# Patient Record
Sex: FEMALE | Race: WHITE | Employment: OTHER | URBAN - METROPOLITAN AREA
[De-identification: names, ages, dates, MRNs, and addresses within clinical notes are randomized per-mention and may not be internally consistent; named-entity substitution may affect disease eponyms.]

---

## 2017-04-21 ENCOUNTER — APPOINTMENT (OUTPATIENT)
Dept: CT IMAGING | Age: 72
End: 2017-04-21
Payer: MEDICARE

## 2017-04-21 ENCOUNTER — HOSPITAL ENCOUNTER (EMERGENCY)
Age: 72
Discharge: HOME | End: 2017-04-21
Payer: MEDICARE

## 2017-04-21 VITALS
HEIGHT: 59 IN | SYSTOLIC BLOOD PRESSURE: 121 MMHG | DIASTOLIC BLOOD PRESSURE: 58 MMHG | TEMPERATURE: 98 F | HEART RATE: 70 BPM | RESPIRATION RATE: 18 BRPM | WEIGHT: 151 LBS | BODY MASS INDEX: 30.44 KG/M2 | OXYGEN SATURATION: 91 %

## 2017-04-21 DIAGNOSIS — S01.01XA SCALP LACERATION, INITIAL ENCOUNTER: ICD-10-CM
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
## 2017-04-21 PROCEDURE — 77030008462 HC STPLR SKN PROX J&J -A

## 2017-04-21 PROCEDURE — 74011250637 HC RX REV CODE- 250/637

## 2017-04-21 PROCEDURE — 70450 CT HEAD/BRAIN W/O DYE: CPT

## 2017-04-21 PROCEDURE — 99284 EMERGENCY DEPT VISIT MOD MDM: CPT

## 2017-04-21 PROCEDURE — 75810000293 HC SIMP/SUPERF WND  RPR

## 2017-04-21 PROCEDURE — 77030018836 HC SOL IRR NACL ICUM -A

## 2017-04-21 RX ORDER — SUCRALFATE 1 G/10ML
1 SUSPENSION ORAL
Status: COMPLETED | OUTPATIENT
Start: 2017-04-21 | End: 2017-04-21

## 2017-04-21 RX ADMIN — SUCRALFATE 1 G: 1 SUSPENSION ORAL at 03:46

## 2017-04-21 NOTE — ED PROVIDER NOTES
HPI Comments: 64-year-old female lost her balance and fell backwards striking her head. No loss of consciousness. She does have a laceration to her scalp bleeding is controlled. Patient is a 66 y.o. female presenting with fall. The history is provided by the patient. Fall   The accident occurred 1 to 2 hours ago. The fall occurred while standing. She fell from a height of ground level. She landed on hard floor. The point of impact was the head. The pain is present in the head. Associated symptoms include laceration. Pertinent negatives include no fever, no numbness, no nausea and no loss of consciousness. The risk factors include being elderly and recurrent falls. The symptoms are aggravated by pressure on injury. She has tried nothing for the symptoms. Past Medical History:   Diagnosis Date    Anxiety     Contact dermatitis and other eczema, due to unspecified cause     Depressive disorder, not elsewhere classified     Encounter for chronic pain management     Esophageal reflux     Essential hypertension, benign     Pure hypercholesterolemia        Past Surgical History:   Procedure Laterality Date    HX CATARACT REMOVAL Bilateral     HX COLONOSCOPY      HX HYSTERECTOMY      HX KNEE REPLACEMENT Right     HX LITHOTRIPSY      HX RETINAL DETACHMENT REPAIR      HX SHOULDER REPLACEMENT Left          History reviewed. No pertinent family history. Social History     Social History    Marital status:      Spouse name: N/A    Number of children: N/A    Years of education: N/A     Occupational History    Not on file. Social History Main Topics    Smoking status: Never Smoker    Smokeless tobacco: Never Used    Alcohol use No    Drug use: No    Sexual activity: Not on file     Other Topics Concern    Not on file     Social History Narrative    Patient and  reside together. She denies any in-house stairs. 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 Activity is reported as cooking and household chores as tolerable. Exercise is reported as intolerable. ALLERGIES: Flexeril [cyclobenzaprine]; Ketoprofen; and Plaquenil [hydroxychloroquine]    Review of Systems   Constitutional: Negative. Negative for activity change and fever. HENT: Negative. Eyes: Negative. Respiratory: Negative. Cardiovascular: Negative. Gastrointestinal: Negative. Negative for nausea. Genitourinary: Negative. Musculoskeletal: Negative. Skin: Negative. Neurological: Negative. Negative for loss of consciousness and numbness. Psychiatric/Behavioral: Negative. All other systems reviewed and are negative. Vitals:    04/21/17 0114 04/21/17 0240 04/21/17 0320 04/21/17 0340   BP: 163/75 114/58 140/75 129/78   Pulse: 76 70 72 73   Resp: 16      Temp: 97.8 °F (36.6 °C)      SpO2: 96% 92% 95% 94%   Weight: 68.5 kg (151 lb)      Height: 4' 11\" (1.499 m)               Physical Exam   Constitutional: She is oriented to person, place, and time. She appears well-developed and well-nourished. No distress. HENT:   Head: Normocephalic. Head is with laceration. Right Ear: External ear normal.   Left Ear: External ear normal.   Nose: Nose normal.   Mouth/Throat: Oropharynx is clear and moist. No oropharyngeal exudate. Eyes: Conjunctivae and EOM are normal. Pupils are equal, round, and reactive to light. Right eye exhibits no discharge. Left eye exhibits no discharge. No scleral icterus. Neck: Normal range of motion. Neck supple. No JVD present. No tracheal deviation present. Cardiovascular: Normal rate, regular rhythm and intact distal pulses. Pulmonary/Chest: Effort normal and breath sounds normal. No stridor. No respiratory distress. She has no wheezes. She exhibits no tenderness. Abdominal: Soft. Bowel sounds are normal. She exhibits no distension and no mass. There is no tenderness.    Musculoskeletal: Normal range of motion. She exhibits no edema or tenderness. Neurological: She is alert and oriented to person, place, and time. No cranial nerve deficit. Skin: Skin is warm and dry. Laceration noted. No rash noted. She is not diaphoretic. No erythema. No pallor. Psychiatric: She has a normal mood and affect. Her behavior is normal. Thought content normal.   Nursing note and vitals reviewed. MDM  Number of Diagnoses or Management Options  Fall, initial encounter: new and does not require workup  Scalp laceration, initial encounter: new and does not require workup  Diagnosis management comments: CT of the head negative for fractures or intracranial hemorrhage. Staples used to close the wound. Amount and/or Complexity of Data Reviewed  Tests in the radiology section of CPT®: ordered and reviewed    Risk of Complications, Morbidity, and/or Mortality  Presenting problems: moderate  Diagnostic procedures: moderate  Management options: moderate      ED Course       Wound Repair  Date/Time: 4/21/2017 4:33 AM  Performed by: attendingPreparation: skin prepped with Shur-Clens  Location details: scalp  Wound length:2.6 - 7.5 cm  Foreign bodies: no foreign bodies  Irrigation solution: saline  Irrigation method: syringe  Skin closure: staples  Number of sutures: 10  Dressing: 4x4  My total time at bedside, performing this procedure was 16-30 minutes. Comments: Good approximation was stapling also added some adhesive glue to wound  To reinforce. Bleeding controlled patient observed for several hours and no further bleeding was noted.

## 2017-04-21 NOTE — ED NOTES
I have reviewed discharge instructions with the patient. The patient verbalized understanding. [facility] to transport pt back to home.

## 2017-04-21 NOTE — ED TRIAGE NOTES
Brought in via EMS. States pt fell 45 minutes ago after losing balance which pt states is a normal problem for her. On fall, pt hit head on coffee table. Denies any LOC. Pt was able to ambulate after fall. Dressing applied prior to arrival to ED. Pt states she does not take any blood thinners. Pt alert and oriented x 4. Respirations are even and unlabored. Pt appears in no acute distress at this time.

## 2017-04-21 NOTE — DISCHARGE INSTRUCTIONS
Cuts: Care Instructions  Your Care Instructions  A cut can happen anywhere on your body. Stitches, staples, skin adhesives, or pieces of tape called Steri-Strips are sometimes used to keep the edges of a cut together and help it heal. Steri-Strips can be used by themselves or with stitches or staples. Sometimes cuts are left open. If the cut went deep and through the skin, the doctor may have closed the cut in two layers. A deeper layer of stitches brings the deep part of the cut together. These stitches will dissolve and don't need to be removed. The upper layer closure, which could be stitches, staples, Steri-Strips, or adhesive, is what you see on the cut. A cut is often covered by a bandage. The doctor has checked you carefully, but problems can develop later. If you notice any problems or new symptoms, get medical treatment right away. Follow-up care is a key part of your treatment and safety. Be sure to make and go to all appointments, and call your doctor if you are having problems. It's also a good idea to know your test results and keep a list of the medicines you take. How can you care for yourself at home? If a cut is open or closed  · Prop up the sore area on a pillow anytime you sit or lie down during the next 3 days. Try to keep it above the level of your heart. This will help reduce swelling. · Keep the cut dry for the first 24 to 48 hours. After this, you can shower if your doctor okays it. Pat the cut dry. · Don't soak the cut, such as in a bathtub. Your doctor will tell you when it's safe to get the cut wet. · After the first 24 to 48 hours, clean the cut with soap and water 2 times a day unless your doctor gives you different instructions. ¨ Don't use hydrogen peroxide or alcohol, which can slow healing. ¨ You may cover the cut with a thin layer of petroleum jelly and a nonstick bandage.   ¨ If the doctor put a bandage over the cut, put on a new bandage after cleaning the cut or if the bandage gets wet or dirty. · Avoid any activity that could cause your cut to reopen. · Be safe with medicines. Read and follow all instructions on the label. ¨ If the doctor gave you a prescription medicine for pain, take it as prescribed. ¨ If you are not taking a prescription pain medicine, ask your doctor if you can take an over-the-counter medicine. If the cut is closed with stitches, staples, or Steri-Strips  · Follow the above instructions for open or closed cuts. · Do not remove the stitches or staples on your own. Your doctor will tell you when to come back to have the stitches or staples removed. · Leave Steri-Strips on until they fall off. If the cut is closed with a skin adhesive  · Follow the above instructions for open or closed cuts. · Leave the skin adhesive on your skin until it falls off on its own. This may take 5 to 10 days. · Do not scratch, rub, or pick at the adhesive. · Do not put the sticky part of a bandage directly on the adhesive. · Do not put any kind of ointment, cream, or lotion over the area. This can make the adhesive fall off too soon. Do not use hydrogen peroxide or alcohol, which can slow healing. When should you call for help? Call your doctor now or seek immediate medical care if:  · You have new pain, or your pain gets worse. · The skin near the cut is cold or pale or changes color. · You have tingling, weakness, or numbness near the cut. · The cut starts to bleed, and blood soaks through the bandage. Oozing small amounts of blood is normal.  · You have trouble moving the area near the cut. · You have symptoms of infection, such as:  ¨ Increased pain, swelling, warmth, or redness around the cut. ¨ Red streaks leading from the cut. ¨ Pus draining from the cut. ¨ A fever. Watch closely for changes in your health, and be sure to contact your doctor if:  · The cut reopens. · You do not get better as expected. Where can you learn more?   Go to http://maisha-faheem.info/. Enter M735 in the search box to learn more about \"Cuts: Care Instructions. \"  Current as of: May 27, 2016  Content Version: 11.2  © 0178-3217 [employer]. Care instructions adapted under license by [employer] (which disclaims liability or warranty for this information). If you have questions about a medical condition or this instruction, always ask your healthcare professional. [address] any warranty or liability for your use of this information. Preventing Falls: Care Instructions  Your Care Instructions  Getting around your home safely can be a challenge if you have injuries or health problems that make it easy for you to fall. Loose rugs and furniture in walkways are among the dangers for many older people who have problems walking or who have poor eyesight. People who have conditions such as arthritis, osteoporosis, or dementia also have to be careful not to fall. You can make your home safer with a few simple measures. Follow-up care is a key part of your treatment and safety. Be sure to make and go to all appointments, and call your doctor if you are having problems. It's also a good idea to know your test results and keep a list of the medicines you take. How can you care for yourself at home? Taking care of yourself  · You may get dizzy if you do not drink enough water. To prevent dehydration, drink plenty of fluids, enough so that your urine is light yellow or clear like water. Choose water and other caffeine-free clear liquids. If you have kidney, heart, or liver disease and have to limit fluids, talk with your doctor before you increase the amount of fluids you drink. · Exercise regularly to improve your strength, muscle tone, and balance. Walk if you can. Swimming may be a good choice if you cannot walk easily. · Have your vision and hearing checked each year or any time you notice a change.  If you have trouble seeing and hearing, you might not be able to avoid objects and could lose your balance. · Know the side effects of the medicines you take. Ask your doctor or pharmacist whether the medicines you take can affect your balance. Sleeping pills or sedatives can affect your balance. · Limit the amount of alcohol you drink. Alcohol can impair your balance and other senses. · Ask your doctor whether calluses or corns on your feet need to be removed. If you wear loose-fitting shoes because of calluses or corns, you can lose your balance and fall. · Talk to your doctor if you have numbness in your feet. Preventing falls at home  · Remove raised doorway thresholds, throw rugs, and clutter. Repair loose carpet or raised areas in the floor. · Move furniture and electrical cords to keep them out of walking paths. · Use nonskid floor wax, and wipe up spills right away, especially on ceramic tile floors. · If you use a walker or cane, put rubber tips on it. If you use crutches, clean the bottoms of them regularly with an abrasive pad, such as steel wool. · Keep your house well lit, especially [place], and outside walkways. Use night-lights in areas such as hallways and bathrooms. Add extra light switches or use remote switches (such as switches that go on or off when you clap your hands) to make it easier to turn lights on if you have to get up during the night. · Install sturdy handrails on stairways. · Move items in your cabinets so that the things you use a lot are on the lower shelves (about waist level). · Keep a cordless phone and a flashlight with new batteries by your bed. If possible, put a phone in each of the main rooms of your house, or carry a cell phone in case you fall and cannot reach a phone. Or, you can wear a device around your neck or wrist. You push a button that sends a signal for help. · Wear low-heeled shoes that fit well and give your feet good support.  Use footwear with nonskid soles. Check the heels and soles of your shoes for wear. Repair or replace worn heels or soles. · Do not wear socks without shoes on wood floors. · Walk on the grass when the sidewalks are slippery. If you live in an area that gets snow and ice in the winter, sprinkle salt on slippery steps and sidewalks. Preventing falls in the bath  · Install grab bars and nonskid mats inside and outside your shower or tub and near the toilet and sinks. · Use shower chairs and bath benches. · Use a hand-held shower head that will allow you to sit while showering. · Get into a tub or shower by putting the weaker leg in first. Get out of a tub or shower with your strong side first.  · Repair loose toilet seats and consider installing a raised toilet seat to make getting on and off the toilet easier. · Keep your bathroom door unlocked while you are in the shower. Where can you learn more? Go to http://maishaParachutefaheem.info/. Enter 0476 [id number] in the search box to learn more about \"Preventing Falls: Care Instructions. \"  Current as of: August 4, 2016  Content Version: 11.2  © 1717-9769 [employer]. Care instructions adapted under license by [employer] (which disclaims liability or warranty for this information). If you have questions about a medical condition or this instruction, always ask your healthcare professional. [place] [address] any warranty or liability for your use of this information. Cuts Closed With Staples: Care Instructions  Your Care Instructions  A cut can happen anywhere on your body. The doctor used staples to close the cut. Staples easily and quickly close a cut, which helps the cut heal.  Sometimes a cut can injure tendons, blood vessels, or nerves. If the cut went deep and through the skin, the doctor may have put in a layer of stitches below the staples. The deeper layer of stitches brings the deep part of the cut together.  These stitches will dissolve and don't need to be removed. The staples in the upper layer are what you see on the cut. You may have a bandage. You will need to have the staples removed, usually in 7 to 14 days. The doctor has checked you carefully, but problems can develop later. If you notice any problems or new symptoms, get medical treatment right away. Follow-up care is a key part of your treatment and safety. Be sure to make and go to all appointments, and call your doctor if you are having problems. It's also a good idea to know your test results and keep a list of the medicines you take. How can you care for yourself at home? · Keep the cut dry for the first 24 to 48 hours. After this, you can shower if your doctor okays it. Pat the cut dry. · Don't soak the cut, such as in a bathtub. Your doctor will tell you when it's safe to get the cut wet. · If your doctor told you how to care for your cut, follow your doctor's instructions. If you did not get instructions, follow this general advice:  ¨ After the first 24 to 48 hours, wash around the cut with clean water 2 times a day. Don't use hydrogen peroxide or alcohol, which can slow healing. ¨ You may cover the cut with a thin layer of petroleum jelly, such as Vaseline, and a nonstick bandage. ¨ Apply more petroleum jelly and replace the bandage as needed. · Avoid any activity that could cause your cut to reopen. · Do not remove the staples on your own. Your doctor will tell you when to come back to have the staples removed. · Take pain medicines exactly as directed. ¨ If the doctor gave you a prescription medicine for pain, take it as prescribed. ¨ If you are not taking a prescription pain medicine, ask your doctor if you can take an over-the-counter medicine. When should you call for help? Call your doctor now or seek immediate medical care if:  · You have new pain, or your pain gets worse.   · The skin near the cut is cold or pale or changes color.  · You have tingling, weakness, or numbness near the cut. · The cut starts to bleed, and blood soaks through the bandage. Oozing small amounts of blood is normal.  · You have trouble moving the area near the cut. · You have symptoms of infection, such as:  ¨ Increased pain, swelling, warmth, or redness around the cut. ¨ Red streaks leading from the cut. ¨ Pus draining from the cut. ¨ A fever. Watch closely for changes in your health, and be sure to contact your doctor if:  · You do not get better as expected. Where can you learn more? Go to http://maisha-faheem.info/. Enter A618 in the search box to learn more about \"Cuts Closed With Staples: Care Instructions. \"  Current as of: May 27, 2016  Content Version: 11.2  © 4737-5997 [employer]. Care instructions adapted under license by [employer] (which disclaims liability or warranty for this information). If you have questions about a medical condition or this instruction, always ask your healthcare professional. [address] any warranty or liability for your use of this information.

## 2017-04-22 ENCOUNTER — PATIENT OUTREACH (OUTPATIENT)
Dept: CASE MANAGEMENT | Age: 72
End: 2017-04-22

## 2017-04-22 NOTE — PROGRESS NOTES
Initial outreach attempt to patient was unsuccessful. Second outreach attempt will be made within 24 hours. This note will not be viewable in [address]

## 2017-08-05 ENCOUNTER — APPOINTMENT (OUTPATIENT)
Dept: GENERAL RADIOLOGY | Age: 72
End: 2017-08-05
Attending: EMERGENCY MEDICINE
Payer: MEDICARE

## 2017-08-05 ENCOUNTER — HOSPITAL ENCOUNTER (EMERGENCY)
Age: 72
Discharge: HOME | End: 2017-08-06
Attending: EMERGENCY MEDICINE
Payer: MEDICARE

## 2017-08-05 DIAGNOSIS — K21.9 GASTROESOPHAGEAL REFLUX DISEASE, ESOPHAGITIS PRESENCE NOT SPECIFIED: ICD-10-CM

## 2017-08-05 DIAGNOSIS — R55 VASOVAGAL EPISODE: ICD-10-CM

## 2017-08-05 DIAGNOSIS — R55 NEAR SYNCOPE: Primary | ICD-10-CM

## 2017-08-05 LAB
ALBUMIN SERPL BCP-MCNC: 3.2 G/DL (ref 3.2–4.6)
ALBUMIN/GLOB SERPL: 0.9 {RATIO} (ref 1.2–3.5)
ALP SERPL-CCNC: 90 U/L (ref 50–136)
ALT SERPL-CCNC: 18 U/L (ref 12–65)
ANION GAP BLD CALC-SCNC: 12 MMOL/L (ref 7–16)
AST SERPL W P-5'-P-CCNC: 21 U/L (ref 15–37)
BASOPHILS # BLD AUTO: 0 K/UL (ref 0–0.2)
BASOPHILS # BLD: 0 % (ref 0–2)
BILIRUB SERPL-MCNC: 0.2 MG/DL (ref 0.2–1.1)
BUN SERPL-MCNC: 9 MG/DL (ref 8–23)
CALCIUM SERPL-MCNC: 8.2 MG/DL (ref 8.3–10.4)
CHLORIDE SERPL-SCNC: 107 MMOL/L (ref 98–107)
CO2 SERPL-SCNC: 25 MMOL/L (ref 21–32)
CREAT SERPL-MCNC: 0.69 MG/DL (ref 0.6–1)
DIFFERENTIAL METHOD BLD: ABNORMAL
EOSINOPHIL # BLD: 0.3 K/UL (ref 0–0.8)
EOSINOPHIL NFR BLD: 3 % (ref 0.5–7.8)
ERYTHROCYTE [DISTWIDTH] IN BLOOD BY AUTOMATED COUNT: 15.3 % (ref 11.9–14.6)
GLOBULIN SER CALC-MCNC: 3.6 G/DL (ref 2.3–3.5)
GLUCOSE SERPL-MCNC: 151 MG/DL (ref 65–100)
HCT VFR BLD AUTO: 34.3 % (ref 35.8–46.3)
HGB BLD-MCNC: 10.5 G/DL (ref 11.7–15.4)
IMM GRANULOCYTES # BLD: 0 K/UL (ref 0–0.5)
IMM GRANULOCYTES NFR BLD AUTO: 0.2 % (ref 0–5)
LYMPHOCYTES # BLD AUTO: 11 % (ref 13–44)
LYMPHOCYTES # BLD: 1.2 K/UL (ref 0.5–4.6)
MCH RBC QN AUTO: 25.7 PG (ref 26.1–32.9)
MCHC RBC AUTO-ENTMCNC: 30.6 G/DL (ref 31.4–35)
MCV RBC AUTO: 84.1 FL (ref 79.6–97.8)
MONOCYTES # BLD: 1.3 K/UL (ref 0.1–1.3)
MONOCYTES NFR BLD AUTO: 12 % (ref 4–12)
NEUTS SEG # BLD: 7.8 K/UL (ref 1.7–8.2)
NEUTS SEG NFR BLD AUTO: 74 % (ref 43–78)
PLATELET # BLD AUTO: 288 K/UL (ref 150–450)
PMV BLD AUTO: 9.7 FL (ref 10.8–14.1)
POTASSIUM SERPL-SCNC: 3.3 MMOL/L (ref 3.5–5.1)
PROT SERPL-MCNC: 6.8 G/DL (ref 6.3–8.2)
RBC # BLD AUTO: 4.08 M/UL (ref 4.05–5.25)
SODIUM SERPL-SCNC: 144 MMOL/L (ref 136–145)
TROPONIN I BLD-MCNC: 0.01 NG/ML (ref 0–0.08)
WBC # BLD AUTO: 10.6 K/UL (ref 4.3–11.1)

## 2017-08-05 PROCEDURE — 85025 COMPLETE CBC W/AUTO DIFF WBC: CPT | Performed by: EMERGENCY MEDICINE

## 2017-08-05 PROCEDURE — 84484 ASSAY OF TROPONIN QUANT: CPT

## 2017-08-05 PROCEDURE — 93005 ELECTROCARDIOGRAM TRACING: CPT | Performed by: EMERGENCY MEDICINE

## 2017-08-05 PROCEDURE — 80053 COMPREHEN METABOLIC PANEL: CPT | Performed by: EMERGENCY MEDICINE

## 2017-08-05 PROCEDURE — 71010 XR CHEST PORT: CPT

## 2017-08-05 PROCEDURE — 99284 EMERGENCY DEPT VISIT MOD MDM: CPT | Performed by: EMERGENCY MEDICINE

## 2017-08-06 ENCOUNTER — APPOINTMENT (OUTPATIENT)
Dept: GENERAL RADIOLOGY | Age: 72
End: 2017-08-06
Attending: EMERGENCY MEDICINE
Payer: MEDICARE

## 2017-08-06 VITALS
HEART RATE: 85 BPM | DIASTOLIC BLOOD PRESSURE: 57 MMHG | OXYGEN SATURATION: 96 % | RESPIRATION RATE: 16 BRPM | SYSTOLIC BLOOD PRESSURE: 115 MMHG | TEMPERATURE: 98.2 F

## 2017-08-06 LAB
ATRIAL RATE: 88 BPM
CALCULATED P AXIS, ECG09: 77 DEGREES
CALCULATED R AXIS, ECG10: 57 DEGREES
CALCULATED T AXIS, ECG11: 16 DEGREES
DIAGNOSIS, 93000: NORMAL
P-R INTERVAL, ECG05: 162 MS
Q-T INTERVAL, ECG07: 350 MS
QRS DURATION, ECG06: 84 MS
QTC CALCULATION (BEZET), ECG08: 423 MS
VENTRICULAR RATE, ECG03: 88 BPM

## 2017-08-06 PROCEDURE — 71020 XR CHEST PA LAT: CPT

## 2017-08-06 PROCEDURE — 74011250637 HC RX REV CODE- 250/637: Performed by: EMERGENCY MEDICINE

## 2017-08-06 RX ADMIN — Medication 30 ML: at 00:10

## 2017-08-06 NOTE — ED NOTES
After trying to contact patient's son to come and pick her up, patient's  (who is unable to drive and is currently at home), said to have an ambulance bring patient home. Patient being transported home via EMS.

## 2017-08-06 NOTE — DISCHARGE INSTRUCTIONS
Gastroesophageal Reflux Disease (GERD): Care Instructions  Your Care Instructions    Gastroesophageal reflux disease (GERD) is the backward flow of stomach acid into the esophagus. The esophagus is the tube that leads from your throat to your stomach. A one-way valve prevents the stomach acid from moving up into this tube. When you have GERD, this valve does not close tightly enough. If you have mild GERD symptoms including heartburn, you may be able to control the problem with antacids or over-the-counter medicine. Changing your diet, losing weight, and making other lifestyle changes can also help reduce symptoms. Follow-up care is a key part of your treatment and safety. Be sure to make and go to all appointments, and call your doctor if you are having problems. Its also a good idea to know your test results and keep a list of the medicines you take. How can you care for yourself at home? · Take your medicines exactly as prescribed. Call your doctor if you think you are having a problem with your medicine. · Your doctor may recommend over-the-counter medicine. For mild or occasional indigestion, antacids, such as Tums, Gaviscon, Mylanta, or Maalox, may help. Your doctor also may recommend over-the-counter acid reducers, such as Pepcid AC, Tagamet HB, Zantac 75, or Prilosec. Read and follow all instructions on the label. If you use these medicines often, talk with your doctor. · Change your eating habits. ¨ Its best to eat several small meals instead of two or three large meals. ¨ After you eat, wait 2 to 3 hours before you lie down. ¨ Chocolate, mint, and alcohol can make GERD worse. ¨ Spicy foods, foods that have a lot of acid (like tomatoes and oranges), and coffee can make GERD symptoms worse in some people. If your symptoms are worse after you eat a certain food, you may want to stop eating that food to see if your symptoms get better.   · Do not smoke or chew tobacco. Smoking can make GERD worse. If you need help quitting, talk to your doctor about stop-smoking programs and medicines. These can increase your chances of quitting for good. · If you have GERD symptoms at night, raise the head of your bed 6 to 8 inches by putting the frame on blocks or placing a foam wedge under the head of your mattress. (Adding extra pillows does not work.)  · Do not wear tight clothing around your middle. · Lose weight if you need to. Losing just 5 to 10 pounds can help. When should you call for help? Call your doctor now or seek immediate medical care if:  · You have new or different belly pain. · Your stools are black and tarlike or have streaks of blood. Watch closely for changes in your health, and be sure to contact your doctor if:  · Your symptoms have not improved after 2 days. · Food seems to catch in your throat or chest.  Where can you learn more? Go to http://maishaHeliospectrafaheem.info/. Enter V179 in the search box to learn more about \"Gastroesophageal Reflux Disease (GERD): Care Instructions. \"  Current as of: August 9, 2016  Content Version: 11.3  © 0516-9427 [employer]. Care instructions adapted under license by [employer] (which disclaims liability or warranty for this information). If you have questions about a medical condition or this instruction, always ask your healthcare professional. [address] any warranty or liability for your use of this information. Vasovagal Syncope: Care Instructions  Your Care Instructions    Vasovagal syncope (say \"[id number] [id number]\") is sudden dizziness or fainting that can be set off by things such as pain, stress, fear, or trauma. You may sweat or feel lightheaded, sick to your stomach, or tingly. The problem causes the heart rate to slow and the blood vessels to widen, or dilate, for a short time.  When this happens, blood pools in the lower body, and less blood goes to the brain. You can usually get relief by lying down with your legs raised (elevated). This helps more blood to flow to your brain and may help relieve symptoms like feeling dizzy. Some doctors may recommend a technique that involves tensing your fists and arms. This type of fainting is often easy to predict. For example, it happens to some people when they see blood or have to get a shot. They may feel symptoms before they faint. An episode of vasovagal syncope usually responds well to self-care. Other treatment often isn't needed. But if the fainting keeps happening, your doctor may suggest further treatments. Follow-up care is a key part of your treatment and safety. Be sure to make and go to all appointments, and call your doctor if you are having problems. It's also a good idea to know your test results and keep a list of the medicines you take. How can you care for yourself at home? · Drink plenty of fluids to prevent dehydration. If you have kidney, heart, or liver disease and have to limit fluids, talk with your doctor before you increase your fluid intake. · Try to avoid things that you think may set off vasovagal syncope. · Talk to your doctor about any medicines you take. Some medicines may increase the chance of this condition occurring. · If you feel symptoms, lie down with your legs raised. Talk to your doctor about what to do if your symptoms come back. When should you call for help? Call 911 anytime you think you may need emergency care. For example, call if:  · You have symptoms of a heart problem. These may include:  ¨ Chest pain or pressure. ¨ Severe trouble breathing. ¨ A fast or irregular heartbeat. Watch closely for changes in your health, and be sure to contact your doctor if:  · You have more episodes of fainting at home. · You do not get better as expected. Where can you learn more? Go to http://maisha-faheem.info/.   Enter L754 in the search box to learn more about \"Vasovagal Syncope: Care Instructions. \"  Current as of: March 20, 2017  Content Version: 11.3  © 6591-6524 [employer]. Care instructions adapted under license by [employer] (which disclaims liability or warranty for this information). If you have questions about a medical condition or this instruction, always ask your healthcare professional. [address] any warranty or liability for your use of this information.

## 2017-08-06 NOTE — ED TRIAGE NOTES
Pt arrives via [facility] complaining of a syncopal episode after a bowel movement. Per EMS patient stated she never passed out, but she was dizzy. Pt has had 150 of fluid, /50, HR 80s, pt alert and oriented. States these episodes have happened 6 times over the past 2 years.   Son's phone number [phone]

## 2017-08-06 NOTE — ED PROVIDER NOTES
HPI Comments: 26-year-old female presents with a near syncopal episode while straining to have a bowel movement. Patient has a history of vagal episodes in the past and stated it is was similar to what she's had before. Patient denies loss of consciousness and states that she did not fall and did not injure herself  Currently she is complaining mainly of a cough related to her reflux disease. She missed a dose of her Carafate earlier this evening. Patient is a 74 y.o. female presenting with syncope. The history is provided by the patient. Syncope    This is a recurrent problem. The current episode started 1 to 2 hours ago. The problem occurs rarely. The problem has been resolved. There was no loss of consciousness. The problem is associated with bowel movement. Associated symptoms include nausea. Pertinent negatives include no visual change, no chest pain, no palpitations, no confusion, no fever, no abdominal pain, no bowel incontinence, no vomiting, no bladder incontinence, no congestion, no headaches, no back pain, no focal weakness, no seizures and no slurred speech. She has tried nothing for the symptoms. Her past medical history does not include no syncope. Past Medical History:   Diagnosis Date    Anxiety     Contact dermatitis and other eczema, due to unspecified cause     Depressive disorder, not elsewhere classified     Encounter for chronic pain management     Esophageal reflux     Essential hypertension, benign     Pure hypercholesterolemia        Past Surgical History:   Procedure Laterality Date    HX CATARACT REMOVAL Bilateral     HX COLONOSCOPY      HX HYSTERECTOMY      HX KNEE REPLACEMENT Right     HX LITHOTRIPSY      HX RETINAL DETACHMENT REPAIR      HX SHOULDER REPLACEMENT Left          History reviewed. No pertinent family history.     Social History     Social History    Marital status:      Spouse name: N/A    Number of children: N/A    Years of education: N/A     Occupational History    Not on file. Social History Main Topics    Smoking status: Never Smoker    Smokeless tobacco: Never Used    Alcohol use No    Drug use: No    Sexual activity: Not on file     Other Topics Concern    Not on file     Social History Narrative    Patient and  reside together. She denies any in-house stairs. 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 Activity is reported as cooking and household chores as tolerable. Exercise is reported as intolerable. ALLERGIES: Flexeril [cyclobenzaprine]; Ketoprofen; and Plaquenil [hydroxychloroquine]    Review of Systems   Constitutional: Negative for chills and fever. HENT: Negative for congestion, ear pain and rhinorrhea. Eyes: Negative for photophobia and discharge. Respiratory: Negative for cough and shortness of breath. Cardiovascular: Positive for syncope. Negative for chest pain and palpitations. Gastrointestinal: Positive for nausea. Negative for abdominal pain, bowel incontinence, constipation, diarrhea and vomiting. Endocrine: Negative for cold intolerance and heat intolerance. Genitourinary: Negative for bladder incontinence, dysuria and flank pain. Musculoskeletal: Negative for arthralgias, back pain, myalgias and neck pain. Skin: Negative for rash and wound. Allergic/Immunologic: Negative for environmental allergies and food allergies. Neurological: Negative for focal weakness, seizures, syncope and headaches. Hematological: Negative for adenopathy. Does not bruise/bleed easily. Psychiatric/Behavioral: Negative for confusion and dysphoric mood. The patient is not nervous/anxious. All other systems reviewed and are negative. Vitals:    08/05/17 2244   BP: 101/50   Pulse: 88   Resp: 17   Temp: 98.2 °F (36.8 °C)   SpO2: 90%            Physical Exam   Constitutional: She is oriented to person, place, and time.  She appears well-developed and well-nourished. She appears distressed. HENT:   Head: Normocephalic and atraumatic. Mouth/Throat: Oropharynx is clear and moist.   Eyes: Conjunctivae and EOM are normal. Pupils are equal, round, and reactive to light. Right eye exhibits no discharge. Left eye exhibits no discharge. No scleral icterus. Neck: Normal range of motion. Neck supple. No thyromegaly present. Cardiovascular: Normal rate, regular rhythm, normal heart sounds and intact distal pulses. Exam reveals no gallop and no friction rub. No murmur heard. Pulmonary/Chest: Effort normal and breath sounds normal. No respiratory distress. She has no wheezes. She exhibits no tenderness. Abdominal: Soft. Bowel sounds are normal. She exhibits no distension. There is no hepatosplenomegaly. There is no tenderness. There is no rebound and no guarding. No hernia. Musculoskeletal: Normal range of motion. She exhibits no edema or tenderness. Neurological: She is alert and oriented to person, place, and time. She has normal strength. No cranial nerve deficit or sensory deficit. She exhibits normal muscle tone. GCS eye subscore is 4. GCS verbal subscore is 5. GCS motor subscore is 6. Skin: Skin is warm, dry and intact. No rash noted. She is not diaphoretic. No erythema. There is pallor. Psychiatric: She has a normal mood and affect. Her speech is normal and behavior is normal. Judgment and thought content normal. Cognition and memory are normal.   Nursing note and vitals reviewed.        MDM  Number of Diagnoses or Management Options  Gastroesophageal reflux disease, esophagitis presence not specified: established and worsening  Near syncope: established and worsening  Vasovagal episode: established and worsening  Diagnosis management comments: EKG reviewed  Sinus rhythm   normal intervals and axis   no ectopy   no acute ischemic changes    Patient remains asymptomatic Workup tonight does not reveal any acute changes  Patient states that these symptoms are similar to past episodes that have been worked up by her primary care provider currently she feels well  Reflux symptoms improved with GI cocktail. Patient will be discharged I have advised close follow-up with her primary care provider       Amount and/or Complexity of Data Reviewed  Clinical lab tests: ordered and reviewed  Tests in the radiology section of CPT®: ordered and reviewed  Review and summarize past medical records: yes    Risk of Complications, Morbidity, and/or Mortality  Presenting problems: high  Diagnostic procedures: moderate  Management options: moderate  General comments: Elements of this note have been dictated via voice recognition software. Text and phrases may be limited by the accuracy of the software. The chart has been reviewed, but errors may still be present.       Patient Progress  Patient progress: improved    ED Course       Procedures

## 2018-02-07 ENCOUNTER — HOSPITAL ENCOUNTER (OUTPATIENT)
Dept: PHYSICAL THERAPY | Age: 73
End: 2018-02-07
Attending: FAMILY MEDICINE

## 2018-07-18 PROBLEM — R29.6 FREQUENT FALLS: Status: ACTIVE | Noted: 2018-07-18

## 2018-07-18 PROBLEM — R29.3 POSTURAL IMBALANCE: Status: ACTIVE | Noted: 2018-07-18

## 2018-07-18 PROBLEM — M54.2 NECK PAIN: Status: ACTIVE | Noted: 2018-07-18

## 2018-07-18 PROBLEM — R26.9 ABNORMALITY OF GAIT: Status: ACTIVE | Noted: 2018-07-18

## 2018-07-31 ENCOUNTER — HOSPITAL ENCOUNTER (OUTPATIENT)
Dept: MRI IMAGING | Age: 73
Discharge: HOME | End: 2018-07-31
Attending: NURSE PRACTITIONER
Payer: MEDICARE

## 2018-07-31 DIAGNOSIS — R26.9 ABNORMALITY OF GAIT: ICD-10-CM

## 2018-07-31 DIAGNOSIS — R29.6 FREQUENT FALLS: ICD-10-CM

## 2018-07-31 DIAGNOSIS — M54.2 NECK PAIN: ICD-10-CM

## 2018-07-31 PROCEDURE — 70551 MRI BRAIN STEM W/O DYE: CPT

## 2018-07-31 PROCEDURE — 72141 MRI NECK SPINE W/O DYE: CPT

## 2018-10-29 PROBLEM — M48.02 SPINAL STENOSIS IN CERVICAL REGION: Status: ACTIVE | Noted: 2018-10-29

## 2018-10-29 PROBLEM — G31.9 CEREBELLAR ATROPHY (HCC): Status: ACTIVE | Noted: 2018-10-29

## 2018-10-30 ENCOUNTER — HOSPITAL ENCOUNTER (OUTPATIENT)
Dept: MAMMOGRAPHY | Age: 73
Discharge: HOME | End: 2018-10-30
Attending: FAMILY MEDICINE
Payer: MEDICARE

## 2018-10-30 DIAGNOSIS — Z12.39 SCREENING BREAST EXAMINATION: ICD-10-CM

## 2018-10-30 PROCEDURE — 77067 SCR MAMMO BI INCL CAD: CPT

## 2018-11-21 ENCOUNTER — HOSPITAL ENCOUNTER (OUTPATIENT)
Dept: PHYSICAL THERAPY | Age: 73
End: 2018-11-21
Attending: NURSE PRACTITIONER

## 2018-12-05 ENCOUNTER — HOSPITAL ENCOUNTER (OUTPATIENT)
Dept: PHYSICAL THERAPY | Age: 73
Discharge: HOME | End: 2018-12-05
Payer: MEDICARE

## 2018-12-05 PROCEDURE — G8978 MOBILITY CURRENT STATUS: HCPCS

## 2018-12-05 PROCEDURE — 97110 THERAPEUTIC EXERCISES: CPT

## 2018-12-05 PROCEDURE — 97162 PT EVAL MOD COMPLEX 30 MIN: CPT

## 2018-12-05 PROCEDURE — G8979 MOBILITY GOAL STATUS: HCPCS

## 2018-12-05 NOTE — THERAPY EVALUATION
Tyson Richard  : 1945  [place]  Secondary: [facility]  [facility], [address] [place] [address],[address], [facility] [address].  [phone]   [phone]           OUTPATIENT PHYSICAL THERAPY:Initial Assessment 2018   ICD-10: Treatment Diagnosis: Other abnormalities of gait and mobility R26.89;   Precautions/Allergies:   Flexeril [cyclobenzaprine]; Ketoprofen; and Plaquenil [hydroxychloroquine]   MD Orders: eval and treat MEDICAL/REFERRING DIAGNOSIS:  Degenerative disease of nervous system, unspecified [G31.9]  Spinal stenosis, cervical region [M48.02]   DATE OF ONSET: years  REFERRING PHYSICIAN: Teo Sánchez NP  RETURN PHYSICIAN APPOINTMENT:      INITIAL ASSESSMENT:  Ms. Ashlyn Higuera presents with imbalance, weakness, unsteady gait. Patient has a history of falls. . Patient would benefit from PT to address these problems to improve patient's independence and safety with mobility and daily activities. Thank you. PROBLEM LIST (Impacting functional limitations):  1. Decreased Strength  2. Decreased ADL/Functional Activities  3. Decreased Transfer Abilities  4. Decreased Ambulation Ability/Technique  5. Decreased Balance  6. Decreased Activity Tolerance  7. Decreased [place] with Home Exercise Program INTERVENTIONS PLANNED:  1. Balance Exercise  2. Bed Mobility  3. Family Education  4. Gait Training  5. Home Exercise Program (HEP)  6. Neuromuscular Re-education/Strengthening  7. Therapeutic Activites  8. Therapeutic Exercise/Strengthening  9. Transfer Training   TREATMENT PLAN:  Effective Dates: 2018 TO 3/3/2019 (90 days). Frequency/Duration: 1-2 times a week for 60- 90 Day(s)  GOALS: (Goals have been discussed and agreed upon with patient.)  Short-Term Functional Goals: Time Frame: 2-4 weeks  1. Patient will demonstrate independence and compliance with home exercise program to improve balance and strength for daily activities. 2.   3.   Discharge Goals: Time Frame: 8-12 weeks  1. Patient will increase bilateral lower extremity strength to greater than or equal to 4/5 to improve strength for functional mobility activities. 2. Patient will increase her score on the Lockwood Balance Scale to greater than or equal to 14/56 indicating improved safety and decreased fall risk for daily activities. Patient will demonstrate improved score to less than or equal to 24 seconds on the Timed Up and Go Test indicating improved mobility for daily activities. Patient will demonstrate improved time on Five Time Sit to Stand Test to less than or equal to 18 seconds indicating improved LE strength for daily mobility. Rehabilitation Potential For Stated Goals: Good  Regarding Mona Jasso therapy, I certify that the treatment plan above will be carried out by a therapist or under their direction. Thank you for this referral,  Lavelle Pedersen PT     Referring Physician Signature: Bishop Keila NP              Date                    The information in this section was collected on 12/5/18 (except where otherwise noted). HISTORY:   History of Present Injury/Illness (Reason for Referral):  Balance has been worsening over the last couple of years, especially the last 6 months. R neck hurts at times, and into head. Has had a lot of falls. Getting harder to get up from the floor, usually use a chair. Walks with rollator walker x 15-20 years. No numbness, no dizziness. Per MD notes: \"Patient presents for follow up for spinal cerebellar atrophy and frequent falls. She has a sister with SCA, similar symptoms to patient, presume SCA is genetic, though patient has not undergone genetic testing. She has a 30 year history of SCA and chronic difficulty with her gait worsening over the last 15 years. More recently she is experiencing more falls at home, despite using her rollator. She denies any difficulty with her vision.   Recent MRI brain showed cerebellar atrophy. 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 \"      Past Medical History/Comorbidities:   Ms. Marcia Ortiz  has a past medical history of Abnormality of gait, Anemia, Anxiety, Arthritis, Asthma, Calculus of kidney, Chronic pain, Contact dermatitis and other eczema, due to unspecified cause, Depressive disorder, not elsewhere classified, Encounter for chronic pain management, Esophageal reflux, Essential hypertension, benign, Frequent falls, GERD (gastroesophageal reflux disease), and Pure hypercholesterolemia. Ms. Marcia Ortiz  has a past surgical history that includes hx knee replacement (Right); hx shoulder replacement (Left); hx hysterectomy; hx lithotripsy; hx cataract removal (Bilateral); hx retinal detachment repair; hx colonoscopy; hx gi; and pr neurological procedure unlisted. Social History/Living Environment:     ; one level home; doesn't do cooking anymore; has someone who comes to clean. Prior Level of Function/Work/Activity:  Independent with rollator. Does not drive. Dominant Side:         LEFT  Previous Treatment Approaches:          Had HHPT earlier in the year. Had OPPT at [facility] last year. Personal Factors:          Sex:  female        Age:  74 y.o. Ambulatory/Rehab Services H2 Model Falls Risk Assessment    Risk Factors:       (5)  History of Recent Falls [w/in 3 months] Ability to Rise from Chair:       (1)  Pushes up, successful in one attempt    Falls Prevention Plan: Mobility Assistance Device (specify):  rollator walker   Total: (5 or greater = High Risk): 6    ©2010 [facility] [place] [address] [place] [address] Patent #[id number].  Federal Law prohibits the replication, distribution or use without written permission from [facility] [employer]     Current Medications:       Current Outpatient Medications:     sertraline (ZOLOFT) 100 mg tablet, Take 1 Tab by mouth daily. , Disp: 135 Tab, Rfl: 1    fluticasone (FLONASE) 50 mcg/actuation nasal spray, 2 Sprays by Both Nostrils route daily. , Disp: 3 Bottle, Rfl: 12    clorazepate (TRANXENE) 3.75 mg tablet, Take 1 Tab by mouth three (3) times daily. Max Daily Amount: 11.25 mg., Disp: 270 Tab, Rfl: 1    calcium carbonate (OS-JANA) 500 mg calcium (1,250 mg) tablet, Take  by mouth daily. , Disp: , Rfl:     tiotropium (SPIRIVA WITH HANDIHALER) 18 mcg inhalation capsule, Take 1 Cap by inhalation daily. , Disp: , Rfl:     ascorbic acid, vitamin C, (VITAMIN C) 500 mg tablet, Take  by mouth., Disp: , Rfl:     melatonin 10 mg cap, Take  by mouth., Disp: , Rfl:     Iron Fum & PS Cmp-Vit C-Niacin (INTEGRA) 125-40-3 mg cap, Take 125 mg by mouth daily. , Disp: 90 Cap, Rfl: 1    montelukast (SINGULAIR) 10 mg tablet, Take 10 mg by mouth daily. , Disp: , Rfl:     lisinopril (PRINIVIL, ZESTRIL) 5 mg tablet, Take 1 Tab by mouth daily. , Disp: 90 Tab, Rfl: 1    pantoprazole (PROTONIX) 40 mg tablet, Take 1 Tab by mouth two (2) times a day., Disp: 180 Tab, Rfl: 1    sucralfate (CARAFATE) 1 gram tablet, Take 1 Tab by mouth three (3) times daily. , Disp: 270 Tab, Rfl: 3    nystatin-triamcinolone (MYCOLOG II) topical cream, Apply  to affected area two (2) times a day., Disp: 60 g, Rfl: 3    SYMBICORT 80-4.5 mcg/actuation HFAA, INHALE 1 PUFF PO BID FOR COUGH, Disp: , Rfl: 0    PROAIR HFA 90 mcg/actuation inhaler, USE 2 PUFFS PO BID PRF COUGH, Disp: , Rfl: 0    dietary supplement cap, Take  by mouth., Disp: , Rfl:     docusate sodium (STOOL SOFTENER) 100 mg tab, Take  by mouth as needed. , Disp: , Rfl:     glucosamine & chondroit sul. Na 750-400 mg cmpk, Take  by mouth., Disp: , Rfl:     omega-3 fatty acids-vitamin e (FISH OIL) 1,000 mg cap, Take 1 Cap by mouth. 1 by oral route twice daily, Disp: , Rfl:     VIT C/ABDIAZIZ AC/LUT/COPPER/ZNOX (PRESERVISION LUTEIN PO), Take  by mouth.  1 by oral route daily, Disp: , Rfl:    cholecalciferol (VITAMIN D3) 1,000 unit tablet, Take  by mouth daily. , Disp: , Rfl:     aspirin delayed-release 81 mg tablet, Take  by mouth daily. , Disp: , Rfl:    Date Last Reviewed:  12/5/18   Number of Personal Factors/Comorbidities that affect the Plan of Care: 1-2: MODERATE COMPLEXITY   EXAMINATION:   Observation/Orthostatic Postural Assessment: Forward head posture, forward bent on rollator. Strength:          R LE 4/5; L LE 4-/5  Functional Mobility:         Gait/Ambulation:  Patient ambulates with rollator walker at slower pace. Forward bent posture. Decreased step length and clearance L LE. Transfers:  Independent with UE assist        Bed Mobility:  independent        Stairs:  NT  Sensation:         intact  Postural Control & Balance:  · Lockwood Balance Scale:  11/56.   (A score less than 45/56 indicates high risk of falls)     · Dynamic Gait Index:  /24. (A score less than or equal to19/24 is abnormal and predictive of falls)          Body Structures Involved:  1. Nerves  2. Eyes and Ears  3. Muscles Body Functions Affected:  1. Sensory/Pain  2. Neuromusculoskeletal  3. Movement Related Activities and Participation Affected:  1. General Tasks and Demands  2. Mobility  3. Domestic Life  4. Community, Social and [place]   Number of elements (examined above) that affect the Plan of Care: 3: MODERATE COMPLEXITY   CLINICAL PRESENTATION:   Presentation: Evolving clinical presentation with changing clinical characteristics: MODERATE COMPLEXITY   CLINICAL DECISION MAKING:   Outcome Measure: Tool Used: Lockwood Balance Scale  Score:  Initial: 11/56 Most Recent: X/56 (Date: -- )   Interpretation of Score: Each section is scored on a 0-4 scale, 0 representing the patients inability to perform the task and 4 representing independence. The scores of each section are added together for a total score of 56. The higher the patients score, the more independent the patient is.   Any score below 45 indicates increased risk for falls. Score 56 55-45 44-34 33-23 22-12 11-1 0   Modifier  CI CJ CK CL CM CN     Tool Used: Timed Up and Go (TUG)  Score:  Initial: 38.64 seconds with rollator Most Recent: X seconds (Date: -- )   Interpretation of Score: The test measures, in seconds, the time taken by an individual to stand up from a standard arm chair (seat height 46 cm [18 in], arm height 65 cm [25.6 in]), walk a distance of 3 meters (118 in, approx 10 ft), turn, walk back to the chair and sit down. If the individual takes longer than 14 seconds to complete TUG, this indicates risk for falls. Score 7 7.5-10.5 11-14 14.5-17.5 18-21 21.5-24.5 25+   Modifier  CI CJ CK CL CM CN     ? Mobility - Walking and Moving Around:     - CURRENT STATUS: CN - 100% impaired, limited or restricted    - GOAL STATUS: CM - 80%-99% impaired, limited or restricted    - D/C STATUS:  ---------------To be determined---------------     Tool Used: Five Times Sit to Stand (FTSST or 5xSST)   Score:  Initial: 20.86 seconds with UE assist Most Recent:  seconds (Date: -- )   Interpretation of Score: This is a measure of functional lower limb muscle strength and quantifying functional change of transitional movements. A score of 12 seconds or less indicates a normal level of function for community dwelling older individuals, a score of 15 seconds or more is at risk for fall. Score 12 or less 13-14 15-16 17-18 19-20 20-21 22 or greater   Modifier  CI CJ CK CL CM CN       Medical Necessity:   · Patient is expected to demonstrate progress in strength, balance and functional technique to improve safety during daily activities. Reason for Services/Other Comments:  · Patient continues to demonstrate capacity to improve strength, balance, gait which will increase independence and increase safety.    Use of outcome tool(s) and clinical judgement create a POC that gives a: Questionable prediction of patient's progress: MODERATE COMPLEXITY            TREATMENT:   (In addition to Assessment/Re-Assessment sessions the following treatments were rendered)  Pre-treatment Symptoms/Complaints:  Off balance, falls, fatigue  Pain: Initial:     4/10 back Post Session:  4/10 back       THERAPEUTIC EXERCISE: (10 minutes):  Exercises per grid below to improve mobility, strength and balance. Required minimal verbal cues to promote proper body alignment, promote proper body posture and promote proper body mechanics. Progressed resistance, repetitions and complexity of movement as indicated. Date:  12/5/18 Date:   Date:     Activity/Exercise Parameters Parameters Parameters   Marching at rail X 20 reps at rail; HEP     Standing L/R wt shift X 20 reps at rail; HEP     Standing A/P wt shift X 20 reps at rail; HEP                                   NEUROMUSCULAR RE-EDUCATION: ( minutes):  Exercise/activities per grid below to improve balance, coordination, posture and proprioception. Required minimal verbal cues to promote static and dynamic balance in standing. [employer] Portal  Treatment/Session Assessment:    · Response to Treatment:  Patient tolerated session well. Patient will call to set up appointments. .  · Compliance with Program/Exercises: Will assess as treatment progresses. · Recommendations/Intent for next treatment session: \"Next visit will focus on advancements to more challenging activities\".   Total Treatment Duration:  PT Patient Time In/Time Out  Time In: 1230  Time Out: [address], PT    Future Appointments   Date Time Provider Ken Zamora   1/9/2019  1:15 PM Lizbeth Avila, PT [facility]   1/16/2019  1:15 PM Lizbeth Avila, PT [facility]   1/23/2019  1:15 PM Lizbeth Avila, PT [facility]   1/30/2019  1:15 PM Lizbeth Austin, PT [facility]   2/14/2019  2:00 PM Jenni Luna MD [facility]   4/24/2019  2:00 PM KENTRELL Sin [facility]   6/17/2019 10:45 AM Jorge L Leila Hoffman  [address] [facility]

## 2018-12-11 PROBLEM — M50.30 DDD (DEGENERATIVE DISC DISEASE), CERVICAL: Status: ACTIVE | Noted: 2018-12-11

## 2019-01-02 ENCOUNTER — HOSPITAL ENCOUNTER (OUTPATIENT)
Dept: PHYSICAL THERAPY | Age: 74
Discharge: HOME | End: 2019-01-02
Payer: MEDICARE

## 2019-01-02 PROCEDURE — 97110 THERAPEUTIC EXERCISES: CPT

## 2019-01-02 PROCEDURE — 97112 NEUROMUSCULAR REEDUCATION: CPT

## 2019-01-02 NOTE — PROGRESS NOTES
Lona Chau  : 1945  [place]  Secondary: [facility] [address] [facility], [address] [facility] [address], [address].  [phone]   [phone]           OUTPATIENT PHYSICAL THERAPY:Daily Note 2019   ICD-10: Treatment Diagnosis: Other abnormalities of gait and mobility R26.89;   Precautions/Allergies:   Flexeril [cyclobenzaprine]; Ketoprofen; and Plaquenil [hydroxychloroquine]   MD Orders: eval and treat MEDICAL/REFERRING DIAGNOSIS:  Degenerative disease of nervous system, unspecified [G31.9]  Spinal stenosis, cervical region [M48.02]   DATE OF ONSET: years  REFERRING PHYSICIAN: Robel Feng NP  RETURN PHYSICIAN APPOINTMENT:      INITIAL ASSESSMENT:  Ms. Grayson Weller presents with imbalance, weakness, unsteady gait. Patient has a history of falls. . Patient would benefit from PT to address these problems to improve patient's independence and safety with mobility and daily activities. Thank you. PROBLEM LIST (Impacting functional limitations):  1. Decreased Strength  2. Decreased ADL/Functional Activities  3. Decreased Transfer Abilities  4. Decreased Ambulation Ability/Technique  5. Decreased Balance  6. Decreased Activity Tolerance  7. Decreased [place] with Home Exercise Program INTERVENTIONS PLANNED:  1. Balance Exercise  2. Bed Mobility  3. Family Education  4. Gait Training  5. Home Exercise Program (HEP)  6. Neuromuscular Re-education/Strengthening  7. Therapeutic Activites  8. Therapeutic Exercise/Strengthening  9. Transfer Training   TREATMENT PLAN:  Effective Dates: 2018 TO 3/3/2019 (90 days). Frequency/Duration: 1-2 times a week for 60- 90 Day(s)  GOALS: (Goals have been discussed and agreed upon with patient.)  Short-Term Functional Goals: Time Frame: 2-4 weeks  1. Patient will demonstrate independence and compliance with home exercise program to improve balance and strength for daily activities. 2.   3.   Discharge Goals: Time Frame: 8-12 weeks  1. Patient will increase bilateral lower extremity strength to greater than or equal to 4/5 to improve strength for functional mobility activities. 2. Patient will increase her score on the Lockwood Balance Scale to greater than or equal to 14/56 indicating improved safety and decreased fall risk for daily activities. Patient will demonstrate improved score to less than or equal to 24 seconds on the Timed Up and Go Test indicating improved mobility for daily activities. Patient will demonstrate improved time on Five Time Sit to Stand Test to less than or equal to 18 seconds indicating improved LE strength for daily mobility. Rehabilitation Potential For Stated Goals: Good               The information in this section was collected on 12/5/18 (except where otherwise noted). HISTORY:   History of Present Injury/Illness (Reason for Referral):  Balance has been worsening over the last couple of years, especially the last 6 months. R neck hurts at times, and into head. Has had a lot of falls. Getting harder to get up from the floor, usually use a chair. Walks with rollator walker x 15-20 years. No numbness, no dizziness. Per MD notes: \"Patient presents for follow up for spinal cerebellar atrophy and frequent falls. She has a sister with SCA, similar symptoms to patient, presume SCA is genetic, though patient has not undergone genetic testing. She has a 30 year history of SCA and chronic difficulty with her gait worsening over the last 15 years. More recently she is experiencing more falls at home, despite using her rollator. She denies any difficulty with her vision. Recent MRI brain showed cerebellar atrophy. 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 \"      Past Medical History/Comorbidities:   Ms. Eduar Salguero  has a past medical history of Abnormality of gait, Anemia, Anxiety, Arthritis, Asthma, Calculus of kidney, Chronic pain, Contact dermatitis and other eczema, due to unspecified cause, Depressive disorder, not elsewhere classified, Encounter for chronic pain management, Esophageal reflux, Essential hypertension, benign, Frequent falls, GERD (gastroesophageal reflux disease), and Pure hypercholesterolemia. Ms. Twila Barr  has a past surgical history that includes hx knee replacement (Right); hx shoulder replacement (Left); hx hysterectomy; hx lithotripsy; hx cataract removal (Bilateral); hx retinal detachment repair; hx colonoscopy; hx gi; and pr neurological procedure unlisted. Social History/Living Environment:     ; one level home; doesn't do cooking anymore; has someone who comes to clean. Prior Level of Function/Work/Activity:  Independent with rollator. Does not drive. Dominant Side:         LEFT  Previous Treatment Approaches:          Had HHPT earlier in the year. Had OPPT at [facility] last year. Personal Factors:          Sex:  female        Age:  74 y.o. Ambulatory/Rehab Services H2 Model Falls Risk Assessment    Risk Factors:       (5)  History of Recent Falls [w/in 3 months] Ability to Rise from Chair:       (1)  Pushes up, successful in one attempt    Falls Prevention Plan: Mobility Assistance Device (specify):  rollator walker   Total: (5 or greater = High Risk): 6    ©2010 [facility] [place] [address] [place] [address] Patent #[id number]. Federal Law prohibits the replication, distribution or use without written permission from [facility]     Current Medications:       Current Outpatient Medications:     sertraline (ZOLOFT) 100 mg tablet, Take 1 Tab by mouth daily. , Disp: 135 Tab, Rfl: 1    fluticasone (FLONASE) 50 mcg/actuation nasal spray, 2 Sprays by Both Nostrils route daily. , Disp: 3 Bottle, Rfl: 12    clorazepate (TRANXENE) 3.75 mg tablet, Take 1 Tab by mouth three (3) times daily.  Max Daily Amount: 11.25 mg., Disp: 270 Tab, Rfl: 1    calcium carbonate (OS-JANA) 500 mg calcium (1,250 mg) tablet, Take  by mouth daily. , Disp: , Rfl:     tiotropium (SPIRIVA WITH HANDIHALER) 18 mcg inhalation capsule, Take 1 Cap by inhalation daily. , Disp: , Rfl:     ascorbic acid, vitamin C, (VITAMIN C) 500 mg tablet, Take  by mouth., Disp: , Rfl:     melatonin 10 mg cap, Take  by mouth., Disp: , Rfl:     Iron Fum & PS Cmp-Vit C-Niacin (INTEGRA) 125-40-3 mg cap, Take 125 mg by mouth daily. , Disp: 90 Cap, Rfl: 1    montelukast (SINGULAIR) 10 mg tablet, Take 10 mg by mouth daily. , Disp: , Rfl:     lisinopril (PRINIVIL, ZESTRIL) 5 mg tablet, Take 1 Tab by mouth daily. , Disp: 90 Tab, Rfl: 1    pantoprazole (PROTONIX) 40 mg tablet, Take 1 Tab by mouth two (2) times a day., Disp: 180 Tab, Rfl: 1    sucralfate (CARAFATE) 1 gram tablet, Take 1 Tab by mouth three (3) times daily. , Disp: 270 Tab, Rfl: 3    nystatin-triamcinolone (MYCOLOG II) topical cream, Apply  to affected area two (2) times a day., Disp: 60 g, Rfl: 3    SYMBICORT 80-4.5 mcg/actuation HFAA, INHALE 1 PUFF PO BID FOR COUGH, Disp: , Rfl: 0    PROAIR HFA 90 mcg/actuation inhaler, USE 2 PUFFS PO BID PRF COUGH, Disp: , Rfl: 0    dietary supplement cap, Take  by mouth., Disp: , Rfl:     docusate sodium (STOOL SOFTENER) 100 mg tab, Take  by mouth as needed. , Disp: , Rfl:     glucosamine & chondroit sul. Na 750-400 mg cmpk, Take  by mouth., Disp: , Rfl:     omega-3 fatty acids-vitamin e (FISH OIL) 1,000 mg cap, Take 1 Cap by mouth. 1 by oral route twice daily, Disp: , Rfl:     VIT C/ABDIAZIZ AC/LUT/COPPER/ZNOX (PRESERVISION LUTEIN PO), Take  by mouth. 1 by oral route daily, Disp: , Rfl:     cholecalciferol (VITAMIN D3) 1,000 unit tablet, Take  by mouth daily. , Disp: , Rfl:     aspirin delayed-release 81 mg tablet, Take  by mouth daily. , Disp: , Rfl:    Date Last Reviewed:  1/2/19   Number of Personal Factors/Comorbidities that affect the Plan of Care: 1-2: MODERATE COMPLEXITY   EXAMINATION:   Observation/Orthostatic Postural Assessment: Forward head posture, forward bent on rollator. Strength:          R LE 4/5; L LE 4-/5  Functional Mobility:         Gait/Ambulation:  Patient ambulates with rollator walker at slower pace. Forward bent posture. Decreased step length and clearance L LE. Transfers:  Independent with UE assist        Bed Mobility:  independent        Stairs:  NT  Sensation:         intact  Postural Control & Balance:  · Lockwood Balance Scale:  11/56.   (A score less than 45/56 indicates high risk of falls)     · Dynamic Gait Index:  /24. (A score less than or equal to19/24 is abnormal and predictive of falls)          Body Structures Involved:  1. Nerves  2. Eyes and Ears  3. Muscles Body Functions Affected:  1. Sensory/Pain  2. Neuromusculoskeletal  3. Movement Related Activities and Participation Affected:  1. General Tasks and Demands  2. Mobility  3. Domestic Life  4. Community, Social and [place]   Number of elements (examined above) that affect the Plan of Care: 3: MODERATE COMPLEXITY   CLINICAL PRESENTATION:   Presentation: Evolving clinical presentation with changing clinical characteristics: MODERATE COMPLEXITY   CLINICAL DECISION MAKING:   Outcome Measure: Tool Used: Lockwood Balance Scale  Score:  Initial: 11/56 Most Recent: X/56 (Date: -- )   Interpretation of Score: Each section is scored on a 0-4 scale, 0 representing the patients inability to perform the task and 4 representing independence. The scores of each section are added together for a total score of 56. The higher the patients score, the more independent the patient is. Any score below 45 indicates increased risk for falls. Score 56 55-45 44-34 33-23 22-12 11-1 0   Modifier CH CI CJ CK CL CM CN     Tool Used: Timed Up and Go (TUG)  Score:  Initial: 38.64 seconds with rollator Most Recent: X seconds (Date: -- )   Interpretation of Score:  The test measures, in seconds, the time taken by an individual to stand up from a standard arm chair (seat height 46 cm [18 in], arm height 65 cm [25.6 in]), walk a distance of 3 meters (118 in, approx 10 ft), turn, walk back to the chair and sit down. If the individual takes longer than 14 seconds to complete TUG, this indicates risk for falls. Score 7 7.5-10.5 11-14 14.5-17.5 18-21 21.5-24.5 25+   Modifier CH CI CJ CK CL CM CN     ? Mobility - Walking and Moving Around:     - CURRENT STATUS: CN - 100% impaired, limited or restricted    - GOAL STATUS: CM - 80%-99% impaired, limited or restricted    - D/C STATUS:  ---------------To be determined---------------     Tool Used: Five Times Sit to Stand (FTSST or 5xSST)   Score:  Initial: 20.86 seconds with UE assist Most Recent:  seconds (Date: -- )   Interpretation of Score: This is a measure of functional lower limb muscle strength and quantifying functional change of transitional movements. A score of 12 seconds or less indicates a normal level of function for community dwelling older individuals, a score of 15 seconds or more is at risk for fall. Score 12 or less 13-14 15-16 17-18 19-20 20-21 22 or greater   Modifier CH CI CJ CK CL CM CN       Medical Necessity:   · Patient is expected to demonstrate progress in strength, balance and functional technique to improve safety during daily activities. Reason for Services/Other Comments:  · Patient continues to demonstrate capacity to improve strength, balance, gait which will increase independence and increase safety. Use of outcome tool(s) and clinical judgement create a POC that gives a: Questionable prediction of patient's progress: MODERATE COMPLEXITY            TREATMENT:   (In addition to Assessment/Re-Assessment sessions the following treatments were rendered)  Pre-treatment Symptoms/Complaints:  Alison Mann the other day. Don't know what happened. No injuries. Had pink eye so had to miss last appt. Tried HEP.    Pain: Initial:     4/10 back Post Session:  4/10 back       THERAPEUTIC EXERCISE: (10 minutes):  Exercises per grid below to improve mobility, strength and balance. Required minimal verbal cues to promote proper body alignment, promote proper body posture and promote proper body mechanics. Progressed resistance, repetitions and complexity of movement as indicated. Date:  1/2/19 Date:   Date:     Activity/Exercise Parameters Parameters Parameters         Marching  X 20 reps at rail     Standing hip abduction X 10 reps each leg at rail     Standing hip extension X 10 reps each leg at rail     Sit to stand 2 x 5 reps  With UE assist                             NEUROMUSCULAR RE-EDUCATION: ( 33 minutes):  Exercise/activities per grid below to improve balance, coordination, posture and proprioception. Required minimal verbal cues to promote static and dynamic balance in standing. Balance/Vestibular Treatment:   Activity   Date  1/2/19 Date Date Date Date Date   Activity/Exercise   Sets/reps/equipment Sets/reps/  equipment Sets/reps/  equipment Sets/reps/  equipment Sets/reps/  equipment Sets/reps/  equipment   Walking with head turns             Walking with head up & down             Step overs     Over 1/2 foam roll x 10 reps each leg fwd and laterally        Step taps     4 inch step x 30 reps fwd and cross        Marching   In place x 30 reps        Sidestepping           Crossovers           Rawlings           Walking  backwards             Tandem walking           Weaving in/out of cones             Picking up cones             Sports cord             [employer] ball           Figure 8s            Circles right/left           Walking with 360 degree turns           Spirals           Weight shifting:    Left & Right     On blue foams eyes open        Weight shifting:   Forward & Backward      On blue foams eyes open        Static Standing Balance             Standing with feet apart     On blue foams eyes open         Standing with feet together             Standing with feet semitandem   On blue foams eyes open         Standing with feet tandem           Single leg stance           X1/X2 Viewing exercises             Hallpike-Ruston testing for BPPV (Benign Paroxysmal Positional Vertigo)             Wise-Daroff exercises           Canalith Repositioning treatment/Epley Maneuver  for BPPV (Benign Paroxysmal Positional Vertigo)           [employer] Training: See scanned report. [employer]  Treatment/Session Assessment:    · Response to Treatment:  Patient tolerated session well. She needs CG to min A during exercises for balance and safety and verbal cueing to help shift wt appropriately to maintain balance. · Compliance with Program/Exercises: Will assess as treatment progresses. · Recommendations/Intent for next treatment session: \"Next visit will focus on advancements to more challenging activities\".   Total Treatment Duration:  PT Patient Time In/Time Out  Time In: 1315  Time Out: Yuniel 139, PT    Future Appointments   Date Time Provider Ken Zamora   1/9/2019  1:15 PM Tanner Yarbrough, PT [facility]   1/16/2019  1:15 PM Tanner Yarbrough PT [facility]   1/23/2019  1:15 PM Tanner Yarbrough, PT [facility]   1/30/2019  1:15 PM Tanner Yarbrouhg, PT [facility]   2/14/2019  2:00 PM Antoine Moncada MD [facility]   4/24/2019  2:00 PM Angelica Mckeon NP [facility]   6/17/2019 10:45 AM Darwin Coats MD [address] [place] [address]

## 2019-01-09 ENCOUNTER — HOSPITAL ENCOUNTER (OUTPATIENT)
Dept: PHYSICAL THERAPY | Age: 74
Discharge: HOME | End: 2019-01-09
Payer: MEDICARE

## 2019-01-09 PROCEDURE — 97112 NEUROMUSCULAR REEDUCATION: CPT

## 2019-01-09 PROCEDURE — 97110 THERAPEUTIC EXERCISES: CPT

## 2019-01-09 NOTE — PROGRESS NOTES
Daniel Domínguez  : 1945  [place]  Secondary: [facility]  [address] [facility], [address] [place] [address],[address], [address]  [phone]   [phone]           OUTPATIENT PHYSICAL THERAPY:Daily Note 2019   ICD-10: Treatment Diagnosis: Other abnormalities of gait and mobility R26.89;   Precautions/Allergies:   Flexeril [cyclobenzaprine]; Ketoprofen; and Plaquenil [hydroxychloroquine]   MD Orders: eval and treat MEDICAL/REFERRING DIAGNOSIS:  Degenerative disease of nervous system, unspecified [G31.9]  Spinal stenosis, cervical region [M48.02]   DATE OF ONSET: years  REFERRING PHYSICIAN: Nelly Neri NP  RETURN PHYSICIAN APPOINTMENT:      INITIAL ASSESSMENT:  Ms. Fransisco Quesada presents with imbalance, weakness, unsteady gait. Patient has a history of falls. . Patient would benefit from PT to address these problems to improve patient's independence and safety with mobility and daily activities. Thank you. PROBLEM LIST (Impacting functional limitations):  1. Decreased Strength  2. Decreased ADL/Functional Activities  3. Decreased Transfer Abilities  4. Decreased Ambulation Ability/Technique  5. Decreased Balance  6. Decreased Activity Tolerance  7. Decreased [place] with Home Exercise Program INTERVENTIONS PLANNED:  1. Balance Exercise  2. Bed Mobility  3. Family Education  4. Gait Training  5. Home Exercise Program (HEP)  6. Neuromuscular Re-education/Strengthening  7. Therapeutic Activites  8. Therapeutic Exercise/Strengthening  9. Transfer Training   TREATMENT PLAN:  Effective Dates: 2018 TO 3/3/2019 (90 days). Frequency/Duration: 1-2 times a week for 60- 90 Day(s)  GOALS: (Goals have been discussed and agreed upon with patient.)  Short-Term Functional Goals: Time Frame: 2-4 weeks  1. Patient will demonstrate independence and compliance with home exercise program to improve balance and strength for daily activities. met  2.   3.   Discharge Goals: Time Frame: 8-12 weeks  1. Patient will increase bilateral lower extremity strength to greater than or equal to 4/5 to improve strength for functional mobility activities. 2. Patient will increase her score on the Lockwood Balance Scale to greater than or equal to 14/56 indicating improved safety and decreased fall risk for daily activities. Patient will demonstrate improved score to less than or equal to 24 seconds on the Timed Up and Go Test indicating improved mobility for daily activities. Patient will demonstrate improved time on Five Time Sit to Stand Test to less than or equal to 18 seconds indicating improved LE strength for daily mobility. Rehabilitation Potential For Stated Goals: Good               The information in this section was collected on 12/5/18 (except where otherwise noted). HISTORY:   History of Present Injury/Illness (Reason for Referral):  Balance has been worsening over the last couple of years, especially the last 6 months. R neck hurts at times, and into head. Has had a lot of falls. Getting harder to get up from the floor, usually use a chair. Walks with rollator walker x 15-20 years. No numbness, no dizziness. Per MD notes: \"Patient presents for follow up for spinal cerebellar atrophy and frequent falls. She has a sister with SCA, similar symptoms to patient, presume SCA is genetic, though patient has not undergone genetic testing. She has a 30 year history of SCA and chronic difficulty with her gait worsening over the last 15 years. More recently she is experiencing more falls at home, despite using her rollator. She denies any difficulty with her vision. Recent MRI brain showed cerebellar atrophy. 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 \"      Past Medical History/Comorbidities:   Ms. Tadeo Escobar  has a past medical history of Abnormality of gait, Anemia, Anxiety, Arthritis, Asthma, Calculus of kidney, Chronic pain, Contact dermatitis and other eczema, due to unspecified cause, Depressive disorder, not elsewhere classified, Encounter for chronic pain management, Esophageal reflux, Essential hypertension, benign, Frequent falls, GERD (gastroesophageal reflux disease), and Pure hypercholesterolemia. Ms. Sandra Medrano  has a past surgical history that includes hx knee replacement (Right); hx shoulder replacement (Left); hx hysterectomy; hx lithotripsy; hx cataract removal (Bilateral); hx retinal detachment repair; hx colonoscopy; hx gi; and pr neurological procedure unlisted. Social History/Living Environment:     ; one level home; doesn't do cooking anymore; has someone who comes to clean. Prior Level of Function/Work/Activity:  Independent with rollator. Does not drive. Dominant Side:         LEFT  Previous Treatment Approaches:          Had HHPT earlier in the year. Had OPPT at [facility] last year. Personal Factors:          Sex:  female        Age:  74 y.o. Ambulatory/Rehab Services H2 Model Falls Risk Assessment    Risk Factors:       (5)  History of Recent Falls [w/in 3 months] Ability to Rise from Chair:       (1)  Pushes up, successful in one attempt    Falls Prevention Plan: Mobility Assistance Device (specify):  rollator walker   Total: (5 or greater = High Risk): 6    ©2010 [facility] [place] [address] [place] [address] Patent #[id number]. Federal Law prohibits the replication, distribution or use without written permission from [facility] of [place]     Current Medications:       Current Outpatient Medications:     sertraline (ZOLOFT) 100 mg tablet, Take 1 Tab by mouth daily. , Disp: 90 Tab, Rfl: 1    fluticasone (FLONASE) 50 mcg/actuation nasal spray, 2 Sprays by Both Nostrils route daily. , Disp: 3 Bottle, Rfl: 12    clorazepate (TRANXENE) 3.75 mg tablet, Take 1 Tab by mouth three (3) times daily.  Max Daily Amount: 11.25 mg., Disp: 270 Tab, Rfl: 1    calcium carbonate (OS-JANA) 500 mg calcium (1,250 mg) tablet, Take  by mouth daily. , Disp: , Rfl:     tiotropium (SPIRIVA WITH HANDIHALER) 18 mcg inhalation capsule, Take 1 Cap by inhalation daily. , Disp: , Rfl:     ascorbic acid, vitamin C, (VITAMIN C) 500 mg tablet, Take  by mouth., Disp: , Rfl:     melatonin 10 mg cap, Take  by mouth., Disp: , Rfl:     Iron Fum & PS Cmp-Vit C-Niacin (INTEGRA) 125-40-3 mg cap, Take 125 mg by mouth daily. , Disp: 90 Cap, Rfl: 1    montelukast (SINGULAIR) 10 mg tablet, Take 10 mg by mouth daily. , Disp: , Rfl:     lisinopril (PRINIVIL, ZESTRIL) 5 mg tablet, Take 1 Tab by mouth daily. , Disp: 90 Tab, Rfl: 1    pantoprazole (PROTONIX) 40 mg tablet, Take 1 Tab by mouth two (2) times a day., Disp: 180 Tab, Rfl: 1    sucralfate (CARAFATE) 1 gram tablet, Take 1 Tab by mouth three (3) times daily. , Disp: 270 Tab, Rfl: 3    nystatin-triamcinolone (MYCOLOG II) topical cream, Apply  to affected area two (2) times a day., Disp: 60 g, Rfl: 3    SYMBICORT 80-4.5 mcg/actuation HFAA, INHALE 1 PUFF PO BID FOR COUGH, Disp: , Rfl: 0    PROAIR HFA 90 mcg/actuation inhaler, USE 2 PUFFS PO BID PRF COUGH, Disp: , Rfl: 0    dietary supplement cap, Take  by mouth., Disp: , Rfl:     docusate sodium (STOOL SOFTENER) 100 mg tab, Take  by mouth as needed. , Disp: , Rfl:     glucosamine & chondroit sul. Na 750-400 mg cmpk, Take  by mouth., Disp: , Rfl:     omega-3 fatty acids-vitamin e (FISH OIL) 1,000 mg cap, Take 1 Cap by mouth. 1 by oral route twice daily, Disp: , Rfl:     VIT C/ABDIAZIZ AC/LUT/COPPER/ZNOX (PRESERVISION LUTEIN PO), Take  by mouth. 1 by oral route daily, Disp: , Rfl:     cholecalciferol (VITAMIN D3) 1,000 unit tablet, Take  by mouth daily. , Disp: , Rfl:     aspirin delayed-release 81 mg tablet, Take  by mouth daily. , Disp: , Rfl:    Date Last Reviewed:  1/9/19   Number of Personal Factors/Comorbidities that affect the Plan of Care: 1-2: MODERATE COMPLEXITY   EXAMINATION:   Observation/Orthostatic Postural Assessment: Forward head posture, forward bent on rollator. Strength:          R LE 4/5; L LE 4-/5  Functional Mobility:         Gait/Ambulation:  Patient ambulates with rollator walker at slower pace. Forward bent posture. Decreased step length and clearance L LE. Transfers:  Independent with UE assist        Bed Mobility:  independent        Stairs:  NT  Sensation:         intact  Postural Control & Balance:  · Lockwood Balance Scale:  11/56.   (A score less than 45/56 indicates high risk of falls)     · Dynamic Gait Index:  /24. (A score less than or equal to19/24 is abnormal and predictive of falls)          Body Structures Involved:  1. Nerves  2. Eyes and Ears  3. Muscles Body Functions Affected:  1. Sensory/Pain  2. Neuromusculoskeletal  3. Movement Related Activities and Participation Affected:  1. General Tasks and Demands  2. Mobility  3. Domestic Life  4. Community, Social and [place]   Number of elements (examined above) that affect the Plan of Care: 3: MODERATE COMPLEXITY   CLINICAL PRESENTATION:   Presentation: Evolving clinical presentation with changing clinical characteristics: MODERATE COMPLEXITY   CLINICAL DECISION MAKING:   Outcome Measure: Tool Used: Lockwood Balance Scale  Score:  Initial: 11/56 Most Recent: X/56 (Date: -- )   Interpretation of Score: Each section is scored on a 0-4 scale, 0 representing the patients inability to perform the task and 4 representing independence. The scores of each section are added together for a total score of 56. The higher the patients score, the more independent the patient is. Any score below 45 indicates increased risk for falls. Score 56 55-45 44-34 33-23 22-12 11-1 0   Modifier CH CI CJ CK CL CM CN     Tool Used: Timed Up and Go (TUG)  Score:  Initial: 38.64 seconds with rollator Most Recent: X seconds (Date: -- )   Interpretation of Score:  The test measures, in seconds, the time taken by an individual to stand up from a standard arm chair (seat height 46 cm [18 in], arm height 65 cm [25.6 in]), walk a distance of 3 meters (118 in, approx 10 ft), turn, walk back to the chair and sit down. If the individual takes longer than 14 seconds to complete TUG, this indicates risk for falls. Score 7 7.5-10.5 11-14 14.5-17.5 18-21 21.5-24.5 25+   Modifier CH CI CJ CK CL CM CN     ? Mobility - Walking and Moving Around:     - CURRENT STATUS: CN - 100% impaired, limited or restricted    - GOAL STATUS: CM - 80%-99% impaired, limited or restricted    - D/C STATUS:  ---------------To be determined---------------     Tool Used: Five Times Sit to Stand (FTSST or 5xSST)   Score:  Initial: 20.86 seconds with UE assist Most Recent:  seconds (Date: -- )   Interpretation of Score: This is a measure of functional lower limb muscle strength and quantifying functional change of transitional movements. A score of 12 seconds or less indicates a normal level of function for community dwelling older individuals, a score of 15 seconds or more is at risk for fall. Score 12 or less 13-14 15-16 17-18 19-20 20-21 22 or greater   Modifier CH CI CJ CK CL CM CN       Medical Necessity:   · Patient is expected to demonstrate progress in strength, balance and functional technique to improve safety during daily activities. Reason for Services/Other Comments:  · Patient continues to demonstrate capacity to improve strength, balance, gait which will increase independence and increase safety. Use of outcome tool(s) and clinical judgement create a POC that gives a: Questionable prediction of patient's progress: MODERATE COMPLEXITY            TREATMENT:   (In addition to Assessment/Re-Assessment sessions the following treatments were rendered)  Pre-treatment Symptoms/Complaints:  Doing fine. I was just a little tired after last time.   Pain: Initial:     3/10 back Post Session:  3/10 back THERAPEUTIC EXERCISE: (10 minutes):  Exercises per grid below to improve mobility, strength and balance. Required minimal verbal cues to promote proper body alignment, promote proper body posture and promote proper body mechanics. Progressed resistance, repetitions and complexity of movement as indicated. Date:  1/9/19 Date:   Date:     Activity/Exercise Parameters Parameters Parameters         Marching       Standing hip abduction      Standing hip extension      Sit to stand 2 x 5 reps  With UE assist     Step ups 4 inch step x 10 reps each leg with rail                       NEUROMUSCULAR RE-EDUCATION: ( 30 minutes):  Exercise/activities per grid below to improve balance, coordination, posture and proprioception. Required minimal verbal cues to promote static and dynamic balance in standing. Balance/Vestibular Treatment:   Activity   Date  1/9/19 Date Date Date Date Date   Activity/Exercise   Sets/reps/equipment Sets/reps/  equipment Sets/reps/  equipment Sets/reps/  equipment Sets/reps/  equipment Sets/reps/  equipment   Walking with head turns             Walking with head up & down             Step overs     Over 1/2 foam roll x 10 reps each leg fwd and laterally        Step taps     4 inch step 2 x 20 reps fwd and cross with rail        Marching   In place x 30 reps        Sidestepping           Crossovers           [place]           Walking  backwards             Tandem walking           Weaving in/out of cones             Picking up cones             Sports cord             [employer] ball           Figure 8s            Circles right/left           Walking with 360 degree turns           Spirals           Weight shifting:    Left & Right     On floor and On on floor and blue foams eyes open        Weight shifting:   Forward & Backward      On blue foams eyes open        Static Standing Balance             Standing with feet apart     On floor and On blue foams eyes open Standing with feet together             Standing with feet semitandem   On blue foams eyes open         Standing with feet tandem           Single leg stance           X1/X2 Viewing exercises             [place] testing for BPPV (Benign Paroxysmal Positional Vertigo)             Wise-Daroff exercises           Canalith Repositioning treatment/Epley Maneuver  for BPPV (Benign Paroxysmal Positional Vertigo)           Smart [employer] Training: See scanned report. [employer] Portal  Treatment/Session Assessment:    · Response to Treatment:  Patient tolerated session well. Patient seemed to tolerate more activity today but still needed frequent seated rest breaks. Patient has a very limited range of wt shifting over base of support before losing balance. · Compliance with Program/Exercises: Compliant most of the time. · Recommendations/Intent for next treatment session: \"Next visit will focus on advancements to more challenging activities\".   Total Treatment Duration:  PT Patient Time In/Time Out  Time In: 1300  Time Out: 2900 [facility] M[place],     Future Appointments   Date Time Provider Ken Zamora   1/16/2019  1:15 PM Janie Osorio PT [facility]   1/23/2019  1:15 PM Janie Osorio PT [facility]   1/30/2019  1:15 PM Janie Osorio PT [facility]   2/14/2019  2:00 PM Zully Shirley MD [facility] Line   4/24/2019  2:00 PM KENTRELL Funez [facility]   6/17/2019 10:45 AM Manny Coats MD [address] [place] [address]

## 2019-01-16 ENCOUNTER — HOSPITAL ENCOUNTER (OUTPATIENT)
Dept: PHYSICAL THERAPY | Age: 74
Discharge: HOME | End: 2019-01-16
Payer: MEDICARE

## 2019-01-16 PROCEDURE — 97112 NEUROMUSCULAR REEDUCATION: CPT

## 2019-01-16 PROCEDURE — 97110 THERAPEUTIC EXERCISES: CPT

## 2019-01-16 NOTE — PROGRESS NOTES
Lona Chau  : 1945  [place]  Secondary: [facility]  [facility] [address] [facility],[address], [address].  [phone]   [phone]           OUTPATIENT PHYSICAL THERAPY:Daily Note 2019   ICD-10: Treatment Diagnosis: Other abnormalities of gait and mobility R26.89;   Precautions/Allergies:   Flexeril [cyclobenzaprine]; Ketoprofen; and Plaquenil [hydroxychloroquine]   MD Orders: eval and treat MEDICAL/REFERRING DIAGNOSIS:  Degenerative disease of nervous system, unspecified [G31.9]  Spinal stenosis, cervical region [M48.02]   DATE OF ONSET: years  REFERRING PHYSICIAN: Robel Feng NP  RETURN PHYSICIAN APPOINTMENT:      INITIAL ASSESSMENT:  Ms. Grayson Weller presents with imbalance, weakness, unsteady gait. Patient has a history of falls. . Patient would benefit from PT to address these problems to improve patient's independence and safety with mobility and daily activities. Thank you. PROBLEM LIST (Impacting functional limitations):  1. Decreased Strength  2. Decreased ADL/Functional Activities  3. Decreased Transfer Abilities  4. Decreased Ambulation Ability/Technique  5. Decreased Balance  6. Decreased Activity Tolerance  7. Decreased [place] with Home Exercise Program INTERVENTIONS PLANNED:  1. Balance Exercise  2. Bed Mobility  3. Family Education  4. Gait Training  5. Home Exercise Program (HEP)  6. Neuromuscular Re-education/Strengthening  7. Therapeutic Activites  8. Therapeutic Exercise/Strengthening  9. Transfer Training   TREATMENT PLAN:  Effective Dates: 2018 TO 3/3/2019 (90 days). Frequency/Duration: 1-2 times a week for 60- 90 Day(s)  GOALS: (Goals have been discussed and agreed upon with patient.)  Short-Term Functional Goals: Time Frame: 2-4 weeks  1. Patient will demonstrate independence and compliance with home exercise program to improve balance and strength for daily activities. met  2.   3.   Discharge Goals: Time Frame: 8-12 weeks  1. Patient will increase bilateral lower extremity strength to greater than or equal to 4/5 to improve strength for functional mobility activities. 2. Patient will increase her score on the Lockwood Balance Scale to greater than or equal to 14/56 indicating improved safety and decreased fall risk for daily activities. Patient will demonstrate improved score to less than or equal to 24 seconds on the Timed Up and Go Test indicating improved mobility for daily activities. Patient will demonstrate improved time on Five Time Sit to Stand Test to less than or equal to 18 seconds indicating improved LE strength for daily mobility. Rehabilitation Potential For Stated Goals: Good               The information in this section was collected on 12/5/18 (except where otherwise noted). HISTORY:   History of Present Injury/Illness (Reason for Referral):  Balance has been worsening over the last couple of years, especially the last 6 months. R neck hurts at times, and into head. Has had a lot of falls. Getting harder to get up from the floor, usually use a chair. Walks with rollator walker x 15-20 years. No numbness, no dizziness. Per MD notes: \"Patient presents for follow up for spinal cerebellar atrophy and frequent falls. She has a sister with SCA, similar symptoms to patient, presume SCA is genetic, though patient has not undergone genetic testing. She has a 30 year history of SCA and chronic difficulty with her gait worsening over the last 15 years. More recently she is experiencing more falls at home, despite using her rollator. She denies any difficulty with her vision. Recent MRI brain showed cerebellar atrophy. 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 \"      Past Medical History/Comorbidities:   Ms. Nelia Kaiser  has a past medical history of Abnormality of gait (7/18/2018), Anemia, Anxiety, Arthritis, Asthma, Calculus of kidney, Chronic pain, Contact dermatitis and other eczema, due to unspecified cause, Depressive disorder, not elsewhere classified, Encounter for chronic pain management, Esophageal reflux, Essential hypertension, benign, Frequent falls (7/18/2018), GERD (gastroesophageal reflux disease), and Pure hypercholesterolemia. Ms. Cristiane Yeager  has a past surgical history that includes hx knee replacement (Right); hx shoulder replacement (Left); hx hysterectomy; hx lithotripsy; hx cataract removal (Bilateral); hx retinal detachment repair; hx colonoscopy; hx gi; and pr neurological procedure unlisted. Social History/Living Environment:     ; one level home; doesn't do cooking anymore; has someone who comes to clean. Prior Level of Function/Work/Activity:  Independent with rollator. Does not drive. Dominant Side:         LEFT  Previous Treatment Approaches:          Had HHPT earlier in the year. Had OPPT at [facility] last year. Personal Factors:          Sex:  female        Age:  74 y.o. Ambulatory/Rehab Services H2 Model Falls Risk Assessment    Risk Factors:       (5)  History of Recent Falls [w/in 3 months] Ability to Rise from Chair:       (1)  Pushes up, successful in one attempt    Falls Prevention Plan: Mobility Assistance Device (specify):  rollator walker   Total: (5 or greater = High Risk): 6    ©2010 [facility] [address] [place] [address] Patent #[id number]. Federal Law prohibits the replication, distribution or use without written permission from [facility]     Current Medications:       Current Outpatient Medications:     sertraline (ZOLOFT) 100 mg tablet, Take 1 Tab by mouth daily. , Disp: 90 Tab, Rfl: 1    fluticasone (FLONASE) 50 mcg/actuation nasal spray, 2 Sprays by Both Nostrils route daily. , Disp: 3 Bottle, Rfl: 12    clorazepate (TRANXENE) 3.75 mg tablet, Take 1 Tab by mouth three (3) times daily. Max Daily Amount: 11.25 mg., Disp: 270 Tab, Rfl: 1    calcium carbonate (OS-JANA) 500 mg calcium (1,250 mg) tablet, Take  by mouth daily. , Disp: , Rfl:     tiotropium (SPIRIVA WITH HANDIHALER) 18 mcg inhalation capsule, Take 1 Cap by inhalation daily. , Disp: , Rfl:     ascorbic acid, vitamin C, (VITAMIN C) 500 mg tablet, Take  by mouth., Disp: , Rfl:     melatonin 10 mg cap, Take  by mouth., Disp: , Rfl:     Iron Fum & PS Cmp-Vit C-Niacin (INTEGRA) 125-40-3 mg cap, Take 125 mg by mouth daily. , Disp: 90 Cap, Rfl: 1    montelukast (SINGULAIR) 10 mg tablet, Take 10 mg by mouth daily. , Disp: , Rfl:     lisinopril (PRINIVIL, ZESTRIL) 5 mg tablet, Take 1 Tab by mouth daily. , Disp: 90 Tab, Rfl: 1    pantoprazole (PROTONIX) 40 mg tablet, Take 1 Tab by mouth two (2) times a day., Disp: 180 Tab, Rfl: 1    sucralfate (CARAFATE) 1 gram tablet, Take 1 Tab by mouth three (3) times daily. , Disp: 270 Tab, Rfl: 3    nystatin-triamcinolone (MYCOLOG II) topical cream, Apply  to affected area two (2) times a day., Disp: 60 g, Rfl: 3    SYMBICORT 80-4.5 mcg/actuation HFAA, INHALE 1 PUFF PO BID FOR COUGH, Disp: , Rfl: 0    PROAIR HFA 90 mcg/actuation inhaler, USE 2 PUFFS PO BID PRF COUGH, Disp: , Rfl: 0    dietary supplement cap, Take  by mouth., Disp: , Rfl:     docusate sodium (STOOL SOFTENER) 100 mg tab, Take  by mouth as needed. , Disp: , Rfl:     glucosamine & chondroit sul. Na 750-400 mg cmpk, Take  by mouth., Disp: , Rfl:     omega-3 fatty acids-vitamin e (FISH OIL) 1,000 mg cap, Take 1 Cap by mouth. 1 by oral route twice daily, Disp: , Rfl:     VIT C/ABDIAZIZ AC/LUT/COPPER/ZNOX (PRESERVISION LUTEIN PO), Take  by mouth. 1 by oral route daily, Disp: , Rfl:     cholecalciferol (VITAMIN D3) 1,000 unit tablet, Take  by mouth daily. , Disp: , Rfl:     aspirin delayed-release 81 mg tablet, Take  by mouth daily. , Disp: , Rfl:    Date Last Reviewed:  1/16/19   Number of Personal Factors/Comorbidities that affect the Plan of Care: 1-2: MODERATE COMPLEXITY   EXAMINATION:   Observation/Orthostatic Postural Assessment: Forward head posture, forward bent on rollator. Strength:          R LE 4/5; L LE 4-/5  Functional Mobility:         Gait/Ambulation:  Patient ambulates with rollator walker at slower pace. Forward bent posture. Decreased step length and clearance L LE. Transfers:  Independent with UE assist        Bed Mobility:  independent        Stairs:  NT  Sensation:         intact  Postural Control & Balance:  · Lockwood Balance Scale:  11/56.   (A score less than 45/56 indicates high risk of falls)     · Dynamic Gait Index:  /24. (A score less than or equal to19/24 is abnormal and predictive of falls)          Body Structures Involved:  1. Nerves  2. Eyes and Ears  3. Muscles Body Functions Affected:  1. Sensory/Pain  2. Neuromusculoskeletal  3. Movement Related Activities and Participation Affected:  1. General Tasks and Demands  2. Mobility  3. Domestic Life  4. Community, Social and [place]   Number of elements (examined above) that affect the Plan of Care: 3: MODERATE COMPLEXITY   CLINICAL PRESENTATION:   Presentation: Evolving clinical presentation with changing clinical characteristics: MODERATE COMPLEXITY   CLINICAL DECISION MAKING:   Outcome Measure: Tool Used: Lockwood Balance Scale  Score:  Initial: 11/56 Most Recent: X/56 (Date: -- )   Interpretation of Score: Each section is scored on a 0-4 scale, 0 representing the patients inability to perform the task and 4 representing independence. The scores of each section are added together for a total score of 56. The higher the patients score, the more independent the patient is. Any score below 45 indicates increased risk for falls.     Score 56 55-45 44-34 33-23 22-12 11-1 0   Modifier CH CI CJ CK CL CM CN     Tool Used: Timed Up and Go (TUG)  Score:  Initial: 38.64 seconds with rollator Most Recent: X seconds (Date: -- )   Interpretation of Score: The test measures, in seconds, the time taken by an individual to stand up from a standard arm chair (seat height 46 cm [18 in], arm height 65 cm [25.6 in]), walk a distance of 3 meters (118 in, approx 10 ft), turn, walk back to the chair and sit down. If the individual takes longer than 14 seconds to complete TUG, this indicates risk for falls. Score 7 7.5-10.5 11-14 14.5-17.5 18-21 21.5-24.5 25+   Modifier CH CI CJ CK CL CM CN     ? Mobility - Walking and Moving Around:     - CURRENT STATUS: CN - 100% impaired, limited or restricted    - GOAL STATUS: CM - 80%-99% impaired, limited or restricted    - D/C STATUS:  ---------------To be determined---------------     Tool Used: Five Times Sit to Stand (FTSST or 5xSST)   Score:  Initial: 20.86 seconds with UE assist Most Recent:  seconds (Date: -- )   Interpretation of Score: This is a measure of functional lower limb muscle strength and quantifying functional change of transitional movements. A score of 12 seconds or less indicates a normal level of function for community dwelling older individuals, a score of 15 seconds or more is at risk for fall. Score 12 or less 13-14 15-16 17-18 19-20 20-21 22 or greater   Modifier CH CI CJ CK CL CM CN       Medical Necessity:   · Patient is expected to demonstrate progress in strength, balance and functional technique to improve safety during daily activities. Reason for Services/Other Comments:  · Patient continues to demonstrate capacity to improve strength, balance, gait which will increase independence and increase safety. Use of outcome tool(s) and clinical judgement create a POC that gives a: Questionable prediction of patient's progress: MODERATE COMPLEXITY            TREATMENT:   (In addition to Assessment/Re-Assessment sessions the following treatments were rendered)  Pre-treatment Symptoms/Complaints:  Doing well. I fell in the kitchen last night making a sandwich.  I don't know what happened. I am okay. Pain: Initial:     3/10 back Post Session:  3/10 back       THERAPEUTIC EXERCISE: (10 minutes):  Exercises per grid below to improve mobility, strength and balance. Required minimal verbal cues to promote proper body alignment, promote proper body posture and promote proper body mechanics. Progressed resistance, repetitions and complexity of movement as indicated. Date:  1/9/19 Date:  1/16/19 Date:     Activity/Exercise Parameters Parameters Parameters         Marching       Standing hip abduction      Standing hip extension      Sit to stand 2 x 5 reps  With UE assist 2 x 5 reps  With UE assist    Step ups 4 inch step x 10 reps each leg with rail 4 inch step x 10 reps each leg with rail                      NEUROMUSCULAR RE-EDUCATION: ( 30 minutes):  Exercise/activities per grid below to improve balance, coordination, posture and proprioception. Required minimal verbal cues to promote static and dynamic balance in standing.   Balance/Vestibular Treatment:   Activity   Date  1/9/19 Date  1/16/19 Date Date Date Date   Activity/Exercise   Sets/reps/equipment Sets/reps/  equipment Sets/reps/  equipment Sets/reps/  equipment Sets/reps/  equipment Sets/reps/  equipment   Walking with head turns             Walking with head up & down             Step overs     Over 1/2 foam roll x 10 reps each leg fwd and laterally Over 1/2 foam roll x 10 reps each leg fwd and laterally       Step taps     4 inch step 2 x 20 reps fwd and cross with rail 4 inch step 2 x 20 reps fwd and cross with rail       Marching   In place x 30 reps In place x 30 reps       Sidestepping           Crossovers           [place]           Walking  backwards             Tandem walking           Weaving in/out of cones             Picking up cones             Sports cord             [employer] ball           Figure 8s            Circles right/left           Walking with 360 degree turns           Spirals Weight shifting:    Left & Right     On floor and On on floor and blue foams eyes open On floor and On on floor and blue foams eyes open       Weight shifting: Forward & Backward      On blue foams eyes open On blue foams eyes open       Static Standing Balance             Standing with feet apart     On floor and On blue foams eyes open  On floor and On blue foams eyes open        Standing with feet together             Standing with feet semitandem   On blue foams eyes open  On blue foams eyes open        Standing with feet tandem           Single leg stance           X1/X2 Viewing exercises             Hallpike-Angela testing for BPPV (Benign Paroxysmal Positional Vertigo)             Wise-Daroff exercises           Canalith Repositioning treatment/Epley Maneuver  for BPPV (Benign Paroxysmal Positional Vertigo)           Smart Equit[employer] Training: See scanned report. QR Artist Portal  Treatment/Session Assessment:    · Response to Treatment:  Patient tolerated session well. She needs reinforcement with weight shifting forward and back, as she gets very anxious. She is slowly progressing in confidence to do more without UE assist.   · Compliance with Program/Exercises: Compliant most of the time. · Recommendations/Intent for next treatment session: \"Next visit will focus on advancements to more challenging activities\".   Total Treatment Duration:  PT Patient Time In/Time Out  Time In: 1315  Time Out: Kin Atkinson PT    Future Appointments   Date Time Provider Ken Zamora   1/23/2019  1:15 PM Caryle Rosier, PT [facility]   1/30/2019  1:15 PM Caryle Rosier, PT [facility]   2/14/2019  2:00 PM Amarilis Griffin MD [facility]   4/24/2019  2:00 PM Josh Dykes NP BSN [facility]   6/17/2019 10:45 AM Ned Coats MD [address] [place] [address]

## 2019-01-23 ENCOUNTER — HOSPITAL ENCOUNTER (OUTPATIENT)
Dept: PHYSICAL THERAPY | Age: 74
Discharge: HOME | End: 2019-01-23
Payer: MEDICARE

## 2019-01-23 PROCEDURE — 97112 NEUROMUSCULAR REEDUCATION: CPT

## 2019-01-23 PROCEDURE — 97110 THERAPEUTIC EXERCISES: CPT

## 2019-01-23 NOTE — PROGRESS NOTES
Cortney Israel  : 1945  [place]  Secondary: [facility]  [facility], [address] [place] [address],[address], [address]  [phone]   [phone]           OUTPATIENT PHYSICAL THERAPY:Daily Note 2019   ICD-10: Treatment Diagnosis: Other abnormalities of gait and mobility R26.89;   Precautions/Allergies:   Flexeril [cyclobenzaprine]; Ketoprofen; and Plaquenil [hydroxychloroquine]   MD Orders: eval and treat MEDICAL/REFERRING DIAGNOSIS:  Degenerative disease of nervous system, unspecified [G31.9]  Spinal stenosis, cervical region [M48.02]   DATE OF ONSET: years  REFERRING PHYSICIAN: Cesar Camp NP  RETURN PHYSICIAN APPOINTMENT:      INITIAL ASSESSMENT:  Ms. Vivian Oliveira presents with imbalance, weakness, unsteady gait. Patient has a history of falls. . Patient would benefit from PT to address these problems to improve patient's independence and safety with mobility and daily activities. Thank you. PROBLEM LIST (Impacting functional limitations):  1. Decreased Strength  2. Decreased ADL/Functional Activities  3. Decreased Transfer Abilities  4. Decreased Ambulation Ability/Technique  5. Decreased Balance  6. Decreased Activity Tolerance  7. Decreased [place] with Home Exercise Program INTERVENTIONS PLANNED:  1. Balance Exercise  2. Bed Mobility  3. Family Education  4. Gait Training  5. Home Exercise Program (HEP)  6. Neuromuscular Re-education/Strengthening  7. Therapeutic Activites  8. Therapeutic Exercise/Strengthening  9. Transfer Training   TREATMENT PLAN:  Effective Dates: 2018 TO 3/3/2019 (90 days). Frequency/Duration: 1-2 times a week for 60- 90 Day(s)  GOALS: (Goals have been discussed and agreed upon with patient.)  Short-Term Functional Goals: Time Frame: 2-4 weeks  1. Patient will demonstrate independence and compliance with home exercise program to improve balance and strength for daily activities. met  2.   3.   Discharge Goals: Time Frame: 8-12 weeks  1. Patient will increase bilateral lower extremity strength to greater than or equal to 4/5 to improve strength for functional mobility activities. 2. Patient will increase her score on the Lockwood Balance Scale to greater than or equal to 14/56 indicating improved safety and decreased fall risk for daily activities. Patient will demonstrate improved score to less than or equal to 24 seconds on the Timed Up and Go Test indicating improved mobility for daily activities. Patient will demonstrate improved time on Five Time Sit to Stand Test to less than or equal to 18 seconds indicating improved LE strength for daily mobility. Rehabilitation Potential For Stated Goals: Good               The information in this section was collected on 12/5/18 (except where otherwise noted). HISTORY:   History of Present Injury/Illness (Reason for Referral):  Balance has been worsening over the last couple of years, especially the last 6 months. R neck hurts at times, and into head. Has had a lot of falls. Getting harder to get up from the floor, usually use a chair. Walks with rollator walker x 15-20 years. No numbness, no dizziness. Per MD notes: \"Patient presents for follow up for spinal cerebellar atrophy and frequent falls. She has a sister with SCA, similar symptoms to patient, presume SCA is genetic, though patient has not undergone genetic testing. She has a 30 year history of SCA and chronic difficulty with her gait worsening over the last 15 years. More recently she is experiencing more falls at home, despite using her rollator. She denies any difficulty with her vision. Recent MRI brain showed cerebellar atrophy. 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 \"      Past Medical History/Comorbidities:   Ms. Neftali Irahetachester  has a past medical history of Abnormality of gait (7/18/2018), Anemia, Anxiety, Arthritis, Asthma, Calculus of kidney, Chronic pain, Contact dermatitis and other eczema, due to unspecified cause, Depressive disorder, not elsewhere classified, Encounter for chronic pain management, Esophageal reflux, Essential hypertension, benign, Frequent falls (7/18/2018), GERD (gastroesophageal reflux disease), and Pure hypercholesterolemia. Ms. Peter Gupta  has a past surgical history that includes hx knee replacement (Right); hx shoulder replacement (Left); hx hysterectomy; hx lithotripsy; hx cataract removal (Bilateral); hx retinal detachment repair; hx colonoscopy; hx gi; and pr neurological procedure unlisted. Social History/Living Environment:     ; one level home; doesn't do cooking anymore; has someone who comes to clean. Prior Level of Function/Work/Activity:  Independent with rollator. Does not drive. Dominant Side:         LEFT  Previous Treatment Approaches:          Had HHPT earlier in the year. Had OPPT at [facility] last year. Personal Factors:          Sex:  female        Age:  74 y.o. Ambulatory/Rehab Services H2 Model Falls Risk Assessment    Risk Factors:       (5)  History of Recent Falls [w/in 3 months] Ability to Rise from Chair:       (1)  Pushes up, successful in one attempt    Falls Prevention Plan: Mobility Assistance Device (specify):  rollator walker   Total: (5 or greater = High Risk): 6    ©2010 [facility] [place] [address] [place] [address] Patent #[id number]. Federal Law prohibits the replication, distribution or use without written permission from [facility] [employer]     Current Medications:       Current Outpatient Medications:     sertraline (ZOLOFT) 100 mg tablet, Take 1 Tab by mouth daily. , Disp: 90 Tab, Rfl: 1    fluticasone (FLONASE) 50 mcg/actuation nasal spray, 2 Sprays by Both Nostrils route daily. , Disp: 3 Bottle, Rfl: 12    clorazepate (TRANXENE) 3.75 mg tablet, Take 1 Tab by mouth three (3) times daily. Max Daily Amount: 11.25 mg., Disp: 270 Tab, Rfl: 1    calcium carbonate (OS-JANA) 500 mg calcium (1,250 mg) tablet, Take  by mouth daily. , Disp: , Rfl:     tiotropium (SPIRIVA WITH HANDIHALER) 18 mcg inhalation capsule, Take 1 Cap by inhalation daily. , Disp: , Rfl:     ascorbic acid, vitamin C, (VITAMIN C) 500 mg tablet, Take  by mouth., Disp: , Rfl:     melatonin 10 mg cap, Take  by mouth., Disp: , Rfl:     Iron Fum & PS Cmp-Vit C-Niacin (INTEGRA) 125-40-3 mg cap, Take 125 mg by mouth daily. , Disp: 90 Cap, Rfl: 1    montelukast (SINGULAIR) 10 mg tablet, Take 10 mg by mouth daily. , Disp: , Rfl:     lisinopril (PRINIVIL, ZESTRIL) 5 mg tablet, Take 1 Tab by mouth daily. , Disp: 90 Tab, Rfl: 1    pantoprazole (PROTONIX) 40 mg tablet, Take 1 Tab by mouth two (2) times a day., Disp: 180 Tab, Rfl: 1    sucralfate (CARAFATE) 1 gram tablet, Take 1 Tab by mouth three (3) times daily. , Disp: 270 Tab, Rfl: 3    nystatin-triamcinolone (MYCOLOG II) topical cream, Apply  to affected area two (2) times a day., Disp: 60 g, Rfl: 3    SYMBICORT 80-4.5 mcg/actuation HFAA, INHALE 1 PUFF PO BID FOR COUGH, Disp: , Rfl: 0    PROAIR HFA 90 mcg/actuation inhaler, USE 2 PUFFS PO BID PRF COUGH, Disp: , Rfl: 0    dietary supplement cap, Take  by mouth., Disp: , Rfl:     docusate sodium (STOOL SOFTENER) 100 mg tab, Take  by mouth as needed. , Disp: , Rfl:     glucosamine & chondroit sul. Na 750-400 mg cmpk, Take  by mouth., Disp: , Rfl:     omega-3 fatty acids-vitamin e (FISH OIL) 1,000 mg cap, Take 1 Cap by mouth. 1 by oral route twice daily, Disp: , Rfl:     VIT C/ABDIAZIZ AC/LUT/COPPER/ZNOX (PRESERVISION LUTEIN PO), Take  by mouth. 1 by oral route daily, Disp: , Rfl:     cholecalciferol (VITAMIN D3) 1,000 unit tablet, Take  by mouth daily. , Disp: , Rfl:     aspirin delayed-release 81 mg tablet, Take  by mouth daily. , Disp: , Rfl:    Date Last Reviewed:  1/23/19   Number of Personal Factors/Comorbidities that affect the Plan of Care: 1-2: MODERATE COMPLEXITY   EXAMINATION:   Observation/Orthostatic Postural Assessment: Forward head posture, forward bent on rollator. Strength:          R LE 4/5; L LE 4-/5  Functional Mobility:         Gait/Ambulation:  Patient ambulates with rollator walker at slower pace. Forward bent posture. Decreased step length and clearance L LE. Transfers:  Independent with UE assist        Bed Mobility:  independent        Stairs:  NT  Sensation:         intact  Postural Control & Balance:  · Lockwood Balance Scale:  11/56.   (A score less than 45/56 indicates high risk of falls)     · Dynamic Gait Index:  /24. (A score less than or equal to19/24 is abnormal and predictive of falls)          Body Structures Involved:  1. Nerves  2. Eyes and Ears  3. Muscles Body Functions Affected:  1. Sensory/Pain  2. Neuromusculoskeletal  3. Movement Related Activities and Participation Affected:  1. General Tasks and Demands  2. Mobility  3. Domestic Life  4. Community, Social and [place]   Number of elements (examined above) that affect the Plan of Care: 3: MODERATE COMPLEXITY   CLINICAL PRESENTATION:   Presentation: Evolving clinical presentation with changing clinical characteristics: MODERATE COMPLEXITY   CLINICAL DECISION MAKING:   Outcome Measure: Tool Used: Lockwood Balance Scale  Score:  Initial: 11/56 Most Recent: X/56 (Date: -- )   Interpretation of Score: Each section is scored on a 0-4 scale, 0 representing the patients inability to perform the task and 4 representing independence. The scores of each section are added together for a total score of 56. The higher the patients score, the more independent the patient is. Any score below 45 indicates increased risk for falls.     Score 56 55-45 44-34 33-23 22-12 11-1 0   Modifier CH CI CJ CK CL CM CN     Tool Used: Timed Up and Go (TUG)  Score:  Initial: 38.64 seconds with rollator Most Recent: X seconds (Date: -- )   Interpretation of Score: The test measures, in seconds, the time taken by an individual to stand up from a standard arm chair (seat height 46 cm [18 in], arm height 65 cm [25.6 in]), walk a distance of 3 meters (118 in, approx 10 ft), turn, walk back to the chair and sit down. If the individual takes longer than 14 seconds to complete TUG, this indicates risk for falls. Score 7 7.5-10.5 11-14 14.5-17.5 18-21 21.5-24.5 25+   Modifier CH CI CJ CK CL CM CN       Tool Used: Five Times Sit to Stand (FTSST or 5xSST)   Score:  Initial: 20.86 seconds with UE assist Most Recent:  seconds (Date: -- )   Interpretation of Score: This is a measure of functional lower limb muscle strength and quantifying functional change of transitional movements. A score of 12 seconds or less indicates a normal level of function for community dwelling older individuals, a score of 15 seconds or more is at risk for fall. Score 12 or less 13-14 15-16 17-18 19-20 20-21 22 or greater   Modifier CH CI CJ CK CL CM CN       Medical Necessity:   · Patient is expected to demonstrate progress in strength, balance and functional technique to improve safety during daily activities. Reason for Services/Other Comments:  · Patient continues to demonstrate capacity to improve strength, balance, gait which will increase independence and increase safety. Use of outcome tool(s) and clinical judgement create a POC that gives a: Questionable prediction of patient's progress: MODERATE COMPLEXITY            TREATMENT:   (In addition to Assessment/Re-Assessment sessions the following treatments were rendered)  Pre-treatment Symptoms/Complaints:  Doing okay. Have fallen but no injuries, tend to fall back. Pain: Initial:     3/10 back Post Session:  3/10 back       THERAPEUTIC EXERCISE: (10minutes):  Exercises per grid below to improve mobility, strength and balance.   Required minimal verbal cues to promote proper body alignment, promote proper body posture and promote proper body mechanics. Progressed resistance, repetitions and complexity of movement as indicated. Date:  1/9/19 Date:  1/16/19 Date:  1/23/19   Activity/Exercise Parameters Parameters Parameters         Marching       Standing hip abduction      Standing hip extension      Sit to stand 2 x 5 reps  With UE assist 2 x 5 reps  With UE assist X 10 reps from mat table With no UE assist   Step ups 4 inch step x 10 reps each leg with rail 4 inch step x 10 reps each leg with rail 4 inch step x 10 reps each leg with rail                     NEUROMUSCULAR RE-EDUCATION: ( 35 minutes):  Exercise/activities per grid below to improve balance, coordination, posture and proprioception. Required minimal verbal cues to promote static and dynamic balance in standing.   Balance/Vestibular Treatment:   Activity   Date  1/9/19 Date  1/16/19 Date  1/23/19 Date Date Date   Activity/Exercise   Sets/reps/equipment Sets/reps/  equipment Sets/reps/  equipment Sets/reps/  equipment Sets/reps/  equipment Sets/reps/  equipment   Walking with head turns             Walking with head up & down             Step overs     Over 1/2 foam roll x 10 reps each leg fwd and laterally Over 1/2 foam roll x 10 reps each leg fwd and laterally Over 1/2 foam roll x 10 reps each leg fwd and laterally      Step taps     4 inch step 2 x 20 reps fwd and cross with rail 4 inch step 2 x 20 reps fwd and cross with rail 4 inch step 2 x 20 reps fwd and cross with rail      Marching   In place x 30 reps In place x 30 reps In place x 30 reps      Sidestepping           Crossovers           [place]           Walking  backwards             Tandem walking           Weaving in/out of cones             Picking up cones             Sports cord             [employer] ball           Figure 8s            Circles right/left           Walking with 360 degree turns           Spirals           Weight shifting:    Left & Right     On floor and On on floor and blue foams eyes open On floor and On on floor and blue foams eyes open On floor and On on floor and blue foams eyes open      Weight shifting: Forward & Backward      On blue foams eyes open On blue foams eyes open On blue foams eyes open       Standing wt shifting back with stepping backwards       Worked on this several times stepping back with R CARON, to teach her to catch herself when she loses her balance backward. Standing with feet apart     On floor and On blue foams eyes open  On floor and On blue foams eyes open  On floor and On blue foams eyes open       Standing with feet together             Standing with feet semitandem   On blue foams eyes open  On blue foams eyes open  On blue foams eyes open       Standing with feet tandem           Single leg stance           X1/X2 Viewing exercises             Hallpike-Angela testing for BPPV (Benign Paroxysmal Positional Vertigo)             Wise-Daroff exercises           Canalith Repositioning treatment/Epley Maneuver  for BPPV (Benign Paroxysmal Positional Vertigo)           Smart [employer] Training: See scanned report. [facility]  Treatment/Session Assessment:    · Response to Treatment:  Patient tolerated session well. Patient did better maintaining balance and being willing to shift her weight although she is very anxious and fearful as she has a history of falls. Continue to work on balance training and strengthening to decrease fall risk. · Compliance with Program/Exercises: Compliant most of the time. · Recommendations/Intent for next treatment session: \"Next visit will focus on advancements to more challenging activities\".   Total Treatment Duration:  PT Patient Time In/Time Out  Time In: 1315  Time Out: [place] Landon Libman, PT    Future Appointments   Date Time Provider Ken Zamora   1/30/2019  1:15 PM Tanner Yarbrough PT [facility]   2/6/2019  2:00 PM Tanner Yarbrough PT [facility]   2/13/2019 12:30 PM Landon Libman, Gulshan Perez, PT [facility]   2/14/2019  2:00 PM Lawrence Tate MD [facility]   2/15/2019 12:30 PM Dulcy Harps, PT [facility]   2/20/2019  2:00 PM Dulcy Harps, PT [facility]   2/22/2019  1:45 PM Dulcy Harps, PT [facility]   2/27/2019  2:00 PM Dulcy Harps, PT [facility]   3/1/2019  1:45 PM Dulcy Harps, PT [facility]   4/24/2019  2:00 PM Cesar Camp NP BSN [facility]   6/17/2019 10:45 AM Geoffrey Coats MD [address] [place] [address]

## 2019-01-30 ENCOUNTER — HOSPITAL ENCOUNTER (OUTPATIENT)
Dept: PHYSICAL THERAPY | Age: 74
Discharge: HOME | End: 2019-01-30
Payer: MEDICARE

## 2019-01-30 PROCEDURE — 97110 THERAPEUTIC EXERCISES: CPT

## 2019-01-30 PROCEDURE — 97112 NEUROMUSCULAR REEDUCATION: CPT

## 2019-01-30 NOTE — PROGRESS NOTES
Zeno Lesch  : 1945  [place]  Secondary: [address] [id number] [facility], [address] [place] [address],[address], [facility] [address].  [phone]   [phone]           OUTPATIENT PHYSICAL THERAPY:Daily Note 2019   ICD-10: Treatment Diagnosis: Other abnormalities of gait and mobility R26.89;   Precautions/Allergies:   Flexeril [cyclobenzaprine]; Ketoprofen; and Plaquenil [hydroxychloroquine]   MD Orders: eval and treat MEDICAL/REFERRING DIAGNOSIS:  Degenerative disease of nervous system, unspecified [G31.9]  Spinal stenosis, cervical region [M48.02]   DATE OF ONSET: years  REFERRING PHYSICIAN: Lizeth aYtes NP  RETURN PHYSICIAN APPOINTMENT:      INITIAL ASSESSMENT:  Ms. Nelia Kaiser presents with imbalance, weakness, unsteady gait. Patient has a history of falls. . Patient would benefit from PT to address these problems to improve patient's independence and safety with mobility and daily activities. Thank you. PROBLEM LIST (Impacting functional limitations):  1. Decreased Strength  2. Decreased ADL/Functional Activities  3. Decreased Transfer Abilities  4. Decreased Ambulation Ability/Technique  5. Decreased Balance  6. Decreased Activity Tolerance  7. Decreased Nelson with Home Exercise Program INTERVENTIONS PLANNED:  1. Balance Exercise  2. Bed Mobility  3. Family Education  4. Gait Training  5. Home Exercise Program (HEP)  6. Neuromuscular Re-education/Strengthening  7. Therapeutic Activites  8. Therapeutic Exercise/Strengthening  9. Transfer Training   TREATMENT PLAN:  Effective Dates: 2018 TO 3/3/2019 (90 days). Frequency/Duration: 1-2 times a week for 60- 90 Day(s)  GOALS: (Goals have been discussed and agreed upon with patient.)  Short-Term Functional Goals: Time Frame: 2-4 weeks  1. Patient will demonstrate independence and compliance with home exercise program to improve balance and strength for daily activities. met  2.   3.   Discharge Goals: Time Frame: 8-12 weeks  1. Patient will increase bilateral lower extremity strength to greater than or equal to 4/5 to improve strength for functional mobility activities. 2. Patient will increase her score on the Lockwood Balance Scale to greater than or equal to 14/56 indicating improved safety and decreased fall risk for daily activities. Patient will demonstrate improved score to less than or equal to 24 seconds on the Timed Up and Go Test indicating improved mobility for daily activities. Patient will demonstrate improved time on Five Time Sit to Stand Test to less than or equal to 18 seconds indicating improved LE strength for daily mobility. Rehabilitation Potential For Stated Goals: Good               The information in this section was collected on 12/5/18 (except where otherwise noted). HISTORY:   History of Present Injury/Illness (Reason for Referral):  Balance has been worsening over the last couple of years, especially the last 6 months. R neck hurts at times, and into head. Has had a lot of falls. Getting harder to get up from the floor, usually use a chair. Walks with rollator walker x 15-20 years. No numbness, no dizziness. Per MD notes: \"Patient presents for follow up for spinal cerebellar atrophy and frequent falls. She has a sister with SCA, similar symptoms to patient, presume SCA is genetic, though patient has not undergone genetic testing. She has a 30 year history of SCA and chronic difficulty with her gait worsening over the last 15 years. More recently she is experiencing more falls at home, despite using her rollator. She denies any difficulty with her vision. Recent MRI brain showed cerebellar atrophy. 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 \"      Past Medical History/Comorbidities:   Ms. Allyssa Perrin  has a past medical history of Abnormality of gait (7/18/2018), Anemia, Anxiety, Arthritis, Asthma, Calculus of kidney, Chronic pain, Contact dermatitis and other eczema, due to unspecified cause, Depressive disorder, not elsewhere classified, Encounter for chronic pain management, Esophageal reflux, Essential hypertension, benign, Frequent falls (7/18/2018), GERD (gastroesophageal reflux disease), and Pure hypercholesterolemia. Ms. Ronald Montilla  has a past surgical history that includes hx knee replacement (Right); hx shoulder replacement (Left); hx hysterectomy; hx lithotripsy; hx cataract removal (Bilateral); hx retinal detachment repair; hx colonoscopy; hx gi; and pr neurological procedure unlisted. Social History/Living Environment:     ; one level home; doesn't do cooking anymore; has someone who comes to clean. Prior Level of Function/Work/Activity:  Independent with rollator. Does not drive. Dominant Side:         LEFT  Previous Treatment Approaches:          Had HHPT earlier in the year. Had OPPT at [facility] last year. Personal Factors:          Sex:  female        Age:  74 y.o. Ambulatory/Rehab Services H2 Model Falls Risk Assessment    Risk Factors:       (5)  History of Recent Falls [w/in 3 months] Ability to Rise from Chair:       (1)  Pushes up, successful in one attempt    Falls Prevention Plan: Mobility Assistance Device (specify):  rollator walker   Total: (5 or greater = High Risk): 6    ©2010 [facility] [address] [place] [address] Patent #[id number]. Federal Law prohibits the replication, distribution or use without written permission from [facility]     Current Medications:       Current Outpatient Medications:     sertraline (ZOLOFT) 100 mg tablet, Take 1 Tab by mouth daily. , Disp: 90 Tab, Rfl: 1    fluticasone (FLONASE) 50 mcg/actuation nasal spray, 2 Sprays by Both Nostrils route daily. , Disp: 3 Bottle, Rfl: 12    clorazepate (TRANXENE) 3.75 mg tablet, Take 1 Tab by mouth three (3) times daily. Max Daily Amount: 11.25 mg., Disp: 270 Tab, Rfl: 1    calcium carbonate (OS-JANA) 500 mg calcium (1,250 mg) tablet, Take  by mouth daily. , Disp: , Rfl:     tiotropium (SPIRIVA WITH HANDIHALER) 18 mcg inhalation capsule, Take 1 Cap by inhalation daily. , Disp: , Rfl:     ascorbic acid, vitamin C, (VITAMIN C) 500 mg tablet, Take  by mouth., Disp: , Rfl:     melatonin 10 mg cap, Take  by mouth., Disp: , Rfl:     Iron Fum & PS Cmp-Vit C-Niacin (INTEGRA) 125-40-3 mg cap, Take 125 mg by mouth daily. , Disp: 90 Cap, Rfl: 1    montelukast (SINGULAIR) 10 mg tablet, Take 10 mg by mouth daily. , Disp: , Rfl:     lisinopril (PRINIVIL, ZESTRIL) 5 mg tablet, Take 1 Tab by mouth daily. , Disp: 90 Tab, Rfl: 1    pantoprazole (PROTONIX) 40 mg tablet, Take 1 Tab by mouth two (2) times a day., Disp: 180 Tab, Rfl: 1    sucralfate (CARAFATE) 1 gram tablet, Take 1 Tab by mouth three (3) times daily. , Disp: 270 Tab, Rfl: 3    nystatin-triamcinolone (MYCOLOG II) topical cream, Apply  to affected area two (2) times a day., Disp: 60 g, Rfl: 3    SYMBICORT 80-4.5 mcg/actuation HFAA, INHALE 1 PUFF PO BID FOR COUGH, Disp: , Rfl: 0    PROAIR HFA 90 mcg/actuation inhaler, USE 2 PUFFS PO BID PRF COUGH, Disp: , Rfl: 0    dietary supplement cap, Take  by mouth., Disp: , Rfl:     docusate sodium (STOOL SOFTENER) 100 mg tab, Take  by mouth as needed. , Disp: , Rfl:     glucosamine & chondroit sul. Na 750-400 mg cmpk, Take  by mouth., Disp: , Rfl:     omega-3 fatty acids-vitamin e (FISH OIL) 1,000 mg cap, Take 1 Cap by mouth. 1 by oral route twice daily, Disp: , Rfl:     VIT C/ABDIAZIZ AC/LUT/COPPER/ZNOX (PRESERVISION LUTEIN PO), Take  by mouth. 1 by oral route daily, Disp: , Rfl:     cholecalciferol (VITAMIN D3) 1,000 unit tablet, Take  by mouth daily. , Disp: , Rfl:     aspirin delayed-release 81 mg tablet, Take  by mouth daily. , Disp: , Rfl:    Date Last Reviewed:  1/30/19   Number of Personal Factors/Comorbidities that affect the Plan of Care: 1-2: MODERATE COMPLEXITY   EXAMINATION:   Observation/Orthostatic Postural Assessment: Forward head posture, forward bent on rollator. Strength:          R LE 4/5; L LE 4-/5  Functional Mobility:         Gait/Ambulation:  Patient ambulates with rollator walker at slower pace. Forward bent posture. Decreased step length and clearance L LE. Transfers:  Independent with UE assist        Bed Mobility:  independent        Stairs:  NT  Sensation:         intact  Postural Control & Balance:  · Lockwood Balance Scale:  11/56.   (A score less than 45/56 indicates high risk of falls)     · Dynamic Gait Index:  /24. (A score less than or equal to19/24 is abnormal and predictive of falls)          Body Structures Involved:  1. Nerves  2. Eyes and Ears  3. Muscles Body Functions Affected:  1. Sensory/Pain  2. Neuromusculoskeletal  3. Movement Related Activities and Participation Affected:  1. General Tasks and Demands  2. Mobility  3. Domestic Life  4. Community, Social and [place]   Number of elements (examined above) that affect the Plan of Care: 3: MODERATE COMPLEXITY   CLINICAL PRESENTATION:   Presentation: Evolving clinical presentation with changing clinical characteristics: MODERATE COMPLEXITY   CLINICAL DECISION MAKING:   Outcome Measure: Tool Used: Lockwood Balance Scale  Score:  Initial: 11/56 Most Recent: X/56 (Date: -- )   Interpretation of Score: Each section is scored on a 0-4 scale, 0 representing the patients inability to perform the task and 4 representing independence. The scores of each section are added together for a total score of 56. The higher the patients score, the more independent the patient is. Any score below 45 indicates increased risk for falls.     Score 56 55-45 44-34 33-23 22-12 11-1 0   Modifier CH CI CJ CK CL CM CN     Tool Used: Timed Up and Go (TUG)  Score:  Initial: 38.64 seconds with rollator Most Recent: X seconds (Date: -- )   Interpretation of Score: The test measures, in seconds, the time taken by an individual to stand up from a standard arm chair (seat height 46 cm [18 in], arm height 65 cm [25.6 in]), walk a distance of 3 meters (118 in, approx 10 ft), turn, walk back to the chair and sit down. If the individual takes longer than 14 seconds to complete TUG, this indicates risk for falls. Score 7 7.5-10.5 11-14 14.5-17.5 18-21 21.5-24.5 25+   Modifier CH CI CJ CK CL CM CN       Tool Used: Five Times Sit to Stand (FTSST or 5xSST)   Score:  Initial: 20.86 seconds with UE assist Most Recent:  seconds (Date: -- )   Interpretation of Score: This is a measure of functional lower limb muscle strength and quantifying functional change of transitional movements. A score of 12 seconds or less indicates a normal level of function for community dwelling older individuals, a score of 15 seconds or more is at risk for fall. Score 12 or less 13-14 15-16 17-18 19-20 20-21 22 or greater   Modifier CH CI CJ CK CL CM CN       Medical Necessity:   · Patient is expected to demonstrate progress in strength, balance and functional technique to improve safety during daily activities. Reason for Services/Other Comments:  · Patient continues to demonstrate capacity to improve strength, balance, gait which will increase independence and increase safety. Use of outcome tool(s) and clinical judgement create a POC that gives a: Questionable prediction of patient's progress: MODERATE COMPLEXITY            TREATMENT:   (In addition to Assessment/Re-Assessment sessions the following treatments were rendered)  Pre-treatment Symptoms/Complaints:  Doing okay. A lot of near falls. Pain: Initial:     3/10 back Post Session:  3/10 back       THERAPEUTIC EXERCISE: (10 minutes):  Exercises per grid below to improve mobility, strength and balance. Required minimal verbal cues to promote proper body alignment, promote proper body posture and promote proper body mechanics.   Progressed resistance, repetitions and complexity of movement as indicated. Date:  1/30/19   Activity/Exercise Parameters       Marching     Standing hip abduction    Standing hip extension    Sit to stand X 10 reps from mat table With no UE assist   Step ups 4 inch step x 10 reps each leg with rail                 NEUROMUSCULAR RE-EDUCATION: ( 30  minutes):  Exercise/activities per grid below to improve balance, coordination, posture and proprioception. Required minimal verbal cues to promote static and dynamic balance in standing. Balance/Vestibular Treatment:   Activity   Date  1/30/19 Date Date Date   Activity/Exercise   Sets/reps/  equipment Sets/reps/  equipment Sets/reps/  equipment Sets/reps/  equipment   Walking with head turns           Walking with head up & down           Step overs     Over 1/2 foam roll x 15 reps each leg fwd and laterally      Step taps     4 inch step 2 x 20 reps fwd and cross with rail      Marching   In place x 30 reps      Sidestepping         Crossovers         [place]         Walking  backwards           Tandem walking         Weaving in/out of cones           Picking up cones           Sports cord           [employer] ball         Figure 8s          Circles right/left         Walking with 360 degree turns         Spirals         Weight shifting:    Left & Right     On floor and On on floor and blue foams eyes open      Weight shifting: Forward & Backward      On blue foams eyes open       Standing wt shifting back with stepping backwards     Worked on this several times stepping back with ELIA REARDON, to teach her to catch herself when she loses her balance backward.        Standing with feet apart     On floor and On blue foams eyes open       Standing with feet together           Standing with feet semitandem   On blue foams eyes open       Standing with feet tandem         Single leg stance         X1/X2 Viewing exercises           Hallpike-[place]ford testing for BPPV (Benign Paroxysmal Positional Vertigo)           Wise-Ananda exercises         Canalith Repositioning treatment/Epley Maneuver  for BPPV (Benign Paroxysmal Positional Vertigo)         Smart Equitest Training: See scanned report. Nala Portal  Treatment/Session Assessment:    · Response to Treatment:  Patient tolerated session well. She used one UE on railing during exercises, improved from needing B UE assist in early sessions. Continue to work on balance training and strengthening to decrease fall risk. · Compliance with Program/Exercises: Compliant most of the time. · Recommendations/Intent for next treatment session: \"Next visit will focus on advancements to more challenging activities\".   Total Treatment Duration:  PT Patient Time In/Time Out  Time In: 1315  Time Out: [address], PT    Future Appointments   Date Time Provider Ken Zamora   2/6/2019  2:00 PM Brigida Benavides, PT [facility]   2/13/2019 12:30 PM Dulcy Harlexie, PT [facility]   2/14/2019  2:00 PM Lawrence Tate MD [facility]   2/15/2019 12:30 PM Dulcy Harps, PT [facility]   2/20/2019  2:00 PM Dulcy Harps, PT [facility]   2/22/2019  1:45 PM Dulcy Harps, PT [facility]   2/25/2019  1:45 PM Dulcy Harps, PT [facility]   2/27/2019  2:00 PM Dulcy Harlexie, PT [facility]   3/1/2019  7:00 PM Dulcy Harlexie, PT [facility]   4/24/2019  2:00 PM Cesar Camp NP B[facility]   6/17/2019 10:45 AM Geoffrey Coats MD [address] [place] [address]

## 2019-02-06 ENCOUNTER — HOSPITAL ENCOUNTER (OUTPATIENT)
Dept: PHYSICAL THERAPY | Age: 74
Discharge: HOME | End: 2019-02-06
Payer: MEDICARE

## 2019-02-06 PROCEDURE — 97110 THERAPEUTIC EXERCISES: CPT

## 2019-02-06 PROCEDURE — 97112 NEUROMUSCULAR REEDUCATION: CPT

## 2019-02-06 NOTE — PROGRESS NOTES
Navid De La Cruz  : 1945  [place]  Secondary: [facility]  [address] [facility] [address], [address] [place] [address],[address]  [phone]   [phone]           OUTPATIENT PHYSICAL THERAPY:Daily Note 2019   ICD-10: Treatment Diagnosis: Other abnormalities of gait and mobility R26.89;   Precautions/Allergies:   Flexeril [cyclobenzaprine]; Ketoprofen; and Plaquenil [hydroxychloroquine]   MD Orders: eval and treat MEDICAL/REFERRING DIAGNOSIS:  Degenerative disease of nervous system, unspecified [G31.9]  Spinal stenosis, cervical region [M48.02]   DATE OF ONSET: years  REFERRING PHYSICIAN: Efren Tse NP  RETURN PHYSICIAN APPOINTMENT:      INITIAL ASSESSMENT:  Ms. Ronnell Cole presents with imbalance, weakness, unsteady gait. Patient has a history of falls. . Patient would benefit from PT to address these problems to improve patient's independence and safety with mobility and daily activities. Thank you. PROBLEM LIST (Impacting functional limitations):  1. Decreased Strength  2. Decreased ADL/Functional Activities  3. Decreased Transfer Abilities  4. Decreased Ambulation Ability/Technique  5. Decreased Balance  6. Decreased Activity Tolerance  7. Decreased [place] with Home Exercise Program INTERVENTIONS PLANNED:  1. Balance Exercise  2. Bed Mobility  3. Family Education  4. Gait Training  5. Home Exercise Program (HEP)  6. Neuromuscular Re-education/Strengthening  7. Therapeutic Activites  8. Therapeutic Exercise/Strengthening  9. Transfer Training   TREATMENT PLAN:  Effective Dates: 2018 TO 3/3/2019 (90 days). Frequency/Duration: 1-2 times a week for 60- 90 Day(s)  GOALS: (Goals have been discussed and agreed upon with patient.)  Short-Term Functional Goals: Time Frame: 2-4 weeks  1. Patient will demonstrate independence and compliance with home exercise program to improve balance and strength for daily activities. met  2.   3.   Discharge Goals: Time Frame: 8-12 weeks  1. Patient will increase bilateral lower extremity strength to greater than or equal to 4/5 to improve strength for functional mobility activities. 2. Patient will increase her score on the Lockwood Balance Scale to greater than or equal to 14/56 indicating improved safety and decreased fall risk for daily activities. Patient will demonstrate improved score to less than or equal to 24 seconds on the Timed Up and Go Test indicating improved mobility for daily activities. Patient will demonstrate improved time on Five Time Sit to Stand Test to less than or equal to 18 seconds indicating improved LE strength for daily mobility. Rehabilitation Potential For Stated Goals: Good               The information in this section was collected on 12/5/18 (except where otherwise noted). HISTORY:   History of Present Injury/Illness (Reason for Referral):  Balance has been worsening over the last couple of years, especially the last 6 months. R neck hurts at times, and into head. Has had a lot of falls. Getting harder to get up from the floor, usually use a chair. Walks with rollator walker x 15-20 years. No numbness, no dizziness. Per MD notes: \"Patient presents for follow up for spinal cerebellar atrophy and frequent falls. She has a sister with SCA, similar symptoms to patient, presume SCA is genetic, though patient has not undergone genetic testing. She has a 30 year history of SCA and chronic difficulty with her gait worsening over the last 15 years. More recently she is experiencing more falls at home, despite using her rollator. She denies any difficulty with her vision. Recent MRI brain showed cerebellar atrophy. 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 \"      Past Medical History/Comorbidities:   Ms. Tadeo Escobar  has a past medical history of Abnormality of gait (7/18/2018), Anemia, Anxiety, Arthritis, Asthma, Calculus of kidney, Chronic pain, Contact dermatitis and other eczema, due to unspecified cause, Depressive disorder, not elsewhere classified, Encounter for chronic pain management, Esophageal reflux, Essential hypertension, benign, Frequent falls (7/18/2018), GERD (gastroesophageal reflux disease), and Pure hypercholesterolemia. Ms. Walker Husbands  has a past surgical history that includes hx knee replacement (Right); hx shoulder replacement (Left); hx hysterectomy; hx lithotripsy; hx cataract removal (Bilateral); hx retinal detachment repair; hx colonoscopy; hx gi; and pr neurological procedure unlisted. Social History/Living Environment:     ; one level home; doesn't do cooking anymore; has someone who comes to clean. Prior Level of Function/Work/Activity:  Independent with rollator. Does not drive. Dominant Side:         LEFT  Previous Treatment Approaches:          Had HHPT earlier in the year. Had OPPT at [facility] last year. Personal Factors:          Sex:  female        Age:  74 y.o. Ambulatory/Rehab Services H2 Model Falls Risk Assessment    Risk Factors:       (5)  History of Recent Falls [w/in 3 months] Ability to Rise from Chair:       (1)  Pushes up, successful in one attempt    Falls Prevention Plan: Mobility Assistance Device (specify):  rollator walker   Total: (5 or greater = High Risk): 6    ©2010 [employer] [address] [place] [address] Patent #[id number]. Federal Law prohibits the replication, distribution or use without written permission from [facility] [employer]     Current Medications:       Current Outpatient Medications:     sertraline (ZOLOFT) 100 mg tablet, Take 1 Tab by mouth daily. , Disp: 90 Tab, Rfl: 1    fluticasone (FLONASE) 50 mcg/actuation nasal spray, 2 Sprays by Both Nostrils route daily. , Disp: 3 Bottle, Rfl: 12    clorazepate (TRANXENE) 3.75 mg tablet, Take 1 Tab by mouth three (3) times daily. Max Daily Amount: 11.25 mg., Disp: 270 Tab, Rfl: 1    calcium carbonate (OS-JANA) 500 mg calcium (1,250 mg) tablet, Take  by mouth daily. , Disp: , Rfl:     tiotropium (SPIRIVA WITH HANDIHALER) 18 mcg inhalation capsule, Take 1 Cap by inhalation daily. , Disp: , Rfl:     ascorbic acid, vitamin C, (VITAMIN C) 500 mg tablet, Take  by mouth., Disp: , Rfl:     melatonin 10 mg cap, Take  by mouth., Disp: , Rfl:     Iron Fum & PS Cmp-Vit C-Niacin (INTEGRA) 125-40-3 mg cap, Take 125 mg by mouth daily. , Disp: 90 Cap, Rfl: 1    montelukast (SINGULAIR) 10 mg tablet, Take 10 mg by mouth daily. , Disp: , Rfl:     lisinopril (PRINIVIL, ZESTRIL) 5 mg tablet, Take 1 Tab by mouth daily. , Disp: 90 Tab, Rfl: 1    pantoprazole (PROTONIX) 40 mg tablet, Take 1 Tab by mouth two (2) times a day., Disp: 180 Tab, Rfl: 1    sucralfate (CARAFATE) 1 gram tablet, Take 1 Tab by mouth three (3) times daily. , Disp: 270 Tab, Rfl: 3    nystatin-triamcinolone (MYCOLOG II) topical cream, Apply  to affected area two (2) times a day., Disp: 60 g, Rfl: 3    SYMBICORT 80-4.5 mcg/actuation HFAA, INHALE 1 PUFF PO BID FOR COUGH, Disp: , Rfl: 0    PROAIR HFA 90 mcg/actuation inhaler, USE 2 PUFFS PO BID PRF COUGH, Disp: , Rfl: 0    dietary supplement cap, Take  by mouth., Disp: , Rfl:     docusate sodium (STOOL SOFTENER) 100 mg tab, Take  by mouth as needed. , Disp: , Rfl:     glucosamine & chondroit sul. Na 750-400 mg cmpk, Take  by mouth., Disp: , Rfl:     omega-3 fatty acids-vitamin e (FISH OIL) 1,000 mg cap, Take 1 Cap by mouth. 1 by oral route twice daily, Disp: , Rfl:     VIT C/ABDIAZIZ AC/LUT/COPPER/ZNOX (PRESERVISION LUTEIN PO), Take  by mouth. 1 by oral route daily, Disp: , Rfl:     cholecalciferol (VITAMIN D3) 1,000 unit tablet, Take  by mouth daily. , Disp: , Rfl:     aspirin delayed-release 81 mg tablet, Take  by mouth daily. , Disp: , Rfl:    Date Last Reviewed:  2/6/19   Number of Personal Factors/Comorbidities that affect the Plan of Care: 1-2: MODERATE COMPLEXITY   EXAMINATION:   Observation/Orthostatic Postural Assessment: Forward head posture, forward bent on rollator. Strength:          R LE 4/5; L LE 4-/5  Functional Mobility:         Gait/Ambulation:  Patient ambulates with rollator walker at slower pace. Forward bent posture. Decreased step length and clearance L LE. Transfers:  Independent with UE assist        Bed Mobility:  independent        Stairs:  NT  Sensation:         intact  Postural Control & Balance:  · Lockwood Balance Scale:  11/56.   (A score less than 45/56 indicates high risk of falls)     · Dynamic Gait Index:  /24. (A score less than or equal to19/24 is abnormal and predictive of falls)          Body Structures Involved:  1. Nerves  2. Eyes and Ears  3. Muscles Body Functions Affected:  1. Sensory/Pain  2. Neuromusculoskeletal  3. Movement Related Activities and Participation Affected:  1. General Tasks and Demands  2. Mobility  3. Domestic Life  4. Community, Social and [place]   Number of elements (examined above) that affect the Plan of Care: 3: MODERATE COMPLEXITY   CLINICAL PRESENTATION:   Presentation: Evolving clinical presentation with changing clinical characteristics: MODERATE COMPLEXITY   CLINICAL DECISION MAKING:   Outcome Measure: Tool Used: Lockwood Balance Scale  Score:  Initial: 11/56 Most Recent: X/56 (Date: -- )   Interpretation of Score: Each section is scored on a 0-4 scale, 0 representing the patients inability to perform the task and 4 representing independence. The scores of each section are added together for a total score of 56. The higher the patients score, the more independent the patient is. Any score below 45 indicates increased risk for falls.     Score 56 55-45 44-34 33-23 22-12 11-1 0   Modifier CH CI CJ CK CL CM CN     Tool Used: Timed Up and Go (TUG)  Score:  Initial: 38.64 seconds with rollator Most Recent: X seconds (Date: -- )   Interpretation of Score: The test measures, in seconds, the time taken by an individual to stand up from a standard arm chair (seat height 46 cm [18 in], arm height 65 cm [25.6 in]), walk a distance of 3 meters (118 in, approx 10 ft), turn, walk back to the chair and sit down. If the individual takes longer than 14 seconds to complete TUG, this indicates risk for falls. Score 7 7.5-10.5 11-14 14.5-17.5 18-21 21.5-24.5 25+   Modifier CH CI CJ CK CL CM CN       Tool Used: Five Times Sit to Stand (FTSST or 5xSST)   Score:  Initial: 20.86 seconds with UE assist Most Recent:  seconds (Date: -- )   Interpretation of Score: This is a measure of functional lower limb muscle strength and quantifying functional change of transitional movements. A score of 12 seconds or less indicates a normal level of function for community dwelling older individuals, a score of 15 seconds or more is at risk for fall. Score 12 or less 13-14 15-16 17-18 19-20 20-21 22 or greater   Modifier CH CI CJ CK CL CM CN       Medical Necessity:   · Patient is expected to demonstrate progress in strength, balance and functional technique to improve safety during daily activities. Reason for Services/Other Comments:  · Patient continues to demonstrate capacity to improve strength, balance, gait which will increase independence and increase safety. Use of outcome tool(s) and clinical judgement create a POC that gives a: Questionable prediction of patient's progress: MODERATE COMPLEXITY            TREATMENT:   (In addition to Assessment/Re-Assessment sessions the following treatments were rendered)  Pre-treatment Symptoms/Complaints:  Doing okay. No falls but some near falls. Pain: Initial:     3/10 back Post Session:  3/10 back       THERAPEUTIC EXERCISE: (15 minutes):  Exercises per grid below to improve mobility, strength and balance. Required minimal verbal cues to promote proper body alignment, promote proper body posture and promote proper body mechanics. Progressed resistance, repetitions and complexity of movement as indicated. Date:  2/6/19   Activity/Exercise Parameters       Marching     Standing hip abduction    Standing hip extension    Sit to stand X 10 reps from mat table With no UE assist   Step ups 4 inch step x 10 reps each leg with rail   Seated scapular retraction X 15 reps yellow band             NEUROMUSCULAR RE-EDUCATION: ( 30  minutes):  Exercise/activities per grid below to improve balance, coordination, posture and proprioception. Required minimal verbal cues to promote static and dynamic balance in standing. Balance/Vestibular Treatment:   Activity   Date  2/6/19   Activity/Exercise   Sets/reps/  equipment   Walking with head turns        Walking with head up & down        Step overs     Over 1/2 foam roll x 15 reps each leg fwd and laterally   Step taps     4 inch step 2 x 20 reps fwd and cross with rail   Marching   In place x 30 reps   Sidestepping   At bar with min UE assist with CGA x 6 steps B   Crossovers      [place]      Walking  backwards        Tandem walking      Weaving in/out of cones        Picking up cones        Sports cord        ONEOK ball      Figure 8s       Circles right/left      Walking with 360 degree turns      Spirals      Weight shifting:    Left & Right     On floor and On on floor and blue foams eyes open   Weight shifting: Forward & Backward      On blue foams eyes open    Standing wt shifting back with stepping backwards     Worked on this several times stepping back with ELIA REARDON, to teach her to catch herself when she loses her balance backward.     Standing with feet apart     On floor and On blue foams eyes open    Standing with feet together        Standing with feet semitandem   On blue foams eyes open    Standing with feet tandem      Single leg stance      X1/X2 Viewing exercises        [place] testing for BPPV (Benign Paroxysmal Positional Vertigo)        Tona solis Canalith Repositioning treatment/Epley Maneuver  for BPPV (Benign Paroxysmal Positional Vertigo)      [employer] Training: See scanned report. [employer]  Treatment/Session Assessment:    · Response to Treatment:  Patient tolerated session well. Patient demonstrated improved tolerance of balance and mobility exercises today. Continue to work on balance training and strengthening to decrease fall risk. · Compliance with Program/Exercises: Compliant most of the time. · Recommendations/Intent for next treatment session: \"Next visit will focus on advancements to more challenging activities\".   Total Treatment Duration:  PT Patient Time In/Time Out  Time In: 1354  Time Out: [address], PT    Future Appointments   Date Time Provider Ken Zamora   2/13/2019 12:30 PM Evelyn Fetch, PT [facility]   2/14/2019  2:00 PM Benjamin Ruth MD [facility]   2/15/2019 12:30 PM Evelyn Fetch, PT [facility]   2/20/2019  2:00 PM Evelyn Fetch, PT [facility]   2/22/2019  1:45 PM Evelyn Fetch, PT [facility]   2/25/2019  1:45 PM Evelyn Fetch, PT [facility]   2/27/2019  2:00 PM Evelyn Fetch, PT [facility]   3/1/2019  7:00 PM Evelyn Fetch, PT [facility]   4/24/2019  2:00 PM Meliza Figueredo NP BSN [facility]   6/17/2019 10:45 AM Kishore Coats MD [address] [place] [address]

## 2019-02-13 ENCOUNTER — HOSPITAL ENCOUNTER (OUTPATIENT)
Dept: PHYSICAL THERAPY | Age: 74
Discharge: HOME | End: 2019-02-13
Payer: MEDICARE

## 2019-02-13 PROCEDURE — 97110 THERAPEUTIC EXERCISES: CPT

## 2019-02-13 PROCEDURE — 97112 NEUROMUSCULAR REEDUCATION: CPT

## 2019-02-13 NOTE — PROGRESS NOTES
Jennifer Reading  : 1945  [place]  Secondary: [address] [id number] [facility], [address] [place] [address],[address], [facility] [address].  [phone]   [phone]           OUTPATIENT PHYSICAL THERAPY:Daily Note 2019   ICD-10: Treatment Diagnosis: Other abnormalities of gait and mobility R26.89;   Precautions/Allergies:   Flexeril [cyclobenzaprine]; Ketoprofen; and Plaquenil [hydroxychloroquine]   MD Orders: eval and treat MEDICAL/REFERRING DIAGNOSIS:  Degenerative disease of nervous system, unspecified [G31.9]  Spinal stenosis, cervical region [M48.02]   DATE OF ONSET: years  REFERRING PHYSICIAN: Evelin Chavez NP  RETURN PHYSICIAN APPOINTMENT:      INITIAL ASSESSMENT:  Ms. Twila Barr presents with imbalance, weakness, unsteady gait. Patient has a history of falls. . Patient would benefit from PT to address these problems to improve patient's independence and safety with mobility and daily activities. Thank you. PROBLEM LIST (Impacting functional limitations):  1. Decreased Strength  2. Decreased ADL/Functional Activities  3. Decreased Transfer Abilities  4. Decreased Ambulation Ability/Technique  5. Decreased Balance  6. Decreased Activity Tolerance  7. Decreased [place] with Home Exercise Program INTERVENTIONS PLANNED:  1. Balance Exercise  2. Bed Mobility  3. Family Education  4. Gait Training  5. Home Exercise Program (HEP)  6. Neuromuscular Re-education/Strengthening  7. Therapeutic Activites  8. Therapeutic Exercise/Strengthening  9. Transfer Training   TREATMENT PLAN:  Effective Dates: 2018 TO 3/3/2019 (90 days). Frequency/Duration: 1-2 times a week for 60- 90 Day(s)  GOALS: (Goals have been discussed and agreed upon with patient.)  Short-Term Functional Goals: Time Frame: 2-4 weeks  1. Patient will demonstrate independence and compliance with home exercise program to improve balance and strength for daily activities. met  2.   3.   Discharge Goals: Time Frame: 8-12 weeks  1. Patient will increase bilateral lower extremity strength to greater than or equal to 4/5 to improve strength for functional mobility activities. 2. Patient will increase her score on the Lockwood Balance Scale to greater than or equal to 14/56 indicating improved safety and decreased fall risk for daily activities. Patient will demonstrate improved score to less than or equal to 24 seconds on the Timed Up and Go Test indicating improved mobility for daily activities. Patient will demonstrate improved time on Five Time Sit to Stand Test to less than or equal to 18 seconds indicating improved LE strength for daily mobility. Rehabilitation Potential For Stated Goals: Good               The information in this section was collected on 12/5/18 (except where otherwise noted). HISTORY:   History of Present Injury/Illness (Reason for Referral):  Balance has been worsening over the last couple of years, especially the last 6 months. R neck hurts at times, and into head. Has had a lot of falls. Getting harder to get up from the floor, usually use a chair. Walks with rollator walker x 15-20 years. No numbness, no dizziness. Per MD notes: \"Patient presents for follow up for spinal cerebellar atrophy and frequent falls. She has a sister with SCA, similar symptoms to patient, presume SCA is genetic, though patient has not undergone genetic testing. She has a 30 year history of SCA and chronic difficulty with her gait worsening over the last 15 years. More recently she is experiencing more falls at home, despite using her rollator. She denies any difficulty with her vision. Recent MRI brain showed cerebellar atrophy. 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 \"      Past Medical History/Comorbidities:   Ms. Eliazar Hernandez  has a past medical history of Abnormality of gait (7/18/2018), Anemia, Anxiety, Arthritis, Asthma, Calculus of kidney, Chronic pain, Contact dermatitis and other eczema, due to unspecified cause, Depressive disorder, not elsewhere classified, Encounter for chronic pain management, Esophageal reflux, Essential hypertension, benign, Frequent falls (7/18/2018), GERD (gastroesophageal reflux disease), and Pure hypercholesterolemia. Ms. Neeta Azevedo  has a past surgical history that includes hx knee replacement (Right); hx shoulder replacement (Left); hx hysterectomy; hx lithotripsy; hx cataract removal (Bilateral); hx retinal detachment repair; hx colonoscopy; hx gi; and pr neurological procedure unlisted. Social History/Living Environment:     ; one level home; doesn't do cooking anymore; has someone who comes to clean. Prior Level of Function/Work/Activity:  Independent with rollator. Does not drive. Dominant Side:         LEFT  Previous Treatment Approaches:          Had HHPT earlier in the year. Had OPPT at [facility] last year. Personal Factors:          Sex:  female        Age:  74 y.o. Ambulatory/Rehab Services H2 Model Falls Risk Assessment    Risk Factors:       (5)  History of Recent Falls [w/in 3 months] Ability to Rise from Chair:       (1)  Pushes up, successful in one attempt    Falls Prevention Plan: Mobility Assistance Device (specify):  rollator walker   Total: (5 or greater = High Risk): 6    ©2010 [facility] [address] [place] [address] Patent #[id number]. Federal Law prohibits the replication, distribution or use without written permission from [facility] Employee Benefit Plans     Current Medications:       Current Outpatient Medications:     sertraline (ZOLOFT) 100 mg tablet, Take 1 Tab by mouth daily. , Disp: 90 Tab, Rfl: 1    fluticasone (FLONASE) 50 mcg/actuation nasal spray, 2 Sprays by Both Nostrils route daily. , Disp: 3 Bottle, Rfl: 12    clorazepate (TRANXENE) 3.75 mg tablet, Take 1 Tab by mouth three (3) times daily. Max Daily Amount: 11.25 mg., Disp: 270 Tab, Rfl: 1    calcium carbonate (OS-JANA) 500 mg calcium (1,250 mg) tablet, Take  by mouth daily. , Disp: , Rfl:     tiotropium (SPIRIVA WITH HANDIHALER) 18 mcg inhalation capsule, Take 1 Cap by inhalation daily. , Disp: , Rfl:     ascorbic acid, vitamin C, (VITAMIN C) 500 mg tablet, Take  by mouth., Disp: , Rfl:     melatonin 10 mg cap, Take  by mouth., Disp: , Rfl:     Iron Fum & PS Cmp-Vit C-Niacin (INTEGRA) 125-40-3 mg cap, Take 125 mg by mouth daily. , Disp: 90 Cap, Rfl: 1    montelukast (SINGULAIR) 10 mg tablet, Take 10 mg by mouth daily. , Disp: , Rfl:     lisinopril (PRINIVIL, ZESTRIL) 5 mg tablet, Take 1 Tab by mouth daily. , Disp: 90 Tab, Rfl: 1    pantoprazole (PROTONIX) 40 mg tablet, Take 1 Tab by mouth two (2) times a day., Disp: 180 Tab, Rfl: 1    sucralfate (CARAFATE) 1 gram tablet, Take 1 Tab by mouth three (3) times daily. , Disp: 270 Tab, Rfl: 3    nystatin-triamcinolone (MYCOLOG II) topical cream, Apply  to affected area two (2) times a day., Disp: 60 g, Rfl: 3    SYMBICORT 80-4.5 mcg/actuation HFAA, INHALE 1 PUFF PO BID FOR COUGH, Disp: , Rfl: 0    PROAIR HFA 90 mcg/actuation inhaler, USE 2 PUFFS PO BID PRF COUGH, Disp: , Rfl: 0    dietary supplement cap, Take  by mouth., Disp: , Rfl:     docusate sodium (STOOL SOFTENER) 100 mg tab, Take  by mouth as needed. , Disp: , Rfl:     glucosamine & chondroit sul. Na 750-400 mg cmpk, Take  by mouth., Disp: , Rfl:     omega-3 fatty acids-vitamin e (FISH OIL) 1,000 mg cap, Take 1 Cap by mouth. 1 by oral route twice daily, Disp: , Rfl:     VIT C/ABDIAZIZ AC/LUT/COPPER/ZNOX (PRESERVISION LUTEIN PO), Take  by mouth. 1 by oral route daily, Disp: , Rfl:     cholecalciferol (VITAMIN D3) 1,000 unit tablet, Take  by mouth daily. , Disp: , Rfl:     aspirin delayed-release 81 mg tablet, Take  by mouth daily. , Disp: , Rfl:    Date Last Reviewed:  2/6/19   Number of Personal Factors/Comorbidities that affect the Plan of Care: 1-2: MODERATE COMPLEXITY   EXAMINATION:   Observation/Orthostatic Postural Assessment: Forward head posture, forward bent on rollator. Strength:          R LE 4/5; L LE 4-/5  Functional Mobility:         Gait/Ambulation:  Patient ambulates with rollator walker at slower pace. Forward bent posture. Decreased step length and clearance L LE. Transfers:  Independent with UE assist        Bed Mobility:  independent        Stairs:  NT  Sensation:         intact  Postural Control & Balance:  · Lockwood Balance Scale:  11/56.   (A score less than 45/56 indicates high risk of falls)     · Dynamic Gait Index:  /24. (A score less than or equal to19/24 is abnormal and predictive of falls)          Body Structures Involved:  1. Nerves  2. Eyes and Ears  3. Muscles Body Functions Affected:  1. Sensory/Pain  2. Neuromusculoskeletal  3. Movement Related Activities and Participation Affected:  1. General Tasks and Demands  2. Mobility  3. Domestic Life  4. Community, Social and [place]   Number of elements (examined above) that affect the Plan of Care: 3: MODERATE COMPLEXITY   CLINICAL PRESENTATION:   Presentation: Evolving clinical presentation with changing clinical characteristics: MODERATE COMPLEXITY   CLINICAL DECISION MAKING:   Outcome Measure: Tool Used: Lockwood Balance Scale  Score:  Initial: 11/56 Most Recent: X/56 (Date: -- )   Interpretation of Score: Each section is scored on a 0-4 scale, 0 representing the patients inability to perform the task and 4 representing independence. The scores of each section are added together for a total score of 56. The higher the patients score, the more independent the patient is. Any score below 45 indicates increased risk for falls.     Score 56 55-45 44-34 33-23 22-12 11-1 0   Modifier CH CI CJ CK CL CM CN     Tool Used: Timed Up and Go (TUG)  Score:  Initial: 38.64 seconds with rollator Most Recent: X seconds (Date: -- )   Interpretation of Score: The test measures, in seconds, the time taken by an individual to stand up from a standard arm chair (seat height 46 cm [18 in], arm height 65 cm [25.6 in]), walk a distance of 3 meters (118 in, approx 10 ft), turn, walk back to the chair and sit down. If the individual takes longer than 14 seconds to complete TUG, this indicates risk for falls. Score 7 7.5-10.5 11-14 14.5-17.5 18-21 21.5-24.5 25+   Modifier CH CI CJ CK CL CM CN       Tool Used: Five Times Sit to Stand (FTSST or 5xSST)   Score:  Initial: 20.86 seconds with UE assist Most Recent:  seconds (Date: -- )   Interpretation of Score: This is a measure of functional lower limb muscle strength and quantifying functional change of transitional movements. A score of 12 seconds or less indicates a normal level of function for community dwelling older individuals, a score of 15 seconds or more is at risk for fall. Score 12 or less 13-14 15-16 17-18 19-20 20-21 22 or greater   Modifier CH CI CJ CK CL CM CN       Medical Necessity:   · Patient is expected to demonstrate progress in strength, balance and functional technique to improve safety during daily activities. Reason for Services/Other Comments:  · Patient continues to demonstrate capacity to improve strength, balance, gait which will increase independence and increase safety. Use of outcome tool(s) and clinical judgement create a POC that gives a: Questionable prediction of patient's progress: MODERATE COMPLEXITY            TREATMENT:   (In addition to Assessment/Re-Assessment sessions the following treatments were rendered)  Pre-treatment Symptoms/Complaints: \"I am doing ok, I can't believe I messed my appointment time up\". Pain: Initial:     3/10 back Post Session:  3/10 back       THERAPEUTIC EXERCISE: (15 minutes):  Exercises per grid below to improve mobility, strength and balance.   Required minimal verbal cues to promote proper body alignment, promote proper body posture and promote proper body mechanics. Progressed resistance, repetitions and complexity of movement as indicated. Date:  2/13/19   Activity/Exercise Parameters       Marching     Standing hip abduction    Standing hip extension    Sit to stand X 10 reps from mat table With no UE assist   Step ups 4 inch step x 10 reps each leg with rail   Seated scapular retraction X 15 reps yellow band             NEUROMUSCULAR RE-EDUCATION: ( 30  minutes):  Exercise/activities per grid below to improve balance, coordination, posture and proprioception. Required minimal verbal cues to promote static and dynamic balance in standing. Balance/Vestibular Treatment:   Activity   Date  2/13/19   Activity/Exercise   Sets/reps/  equipment   Walking with head turns        Walking with head up & down        Step overs     Over 1/2 foam roll x 15 reps each leg fwd and laterally   Step taps     4 inch step 2 x 20 reps fwd and cross with rail   Marching   In place x 30 reps   Sidestepping   At bar with min UE assist with CGA x 6 steps B   Crossovers      Peak      Walking  backwards        Tandem walking      Weaving in/out of cones        Picking up cones        Sports cord        ONEOK ball      Figure 8s       Circles right/left      Walking with 360 degree turns      Spirals      Weight shifting:    Left & Right     On floor and On on floor and blue foams eyes open   Weight shifting: Forward & Backward      On blue foams eyes open    Standing wt shifting back with stepping backwards     Worked on this several times stepping back with ELIA REARDON, to teach her to catch herself when she loses her balance backward.     Standing with feet apart     On floor and On blue foams eyes open    Standing with feet together        Standing with feet semitandem   On blue foams eyes open    Standing with feet tandem      Single leg stance      X1/X2 Viewing exercises        Hallpike-Angela testing for BPPV (Benign Paroxysmal Positional Vertigo) Tona exercises      Canalith Repositioning treatment/Epley Maneuver  for BPPV (Benign Paroxysmal Positional Vertigo)      Smart Equitest Training: See scanned report. RAP Index Portal  Treatment/Session Assessment:  Progress note next session. · Response to Treatment:  Patient demonstrated improved tolerance of balance and mobility exercises today. Continue to work on balance training and strengthening to decrease fall risk. · Compliance with Program/Exercises: Compliant most of the time. · Recommendations/Intent for next treatment session: \"Next visit will focus on advancements to more challenging activities\".   Total Treatment Duration:  PT Patient Time In/Time Out  Time In: 1400  Time Out: [address] PTA    Future Appointments   Date Time Provider Ken Zamora   2/15/2019 12:30 PM Marychuy Ojeda, PT [facility]   2/18/2019  2:10 PM Dorita Blevins MD [facility]   2/20/2019  2:00 PM Marychuy Ojeda PT [facility]   2/22/2019  1:45 PM Marychuy Ojeda PT [facility]   2/25/2019  1:45 PM Marychuy Ojeda, PT [facility]   2/27/2019  2:00 PM Marychuy Ojeda, PT [facility]   3/1/2019  7:00 PM Marychuy Ojeda PT [facility]   4/24/2019  2:00 PM Alison Olvera NP BSN [facility]   6/17/2019 10:45 AM Lila Coats MD [address] [place] [address]

## 2019-02-15 ENCOUNTER — HOSPITAL ENCOUNTER (OUTPATIENT)
Dept: PHYSICAL THERAPY | Age: 74
Discharge: HOME | End: 2019-02-15
Payer: MEDICARE

## 2019-02-15 PROCEDURE — 97112 NEUROMUSCULAR REEDUCATION: CPT

## 2019-02-15 PROCEDURE — 97110 THERAPEUTIC EXERCISES: CPT

## 2019-02-15 NOTE — PROGRESS NOTES
Juve Hoang  : 1945  [place]  Secondary: [facility]  [facility], [address] [place] [address],[address], [address]  [phone]   [phone]           OUTPATIENT PHYSICAL THERAPY:Daily Note and Progress Report 2/15/2019   ICD-10: Treatment Diagnosis: Other abnormalities of gait and mobility R26.89;   Precautions/Allergies:   Flexeril [cyclobenzaprine]; Ketoprofen; and Plaquenil [hydroxychloroquine]   MD Orders: eval and treat MEDICAL/REFERRING DIAGNOSIS:  Degenerative disease of nervous system, unspecified [G31.9]  Spinal stenosis, cervical region [M48.02]   DATE OF ONSET: years  REFERRING PHYSICIAN: Sandee Mota NP  RETURN PHYSICIAN APPOINTMENT:      INITIAL ASSESSMENT:  Ms. Rodney Yang presents with imbalance, weakness, unsteady gait. Patient has a history of falls. . Patient would benefit from PT to address these problems to improve patient's independence and safety with mobility and daily activities. Thank you. PROGRESS NOTE 2/15/19:  Ms. Rodney Yang has attended 9 PT sessions from 18 to 2/15/19 for imbalance, weakness, unsteady gait. Patient has a history of falls, but has not had any recent falls. She has reported some near falls. Patient must use rollator walker for ambulation and transfers for balance and safety. She is demonstrating improving strength, mobility, and balance as evidenced by improved scores with testing today. Patient would benefit from continuing  PT for a few more visits to address these problems to improve patient's independence and safety with mobility and daily activities. Thank you. PROBLEM LIST (Impacting functional limitations):  1. Decreased Strength  2. Decreased ADL/Functional Activities  3. Decreased Transfer Abilities  4. Decreased Ambulation Ability/Technique  5. Decreased Balance  6. Decreased Activity Tolerance  7.  Decreased [place] with Home Exercise Program INTERVENTIONS PLANNED:  1. Balance Exercise  2. Bed Mobility  3. Family Education  4. Gait Training  5. Home Exercise Program (HEP)  6. Neuromuscular Re-education/Strengthening  7. Therapeutic Activites  8. Therapeutic Exercise/Strengthening  9. Transfer Training   TREATMENT PLAN:  Effective Dates: 12/5/2018 TO 3/3/2019 (90 days). Frequency/Duration: 1-2 times a week for 60- 90 Day(s)  GOALS: (Goals have been discussed and agreed upon with patient.)  Short-Term Functional Goals: Time Frame: 2-4 weeks  1. Patient will demonstrate independence and compliance with home exercise program to improve balance and strength for daily activities. met  2.   3.   Discharge Goals: Time Frame: 8-12 weeks  1. Patient will increase bilateral lower extremity strength to greater than or equal to 4/5 to improve strength for functional mobility activities. MET  2. Patient will increase her score on the Lockwood Balance Scale to greater than or equal to 14/56 indicating improved safety and decreased fall risk for daily activities. MET  Patient will demonstrate improved score to less than or equal to 24 seconds on the Timed Up and Go Test indicating improved mobility for daily activities. Progressing and ongoing  Patient will demonstrate improved time on Five Time Sit to Stand Test to less than or equal to 18 seconds indicating improved LE strength for daily mobility. Progressing and ongoing  Rehabilitation Potential For Stated Goals: Good               The information in this section was collected on 12/5/18 (except where otherwise noted). HISTORY:   History of Present Injury/Illness (Reason for Referral):  Balance has been worsening over the last couple of years, especially the last 6 months. R neck hurts at times, and into head. Has had a lot of falls. Getting harder to get up from the floor, usually use a chair. Walks with rollator walker x 15-20 years. No numbness, no dizziness.   Per MD notes: \"Patient presents for follow up for spinal cerebellar atrophy and frequent falls. She has a sister with SCA, similar symptoms to patient, presume SCA is genetic, though patient has not undergone genetic testing. She has a 30 year history of SCA and chronic difficulty with her gait worsening over the last 15 years. More recently she is experiencing more falls at home, despite using her rollator. She denies any difficulty with her vision. Recent MRI brain showed cerebellar atrophy. 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 \"      Past Medical History/Comorbidities:   Ms. Sara Quiros  has a past medical history of Abnormality of gait (7/18/2018), Anemia, Anxiety, Arthritis, Asthma, Calculus of kidney, Chronic pain, Contact dermatitis and other eczema, due to unspecified cause, Depressive disorder, not elsewhere classified, Encounter for chronic pain management, Esophageal reflux, Essential hypertension, benign, Frequent falls (7/18/2018), GERD (gastroesophageal reflux disease), and Pure hypercholesterolemia. Ms. Sara Quiros  has a past surgical history that includes hx knee replacement (Right); hx shoulder replacement (Left); hx hysterectomy; hx lithotripsy; hx cataract removal (Bilateral); hx retinal detachment repair; hx colonoscopy; hx gi; and pr neurological procedure unlisted. Social History/Living Environment:     ; one level home; doesn't do cooking anymore; has someone who comes to clean. Prior Level of Function/Work/Activity:  Independent with rollator. Does not drive. Dominant Side:         LEFT  Previous Treatment Approaches:          Had HHPT earlier in the year. Had OPPT at [facility] last year. Personal Factors:          Sex:  female        Age:  74 y.o.      Ambulatory/Rehab Services H2 Model Falls Risk Assessment    Risk Factors:       (5)  History of Recent Falls [w/in 3 months] Ability to Rise from Chair:       (1)  Pushes up, successful in one attempt    Falls Prevention Plan: Mobility Assistance Device (specify):  rollator walker   Total: (5 or greater = High Risk): 6    ©2010 [facility] [address] [place] [address] Patent #[id number]. Federal Law prohibits the replication, distribution or use without written permission from [facility] [employer]     Current Medications:       Current Outpatient Medications:     sertraline (ZOLOFT) 100 mg tablet, Take 1 Tab by mouth daily. , Disp: 90 Tab, Rfl: 1    fluticasone (FLONASE) 50 mcg/actuation nasal spray, 2 Sprays by Both Nostrils route daily. , Disp: 3 Bottle, Rfl: 12    clorazepate (TRANXENE) 3.75 mg tablet, Take 1 Tab by mouth three (3) times daily. Max Daily Amount: 11.25 mg., Disp: 270 Tab, Rfl: 1    calcium carbonate (OS-JANA) 500 mg calcium (1,250 mg) tablet, Take  by mouth daily. , Disp: , Rfl:     tiotropium (SPIRIVA WITH HANDIHALER) 18 mcg inhalation capsule, Take 1 Cap by inhalation daily. , Disp: , Rfl:     ascorbic acid, vitamin C, (VITAMIN C) 500 mg tablet, Take  by mouth., Disp: , Rfl:     melatonin 10 mg cap, Take  by mouth., Disp: , Rfl:     Iron Fum & PS Cmp-Vit C-Niacin (INTEGRA) 125-40-3 mg cap, Take 125 mg by mouth daily. , Disp: 90 Cap, Rfl: 1    montelukast (SINGULAIR) 10 mg tablet, Take 10 mg by mouth daily. , Disp: , Rfl:     lisinopril (PRINIVIL, ZESTRIL) 5 mg tablet, Take 1 Tab by mouth daily. , Disp: 90 Tab, Rfl: 1    pantoprazole (PROTONIX) 40 mg tablet, Take 1 Tab by mouth two (2) times a day., Disp: 180 Tab, Rfl: 1    sucralfate (CARAFATE) 1 gram tablet, Take 1 Tab by mouth three (3) times daily. , Disp: 270 Tab, Rfl: 3    nystatin-triamcinolone (MYCOLOG II) topical cream, Apply  to affected area two (2) times a day., Disp: 60 g, Rfl: 3    SYMBICORT 80-4.5 mcg/actuation HFAA, INHALE 1 PUFF PO BID FOR COUGH, Disp: , Rfl: 0    PROAIR HFA 90 mcg/actuation inhaler, USE 2 PUFFS PO BID PRF COUGH, Disp: , Rfl: 0    dietary supplement cap, Take  by mouth., Disp: , Rfl:     docusate sodium (STOOL SOFTENER) 100 mg tab, Take  by mouth as needed. , Disp: , Rfl:     glucosamine & chondroit sul. Na 750-400 mg cmpk, Take  by mouth., Disp: , Rfl:     omega-3 fatty acids-vitamin e (FISH OIL) 1,000 mg cap, Take 1 Cap by mouth. 1 by oral route twice daily, Disp: , Rfl:     VIT C/ABDIAZIZ AC/LUT/COPPER/ZNOX (PRESERVISION LUTEIN PO), Take  by mouth. 1 by oral route daily, Disp: , Rfl:     cholecalciferol (VITAMIN D3) 1,000 unit tablet, Take  by mouth daily. , Disp: , Rfl:     aspirin delayed-release 81 mg tablet, Take  by mouth daily. , Disp: , Rfl:    Date Last Reviewed:  2/15/19   Number of Personal Factors/Comorbidities that affect the Plan of Care: 1-2: MODERATE COMPLEXITY   EXAMINATION:   Observation/Orthostatic Postural Assessment: Forward head posture, forward bent on rollator. Strength:          R LE 4/5; L LE 4-/5  2/15/19: 4/5 B LE  Functional Mobility:         Gait/Ambulation:  Patient ambulates with rollator walker at slower pace. Forward bent posture. Decreased step length and clearance L LE. Transfers:  Independent with UE assist        Bed Mobility:  independent        Stairs:  NT  Sensation:         intact  Postural Control & Balance:  · Lockwood Balance Scale:  19/56.  2/15/19.  (A score less than 45/56 indicates high risk of falls)   11/56 at initial evaluation  · Dynamic Gait Index:  NT/24.   (A score less than or equal to19/24 is abnormal and predictive of falls)          Body Structures Involved:  1. Nerves  2. Eyes and Ears  3. Muscles Body Functions Affected:  1. Sensory/Pain  2. Neuromusculoskeletal  3. Movement Related Activities and Participation Affected:  1. General Tasks and Demands  2. Mobility  3. Domestic Life  4.  Community, Social and [place]   Number of elements (examined above) that affect the Plan of Care: 3: MODERATE COMPLEXITY   CLINICAL PRESENTATION:   Presentation: Evolving clinical presentation with changing clinical characteristics: MODERATE COMPLEXITY   CLINICAL DECISION MAKING:   Outcome Measure: Tool Used: Lockwood Balance Scale  Score:  Initial: 11/56 Most Recent: 19/56 (Date: 2/15/19 )   Interpretation of Score: Each section is scored on a 0-4 scale, 0 representing the patients inability to perform the task and 4 representing independence. The scores of each section are added together for a total score of 56. The higher the patients score, the more independent the patient is. Any score below 45 indicates increased risk for falls. Score 56 55-45 44-34 33-23 22-12 11-1 0   Modifier CH CI CJ CK CL CM CN     Tool Used: Timed Up and Go (TUG)  Score:  Initial: 38.64 seconds with rollator Most Recent: 30.81 seconds (Date: 2/15/19 )   Interpretation of Score: The test measures, in seconds, the time taken by an individual to stand up from a standard arm chair (seat height 46 cm [18 in], arm height 65 cm [25.6 in]), walk a distance of 3 meters (118 in, approx 10 ft), turn, walk back to the chair and sit down. If the individual takes longer than 14 seconds to complete TUG, this indicates risk for falls. Score 7 7.5-10.5 11-14 14.5-17.5 18-21 21.5-24.5 25+   Modifier CH CI CJ CK CL CM CN       Tool Used: Five Times Sit to Stand (FTSST or 5xSST)   Score:  Initial: 20.86 seconds with UE assist Most Recent: 18.4 seconds (Date: 2/15/19 )   Interpretation of Score: This is a measure of functional lower limb muscle strength and quantifying functional change of transitional movements. A score of 12 seconds or less indicates a normal level of function for community dwelling older individuals, a score of 15 seconds or more is at risk for fall. Score 12 or less 13-14 15-16 17-18 19-20 20-21 22 or greater   Modifier CH CI CJ CK CL CM CN       Medical Necessity:   · Patient is expected to demonstrate progress in strength, balance and functional technique to improve safety during daily activities.   Reason for Services/Other Comments:  · Patient continues to demonstrate capacity to improve strength, balance, gait which will increase independence and increase safety. Use of outcome tool(s) and clinical judgement create a POC that gives a: Questionable prediction of patient's progress: MODERATE COMPLEXITY            TREATMENT:   (In addition to Assessment/Re-Assessment sessions the following treatments were rendered)  Pre-treatment Symptoms/Complaints: \"Doing fine. No falls. \"  Pain: Initial:     3/10 back Post Session:  3/10 back       THERAPEUTIC EXERCISE: (20 minutes):  Exercises per grid below to improve mobility, strength and balance. Required minimal verbal cues to promote proper body alignment, promote proper body posture and promote proper body mechanics. Progressed resistance, repetitions and complexity of movement as indicated. Date:  2/15/19   Activity/Exercise Parameters       Marching     Standing hip abduction    Standing hip extension    Sit to stand X 5 reps, X 10 reps from chair With no UE assist   Step ups 6 inch step x 10 reps each leg with rail   Seated scapular retraction              NEUROMUSCULAR RE-EDUCATION: ( 30  minutes):  Exercise/activities per grid below to improve balance, coordination, posture and proprioception. Required minimal verbal cues to promote static and dynamic balance in standing.   Balance/Vestibular Treatment:   Activity   Date  2/15/19   Activity/Exercise   Sets/reps/  equipment   Walking with head turns        Walking with head up & down        Step overs     Over 1/2 foam roll x 15 reps each leg fwd and laterally   Step taps     6 inch step 2 x 20 reps fwd and cross with rail   Marching   In place x 30 reps   Sidestepping   At bar with min UE assist with CGA x 6 steps B   Crossovers      [place]      Walking  backwards        Tandem walking      Weaving in/out of cones        Picking up cones        Sports cord        ONEOK ball      Figure 8s       Circles right/left Walking with 360 degree turns      Spirals      Weight shifting:    Left & Right     On floor and On on floor and blue foams eyes open   Weight shifting: Forward & Backward      On blue foams eyes open    Standing wt shifting back with stepping backwards     d. Standing with feet apart     On floor and On blue foams eyes open    Standing with feet together        Standing with feet semitandem   On blue foams eyes open    Standing with feet tandem      Single leg stance      X1/X2 Viewing exercises        Hallpike-Leitchfield testing for BPPV (Benign Paroxysmal Positional Vertigo)        Wise-Daroff exercises      Canalith Repositioning treatment/Epley Maneuver  for BPPV (Benign Paroxysmal Positional Vertigo)      Smart [employer] Training: See scanned report. [employer] Portal  Treatment/Session Assessment:  Patient tolerated session well. She demonstrated better initiation in correcting her balance/weight shift as needed to maintain balance. Discussed with patient that we plan to discharge with HEP in the next few visits. · Response to Treatment:  Patient demonstrated improved tolerance of balance and mobility exercises today. Continue to work on balance training and strengthening to decrease fall risk. · Compliance with Program/Exercises: Compliant most of the time. · Recommendations/Intent for next treatment session: \"Next visit will focus on advancements to more challenging activities\".   Total Treatment Duration:  PT Patient Time In/Time Out  Time In: 0846  Time Out: [address], PT    Future Appointments   Date Time Provider Ken Zamora   2/18/2019  2:10 PM Lauren Dillon MD [facility]   2/20/2019  2:00 PM Muna Rogers PT [facility]   2/22/2019  1:45 PM Muna Rogers PT [facility]   2/25/2019  1:45 PM Muna Rogers PT [facility]   2/27/2019  2:00 PM Muna Rogers PT [facility]   3/1/2019  7:00 PM Muna Rogers, PT [facility] 4/24/2019  2:00 PM Shauna Logan NP BSN [facility]   6/17/2019 10:45 AM Goforth, Devaughn Lanes, MD [address] [place] [address]

## 2019-02-20 ENCOUNTER — HOSPITAL ENCOUNTER (OUTPATIENT)
Dept: PHYSICAL THERAPY | Age: 74
Discharge: HOME | End: 2019-02-20
Payer: MEDICARE

## 2019-02-20 NOTE — PROGRESS NOTES
PT NOTE: Patient cancelled her appointment today and Friday due to lack of transportation.   Jeimy Bacon, PT

## 2019-02-25 ENCOUNTER — HOSPITAL ENCOUNTER (OUTPATIENT)
Dept: PHYSICAL THERAPY | Age: 74
Discharge: HOME | End: 2019-02-25
Payer: MEDICARE

## 2019-02-25 NOTE — PROGRESS NOTES
PT NOTE: Patient had to cancel today's appointment due to problem with transportation.   Vini Canchola, PT

## 2019-02-27 ENCOUNTER — HOSPITAL ENCOUNTER (OUTPATIENT)
Dept: PHYSICAL THERAPY | Age: 74
Discharge: HOME | End: 2019-02-27
Payer: MEDICARE

## 2019-02-27 PROCEDURE — 97112 NEUROMUSCULAR REEDUCATION: CPT

## 2019-02-27 PROCEDURE — 97110 THERAPEUTIC EXERCISES: CPT

## 2019-02-27 NOTE — THERAPY RECERTIFICATION
Sophia Feliz  : 1945  [place]  Secondary: [address] [id number] [facility], [address] [place] [address],[address], [address] [facility]  [phone]   [phone]           OUTPATIENT PHYSICAL THERAPY:Daily Note and Recertification    ICD-10: Treatment Diagnosis: Other abnormalities of gait and mobility R26.89;   Precautions/Allergies:   Flexeril [cyclobenzaprine]; Ketoprofen; and Plaquenil [hydroxychloroquine]   MD Orders: eval and treat MEDICAL/REFERRING DIAGNOSIS:  Degenerative disease of nervous system, unspecified [G31.9]  Spinal stenosis, cervical region [M48.02]   DATE OF ONSET: years  REFERRING PHYSICIAN: Meliza Figueredo NP  RETURN PHYSICIAN APPOINTMENT:      INITIAL ASSESSMENT:  Ms. Seamus Hassan presents with imbalance, weakness, unsteady gait. Patient has a history of falls. . Patient would benefit from PT to address these problems to improve patient's independence and safety with mobility and daily activities. Thank you. PROGRESS NOTE 2/15/19:  Ms. Seamus Hassan has attended 9 PT sessions from 18 to 2/15/19 for imbalance, weakness, unsteady gait. Patient has a history of falls, but has not had any recent falls. She has reported some near falls. Patient must use rollator walker for ambulation and transfers for balance and safety. She is demonstrating improving strength, mobility, and balance as evidenced by improved scores with testing today. Patient would benefit from continuing  PT for a few more visits to address these problems to improve patient's independence and safety with mobility and daily activities. Thank you. RECERTIFICATION : Ms. Seamus Hassan has attended 10 PT sessions from 18 to 19 for imbalance, weakness, unsteady gait. Patient has a history of falls, but has not had any recent falls. Patient must use rollator walker for ambulation and transfers for balance and safety.  She is demonstrating improving strength, mobility, and balance. Patient had to miss a few appointments recently due to transportation problems. We plan to work with patient for a few more sessions, then discharge with HEP, as patient has progressed well. Patient would benefit from continuing  PT for a few more visits to address these problems to improve patient's independence and safety with mobility and daily activities. Thank you. PROBLEM LIST (Impacting functional limitations):  1. Decreased Strength  2. Decreased ADL/Functional Activities  3. Decreased Transfer Abilities  4. Decreased Ambulation Ability/Technique  5. Decreased Balance  6. Decreased Activity Tolerance  7. Decreased Foster with Home Exercise Program INTERVENTIONS PLANNED:  1. Balance Exercise  2. Bed Mobility  3. Family Education  4. Gait Training  5. Home Exercise Program (HEP)  6. Neuromuscular Re-education/Strengthening  7. Therapeutic Activites  8. Therapeutic Exercise/Strengthening  9. Transfer Training   TREATMENT PLAN:  Effective Dates: 2/27/2019 TO 3/27/2019 (90 days). Frequency/Duration: 1-2 times a week for 2-4 more weeks  GOALS: (Goals have been discussed and agreed upon with patient.)  Short-Term Functional Goals: Time Frame: 2-4 weeks  1. Patient will demonstrate independence and compliance with home exercise program to improve balance and strength for daily activities. met  2.   3.   Discharge Goals: Time Frame: 8-12 weeks  1. Patient will increase bilateral lower extremity strength to greater than or equal to 4/5 to improve strength for functional mobility activities. MET  2. Patient will increase her score on the Lockwood Balance Scale to greater than or equal to 14/56 indicating improved safety and decreased fall risk for daily activities. MET  Patient will demonstrate improved score to less than or equal to 24 seconds on the Timed Up and Go Test indicating improved mobility for daily activities. Progressing and ongoing  Patient will demonstrate improved time on Five Time Sit to Stand Test to less than or equal to 18 seconds indicating improved LE strength for daily mobility. Progressing and ongoing  Rehabilitation Potential For Stated Goals: Good   Regarding [facility] Sickle therapy, I certify that the treatment plan above will be carried out by a therapist or under their direction. Thank you for this referral,  Tate Briones PT     Referring Physician Signature: Angelica Mckeon NP          Date                         The information in this section was collected on 12/5/18 (except where otherwise noted). HISTORY:   History of Present Injury/Illness (Reason for Referral):  Balance has been worsening over the last couple of years, especially the last 6 months. R neck hurts at times, and into head. Has had a lot of falls. Getting harder to get up from the floor, usually use a chair. Walks with rollator walker x 15-20 years. No numbness, no dizziness. Per MD notes: \"Patient presents for follow up for spinal cerebellar atrophy and frequent falls. She has a sister with SCA, similar symptoms to patient, presume SCA is genetic, though patient has not undergone genetic testing. She has a 30 year history of SCA and chronic difficulty with her gait worsening over the last 15 years. More recently she is experiencing more falls at home, despite using her rollator. She denies any difficulty with her vision. Recent MRI brain showed cerebellar atrophy. 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 \"      Past Medical History/Comorbidities:   Ms. Peter Gupta  has a past medical history of Abnormality of gait (7/18/2018), Anemia, Anxiety, Arthritis, Asthma, Calculus of kidney, Chronic pain, Contact dermatitis and other eczema, due to unspecified cause, Depressive disorder, not elsewhere classified, Encounter for chronic pain management, Esophageal reflux, Essential hypertension, benign, Frequent falls (7/18/2018), GERD (gastroesophageal reflux disease), and Pure hypercholesterolemia. Ms. Greene Never  has a past surgical history that includes hx knee replacement (Right); hx shoulder replacement (Left); hx hysterectomy; hx lithotripsy; hx cataract removal (Bilateral); hx retinal detachment repair; hx colonoscopy; hx gi; and pr neurological procedure unlisted. Social History/Living Environment:     ; one level home; doesn't do cooking anymore; has someone who comes to clean. Prior Level of Function/Work/Activity:  Independent with rollator. Does not drive. Dominant Side:         LEFT  Previous Treatment Approaches:          Had HHPT earlier in the year. Had OPPT at [facility] last year. Personal Factors:          Sex:  female        Age:  74 y.o. Ambulatory/Rehab Services H2 Model Falls Risk Assessment    Risk Factors:       (5)  History of Recent Falls [w/in 3 months] Ability to Rise from Chair:       (1)  Pushes up, successful in one attempt    Falls Prevention Plan: Mobility Assistance Device (specify):  rollator walker   Total: (5 or greater = High Risk): 6    ©2010 [facility] [address] [place] [address] States Patent #[id number]. Federal Law prohibits the replication, distribution or use without written permission from [facility]-8     Current Medications:       Current Outpatient Medications:     clorazepate (TRANXENE) 3.75 mg tablet, Take 1 Tab by mouth three (3) times daily. Max Daily Amount: 11.25 mg., Disp: 270 Tab, Rfl: 1    sertraline (ZOLOFT) 100 mg tablet, Take 1 Tab by mouth daily. , Disp: 90 Tab, Rfl: 1    fluticasone (FLONASE) 50 mcg/actuation nasal spray, 2 Sprays by Both Nostrils route daily. , Disp: 3 Bottle, Rfl: 12    calcium carbonate (OS-JANA) 500 mg calcium (1,250 mg) tablet, Take  by mouth daily. , Disp: , Rfl:     tiotropium (SPIRIVA WITH HANDIHALER) 18 mcg inhalation capsule, Take 1 Cap by inhalation daily. , Disp: , Rfl:     ascorbic acid, vitamin C, (VITAMIN C) 500 mg tablet, Take  by mouth., Disp: , Rfl:     melatonin 10 mg cap, Take  by mouth., Disp: , Rfl:     Iron Fum & PS Cmp-Vit C-Niacin (INTEGRA) 125-40-3 mg cap, Take 125 mg by mouth daily. , Disp: 90 Cap, Rfl: 1    montelukast (SINGULAIR) 10 mg tablet, Take 10 mg by mouth daily. , Disp: , Rfl:     lisinopril (PRINIVIL, ZESTRIL) 5 mg tablet, Take 1 Tab by mouth daily. , Disp: 90 Tab, Rfl: 1    pantoprazole (PROTONIX) 40 mg tablet, Take 1 Tab by mouth two (2) times a day., Disp: 180 Tab, Rfl: 1    sucralfate (CARAFATE) 1 gram tablet, Take 1 Tab by mouth three (3) times daily. , Disp: 270 Tab, Rfl: 3    nystatin-triamcinolone (MYCOLOG II) topical cream, Apply  to affected area two (2) times a day., Disp: 60 g, Rfl: 3    SYMBICORT 80-4.5 mcg/actuation HFAA, INHALE 1 PUFF PO BID FOR COUGH, Disp: , Rfl: 0    PROAIR HFA 90 mcg/actuation inhaler, USE 2 PUFFS PO BID PRF COUGH, Disp: , Rfl: 0    dietary supplement cap, Take  by mouth., Disp: , Rfl:     docusate sodium (STOOL SOFTENER) 100 mg tab, Take  by mouth as needed. , Disp: , Rfl:     glucosamine & chondroit sul. Na 750-400 mg cmpk, Take  by mouth., Disp: , Rfl:     omega-3 fatty acids-vitamin e (FISH OIL) 1,000 mg cap, Take 1 Cap by mouth. 1 by oral route twice daily, Disp: , Rfl:     VIT C/ABDIAZIZ AC/LUT/COPPER/ZNOX (PRESERVISION LUTEIN PO), Take  by mouth. 1 by oral route daily, Disp: , Rfl:     cholecalciferol (VITAMIN D3) 1,000 unit tablet, Take  by mouth daily. , Disp: , Rfl:     aspirin delayed-release 81 mg tablet, Take  by mouth daily. , Disp: , Rfl:    Date Last Reviewed:  2/27/19   Number of Personal Factors/Comorbidities that affect the Plan of Care: 1-2: MODERATE COMPLEXITY   EXAMINATION:   Observation/Orthostatic Postural Assessment: Forward head posture, forward bent on rollator.   Strength:          R LE 4/5; L LE 4-/5  2/15/19: 4/5 B LE  Functional Mobility:         Gait/Ambulation: Patient ambulates with rollator walker at slower pace. Forward bent posture. Decreased step length and clearance L LE. Transfers:  Independent with UE assist        Bed Mobility:  independent        Stairs:  NT  Sensation:         intact  Postural Control & Balance:  · Lockwood Balance Scale:  19/56.  2/15/19.  (A score less than 45/56 indicates high risk of falls)   11/56 at initial evaluation  · Dynamic Gait Index:  NT/24.   (A score less than or equal to19/24 is abnormal and predictive of falls)          Body Structures Involved:  1. Nerves  2. Eyes and Ears  3. Muscles Body Functions Affected:  1. Sensory/Pain  2. Neuromusculoskeletal  3. Movement Related Activities and Participation Affected:  1. General Tasks and Demands  2. Mobility  3. Domestic Life  4. Community, Social and [place]   Number of elements (examined above) that affect the Plan of Care: 3: MODERATE COMPLEXITY   CLINICAL PRESENTATION:   Presentation: Evolving clinical presentation with changing clinical characteristics: MODERATE COMPLEXITY   CLINICAL DECISION MAKING:   Outcome Measure: Tool Used: Lockwood Balance Scale  Score:  Initial: 11/56 Most Recent: 19/56 (Date: 2/15/19 )   Interpretation of Score: Each section is scored on a 0-4 scale, 0 representing the patients inability to perform the task and 4 representing independence. The scores of each section are added together for a total score of 56. The higher the patients score, the more independent the patient is. Any score below 45 indicates increased risk for falls. Score 56 55-45 44-34 33-23 22-12 11-1 0   Modifier CH CI CJ CK CL CM CN     Tool Used: Timed Up and Go (TUG)  Score:  Initial: 38.64 seconds with rollator Most Recent: 30.81 seconds (Date: 2/15/19 )   Interpretation of Score:  The test measures, in seconds, the time taken by an individual to stand up from a standard arm chair (seat height 46 cm [18 in], arm height 65 cm [25.6 in]), walk a distance of 3 meters (118 in, approx 10 ft), turn, walk back to the chair and sit down. If the individual takes longer than 14 seconds to complete TUG, this indicates risk for falls. Score 7 7.5-10.5 11-14 14.5-17.5 18-21 21.5-24.5 25+   Modifier CH CI CJ CK CL CM CN       Tool Used: Five Times Sit to Stand (FTSST or 5xSST)   Score:  Initial: 20.86 seconds with UE assist Most Recent: 18.4 seconds (Date: 2/15/19 )   Interpretation of Score: This is a measure of functional lower limb muscle strength and quantifying functional change of transitional movements. A score of 12 seconds or less indicates a normal level of function for community dwelling older individuals, a score of 15 seconds or more is at risk for fall. Score 12 or less 13-14 15-16 17-18 19-20 20-21 22 or greater   Modifier CH CI CJ CK CL CM CN       Medical Necessity:   · Patient is expected to demonstrate progress in strength, balance and functional technique to improve safety during daily activities. Reason for Services/Other Comments:  · Patient continues to demonstrate capacity to improve strength, balance, gait which will increase independence and increase safety. Use of outcome tool(s) and clinical judgement create a POC that gives a: Questionable prediction of patient's progress: MODERATE COMPLEXITY            TREATMENT:   (In addition to Assessment/Re-Assessment sessions the following treatments were rendered)  Pre-treatment Symptoms/Complaints: \"Sometimes feet won't  after I've been sitting a while when I try to walk. Then if I sit back down a few minutes, I'm fine. \"  Pain: Initial:     3/10 back Post Session:  3/10 back       THERAPEUTIC EXERCISE: (15 minutes):  Exercises per grid below to improve mobility, strength and balance. Required minimal verbal cues to promote proper body alignment, promote proper body posture and promote proper body mechanics. Progressed resistance, repetitions and complexity of movement as indicated.    Date:  2/27/19 Activity/Exercise Parameters       Marching  X 10 reps each leg   Standing hip abduction X 12 reps each leg   Standing hip extension    Sit to stand X 10 reps, X 10 reps from chair With no UE assist   Step ups 6 inch step x 10 reps each leg with rail   Seated scapular retraction              NEUROMUSCULAR RE-EDUCATION: ( 25  minutes):  Exercise/activities per grid below to improve balance, coordination, posture and proprioception. Required minimal verbal cues to promote static and dynamic balance in standing. Balance/Vestibular Treatment:   Activity   Date  2/27/19   Activity/Exercise   Sets/reps/  equipment   Walking with head turns        Walking with head up & down        Step overs     Over 1/2 foam roll x 15 reps each leg fwd and laterally   Step taps     6 inch step 2 x 20 reps fwd and cross with rail   Marching   In place x 30 reps   Sidestepping   At bar with min UE assist with CGA x 6 steps B   Crossovers      [place]      Walking  backwards        Tandem walking      Weaving in/out of cones        Picking up cones        Sports cord        ONEOK ball      Figure 8s       Circles right/left      Walking with 360 degree turns      Spirals      Weight shifting:    Left & Right     On floor and On on floor and blue foams eyes open   Weight shifting: Forward & Backward      On blue foams eyes open    Standing wt shifting back with stepping backwards     d. Standing with feet apart     On floor and On blue foams eyes open    Standing with feet together        Standing with feet semitandem   On blue foams eyes open    Standing with feet tandem      Single leg stance      X1/X2 Viewing exercises        Hallpike-[place] testing for BPPV (Benign Paroxysmal Positional Vertigo)        Wise-Ananda exercises      Canalith Repositioning treatment/Epley Maneuver  for BPPV (Benign Paroxysmal Positional Vertigo)      Smart Equitest Training: See scanned report.           [facility] Portal  Treatment/Session Assessment:  Patient tolerated session well. Balance is improving. Plan to continue for a few more sessions. Discussed with patient that we plan to discharge with HEP in the next few visits. · Response to Treatment:  Patient demonstrated improved tolerance of balance and mobility exercises today. Continue to work on balance training and strengthening to decrease fall risk. · Compliance with Program/Exercises: Compliant most of the time. · Recommendations/Intent for next treatment session: \"Next visit will focus on advancements to more challenging activities\".   Total Treatment Duration:  PT Patient Time In/Time Out  Time In: 1415(late for 1400)  Time Out: [address], PT    Future Appointments   Date Time Provider Ken Zamora   3/1/2019  7:00 PM Brigida Benavides, PT [facility]   3/4/2019  1:30 PM Brigida Benavides, PT [facility]   3/6/2019 12:30 PM Brigida Benavides, PT [facility]   3/12/2019  2:00 PM Brigida Benavides, PT [facility]   4/24/2019  2:00 PM Cesar Camp NP BSN [facility]   6/17/2019 10:45 AM [facility]   8/19/2019  2:00 PM Lawrence Tate MD [facility]

## 2019-03-06 ENCOUNTER — HOSPITAL ENCOUNTER (OUTPATIENT)
Dept: PHYSICAL THERAPY | Age: 74
Discharge: HOME | End: 2019-03-06

## 2019-09-06 ENCOUNTER — APPOINTMENT (OUTPATIENT)
Dept: CT IMAGING | Age: 74
End: 2019-09-06
Payer: MEDICARE

## 2019-09-06 ENCOUNTER — HOSPITAL ENCOUNTER (EMERGENCY)
Age: 74
Discharge: HOME | End: 2019-09-06
Attending: EMERGENCY MEDICINE | Admitting: EMERGENCY MEDICINE
Payer: MEDICARE

## 2019-09-06 VITALS
DIASTOLIC BLOOD PRESSURE: 67 MMHG | TEMPERATURE: 98.2 F | RESPIRATION RATE: 18 BRPM | OXYGEN SATURATION: 94 % | SYSTOLIC BLOOD PRESSURE: 153 MMHG | WEIGHT: 142 LBS | HEIGHT: 60 IN | BODY MASS INDEX: 27.88 KG/M2 | HEART RATE: 67 BPM

## 2019-09-06 DIAGNOSIS — S09.90XA INJURY OF HEAD, INITIAL ENCOUNTER: Primary | ICD-10-CM

## 2019-09-06 DIAGNOSIS — S01.01XA LACERATION OF SCALP, INITIAL ENCOUNTER: ICD-10-CM

## 2019-09-06 PROCEDURE — 70450 CT HEAD/BRAIN W/O DYE: CPT

## 2019-09-06 PROCEDURE — 75810000293 HC SIMP/SUPERF WND  RPR: Performed by: PHYSICIAN ASSISTANT

## 2019-09-06 PROCEDURE — 99283 EMERGENCY DEPT VISIT LOW MDM: CPT | Performed by: PHYSICIAN ASSISTANT

## 2019-09-06 PROCEDURE — 77030008462 HC STPLR SKN PROX J&J -A

## 2019-09-06 NOTE — DISCHARGE INSTRUCTIONS
Patient Education        Learning About a Closed Head Injury  What is a closed head injury? A closed head injury happens when your head gets hit hard. The strong force of the blow causes your brain to shake in your skull. This movement can cause the brain to bruise, swell, or tear. Sometimes nerves or blood vessels also get damaged. This can cause bleeding in or around the brain. A concussion is a type of closed head injury. What are the symptoms? If you have a mild concussion, you may have a mild headache or feel \"not quite right. \" These symptoms are common. They usually go away over a few days to 4 weeks. But sometimes after a concussion, you feel like you can't function as well as before the injury. And you have new symptoms. This is called postconcussive syndrome. You may:  · Find it harder to solve problems, think, concentrate, or remember. · Have headaches. · Have changes in your sleep patterns, such as not being able to sleep or sleeping all the time. · Have changes in your personality. · Not be interested in your usual activities. · Feel angry or anxious without a clear reason. · Lose your sense of taste or smell. · Be dizzy, lightheaded, or unsteady. It may be hard to stand or walk. How is a closed head injury treated? Any person who may have a concussion needs to see a doctor. Some people have to stay in the hospital to be watched. Others can go home safely. If you go home, follow your doctor's instructions. He or she will tell you if you need someone to watch you closely for the next 24 hours or longer. Rest is the best treatment. Get plenty of sleep at night. And try to rest during the day. · Avoid activities that are physically or mentally demanding. These include housework, exercise, and schoolwork. And don't play video games, send text messages, or use the computer. You may need to change your school or work schedule to be able to avoid these activities.   · Ask your doctor when it's okay to drive, ride a bike, or operate machinery. · Take an over-the-counter pain medicine, such as acetaminophen (Tylenol), ibuprofen (Advil, Motrin), or naproxen (Aleve). Be safe with medicines. Read and follow all instructions on the label. · Check with your doctor before you use any other medicines for pain. · Do not drink alcohol or use illegal drugs. They can slow recovery. They can also increase your risk of getting a second head injury. Follow-up care is a key part of your treatment and safety. Be sure to make and go to all appointments, and call your doctor if you are having problems. It's also a good idea to know your test results and keep a list of the medicines you take. Where can you learn more? Go to http://maisha-faheem.info/. Enter E235 in the search box to learn more about \"Learning About a Closed Head Injury. \"  Current as of: March 28, 2019  Content Version: 12.1  © 3847-5731 [employer] Care instructions adapted under license by [employer] (which disclaims liability or warranty for this information). If you have questions about a medical condition or this instruction, always ask your healthcare professional. [address] [address] any warranty or liability for your use of this information. Patient Education        Cuts Closed With Staples: Care Instructions  Your Care Instructions  A cut can happen anywhere on your body. The doctor used staples to close the cut. Staples easily and quickly close a cut, which helps the cut heal.  Sometimes a cut can injure tendons, blood vessels, or nerves. If the cut went deep and through the skin, the doctor may have put in a layer of stitches below the staples. The deeper layer of stitches brings the deep part of the cut together. These stitches will dissolve and don't need to be removed. The staples in the upper layer are what you see on the cut. You may have a bandage.  You will need to have the staples removed, usually in 7 to 14 days. The doctor has checked you carefully, but problems can develop later. If you notice any problems or new symptoms, get medical treatment right away. Follow-up care is a key part of your treatment and safety. Be sure to make and go to all appointments, and call your doctor if you are having problems. It's also a good idea to know your test results and keep a list of the medicines you take. How can you care for yourself at home? · Keep the cut dry for the first 24 to 48 hours. After this, you can shower if your doctor okays it. Pat the cut dry. · Don't soak the cut, such as in a bathtub. Your doctor will tell you when it's safe to get the cut wet. · If your doctor told you how to care for your cut, follow your doctor's instructions. If you did not get instructions, follow this general advice:  ? After the first 24 to 48 hours, wash around the cut with clean water 2 times a day. Don't use hydrogen peroxide or alcohol, which can slow healing. ? You may cover the cut with a thin layer of petroleum jelly, such as Vaseline, and a nonstick bandage. ? Apply more petroleum jelly and replace the bandage as needed. · Avoid any activity that could cause your cut to reopen. · Do not remove the staples on your own. Your doctor will tell you when to come back to have the staples removed. · Take pain medicines exactly as directed. ? If the doctor gave you a prescription medicine for pain, take it as prescribed. ? If you are not taking a prescription pain medicine, ask your doctor if you can take an over-the-counter medicine. When should you call for help? Call your doctor now or seek immediate medical care if:    · You have new pain, or your pain gets worse.     · The skin near the cut is cold or pale or changes color.     · You have tingling, weakness, or numbness near the cut.     · The cut starts to bleed, and blood soaks through the bandage.  Oozing small amounts of blood is normal.     · You have trouble moving the area near the cut.     · You have symptoms of infection, such as:  ? Increased pain, swelling, warmth, or redness around the cut.  ? Red streaks leading from the cut.  ? Pus draining from the cut.  ? A fever.    Watch closely for changes in your health, and be sure to contact your doctor if:    · You do not get better as expected. Where can you learn more? Go to http://maisha-faheem.info/. Enter H775 in the search box to learn more about \"Cuts Closed With Staples: Care Instructions. \"  Current as of: September 23, 2018  Content Version: 12.1  © 3805-3146 [employer]. Care instructions adapted under license by [employer] (which disclaims liability or warranty for this information). If you have questions about a medical condition or this instruction, always ask your healthcare professional. [place] [address] any warranty or liability for your use of this information.

## 2019-09-06 NOTE — ED PROVIDER NOTES
Patient is here after she fell backwards hitting the back of her head on her often. She did not have loss of consciousness. She states she has had issues with her cerebellum and her balance since she was young and this is nothing new for her. This is her normal \"tipping over. \"  She did not have any loss of consciousness, visual changes, nausea, vomiting, neck pain, chest pain, shortness of breath, abdominal pain, dizziness, weakness, dyspnea on exertion, orthopnea or other new symptoms. She was ambulatory to the room without difficulty and well-hydrated. The history is provided by the patient. Fall   The accident occurred 1 to 2 hours ago. The fall occurred while standing. Impact surface: Corner of the oven. The volume of blood lost was minimal. The point of impact was the head. The pain is present in the head. The pain is at a severity of 1/10. The pain is mild. She was ambulatory at the scene. There was no entrapment after the fall. There was no drug use involved in the accident. There was no alcohol use involved in the accident. Associated symptoms include laceration. Pertinent negatives include no visual change, no fever, no numbness, no abdominal pain, no bowel incontinence, no nausea, no vomiting, no hematuria, no headaches, no extremity weakness, no hearing loss, no loss of consciousness and no tingling. The risk factors include being elderly. She has tried nothing for the symptoms.         Past Medical History:   Diagnosis Date    Abnormality of gait 7/18/2018    Anemia     Anxiety     Arthritis     Asthma     Calculus of kidney     Chronic pain     Contact dermatitis and other eczema, due to unspecified cause     Depressive disorder, not elsewhere classified     Encounter for chronic pain management     Esophageal reflux     Essential hypertension, benign     Frequent falls 7/18/2018    GERD (gastroesophageal reflux disease)     Pure hypercholesterolemia        Past Surgical History: Procedure Laterality Date    HX CATARACT REMOVAL Bilateral     HX COLONOSCOPY      HX GI      HX HYSTERECTOMY      HX KNEE REPLACEMENT Right     HX LITHOTRIPSY      HX RETINAL DETACHMENT REPAIR      HX SHOULDER REPLACEMENT Left     NEUROLOGICAL PROCEDURE UNLISTED           Family History:   Problem Relation Age of Onset    Arthritis-osteo Mother     Cancer Father     Stroke Maternal Grandmother     Stroke Paternal Grandmother        Social History     Socioeconomic History    Marital status:      Spouse name: Not on file    Number of children: Not on file    Years of education: Not on file    Highest education level: Not on file   Occupational History    Not on file   Social Needs    Financial resource strain: Not on file    Food insecurity:     Worry: Not on file     Inability: Not on file    Transportation needs:     Medical: Not on file     Non-medical: Not on file   Tobacco Use    Smoking status: Never Smoker    Smokeless tobacco: Never Used   Substance and Sexual Activity    Alcohol use: No    Drug use: No    Sexual activity: Not on file   Lifestyle    Physical activity:     Days per week: Not on file     Minutes per session: Not on file    Stress: Not on file   Relationships    Social connections:     Talks on phone: Not on file     Gets together: Not on file     Attends Buddhism service: Not on file     Active member of club or organization: Not on file     Attends meetings of clubs or organizations: Not on file     Relationship status: Not on file    Intimate partner violence:     Fear of current or ex partner: Not on file     Emotionally abused: Not on file     Physically abused: Not on file     Forced sexual activity: Not on file   Other Topics Concern    Not on file   Social History Narrative    Patient and  reside together. She denies any in-house stairs. 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 Activity is reported as cooking and household chores as tolerable. Exercise is reported as intolerable. ALLERGIES: Flexeril [cyclobenzaprine]; Ketoprofen; and Plaquenil [hydroxychloroquine]    Review of Systems   Constitutional: Negative. Negative for fever. HENT: Negative. Eyes: Negative. Respiratory: Negative. Cardiovascular: Negative. Gastrointestinal: Negative. Negative for abdominal pain, bowel incontinence, nausea and vomiting. Genitourinary: Negative. Negative for hematuria. Musculoskeletal: Negative. Negative for extremity weakness. Skin: Positive for wound. Neurological: Negative. Negative for tingling, loss of consciousness, numbness and headaches. Psychiatric/Behavioral: Negative. All other systems reviewed and are negative. Vitals:    09/06/19 1237 09/06/19 1240   BP: 169/70    Pulse: 64    Resp: 18    Temp: 97.6 °F (36.4 °C)    SpO2: 93% (!) 18%   Weight: 64.4 kg (142 lb)    Height: 5' (1.524 m)             Physical Exam   Constitutional: She is oriented to person, place, and time. She appears well-developed and well-nourished. HENT:   Head: Normocephalic and atraumatic. Right Ear: External ear normal.   Left Ear: External ear normal.   Nose: Nose normal.   Mouth/Throat: Oropharynx is clear and moist.   Eyes: Pupils are equal, round, and reactive to light. Conjunctivae and EOM are normal.   Neck: Normal range of motion. Neck supple. Cardiovascular: Normal rate, regular rhythm, normal heart sounds and intact distal pulses. Pulmonary/Chest: Effort normal and breath sounds normal.   Abdominal: Soft. Bowel sounds are normal.   Musculoskeletal: Normal range of motion. Neurological: She is alert and oriented to person, place, and time. She has normal reflexes. Skin: Skin is warm and dry. Laceration noted. Psychiatric: She has a normal mood and affect.  Her behavior is normal. Judgment and thought content normal.   Nursing note and vitals reviewed. MDM  Number of Diagnoses or Management Options     Amount and/or Complexity of Data Reviewed  Tests in the radiology section of CPT®: ordered    Risk of Complications, Morbidity, and/or Mortality  Presenting problems: moderate  Diagnostic procedures: moderate  Management options: moderate           Wound Repair  Date/Time: 9/6/2019 2:22 PM  Performed by: PAPreparation: skin prepped with Shur-Clens  Pre-procedure re-eval: Immediately prior to the procedure, the patient was reevaluated and found suitable for the planned procedure and any planned medications. Location details: scalp  Wound length:2.5 cm or less  Foreign bodies: no foreign bodies  Skin closure: staples  Number of sutures: 2  Dressing: 4x4  Patient tolerance: Patient tolerated the procedure well with no immediate complications  My total time at bedside, performing this procedure was 1-15 minutes. The patient was observed in the ED. Results Reviewed:  CT HEAD WO CONT   Final Result   IMPRESSION:     MILD ATROPHY WITH SMALL VESSEL ISCHEMIC DISEASE BUT NO ACUTE   INTRACRANIAL ABNORMALITY OR CALVARIAL FRACTURE IDENTIFIED AT NONCONTRAST CT. The wound was cleaned with Shur-Clens, 2 staples were placed. The bleeding is controlled. She was given head injury precautions. She was instructed to follow-up with her primary care physician for recheck and return here in 7 days for staple removal.  She is stable for discharge at this time. I discussed the results of all labs, procedures, radiographs, and treatments with the patient and available family. Treatment plan is agreed upon and the patient is ready for discharge. All voiced understanding of the discharge plan and medication instructions or changes as appropriate. Questions about treatment in the ED were answered. All were encouraged to return should symptoms worsen or new problems develop.

## 2019-09-06 NOTE — ED NOTES
I have reviewed discharge instructions with the patient. The patient verbalized understanding. Patient left ED via Discharge Method: ambulatory to Home with daughter    Opportunity for questions and clarification provided. Patient given 0 scripts. To continue your aftercare when you leave the hospital, you may receive an automated call from our care team to check in on how you are doing. This is a free service and part of our promise to provide the best care and service to meet your aftercare needs.  If you have questions, or wish to unsubscribe from this service please call [phone]. Thank you for Choosing our [facility] Emergency Department.

## 2019-09-06 NOTE — ED TRIAGE NOTES
Patient arrives via EMS from home with complaint of a fall. She was in the kitchen, lost her balance, fell, hit her head on the class of the oven, causing a small laceration to the back of her head. Denies loss of consciousness. Patient arrives alert and oriented.

## 2019-11-01 ENCOUNTER — HOSPITAL ENCOUNTER (OUTPATIENT)
Dept: MAMMOGRAPHY | Age: 74
Discharge: HOME | End: 2019-11-01
Attending: FAMILY MEDICINE

## 2019-11-01 DIAGNOSIS — Z12.31 VISIT FOR SCREENING MAMMOGRAM: ICD-10-CM

## 2020-07-09 ENCOUNTER — HOSPITAL ENCOUNTER (EMERGENCY)
Age: 75
Discharge: HOME | End: 2020-07-09
Attending: EMERGENCY MEDICINE
Payer: MEDICARE

## 2020-07-09 ENCOUNTER — APPOINTMENT (OUTPATIENT)
Dept: CT IMAGING | Age: 75
End: 2020-07-09
Attending: EMERGENCY MEDICINE
Payer: MEDICARE

## 2020-07-09 VITALS
HEIGHT: 60 IN | RESPIRATION RATE: 16 BRPM | BODY MASS INDEX: 27.48 KG/M2 | OXYGEN SATURATION: 96 % | HEART RATE: 88 BPM | SYSTOLIC BLOOD PRESSURE: 112 MMHG | TEMPERATURE: 98 F | WEIGHT: 140 LBS | DIASTOLIC BLOOD PRESSURE: 68 MMHG

## 2020-07-09 DIAGNOSIS — R04.0 BLEEDING FROM THE NOSE: Primary | ICD-10-CM

## 2020-07-09 DIAGNOSIS — S00.83XA CONTUSION OF FACE, INITIAL ENCOUNTER: ICD-10-CM
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
## 2020-07-09 PROCEDURE — 99284 EMERGENCY DEPT VISIT MOD MDM: CPT

## 2020-07-09 PROCEDURE — 70486 CT MAXILLOFACIAL W/O DYE: CPT

## 2020-07-09 PROCEDURE — 70450 CT HEAD/BRAIN W/O DYE: CPT

## 2020-07-09 NOTE — ED PROVIDER NOTES
Mask was worn during the entire patient examination. Janee Blas is a 66 y.o. female who presents to the ED with a chief complaint of fall. Patient states she fell out of her electric scooter and hit her nose and right side of her face. She does suffer from a history of recurrent nosebleeds has been bleeding several times since the fall today. She was wearing a nose clip on arrival.  She states that she does not feel like it is actively bleeding currently. She does take an 81 mg aspirin currently. She has no other complaints.              Past Medical History:   Diagnosis Date    Abnormality of gait 7/18/2018    Anemia     Anxiety     Arthritis     Asthma     Calculus of kidney     Chronic pain     Contact dermatitis and other eczema, due to unspecified cause     Depressive disorder, not elsewhere classified     Encounter for chronic pain management     Esophageal reflux     Essential hypertension, benign     Frequent falls 7/18/2018    GERD (gastroesophageal reflux disease)     Pure hypercholesterolemia        Past Surgical History:   Procedure Laterality Date    HX CATARACT REMOVAL Bilateral     HX COLONOSCOPY      HX GI      HX HYSTERECTOMY      HX KNEE REPLACEMENT Right     HX LITHOTRIPSY      HX RETINAL DETACHMENT REPAIR      HX SHOULDER REPLACEMENT Left     NEUROLOGICAL PROCEDURE UNLISTED           Family History:   Problem Relation Age of Onset    Arthritis-osteo Mother     Cancer Father     Stroke Maternal Grandmother     Stroke Paternal Grandmother        Social History     Socioeconomic History    Marital status:      Spouse name: Not on file    Number of children: Not on file    Years of education: Not on file    Highest education level: Not on file   Occupational History    Not on file   Social Needs    Financial resource strain: Not on file    Food insecurity     Worry: Not on file     Inability: Not on file    Transportation needs     Medical: Not on file     Non-medical: Not on file   Tobacco Use    Smoking status: Never Smoker    Smokeless tobacco: Never Used   Substance and Sexual Activity    Alcohol use: No    Drug use: No    Sexual activity: Not on file   Lifestyle    Physical activity     Days per week: Not on file     Minutes per session: Not on file    Stress: Not on file   Relationships    Social connections     Talks on phone: Not on file     Gets together: Not on file     Attends Judaism service: Not on file     Active member of club or organization: Not on file     Attends meetings of clubs or organizations: Not on file     Relationship status: Not on file    Intimate partner violence     Fear of current or ex partner: Not on file     Emotionally abused: Not on file     Physically abused: Not on file     Forced sexual activity: Not on file   Other Topics Concern    Not on file   Social History Narrative    Patient and  reside together. She denies any in-house stairs. 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 Activity is reported as cooking and household chores as tolerable. Exercise is reported as intolerable. ALLERGIES: Flexeril [cyclobenzaprine]; Ketoprofen; and Plaquenil [hydroxychloroquine]    Review of Systems   Constitutional: Negative for chills and fever. HENT: Positive for nosebleeds. Negative for congestion, ear discharge, ear pain, facial swelling, mouth sores, postnasal drip, rhinorrhea, trouble swallowing and voice change. Gastrointestinal: Negative for abdominal pain, rectal pain and vomiting. Genitourinary: Negative for dysuria and flank pain. Musculoskeletal: Negative for neck pain and neck stiffness. Skin: Negative for color change, pallor and wound. Neurological: Negative for dizziness, tremors, seizures, weakness and numbness. All other systems reviewed and are negative.       Vitals:    07/09/20 1700   BP: 109/71   Pulse: 97 Resp: 18   Temp: 97.4 °F (36.3 °C)   SpO2: 94%   Weight: 63.5 kg (140 lb)   Height: 5' (1.524 m)            Physical Exam  Vitals signs and nursing note reviewed. Constitutional:       General: She is not in acute distress. Appearance: Normal appearance. She is well-developed. She is not ill-appearing, toxic-appearing or diaphoretic. HENT:      Head: Normocephalic. Nose:      Comments: Dried blood in both nostrils. There is some ecchymosis present to the right eye. Eyes:      General: No scleral icterus. Conjunctiva/sclera: Conjunctivae normal.      Comments: Clear anterior chamber. Neck:      Musculoskeletal: No neck rigidity. Pulmonary:      Effort: Pulmonary effort is normal. No respiratory distress. Breath sounds: No stridor. Skin:     Coloration: Skin is not jaundiced or pale. Neurological:      Mental Status: She is alert. Mental status is at baseline. Psychiatric:         Mood and Affect: Mood normal.         Behavior: Behavior normal.          MDM  Number of Diagnoses or Management Options  Diagnosis management comments: Patient has had no further bleeding from the nose on my reevaluation. Her CT scans do not show any acute fractures she was given return precaution and does have a nose clip if bleeding recurs but will try to avoid packing at this time since there is no active bleeding she was given return precautions if she is unable to get the bleeding stopped. Yefri Dominguez MD; 7/9/2020 @8:33 PM Voice dictation software was used during the making of this note. This software is not perfect and grammatical and other typographical errors may be present.   This note has not been proofread for errors.  ===================================================================            Procedures

## 2020-07-09 NOTE — ED TRIAGE NOTES
Arrives via EMS wearing mask. Reports a fall around 0900; states fell face-first off of her electric scooter while bending over to pick something up off of floor. Pt has hematoma to right eye, small abrasion beneath. Pt has had intermittent epistaxis since fall. EMS has been to residence twice today. Arrives w/ nose clamp in place w/ control of bleeding. Clamp removed during triage; only 1 drop of blood noted from right nare. Denies dizziness or nausea. Denies loc. Denies taking blood thinners; takes a baby asa daily.  Alert and oriented x 4 on arrival.

## 2020-07-10 NOTE — ED NOTES
I have reviewed discharge instructions with the patient. The patient verbalized understanding. Patient left ED via Discharge Method: ambulatory to Home with family. Opportunity for questions and clarification provided. Patient given 0 scripts. To continue your aftercare when you leave the hospital, you may receive an automated call from our care team to check in on how you are doing. This is a free service and part of our promise to provide the best care and service to meet your aftercare needs.  If you have questions, or wish to unsubscribe from this service please call [phone]. Thank you for Choosing our [facility] Emergency Department.

## 2020-07-24 ENCOUNTER — HOSPITAL ENCOUNTER (EMERGENCY)
Age: 75
Discharge: HOME | DRG: 163 | End: 2020-07-25
Attending: EMERGENCY MEDICINE
Payer: MEDICARE

## 2020-07-24 ENCOUNTER — APPOINTMENT (OUTPATIENT)
Dept: GENERAL RADIOLOGY | Age: 75
DRG: 163 | End: 2020-07-24
Attending: EMERGENCY MEDICINE
Payer: MEDICARE

## 2020-07-24 DIAGNOSIS — R04.0 ACUTE ANTERIOR EPISTAXIS: Primary | ICD-10-CM

## 2020-07-24 DIAGNOSIS — J45.909 MILD ASTHMA, UNSPECIFIED WHETHER COMPLICATED, UNSPECIFIED WHETHER PERSISTENT: ICD-10-CM

## 2020-07-24 PROCEDURE — 74011000250 HC RX REV CODE- 250: Performed by: EMERGENCY MEDICINE

## 2020-07-24 PROCEDURE — 99283 EMERGENCY DEPT VISIT LOW MDM: CPT

## 2020-07-24 PROCEDURE — 71045 X-RAY EXAM CHEST 1 VIEW: CPT

## 2020-07-24 PROCEDURE — 74011250637 HC RX REV CODE- 250/637: Performed by: EMERGENCY MEDICINE

## 2020-07-24 PROCEDURE — 77030013140 HC MSK NEB VYRM -A

## 2020-07-24 PROCEDURE — 94760 N-INVAS EAR/PLS OXIMETRY 1: CPT

## 2020-07-24 PROCEDURE — 94664 DEMO&/EVAL PT USE INHALER: CPT

## 2020-07-24 PROCEDURE — 94640 AIRWAY INHALATION TREATMENT: CPT

## 2020-07-24 PROCEDURE — 77030012341 HC CHMB SPCR OPTC MDI VYRM -A

## 2020-07-24 RX ORDER — ALBUTEROL SULFATE 0.83 MG/ML
10 SOLUTION RESPIRATORY (INHALATION)
Status: DISCONTINUED | OUTPATIENT
Start: 2020-07-24 | End: 2020-07-25

## 2020-07-24 RX ORDER — IPRATROPIUM BROMIDE AND ALBUTEROL SULFATE 2.5; .5 MG/3ML; MG/3ML
3 SOLUTION RESPIRATORY (INHALATION)
Status: COMPLETED | OUTPATIENT
Start: 2020-07-24 | End: 2020-07-24

## 2020-07-24 RX ORDER — IPRATROPIUM BROMIDE AND ALBUTEROL SULFATE 2.5; .5 MG/3ML; MG/3ML
3 SOLUTION RESPIRATORY (INHALATION)
Status: DISCONTINUED | OUTPATIENT
Start: 2020-07-24 | End: 2020-07-24 | Stop reason: SDUPTHER

## 2020-07-24 RX ORDER — OXYMETAZOLINE HCL 0.05 %
2 SPRAY, NON-AEROSOL (ML) NASAL
Status: COMPLETED | OUTPATIENT
Start: 2020-07-24 | End: 2020-07-24

## 2020-07-24 RX ADMIN — OXYMETAZOLINE HCL 2 SPRAY: 0.05 SPRAY NASAL at 22:18

## 2020-07-24 RX ADMIN — IPRATROPIUM BROMIDE AND ALBUTEROL SULFATE 3 ML: .5; 3 SOLUTION RESPIRATORY (INHALATION) at 23:26

## 2020-07-25 VITALS
RESPIRATION RATE: 20 BRPM | BODY MASS INDEX: 27.48 KG/M2 | DIASTOLIC BLOOD PRESSURE: 62 MMHG | HEART RATE: 85 BPM | HEIGHT: 60 IN | WEIGHT: 140 LBS | TEMPERATURE: 98.3 F | SYSTOLIC BLOOD PRESSURE: 132 MMHG | OXYGEN SATURATION: 95 %

## 2020-07-25 NOTE — ED TRIAGE NOTES
Pt arrived to ED via EMS from home. Pt stated nosebleed started around 2030. Per EMS pt does not have hx of nosebleed. Per EMS they saw apx 20 cc of blood in trash can, no big clots seen, nose clamp applied by EMS.  No other complaints    Per EMS:   last /60, , HR 90

## 2020-07-25 NOTE — ED PROVIDER NOTES
77-year-old female presents with acute onset right-sided epistaxis. She was at home who just got off the phone with her  when she started noticing something dripping out of her nose. Blood was emanating from the right nostril and then running backwards. No prior history of nosebleeds. She takes a baby aspirin for her only blood thinner. Patient does not recall bumping, scratching her nose or blowing or sneezing hard.            Past Medical History:   Diagnosis Date    Abnormality of gait 7/18/2018    Anemia     Anxiety     Arthritis     Asthma     Calculus of kidney     Chronic pain     Contact dermatitis and other eczema, due to unspecified cause     Depressive disorder, not elsewhere classified     Encounter for chronic pain management     Esophageal reflux     Essential hypertension, benign     Frequent falls 7/18/2018    GERD (gastroesophageal reflux disease)     Pure hypercholesterolemia        Past Surgical History:   Procedure Laterality Date    HX CATARACT REMOVAL Bilateral     HX COLONOSCOPY      HX GI      HX HYSTERECTOMY      HX KNEE REPLACEMENT Right     HX LITHOTRIPSY      HX RETINAL DETACHMENT REPAIR      HX SHOULDER REPLACEMENT Left     NEUROLOGICAL PROCEDURE UNLISTED           Family History:   Problem Relation Age of Onset    Arthritis-osteo Mother     Cancer Father     Stroke Maternal Grandmother     Stroke Paternal Grandmother        Social History     Socioeconomic History    Marital status:      Spouse name: Not on file    Number of children: Not on file    Years of education: Not on file    Highest education level: Not on file   Occupational History    Not on file   Social Needs    Financial resource strain: Not on file    Food insecurity     Worry: Not on file     Inability: Not on file    Transportation needs     Medical: Not on file     Non-medical: Not on file   Tobacco Use    Smoking status: Never Smoker    Smokeless tobacco: Never Used   Substance and Sexual Activity    Alcohol use: No    Drug use: No    Sexual activity: Not on file   Lifestyle    Physical activity     Days per week: Not on file     Minutes per session: Not on file    Stress: Not on file   Relationships    Social connections     Talks on phone: Not on file     Gets together: Not on file     Attends Mosque service: Not on file     Active member of club or organization: Not on file     Attends meetings of clubs or organizations: Not on file     Relationship status: Not on file    Intimate partner violence     Fear of current or ex partner: Not on file     Emotionally abused: Not on file     Physically abused: Not on file     Forced sexual activity: Not on file   Other Topics Concern    Not on file   Social History Narrative    Patient and  reside together. She denies any in-house stairs. 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 Activity is reported as cooking and household chores as tolerable. Exercise is reported as intolerable. ALLERGIES: Flexeril [cyclobenzaprine]; Ketoprofen; and Plaquenil [hydroxychloroquine]    Review of Systems   HENT: Positive for nosebleeds. Negative for congestion, facial swelling, trouble swallowing and voice change. Respiratory: Negative for cough and stridor. Gastrointestinal: Negative for nausea and vomiting. Vitals:    07/24/20 2150   BP: 132/62   Pulse: 85   Resp: 20   Temp: 98.3 °F (36.8 °C)   SpO2: 92%   Weight: 63.5 kg (140 lb)   Height: 5' (1.524 m)            Physical Exam  Vitals signs and nursing note reviewed. Constitutional:       General: She is not in acute distress. Appearance: Normal appearance. She is well-developed. She is not ill-appearing, toxic-appearing or diaphoretic. HENT:      Head: Normocephalic and atraumatic. Nose: No congestion or rhinorrhea.       Comments: Clot emanating from right nostril, when slight traction was applied fresh bleeding ensued. Mouth/Throat:      Mouth: Mucous membranes are moist.   Eyes:      General:         Right eye: No discharge. Left eye: No discharge. Conjunctiva/sclera: Conjunctivae normal.   Neck:      Musculoskeletal: Normal range of motion and neck supple. Pulmonary:      Effort: Pulmonary effort is normal. No respiratory distress. Musculoskeletal: Normal range of motion. Skin:     General: Skin is warm and dry. Findings: No rash. Neurological:      General: No focal deficit present. Mental Status: She is alert and oriented to person, place, and time. Mental status is at baseline. Motor: No abnormal muscle tone. Comments: cni 2-12 grossly  Nl gait,  Nl speech     Psychiatric:         Mood and Affect: Mood normal.         Behavior: Behavior normal.          MDM  Number of Diagnoses or Management Options  Acute anterior epistaxis:   Mild asthma, unspecified whether complicated, unspecified whether persistent:   Diagnosis management comments: Medical decision making note:  Acute right-sided epistaxis, most likely anterior. Patient had achieved hemostasis with just a nasal clamp in route. Minimal evaluation restimulated bleeding. Will insufflate some Afrin, have her blow the nose, reinsufflate Afrin and apply nose clamp. If bleeding persists we will proceed to an inflatable Rhinostat gel / cell matrix balloon. 12:10 AM bleeding has been stopped for quite a while now with Afrin and nasal clamp for 10 minutes. Sats were initially 92 when she got here, but were trending down to the mid 80s during treatment.  's were clear, chest x-ray unrevealing. A DuoNeb was administered and initially while slouched in bed and her sats remained in the mid 80s. We were fixing to perform an IV and a 2 mg albuterol nebulizer when her sats started coming up to 90-94. Patient was gotten out of bed and ambulated and sats went up to 95.   Does not really feel short of breath, has inhaler at home. We have provided a spacer and shown how to use that with her pro-air inhaler. This concludes the \"medical decision making note\" part of this emergency department visit note.            Procedures

## 2020-07-25 NOTE — ED NOTES
I have reviewed discharge instructions with the patient. The patient verbalized understanding. Patient left ED via Discharge Method: wheelchair to Home with self. Opportunity for questions and clarification provided. Patient given 0 scripts. To continue your aftercare when you leave the hospital, you may receive an automated call from our care team to check in on how you are doing. This is a free service and part of our promise to provide the best care and service to meet your aftercare needs.  If you have questions, or wish to unsubscribe from this service please call [phone]. Thank you for Choosing our [facility] Emergency Department.

## 2020-07-25 NOTE — DISCHARGE INSTRUCTIONS
Use the spacer tube with your pro-air inhaler as demonstrated    FREQUENT NASAL SALINE  You may also apply a thin rim of vaseline inside the nostril to \"waterproof\" the skin against the dry air. .  If nosebleed recurs, apply nasal clamp to SOFT PART of nose for TEN MINUTES. If still bleeding . Delphine Blackwell Kain out the nostril, and spray in some AFRIN, or HEIDY-SYNEPHRINE. And REAPPLY nose clamp for TEN MORE minutes. .  If still bleeding, return to the e.r. Patient Education        Nosebleeds: Care Instructions  Your Care Instructions     Nosebleeds are common, especially if you have colds or allergies. Many things can cause a nosebleed. Some nosebleeds stop on their own with pressure. Others need packing. Some get cauterized (sealed). If you have gauze or other packing materials in your nose, you will need to follow up with your doctor to have the packing removed. You may need more treatment if you get nosebleeds a lot. The doctor has checked you carefully, but problems can develop later. If you notice any problems or new symptoms, get medical treatment right away. Follow-up care is a key part of your treatment and safety. Be sure to make and go to all appointments, and call your doctor if you are having problems. It's also a good idea to know your test results and keep a list of the medicines you take. How can you care for yourself at home? · If you get another nosebleed:  ? Sit up and tilt your head slightly forward. This keeps blood from going down your throat. ? Use your thumb and index finger to pinch your nose shut for 10 minutes. Use a clock. Do not check to see if the bleeding has stopped before the 10 minutes are up. If the bleeding has not stopped, pinch your nose shut for another 10 minutes. ? When the bleeding has stopped, try not to pick, rub, or blow your nose for 12 hours. Avoiding these things helps keep your nose from bleeding again.   · If your doctor prescribed antibiotics, take them as directed. Do not stop taking them just because you feel better. You need to take the full course of antibiotics. To prevent nosebleeds  · Do not blow your nose too hard. · Try not to lift or strain after a nosebleed. · Raise your head on a pillow while you sleep. · Put a thin layer of a saline- or water-based nasal gel, such as NasoGel, inside your nose. Put it on the septum, which divides your nostrils. This will prevent dryness that can cause nosebleeds. · Use a vaporizer or humidifier to add moisture to your bedroom. Follow the directions for cleaning the machine. · Do not use aspirin, ibuprofen (Advil, Motrin), or naproxen (Aleve) for 36 to 48 hours after a nosebleed unless your doctor tells you to. You can use acetaminophen (Tylenol) for pain relief. · Talk to your doctor about stopping any other medicines you are taking. Some medicines may make you more likely to get a nosebleed. · Do not use cold medicines or nasal sprays without first talking to your doctor. They can make your nose dry. When should you call for help? [id number] anytime you think you may need emergency care. For example, call if:  · You passed out (lost consciousness). Call your doctor now or seek immediate medical care if:  · You get another nosebleed and your nose is still bleeding after you have applied pressure 3 times for 10 minutes each time (30 minutes total). · There is a lot of blood running down the back of your throat even after you pinch your nose and tilt your head forward. · You have a fever. · You have sinus pain. Watch closely for changes in your health, and be sure to contact your doctor if:  · You get nosebleeds often, even if they stop. · You do not get better as expected. Where can you learn more? Go to http://www.[employer].com/  Enter S156 in the search box to learn more about \"Nosebleeds: Care Instructions. \"  Current as of: June 26, 2019               Content Version: 12.5  © 8222-9540 [employer]. Care instructions adapted under license by [employer] (which disclaims liability or warranty for this information). If you have questions about a medical condition or this instruction, always ask your healthcare professional. [address] any warranty or liability for your use of this information.

## 2020-07-27 ENCOUNTER — APPOINTMENT (OUTPATIENT)
Dept: CT IMAGING | Age: 75
DRG: 163 | End: 2020-07-27
Attending: EMERGENCY MEDICINE
Payer: MEDICARE

## 2020-07-27 ENCOUNTER — HOSPITAL ENCOUNTER (EMERGENCY)
Age: 75
Discharge: ANOTHER HOSPITAL | DRG: 163 | End: 2020-07-27
Attending: EMERGENCY MEDICINE
Payer: MEDICARE

## 2020-07-27 ENCOUNTER — HOSPITAL ENCOUNTER (INPATIENT)
Age: 75
LOS: 7 days | Discharge: NURSING, REHAB OR LONG-TERM CARE | DRG: 163 | End: 2020-08-03
Attending: FAMILY MEDICINE | Admitting: HOSPITALIST
Payer: MEDICARE

## 2020-07-27 ENCOUNTER — APPOINTMENT (OUTPATIENT)
Dept: GENERAL RADIOLOGY | Age: 75
DRG: 163 | End: 2020-07-27
Attending: EMERGENCY MEDICINE
Payer: MEDICARE

## 2020-07-27 ENCOUNTER — APPOINTMENT (OUTPATIENT)
Dept: ULTRASOUND IMAGING | Age: 75
DRG: 163 | End: 2020-07-27
Attending: HOSPITALIST
Payer: MEDICARE

## 2020-07-27 VITALS
HEART RATE: 78 BPM | OXYGEN SATURATION: 95 % | HEIGHT: 60 IN | RESPIRATION RATE: 26 BRPM | WEIGHT: 140 LBS | SYSTOLIC BLOOD PRESSURE: 156 MMHG | BODY MASS INDEX: 27.48 KG/M2 | DIASTOLIC BLOOD PRESSURE: 67 MMHG | TEMPERATURE: 97.2 F

## 2020-07-27 DIAGNOSIS — R06.02 SOB (SHORTNESS OF BREATH): Primary | ICD-10-CM

## 2020-07-27 DIAGNOSIS — I26.99 BILATERAL PULMONARY EMBOLISM (HCC): ICD-10-CM

## 2020-07-27 PROBLEM — J45.909 ASTHMA: Status: ACTIVE | Noted: 2020-07-27

## 2020-07-27 PROBLEM — Z20.822 PERSON UNDER INVESTIGATION FOR COVID-19: Status: ACTIVE | Noted: 2020-07-27

## 2020-07-27 PROBLEM — J96.01 ACUTE HYPOXEMIC RESPIRATORY FAILURE (HCC): Status: ACTIVE | Noted: 2020-07-27

## 2020-07-27 LAB
ABO + RH BLD: NORMAL
ALBUMIN SERPL-MCNC: 3.2 G/DL (ref 3.2–4.6)
ALBUMIN/GLOB SERPL: 0.8 {RATIO} (ref 1.2–3.5)
ALP SERPL-CCNC: 99 U/L (ref 50–136)
ALT SERPL-CCNC: 24 U/L (ref 12–65)
ANION GAP SERPL CALC-SCNC: 7 MMOL/L (ref 7–16)
APTT PPP: 25.6 SEC (ref 24.3–35.4)
ARTERIAL PATENCY WRIST A: YES
AST SERPL-CCNC: 19 U/L (ref 15–37)
BASE DEFICIT BLDV-SCNC: 2 MMOL/L
BASE EXCESS BLD CALC-SCNC: 0 MMOL/L
BASE EXCESS BLD CALC-SCNC: 2 MMOL/L
BASOPHILS # BLD: 0 K/UL (ref 0–0.2)
BASOPHILS NFR BLD: 0 % (ref 0–2)
BDY SITE: ABNORMAL
BILIRUB SERPL-MCNC: 0.5 MG/DL (ref 0.2–1.1)
BLOOD GROUP ANTIBODIES SERPL: NORMAL
BNP SERPL-MCNC: 5231 PG/ML
BUN SERPL-MCNC: 18 MG/DL (ref 8–23)
CALCIUM SERPL-MCNC: 8.8 MG/DL (ref 8.3–10.4)
CHLORIDE SERPL-SCNC: 108 MMOL/L (ref 98–107)
CO2 BLD-SCNC: 22 MMOL/L
CO2 BLD-SCNC: 24 MMOL/L
CO2 BLD-SCNC: 24 MMOL/L
CO2 SERPL-SCNC: 24 MMOL/L (ref 21–32)
COLLECT TIME,HTIME: 1655
COLLECT TIME,HTIME: 1731
COLLECT TIME,HTIME: 1802
CREAT SERPL-MCNC: 0.78 MG/DL (ref 0.6–1)
DIFFERENTIAL METHOD BLD: ABNORMAL
EOSINOPHIL # BLD: 0.1 K/UL (ref 0–0.8)
EOSINOPHIL NFR BLD: 1 % (ref 0.5–7.8)
ERYTHROCYTE [DISTWIDTH] IN BLOOD BY AUTOMATED COUNT: 15.9 % (ref 11.9–14.6)
FLOW RATE ISTAT,IFRATE: 4 L/MIN
GAS FLOW.O2 O2 DELIVERY SYS: ABNORMAL L/MIN
GLOBULIN SER CALC-MCNC: 4.1 G/DL (ref 2.3–3.5)
GLUCOSE SERPL-MCNC: 112 MG/DL (ref 65–100)
HCO3 BLD-SCNC: 21.2 MMOL/L (ref 22–26)
HCO3 BLD-SCNC: 23.1 MMOL/L (ref 22–26)
HCO3 BLDV-SCNC: 22.9 MMOL/L (ref 23–28)
HCT VFR BLD AUTO: 34.5 % (ref 35.8–46.3)
HGB BLD-MCNC: 10.7 G/DL (ref 11.7–15.4)
IMM GRANULOCYTES # BLD AUTO: 0 K/UL (ref 0–0.5)
IMM GRANULOCYTES NFR BLD AUTO: 0 % (ref 0–5)
INR PPP: 1.1
LACTATE SERPL-SCNC: 1.7 MMOL/L (ref 0.4–2)
LYMPHOCYTES # BLD: 2.1 K/UL (ref 0.5–4.6)
LYMPHOCYTES NFR BLD: 23 % (ref 13–44)
MCH RBC QN AUTO: 26.9 PG (ref 26.1–32.9)
MCHC RBC AUTO-ENTMCNC: 31 G/DL (ref 31.4–35)
MCV RBC AUTO: 86.7 FL (ref 79.6–97.8)
MONOCYTES # BLD: 1.1 K/UL (ref 0.1–1.3)
MONOCYTES NFR BLD: 13 % (ref 4–12)
NEUTS SEG # BLD: 5.5 K/UL (ref 1.7–8.2)
NEUTS SEG NFR BLD: 62 % (ref 43–78)
NRBC # BLD: 0.02 K/UL (ref 0–0.2)
PCO2 BLD: 19.6 MMHG (ref 35–45)
PCO2 BLD: 32.5 MMHG (ref 35–45)
PCO2 BLDV: 38.4 MMHG (ref 41–51)
PH BLD: 7.46 [PH] (ref 7.35–7.45)
PH BLD: 7.64 [PH] (ref 7.35–7.45)
PH BLDV: 7.38 [PH] (ref 7.32–7.42)
PLATELET # BLD AUTO: 307 K/UL (ref 150–450)
PMV BLD AUTO: 10.9 FL (ref 9.4–12.3)
PO2 BLD: 154 MMHG (ref 75–100)
PO2 BLD: 32 MMHG (ref 75–100)
PO2 BLDV: 24 MMHG
POTASSIUM SERPL-SCNC: 3.9 MMOL/L (ref 3.5–5.1)
PROCALCITONIN SERPL-MCNC: <0.05 NG/ML
PROT SERPL-MCNC: 7.3 G/DL (ref 6.3–8.2)
PROTHROMBIN TIME: 14 SEC (ref 12–14.7)
RBC # BLD AUTO: 3.98 M/UL (ref 4.05–5.2)
SAO2 % BLD: 78 % (ref 95–98)
SAO2 % BLD: 99 % (ref 95–98)
SAO2 % BLDV: 42 % (ref 65–88)
SERVICE CMNT-IMP: ABNORMAL
SODIUM SERPL-SCNC: 139 MMOL/L (ref 136–145)
SPECIMEN EXP DATE BLD: NORMAL
SPECIMEN TYPE: ABNORMAL
TROPONIN-HIGH SENSITIVITY: 21 PG/ML (ref 0–14)
WBC # BLD AUTO: 8.9 K/UL (ref 4.3–11.1)

## 2020-07-27 PROCEDURE — 65270000029 HC RM PRIVATE

## 2020-07-27 PROCEDURE — 80053 COMPREHEN METABOLIC PANEL: CPT

## 2020-07-27 PROCEDURE — 36600 WITHDRAWAL OF ARTERIAL BLOOD: CPT

## 2020-07-27 PROCEDURE — 82803 BLOOD GASES ANY COMBINATION: CPT

## 2020-07-27 PROCEDURE — 85730 THROMBOPLASTIN TIME PARTIAL: CPT

## 2020-07-27 PROCEDURE — 77030013140 HC MSK NEB VYRM -A

## 2020-07-27 PROCEDURE — 94640 AIRWAY INHALATION TREATMENT: CPT

## 2020-07-27 PROCEDURE — 84484 ASSAY OF TROPONIN QUANT: CPT

## 2020-07-27 PROCEDURE — 74011000250 HC RX REV CODE- 250: Performed by: EMERGENCY MEDICINE

## 2020-07-27 PROCEDURE — 96367 TX/PROPH/DG ADDL SEQ IV INF: CPT

## 2020-07-27 PROCEDURE — 86900 BLOOD TYPING SEROLOGIC ABO: CPT

## 2020-07-27 PROCEDURE — 87635 SARS-COV-2 COVID-19 AMP PRB: CPT

## 2020-07-27 PROCEDURE — 83605 ASSAY OF LACTIC ACID: CPT

## 2020-07-27 PROCEDURE — 84145 PROCALCITONIN (PCT): CPT

## 2020-07-27 PROCEDURE — 85610 PROTHROMBIN TIME: CPT

## 2020-07-27 PROCEDURE — 96368 THER/DIAG CONCURRENT INF: CPT

## 2020-07-27 PROCEDURE — 71045 X-RAY EXAM CHEST 1 VIEW: CPT

## 2020-07-27 PROCEDURE — 74011250636 HC RX REV CODE- 250/636: Performed by: EMERGENCY MEDICINE

## 2020-07-27 PROCEDURE — 96365 THER/PROPH/DIAG IV INF INIT: CPT

## 2020-07-27 PROCEDURE — 74011000258 HC RX REV CODE- 258: Performed by: EMERGENCY MEDICINE

## 2020-07-27 PROCEDURE — 93970 EXTREMITY STUDY: CPT

## 2020-07-27 PROCEDURE — 93005 ELECTROCARDIOGRAM TRACING: CPT | Performed by: EMERGENCY MEDICINE

## 2020-07-27 PROCEDURE — 96376 TX/PRO/DX INJ SAME DRUG ADON: CPT

## 2020-07-27 PROCEDURE — 87040 BLOOD CULTURE FOR BACTERIA: CPT

## 2020-07-27 PROCEDURE — 83880 ASSAY OF NATRIURETIC PEPTIDE: CPT

## 2020-07-27 PROCEDURE — 99285 EMERGENCY DEPT VISIT HI MDM: CPT

## 2020-07-27 PROCEDURE — 74011636320 HC RX REV CODE- 636/320: Performed by: EMERGENCY MEDICINE

## 2020-07-27 PROCEDURE — 74011250637 HC RX REV CODE- 250/637: Performed by: HOSPITALIST

## 2020-07-27 PROCEDURE — 85025 COMPLETE CBC W/AUTO DIFF WBC: CPT

## 2020-07-27 PROCEDURE — 96375 TX/PRO/DX INJ NEW DRUG ADDON: CPT

## 2020-07-27 PROCEDURE — 71260 CT THORAX DX C+: CPT

## 2020-07-27 RX ORDER — CALCIUM CARBONATE 500(1250)
500 TABLET ORAL DAILY
Status: DISCONTINUED | OUTPATIENT
Start: 2020-07-28 | End: 2020-08-03 | Stop reason: HOSPADM

## 2020-07-27 RX ORDER — PANTOPRAZOLE SODIUM 40 MG/1
40 TABLET, DELAYED RELEASE ORAL
Status: DISCONTINUED | OUTPATIENT
Start: 2020-07-28 | End: 2020-07-30

## 2020-07-27 RX ORDER — MELATONIN
1000 DAILY
Status: DISCONTINUED | OUTPATIENT
Start: 2020-07-28 | End: 2020-08-03 | Stop reason: HOSPADM

## 2020-07-27 RX ORDER — HEPARIN SODIUM 5000 [USP'U]/100ML
18-36 INJECTION, SOLUTION INTRAVENOUS
Status: DISCONTINUED | OUTPATIENT
Start: 2020-07-27 | End: 2020-07-27 | Stop reason: HOSPADM

## 2020-07-27 RX ORDER — ACETAMINOPHEN 325 MG/1
650 TABLET ORAL
Status: DISCONTINUED | OUTPATIENT
Start: 2020-07-27 | End: 2020-08-03 | Stop reason: HOSPADM

## 2020-07-27 RX ORDER — ACETAMINOPHEN 650 MG/1
650 SUPPOSITORY RECTAL
Status: DISCONTINUED | OUTPATIENT
Start: 2020-07-27 | End: 2020-07-27 | Stop reason: SDUPTHER

## 2020-07-27 RX ORDER — PROMETHAZINE HYDROCHLORIDE 25 MG/1
12.5 TABLET ORAL
Status: DISCONTINUED | OUTPATIENT
Start: 2020-07-27 | End: 2020-08-03 | Stop reason: HOSPADM

## 2020-07-27 RX ORDER — SERTRALINE HYDROCHLORIDE 100 MG/1
100 TABLET, FILM COATED ORAL DAILY
Status: DISCONTINUED | OUTPATIENT
Start: 2020-07-28 | End: 2020-08-03 | Stop reason: HOSPADM

## 2020-07-27 RX ORDER — ACETAMINOPHEN 325 MG/1
650 TABLET ORAL
Status: DISCONTINUED | OUTPATIENT
Start: 2020-07-27 | End: 2020-07-27 | Stop reason: SDUPTHER

## 2020-07-27 RX ORDER — FAMOTIDINE 20 MG/1
20 TABLET, FILM COATED ORAL 2 TIMES DAILY
Status: CANCELLED | OUTPATIENT
Start: 2020-07-27

## 2020-07-27 RX ORDER — HYDRALAZINE HYDROCHLORIDE 20 MG/ML
10 INJECTION INTRAMUSCULAR; INTRAVENOUS
Status: DISCONTINUED | OUTPATIENT
Start: 2020-07-27 | End: 2020-08-03 | Stop reason: HOSPADM

## 2020-07-27 RX ORDER — ASPIRIN 81 MG/1
81 TABLET ORAL DAILY
Status: DISCONTINUED | OUTPATIENT
Start: 2020-07-28 | End: 2020-08-03 | Stop reason: HOSPADM

## 2020-07-27 RX ORDER — IPRATROPIUM BROMIDE AND ALBUTEROL SULFATE 2.5; .5 MG/3ML; MG/3ML
3 SOLUTION RESPIRATORY (INHALATION) 2 TIMES DAILY
Status: CANCELLED | OUTPATIENT
Start: 2020-07-27

## 2020-07-27 RX ORDER — MELATONIN 10 MG
10 CAPSULE ORAL
Status: DISCONTINUED | OUTPATIENT
Start: 2020-07-27 | End: 2020-08-03 | Stop reason: HOSPADM

## 2020-07-27 RX ORDER — UREA 10 %
220 LOTION (ML) TOPICAL DAILY
Status: DISCONTINUED | OUTPATIENT
Start: 2020-07-28 | End: 2020-08-03 | Stop reason: HOSPADM

## 2020-07-27 RX ORDER — HEPARIN SODIUM 5000 [USP'U]/ML
5000 INJECTION, SOLUTION INTRAVENOUS; SUBCUTANEOUS
Status: COMPLETED | OUTPATIENT
Start: 2020-07-27 | End: 2020-07-27

## 2020-07-27 RX ORDER — DEXAMETHASONE SODIUM PHOSPHATE 100 MG/10ML
6 INJECTION INTRAMUSCULAR; INTRAVENOUS DAILY
Status: DISCONTINUED | OUTPATIENT
Start: 2020-07-28 | End: 2020-07-30

## 2020-07-27 RX ORDER — POLYETHYLENE GLYCOL 3350 17 G/17G
17 POWDER, FOR SOLUTION ORAL DAILY PRN
Status: DISCONTINUED | OUTPATIENT
Start: 2020-07-27 | End: 2020-08-03 | Stop reason: HOSPADM

## 2020-07-27 RX ORDER — ONDANSETRON 2 MG/ML
4 INJECTION INTRAMUSCULAR; INTRAVENOUS
Status: DISCONTINUED | OUTPATIENT
Start: 2020-07-27 | End: 2020-08-03 | Stop reason: HOSPADM

## 2020-07-27 RX ORDER — SUCRALFATE 1 G/1
1 TABLET ORAL 3 TIMES DAILY
Status: DISCONTINUED | OUTPATIENT
Start: 2020-07-27 | End: 2020-08-03 | Stop reason: HOSPADM

## 2020-07-27 RX ORDER — ACETAMINOPHEN 650 MG/1
650 SUPPOSITORY RECTAL
Status: DISCONTINUED | OUTPATIENT
Start: 2020-07-27 | End: 2020-08-03 | Stop reason: HOSPADM

## 2020-07-27 RX ORDER — BUDESONIDE AND FORMOTEROL FUMARATE DIHYDRATE 80; 4.5 UG/1; UG/1
2 AEROSOL RESPIRATORY (INHALATION)
Status: DISCONTINUED | OUTPATIENT
Start: 2020-07-27 | End: 2020-08-03 | Stop reason: HOSPADM

## 2020-07-27 RX ORDER — HEPARIN SODIUM 5000 [USP'U]/100ML
18-36 INJECTION, SOLUTION INTRAVENOUS
Status: DISCONTINUED | OUTPATIENT
Start: 2020-07-27 | End: 2020-07-29

## 2020-07-27 RX ORDER — SODIUM CHLORIDE 0.9 % (FLUSH) 0.9 %
5-40 SYRINGE (ML) INJECTION EVERY 8 HOURS
Status: DISCONTINUED | OUTPATIENT
Start: 2020-07-27 | End: 2020-08-02

## 2020-07-27 RX ORDER — IPRATROPIUM BROMIDE AND ALBUTEROL SULFATE 2.5; .5 MG/3ML; MG/3ML
3 SOLUTION RESPIRATORY (INHALATION)
Status: DISCONTINUED | OUTPATIENT
Start: 2020-07-27 | End: 2020-08-03 | Stop reason: HOSPADM

## 2020-07-27 RX ORDER — SODIUM CHLORIDE 0.9 % (FLUSH) 0.9 %
10 SYRINGE (ML) INJECTION
Status: COMPLETED | OUTPATIENT
Start: 2020-07-27 | End: 2020-07-27

## 2020-07-27 RX ORDER — FLUTICASONE PROPIONATE 50 MCG
2 SPRAY, SUSPENSION (ML) NASAL DAILY
Status: DISCONTINUED | OUTPATIENT
Start: 2020-07-28 | End: 2020-08-03 | Stop reason: HOSPADM

## 2020-07-27 RX ORDER — MONTELUKAST SODIUM 10 MG/1
10 TABLET ORAL DAILY
Status: DISCONTINUED | OUTPATIENT
Start: 2020-07-28 | End: 2020-08-03 | Stop reason: HOSPADM

## 2020-07-27 RX ORDER — IPRATROPIUM BROMIDE AND ALBUTEROL SULFATE 2.5; .5 MG/3ML; MG/3ML
3 SOLUTION RESPIRATORY (INHALATION)
Status: COMPLETED | OUTPATIENT
Start: 2020-07-27 | End: 2020-07-27

## 2020-07-27 RX ORDER — DEXAMETHASONE SODIUM PHOSPHATE 100 MG/10ML
6 INJECTION INTRAMUSCULAR; INTRAVENOUS
Status: COMPLETED | OUTPATIENT
Start: 2020-07-27 | End: 2020-07-27

## 2020-07-27 RX ORDER — CLORAZEPATE DIPOTASSIUM 7.5 MG/1
3.75 TABLET ORAL 3 TIMES DAILY
Status: DISCONTINUED | OUTPATIENT
Start: 2020-07-27 | End: 2020-08-03 | Stop reason: HOSPADM

## 2020-07-27 RX ORDER — ASCORBIC ACID 500 MG
1500 TABLET ORAL 3 TIMES DAILY
Status: DISCONTINUED | OUTPATIENT
Start: 2020-07-27 | End: 2020-08-03 | Stop reason: HOSPADM

## 2020-07-27 RX ORDER — SODIUM CHLORIDE 0.9 % (FLUSH) 0.9 %
5-40 SYRINGE (ML) INJECTION AS NEEDED
Status: DISCONTINUED | OUTPATIENT
Start: 2020-07-27 | End: 2020-08-03 | Stop reason: HOSPADM

## 2020-07-27 RX ADMIN — IOPAMIDOL 100 ML: 755 INJECTION, SOLUTION INTRAVENOUS at 18:47

## 2020-07-27 RX ADMIN — IPRATROPIUM BROMIDE AND ALBUTEROL SULFATE 3 ML: .5; 3 SOLUTION RESPIRATORY (INHALATION) at 16:54

## 2020-07-27 RX ADMIN — IPRATROPIUM BROMIDE AND ALBUTEROL SULFATE 3 ML: .5; 3 SOLUTION RESPIRATORY (INHALATION) at 19:35

## 2020-07-27 RX ADMIN — Medication 10 ML: at 18:48

## 2020-07-27 RX ADMIN — CLORAZEPATE DIPOTASSIUM 3.75 MG: 7.5 TABLET ORAL at 22:27

## 2020-07-27 RX ADMIN — SODIUM CHLORIDE 100 ML: 900 INJECTION, SOLUTION INTRAVENOUS at 18:47

## 2020-07-27 RX ADMIN — Medication 10 ML: at 22:28

## 2020-07-27 RX ADMIN — AZITHROMYCIN 500 MG: 500 INJECTION, POWDER, LYOPHILIZED, FOR SOLUTION INTRAVENOUS at 18:58

## 2020-07-27 RX ADMIN — DEXAMETHASONE SODIUM PHOSPHATE 6 MG: 10 INJECTION INTRAMUSCULAR; INTRAVENOUS at 18:30

## 2020-07-27 RX ADMIN — Medication 1500 MG: at 22:27

## 2020-07-27 RX ADMIN — HEPARIN SODIUM 18 UNITS/KG/HR: 5000 INJECTION, SOLUTION INTRAVENOUS at 19:37

## 2020-07-27 RX ADMIN — SUCRALFATE 1 G: 1 TABLET ORAL at 22:28

## 2020-07-27 RX ADMIN — SODIUM CHLORIDE 500 ML: 900 INJECTION, SOLUTION INTRAVENOUS at 17:45

## 2020-07-27 RX ADMIN — HEPARIN SODIUM 5000 UNITS: 5000 INJECTION INTRAVENOUS; SUBCUTANEOUS at 19:36

## 2020-07-27 RX ADMIN — CEFTRIAXONE SODIUM 2 G: 2 INJECTION, POWDER, FOR SOLUTION INTRAMUSCULAR; INTRAVENOUS at 18:30

## 2020-07-27 NOTE — ED PROVIDER NOTES
28-year-old female w/ PMHx of asthma presents with complaint of worsening shortness of breath and nonproductive cough over the past week. Patient was seen around 3 days ago for epistaxis. Patient denies any recurrent bleeding or issues in regards to recent ED visit. Denies chest pain, nausea, vomiting, fever, chills, dizziness, headache. Patient states that she is not on any supplemental O2 at baseline. Patient was being seen at ENT office by Dr. Grace Andre when he evaluated her and sent her to the ER for further evaluation. The history is provided by the patient. No  was used. Shortness of Breath   This is a new problem. The problem occurs continuously. The current episode started more than 2 days ago. The problem has not changed since onset. Associated symptoms include cough and wheezing. Pertinent negatives include no fever, no headaches, no coryza, no rhinorrhea, no sore throat, no swollen glands, no sputum production, no hemoptysis, no PND, no orthopnea, no chest pain, no syncope, no vomiting, no abdominal pain, no rash and no leg pain. The treatment provided no relief.         Past Medical History:   Diagnosis Date    Abnormality of gait 7/18/2018    Anemia     Anxiety     Arthritis     Asthma     Calculus of kidney     Chronic pain     Contact dermatitis and other eczema, due to unspecified cause     Depressive disorder, not elsewhere classified     Encounter for chronic pain management     Esophageal reflux     Essential hypertension, benign     Frequent falls 7/18/2018    GERD (gastroesophageal reflux disease)     Pure hypercholesterolemia        Past Surgical History:   Procedure Laterality Date    HX CATARACT REMOVAL Bilateral     HX COLONOSCOPY      HX GI      HX HYSTERECTOMY      HX KNEE REPLACEMENT Right     HX LITHOTRIPSY      HX RETINAL DETACHMENT REPAIR      HX SHOULDER REPLACEMENT Left     NEUROLOGICAL PROCEDURE UNLISTED           Family History: Problem Relation Age of Onset    Arthritis-osteo Mother     Cancer Father     Stroke Maternal Grandmother     Stroke Paternal Grandmother        Social History     Socioeconomic History    Marital status:      Spouse name: Not on file    Number of children: Not on file    Years of education: Not on file    Highest education level: Not on file   Occupational History    Not on file   Social Needs    Financial resource strain: Not on file    Food insecurity     Worry: Not on file     Inability: Not on file   [employer] needs     Medical: Not on file     Non-medical: Not on file   Tobacco Use    Smoking status: Never Smoker    Smokeless tobacco: Never Used   Substance and Sexual Activity    Alcohol use: No    Drug use: No    Sexual activity: Not on file   Lifestyle    Physical activity     Days per week: Not on file     Minutes per session: Not on file    Stress: Not on file   Relationships    Social connections     Talks on phone: Not on file     Gets together: Not on file     Attends Yarsanism service: Not on file     Active member of club or organization: Not on file     Attends meetings of clubs or organizations: Not on file     Relationship status: Not on file    Intimate partner violence     Fear of current or ex partner: Not on file     Emotionally abused: Not on file     Physically abused: Not on file     Forced sexual activity: Not on file   Other Topics Concern    Not on file   Social History Narrative    Patient and  reside together. She denies any in-house stairs. 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 Activity is reported as cooking and household chores as tolerable. Exercise is reported as intolerable. ALLERGIES: Flexeril [cyclobenzaprine]; Ketoprofen; and Plaquenil [hydroxychloroquine]    Review of Systems   Constitutional: Negative for chills, diaphoresis, fatigue and fever.    HENT: Negative for congestion, rhinorrhea and sore throat. Respiratory: Positive for cough, shortness of breath and wheezing. Negative for hemoptysis and sputum production. Cardiovascular: Negative for chest pain, orthopnea, syncope and PND. Gastrointestinal: Negative for abdominal pain, diarrhea, nausea and vomiting. Genitourinary: Negative for dysuria and flank pain. Musculoskeletal: Negative for myalgias. Skin: Negative for rash. Neurological: Negative for dizziness, weakness, light-headedness and headaches. Vitals:    07/27/20 1755 07/27/20 1800 07/27/20 1803 07/27/20 1815   BP:  135/62  146/65   Pulse: 82 81 84 82   Resp: 22 27 (!) 32 26   Temp:       SpO2: 98%  100% 100%   Weight:       Height:                Physical Exam  Vitals signs and nursing note reviewed. Constitutional:       Comments: Mild distress. HENT:      Head: Normocephalic. Comments: No epistaxis noted. No dried blood bilateral nares. Mouth/Throat:      Mouth: Mucous membranes are moist.   Eyes:      Extraocular Movements: Extraocular movements intact. Pupils: Pupils are equal, round, and reactive to light. Neck:      Musculoskeletal: Normal range of motion. Cardiovascular:      Rate and Rhythm: Normal rate and regular rhythm. Pulses: Normal pulses. Heart sounds: Normal heart sounds. Comments: Pulses 2+ throughout. Pulmonary:      Effort: Tachypnea present. Comments: Diminished breath sounds present bilaterally. Abdominal:      Palpations: Abdomen is soft. Tenderness: There is no abdominal tenderness. Comments: Soft, NTND. Musculoskeletal: Normal range of motion. Comments: 2-3+ pitting bilateral LE edema. No calf TTP. Neurological:      General: No focal deficit present. Mental Status: She is alert and oriented to person, place, and time. Comments: No focal deficits. Strength 5 out of 5 throughout.           MDM  Number of Diagnoses or Management Options  Bilateral pulmonary embolism ([facility] [address]):   SOB (shortness of breath): new and requires workup  Diagnosis management comments: Patient presents with hypoxia. Initial O2 sat 87% on room air. Patient not on insulin O2 at home. Patient placed on 4 L O2 via nasal cannula. Order placed for DuoNeb. Chest x-ray with increasing haziness to left lung. Rocephin + Zithromax ordered. ABG was obtained however inaccurate. COVID-19 ordered. Given concern for COVID will give Decadron 6 mg IV.  =====================================  CT Chest obtained. CT with evidence of bilateral PE. No saddle present. Order placed for Heparin bolus + infusion. No epistaxis at this time. Hospitalist consulted for admission. Dr. Laurie Burciaga requests Interventional Radiologist be contacted so they are aware of patient and can place lysis catheter tomorrow if needed. Pt currently HD. Pt is tachypneic. O2 sats 100% on 4 L O2. Dr. Laurie Burciaga states will transfer to DT facility. Amount and/or Complexity of Data Reviewed  Clinical lab tests: ordered and reviewed  Tests in the radiology section of CPT®: ordered and reviewed  Tests in the medicine section of CPT®: ordered and reviewed  Review and summarize past medical records: yes  Discuss the patient with other providers: yes  Independent visualization of images, tracings, or specimens: yes    Risk of Complications, Morbidity, and/or Mortality  Presenting problems: high  Diagnostic procedures: high  Management options: high    Critical Care  Total time providing critical care: 30-74 minutes    Patient Progress  Patient progress: other (comment) (Guarded. )    ED Course as of Jul 27 1937   Mon Jul 27, 2020   1657 Patient states that she is not on supplemental oxygen at home at baseline. Patient currently on 4 L    [DF]   1727 CXR IMPRESSION: Increasing peripheral hazy opacity in the left midlung could represent a developing airspace consolidation or pneumonia.     [DF]   0136 BP 150s/80s at this time. [DF]   9811 Both ABGs attempted inaccurate. [DF]   1919 CT chest IMPRESSION:  1. Bilateral pulmonary emboli that originates in the peripheral right and left  main bronchus and extend into the lobar pulmonary arteries. Clot burden is  considered extensive. 2. Cardiomegaly. 3. Dependent atelectasis with nonspecific, irregular faint groundglass opacity  in the bilateral lungs more likely related to imaging the patient in expiration. No tom pneumonia appreciated. 4. Moderate-sized hiatal hernia. 5. 2.6 cm left adrenal nodule. 6. 4 mm right upper lobe pulmonary nodule.     DC5  The critical results contained in this report were communicated to Dr. Ja Rae  by Dr. Vicki Das on 7/27/2020 at 7:10 PM. Critical results were communicated as  outlined in Section II.C.2.a.i of the ACR Practice Guideline for Communication  of Diagnostic Imaging Findings.       [DF]   1935 IR MD contacted and is aware of patient. States will see in am but to contact him if patient becomes unstable in any way overnight.    [DF]      ED Course User Index  [DF] Deng Pandey MD       EKG    Date/Time: 7/27/2020 4:49 PM  Performed by: Deng Pandey MD  Authorized by:  Deng Pandey MD     ECG reviewed by ED Physician in the absence of a cardiologist: yes    Rate:     ECG rate:  88    ECG rate assessment: normal    Rhythm:     Rhythm: sinus rhythm    Ectopy:     Ectopy: none    QRS:     QRS axis:  Normal    QRS intervals:  Normal  Conduction:     Conduction: normal    ST segments:     ST segments:  Normal  T waves:     T waves: normal              Results Include:    Recent Results (from the past 24 hour(s))   CBC WITH AUTOMATED DIFF    Collection Time: 07/27/20  4:31 PM   Result Value Ref Range    WBC 8.9 4.3 - 11.1 K/uL    RBC 3.98 (L) 4.05 - 5.2 M/uL    HGB 10.7 (L) 11.7 - 15.4 g/dL    HCT 34.5 (L) 35.8 - 46.3 %    MCV 86.7 79.6 - 97.8 FL    MCH 26.9 26.1 - 32.9 PG    MCHC 31.0 (L) 31.4 - 35.0 g/dL    RDW 15.9 (H) 11.9 - 14.6 %    PLATELET 647 031 - 470 K/uL    MPV 10.9 9.4 - 12.3 FL    ABSOLUTE NRBC 0.02 0.0 - 0.2 K/uL    DF AUTOMATED      NEUTROPHILS 62 43 - 78 %    LYMPHOCYTES 23 13 - 44 %    MONOCYTES 13 (H) 4.0 - 12.0 %    EOSINOPHILS 1 0.5 - 7.8 %    BASOPHILS 0 0.0 - 2.0 %    IMMATURE GRANULOCYTES 0 0.0 - 5.0 %    ABS. NEUTROPHILS 5.5 1.7 - 8.2 K/UL    ABS. LYMPHOCYTES 2.1 0.5 - 4.6 K/UL    ABS. MONOCYTES 1.1 0.1 - 1.3 K/UL    ABS. EOSINOPHILS 0.1 0.0 - 0.8 K/UL    ABS. BASOPHILS 0.0 0.0 - 0.2 K/UL    ABS. IMM. GRANS. 0.0 0.0 - 0.5 K/UL   METABOLIC PANEL, COMPREHENSIVE    Collection Time: 07/27/20  4:31 PM   Result Value Ref Range    Sodium 139 136 - 145 mmol/L    Potassium 3.9 3.5 - 5.1 mmol/L    Chloride 108 (H) 98 - 107 mmol/L    CO2 24 21 - 32 mmol/L    Anion gap 7 7 - 16 mmol/L    Glucose 112 (H) 65 - 100 mg/dL    BUN 18 8 - 23 MG/DL    Creatinine 0.78 0.6 - 1.0 MG/DL    GFR est AA >60 >60 ml/min/1.73m2    GFR est non-AA >60 >60 ml/min/1.73m2    Calcium 8.8 8.3 - 10.4 MG/DL    Bilirubin, total 0.5 0.2 - 1.1 MG/DL    ALT (SGPT) 24 12 - 65 U/L    AST (SGOT) 19 15 - 37 U/L    Alk.  phosphatase 99 50 - 136 U/L    Protein, total 7.3 6.3 - 8.2 g/dL    Albumin 3.2 3.2 - 4.6 g/dL    Globulin 4.1 (H) 2.3 - 3.5 g/dL    A-G Ratio 0.8 (L) 1.2 - 3.5     TROPONIN-HIGH SENSITIVITY    Collection Time: 07/27/20  4:31 PM   Result Value Ref Range    Troponin-High Sensitivity 21.0 (H) 0 - 14 pg/mL   LACTIC ACID    Collection Time: 07/27/20  4:31 PM   Result Value Ref Range    Lactic acid 1.7 0.4 - 2.0 MMOL/L   NT-PRO BNP    Collection Time: 07/27/20  4:31 PM   Result Value Ref Range    NT pro-BNP 5,231 (H) <450 PG/ML   POC VENOUS BLOOD GAS    Collection Time: 07/27/20  4:54 PM   Result Value Ref Range    Device: NASAL CANNULA      pH, venous (POC) 7.38 7.32 - 7.42      pCO2, venous (POC) 38.4 (L) 41 - 51 MMHG    pO2, venous (POC) 24 (LL) mmHg    HCO3, venous (POC) 22.9 (L) 23 - 28 MMOL/L    sO2, venous (POC) 42 (L) 65 - 88 %    Base deficit, venous (POC) 2 mmol/L    Allens test (POC) YES      Site RIGHT BRACHIAL      Specimen type (POC) MIXED VENOUS      Performed by TeagueJaneRT     CO2, POC 24 MMOL/L    Flow rate (POC) 4.000 L/min    Critical value read back 00:01     Respiratory comment: PhysicianNotified     COLLECT TIME 1,655     POC G3    Collection Time: 07/27/20  5:32 PM   Result Value Ref Range    Device: NASAL CANNULA      pH (POC) 7.64 (HH) 7.35 - 7.45      pCO2 (POC) 19.6 (LL) 35 - 45 MMHG    pO2 (POC) 32 (LL) 75 - 100 MMHG    HCO3 (POC) 21.2 (L) 22 - 26 MMOL/L    sO2 (POC) 78 (L) 95 - 98 %    Base excess (POC) 2 mmol/L    Allens test (POC) YES      Site LEFT RADIAL      Specimen type (POC) ARTERIAL      Performed by TeagueJaneRT     CO2, POC 22 MMOL/L    Flow rate (POC) 4.000 L/min    Critical value read back 00:01     Respiratory comment: PhysicianNotified     COLLECT TIME 1,731     POC G3    Collection Time: 07/27/20  6:03 PM   Result Value Ref Range    Device: NASAL CANNULA      pH (POC) 7.46 (H) 7.35 - 7.45      pCO2 (POC) 32.5 (L) 35 - 45 MMHG    pO2 (POC) 154 (H) 75 - 100 MMHG    HCO3 (POC) 23.1 22 - 26 MMOL/L    sO2 (POC) 99 (H) 95 - 98 %    Base excess (POC) 0 mmol/L    Allens test (POC) YES      Site RIGHT RADIAL      Specimen type (POC) ARTERIAL      Performed by TeagueJaneRT     CO2, POC 24 MMOL/L    Flow rate (POC) 4.000 L/min    Critical value read back 00:01     Respiratory comment: PhysicianNotified     COLLECT TIME 1,802           CRITICAL CARE Documentation: This patient is critically ill and there is a high probability of of imminent or life threatening deterioration in the patient's condition without immediate management. The nature of the patient's clinical problem is: Bilateral Pulmonary embolism, SOB, Hypoxia    I have spent 45 minutes in direct patient care, documentation, review of labs/xrays/old records, discussion with Family, Staff, Colleague, Nursing .      The time involved in the performance of separately reportable procedures was not counted toward critical care time. Jola Holter., MD; 7/27/2020 @7:18 PM         Jola Holter., MD; 7/27/2020 @4:49 PM Voice dictation software was used during the making of this note. This software is not perfect and grammatical and other typographical errors may be present.   This note has not been proofread for errors.  ===================================================================

## 2020-07-28 ENCOUNTER — APPOINTMENT (OUTPATIENT)
Dept: INTERVENTIONAL RADIOLOGY/VASCULAR | Age: 75
DRG: 163 | End: 2020-07-28
Attending: HOSPITALIST
Payer: MEDICARE

## 2020-07-28 LAB
ALBUMIN SERPL-MCNC: 2.9 G/DL (ref 3.2–4.6)
ALBUMIN/GLOB SERPL: 0.7 {RATIO} (ref 1.2–3.5)
ALP SERPL-CCNC: 91 U/L (ref 50–136)
ALT SERPL-CCNC: 27 U/L (ref 12–65)
ANION GAP SERPL CALC-SCNC: 6 MMOL/L (ref 7–16)
APTT PPP: 117.5 SEC (ref 24.3–35.4)
APTT PPP: 128.9 SEC (ref 24.3–35.4)
APTT PPP: 92.6 SEC (ref 24.3–35.4)
AST SERPL-CCNC: 16 U/L (ref 15–37)
ATRIAL RATE: 88 BPM
BASOPHILS # BLD: 0 K/UL (ref 0–0.2)
BASOPHILS # BLD: 0 K/UL (ref 0–0.2)
BASOPHILS NFR BLD: 0 % (ref 0–2)
BASOPHILS NFR BLD: 0 % (ref 0–2)
BILIRUB SERPL-MCNC: 0.4 MG/DL (ref 0.2–1.1)
BUN SERPL-MCNC: 14 MG/DL (ref 8–23)
CALCIUM SERPL-MCNC: 9 MG/DL (ref 8.3–10.4)
CALCULATED P AXIS, ECG09: 97 DEGREES
CALCULATED R AXIS, ECG10: 37 DEGREES
CALCULATED T AXIS, ECG11: 16 DEGREES
CHLORIDE SERPL-SCNC: 111 MMOL/L (ref 98–107)
CO2 SERPL-SCNC: 25 MMOL/L (ref 21–32)
COVID-19 RAPID TEST, COVR: NOT DETECTED
CREAT SERPL-MCNC: 0.67 MG/DL (ref 0.6–1)
CRP SERPL-MCNC: 0.9 MG/DL (ref 0–0.9)
D DIMER PPP FEU-MCNC: 2.68 UG/ML(FEU)
D DIMER PPP FEU-MCNC: 2.85 UG/ML(FEU)
DIAGNOSIS, 93000: NORMAL
DIFFERENTIAL METHOD BLD: ABNORMAL
DIFFERENTIAL METHOD BLD: ABNORMAL
EOSINOPHIL # BLD: 0 K/UL (ref 0–0.8)
EOSINOPHIL # BLD: 0 K/UL (ref 0–0.8)
EOSINOPHIL NFR BLD: 0 % (ref 0.5–7.8)
EOSINOPHIL NFR BLD: 0 % (ref 0.5–7.8)
ERYTHROCYTE [DISTWIDTH] IN BLOOD BY AUTOMATED COUNT: 15.7 % (ref 11.9–14.6)
ERYTHROCYTE [DISTWIDTH] IN BLOOD BY AUTOMATED COUNT: 15.8 % (ref 11.9–14.6)
FERRITIN SERPL-MCNC: 21 NG/ML (ref 8–388)
FIBRINOGEN PPP-MCNC: 338 MG/DL (ref 190–501)
GLOBULIN SER CALC-MCNC: 4.2 G/DL (ref 2.3–3.5)
GLUCOSE SERPL-MCNC: 119 MG/DL (ref 65–100)
HCT VFR BLD AUTO: 31.6 % (ref 35.8–46.3)
HCT VFR BLD AUTO: 32.4 % (ref 35.8–46.3)
HGB BLD-MCNC: 9.5 G/DL (ref 11.7–15.4)
HGB BLD-MCNC: 9.8 G/DL (ref 11.7–15.4)
IMM GRANULOCYTES # BLD AUTO: 0 K/UL (ref 0–0.5)
IMM GRANULOCYTES # BLD AUTO: 0 K/UL (ref 0–0.5)
IMM GRANULOCYTES NFR BLD AUTO: 0 % (ref 0–5)
IMM GRANULOCYTES NFR BLD AUTO: 1 % (ref 0–5)
INR PPP: 1.1
LDH SERPL L TO P-CCNC: 251 U/L (ref 110–210)
LYMPHOCYTES # BLD: 1 K/UL (ref 0.5–4.6)
LYMPHOCYTES # BLD: 1.4 K/UL (ref 0.5–4.6)
LYMPHOCYTES NFR BLD: 13 % (ref 13–44)
LYMPHOCYTES NFR BLD: 19 % (ref 13–44)
MCH RBC QN AUTO: 26.1 PG (ref 26.1–32.9)
MCH RBC QN AUTO: 26.3 PG (ref 26.1–32.9)
MCHC RBC AUTO-ENTMCNC: 30.1 G/DL (ref 31.4–35)
MCHC RBC AUTO-ENTMCNC: 30.2 G/DL (ref 31.4–35)
MCV RBC AUTO: 86.2 FL (ref 79.6–97.8)
MCV RBC AUTO: 87.5 FL (ref 79.6–97.8)
MONOCYTES # BLD: 0.5 K/UL (ref 0.1–1.3)
MONOCYTES # BLD: 0.6 K/UL (ref 0.1–1.3)
MONOCYTES NFR BLD: 7 % (ref 4–12)
MONOCYTES NFR BLD: 8 % (ref 4–12)
NEUTS SEG # BLD: 5.2 K/UL (ref 1.7–8.2)
NEUTS SEG # BLD: 6.2 K/UL (ref 1.7–8.2)
NEUTS SEG NFR BLD: 72 % (ref 43–78)
NEUTS SEG NFR BLD: 80 % (ref 43–78)
NRBC # BLD: 0 K/UL (ref 0–0.2)
NRBC # BLD: 0.02 K/UL (ref 0–0.2)
P-R INTERVAL, ECG05: 138 MS
PLATELET # BLD AUTO: 282 K/UL (ref 150–450)
PLATELET # BLD AUTO: 287 K/UL (ref 150–450)
PMV BLD AUTO: 10.6 FL (ref 9.4–12.3)
PMV BLD AUTO: 10.7 FL (ref 9.4–12.3)
POTASSIUM SERPL-SCNC: 4.1 MMOL/L (ref 3.5–5.1)
PROT SERPL-MCNC: 7.1 G/DL (ref 6.3–8.2)
PROTHROMBIN TIME: 14.5 SEC (ref 12–14.7)
Q-T INTERVAL, ECG07: 352 MS
QRS DURATION, ECG06: 86 MS
QTC CALCULATION (BEZET), ECG08: 425 MS
RBC # BLD AUTO: 3.61 M/UL (ref 4.05–5.2)
RBC # BLD AUTO: 3.76 M/UL (ref 4.05–5.2)
SARS COV-2, XPGCVT: NEGATIVE
SODIUM SERPL-SCNC: 142 MMOL/L (ref 136–145)
SOURCE, COVRS: NORMAL
VENTRICULAR RATE, ECG03: 88 BPM
WBC # BLD AUTO: 7.2 K/UL (ref 4.3–11.1)
WBC # BLD AUTO: 7.8 K/UL (ref 4.3–11.1)

## 2020-07-28 PROCEDURE — B31S1ZZ FLUOROSCOPY OF RIGHT PULMONARY ARTERY USING LOW OSMOLAR CONTRAST: ICD-10-PCS | Performed by: RADIOLOGY

## 2020-07-28 PROCEDURE — 77030013558 HC DIL VESL COOK -A

## 2020-07-28 PROCEDURE — 86580 TB INTRADERMAL TEST: CPT | Performed by: FAMILY MEDICINE

## 2020-07-28 PROCEDURE — 94640 AIRWAY INHALATION TREATMENT: CPT

## 2020-07-28 PROCEDURE — B31T1ZZ FLUOROSCOPY OF LEFT PULMONARY ARTERY USING LOW OSMOLAR CONTRAST: ICD-10-PCS | Performed by: RADIOLOGY

## 2020-07-28 PROCEDURE — 74011250636 HC RX REV CODE- 250/636: Performed by: HOSPITALIST

## 2020-07-28 PROCEDURE — C1894 INTRO/SHEATH, NON-LASER: HCPCS

## 2020-07-28 PROCEDURE — 77030002996 HC SUT SLK J&J -A

## 2020-07-28 PROCEDURE — 74011250636 HC RX REV CODE- 250/636: Performed by: RADIOLOGY

## 2020-07-28 PROCEDURE — 85610 PROTHROMBIN TIME: CPT

## 2020-07-28 PROCEDURE — C1757 CATH, THROMBECTOMY/EMBOLECT: HCPCS

## 2020-07-28 PROCEDURE — 85379 FIBRIN DEGRADATION QUANT: CPT

## 2020-07-28 PROCEDURE — 74011000302 HC RX REV CODE- 302: Performed by: FAMILY MEDICINE

## 2020-07-28 PROCEDURE — 97535 SELF CARE MNGMENT TRAINING: CPT

## 2020-07-28 PROCEDURE — 82728 ASSAY OF FERRITIN: CPT

## 2020-07-28 PROCEDURE — 83615 LACTATE (LD) (LDH) ENZYME: CPT

## 2020-07-28 PROCEDURE — 85730 THROMBOPLASTIN TIME PARTIAL: CPT

## 2020-07-28 PROCEDURE — 77030004558 HC CATH ANGI DX SUPR TORQ CARD -A

## 2020-07-28 PROCEDURE — 76937 US GUIDE VASCULAR ACCESS: CPT

## 2020-07-28 PROCEDURE — 74011000250 HC RX REV CODE- 250: Performed by: RADIOLOGY

## 2020-07-28 PROCEDURE — 36415 COLL VENOUS BLD VENIPUNCTURE: CPT

## 2020-07-28 PROCEDURE — 94760 N-INVAS EAR/PLS OXIMETRY 1: CPT

## 2020-07-28 PROCEDURE — C1880 VENA CAVA FILTER: HCPCS

## 2020-07-28 PROCEDURE — 74011250637 HC RX REV CODE- 250/637: Performed by: HOSPITALIST

## 2020-07-28 PROCEDURE — 97165 OT EVAL LOW COMPLEX 30 MIN: CPT

## 2020-07-28 PROCEDURE — 86140 C-REACTIVE PROTEIN: CPT

## 2020-07-28 PROCEDURE — 97112 NEUROMUSCULAR REEDUCATION: CPT

## 2020-07-28 PROCEDURE — 97530 THERAPEUTIC ACTIVITIES: CPT

## 2020-07-28 PROCEDURE — 85025 COMPLETE CBC W/AUTO DIFF WBC: CPT

## 2020-07-28 PROCEDURE — 77030027138 HC INCENT SPIROMETER -A

## 2020-07-28 PROCEDURE — C1769 GUIDE WIRE: HCPCS

## 2020-07-28 PROCEDURE — 97162 PT EVAL MOD COMPLEX 30 MIN: CPT

## 2020-07-28 PROCEDURE — 77030011229 HC DIL VESL COON COOK -A

## 2020-07-28 PROCEDURE — 80053 COMPREHEN METABOLIC PANEL: CPT

## 2020-07-28 PROCEDURE — 74011636320 HC RX REV CODE- 636/320: Performed by: RADIOLOGY

## 2020-07-28 PROCEDURE — 02CR3ZZ EXTIRPATION OF MATTER FROM LEFT PULMONARY ARTERY, PERCUTANEOUS APPROACH: ICD-10-PCS | Performed by: RADIOLOGY

## 2020-07-28 PROCEDURE — 85384 FIBRINOGEN ACTIVITY: CPT

## 2020-07-28 PROCEDURE — 77010033678 HC OXYGEN DAILY

## 2020-07-28 PROCEDURE — 06H03DZ INSERTION OF INTRALUMINAL DEVICE INTO INFERIOR VENA CAVA, PERCUTANEOUS APPROACH: ICD-10-PCS | Performed by: RADIOLOGY

## 2020-07-28 PROCEDURE — 77030021532 HC CATH ANGI DX IMPRS MRTM -B

## 2020-07-28 PROCEDURE — 65270000029 HC RM PRIVATE

## 2020-07-28 RX ORDER — SODIUM CHLORIDE 9 MG/ML
25 INJECTION, SOLUTION INTRAVENOUS CONTINUOUS
Status: DISCONTINUED | OUTPATIENT
Start: 2020-07-28 | End: 2020-07-28

## 2020-07-28 RX ORDER — LIDOCAINE HYDROCHLORIDE 20 MG/ML
2-20 INJECTION, SOLUTION INFILTRATION; PERINEURAL ONCE
Status: COMPLETED | OUTPATIENT
Start: 2020-07-28 | End: 2020-07-28

## 2020-07-28 RX ORDER — MIDAZOLAM HYDROCHLORIDE 1 MG/ML
.5-2 INJECTION, SOLUTION INTRAMUSCULAR; INTRAVENOUS
Status: DISCONTINUED | OUTPATIENT
Start: 2020-07-28 | End: 2020-07-28

## 2020-07-28 RX ORDER — FENTANYL CITRATE 50 UG/ML
25-50 INJECTION, SOLUTION INTRAMUSCULAR; INTRAVENOUS
Status: DISCONTINUED | OUTPATIENT
Start: 2020-07-28 | End: 2020-07-28

## 2020-07-28 RX ORDER — HEPARIN SODIUM 200 [USP'U]/100ML
1000 INJECTION, SOLUTION INTRAVENOUS AS NEEDED
Status: DISCONTINUED | OUTPATIENT
Start: 2020-07-28 | End: 2020-07-28

## 2020-07-28 RX ADMIN — TUBERCULIN PURIFIED PROTEIN DERIVATIVE 5 UNITS: 5 INJECTION, SOLUTION INTRADERMAL at 12:41

## 2020-07-28 RX ADMIN — Medication 500 MG: at 08:49

## 2020-07-28 RX ADMIN — PANTOPRAZOLE SODIUM 40 MG: 40 TABLET, DELAYED RELEASE ORAL at 17:12

## 2020-07-28 RX ADMIN — SUCRALFATE 1 G: 1 TABLET ORAL at 08:49

## 2020-07-28 RX ADMIN — Medication 10 ML: at 05:42

## 2020-07-28 RX ADMIN — BUDESONIDE AND FORMOTEROL FUMARATE DIHYDRATE 2 PUFF: 80; 4.5 AEROSOL RESPIRATORY (INHALATION) at 21:30

## 2020-07-28 RX ADMIN — Medication 1500 MG: at 08:48

## 2020-07-28 RX ADMIN — CLORAZEPATE DIPOTASSIUM 3.75 MG: 7.5 TABLET ORAL at 08:49

## 2020-07-28 RX ADMIN — HEPARIN SODIUM 16 UNITS/KG/HR: 5000 INJECTION, SOLUTION INTRAVENOUS at 20:22

## 2020-07-28 RX ADMIN — ASPIRIN 81 MG: 81 TABLET, COATED ORAL at 08:50

## 2020-07-28 RX ADMIN — Medication 220 MG: at 08:49

## 2020-07-28 RX ADMIN — CHOLECALCIFEROL TAB 25 MCG (1000 UNIT) 1 TABLET: 25 TAB at 08:49

## 2020-07-28 RX ADMIN — CLORAZEPATE DIPOTASSIUM 3.75 MG: 7.5 TABLET ORAL at 17:12

## 2020-07-28 RX ADMIN — SUCRALFATE 1 G: 1 TABLET ORAL at 17:12

## 2020-07-28 RX ADMIN — DEXAMETHASONE SODIUM PHOSPHATE 6 MG: 10 INJECTION INTRAMUSCULAR; INTRAVENOUS at 08:48

## 2020-07-28 RX ADMIN — SODIUM CHLORIDE 25 ML/HR: 900 INJECTION, SOLUTION INTRAVENOUS at 14:00

## 2020-07-28 RX ADMIN — PANTOPRAZOLE SODIUM 40 MG: 40 TABLET, DELAYED RELEASE ORAL at 08:50

## 2020-07-28 RX ADMIN — Medication 10 ML: at 21:35

## 2020-07-28 RX ADMIN — FENTANYL CITRATE 50 MCG: 50 INJECTION, SOLUTION INTRAMUSCULAR; INTRAVENOUS at 14:11

## 2020-07-28 RX ADMIN — CLORAZEPATE DIPOTASSIUM 3.75 MG: 7.5 TABLET ORAL at 21:35

## 2020-07-28 RX ADMIN — IOPAMIDOL 75 ML: 612 INJECTION, SOLUTION INTRAVENOUS at 15:40

## 2020-07-28 RX ADMIN — Medication 1500 MG: at 17:11

## 2020-07-28 RX ADMIN — MIDAZOLAM 1 MG: 1 INJECTION INTRAMUSCULAR; INTRAVENOUS at 14:11

## 2020-07-28 RX ADMIN — SERTRALINE HYDROCHLORIDE 100 MG: 100 TABLET ORAL at 08:49

## 2020-07-28 RX ADMIN — BUDESONIDE AND FORMOTEROL FUMARATE DIHYDRATE 2 PUFF: 80; 4.5 AEROSOL RESPIRATORY (INHALATION) at 08:54

## 2020-07-28 RX ADMIN — MONTELUKAST 10 MG: 10 TABLET, FILM COATED ORAL at 08:49

## 2020-07-28 RX ADMIN — MIDAZOLAM 1 MG: 1 INJECTION INTRAMUSCULAR; INTRAVENOUS at 14:20

## 2020-07-28 RX ADMIN — Medication 1500 MG: at 21:35

## 2020-07-28 RX ADMIN — HEPARIN SODIUM 2000 UNITS: 200 INJECTION, SOLUTION INTRAVENOUS at 14:22

## 2020-07-28 RX ADMIN — LIDOCAINE HYDROCHLORIDE 150 MG: 20 INJECTION, SOLUTION INFILTRATION; PERINEURAL at 14:21

## 2020-07-28 RX ADMIN — SUCRALFATE 1 G: 1 TABLET ORAL at 21:35

## 2020-07-28 RX ADMIN — FLUTICASONE PROPIONATE 2 SPRAY: 50 SPRAY, METERED NASAL at 08:48

## 2020-07-28 RX ADMIN — TIOTROPIUM BROMIDE INHALATION SPRAY 2 PUFF: 3.12 SPRAY, METERED RESPIRATORY (INHALATION) at 08:54

## 2020-07-28 NOTE — PROGRESS NOTES
Problem: Mobility Impaired (Adult and Pediatric)  Goal: *Acute Goals and Plan of Care  Outcome: Progressing Towards Goal  Note: Acute PT Goals:  (1.)Alice Pierce will move from supine to sit and sit to supine , scoot up and down, and roll side to side with MODIFIED INDEPENDENCE within 7 treatment day(s). (2.)Alice Pierce will transfer from bed to chair and chair to bed with MODIFIED INDEPENDENCE using the least restrictive device within 7 treatment day(s). (3.)Alice Pierce will ambulate with STAND BY ASSIST for 250 feet with the least restrictive device within 7 treatment day(s). (4.)Alice Pierce will perform standing static and dynamic balance activities x 15 minutes with STAND BY ASSIST to improve safety and activity tolerance within 7 treatment day(s). (Bryan Mcdonough will perform bilateral lower extremity exercises x 15 min for HEP with INDEPENDENCE to improve strength, endurance, and functional mobility within 7 treatment day(s). PHYSICAL THERAPY: Initial Assessment, Daily Note, and AM 7/28/2020  INPATIENT: PT Visit Days : 1  [place] / [place] / Product Type: Medicare /       NAME/AGE/GENDER: Tia Murphy is a 66 y.o. female   PRIMARY DIAGNOSIS: Pulmonary embolism, bilateral ([facility] [I26.99]  Acute hypoxemic respiratory failure ([facility] [J96.01]  Person under investigation for COVID-19 [Z20.828] Acute hypoxemic respiratory failure ([facility] [address] [facility] Acute hypoxemic respiratory failure ([facility] [address]        ICD-10: Treatment Diagnosis:   Generalized Muscle Weakness (M62.81)  Other lack of cordination (R27.8)  Difficulty in walking, Not elsewhere classified (R26.2)  Other abnormalities of gait and mobility (R26.89)  Repeated Falls (R29.6)  History of falling (Z91.81)   Precaution/Allergies:  Flexeril [cyclobenzaprine]; Ketoprofen; and Plaquenil [hydroxychloroquine]      ASSESSMENT:     Ms. Que Pierce is a 66year old F who presents to hospital with cough, SOB.  Prior to hospital admission pt lives with a roommate in a one story home with no step(s) to enter (has a ramp). Her  lives in another home as she reports Aby Foley is a pack rat\" - he is currently at rehab. Another friend assists her with getting to appointments. Pt endorses at least ten falls in past 6 months. Prior to admission Ms. Josi Latif uses a rollator walker or a power scooter for mobility. She does not wear oxygen at baseline. Upon entering, pt resting in bed, agreeable to PT evaluation. she reports no pain at rest. BLE assessment indicates sensation to light touch intact, AROM WFL, and strength diminished, but functional. Pt performed supine > sit with CGA, sitting EOB with good sitting balance control. Sit > stand with Min A using rollator walker. Pt ambulated  to bedside chair. Sit <> stand from bedside chair with min A, cues for proper BUE placement and safe sequencing. Pt experiencing SOB with exertion. Sit <> stand from bedside chair with Min A. Gait x 10 ft with CGA/rollator, cues for posture, breathing technique. Static standing balance activities at sink with constant support. At end of session pt sitting up in chair with all needs within reach, alarm activated for safety, RN notified. Pt presents as functioning below her baseline, with deficits in mobility including transfers, gait, balance, and activity tolerance. Pt will benefit from skilled therapy services to address stated deficits to promote return to highest level of function, independence, and safety. Will continue to follow.     This section established at most recent assessment   PROBLEM LIST (Impairments causing functional limitations):  Decreased Strength  Decreased ADL/Functional Activities  Decreased Transfer Abilities  Decreased Ambulation Ability/Technique  Decreased Balance  Decreased Activity Tolerance  Increased Fatigue  Increased Shortness of Breath   INTERVENTIONS PLANNED: (Benefits and precautions of physical therapy have been discussed with the patient.)  Balance Exercise  Bed Mobility  Family Education  Gait Training  Home Exercise Program (HEP)  Neuromuscular Re-education/Strengthening  Range of Motion (ROM)  Therapeutic Activites  Therapeutic Exercise/Strengthening  Transfer Training     TREATMENT PLAN: Frequency/Duration: 3 times a week for duration of hospital stay  Rehabilitation Potential For Stated Goals: Good     REHAB RECOMMENDATIONS (at time of discharge pending progress):    Placement: It is my opinion, based on this patient's performance to date, that Ms. Henna Gould may benefit from intensive therapy at a [address] [place] [address] after discharge due to the functional deficits listed above that are likely to improve with skilled rehabilitation and concerns that he/she may be unsafe to be unsupervised at home due to medical complications and mobility deficits which put her at increase risk of functional decline and/or falling . Equipment:   None at this time              HISTORY:   History of Present Injury/Illness (Reason for Referral):  Per H&P: \"This is a 78-year-old female with past medical history significant for asthma, hypertension, presented to the ER because of cough and shortness of breath for the last week. Patient states that she was seen about 3 days ago for a nosebleed. She went to see ENT physician Dr. Kanika John today. Because of shortness of breath she was sent to the ER for further evaluation. Patient states that she has been having nonproductive cough for the last week. No hemoptysis, no sputum production. Shortness of breath at rest worse with activity. Progressively worsening shortness of breath for the last week. No orthopnea no paroxysmal nocturnal dyspnea. She denies any chest pain or palpitations. No fever no chills. She denies any headache or dizziness. At baseline patient is not on any oxygen. No nausea no vomiting no diarrhea, no constipation, no abdominal pain.   No weakness tingling or numbness of extremities, she has bilateral lower extremity edema. No pain in the legs. No dysuria urgency or frequency of urination,  10 systems reviewed and negative except as noted in HPI. ER course:  Afebrile, tachycardic with heart rate of 95/min, tachypneic with rate of 30/min, initial blood pressure 93/60, recheck blood pressure was 150/80, oxygen saturation of 87% on room air. Placed on 4 L oxygen by nasal cannula. CBC with white count of 8.9 hemoglobin 10.7 platelet count 777. CMP is fairly unremarkable. High-sensitivity troponin of 21, NT proBNP of 5231. Arterial blood gas with pH of 7.46, PCO2 of 32.5, PO2 of 154, oxygen saturation 99% on 4 L oxygen by nasal cannula. Chest x-ray with increasing peripheral hazy opacity in the left midlung could represent a developing airspace consolidation or pneumonia. CT angiogram of the chest showed bilateral pulmonary emboli that originates in the peripheral right and left main bronchus and extends into the lobar pulmonary arteries. Clot burden is considered extensive. Cardiomegaly, dependent atelectasis with nonspecific irregular faint groundglass opacity in the bilateral lungs. Moderate-sized hiatal hernia, 2.6 cm left adrenal nodule, 4 mm right upper lobe pulmonary nodule. Patient admitted for further evaluation management of acute hypoxemic respiratory failure, bilateral extensive PE. COVID test pending. \"  Past Medical History/Comorbidities:   Ms. Dionicio Castellanos  has a past medical history of Abnormality of gait (7/18/2018), Anemia, Anxiety, Arthritis, Asthma, Calculus of kidney, Chronic pain, Contact dermatitis and other eczema, due to unspecified cause, Depressive disorder, not elsewhere classified, Encounter for chronic pain management, Esophageal reflux, Essential hypertension, benign, Frequent falls (7/18/2018), GERD (gastroesophageal reflux disease), and Pure hypercholesterolemia.   Ms. Dionicio Castellanos  has a past surgical history that includes hx knee replacement (Right); hx shoulder replacement (Left); hx hysterectomy; hx lithotripsy; hx cataract removal (Bilateral); hx retinal detachment repair; hx colonoscopy; hx gi; and neurological procedure unlisted. Social History/Living Environment:   Home Environment: Private residence  # Steps to Enter: 0  One/Two Story Residence: One story  Living Alone: No  Support Systems: Friends \ neighbors, Family member(s)  Patient Expects to be Discharged to[de-identified] Unknown  Current DME Used/Available at Home: Cletis Kava, rollator, Wheelchair, power, Shower chair  Tub or Shower Type: Tub/Shower combination  Prior Level of Function/Work/Activity:  Prior to hospital admission pt lives with a roommate in a one story home with no step(s) to enter (has a ramp). Her  lives in another home as she reports Emeka Hernandes is a pack rat\" - he is currently at rehab. Another friend assists her with getting to appointments. Pt endorses at least ten falls in past 6 months. Prior to admission Ms. Que Pierce uses a rollator walker or a power scooter for mobility. She does not wear oxygen at baseline. Number of Personal Factors/Comorbidities that affect the Plan of Care: 1-2: MODERATE COMPLEXITY   EXAMINATION:   Most Recent Physical Functioning:   Gross Assessment:  AROM: Within functional limits  Strength: Generally decreased, functional  Sensation: Intact               Posture:     Balance:  Sitting: Intact  Standing: Impaired  Standing - Static: Fair;Constant support  Standing - Dynamic : Fair;Constant support Bed Mobility:  Rolling: Contact guard assistance  Supine to Sit: Contact guard assistance  Sit to Supine: Contact guard assistance  Scooting: Contact guard assistance  Wheelchair Mobility:     Transfers:  Sit to Stand: Minimum assistance  Stand to Sit: Minimum assistance  Bed to Chair: Minimum assistance  Interventions: Safety awareness training;Verbal cues; Tactile cues  Gait:     Base of Support: Center of gravity altered  Speed/Jenifer: Shuffled; Slow  Step Length: Left shortened;Right shortened  Gait Abnormalities: Decreased step clearance;Trunk sway increased; Path deviations; Shuffling gait  Distance (ft): 10 Feet (ft)  Assistive Device: Walker, rollator  Ambulation - Level of Assistance: Contact guard assistance;Minimal assistance      Body Structures Involved:  Heart  Lungs  Bones  Joints  Muscles Body Functions Affected:  Cardio  Respiratory  Neuromusculoskeletal  Movement Related Activities and Participation Affected:  General Tasks and Demands  Mobility  Self Care   Number of elements that affect the Plan of Care: 3: MODERATE COMPLEXITY   CLINICAL PRESENTATION:   Presentation: Evolving clinical presentation with changing clinical characteristics: MODERATE COMPLEXITY   CLINICAL DECISION MAKIN [facility] Inpatient Short Form  How much difficulty does the patient currently have. .. Unable A Lot A Little None   1. Turning over in bed (including adjusting bedclothes, sheets and blankets)? [] 1   [] 2   [x] 3   [] 4   2. Sitting down on and standing up from a chair with arms ( e.g., wheelchair, bedside commode, etc.)   [] 1   [] 2   [x] 3   [] 4   3. Moving from lying on back to sitting on the side of the bed? [] 1   [] 2   [x] 3   [] 4   How much help from another person does the patient currently need. .. Total A Lot A Little None   4. Moving to and from a bed to a chair (including a wheelchair)? [] 1   [] 2   [x] 3   [] 4   5. Need to walk in hospital room? [] 1   [] 2   [x] 3   [] 4   6. Climbing 3-5 steps with a railing? [] 1   [x] 2   [] 3   [] 4   © , Trustees of [address] [place], [place] [id number], under license to [employer]. All rights reserved      Score:  Initial: 17 Most Recent: X (Date: -- )    Interpretation of Tool:  Represents activities that are increasingly more difficult (i.e. Bed mobility, Transfers, Gait).     Medical Necessity:     Patient is expected to demonstrate progress in   strength, range of motion, balance, coordination, functional technique, and activity tolerance   to   increase independence with all mobility and decrease fall risk. .  Reason for Services/Other Comments:  Patient continues to require skilled intervention due to   medical complications and mobility deficits which impact her level of function, safety, and independence  . Use of outcome tool(s) and clinical judgement create a POC that gives a: Questionable prediction of patient's progress: MODERATE COMPLEXITY        TREATMENT:   (In addition to Assessment/Re-Assessment sessions the following treatments were rendered)   Pre-treatment Symptoms/Complaints:  Fatigue  Pain: Initial:   Pain Intensity 1: 0  Post Session:  0/10     Today's treatment session addressed Decreased Strength, Decreased ADL/Functional Activities, Decreased Transfer Abilities, Decreased Ambulation Ability/Technique, Decreased Balance, Decreased Activity Tolerance, Increased Fatigue, and Increased Shortness of Breath to progress towards achieving stated therapy goals. During this session, Occupational Therapy addressed ADLs to progress towards their discipline specific goal(s). Co-treatment was necessary to improve patient's ability to follow higher level commands, ability to increase activity demands, and ability to return to normal functional activity. Therapeutic Activity: (   24 Minutes): Therapeutic activities including Bed transfers, Chair transfers, Ambulation on level ground, and standing balance activities to improve mobility, strength, balance, coordination, and activity tolerance . Required moderate cues  to promote static and dynamic balance in standing and promote coordination of bilateral, lower extremity(s).      Braces/Orthotics/Lines/Etc:   IV  O2 Device: Nasal cannula  Treatment/Session Assessment:    Response to Treatment:  See above  Interdisciplinary Collaboration:   Physical Therapist  Occupational Therapist  Registered Nurse  After treatment position/precautions:   Up in chair  Bed/Chair-wheels locked  Bed in low position  Call light within reach  RN notified   Compliance with Program/Exercises: Will assess as treatment progresses  Recommendations/Intent for next treatment session: \"Next visit will focus on advancements to more challenging activities and reduction in assistance provided\".     Total Treatment Duration:  PT Patient Time In/Time Out  Time In: 0926  Time Out: [address], [place]

## 2020-07-28 NOTE — ACP (ADVANCE CARE PLANNING)
Advance Care Planning     Advance Care Planning Activator (Inpatient)  Conversation Note      Date of ACP Conversation: 07/28/20     Conversation Conducted with:   Patient with Decision Making Capacity    ACP Activator: Lashae Gaston RN    *When Decision Maker makes decisions on behalf of the incapacitated patient: Decision Maker is asked to consider and make decisions based on patient values, known preferences, or best interests. Health Care Decision Maker:    Current Designated Health Care Decision Maker:   (If there is a valid [address] named in the [address] [place] [address] Makers\" box in the ACP activity, but it is not visible above, be sure to open that field and then select the health care decision maker relationship (ie \"primary\") in the blank space to the right of the name.) Validate  this information as still accurate & up-to-date; edit [address] field as needed.)    Note: Assess and validate information in current ACP documents, as indicated. If no Decision Maker listed above or available through scanned documents, then:    If no Authorized Decision Maker has previously been identified, then patient chooses [address]:  \"Who would you like to name as your primary health care decision-maker? \"    Name: Taiwo Dong   Relationship: Daughter Phone number: [phone]  Eric Townsend this person be reached easily? \" YES  \"Who would you like to name as your back-up decision maker? \"   Name: Jesús Riggs. Relationship: Son Phone number: [phone]  Eric Townsend this person be reached easily? \" YES    Note: If the relationship of these Decision-Makers to the patient does NOT follow your state's Next of Kin hierarchy, recommend that patient complete ACP document that meets state-specific requirements to allow them to act on the patient's behalf when appropriate. Care Preferences    Ventilation:   \"If you were in your present state of health and suddenly became very ill and were unable to breathe on your own, what would your preference be about the use of a ventilator (breathing machine) if it were available to you? \"      If patient would desire the use of a ventilator (breathing machine), answer \"yes\", if not \"no\":yes    \"If your health worsens and it becomes clear that your chance of recovery is unlikely, what would your preference be about the use of a ventilator (breathing machine) if it were available to you? \"     Would the patient desire the use of a ventilator (breathing machine)? YES      Resuscitation  \"CPR works best to restart the heart when there is a sudden event, like a heart attack, in someone who is otherwise healthy. Unfortunately, CPR does not typically restart the heart for people who have serious health conditions or who are very sick. \"    \"In the event your heart stopped as a result of an underlying serious health condition, would you want attempts to be made to restart your heart (answer \"yes\" for attempt to resuscitate) or would you prefer a natural death (answer \"no\" for do not attempt to resuscitate)? \" yes      NOTE: If the patient has a valid advance directive AND now provides care preference(s) that are inconsistent with that prior directive, advise the patient to consider either: creating a new advance directive that complies with state-specific requirements; or, if that is not possible, orally revoking that prior directive in accordance with state-specific requirements, which must be documented in the EHR. [x] Yes  [] No   Educated Patient / Jackson Bores regarding differences between Advance Directives and portable DNR orders.     Length of ACP Conversation in minutes:      Conversation Outcomes:  [x] ACP discussion completed  [] Existing advance directive reviewed with patient; no changes to patient's previously recorded wishes     [] New Advance Directive completed   [] Portable Do Not Resuscitate prepared for Provider review and signature  [] POLST/POST/MOLST/MOST prepared for Provider review and signature      Follow-up plan:    [] Schedule follow-up conversation to continue planning  [] Referred individual to Provider for additional questions/concerns   [] Advised patient/agent/surrogate to review completed ACP document and update if needed with changes in condition, patient preferences or care setting     [x] This note routed to one or more involved healthcare providers

## 2020-07-28 NOTE — PROGRESS NOTES
TRANSFER - IN REPORT:    Verbal report received from CATHY Plummer(name) on Jessica Liu  being received from ED(unit) for routine progression of care      Report consisted of patients Situation, Background, Assessment and   Recommendations(SBAR). Information from the following report(s) ED Summary, MAR and Recent Results was reviewed with the receiving nurse. Opportunity for questions and clarification was provided. Assessment completed upon patients arrival to unit and care assumed.

## 2020-07-28 NOTE — PROGRESS NOTES
TRANSFER - OUT REPORT:    Verbal report given to Breana Robledo RN(name) on Kuldeep Tripathi  being transferred to IR(unit) for ordered procedure       Report consisted of patients Situation, Background, Assessment and   Recommendations(SBAR). Information from the following report(s) SBAR and Kardex was reviewed with the receiving nurse. Lines:   Peripheral IV 07/27/20 Left Antecubital (Active)   Site Assessment Clean, dry, & intact 07/28/20 0706   Phlebitis Assessment 0 07/28/20 0706   Infiltration Assessment 0 07/28/20 0706   Dressing Status Clean, dry, & intact 07/28/20 0706   Dressing Type Tape;Transparent 07/28/20 0706   Hub Color/Line Status Infusing 07/28/20 0706   Action Taken Blood drawn 07/27/20 1638       Peripheral IV 07/27/20 Right Forearm (Active)   Site Assessment Clean, dry, & intact 07/28/20 0706   Phlebitis Assessment 0 07/28/20 0706   Infiltration Assessment 0 07/28/20 0706   Dressing Status Clean, dry, & intact 07/28/20 0706   Dressing Type Tape;Transparent 07/28/20 0706   Hub Color/Line Status Capped;Flushed 07/28/20 6782        Opportunity for questions and clarification was provided.       Patient transported with:   O2 @ 3 liters

## 2020-07-28 NOTE — ED NOTES
Daughter called pt gave verbal consent to inform her daughter what is going. Pt's  is in rehab and would not be able to be called.  Pt added daughter and son as emergency contact

## 2020-07-28 NOTE — PROGRESS NOTES
MSN, CM:  Spoke with patient this AM about discharge planning. Patient lives with room mate \"Margy\" in own house with ramps for entrance. Patient has a son \"Jr. Kane\", a daughter Lilliam Mayo" who live close and helps when needed. Patient also has a  \"Sr. Kane\" that lives in [place]. Patient is independent with all ADL's but does require the use of a rollator or motorized w/c for ambulation. Patient denies any home oxygen. Patient has received HH but does not recall from what service. Patient also has received rehab from [facility] in the past.  Patient confirms PCP is Dr. Lan Clinton and [facility] transport her to all appointments. PT and OT consulted for evaluation and recommendations. Case Management will continue to follow for discharge needs. Care Management Interventions  PCP Verified by CM: Yes(Dr. Lan Clinton)  Mode of Transport at Discharge:  Other (see comment)(family to transport)  Transition of Care Consult (CM Consult): Discharge Planning  Physical Therapy Consult: Yes  Occupational Therapy Consult: Yes  Current Support Network: Own Home, Other(Room mate lives in home)  Confirm Follow Up Transport: Other (see comment)([facility] (private transport))  Freedom of Choice List was Provided with Basic Dialogue that Supports the Patient's Individualized Plan of Care/Goals, Treatment Preferences and Shares the Quality Data Associated with the Providers?: Yes  Discharge Location  Discharge Placement: Unable to determine at this time

## 2020-07-28 NOTE — PROCEDURES
Department of Interventional Radiology  [phone]        Interventional Radiology Brief Procedure Note    Patient: Kate Feliz MRN: [id number]  SSN: [id number]    YOB: 1945  Age: 66 y.o. Sex: female      Date of Procedure: 7/28/2020    Pre-Procedure Diagnosis: Acute bilateral PE with right heart strain    Post-Procedure Diagnosis: SAME    Procedure(s): Bilateral pulmonary angiography with mechanical thrombectomy. IVC filter placement. Brief Description of Procedure: Pulmonary angiography reveals bilateral acute pulmonary emboli primarily involving the lobar branches. Only a small volume of thrombus was able to be aspirated. IVC is patent without thrombus. An IVC filter was placed. Right CFV access.     Performed By: Sharon Mcallister MD     Assistants: None    Anesthesia:Moderate Sedation    Estimated Blood Loss: 100 ml    Specimens:  None    Implants: Cook Celect IVC filter    Findings: As above    Complications: None      Signed By: Sharon Mcallister MD     July 28, 2020

## 2020-07-28 NOTE — PROGRESS NOTES
TRANSFER - IN REPORT:    Verbal report received from [facility]) on Cathren Clarity  being received from IT(unit) for routine progression of care      Report consisted of patients Situation, Background, Assessment and   Recommendations(SBAR). Information from the following report(s) SBAR, Kardex and MAR was reviewed with the receiving nurse. Opportunity for questions and clarification was provided. Assessment completed upon patients arrival to unit and care assumed.

## 2020-07-28 NOTE — PROGRESS NOTES
Hospitalist Progress Note     Admit Date:  2020  9:34 PM   Name:  Peter Schuler   Age:  66 y.o.  :  1945   MRN:  [id number]   PCP:  John Tong MD  Treatment Team: Attending Provider: Boyd Mercado MD; Utilization Review: Randall Amos RN; Physical Therapist: Xavier Hutchins PT; Care Manager: Jewels Yao RN    Subjective:   Chief complaint cough shortness of breath     This is a 12-year-old female with past medical history significant for asthma, hypertension, presented to the ER because of cough and shortness of breath for the last week. Patient states that she was seen about 3 days ago for a nosebleed. She went to see ENT physician Dr. Aram Moy today. Because of shortness of breath she was sent to the ER for further evaluation. Patient states that she has been having nonproductive cough for the last week. No hemoptysis, no sputum production. Shortness of breath at rest worse with activity. Progressively worsening shortness of breath for the last week. No orthopnea no paroxysmal nocturnal dyspnea. She denies any chest pain or palpitations. No fever no chills. She denies any headache or dizziness. At baseline patient is not on any oxygen. No nausea no vomiting no diarrhea, no constipation, no abdominal pain. No weakness tingling or numbness of extremities, she has bilateral lower extremity edema. No pain in the legs. No dysuria urgency or frequency of urination,     10 systems reviewed and negative except as noted in HPI.     ER course:     Afebrile, tachycardic with heart rate of 95/min, tachypneic with rate of 30/min, initial blood pressure 93/60, recheck blood pressure was 150/80, oxygen saturation of 87% on room air. Placed on 4 L oxygen by nasal cannula. Arterial blood gas with pH of 7.46, PCO2 of 32.5, PO2 of 154, oxygen saturation 99% on 4 L oxygen by nasal cannula.   Chest x-ray with increasing peripheral hazy opacity in the left midlung could represent a developing airspace consolidation or pneumonia. CT angiogram of the chest showed bilateral pulmonary emboli that originates in the peripheral right and left main bronchus and extends into the lobar pulmonary arteries. Clot burden is considered extensive. Cardiomegaly, dependent atelectasis with nonspecific irregular faint groundglass opacity in the bilateral lungs. Moderate-sized hiatal hernia, 2.6 cm left adrenal nodule, 4 mm right upper lobe pulmonary nodule.     Patient admitted for further evaluation management of acute hypoxemic respiratory failure, bilateral extensive PE. COVID test pending. 7/28/2020  C/o mild shortness of breath better then yesterday  Probably for IVC filter today  Npo  On heparin drip    Objective:     Patient Vitals for the past 24 hrs:   Temp Pulse Resp BP SpO2   07/28/20 0856     92 %   07/28/20 0713 97.5 °F (36.4 °C) 71 16 105/53 91 %   07/28/20 0319 97.7 °F (36.5 °C) 76 18 112/54 93 %   07/28/20 0032 97.7 °F (36.5 °C) 78 20 119/68 91 %   07/27/20 2201 98.5 °F (36.9 °C) 83 22 122/73 94 %   07/27/20 2145     98 %     Oxygen Therapy  O2 Sat (%): 92 % (07/28/20 0856)  Pulse via Oximetry: 78 beats per minute (07/28/20 0856)  O2 Device: Nasal cannula (07/28/20 0926)  O2 Flow Rate (L/min): 5 l/min (07/28/20 0926)    Intake/Output Summary (Last 24 hours) at 7/28/2020 1127  Last data filed at 7/28/2020 0002  Gross per 24 hour   Intake    Output 200 ml   Net -200 ml         General:    Well nourished. Alert. Mild resp distress on oxygen    heent- normal  CV:   RRR. No murmur, rub, or gallop. Lungs:   Coarse breath sounds  Abdomen:   Soft, nontender, nondistended. Cns- no focal neurological deficits  Extremities: Warm and dry. No cyanosis or edema. Skin:     No rashes or jaundice. Data Review:  I have reviewed all labs, meds, telemetry events, and studies from the last 24 hours.     Recent Results (from the past 24 hour(s))   EKG, 12 LEAD, INITIAL Collection Time: 07/27/20  4:22 PM   Result Value Ref Range    Ventricular Rate 88 BPM    Atrial Rate 88 BPM    P-R Interval 138 ms    QRS Duration 86 ms    Q-T Interval 352 ms    QTC Calculation (Bezet) 425 ms    Calculated P Axis 97 degrees    Calculated R Axis 37 degrees    Calculated T Axis 16 degrees    Diagnosis       !! AGE AND GENDER SPECIFIC ECG ANALYSIS !! Normal sinus rhythm  Normal ECG  When compared with ECG of 05-AUG-2017 22:36,  Nonspecific T wave abnormality now evident in Anterior leads  Confirmed by HAMMAD AZUL (), Anyi Grace ([id number]) on 7/28/2020 5:48:12 AM     PROTHROMBIN TIME + INR    Collection Time: 07/27/20  4:30 PM   Result Value Ref Range    Prothrombin time 14.0 12.0 - 14.7 sec    INR 1.1     PTT    Collection Time: 07/27/20  4:30 PM   Result Value Ref Range    aPTT 25.6 24.3 - 35.4 SEC   CBC WITH AUTOMATED DIFF    Collection Time: 07/27/20  4:31 PM   Result Value Ref Range    WBC 8.9 4.3 - 11.1 K/uL    RBC 3.98 (L) 4.05 - 5.2 M/uL    HGB 10.7 (L) 11.7 - 15.4 g/dL    HCT 34.5 (L) 35.8 - 46.3 %    MCV 86.7 79.6 - 97.8 FL    MCH 26.9 26.1 - 32.9 PG    MCHC 31.0 (L) 31.4 - 35.0 g/dL    RDW 15.9 (H) 11.9 - 14.6 %    PLATELET 731 899 - 070 K/uL    MPV 10.9 9.4 - 12.3 FL    ABSOLUTE NRBC 0.02 0.0 - 0.2 K/uL    DF AUTOMATED      NEUTROPHILS 62 43 - 78 %    LYMPHOCYTES 23 13 - 44 %    MONOCYTES 13 (H) 4.0 - 12.0 %    EOSINOPHILS 1 0.5 - 7.8 %    BASOPHILS 0 0.0 - 2.0 %    IMMATURE GRANULOCYTES 0 0.0 - 5.0 %    ABS. NEUTROPHILS 5.5 1.7 - 8.2 K/UL    ABS. LYMPHOCYTES 2.1 0.5 - 4.6 K/UL    ABS. MONOCYTES 1.1 0.1 - 1.3 K/UL    ABS. EOSINOPHILS 0.1 0.0 - 0.8 K/UL    ABS. BASOPHILS 0.0 0.0 - 0.2 K/UL    ABS. IMM.  GRANS. 0.0 0.0 - 0.5 K/UL   METABOLIC PANEL, COMPREHENSIVE    Collection Time: 07/27/20  4:31 PM   Result Value Ref Range    Sodium 139 136 - 145 mmol/L    Potassium 3.9 3.5 - 5.1 mmol/L    Chloride 108 (H) 98 - 107 mmol/L    CO2 24 21 - 32 mmol/L    Anion gap 7 7 - 16 mmol/L    Glucose 112 (H) 65 - 100 mg/dL    BUN 18 8 - 23 MG/DL    Creatinine 0.78 0.6 - 1.0 MG/DL    GFR est AA >60 >60 ml/min/1.73m2    GFR est non-AA >60 >60 ml/min/1.73m2    Calcium 8.8 8.3 - 10.4 MG/DL    Bilirubin, total 0.5 0.2 - 1.1 MG/DL    ALT (SGPT) 24 12 - 65 U/L    AST (SGOT) 19 15 - 37 U/L    Alk.  phosphatase 99 50 - 136 U/L    Protein, total 7.3 6.3 - 8.2 g/dL    Albumin 3.2 3.2 - 4.6 g/dL    Globulin 4.1 (H) 2.3 - 3.5 g/dL    A-G Ratio 0.8 (L) 1.2 - 3.5     TROPONIN-HIGH SENSITIVITY    Collection Time: 07/27/20  4:31 PM   Result Value Ref Range    Troponin-High Sensitivity 21.0 (H) 0 - 14 pg/mL   LACTIC ACID    Collection Time: 07/27/20  4:31 PM   Result Value Ref Range    Lactic acid 1.7 0.4 - 2.0 MMOL/L   NT-PRO BNP    Collection Time: 07/27/20  4:31 PM   Result Value Ref Range    NT pro-BNP 5,231 (H) <450 PG/ML   PROCALCITONIN    Collection Time: 07/27/20  4:31 PM   Result Value Ref Range    Procalcitonin <0.05 ng/mL   POC VENOUS BLOOD GAS    Collection Time: 07/27/20  4:54 PM   Result Value Ref Range    Device: NASAL CANNULA      pH, venous (POC) 7.38 7.32 - 7.42      pCO2, venous (POC) 38.4 (L) 41 - 51 MMHG    pO2, venous (POC) 24 (LL) mmHg    HCO3, venous (POC) 22.9 (L) 23 - 28 MMOL/L    sO2, venous (POC) 42 (L) 65 - 88 %    Base deficit, venous (POC) 2 mmol/L    Allens test (POC) YES      Site RIGHT BRACHIAL      Specimen type (POC) MIXED VENOUS      Performed by Madhu     CO2, POC 24 MMOL/L    Flow rate (POC) 4.000 L/min    Critical value read back 00:01     Respiratory comment: PhysicianNotified     COLLECT TIME 1,655     POC G3    Collection Time: 07/27/20  5:32 PM   Result Value Ref Range    Device: NASAL CANNULA      pH (POC) 7.64 (HH) 7.35 - 7.45      pCO2 (POC) 19.6 (LL) 35 - 45 MMHG    pO2 (POC) 32 (LL) 75 - 100 MMHG    HCO3 (POC) 21.2 (L) 22 - 26 MMOL/L    sO2 (POC) 78 (L) 95 - 98 %    Base excess (POC) 2 mmol/L    Allens test (POC) YES      Site LEFT RADIAL      Specimen type (POC) ARTERIAL      Performed by [employer]     CO2, POC 22 MMOL/L    Flow rate (POC) 4.000 L/min    Critical value read back 00:01     Respiratory comment: PhysicianNotified     COLLECT TIME 1,731     POC G3    Collection Time: 07/27/20  6:03 PM   Result Value Ref Range    Device: NASAL CANNULA      pH (POC) 7.46 (H) 7.35 - 7.45      pCO2 (POC) 32.5 (L) 35 - 45 MMHG    pO2 (POC) 154 (H) 75 - 100 MMHG    HCO3 (POC) 23.1 22 - 26 MMOL/L    sO2 (POC) 99 (H) 95 - 98 %    Base excess (POC) 0 mmol/L    Allens test (POC) YES      Site RIGHT RADIAL      Specimen type (POC) ARTERIAL      Performed by [employer]     CO2, POC 24 MMOL/L    Flow rate (POC) 4.000 L/min    Critical value read back 00:01     Respiratory comment: PhysicianNotified     COLLECT TIME 1,802     SARS-COV-2    Collection Time: 07/27/20  6:39 PM   Result Value Ref Range    Specimen source Nasopharyngeal      COVID-19 rapid test Not detected NOTD      SARS CoV-2 PENDING    TYPE & SCREEN    Collection Time: 07/27/20  6:39 PM   Result Value Ref Range    Crossmatch Expiration 07/30/2020     ABO/Rh(D) A POSITIVE     Antibody screen NEG    PTT    Collection Time: 07/28/20  1:40 AM   Result Value Ref Range    aPTT 92.6 (H) 24.3 - 35.4 SEC   PROTHROMBIN TIME + INR    Collection Time: 07/28/20  1:40 AM   Result Value Ref Range    Prothrombin time 14.5 12.0 - 14.7 sec    INR 1.1     D DIMER    Collection Time: 07/28/20  1:40 AM   Result Value Ref Range    D DIMER 2.85 (HH) <0.56 ug/ml(FEU)   FIBRINOGEN    Collection Time: 07/28/20  1:40 AM   Result Value Ref Range    Fibrinogen 338 190 - 501 mg/dL   LD    Collection Time: 07/28/20 10:04 AM   Result Value Ref Range     (H) 110 - 210 U/L   FERRITIN    Collection Time: 07/28/20 10:04 AM   Result Value Ref Range    Ferritin 21 8 - 388 NG/ML   D DIMER    Collection Time: 07/28/20 10:04 AM   Result Value Ref Range    D DIMER 2.68 (HH) <0.56 ug/ml(FEU)   CBC WITH AUTOMATED DIFF    Collection Time: 07/28/20 10:04 AM   Result Value Ref Range WBC 7.2 4.3 - 11.1 K/uL    RBC 3.76 (L) 4.05 - 5.2 M/uL    HGB 9.8 (L) 11.7 - 15.4 g/dL    HCT 32.4 (L) 35.8 - 46.3 %    MCV 86.2 79.6 - 97.8 FL    MCH 26.1 26.1 - 32.9 PG    MCHC 30.2 (L) 31.4 - 35.0 g/dL    RDW 15.8 (H) 11.9 - 14.6 %    PLATELET 912 155 - 162 K/uL    MPV 10.7 9.4 - 12.3 FL    ABSOLUTE NRBC 0.02 0.0 - 0.2 K/uL    DF AUTOMATED      NEUTROPHILS 72 43 - 78 %    LYMPHOCYTES 19 13 - 44 %    MONOCYTES 8 4.0 - 12.0 %    EOSINOPHILS 0 (L) 0.5 - 7.8 %    BASOPHILS 0 0.0 - 2.0 %    IMMATURE GRANULOCYTES 1 0.0 - 5.0 %    ABS. NEUTROPHILS 5.2 1.7 - 8.2 K/UL    ABS. LYMPHOCYTES 1.4 0.5 - 4.6 K/UL    ABS. MONOCYTES 0.6 0.1 - 1.3 K/UL    ABS. EOSINOPHILS 0.0 0.0 - 0.8 K/UL    ABS. BASOPHILS 0.0 0.0 - 0.2 K/UL    ABS. IMM. GRANS. 0.0 0.0 - 0.5 K/UL   PTT    Collection Time: 07/28/20 10:04 AM   Result Value Ref Range    aPTT 117.5 (H) 24.3 - 35.4 SEC   C REACTIVE PROTEIN, QT    Collection Time: 07/28/20 10:04 AM   Result Value Ref Range    C-Reactive protein 0.9 0.0 - 0.9 mg/dL   METABOLIC PANEL, COMPREHENSIVE    Collection Time: 07/28/20 10:04 AM   Result Value Ref Range    Sodium 142 136 - 145 mmol/L    Potassium 4.1 3.5 - 5.1 mmol/L    Chloride 111 (H) 98 - 107 mmol/L    CO2 25 21 - 32 mmol/L    Anion gap 6 (L) 7 - 16 mmol/L    Glucose 119 (H) 65 - 100 mg/dL    BUN 14 8 - 23 MG/DL    Creatinine 0.67 0.6 - 1.0 MG/DL    GFR est AA >60 >60 ml/min/1.73m2    GFR est non-AA >60 >60 ml/min/1.73m2    Calcium 9.0 8.3 - 10.4 MG/DL    Bilirubin, total 0.4 0.2 - 1.1 MG/DL    ALT (SGPT) 27 12 - 65 U/L    AST (SGOT) 16 15 - 37 U/L    Alk.  phosphatase 91 50 - 136 U/L    Protein, total 7.1 6.3 - 8.2 g/dL    Albumin 2.9 (L) 3.2 - 4.6 g/dL    Globulin 4.2 (H) 2.3 - 3.5 g/dL    A-G Ratio 0.7 (L) 1.2 - 3.5          All Micro Results     None          Current Meds:  Current Facility-Administered Medications   Medication Dose Route Frequency    tuberculin injection 5 Units  5 Units IntraDERMal ONCE    sodium chloride (NS) flush 5-40 mL  5-40 mL IntraVENous Q8H    sodium chloride (NS) flush 5-40 mL  5-40 mL IntraVENous PRN    polyethylene glycol (MIRALAX) packet 17 g  17 g Oral DAILY PRN    promethazine (PHENERGAN) tablet 12.5 mg  12.5 mg Oral Q6H PRN    Or    ondansetron (ZOFRAN) injection 4 mg  4 mg IntraVENous Q6H PRN    acetaminophen (TYLENOL) tablet 650 mg  650 mg Oral Q6H PRN    Or    acetaminophen (TYLENOL) suppository 650 mg  650 mg Rectal Q6H PRN    dexamethasone (DECADRON) 10 mg/mL injection 6 mg  6 mg IntraVENous DAILY    heparin 25,000 units in dextrose 500 mL infusion  18-36 Units/kg/hr IntraVENous TITRATE    albuterol-ipratropium (DUO-NEB) 2.5 MG-0.5 MG/3 ML  3 mL Nebulization Q4H PRN    aspirin delayed-release tablet 81 mg  81 mg Oral DAILY    calcium carbonate (OS-JANA) tablet 500 mg [elemental]  500 mg Oral DAILY    cholecalciferol (VITAMIN D3) (1000 Units /25 mcg) tablet 1 Tab  1,000 Units Oral DAILY    clorazepate (TRANXENE) tablet 3.75 mg  3.75 mg Oral TID    fluticasone propionate (FLONASE) 50 mcg/actuation nasal spray 2 Spray  2 Spray Both Nostrils DAILY    melatonin cap 10 mg (Patient Supplied)  10 mg Oral QHS    montelukast (SINGULAIR) tablet 10 mg  10 mg Oral DAILY    pantoprazole (PROTONIX) tablet 40 mg  40 mg Oral ACB&D    sertraline (ZOLOFT) tablet 100 mg  100 mg Oral DAILY    sucralfate (CARAFATE) tablet 1 g  1 g Oral TID    budesonide-formoterol (SYMBICORT) 80-4.5 mcg inhaler  2 Puff Inhalation BID RT    tiotropium bromide (SPIRIVA RESPIMAT) 2.5 mcg /actuation  2 Puff Inhalation DAILY    ascorbic acid (vitamin C) (VITAMIN C) tablet 1,500 mg  1,500 mg Oral TID    zinc sulfate tablet 220 mg  220 mg Oral DAILY    hydrALAZINE (APRESOLINE) 20 mg/mL injection 10 mg  10 mg IntraVENous Q6H PRN       Other Studies (last 24 hours):  Ct Chest W Cont    Result Date: 7/27/2020  CT OF THE CHEST WITH CONTRAST, PULMONARY EMBOLUS PROTOCOL.  CLINICAL INDICATION: Shortness of breath, Covid rule out, evaluate for pulmonary emboli PROCEDURE: Serial thin section axial images are obtained from the thoracic inlet through the upper abdomen following the administration of intravenous contrast per a dedicated, institutional pulmonary embolus protocol. Coronal MIP reformatted images are generated. Radiation dose reduction techniques were used for this study. Our CT scanners use one or all of the following: Automated exposure control, adjusted of the mA and/or kV according to patient size, iterative reconstruction COMPARISON: No prior FINDINGS: The aorta is unremarkable. Large filling defects are noted in the right and left main pulmonary arteries extending into all lobar pulmonary arteries in keeping with extensive acute pulmonary emboli. No mediastinal or axillary adenopathy. The heart is enlarged. No pleural or pericardial effusion evident. There is no pneumothorax. Dependent atelectasis is present. There is vague irregular groundglass opacity more likely secondary to imaging the patient in expiration. A 4 mm pulmonary nodule is noted in the right upper lobe on image 30 there is no pleural effusion. A moderate-sized hiatal hernia is present. A 2.6 cm left adrenal nodule is noted. No aggressive osseous is identified. IMPRESSION: 1. Bilateral pulmonary emboli that originates in the peripheral right and left main bronchus and extend into the lobar pulmonary arteries. Clot burden is considered extensive. 2. Cardiomegaly. 3. Dependent atelectasis with nonspecific, irregular faint groundglass opacity in the bilateral lungs more likely related to imaging the patient in expiration. No tom pneumonia appreciated. 4. Moderate-sized hiatal hernia. 5. 2.6 cm left adrenal nodule. 6. 4 mm right upper lobe pulmonary nodule.  [facility] The critical results contained in this report were communicated to Dr. Ja Rae by Dr. Vicki Das on 7/27/2020 at 7:10 PM. Critical results were communicated as outlined in Section II.C.2.a.i of the ACR Practice Guideline for Communication of Diagnostic Imaging Findings. Xr Chest Port    Result Date: 7/27/2020  Portable chest x-ray CLINICAL INDICATION: Shortness of breath for a couple of days FINDINGS: Single AP view the chest compared to a similar exam dated 7/24/2020 shows increase in the hazy opacity within the left mid lung. Note the lungs are underexpanded. The cardiac silhouette and mediastinum are stable and unchanged. The patient is status post left shoulder arthroplasty. IMPRESSION: Increasing peripheral hazy opacity in the left midlung could represent a developing airspace consolidation or pneumonia. Duplex Lower Ext Venous Bilat    Result Date: 7/28/2020  EXAM: Bilateral leg venous ultrasound. INDICATION: Swelling. COMPARISON: None. TECHNIQUE: Evaluation of the bilateral leg veins was performed utilizing grey-scale, color Doppler and duplex ultrasound. FINDINGS: The bilateral common femoral, deep femoral and greater saphenous veins are patent and compressible, with normal response to augmentation. There is nonocclusive thrombus in the bilateral superficial femoral and popliteal veins, as well as in the right peroneal and posterior tibial veins. Occlusive thrombus is noted in the left peroneal and posterior tibial veins. Also noted is a 2.2 x 2.2 x 0.5 cm Baker's cyst in the right popliteal fossa. Ultrasound technologist has indicated she will notify the floor nurse. IMPRESSION: 1. Bilateral leg DVT. 2. 2.2 cm Baker's cyst in the right popliteal fossa.       Assessment and Plan:     Hospital Problems as of 7/28/2020 Date Reviewed: 5/13/2020          Codes Class Noted - Resolved POA    * (Principal) [facility] ICD-10-CM: J96.01  ICD-9-CM: 518.81  7/27/2020 - Present Unknown        Pulmonary embolism, bilateral (HCC) ICD-10-CM: I26.99  ICD-9-CM: 415.19  7/27/2020 - Present Unknown        Person under investigation for COVID-19 ICD-10-CM: Z20.828  ICD-9-CM: V01.79 7/27/2020 - Present Unknown        Asthma ICD-10-CM: J45.909  ICD-9-CM: 493.90  7/27/2020 - Present Unknown        Esophageal reflux ICD-10-CM: K21.9  ICD-9-CM: 530.81  Unknown - Present Yes        Anxiety ICD-10-CM: F41.9  ICD-9-CM: 300.00  Unknown - Present Yes        Essential hypertension, benign ICD-10-CM: I10  ICD-9-CM: 401.1  Unknown - Present Yes              PLAN:    - acute hypoxic resp failure- se to bilateral PE.on heparin drip, prob for IVC filter today. -Bilateral DVT  -R/o covid  Cont steroid, vitamin c and zn  - gerd  - htn  - asthma  - anxiety/depression - stable  Ct findings-   Moderate-sized hiatal hernia. 2.6 cm left adrenal nodule. 4 mm right upper lobe pulmonary nodule.     DC planning/Dispo:    DVT ppx: heparin    Signed:  Dewey Howell MD

## 2020-07-28 NOTE — PROGRESS NOTES
Problem: Falls - Risk of  Goal: *Absence of Falls  Description: Document Tawanda Adenike Fall Risk and appropriate interventions in the flowsheet.   Outcome: Progressing Towards Goal  Note: Fall Risk Interventions:  Mobility Interventions: Patient to call before getting OOB         Medication Interventions: Patient to call before getting OOB    Elimination Interventions: Call light in reach    History of Falls Interventions: Room close to nurse's station         Problem: Pulmonary Embolism Care Plan (Adult)  Goal: *Improvement of existing pulmonary embolism  Outcome: Progressing Towards Goal  Goal: *Absence of bleeding  Outcome: Progressing Towards Goal  Goal: *Labs within defined limits  Outcome: Progressing Towards Goal

## 2020-07-28 NOTE — PROGRESS NOTES
TRANSFER - OUT REPORT:    Verbal report given to Elisabet Hadley RN on Kuldeep Tripathi  being transferred to 8th floor for routine post - op       Report consisted of patients Situation, Background, Assessment and   Recommendations(SBAR). Information from the following report(s) SBAR, Kardex, ED Summary, Procedure Summary and MAR was reviewed with the receiving nurse. Lines:   Peripheral IV 07/27/20 Left Antecubital (Active)   Site Assessment Clean, dry, & intact 07/28/20 0706   Phlebitis Assessment 0 07/28/20 0706   Infiltration Assessment 0 07/28/20 0706   Dressing Status Clean, dry, & intact 07/28/20 0706   Dressing Type Tape;Transparent 07/28/20 0706   Hub Color/Line Status Infusing 07/28/20 0706   Action Taken Blood drawn 07/27/20 1638       Peripheral IV 07/27/20 Right Forearm (Active)   Site Assessment Clean, dry, & intact 07/28/20 0706   Phlebitis Assessment 0 07/28/20 0706   Infiltration Assessment 0 07/28/20 0706   Dressing Status Clean, dry, & intact 07/28/20 0706   Dressing Type Tape;Transparent 07/28/20 0706   Hub Color/Line Status Capped;Flushed 07/28/20 8883        Opportunity for questions and clarification was provided. Patient transported with:   O2 @ 3 liters  Watch for lab results for latest blood draw: CBC and PTT. Watch for bleeding at right groin site.

## 2020-07-28 NOTE — H&P
Hospitalist Note     Admit Date:  No admission date for patient encounter. Name:  Janee Blas   Age:  66 y.o.  :  1945   MRN:  [id number]   PCP:  Gabrielle Rodriguez MD  Treatment Team: Attending Provider: Mitali Bello MD    HPI/Subjective:   Chief complaint cough shortness of breath    This is a 64-year-old female with past medical history significant for asthma, hypertension, presented to the ER because of cough and shortness of breath for the last week. Patient states that she was seen about 3 days ago for a nosebleed. She went to see ENT physician Dr. Harsha Winter today. Because of shortness of breath she was sent to the ER for further evaluation. Patient states that she has been having nonproductive cough for the last week. No hemoptysis, no sputum production. Shortness of breath at rest worse with activity. Progressively worsening shortness of breath for the last week. No orthopnea no paroxysmal nocturnal dyspnea. She denies any chest pain or palpitations. No fever no chills. She denies any headache or dizziness. At baseline patient is not on any oxygen. No nausea no vomiting no diarrhea, no constipation, no abdominal pain. No weakness tingling or numbness of extremities, she has bilateral lower extremity edema. No pain in the legs. No dysuria urgency or frequency of urination,    10 systems reviewed and negative except as noted in HPI. ER course:    Afebrile, tachycardic with heart rate of 95/min, tachypneic with rate of 30/min, initial blood pressure 93/60, recheck blood pressure was 150/80, oxygen saturation of 87% on room air. Placed on 4 L oxygen by nasal cannula. CBC with white count of 8.9 hemoglobin 10.7 platelet count 465. CMP is fairly unremarkable. High-sensitivity troponin of 21, NT proBNP of 5231. Arterial blood gas with pH of 7.46, PCO2 of 32.5, PO2 of 154, oxygen saturation 99% on 4 L oxygen by nasal cannula.     Chest x-ray with increasing peripheral hazy opacity in the left midlung could represent a developing airspace consolidation or pneumonia. CT angiogram of the chest showed bilateral pulmonary emboli that originates in the peripheral right and left main bronchus and extends into the lobar pulmonary arteries. Clot burden is considered extensive. Cardiomegaly, dependent atelectasis with nonspecific irregular faint groundglass opacity in the bilateral lungs. Moderate-sized hiatal hernia, 2.6 cm left adrenal nodule, 4 mm right upper lobe pulmonary nodule. Patient admitted for further evaluation management of acute hypoxemic respiratory failure, bilateral extensive PE. COVID test pending.     Past Medical History:   Diagnosis Date    Abnormality of gait 7/18/2018    Anemia     Anxiety     Arthritis     Asthma     Calculus of kidney     Chronic pain     Contact dermatitis and other eczema, due to unspecified cause     Depressive disorder, not elsewhere classified     Encounter for chronic pain management     Esophageal reflux     Essential hypertension, benign     Frequent falls 7/18/2018    GERD (gastroesophageal reflux disease)     Pure hypercholesterolemia       Past Surgical History:   Procedure Laterality Date    HX CATARACT REMOVAL Bilateral     HX COLONOSCOPY      HX GI      HX HYSTERECTOMY      HX KNEE REPLACEMENT Right     HX LITHOTRIPSY      HX RETINAL DETACHMENT REPAIR      HX SHOULDER REPLACEMENT Left     NEUROLOGICAL PROCEDURE UNLISTED        Allergies   Allergen Reactions    Flexeril [Cyclobenzaprine] Unknown (comments)    Ketoprofen Unknown (comments)    Plaquenil [Hydroxychloroquine] Unknown (comments)      Social History     Tobacco Use    Smoking status: Never Smoker    Smokeless tobacco: Never Used   Substance Use Topics    Alcohol use: No      Family History   Problem Relation Age of Onset    Arthritis-osteo Mother     Cancer Father     Stroke Maternal Grandmother     Stroke Paternal Grandmother Immunization History   Administered Date(s) Administered    Pneumococcal Conjugate (PCV-13) 11/06/2017    Pneumococcal Polysaccharide (PPSV-23) 02/18/2019     PTA Medications:  Cannot display prior to admission medications because the patient has not been admitted in this contact. Objective:   No data found. No intake or output data in the 24 hours ending 07/27/20 2034    *Note that automatically entered I/Os may not be accurate; dependent on patient compliance with collection and accurate  by assistants. Physical Exam:  General:    Elderly patient, alert, awake, increased work of breathing with accessory muscle use, on 4 L nasal cannula, unable to speak in full sentences,  Eyes:   Normal sclerae. Extraocular movements intact. HENT:  Normocephalic, atraumatic. Moist mucous membranes  CV:  Regular rate rhythm, no murmurs rubs or gallops. Lungs:  Diminished breath sounds, bilateral lung bases,  Abdomen: Soft, nontender, nondistended. Active bowel sounds, no organomegaly  Extremities: Warm and dry. No cyanosis, 2+  Bilateral lower extremity edema,   Neurologic: Alert awake oriented x3, CN II-XII grossly intact. Sensation intact. Skin:     No rashes or jaundice. Normal coloration  Psych:  Normal mood and affect. I reviewed the labs, imaging, EKGs, telemetry, and other studies done this admission.     Data Review:   Recent Results (from the past 24 hour(s))   PROTHROMBIN TIME + INR    Collection Time: 07/27/20  4:30 PM   Result Value Ref Range    Prothrombin time 14.0 12.0 - 14.7 sec    INR 1.1     PTT    Collection Time: 07/27/20  4:30 PM   Result Value Ref Range    aPTT 25.6 24.3 - 35.4 SEC   CBC WITH AUTOMATED DIFF    Collection Time: 07/27/20  4:31 PM   Result Value Ref Range    WBC 8.9 4.3 - 11.1 K/uL    RBC 3.98 (L) 4.05 - 5.2 M/uL    HGB 10.7 (L) 11.7 - 15.4 g/dL    HCT 34.5 (L) 35.8 - 46.3 %    MCV 86.7 79.6 - 97.8 FL    MCH 26.9 26.1 - 32.9 PG    MCHC 31.0 (L) 31.4 - 35.0 g/dL    RDW 15.9 (H) 11.9 - 14.6 %    PLATELET 388 116 - 194 K/uL    MPV 10.9 9.4 - 12.3 FL    ABSOLUTE NRBC 0.02 0.0 - 0.2 K/uL    DF AUTOMATED      NEUTROPHILS 62 43 - 78 %    LYMPHOCYTES 23 13 - 44 %    MONOCYTES 13 (H) 4.0 - 12.0 %    EOSINOPHILS 1 0.5 - 7.8 %    BASOPHILS 0 0.0 - 2.0 %    IMMATURE GRANULOCYTES 0 0.0 - 5.0 %    ABS. NEUTROPHILS 5.5 1.7 - 8.2 K/UL    ABS. LYMPHOCYTES 2.1 0.5 - 4.6 K/UL    ABS. MONOCYTES 1.1 0.1 - 1.3 K/UL    ABS. EOSINOPHILS 0.1 0.0 - 0.8 K/UL    ABS. BASOPHILS 0.0 0.0 - 0.2 K/UL    ABS. IMM. GRANS. 0.0 0.0 - 0.5 K/UL   METABOLIC PANEL, COMPREHENSIVE    Collection Time: 07/27/20  4:31 PM   Result Value Ref Range    Sodium 139 136 - 145 mmol/L    Potassium 3.9 3.5 - 5.1 mmol/L    Chloride 108 (H) 98 - 107 mmol/L    CO2 24 21 - 32 mmol/L    Anion gap 7 7 - 16 mmol/L    Glucose 112 (H) 65 - 100 mg/dL    BUN 18 8 - 23 MG/DL    Creatinine 0.78 0.6 - 1.0 MG/DL    GFR est AA >60 >60 ml/min/1.73m2    GFR est non-AA >60 >60 ml/min/1.73m2    Calcium 8.8 8.3 - 10.4 MG/DL    Bilirubin, total 0.5 0.2 - 1.1 MG/DL    ALT (SGPT) 24 12 - 65 U/L    AST (SGOT) 19 15 - 37 U/L    Alk.  phosphatase 99 50 - 136 U/L    Protein, total 7.3 6.3 - 8.2 g/dL    Albumin 3.2 3.2 - 4.6 g/dL    Globulin 4.1 (H) 2.3 - 3.5 g/dL    A-G Ratio 0.8 (L) 1.2 - 3.5     TROPONIN-HIGH SENSITIVITY    Collection Time: 07/27/20  4:31 PM   Result Value Ref Range    Troponin-High Sensitivity 21.0 (H) 0 - 14 pg/mL   LACTIC ACID    Collection Time: 07/27/20  4:31 PM   Result Value Ref Range    Lactic acid 1.7 0.4 - 2.0 MMOL/L   NT-PRO BNP    Collection Time: 07/27/20  4:31 PM   Result Value Ref Range    NT pro-BNP 5,231 (H) <450 PG/ML   PROCALCITONIN    Collection Time: 07/27/20  4:31 PM   Result Value Ref Range    Procalcitonin <0.05 ng/mL   POC VENOUS BLOOD GAS    Collection Time: 07/27/20  4:54 PM   Result Value Ref Range    Device: NASAL CANNULA      pH, venous (POC) 7.38 7.32 - 7.42      pCO2, venous (POC) 38.4 (L) 41 - 51 MMHG    pO2, venous (POC) 24 (LL) mmHg    HCO3, venous (POC) 22.9 (L) 23 - 28 MMOL/L    sO2, venous (POC) 42 (L) 65 - 88 %    Base deficit, venous (POC) 2 mmol/L    Allens test (POC) YES      Site RIGHT BRACHIAL      Specimen type (POC) MIXED VENOUS      Performed by TeagueJaneRT     CO2, POC 24 MMOL/L    Flow rate (POC) 4.000 L/min    Critical value read back 00:01     Respiratory comment: PhysicianNotified     COLLECT TIME 1,655     POC G3    Collection Time: 07/27/20  5:32 PM   Result Value Ref Range    Device: NASAL CANNULA      pH (POC) 7.64 (HH) 7.35 - 7.45      pCO2 (POC) 19.6 (LL) 35 - 45 MMHG    pO2 (POC) 32 (LL) 75 - 100 MMHG    HCO3 (POC) 21.2 (L) 22 - 26 MMOL/L    sO2 (POC) 78 (L) 95 - 98 %    Base excess (POC) 2 mmol/L    Allens test (POC) YES      Site LEFT RADIAL      Specimen type (POC) ARTERIAL      Performed by TeagueJaneRT     CO2, POC 22 MMOL/L    Flow rate (POC) 4.000 L/min    Critical value read back 00:01     Respiratory comment: PhysicianNotified     COLLECT TIME 1,731     POC G3    Collection Time: 07/27/20  6:03 PM   Result Value Ref Range    Device: NASAL CANNULA      pH (POC) 7.46 (H) 7.35 - 7.45      pCO2 (POC) 32.5 (L) 35 - 45 MMHG    pO2 (POC) 154 (H) 75 - 100 MMHG    HCO3 (POC) 23.1 22 - 26 MMOL/L    sO2 (POC) 99 (H) 95 - 98 %    Base excess (POC) 0 mmol/L    Allens test (POC) YES      Site RIGHT RADIAL      Specimen type (POC) ARTERIAL      Performed by TeagueJaneRT     CO2, POC 24 MMOL/L    Flow rate (POC) 4.000 L/min    Critical value read back 00:01     Respiratory comment: PhysicianNotified     COLLECT TIME 1,802     SARS-COV-2    Collection Time: 07/27/20  6:39 PM   Result Value Ref Range    Specimen source Nasopharyngeal      COVID-19 rapid test Not detected NOTD      SARS CoV-2 PENDING        All Micro Results     None          No current facility-administered medications for this encounter.       Current Outpatient Medications   Medication Sig    lisinopriL (PRINIVIL, ZESTRIL) 5 mg tablet Take 1 Tab by mouth daily.  pantoprazole (Protonix) 40 mg tablet Take 1 Tab by mouth two (2) times a day.  sertraline (ZOLOFT) 100 mg tablet Take 1 1/2 tab daily.  ProAir HFA 90 mcg/actuation inhaler USE 2 PUFFS PO BID PRF COUGH    montelukast (Singulair) 10 mg tablet Take 1 Tab by mouth daily.  Symbicort 80-4.5 mcg/actuation HFAA INHALE 1 PUFF PO BID FOR COUGH    fluticasone propionate (FLONASE) 50 mcg/actuation nasal spray 2 Sprays by Both Nostrils route daily.  clorazepate (TRANXENE) 3.75 mg tablet Take 1 Tab by mouth three (3) times daily. Max Daily Amount: 11.25 mg. Indications: anxious    cycloSPORINE (RESTASIS) 0.05 % dpet Restasis    nystatin-triamcinolone (MYCOLOG II) topical cream Apply  to affected area two (2) times a day.  calcium carbonate (OS-JANA) 500 mg calcium (1,250 mg) tablet Take  by mouth daily.  tiotropium (SPIRIVA WITH HANDIHALER) 18 mcg inhalation capsule Take 1 Cap by inhalation daily.  ascorbic acid, vitamin C, (VITAMIN C) 500 mg tablet Take  by mouth.  melatonin 10 mg cap Take  by mouth.  Iron Fum & PS Cmp-Vit C-Niacin (INTEGRA) 125-40-3 mg cap Take 125 mg by mouth daily.  sucralfate (CARAFATE) 1 gram tablet Take 1 Tab by mouth three (3) times daily.  dietary supplement cap Take  by mouth.  docusate sodium (STOOL SOFTENER) 100 mg tab Take  by mouth as needed.  glucosamine & chondroit sul. Na 750-400 mg cmpk Take  by mouth.  omega-3 fatty acids-vitamin e (FISH OIL) 1,000 mg cap Take 1 Cap by mouth. 1 by oral route twice daily    VIT C/ABDIAZIZ AC/LUT/COPPER/ZNOX (PRESERVISION LUTEIN PO) Take  by mouth. 1 by oral route daily    cholecalciferol (VITAMIN D3) 1,000 unit tablet Take  by mouth daily.  aspirin delayed-release 81 mg tablet Take  by mouth daily.      Facility-Administered Medications Ordered in Other Encounters   Medication Dose Route Frequency    heparin 25,000 units in dextrose 500 mL infusion  18-36 Units/kg/hr IntraVENous TITRATE       Other Studies:  Ct Chest W Cont    Result Date: 7/27/2020  CT OF THE CHEST WITH CONTRAST, PULMONARY EMBOLUS PROTOCOL. CLINICAL INDICATION: Shortness of breath, Covid rule out, evaluate for pulmonary emboli PROCEDURE: Serial thin section axial images are obtained from the thoracic inlet through the upper abdomen following the administration of intravenous contrast per a dedicated, institutional pulmonary embolus protocol. Coronal MIP reformatted images are generated. Radiation dose reduction techniques were used for this study. Our CT scanners use one or all of the following: Automated exposure control, adjusted of the mA and/or kV according to patient size, iterative reconstruction COMPARISON: No prior FINDINGS: The aorta is unremarkable. Large filling defects are noted in the right and left main pulmonary arteries extending into all lobar pulmonary arteries in keeping with extensive acute pulmonary emboli. No mediastinal or axillary adenopathy. The heart is enlarged. No pleural or pericardial effusion evident. There is no pneumothorax. Dependent atelectasis is present. There is vague irregular groundglass opacity more likely secondary to imaging the patient in expiration. A 4 mm pulmonary nodule is noted in the right upper lobe on image 30 there is no pleural effusion. A moderate-sized hiatal hernia is present. A 2.6 cm left adrenal nodule is noted. No aggressive osseous is identified. IMPRESSION: 1. Bilateral pulmonary emboli that originates in the peripheral right and left main bronchus and extend into the lobar pulmonary arteries. Clot burden is considered extensive. 2. Cardiomegaly. 3. Dependent atelectasis with nonspecific, irregular faint groundglass opacity in the bilateral lungs more likely related to imaging the patient in expiration. No tom pneumonia appreciated. 4. Moderate-sized hiatal hernia. 5. 2.6 cm left adrenal nodule. 6. 4 mm right upper lobe pulmonary nodule.  [facility] The critical results contained in this report were communicated to Dr. Monique Narvaez by Dr. Nabor Munguia on 7/27/2020 at 7:10 PM. Critical results were communicated as outlined in Section II.C.2.a.i of the ACR Practice Guideline for Communication of Diagnostic Imaging Findings. Xr Chest Port    Result Date: 7/27/2020  Portable chest x-ray CLINICAL INDICATION: Shortness of breath for a couple of days FINDINGS: Single AP view the chest compared to a similar exam dated 7/24/2020 shows increase in the hazy opacity within the left mid lung. Note the lungs are underexpanded. The cardiac silhouette and mediastinum are stable and unchanged. The patient is status post left shoulder arthroplasty. IMPRESSION: Increasing peripheral hazy opacity in the left midlung could represent a developing airspace consolidation or pneumonia. Assessment and Plan:     Hospital Problems as of 7/27/2020 Date Reviewed: 5/13/2020          Codes Class Noted - Resolved POA    * (Principal) Acute hypoxemic respiratory failure ([facility] [address] ICD-10-CM: J96.01  ICD-9-CM: 518.81  7/27/2020 - Present Unknown        Pulmonary embolism, bilateral ([facility] [address] ICD-10-CM: I26.99  ICD-9-CM: 415.19  7/27/2020 - Present Unknown        Person under investigation for COVID-19 ICD-10-CM: Z20.828  ICD-9-CM: V01.79  7/27/2020 - Present Unknown        Asthma ICD-10-CM: J45.909  ICD-9-CM: 493.90  7/27/2020 - Present Unknown        Esophageal reflux ICD-10-CM: K21.9  ICD-9-CM: 530.81  Unknown - Present Yes        Anxiety ICD-10-CM: F41.9  ICD-9-CM: 300.00  Unknown - Present Yes        Essential hypertension, benign ICD-10-CM: I10  ICD-9-CM: 401.1  Unknown - Present Yes              Plan: This is a 68-year-old female with    Acute hypoxemic respiratory failure secondary to bilateral extensive PE  Currently on 4 L oxygen by nasal cannula. Wean oxygen as tolerated. CT of the chest with findings as above. IR consulted. Appreciate recommendations. Notified by ER physician. N.p.o. after midnight for possible thrombectomy in a.m. Heparin drip per PE protocol. Monitor for signs of bleeding. Will order b/l Duplex ultrasound. No signs of pneumonia. Hold off on antibiotics. Bilateral extensive PE  As above. Person under investigation for COVID-19  COVID test pending. Dexamethasone, vitamin C, zinc.  No signs of pneumonia. Hold off on antibiotics. GERD  PPI, Carafate    Hypertension  Hold lisinopril as blood pressure is 1 79-5 40 systolic. Hydralazine PRN. Mild persistent asthma  Not in acute exacerbation. Continue home medications. Anxiety/depression  Continue home medications. Discharge planning: Medical inpatient    DVT ppx: Heparin drip    Code status:  Full    D POA patient's son Mr. Jessee Zamudio. [phone].     Estimated LOS:  Greater than 2 midnights    Risk:  high    Signed:  Hermelinda Mcguire MD

## 2020-07-28 NOTE — ED NOTES
TRANSFER - OUT REPORT:    Verbal report given to Ada Murphy RN  on Jessica Liu  being transferred to 8th floor [phone] for routine progression of care       Report consisted of patients Situation, Background, Assessment and   Recommendations(SBAR). Information from the following report(s) SBAR was reviewed with the receiving nurse. Lines:   Peripheral IV 07/27/20 Left Antecubital (Active)   Site Assessment Clean, dry, & intact 07/27/20 1638   Phlebitis Assessment 0 07/27/20 1638   Infiltration Assessment 0 07/27/20 1638   Dressing Status Clean, dry, & intact 07/27/20 1638   Dressing Type Tape;Transparent 07/27/20 1638   Hub Color/Line Status Pink;Flushed;Patent 07/27/20 1638   Action Taken Blood drawn 07/27/20 1638       Peripheral IV 07/27/20 Right Forearm (Active)   Site Assessment Clean, dry, & intact 07/27/20 1936   Phlebitis Assessment 0 07/27/20 1936   Infiltration Assessment 0 07/27/20 1936   Dressing Status Clean, dry, & intact 07/27/20 1936        Opportunity for questions and clarification was provided.       Patient transported with:   O2 @ 5 liters   Heparin infusing   ACLS Medic with pt

## 2020-07-28 NOTE — PROGRESS NOTES
Admission assessment complete. A/O x4. Respirations present, non-labored. Diminished bilaterally pan sounds w/ audible wheezing. 5 LPM oxygen via NC. 1+ edema in BLE. Safety measures in place. Bed low, locked, and call light in reach. Will continue to monitor. Oriented to room and surroundings.

## 2020-07-28 NOTE — CONSULTS
Department of Interventional Radiology  [phone]        Consult Note     Patient: Rolan Jung MRN: [id number]  SSN: [id number]    YOB: 1945  Age: 66 y.o. Sex: female      Referring Physician: Hospitalist    Consult Date: 7/28/2020     Subjective:     Chief Complaint: PE    History of Present Illness: Rolan Jung is a 66 y.o. female who is seen in consultation for possible treatment of her acute PE. She presented to the ED with c/o SOB, nonproductive cough x 1 week. Pt began experiencing SOB approximately 1 week ago. CT scan reveals bilateral PE,  troponins elevated. C/o chest discomfort last evening and overnight. She reports chronic LE edema, worse at the end of the day. US decclusive thrombus in the left per vein and PTV. She does not use home oxygen. History significant for asthma, HTN, balance disturbance-uses motorized scooter at home, epistaxis. PUI COVID. Rapid test negative. She fell out of her scooter 3 weeks ago and was seen in the ED-suffered a nose bleed, nasal trauma and black eye. Nose bleeds occur ~1/month x ~ 4 years, sometimes require ED visit, most recently being 3 days ago, treated with Afrin and nasal clamp. She is due to see her allergist next week and is planning to discuss. It always begins from the right nares, transfers to the left, then she coughs up blood. She is quite sedentary, lives in her home with a roommate, her  is in a rehab facility in [place].       Past Medical History:   Diagnosis Date    Abnormality of gait 7/18/2018    Anemia     Anxiety     Arthritis     Asthma     Calculus of kidney     Chronic pain     Contact dermatitis and other eczema, due to unspecified cause     Depressive disorder, not elsewhere classified     Encounter for chronic pain management     Esophageal reflux     Essential hypertension, benign     Frequent falls 7/18/2018    GERD (gastroesophageal reflux disease)     Pure hypercholesterolemia      Past Surgical History:   Procedure Laterality Date    HX CATARACT REMOVAL Bilateral     HX COLONOSCOPY      HX GI      HX HYSTERECTOMY      HX KNEE REPLACEMENT Right     HX LITHOTRIPSY      HX RETINAL DETACHMENT REPAIR      HX SHOULDER REPLACEMENT Left     NEUROLOGICAL PROCEDURE UNLISTED        Family History   Problem Relation Age of Onset    Arthritis-osteo Mother     Cancer Father     Stroke Maternal Grandmother     Stroke Paternal Grandmother      Social History     Tobacco Use    Smoking status: Never Smoker    Smokeless tobacco: Never Used   Substance Use Topics    Alcohol use: No      Allergies   Allergen Reactions    Flexeril [Cyclobenzaprine] Unknown (comments)    Ketoprofen Unknown (comments)    Plaquenil [Hydroxychloroquine] Unknown (comments)     Current Facility-Administered Medications   Medication Dose Route Frequency Provider Last Rate Last Dose    tuberculin injection 5 Units  5 Units IntraDERMal Aldona Brooklyn Chun MD        sodium chloride (NS) flush 5-40 mL  5-40 mL IntraVENous Q8H Fe Rosenthal MD   10 mL at 07/28/20 0542    sodium chloride (NS) flush 5-40 mL  5-40 mL IntraVENous PRN Fe Rosenthal MD        polyethylene glycol (MIRALAX) packet 17 g  17 g Oral DAILY PRN Fe Rosenthal MD        promethazine (PHENERGAN) tablet 12.5 mg  12.5 mg Oral Q6H PRN Fe Rosenthal MD        Or    ondansetron (ZOFRAN) injection 4 mg  4 mg IntraVENous Q6H PRN Fe Rosenthal MD        acetaminophen (TYLENOL) tablet 650 mg  650 mg Oral Q6H PRN Fe Rosenthal MD        Or    acetaminophen (TYLENOL) suppository 650 mg  650 mg Rectal Q6H PRN Fe Rosenthal MD        dexamethasone (DECADRON) 10 mg/mL injection 6 mg  6 mg IntraVENous DAILY Fe Rosenthal MD   6 mg at 07/28/20 0848    heparin 25,000 units in dextrose 500 mL infusion  18-36 Units/kg/hr IntraVENous TITRATE Ayde Rosenthal MD 22.9 mL/hr at 07/28/20 1127 18 Units/kg/hr at 07/28/20 1127    albuterol-ipratropium (DUO-NEB) 2.5 MG-0.5 MG/3 ML  3 mL Nebulization Q4H PRN Taty Rosenthal MD        aspirin delayed-release tablet 81 mg  81 mg Oral DAILY Ayde Rosenthal MD   81 mg at 07/28/20 0850    calcium carbonate (OS-JANA) tablet 500 mg [elemental]  500 mg Oral DAILY Ayde Rosenthal MD   500 mg at 07/28/20 0849    cholecalciferol (VITAMIN D3) (1000 Units /25 mcg) tablet 1 Tab  1,000 Units Oral DAILY Ayde Rosenthal MD   1 Tab at 07/28/20 0849    clorazepate (TRANXENE) tablet 3.75 mg  3.75 mg Oral TID Ayde Rosenthal MD   3.75 mg at 07/28/20 0849    fluticasone propionate (FLONASE) 50 mcg/actuation nasal spray 2 Spray  2 Spray Both Nostrils DAILY Ayde Rosenthal MD   2 [place] at 07/28/20 0848    melatonin cap 10 mg (Patient Supplied)  10 mg Oral QHS Ayde Rosenthal MD        montelukast (SINGULAIR) tablet 10 mg  10 mg Oral DAILY Ayde Rosenthal MD   10 mg at 07/28/20 0849    pantoprazole (PROTONIX) tablet 40 mg  40 mg Oral ACB&D Ayde Rosenthal MD   40 mg at 07/28/20 0850    sertraline (ZOLOFT) tablet 100 mg  100 mg Oral DAILY Ayde Rosenthal MD   100 mg at 07/28/20 0849    sucralfate (CARAFATE) tablet 1 g  1 g Oral TID Ayde Rosenthal MD   1 g at 07/28/20 0849    budesonide-formoterol (SYMBICORT) 80-4.5 mcg inhaler  2 Puff Inhalation BID RT Ayde Rosenthal MD   2 Puff at 07/28/20 0854    tiotropium bromide (SPIRIVA RESPIMAT) 2.5 mcg /actuation  2 Puff Inhalation DAILY Taty Rosenthal MD   2 Puff at 07/28/20 0854    ascorbic acid (vitamin C) (VITAMIN C) tablet 1,500 mg  1,500 mg Oral TID Taty Rosenthal MD   1,500 mg at 07/28/20 0848    zinc sulfate tablet 220 mg  220 mg Oral DAILY Taty Rosenthal MD   220 mg at 07/28/20 0849    hydrALAZINE (APRESOLINE) 20 mg/mL injection 10 mg  10 mg IntraVENous Q6H PRN Ayde Rosenthal MD           Medications Prior to Admission   Medication Sig    lisinopriL (PRINIVIL, ZESTRIL) 5 mg tablet Take 1 Tab by mouth daily.  pantoprazole (Protonix) 40 mg tablet Take 1 Tab by mouth two (2) times a day.     sertraline (ZOLOFT) 100 mg tablet Take 1 1/2 tab daily.  ProAir HFA 90 mcg/actuation inhaler USE 2 PUFFS PO BID PRF COUGH    montelukast (Singulair) 10 mg tablet Take 1 Tab by mouth daily.  Symbicort 80-4.5 mcg/actuation HFAA INHALE 1 PUFF PO BID FOR COUGH    fluticasone propionate (FLONASE) 50 mcg/actuation nasal spray 2 Sprays by Both Nostrils route daily.  clorazepate (TRANXENE) 3.75 mg tablet Take 1 Tab by mouth three (3) times daily. Max Daily Amount: 11.25 mg. Indications: anxious    cycloSPORINE (RESTASIS) 0.05 % dpet Restasis    nystatin-triamcinolone (MYCOLOG II) topical cream Apply  to affected area two (2) times a day.  calcium carbonate (OS-JANA) 500 mg calcium (1,250 mg) tablet Take  by mouth daily.  tiotropium (SPIRIVA WITH HANDIHALER) 18 mcg inhalation capsule Take 1 Cap by inhalation daily.  ascorbic acid, vitamin C, (VITAMIN C) 500 mg tablet Take  by mouth.  melatonin 10 mg cap Take  by mouth.  Iron Fum & PS Cmp-Vit C-Niacin (INTEGRA) 125-40-3 mg cap Take 125 mg by mouth daily.  sucralfate (CARAFATE) 1 gram tablet Take 1 Tab by mouth three (3) times daily.  dietary supplement cap Take  by mouth.  docusate sodium (STOOL SOFTENER) 100 mg tab Take  by mouth as needed.  glucosamine & chondroit sul. Na 750-400 mg cmpk Take  by mouth.  omega-3 fatty acids-vitamin e (FISH OIL) 1,000 mg cap Take 1 Cap by mouth. 1 by oral route twice daily    VIT C/ABDIAZIZ AC/LUT/COPPER/ZNOX (PRESERVISION LUTEIN PO) Take  by mouth. 1 by oral route daily    cholecalciferol (VITAMIN D3) 1,000 unit tablet Take  by mouth daily.  aspirin delayed-release 81 mg tablet Take  by mouth daily.         Allergies   Allergen Reactions    Flexeril [Cyclobenzaprine] Unknown (comments)    Ketoprofen Unknown (comments)    Plaquenil [Hydroxychloroquine] Unknown (comments)       Review of Systems:  A detailed 10 organ review of systems is obtained with pertinent positives as listed in the History of Present Illness and Past Medical History. All others are negative. Objective:     Physical Exam:  Vitals:    07/28/20 0319 07/28/20 0713 07/28/20 0856 07/28/20 1050   BP: 112/54 105/53  126/75   Pulse: 76 71  78   Resp: 18 16  18   Temp: 97.7 °F (36.5 °C) 97.5 °F (36.4 °C)  97.5 °F (36.4 °C)   SpO2: 93% 91% 92% 93%        Pain Assessment  Pain Intensity 1: 0 (07/28/20 0926)        Patient Stated Pain Goal: 0    Gen: NAD, broken conversation due to dyspnea. HEART: regular rate and rhythm  LUNG: clear to auscultation bilaterally, 93 % 4 liters  ABDOMEN: normal findings: soft, non-tender, obese  EXTREMITIES: warm, obese, mild LE nonpitting edema, perfused. Mild left pretibial bruise. Lab/Data Review:  CMP:   Lab Results   Component Value Date/Time     07/28/2020 10:04 AM    K 4.1 07/28/2020 10:04 AM     (H) 07/28/2020 10:04 AM    CO2 25 07/28/2020 10:04 AM    AGAP 6 (L) 07/28/2020 10:04 AM     (H) 07/28/2020 10:04 AM    BUN 14 07/28/2020 10:04 AM    CREA 0.67 07/28/2020 10:04 AM    GFRAA >60 07/28/2020 10:04 AM    GFRNA >60 07/28/2020 10:04 AM    CA 9.0 07/28/2020 10:04 AM    ALB 2.9 (L) 07/28/2020 10:04 AM    TP 7.1 07/28/2020 10:04 AM    GLOB 4.2 (H) 07/28/2020 10:04 AM    AGRAT 0.7 (L) 07/28/2020 10:04 AM    ALT 27 07/28/2020 10:04 AM     CBC:   Lab Results   Component Value Date/Time    WBC 7.2 07/28/2020 10:04 AM    HGB 9.8 (L) 07/28/2020 10:04 AM    HCT 32.4 (L) 07/28/2020 10:04 AM     07/28/2020 10:04 AM     COAGS:   Lab Results   Component Value Date/Time    APTT 117.5 (H) 07/28/2020 10:04 AM    PTP 14.5 07/28/2020 01:40 AM    INR 1.1 07/28/2020 01:40 AM         Assessment/Plan:   Bilateral PE, right heart strain on CT based on elevated RV:LV ratio as well as elevated cardiac biomarkers. Dyspnea even with brief conversation. Bilateral distal FV and pop vein nonocclusive thrombus. Recurrent epistaxis.     She would likely benefit from  PE thrombolysis, but she would be at significant risk of hemorrhage given her nosebleeds. ICU space is also limited. I believe she is a good candidate for suction mechanical thrombectomy. Will also place a filter for the short term given bilateral DVT and likely limited cardiac reserve. Office follow up to discuss retrieval if appropriate.       MARY LOU Padron [facility] Problems  Date Reviewed: 5/13/2020          Codes Class Noted POA    * (Principal) Acute hypoxemic respiratory failure [facility]) ICD-10-CM: J96.01  ICD-9-CM: 518.81  7/27/2020 Unknown        Pulmonary embolism, bilateral ([facility] [address] ICD-10-CM: I26.99  ICD-9-CM: 415.19  7/27/2020 Unknown        Person under investigation for COVID-19 ICD-10-CM: Z20.828  ICD-9-CM: V01.79  7/27/2020 Unknown        Asthma ICD-10-CM: T35.332  ICD-9-CM: 493.90  7/27/2020 Unknown        Esophageal reflux ICD-10-CM: K21.9  ICD-9-CM: 530.81  Unknown Yes        Anxiety ICD-10-CM: F41.9  ICD-9-CM: 300.00  Unknown Yes        Essential hypertension, benign ICD-10-CM: I10  ICD-9-CM: 401.1  Unknown Yes

## 2020-07-28 NOTE — PROGRESS NOTES
Problem: Patient Education: Go to Patient Education Activity  Goal: Patient/Family Education  Description: 1. Patient will complete lower body bathing and dressing with SBA and adaptive equipment as needed. 2. Patient will complete toileting with supervision. 3. Patient will tolerate 25 minutes of OT treatment with 1-2 rest breaks to increase activity tolerance for ADLs. 4. Patient will complete functional transfers with supervision and adaptive equipment as needed. 5. Patient will complete self-grooming while standing edge of sink with supervision and adaptive equipment as needed. 6. Patient will complete functional mobility for household distances with supervision and adaptive equipment as needed. Timeframe: 7 visits     Outcome: Progressing Towards Goal      OCCUPATIONAL THERAPY: Initial Assessment, Daily Note, and AM 7/28/2020  INPATIENT: OT Visit Days: 1  [place] / [place] / Product Type: Medicare /      NAME/AGE/GENDER: George Husbands is a 66 y.o. female   PRIMARY DIAGNOSIS:  Pulmonary embolism, bilateral ([address] [I26.99]  Acute hypoxemic respiratory failure ([address] [J96.01]  Person under investigation for COVID-19 [Z20.828] Acute hypoxemic respiratory failure ([address] Acute hypoxemic respiratory failure ([address]        ICD-10: Treatment Diagnosis:    Generalized Muscle Weakness (M62.81)   Precautions/Allergies:     Flexeril [cyclobenzaprine]; Ketoprofen; and Plaquenil [hydroxychloroquine]      ASSESSMENT:     Ms. Bruno Barr presents for the above diagnoses. Upon arrival, pt supine in bed and resting on 2L02 via n.c. Pt is alert, oriented x 4, and agreeable to OT evaluation. Pt reports living with a roommate in a 1-story home with ramp entry. At baseline, pt notes independence with ADLs and MOD I for functional mobility with use of rollator. Endorses multiple recent falls over the last several months.    Today, pt presents with deficits in overall strength, activity tolerance, ADL performance, and functional mobility. Co-treatment completed with PT d/t pt's decreased activity tolerance limiting her ability to complete ADLs and functional mobility. Pt transitioned to sitting edge of bed with min A. Static and dynamic sitting balance is intact with no additional support. Pt's breathing becoming more labored after mobility; pt provided pursed lip breathing strategies with pt demonstrating understanding. Pt stood and ambulated to bedside chair with CGA x RW. Short restbreak provided before pt ambulating to sink to perform self-grooming tasks. Pt completed washing hands and denture care with supervision after set-up. Pt returned back to sitting upright in bedside chair. Pt left with needs met, call light within reach and RN notified. At this time, Shelton Rosas is functioning below baseline for ADLs and functional mobility. Pt would benefit from skilled OT services to address OT goals and plan of care. This section established at most recent assessment   PROBLEM LIST (Impairments causing functional limitations):  Decreased Strength  Decreased ADL/Functional Activities  Decreased Transfer Abilities  Decreased Ambulation Ability/Technique  Decreased Balance  Decreased Activity Tolerance  Decreased Pacing Skills  Decreased Work Simplification/Energy Conservation Techniques  Increased Fatigue  Increased Shortness of Breath   INTERVENTIONS PLANNED: (Benefits and precautions of occupational therapy have been discussed with the patient.)  Activities of daily living training  Adaptive equipment training  Balance training  Clothing management  Community reintergration  Donning&doffing training  Neuromuscular re-eduation  Re-evaluation  Therapeutic activity  Therapeutic exercise     TREATMENT PLAN: Frequency/Duration: Follow patient 3x/week to address above goals. Rehabilitation Potential For Stated Goals: Good     REHAB RECOMMENDATIONS (at time of discharge pending progress):    Placement:   It is my opinion, based on this patient's performance to date, that Ms. Frank Howard may benefit from intensive therapy at a [address] [place] [address] after discharge due to the functional deficits listed above that are likely to improve with skilled rehabilitation and concerns that he/she may be unsafe to be unsupervised at home due to decline in ability to safely perform ADLs and functional mobility. .  Equipment:   TBD              OCCUPATIONAL PROFILE AND HISTORY:   History of Present Injury/Illness (Reason for Referral):  See H&P  Past Medical History/Comorbidities:   Ms. Frank Howard  has a past medical history of Abnormality of gait (7/18/2018), Anemia, Anxiety, Arthritis, Asthma, Calculus of kidney, Chronic pain, Contact dermatitis and other eczema, due to unspecified cause, Depressive disorder, not elsewhere classified, Encounter for chronic pain management, Esophageal reflux, Essential hypertension, benign, Frequent falls (7/18/2018), GERD (gastroesophageal reflux disease), and Pure hypercholesterolemia. Ms. Frank Howard  has a past surgical history that includes hx knee replacement (Right); hx shoulder replacement (Left); hx hysterectomy; hx lithotripsy; hx cataract removal (Bilateral); hx retinal detachment repair; hx colonoscopy; hx gi; and neurological procedure unlisted. Social History/Living Environment:   Home Environment: Private residence  # Steps to Enter: 0  One/Two Story Residence: One story  Living Alone: No  Support Systems: Friends \ neighbors  Patient Expects to be Discharged to[de-identified] Unknown  Current DME Used/Available at Home: [address], rollator, Wheelchair, [employer] chair  Tub or Shower Type: Tub/Shower combination  Prior Level of Function/Work/Activity:  Independent with ADLs and MOD I for functional mobility with rollator  Personal Factors:          Sex:  female        Age:  66 y.o.    Number of Personal Factors/Comorbidities that affect the Plan of Care: Brief history (0):  LOW COMPLEXITY   ASSESSMENT OF OCCUPATIONAL PERFORMANCE[de-identified]   Activities of Daily Living:   Basic ADLs (From Assessment) Complex ADLs (From Assessment)   Feeding: Setup  Oral Facial Hygiene/Grooming: Setup  Bathing: Moderate assistance  Upper Body Dressing: Setup  Lower Body Dressing: Moderate assistance  Toileting: Moderate assistance     Grooming/Bathing/Dressing Activities of Daily Living     Cognitive Retraining  Safety/Judgement: Fall prevention; Awareness of environment                       Bed/Mat Mobility  Rolling: Contact guard assistance  Supine to Sit: Contact guard assistance  Sit to Supine: Contact guard assistance  Sit to Stand: Minimum assistance  Stand to Sit: Contact guard assistance  Bed to Chair: Minimum assistance  Scooting: Contact guard assistance     Most Recent Physical Functioning:   Gross Assessment:  AROM: Within functional limits  Strength: Generally decreased, functional  Sensation: Intact               Posture:     Balance:  Sitting: Intact  Standing: Impaired  Standing - Static: Fair;Constant support  Standing - Dynamic : Fair;Constant support Bed Mobility:  Rolling: Contact guard assistance  Supine to Sit: Contact guard assistance  Sit to Supine: Contact guard assistance  Scooting: Contact guard assistance  Wheelchair Mobility:     Transfers:  Sit to Stand: Minimum assistance  Stand to Sit: Contact guard assistance  Bed to Chair: Minimum assistance  Interventions: Safety awareness training;Verbal cues; Tactile cues            Patient Vitals for the past 6 hrs:   BP BP Patient Position SpO2 O2 Flow Rate (L/min) Pulse   07/28/20 0706 -- -- -- 2 l/min --   07/28/20 0713 105/53 At rest 91 % -- 71   07/28/20 0856 -- -- 92 % 2 l/min --   07/28/20 0926 -- -- -- 5 l/min --       Mental Status  Neurologic State: Alert  Orientation Level: Oriented X4  Cognition: Follows commands  Perception: Appears intact  Perseveration: No perseveration noted  Safety/Judgement: Fall prevention, Awareness of environment Physical Skills Involved:  Balance  Strength  Activity Tolerance  Gross Motor Control Cognitive Skills Affected (resulting in the inability to perform in a timely and safe manner):  none Psychosocial Skills Affected:  Habits/Routines  Environmental Adaptation   Number of elements that affect the Plan of Care: 5+:  HIGH COMPLEXITY   CLINICAL DECISION MAKIN[address] [place] [address] AM-PAC 6 Clicks   Daily Activity Inpatient Short Form  How much help from another person does the patient currently need. .. Total A Lot A Little None   1. Putting on and taking off regular lower body clothing? [] 1   [x] 2   [] 3   [] 4   2. Bathing (including washing, rinsing, drying)? [] 1   [x] 2   [] 3   [] 4   3. Toileting, which includes using toilet, bedpan or urinal?   [] 1   [x] 2   [] 3   [] 4   4. Putting on and taking off regular upper body clothing? [] 1   [] 2   [x] 3   [] 4   5. Taking care of personal grooming such as brushing teeth? [] 1   [] 2   [x] 3   [] 4   6. Eating meals? [] 1   [] 2   [x] 3   [] 4   © , Trustees of [address] [place] [address], under license to [employer]. All rights reserved      Score:  Initial: 15 Most Recent: X (Date: -- )    Interpretation of Tool:  Represents activities that are increasingly more difficult (i.e. Bed mobility, Transfers, Gait). Medical Necessity:     Patient demonstrates   good   rehab potential due to higher previous functional level. Reason for Services/Other Comments:  Patient continues to require skilled intervention due to   medical complications and patient unable to attend/participate in therapy as expected  .    Use of outcome tool(s) and clinical judgement create a POC that gives a: LOW COMPLEXITY         TREATMENT:   (In addition to Assessment/Re-Assessment sessions the following treatments were rendered)     Pre-treatment Symptoms/Complaints:    Pain: Initial:   Pain Intensity 1: 0  Post Session:  same     Self Care: (10 minutes): Procedure(s) (per grid) utilized to improve and/or restore self-care/home management as related to grooming. Required minimal verbal and   cueing to facilitate activities of daily living skills. Neuromuscular Re-education: ( 15 minutes):  Exercise/activities per grid below to improve balance, coordination, posture, and energy conservation strategies . Required minimal verbal cues to promote static and dynamic balance in sitting. Braces/Orthotics/Lines/Etc:   IV  O2 Device: Nasal cannula  Treatment/Session Assessment:    Response to Treatment:  tolerated well with no issues noted. Interdisciplinary Collaboration:   Physical Therapist  Occupational Therapist  Registered Nurse  After treatment position/precautions:   Up in chair  Bed in low position  Call light within reach  RN notified   Compliance with Program/Exercises: Will assess as treatment progresses. Recommendations/Intent for next treatment session: \"Next visit will focus on advancements to more challenging activities and reduction in assistance provided\".   Total Treatment Duration:  OT Patient Time In/Time Out  Time In: 0926  Time Out: [address] [facility],

## 2020-07-29 PROBLEM — E87.6 HYPOKALEMIA: Status: ACTIVE | Noted: 2020-07-29

## 2020-07-29 LAB
ANION GAP SERPL CALC-SCNC: 10 MMOL/L (ref 7–16)
APTT PPP: 142.6 SEC (ref 24.3–35.4)
APTT PPP: 46.7 SEC (ref 24.3–35.4)
BASOPHILS # BLD: 0 K/UL (ref 0–0.2)
BASOPHILS NFR BLD: 0 % (ref 0–2)
BUN SERPL-MCNC: 16 MG/DL (ref 8–23)
CALCIUM SERPL-MCNC: 8.2 MG/DL (ref 8.3–10.4)
CHLORIDE SERPL-SCNC: 110 MMOL/L (ref 98–107)
CO2 SERPL-SCNC: 23 MMOL/L (ref 21–32)
CREAT SERPL-MCNC: 0.7 MG/DL (ref 0.6–1)
D DIMER PPP FEU-MCNC: 1.69 UG/ML(FEU)
DIFFERENTIAL METHOD BLD: ABNORMAL
EOSINOPHIL # BLD: 0.1 K/UL (ref 0–0.8)
EOSINOPHIL NFR BLD: 0 % (ref 0.5–7.8)
ERYTHROCYTE [DISTWIDTH] IN BLOOD BY AUTOMATED COUNT: 16.1 % (ref 11.9–14.6)
GLUCOSE SERPL-MCNC: 114 MG/DL (ref 65–100)
HCT VFR BLD AUTO: 31.1 % (ref 35.8–46.3)
HGB BLD-MCNC: 9.2 G/DL (ref 11.7–15.4)
IMM GRANULOCYTES # BLD AUTO: 0 K/UL (ref 0–0.5)
IMM GRANULOCYTES NFR BLD AUTO: 0 % (ref 0–5)
LYMPHOCYTES # BLD: 3.1 K/UL (ref 0.5–4.6)
LYMPHOCYTES NFR BLD: 28 % (ref 13–44)
MAGNESIUM SERPL-MCNC: 2.1 MG/DL (ref 1.8–2.4)
MCH RBC QN AUTO: 26.1 PG (ref 26.1–32.9)
MCHC RBC AUTO-ENTMCNC: 29.6 G/DL (ref 31.4–35)
MCV RBC AUTO: 88.1 FL (ref 79.6–97.8)
MM INDURATION POC: 0 MM (ref 0–5)
MONOCYTES # BLD: 1.2 K/UL (ref 0.1–1.3)
MONOCYTES NFR BLD: 11 % (ref 4–12)
NEUTS SEG # BLD: 6.8 K/UL (ref 1.7–8.2)
NEUTS SEG NFR BLD: 61 % (ref 43–78)
NRBC # BLD: 0.04 K/UL (ref 0–0.2)
PLATELET # BLD AUTO: 275 K/UL (ref 150–450)
PMV BLD AUTO: 10.8 FL (ref 9.4–12.3)
POTASSIUM SERPL-SCNC: 3.3 MMOL/L (ref 3.5–5.1)
PPD POC: NORMAL
RBC # BLD AUTO: 3.53 M/UL (ref 4.05–5.2)
SODIUM SERPL-SCNC: 143 MMOL/L (ref 136–145)
WBC # BLD AUTO: 11.2 K/UL (ref 4.3–11.1)

## 2020-07-29 PROCEDURE — 94760 N-INVAS EAR/PLS OXIMETRY 1: CPT

## 2020-07-29 PROCEDURE — 85025 COMPLETE CBC W/AUTO DIFF WBC: CPT

## 2020-07-29 PROCEDURE — 93306 TTE W/DOPPLER COMPLETE: CPT

## 2020-07-29 PROCEDURE — 97530 THERAPEUTIC ACTIVITIES: CPT

## 2020-07-29 PROCEDURE — 85730 THROMBOPLASTIN TIME PARTIAL: CPT

## 2020-07-29 PROCEDURE — 74011250637 HC RX REV CODE- 250/637: Performed by: FAMILY MEDICINE

## 2020-07-29 PROCEDURE — 80048 BASIC METABOLIC PNL TOTAL CA: CPT

## 2020-07-29 PROCEDURE — 94640 AIRWAY INHALATION TREATMENT: CPT

## 2020-07-29 PROCEDURE — 74011250637 HC RX REV CODE- 250/637: Performed by: HOSPITALIST

## 2020-07-29 PROCEDURE — 77010033678 HC OXYGEN DAILY

## 2020-07-29 PROCEDURE — 83735 ASSAY OF MAGNESIUM: CPT

## 2020-07-29 PROCEDURE — 74011250636 HC RX REV CODE- 250/636: Performed by: FAMILY MEDICINE

## 2020-07-29 PROCEDURE — 36415 COLL VENOUS BLD VENIPUNCTURE: CPT

## 2020-07-29 PROCEDURE — 65270000029 HC RM PRIVATE

## 2020-07-29 PROCEDURE — 74011250636 HC RX REV CODE- 250/636: Performed by: HOSPITALIST

## 2020-07-29 PROCEDURE — 85379 FIBRIN DEGRADATION QUANT: CPT

## 2020-07-29 RX ORDER — POTASSIUM CHLORIDE 20 MEQ/1
40 TABLET, EXTENDED RELEASE ORAL EVERY 4 HOURS
Status: COMPLETED | OUTPATIENT
Start: 2020-07-29 | End: 2020-07-29

## 2020-07-29 RX ORDER — HEPARIN SODIUM 5000 [USP'U]/ML
35 INJECTION, SOLUTION INTRAVENOUS; SUBCUTANEOUS ONCE
Status: COMPLETED | OUTPATIENT
Start: 2020-07-29 | End: 2020-07-29

## 2020-07-29 RX ORDER — GUAIFENESIN 100 MG/5ML
100 SOLUTION ORAL
Status: DISCONTINUED | OUTPATIENT
Start: 2020-07-29 | End: 2020-08-03 | Stop reason: HOSPADM

## 2020-07-29 RX ADMIN — POTASSIUM CHLORIDE 40 MEQ: 20 TABLET, EXTENDED RELEASE ORAL at 21:53

## 2020-07-29 RX ADMIN — CLORAZEPATE DIPOTASSIUM 3.75 MG: 7.5 TABLET ORAL at 16:47

## 2020-07-29 RX ADMIN — SERTRALINE HYDROCHLORIDE 100 MG: 100 TABLET ORAL at 08:52

## 2020-07-29 RX ADMIN — BUDESONIDE AND FORMOTEROL FUMARATE DIHYDRATE 2 PUFF: 80; 4.5 AEROSOL RESPIRATORY (INHALATION) at 20:40

## 2020-07-29 RX ADMIN — TIOTROPIUM BROMIDE INHALATION SPRAY 2 PUFF: 3.12 SPRAY, METERED RESPIRATORY (INHALATION) at 08:09

## 2020-07-29 RX ADMIN — BUDESONIDE AND FORMOTEROL FUMARATE DIHYDRATE 2 PUFF: 80; 4.5 AEROSOL RESPIRATORY (INHALATION) at 08:09

## 2020-07-29 RX ADMIN — Medication 1500 MG: at 08:50

## 2020-07-29 RX ADMIN — APIXABAN 10 MG: 5 TABLET, FILM COATED ORAL at 12:20

## 2020-07-29 RX ADMIN — APIXABAN 10 MG: 5 TABLET, FILM COATED ORAL at 21:49

## 2020-07-29 RX ADMIN — MONTELUKAST 10 MG: 10 TABLET, FILM COATED ORAL at 08:52

## 2020-07-29 RX ADMIN — DEXAMETHASONE SODIUM PHOSPHATE 6 MG: 10 INJECTION INTRAMUSCULAR; INTRAVENOUS at 08:53

## 2020-07-29 RX ADMIN — Medication 10 ML: at 21:43

## 2020-07-29 RX ADMIN — CHOLECALCIFEROL TAB 25 MCG (1000 UNIT) 1 TABLET: 25 TAB at 09:00

## 2020-07-29 RX ADMIN — Medication 10 ML: at 15:22

## 2020-07-29 RX ADMIN — FLUTICASONE PROPIONATE 2 SPRAY: 50 SPRAY, METERED NASAL at 09:00

## 2020-07-29 RX ADMIN — CLORAZEPATE DIPOTASSIUM 3.75 MG: 7.5 TABLET ORAL at 21:53

## 2020-07-29 RX ADMIN — POTASSIUM CHLORIDE 40 MEQ: 20 TABLET, EXTENDED RELEASE ORAL at 16:47

## 2020-07-29 RX ADMIN — Medication 500 MG: at 08:50

## 2020-07-29 RX ADMIN — PANTOPRAZOLE SODIUM 40 MG: 40 TABLET, DELAYED RELEASE ORAL at 16:47

## 2020-07-29 RX ADMIN — Medication 220 MG: at 08:52

## 2020-07-29 RX ADMIN — HEPARIN SODIUM 2200 UNITS: 5000 INJECTION INTRAVENOUS; SUBCUTANEOUS at 02:57

## 2020-07-29 RX ADMIN — Medication 10 ML: at 05:38

## 2020-07-29 RX ADMIN — SUCRALFATE 1 G: 1 TABLET ORAL at 08:52

## 2020-07-29 RX ADMIN — PANTOPRAZOLE SODIUM 40 MG: 40 TABLET, DELAYED RELEASE ORAL at 08:52

## 2020-07-29 RX ADMIN — ASPIRIN 81 MG: 81 TABLET, COATED ORAL at 08:52

## 2020-07-29 RX ADMIN — Medication 10 MG: at 22:00

## 2020-07-29 RX ADMIN — CLORAZEPATE DIPOTASSIUM 3.75 MG: 7.5 TABLET ORAL at 08:53

## 2020-07-29 RX ADMIN — Medication 1500 MG: at 16:47

## 2020-07-29 RX ADMIN — SUCRALFATE 1 G: 1 TABLET ORAL at 16:47

## 2020-07-29 RX ADMIN — SUCRALFATE 1 G: 1 TABLET ORAL at 21:53

## 2020-07-29 NOTE — INTERDISCIPLINARY ROUNDS
Interdisciplinary team rounds were held 7/29/2020 with the following team members:Care Management, Nursing and Physician. Plan of care discussed. See clinical pathway and/or care plan for interventions and desired outcomes.

## 2020-07-29 NOTE — PROGRESS NOTES
responded to ACP consult, met with patient, went over healthcare power of  form, answered questions. Patient wants to review/complete form and will call spiritual care for assistance.     Catrina Sanz PRN  [phone]

## 2020-07-29 NOTE — PROGRESS NOTES
Hospitalist Progress Note     Admit Date:  2020  9:34 PM   Name:  Kuldeep Tripathi   Age:  66 y.o.  :  1945   MRN:  [id number]   PCP:  Jaye Howell MD  Treatment Team: Attending Provider: Iliana Martinez MD; Utilization Review: Raeann Parish RN; Care Manager: Pascale Lopez, RN; Physical Therapist: Tawnya Mobley PT    Subjective:   Chief complaint cough shortness of breath     This is a 78-year-old female with past medical history significant for asthma, hypertension, presented to the ER because of cough and shortness of breath for the last week. Patient states that she was seen about 3 days ago for a nosebleed. She went to see ENT physician Dr. Dary Simms today. Because of shortness of breath she was sent to the ER for further evaluation. Patient states that she has been having nonproductive cough for the last week. No hemoptysis, no sputum production. Shortness of breath at rest worse with activity. Progressively worsening shortness of breath for the last week. No orthopnea no paroxysmal nocturnal dyspnea. She denies any chest pain or palpitations. No fever no chills. She denies any headache or dizziness. At baseline patient is not on any oxygen. No nausea no vomiting no diarrhea, no constipation, no abdominal pain. No weakness tingling or numbness of extremities, she has bilateral lower extremity edema. No pain in the legs. No dysuria urgency or frequency of urination,     10 systems reviewed and negative except as noted in HPI.     ER course:     Afebrile, tachycardic with heart rate of 95/min, tachypneic with rate of 30/min, initial blood pressure 93/60, recheck blood pressure was 150/80, oxygen saturation of 87% on room air. Placed on 4 L oxygen by nasal cannula. Arterial blood gas with pH of 7.46, PCO2 of 32.5, PO2 of 154, oxygen saturation 99% on 4 L oxygen by nasal cannula.   Chest x-ray with increasing peripheral hazy opacity in the left midlung could represent a developing airspace consolidation or pneumonia. CT angiogram of the chest showed bilateral pulmonary emboli that originates in the peripheral right and left main bronchus and extends into the lobar pulmonary arteries. Clot burden is considered extensive. Cardiomegaly, dependent atelectasis with nonspecific irregular faint groundglass opacity in the bilateral lungs. Moderate-sized hiatal hernia, 2.6 cm left adrenal nodule, 4 mm right upper lobe pulmonary nodule.     Patient admitted for further evaluation management of acute hypoxemic respiratory failure, bilateral extensive PE. COVID test pending.     7/28/2020  C/o mild shortness of breath better then yesterday  Probably for IVC filter today  Npo  On heparin drip    7/29/2020  Pt says breathing mild better  Had IVC yesterday      Objective:     Patient Vitals for the past 24 hrs:   Temp Pulse Resp BP SpO2   07/29/20 1128 97.4 °F (36.3 °C) 84 28 124/68 94 %   07/29/20 0809     90 %   07/29/20 0717 97.9 °F (36.6 °C) 84 16 123/59 96 %   07/29/20 0411 97.6 °F (36.4 °C) 83 20 109/59 96 %   07/28/20 2331 98.5 °F (36.9 °C) 87 20 113/69 94 %   07/28/20 2139     91 %   07/28/20 1940 97.3 °F (36.3 °C) 78 18 122/57 99 %   07/28/20 1800 97.6 °F (36.4 °C) 86 18 129/61 99 %   07/28/20 1620  72  123/64 99 %   07/28/20 1618  74  123/64 97 %   07/28/20 1556  68  126/70 100 %   07/28/20 1551  69  140/71 100 %   07/28/20 1546  80  142/72 99 %   07/28/20 1541  86  135/76 94 %   07/28/20 1536  72  134/78 (!) 75 %   07/28/20 1531  71  119/66 100 %   07/28/20 1526  66  114/63 (!) 77 %   07/28/20 1520  83 16 105/65 (!) 79 %   07/28/20 1516  78 20 110/61 98 %   07/28/20 1510  74 25 105/60 100 %   07/28/20 1505  85 24 109/60 98 %   07/28/20 1500  77 24 118/60 97 %   07/28/20 1455  85 23 120/62 100 %   07/28/20 1451  76 18 125/70 98 %   07/28/20 1446  82 21 127/59 94 %   07/28/20 1441  75 20 116/73 98 %   07/28/20 1436  65 19 124/71 95 % 07/28/20 1431  94 18 125/71 99 %   07/28/20 1425  73 18 122/59 100 %   07/28/20 1420  71 18 111/70 100 %     Oxygen Therapy  O2 Sat (%): 94 % (07/29/20 1128)  Pulse via Oximetry: 73 beats per minute (07/29/20 0809)  O2 Device: Nasal cannula (07/29/20 0927)  O2 Flow Rate (L/min): 3 l/min (07/29/20 0927)  ETCO2 (mmHg): 20 mmHg (07/28/20 1618)  No intake or output data in the 24 hours ending 07/29/20 1418      General:    Well nourished. Alert. Mild resp distress on oxygen    heent- normal  CV:   RRR. No murmur, rub, or gallop. Lungs:   Coarse breath sounds  Abdomen:   Soft, nontender, nondistended. Cns- no focal neurological deficits  Extremities: Warm and dry. No cyanosis or edema. Skin:     No rashes or jaundice. Data Review:  I have reviewed all labs, meds, telemetry events, and studies from the last 24 hours. Recent Results (from the past 24 hour(s))   CBC WITH AUTOMATED DIFF    Collection Time: 07/28/20  4:08 PM   Result Value Ref Range    WBC 7.8 4.3 - 11.1 K/uL    RBC 3.61 (L) 4.05 - 5.2 M/uL    HGB 9.5 (L) 11.7 - 15.4 g/dL    HCT 31.6 (L) 35.8 - 46.3 %    MCV 87.5 79.6 - 97.8 FL    MCH 26.3 26.1 - 32.9 PG    MCHC 30.1 (L) 31.4 - 35.0 g/dL    RDW 15.7 (H) 11.9 - 14.6 %    PLATELET 212 362 - 511 K/uL    MPV 10.6 9.4 - 12.3 FL    ABSOLUTE NRBC 0.00 0.0 - 0.2 K/uL    DF AUTOMATED      NEUTROPHILS 80 (H) 43 - 78 %    LYMPHOCYTES 13 13 - 44 %    MONOCYTES 7 4.0 - 12.0 %    EOSINOPHILS 0 (L) 0.5 - 7.8 %    BASOPHILS 0 0.0 - 2.0 %    IMMATURE GRANULOCYTES 0 0.0 - 5.0 %    ABS. NEUTROPHILS 6.2 1.7 - 8.2 K/UL    ABS. LYMPHOCYTES 1.0 0.5 - 4.6 K/UL    ABS. MONOCYTES 0.5 0.1 - 1.3 K/UL    ABS. EOSINOPHILS 0.0 0.0 - 0.8 K/UL    ABS. BASOPHILS 0.0 0.0 - 0.2 K/UL    ABS. IMM.  GRANS. 0.0 0.0 - 0.5 K/UL   PTT    Collection Time: 07/28/20  4:08 PM   Result Value Ref Range    aPTT 128.9 (H) 24.3 - 35.4 SEC   PTT    Collection Time: 07/29/20 12:14 AM   Result Value Ref Range    aPTT 46.7 (H) 24.3 - 35.4 SEC D DIMER    Collection Time: 07/29/20  8:53 AM   Result Value Ref Range    D DIMER 1.69 (HH) <0.56 ug/ml(FEU)   CBC WITH AUTOMATED DIFF    Collection Time: 07/29/20  8:53 AM   Result Value Ref Range    WBC 11.2 (H) 4.3 - 11.1 K/uL    RBC 3.53 (L) 4.05 - 5.2 M/uL    HGB 9.2 (L) 11.7 - 15.4 g/dL    HCT 31.1 (L) 35.8 - 46.3 %    MCV 88.1 79.6 - 97.8 FL    MCH 26.1 26.1 - 32.9 PG    MCHC 29.6 (L) 31.4 - 35.0 g/dL    RDW 16.1 (H) 11.9 - 14.6 %    PLATELET 653 581 - 376 K/uL    MPV 10.8 9.4 - 12.3 FL    ABSOLUTE NRBC 0.04 0.0 - 0.2 K/uL    DF AUTOMATED      NEUTROPHILS 61 43 - 78 %    LYMPHOCYTES 28 13 - 44 %    MONOCYTES 11 4.0 - 12.0 %    EOSINOPHILS 0 (L) 0.5 - 7.8 %    BASOPHILS 0 0.0 - 2.0 %    IMMATURE GRANULOCYTES 0 0.0 - 5.0 %    ABS. NEUTROPHILS 6.8 1.7 - 8.2 K/UL    ABS. LYMPHOCYTES 3.1 0.5 - 4.6 K/UL    ABS. MONOCYTES 1.2 0.1 - 1.3 K/UL    ABS. EOSINOPHILS 0.1 0.0 - 0.8 K/UL    ABS. BASOPHILS 0.0 0.0 - 0.2 K/UL    ABS. IMM.  GRANS. 0.0 0.0 - 0.5 K/UL   METABOLIC PANEL, BASIC    Collection Time: 07/29/20  8:53 AM   Result Value Ref Range    Sodium 143 136 - 145 mmol/L    Potassium 3.3 (L) 3.5 - 5.1 mmol/L    Chloride 110 (H) 98 - 107 mmol/L    CO2 23 21 - 32 mmol/L    Anion gap 10 7 - 16 mmol/L    Glucose 114 (H) 65 - 100 mg/dL    BUN 16 8 - 23 MG/DL    Creatinine 0.70 0.6 - 1.0 MG/DL    GFR est AA >60 >60 ml/min/1.73m2    GFR est non-AA >60 >60 ml/min/1.73m2    Calcium 8.2 (L) 8.3 - 10.4 MG/DL   PTT    Collection Time: 07/29/20  8:53 AM   Result Value Ref Range    aPTT 142.6 (H) 24.3 - 35.4 SEC   PLEASE READ & DOCUMENT PPD TEST IN 24 HRS    Collection Time: 07/29/20  1:08 PM   Result Value Ref Range    PPD      mm Induration 0 0 - 5 mm        All Micro Results     None          Current Meds:  Current Facility-Administered Medications   Medication Dose Route Frequency    apixaban (ELIQUIS) tablet 10 mg  10 mg Oral Q12H    [START ON 8/5/2020] apixaban (ELIQUIS) tablet 5 mg  5 mg Oral Q12H    potassium chloride (K-DUR, KLOR-CON) SR tablet 40 mEq  40 mEq Oral Q4H    sodium chloride (NS) flush 5-40 mL  5-40 mL IntraVENous Q8H    sodium chloride (NS) flush 5-40 mL  5-40 mL IntraVENous PRN    polyethylene glycol (MIRALAX) packet 17 g  17 g Oral DAILY PRN    promethazine (PHENERGAN) tablet 12.5 mg  12.5 mg Oral Q6H PRN    Or    ondansetron (ZOFRAN) injection 4 mg  4 mg IntraVENous Q6H PRN    acetaminophen (TYLENOL) tablet 650 mg  650 mg Oral Q6H PRN    Or    acetaminophen (TYLENOL) suppository 650 mg  650 mg Rectal Q6H PRN    dexamethasone (DECADRON) 10 mg/mL injection 6 mg  6 mg IntraVENous DAILY    albuterol-ipratropium (DUO-NEB) 2.5 MG-0.5 MG/3 ML  3 mL Nebulization Q4H PRN    aspirin delayed-release tablet 81 mg  81 mg Oral DAILY    calcium carbonate (OS-JANA) tablet 500 mg [elemental]  500 mg Oral DAILY    cholecalciferol (VITAMIN D3) (1000 Units /25 mcg) tablet 1 Tab  1,000 Units Oral DAILY    clorazepate (TRANXENE) tablet 3.75 mg  3.75 mg Oral TID    fluticasone propionate (FLONASE) 50 mcg/actuation nasal spray 2 Spray  2 Spray Both Nostrils DAILY    melatonin cap 10 mg (Patient Supplied)  10 mg Oral QHS    montelukast (SINGULAIR) tablet 10 mg  10 mg Oral DAILY    pantoprazole (PROTONIX) tablet 40 mg  40 mg Oral ACB&D    sertraline (ZOLOFT) tablet 100 mg  100 mg Oral DAILY    sucralfate (CARAFATE) tablet 1 g  1 g Oral TID    budesonide-formoterol (SYMBICORT) 80-4.5 mcg inhaler  2 Puff Inhalation BID RT    tiotropium bromide (SPIRIVA RESPIMAT) 2.5 mcg /actuation  2 Puff Inhalation DAILY    ascorbic acid (vitamin C) (VITAMIN C) tablet 1,500 mg  1,500 mg Oral TID    zinc sulfate tablet 220 mg  220 mg Oral DAILY    hydrALAZINE (APRESOLINE) 20 mg/mL injection 10 mg  10 mg IntraVENous Q6H PRN       Other Studies (last 24 hours):  No results found.     Assessment and Plan:     Hospital Problems as of 7/29/2020 Date Reviewed: 5/13/2020          Codes Class Noted - Resolved POA    Hypokalemia ICD-10-CM: E87.6  ICD-9-CM: 276.8  7/29/2020 - Present Unknown        * (Principal) Acute hypoxemic respiratory failure (HCC) ICD-10-CM: J96.01  ICD-9-CM: 518.81  7/27/2020 - Present Unknown        Pulmonary embolism, bilateral (HCC) ICD-10-CM: I26.99  ICD-9-CM: 415.19  7/27/2020 - Present Unknown        Person under investigation for COVID-19 ICD-10-CM: Z20.828  ICD-9-CM: V01.79  7/27/2020 - Present Unknown        Asthma ICD-10-CM: J45.909  ICD-9-CM: 493.90  7/27/2020 - Present Unknown        Esophageal reflux ICD-10-CM: K21.9  ICD-9-CM: 530.81  Unknown - Present Yes        Anxiety ICD-10-CM: F41.9  ICD-9-CM: 300.00  Unknown - Present Yes        Essential hypertension, benign ICD-10-CM: I10  ICD-9-CM: 401.1  Unknown - Present Yes              PLAN:    - acute hypoxic resp failure- secondary to bilateral PE.will dc heparin and start on eliquis. IVC filter 7/28/2020.  -Bilateral DVT  -R/o covid  Cont steroid, vitamin c and zn  - gerd  - htn  - asthma  - anxiety/depression - stable  Ct findings-   Moderate-sized hiatal hernia. 2.6 cm left adrenal nodule. 4 mm right upper lobe pulmonary nodule.     DC planning/Dispo:    DVT ppx: eliquis    Signed:  Rita Wheatley MD

## 2020-07-29 NOTE — PROGRESS NOTES
Patient resting in bed. Respirations present, non-labored. 3 LPM oxygen via NC. PIV infusing w/ Heparin w/o difficulty. Incision dressing to Right groin - intact w/ breakthrough bleeding noted. 1+ pitting edema BLE noted, pedal pulse present. Safety measures in place. Will continue to monitor.

## 2020-07-29 NOTE — PROGRESS NOTES
Problem: Mobility Impaired (Adult and Pediatric)  Goal: *Acute Goals and Plan of Care  Outcome: Progressing Towards Goal  Note: Acute PT Goals:  (1.)Alice Bradford will move from supine to sit and sit to supine , scoot up and down, and roll side to side with MODIFIED INDEPENDENCE within 7 treatment day(s). (2.)Alice Bradford will transfer from bed to chair and chair to bed with MODIFIED INDEPENDENCE using the least restrictive device within 7 treatment day(s). (3.)Alice Bradford will ambulate with STAND BY ASSIST for 250 feet with the least restrictive device within 7 treatment day(s). (4.)Alice Bradford will perform standing static and dynamic balance activities x 15 minutes with STAND BY ASSIST to improve safety and activity tolerance within 7 treatment day(s). (Dereje Ramachandran will perform bilateral lower extremity exercises x 15 min for HEP with INDEPENDENCE to improve strength, endurance, and functional mobility within 7 treatment day(s). PHYSICAL THERAPY: Daily Note and AM 7/29/2020  INPATIENT: PT Visit Days : 2  [place] / [place] / Product Type: Medicare /       NAME/AGE/GENDER: Lindy Ramirez is a 66 y.o. female   PRIMARY DIAGNOSIS: Pulmonary embolism, bilateral ([facility] [I26.99]  Acute hypoxemic respiratory failure ([facility] [J96.01]  Person under investigation for COVID-19 [Z20.828] Acute hypoxemic respiratory failure ([facility] Acute hypoxemic respiratory failure ([facility]       ICD-10: Treatment Diagnosis:   · Generalized Muscle Weakness (M62.81)  · Other lack of cordination (R27.8)  · Difficulty in walking, Not elsewhere classified (R26.2)  · Other abnormalities of gait and mobility (R26.89)  · Repeated Falls (R29.6)  · History of falling (Z91.81)   Precaution/Allergies:  Flexeril [cyclobenzaprine]; Ketoprofen; and Plaquenil [hydroxychloroquine]      ASSESSMENT:     Ms. Ryley Bradford is a 66year old F who presents to hospital with cough, SOB.  Prior to hospital admission pt lives with a roommate in a one story home with no step(s) to enter (has a ramp). Her  lives in another home as she reports Jacek Greene is a pack rat\" - he is currently at rehab. Another friend assists her with getting to appointments. Pt endorses at least ten falls in past 6 months. Prior to admission Ms. Delfino Damon uses a rollator walker or a power scooter for mobility. She does not wear oxygen at baseline. 7/29/20: Upon entering, pt resting in bed, agreeable to PT.  she reports no pain at rest. On 3L O2. Pt performed supine > sit with CGA, sitting EOB with good sitting balance control. Sit > stand with Min A using rollator walker. Pt ambulated  to bedside chair, fair standing balance control . Fatigued following exertion. PT instructed pt in BLE exercises to promote strength, activity tolerance, and mobility. At end of session pt sitting up in chair with all needs within reach, alarm activated for safety, RN notified. Pt presents as functioning below her baseline, with deficits in mobility including transfers, gait, balance, and activity tolerance. Pt will benefit from skilled therapy services to address stated deficits to promote return to highest level of function, independence, and safety. Will continue therapy efforts. Pt making slow progress towards goals which continue to be appropriate. Noted progress in bed mobility, activity tolerance, and transfers this session. This section established at most recent assessment   PROBLEM LIST (Impairments causing functional limitations):  1. Decreased Strength  2. Decreased ADL/Functional Activities  3. Decreased Transfer Abilities  4. Decreased Ambulation Ability/Technique  5. Decreased Balance  6. Decreased Activity Tolerance  7. Increased Fatigue  8. Increased Shortness of Breath   INTERVENTIONS PLANNED: (Benefits and precautions of physical therapy have been discussed with the patient.)  1. Balance Exercise  2. Bed Mobility  3. Family Education  4. Gait Training  5.  Home Exercise Program (HEP)  6. Neuromuscular Re-education/Strengthening  7. Range of Motion (ROM)  8. Therapeutic Activites  9. Therapeutic Exercise/Strengthening  10. Transfer Training     TREATMENT PLAN: Frequency/Duration: 3 times a week for duration of hospital stay  Rehabilitation Potential For Stated Goals: Good     REHAB RECOMMENDATIONS (at time of discharge pending progress):    Placement: It is my opinion, based on this patient's performance to date, that Ms. Evgeny Sousa may benefit from intensive therapy at a [address] [place] [address] after discharge due to the functional deficits listed above that are likely to improve with skilled rehabilitation and concerns that he/she may be unsafe to be unsupervised at home due to medical complications and mobility deficits which put her at increase risk of functional decline and/or falling . Equipment:    None at this time            HISTORY:   History of Present Injury/Illness (Reason for Referral):  Per H&P: \"This is a 57-year-old female with past medical history significant for asthma, hypertension, presented to the ER because of cough and shortness of breath for the last week. Patient states that she was seen about 3 days ago for a nosebleed. She went to see ENT physician Dr. Cheng Davis today. Because of shortness of breath she was sent to the ER for further evaluation. Patient states that she has been having nonproductive cough for the last week. No hemoptysis, no sputum production. Shortness of breath at rest worse with activity. Progressively worsening shortness of breath for the last week. No orthopnea no paroxysmal nocturnal dyspnea. She denies any chest pain or palpitations. No fever no chills. She denies any headache or dizziness. At baseline patient is not on any oxygen. No nausea no vomiting no diarrhea, no constipation, no abdominal pain. No weakness tingling or numbness of extremities, she has bilateral lower extremity edema. No pain in the legs. No dysuria urgency or frequency of urination,  10 systems reviewed and negative except as noted in HPI. ER course:  Afebrile, tachycardic with heart rate of 95/min, tachypneic with rate of 30/min, initial blood pressure 93/60, recheck blood pressure was 150/80, oxygen saturation of 87% on room air. Placed on 4 L oxygen by nasal cannula. CBC with white count of 8.9 hemoglobin 10.7 platelet count 166. CMP is fairly unremarkable. High-sensitivity troponin of 21, NT proBNP of 5231. Arterial blood gas with pH of 7.46, PCO2 of 32.5, PO2 of 154, oxygen saturation 99% on 4 L oxygen by nasal cannula. Chest x-ray with increasing peripheral hazy opacity in the left midlung could represent a developing airspace consolidation or pneumonia. CT angiogram of the chest showed bilateral pulmonary emboli that originates in the peripheral right and left main bronchus and extends into the lobar pulmonary arteries. Clot burden is considered extensive. Cardiomegaly, dependent atelectasis with nonspecific irregular faint groundglass opacity in the bilateral lungs. Moderate-sized hiatal hernia, 2.6 cm left adrenal nodule, 4 mm right upper lobe pulmonary nodule. Patient admitted for further evaluation management of acute hypoxemic respiratory failure, bilateral extensive PE. COVID test pending. \"  Past Medical History/Comorbidities:   Ms. Frank Howard  has a past medical history of Abnormality of gait (7/18/2018), Anemia, Anxiety, Arthritis, Asthma, Calculus of kidney, Chronic pain, Contact dermatitis and other eczema, due to unspecified cause, Depressive disorder, not elsewhere classified, Encounter for chronic pain management, Esophageal reflux, Essential hypertension, benign, Frequent falls (7/18/2018), GERD (gastroesophageal reflux disease), and Pure hypercholesterolemia.   Ms. Frank Howard  has a past surgical history that includes hx knee replacement (Right); hx shoulder replacement (Left); hx hysterectomy; hx lithotripsy; hx cataract removal (Bilateral); hx retinal detachment repair; hx colonoscopy; hx gi; and neurological procedure unlisted. Social History/Living Environment:   Home Environment: Private residence  # Steps to Enter: 0  One/Two Story Residence: One story  Living Alone: No  Support Systems: Friends \ neighbors  Patient Expects to be Discharged to[de-identified] Unknown  Current DME Used/Available at Home: Samuella Land, rollator, Wheelchair, [id number] chair  Tub or Shower Type: Tub/Shower combination  Prior Level of Function/Work/Activity:  Prior to hospital admission pt lives with a roommate in a one story home with no step(s) to enter (has a ramp). Her  lives in another home as she reports Herbert Arriaza is a pack rat\" - he is currently at rehab. Another friend assists her with getting to appointments. Pt endorses at least ten falls in past 6 months. Prior to admission Ms. Josesito Fritz uses a rollator walker or a power scooter for mobility. She does not wear oxygen at baseline. Number of Personal Factors/Comorbidities that affect the Plan of Care: 1-2: MODERATE COMPLEXITY   EXAMINATION:   Most Recent Physical Functioning:   Gross Assessment:  AROM: Within functional limits  Strength: Generally decreased, functional  Sensation: Intact               Posture:     Balance:  Sitting: Intact  Standing: Impaired  Standing - Static: Fair;Constant support  Standing - Dynamic : Fair;Constant support Bed Mobility:  Rolling: Contact guard assistance  Supine to Sit: Contact guard assistance  Sit to Supine: Contact guard assistance  Scooting: Contact guard assistance  Wheelchair Mobility:     Transfers:  Sit to Stand: Minimum assistance  Stand to Sit: Contact guard assistance  Bed to Chair: Contact guard assistance;Minimum assistance  Interventions: Safety awareness training; Tactile cues; Verbal cues  Gait:     Base of Support: Center of gravity altered  Speed/Jenifer: Shuffled; Slow  Step Length: Left shortened;Right shortened  Gait Abnormalities: Decreased step clearance;Trunk sway increased; Path deviations; Shuffling gait  Distance (ft): 10 Feet (ft)  Assistive Device: Walker, rollator  Ambulation - Level of Assistance: Contact guard assistance;Minimal assistance      Body Structures Involved:  1. Heart  2. Lungs  3. Bones  4. Joints  5. Muscles Body Functions Affected:  1. Cardio  2. Respiratory  3. Neuromusculoskeletal  4. Movement Related Activities and Participation Affected:  1. General Tasks and Demands  2. Mobility  3. Self Care   Number of elements that affect the Plan of Care: 3: MODERATE COMPLEXITY   CLINICAL PRESENTATION:   Presentation: Evolving clinical presentation with changing clinical characteristics: MODERATE COMPLEXITY   CLINICAL DECISION MAKIN [facility] Mobility Inpatient Short Form  How much difficulty does the patient currently have. .. Unable A Lot A Little None   1. Turning over in bed (including adjusting bedclothes, sheets and blankets)? [] 1   [] 2   [x] 3   [] 4   2. Sitting down on and standing up from a chair with arms ( e.g., wheelchair, bedside commode, etc.)   [] 1   [] 2   [x] 3   [] 4   3. Moving from lying on back to sitting on the side of the bed? [] 1   [] 2   [x] 3   [] 4   How much help from another person does the patient currently need. .. Total A Lot A Little None   4. Moving to and from a bed to a chair (including a wheelchair)? [] 1   [] 2   [x] 3   [] 4   5. Need to walk in hospital room? [] 1   [] 2   [x] 3   [] 4   6. Climbing 3-5 steps with a railing? [] 1   [x] 2   [] 3   [] 4   © , Trustees of [address] [place] [address] [address] [address], under license to [employer]. All rights reserved      Score:  Initial: 17 Most Recent: X (Date: -- )    Interpretation of Tool:  Represents activities that are increasingly more difficult (i.e. Bed mobility, Transfers, Gait).     Medical Necessity:     · Patient is expected to demonstrate progress in   · strength, range of motion, balance, coordination, functional technique, and activity tolerance  ·  to   · increase independence with all mobility and decrease fall risk. · .  Reason for Services/Other Comments:  · Patient continues to require skilled intervention due to   · medical complications and mobility deficits which impact her level of function, safety, and independence  · . Use of outcome tool(s) and clinical judgement create a POC that gives a: Questionable prediction of patient's progress: MODERATE COMPLEXITY        TREATMENT:   (In addition to Assessment/Re-Assessment sessions the following treatments were rendered)   Pre-treatment Symptoms/Complaints:  Fatigue  Pain: Initial:   Pain Intensity 1: 0  Post Session:  0/10     Therapeutic Activity: (   26 Minutes): Therapeutic activities including Bed transfers, Chair transfers, Ambulation on level ground, and BLE exercises to improve mobility, strength, balance, coordination, and activity tolerance . Required minimal cues  to promote static and dynamic balance in standing and promote coordination of bilateral, lower extremity(s). Date:  7/29/20 Date:   Date:     Activity/Exercise Parameters Parameters Parameters   Seated AP 1 x 15 BLE     Seated LAQ 1 x 8 BLE     Seated Marches 1 x 8 BLE                               Braces/Orthotics/Lines/Etc:   · IV  · O2 Device: Nasal cannula  Treatment/Session Assessment:    · Response to Treatment:  See above  · Interdisciplinary Collaboration:   o Physical Therapist  o Registered Nurse  · After treatment position/precautions:   o Up in chair  o Bed/Chair-wheels locked  o Bed in low position  o Call light within reach  o RN notified   · Compliance with Program/Exercises: Will assess as treatment progresses  · Recommendations/Intent for next treatment session: \"Next visit will focus on advancements to more challenging activities and reduction in assistance provided\".     Total Treatment Duration:  PT Patient Time In/Time Out  Time In: 0927  Time Out: [address] [place] [address]

## 2020-07-29 NOTE — ACP (ADVANCE CARE PLANNING)
responded to ACP consult, met with patient, went over healthcare power of  form, answered questions. Patient wants to review/complete form and will call spiritual care for assistance.     Patti Anton   PRN  [phone]

## 2020-07-30 LAB
ANION GAP SERPL CALC-SCNC: 8 MMOL/L (ref 7–16)
BASOPHILS # BLD: 0 K/UL (ref 0–0.2)
BASOPHILS NFR BLD: 0 % (ref 0–2)
BUN SERPL-MCNC: 13 MG/DL (ref 8–23)
CALCIUM SERPL-MCNC: 8.3 MG/DL (ref 8.3–10.4)
CHLORIDE SERPL-SCNC: 108 MMOL/L (ref 98–107)
CO2 SERPL-SCNC: 25 MMOL/L (ref 21–32)
CREAT SERPL-MCNC: 0.72 MG/DL (ref 0.6–1)
DIFFERENTIAL METHOD BLD: ABNORMAL
EOSINOPHIL # BLD: 0 K/UL (ref 0–0.8)
EOSINOPHIL NFR BLD: 0 % (ref 0.5–7.8)
ERYTHROCYTE [DISTWIDTH] IN BLOOD BY AUTOMATED COUNT: 15.9 % (ref 11.9–14.6)
GLUCOSE SERPL-MCNC: 121 MG/DL (ref 65–100)
HCT VFR BLD AUTO: 28 % (ref 35.8–46.3)
HGB BLD-MCNC: 8.4 G/DL (ref 11.7–15.4)
IMM GRANULOCYTES # BLD AUTO: 0.1 K/UL (ref 0–0.5)
IMM GRANULOCYTES NFR BLD AUTO: 1 % (ref 0–5)
LYMPHOCYTES # BLD: 2.9 K/UL (ref 0.5–4.6)
LYMPHOCYTES NFR BLD: 27 % (ref 13–44)
MCH RBC QN AUTO: 26.1 PG (ref 26.1–32.9)
MCHC RBC AUTO-ENTMCNC: 30 G/DL (ref 31.4–35)
MCV RBC AUTO: 87 FL (ref 79.6–97.8)
MM INDURATION POC: 0 MM (ref 0–5)
MONOCYTES # BLD: 1.4 K/UL (ref 0.1–1.3)
MONOCYTES NFR BLD: 13 % (ref 4–12)
NEUTS SEG # BLD: 6.4 K/UL (ref 1.7–8.2)
NEUTS SEG NFR BLD: 59 % (ref 43–78)
NRBC # BLD: 0.05 K/UL (ref 0–0.2)
PLATELET # BLD AUTO: 268 K/UL (ref 150–450)
PMV BLD AUTO: 10.8 FL (ref 9.4–12.3)
POTASSIUM SERPL-SCNC: 4.2 MMOL/L (ref 3.5–5.1)
PPD POC: NORMAL
RBC # BLD AUTO: 3.22 M/UL (ref 4.05–5.2)
SODIUM SERPL-SCNC: 141 MMOL/L (ref 136–145)
WBC # BLD AUTO: 10.7 K/UL (ref 4.3–11.1)

## 2020-07-30 PROCEDURE — 65270000029 HC RM PRIVATE

## 2020-07-30 PROCEDURE — 77030021668 HC NEB PREFIL KT VYRM -A

## 2020-07-30 PROCEDURE — 74011250637 HC RX REV CODE- 250/637: Performed by: FAMILY MEDICINE

## 2020-07-30 PROCEDURE — 94760 N-INVAS EAR/PLS OXIMETRY 1: CPT

## 2020-07-30 PROCEDURE — 74011250637 HC RX REV CODE- 250/637: Performed by: HOSPITALIST

## 2020-07-30 PROCEDURE — 74011000250 HC RX REV CODE- 250: Performed by: FAMILY MEDICINE

## 2020-07-30 PROCEDURE — 85025 COMPLETE CBC W/AUTO DIFF WBC: CPT

## 2020-07-30 PROCEDURE — 80048 BASIC METABOLIC PNL TOTAL CA: CPT

## 2020-07-30 PROCEDURE — 94640 AIRWAY INHALATION TREATMENT: CPT

## 2020-07-30 RX ORDER — PANTOPRAZOLE SODIUM 40 MG/1
40 TABLET, DELAYED RELEASE ORAL
Status: DISCONTINUED | OUTPATIENT
Start: 2020-07-31 | End: 2020-08-03 | Stop reason: HOSPADM

## 2020-07-30 RX ORDER — LATANOPROST 50 UG/ML
1 SOLUTION/ DROPS OPHTHALMIC EVERY EVENING
Status: DISCONTINUED | OUTPATIENT
Start: 2020-07-30 | End: 2020-08-03 | Stop reason: HOSPADM

## 2020-07-30 RX ORDER — CARBOXYMETHYLCELLULOSE SODIUM 10 MG/ML
1 GEL OPHTHALMIC
Status: DISCONTINUED | OUTPATIENT
Start: 2020-07-30 | End: 2020-08-03 | Stop reason: HOSPADM

## 2020-07-30 RX ADMIN — SUCRALFATE 1 G: 1 TABLET ORAL at 17:16

## 2020-07-30 RX ADMIN — Medication 220 MG: at 08:36

## 2020-07-30 RX ADMIN — ASPIRIN 81 MG: 81 TABLET, COATED ORAL at 08:36

## 2020-07-30 RX ADMIN — Medication 1500 MG: at 01:35

## 2020-07-30 RX ADMIN — Medication 1500 MG: at 08:35

## 2020-07-30 RX ADMIN — CHOLECALCIFEROL TAB 25 MCG (1000 UNIT) 1 TABLET: 25 TAB at 08:36

## 2020-07-30 RX ADMIN — BUDESONIDE AND FORMOTEROL FUMARATE DIHYDRATE 2 PUFF: 80; 4.5 AEROSOL RESPIRATORY (INHALATION) at 21:39

## 2020-07-30 RX ADMIN — GUAIFENESIN 100 MG: 100 SOLUTION ORAL at 00:03

## 2020-07-30 RX ADMIN — CLORAZEPATE DIPOTASSIUM 3.75 MG: 7.5 TABLET ORAL at 22:17

## 2020-07-30 RX ADMIN — APIXABAN 10 MG: 5 TABLET, FILM COATED ORAL at 08:37

## 2020-07-30 RX ADMIN — MONTELUKAST 10 MG: 10 TABLET, FILM COATED ORAL at 08:37

## 2020-07-30 RX ADMIN — PANTOPRAZOLE SODIUM 40 MG: 40 TABLET, DELAYED RELEASE ORAL at 06:26

## 2020-07-30 RX ADMIN — TIOTROPIUM BROMIDE INHALATION SPRAY 2 PUFF: 3.12 SPRAY, METERED RESPIRATORY (INHALATION) at 09:14

## 2020-07-30 RX ADMIN — SUCRALFATE 1 G: 1 TABLET ORAL at 08:36

## 2020-07-30 RX ADMIN — CLORAZEPATE DIPOTASSIUM 3.75 MG: 7.5 TABLET ORAL at 08:36

## 2020-07-30 RX ADMIN — Medication 1500 MG: at 17:16

## 2020-07-30 RX ADMIN — FLUTICASONE PROPIONATE 2 SPRAY: 50 SPRAY, METERED NASAL at 09:00

## 2020-07-30 RX ADMIN — LATANOPROST 1 DROP: 50 SOLUTION OPHTHALMIC at 18:16

## 2020-07-30 RX ADMIN — Medication 1500 MG: at 22:17

## 2020-07-30 RX ADMIN — Medication 500 MG: at 08:35

## 2020-07-30 RX ADMIN — BUDESONIDE AND FORMOTEROL FUMARATE DIHYDRATE 2 PUFF: 80; 4.5 AEROSOL RESPIRATORY (INHALATION) at 09:14

## 2020-07-30 RX ADMIN — Medication 5 ML: at 13:17

## 2020-07-30 RX ADMIN — SERTRALINE HYDROCHLORIDE 100 MG: 100 TABLET ORAL at 08:35

## 2020-07-30 RX ADMIN — APIXABAN 10 MG: 5 TABLET, FILM COATED ORAL at 22:17

## 2020-07-30 RX ADMIN — SUCRALFATE 1 G: 1 TABLET ORAL at 22:00

## 2020-07-30 NOTE — PROGRESS NOTES
Primary Nurse Jennifer Reid RN and Brent Lord se RN performed a dual skin assessment on this patient No impairment noted except bruising on bilateral all extremities, reddened area on the back of both heels, and sacrum. Transparent dressing over right groin where IVC filter was played.   Edson score is 18

## 2020-07-30 NOTE — PROGRESS NOTES
Met with pt at bedside maintaining social distance and PPE in place, STR list provided to pt to make choices, pt states she wants to speak with daughter about rehab.     CM will follow up with pt in am.

## 2020-07-30 NOTE — PROGRESS NOTES
Patient has rested at long intervals overnight, has been observed during hourly rounding and denies needs to this nurse at this time. Patient has been cleaned of incontinent urine and brief changed. Patient will continue to be monitored and assisted until day shift assumes care of this patient.

## 2020-07-30 NOTE — PROGRESS NOTES
Hospitalist Progress Note     Admit Date:  2020  9:34 PM   Name:  Irena Kelley   Age:  66 y.o.  :  1945   MRN:  [id number]   PCP:  Gina Rizzo MD  Treatment Team: Attending Provider: Geno Novak MD; Utilization Review: Giacomo Koehler RN; Care Manager: Gisell Dover RN    Subjective:   Chief complaint cough shortness of breath     This is a 13-year-old female with past medical history significant for asthma, hypertension, presented to the ER because of cough and shortness of breath for the last week. Patient states that she was seen about 3 days ago for a nosebleed. She went to see ENT physician Dr. Jyotsna Elizalde today. Because of shortness of breath she was sent to the ER for further evaluation. Patient states that she has been having nonproductive cough for the last week. No hemoptysis, no sputum production. Shortness of breath at rest worse with activity. Progressively worsening shortness of breath for the last week. No orthopnea no paroxysmal nocturnal dyspnea. She denies any chest pain or palpitations. No fever no chills. She denies any headache or dizziness. At baseline patient is not on any oxygen. No nausea no vomiting no diarrhea, no constipation, no abdominal pain. No weakness tingling or numbness of extremities, she has bilateral lower extremity edema. No pain in the legs. No dysuria urgency or frequency of urination,     10 systems reviewed and negative except as noted in HPI.     ER course:     Afebrile, tachycardic with heart rate of 95/min, tachypneic with rate of 30/min, initial blood pressure 93/60, recheck blood pressure was 150/80, oxygen saturation of 87% on room air. Placed on 4 L oxygen by nasal cannula. Arterial blood gas with pH of 7.46, PCO2 of 32.5, PO2 of 154, oxygen saturation 99% on 4 L oxygen by nasal cannula.   Chest x-ray with increasing peripheral hazy opacity in the left midlung could represent a developing airspace consolidation or pneumonia. CT angiogram of the chest showed bilateral pulmonary emboli that originates in the peripheral right and left main bronchus and extends into the lobar pulmonary arteries. Clot burden is considered extensive. Cardiomegaly, dependent atelectasis with nonspecific irregular faint groundglass opacity in the bilateral lungs. Moderate-sized hiatal hernia, 2.6 cm left adrenal nodule, 4 mm right upper lobe pulmonary nodule.     Patient admitted for further evaluation management of acute hypoxemic respiratory failure, bilateral extensive PE. COVID test pending. 7/28/2020  C/o mild shortness of breath better then yesterday  Probably for IVC filter today  Npo  On heparin drip    7/29/2020  Pt says breathing mild better  Had IVC yesterday    7/30/2020  Says doing ok  Shortness of breath better  On 3 lit/min      Objective:     Patient Vitals for the past 24 hrs:   Temp Pulse Resp BP SpO2   07/30/20 0914     99 %   07/30/20 0740 97.8 °F (36.6 °C) 75 20 117/58 98 %   07/30/20 0422 98.2 °F (36.8 °C) 78 20 124/77 96 %   07/30/20 0105 98.1 °F (36.7 °C) 82 20 119/51 94 %   07/29/20 2040     96 %   07/29/20 1949 98.2 °F (36.8 °C) 97 20 98/56 97 %   07/29/20 1623 98.1 °F (36.7 °C) 85 22 123/53 94 %   07/29/20 1128 97.4 °F (36.3 °C) 84 28 124/68 94 %     Oxygen Therapy  O2 Sat (%): 99 % (07/30/20 0914)  Pulse via Oximetry: 87 beats per minute (07/30/20 0914)  O2 Device: Nasal cannula (07/30/20 0914)  O2 Flow Rate (L/min): 3 l/min (07/30/20 0914)  ETCO2 (mmHg): 20 mmHg (07/28/20 1618)    Intake/Output Summary (Last 24 hours) at 7/30/2020 1122  Last data filed at 7/30/2020 0615  Gross per 24 hour   Intake    Output 1100 ml   Net -1100 ml         General:    Well nourished. Alert. Mild resp distress on oxygen  3 lit/min  heent- normal  CV:   RRR. No murmur, rub, or gallop. Lungs:   Coarse breath sounds  Abdomen:   Soft, nontender, nondistended. Cns- no focal neurological deficits  Extremities: Warm and dry. No cyanosis or edema. Skin:     No rashes or jaundice. Data Review:  I have reviewed all labs, meds, telemetry events, and studies from the last 24 hours. Recent Results (from the past 24 hour(s))   PLEASE READ & DOCUMENT PPD TEST IN 24 HRS    Collection Time: 07/29/20  1:08 PM   Result Value Ref Range    PPD      mm Induration 0 0 - 5 mm   CBC WITH AUTOMATED DIFF    Collection Time: 07/30/20  3:27 AM   Result Value Ref Range    WBC 10.7 4.3 - 11.1 K/uL    RBC 3.22 (L) 4.05 - 5.2 M/uL    HGB 8.4 (L) 11.7 - 15.4 g/dL    HCT 28.0 (L) 35.8 - 46.3 %    MCV 87.0 79.6 - 97.8 FL    MCH 26.1 26.1 - 32.9 PG    MCHC 30.0 (L) 31.4 - 35.0 g/dL    RDW 15.9 (H) 11.9 - 14.6 %    PLATELET 814 404 - 940 K/uL    MPV 10.8 9.4 - 12.3 FL    ABSOLUTE NRBC 0.05 0.0 - 0.2 K/uL    DF AUTOMATED      NEUTROPHILS 59 43 - 78 %    LYMPHOCYTES 27 13 - 44 %    MONOCYTES 13 (H) 4.0 - 12.0 %    EOSINOPHILS 0 (L) 0.5 - 7.8 %    BASOPHILS 0 0.0 - 2.0 %    IMMATURE GRANULOCYTES 1 0.0 - 5.0 %    ABS. NEUTROPHILS 6.4 1.7 - 8.2 K/UL    ABS. LYMPHOCYTES 2.9 0.5 - 4.6 K/UL    ABS. MONOCYTES 1.4 (H) 0.1 - 1.3 K/UL    ABS. EOSINOPHILS 0.0 0.0 - 0.8 K/UL    ABS. BASOPHILS 0.0 0.0 - 0.2 K/UL    ABS. IMM.  GRANS. 0.1 0.0 - 0.5 K/UL   METABOLIC PANEL, BASIC    Collection Time: 07/30/20  3:27 AM   Result Value Ref Range    Sodium 141 136 - 145 mmol/L    Potassium 4.2 3.5 - 5.1 mmol/L    Chloride 108 (H) 98 - 107 mmol/L    CO2 25 21 - 32 mmol/L    Anion gap 8 7 - 16 mmol/L    Glucose 121 (H) 65 - 100 mg/dL    BUN 13 8 - 23 MG/DL    Creatinine 0.72 0.6 - 1.0 MG/DL    GFR est AA >60 >60 ml/min/1.73m2    GFR est non-AA >60 >60 ml/min/1.73m2    Calcium 8.3 8.3 - 10.4 MG/DL        All Micro Results     None          Current Meds:  Current Facility-Administered Medications   Medication Dose Route Frequency    [START ON 7/31/2020] pantoprazole (PROTONIX) tablet 40 mg  40 mg Oral ACB    latanoprost (XALATAN) 0.005 % ophthalmic solution 1 Drop  1 Drop Both Eyes QPM    carboxymethylcellulose sodium (REFRESH LIQUIGEL) 1 % ophthalmic solution 1 Drop  1 Drop Both Eyes BID PRN    apixaban (ELIQUIS) tablet 10 mg  10 mg Oral Q12H    [START ON 8/5/2020] apixaban (ELIQUIS) tablet 5 mg  5 mg Oral Q12H    guaiFENesin (ROBITUSSIN) 100 mg/5 mL oral liquid 100 mg  100 mg Oral Q4H PRN    sodium chloride (NS) flush 5-40 mL  5-40 mL IntraVENous Q8H    sodium chloride (NS) flush 5-40 mL  5-40 mL IntraVENous PRN    polyethylene glycol (MIRALAX) packet 17 g  17 g Oral DAILY PRN    promethazine (PHENERGAN) tablet 12.5 mg  12.5 mg Oral Q6H PRN    Or    ondansetron (ZOFRAN) injection 4 mg  4 mg IntraVENous Q6H PRN    acetaminophen (TYLENOL) tablet 650 mg  650 mg Oral Q6H PRN    Or    acetaminophen (TYLENOL) suppository 650 mg  650 mg Rectal Q6H PRN    albuterol-ipratropium (DUO-NEB) 2.5 MG-0.5 MG/3 ML  3 mL Nebulization Q4H PRN    aspirin delayed-release tablet 81 mg  81 mg Oral DAILY    calcium carbonate (OS-JANA) tablet 500 mg [elemental]  500 mg Oral DAILY    cholecalciferol (VITAMIN D3) (1000 Units /25 mcg) tablet 1 Tab  1,000 Units Oral DAILY    clorazepate (TRANXENE) tablet 3.75 mg  3.75 mg Oral TID    fluticasone propionate (FLONASE) 50 mcg/actuation nasal spray 2 Spray  2 Spray Both Nostrils DAILY    melatonin cap 10 mg (Patient Supplied)  10 mg Oral QHS    montelukast (SINGULAIR) tablet 10 mg  10 mg Oral DAILY    sertraline (ZOLOFT) tablet 100 mg  100 mg Oral DAILY    sucralfate (CARAFATE) tablet 1 g  1 g Oral TID    budesonide-formoterol (SYMBICORT) 80-4.5 mcg inhaler  2 Puff Inhalation BID RT    tiotropium bromide (SPIRIVA RESPIMAT) 2.5 mcg /actuation  2 Puff Inhalation DAILY    ascorbic acid (vitamin C) (VITAMIN C) tablet 1,500 mg  1,500 mg Oral TID    zinc sulfate tablet 220 mg  220 mg Oral DAILY    hydrALAZINE (APRESOLINE) 20 mg/mL injection 10 mg  10 mg IntraVENous Q6H PRN       Other Studies (last 24 hours):  No results found.     Assessment and Plan: Hospital Problems as of 7/30/2020 Date Reviewed: 5/13/2020          Codes Class Noted - Resolved POA    Hypokalemia ICD-10-CM: E87.6  ICD-9-CM: 276.8  7/29/2020 - Present Unknown        * (Principal) Acute hypoxemic respiratory failure (HCC) ICD-10-CM: J96.01  ICD-9-CM: 518.81  7/27/2020 - Present Unknown        Pulmonary embolism, bilateral ([address] ICD-10-CM: I26.99  ICD-9-CM: 415.19  7/27/2020 - Present Unknown        Person under investigation for COVID-19 ICD-10-CM: Z20.828  ICD-9-CM: V01.79  7/27/2020 - Present Unknown        Asthma ICD-10-CM: J45.909  ICD-9-CM: 493.90  7/27/2020 - Present Unknown        Esophageal reflux ICD-10-CM: K21.9  ICD-9-CM: 530.81  Unknown - Present Yes        Anxiety ICD-10-CM: F41.9  ICD-9-CM: 300.00  Unknown - Present Yes        Essential hypertension, benign ICD-10-CM: I10  ICD-9-CM: 401.1  Unknown - Present Yes              PLAN:    - acute hypoxic resp failure- secondary to bilateral PE. on eliquis. IVC filter 7/28/2020.  -Bilateral DVT  -R/o covid- negative   vitamin c and zn  - gerd  - htn  - asthma  - anxiety/depression - stable  Ct findings-   Moderate-sized hiatal hernia. 2.6 cm left adrenal nodule. 4 mm right upper lobe pulmonary nodule.     DC planning/Dispo:    DVT ppx: eliquis    Signed:  Donya Chow MD

## 2020-07-30 NOTE — PROGRESS NOTES
Son Lavonda Holter notified of pt moving to room 903 and visiting hours. Also given update on pt condition.

## 2020-07-30 NOTE — PROGRESS NOTES
TRANSFER - IN REPORT:    Verbal report received from [facility] on Rut Miguel  being received from 8th floor for routine progression of care      Report consisted of patients Situation, Background, Assessment and   Recommendations(SBAR). Information from the following report(s) SBAR was reviewed with the receiving nurse. Opportunity for questions and clarification was provided. Assessment completed upon patients arrival to unit and care assumed.

## 2020-07-30 NOTE — PROGRESS NOTES
TRANSFER - OUT REPORT:    Verbal report given to [facility] RN(name) on Tiana Michelle  being transferred to [facility](unit) for routine progression of care       Report consisted of patients Situation, Background, Assessment and   Recommendations(SBAR). Information from the following report(s) SBAR was reviewed with the receiving nurse. Lines:   Peripheral IV 07/27/20 Left Antecubital (Active)   Site Assessment Clean, dry, & intact 07/30/20 0710   Phlebitis Assessment 0 07/30/20 0710   Infiltration Assessment 0 07/30/20 0710   Dressing Status Clean, dry, & intact 07/30/20 0710   Dressing Type Transparent;Tape 07/30/20 0710   Hub Color/Line Status Capped 07/29/20 0044   Action Taken Blood drawn 07/27/20 1638       Peripheral IV 07/27/20 Right Forearm (Active)   Site Assessment Clean, dry, & intact 07/30/20 0710   Phlebitis Assessment 0 07/30/20 0710   Infiltration Assessment 0 07/30/20 0710   Dressing Status Clean, dry, & intact 07/30/20 0710   Dressing Type Transparent;Tape 07/30/20 0710   Hub Color/Line Status Infusing 07/29/20 0044        Opportunity for questions and clarification was provided.       Patient transported with:   O2 @ 3 liters

## 2020-07-31 LAB
ANION GAP SERPL CALC-SCNC: 8 MMOL/L (ref 7–16)
BASOPHILS # BLD: 0.1 K/UL (ref 0–0.2)
BASOPHILS NFR BLD: 1 % (ref 0–2)
BUN SERPL-MCNC: 10 MG/DL (ref 8–23)
CALCIUM SERPL-MCNC: 8.7 MG/DL (ref 8.3–10.4)
CHLORIDE SERPL-SCNC: 106 MMOL/L (ref 98–107)
CO2 SERPL-SCNC: 29 MMOL/L (ref 21–32)
CREAT SERPL-MCNC: 0.6 MG/DL (ref 0.6–1)
DIFFERENTIAL METHOD BLD: ABNORMAL
EOSINOPHIL # BLD: 0.3 K/UL (ref 0–0.8)
EOSINOPHIL NFR BLD: 3 % (ref 0.5–7.8)
ERYTHROCYTE [DISTWIDTH] IN BLOOD BY AUTOMATED COUNT: 15.8 % (ref 11.9–14.6)
GLUCOSE SERPL-MCNC: 93 MG/DL (ref 65–100)
HCT VFR BLD AUTO: 29.7 % (ref 35.8–46.3)
HGB BLD-MCNC: 9.2 G/DL (ref 11.7–15.4)
IMM GRANULOCYTES # BLD AUTO: 0.1 K/UL (ref 0–0.5)
IMM GRANULOCYTES NFR BLD AUTO: 1 % (ref 0–5)
LYMPHOCYTES # BLD: 2.5 K/UL (ref 0.5–4.6)
LYMPHOCYTES NFR BLD: 28 % (ref 13–44)
MCH RBC QN AUTO: 26.7 PG (ref 26.1–32.9)
MCHC RBC AUTO-ENTMCNC: 31 G/DL (ref 31.4–35)
MCV RBC AUTO: 86.1 FL (ref 79.6–97.8)
MONOCYTES # BLD: 1.1 K/UL (ref 0.1–1.3)
MONOCYTES NFR BLD: 12 % (ref 4–12)
NEUTS SEG # BLD: 5 K/UL (ref 1.7–8.2)
NEUTS SEG NFR BLD: 55 % (ref 43–78)
NRBC # BLD: 0.03 K/UL (ref 0–0.2)
PLATELET # BLD AUTO: 278 K/UL (ref 150–450)
PMV BLD AUTO: 10.8 FL (ref 9.4–12.3)
POTASSIUM SERPL-SCNC: 3.8 MMOL/L (ref 3.5–5.1)
RBC # BLD AUTO: 3.45 M/UL (ref 4.05–5.2)
SODIUM SERPL-SCNC: 143 MMOL/L (ref 136–145)
WBC # BLD AUTO: 8.9 K/UL (ref 4.3–11.1)

## 2020-07-31 PROCEDURE — 94640 AIRWAY INHALATION TREATMENT: CPT

## 2020-07-31 PROCEDURE — 74011250637 HC RX REV CODE- 250/637: Performed by: FAMILY MEDICINE

## 2020-07-31 PROCEDURE — 94761 N-INVAS EAR/PLS OXIMETRY MLT: CPT

## 2020-07-31 PROCEDURE — 97116 GAIT TRAINING THERAPY: CPT

## 2020-07-31 PROCEDURE — 36415 COLL VENOUS BLD VENIPUNCTURE: CPT

## 2020-07-31 PROCEDURE — 74011250637 HC RX REV CODE- 250/637: Performed by: HOSPITALIST

## 2020-07-31 PROCEDURE — 97530 THERAPEUTIC ACTIVITIES: CPT

## 2020-07-31 PROCEDURE — 65270000029 HC RM PRIVATE

## 2020-07-31 PROCEDURE — 85025 COMPLETE CBC W/AUTO DIFF WBC: CPT

## 2020-07-31 PROCEDURE — 80048 BASIC METABOLIC PNL TOTAL CA: CPT

## 2020-07-31 RX ADMIN — APIXABAN 10 MG: 5 TABLET, FILM COATED ORAL at 08:26

## 2020-07-31 RX ADMIN — Medication 1500 MG: at 16:03

## 2020-07-31 RX ADMIN — SUCRALFATE 1 G: 1 TABLET ORAL at 21:57

## 2020-07-31 RX ADMIN — SUCRALFATE 1 G: 1 TABLET ORAL at 16:03

## 2020-07-31 RX ADMIN — BUDESONIDE AND FORMOTEROL FUMARATE DIHYDRATE 2 PUFF: 80; 4.5 AEROSOL RESPIRATORY (INHALATION) at 20:59

## 2020-07-31 RX ADMIN — Medication 220 MG: at 08:26

## 2020-07-31 RX ADMIN — CLORAZEPATE DIPOTASSIUM 3.75 MG: 7.5 TABLET ORAL at 21:56

## 2020-07-31 RX ADMIN — Medication 1500 MG: at 08:25

## 2020-07-31 RX ADMIN — PANTOPRAZOLE SODIUM 40 MG: 40 TABLET, DELAYED RELEASE ORAL at 06:50

## 2020-07-31 RX ADMIN — TIOTROPIUM BROMIDE INHALATION SPRAY 2 PUFF: 3.12 SPRAY, METERED RESPIRATORY (INHALATION) at 07:58

## 2020-07-31 RX ADMIN — Medication 10 ML: at 14:05

## 2020-07-31 RX ADMIN — LATANOPROST 1 DROP: 50 SOLUTION OPHTHALMIC at 17:57

## 2020-07-31 RX ADMIN — MONTELUKAST 10 MG: 10 TABLET, FILM COATED ORAL at 08:33

## 2020-07-31 RX ADMIN — CLORAZEPATE DIPOTASSIUM 3.75 MG: 7.5 TABLET ORAL at 16:03

## 2020-07-31 RX ADMIN — ASPIRIN 81 MG: 81 TABLET, COATED ORAL at 08:26

## 2020-07-31 RX ADMIN — APIXABAN 10 MG: 5 TABLET, FILM COATED ORAL at 21:56

## 2020-07-31 RX ADMIN — CHOLECALCIFEROL TAB 25 MCG (1000 UNIT) 1 TABLET: 25 TAB at 08:29

## 2020-07-31 RX ADMIN — FLUTICASONE PROPIONATE 2 SPRAY: 50 SPRAY, METERED NASAL at 08:28

## 2020-07-31 RX ADMIN — CLORAZEPATE DIPOTASSIUM 3.75 MG: 7.5 TABLET ORAL at 08:26

## 2020-07-31 RX ADMIN — SERTRALINE HYDROCHLORIDE 100 MG: 100 TABLET ORAL at 08:26

## 2020-07-31 RX ADMIN — Medication 10 ML: at 22:05

## 2020-07-31 RX ADMIN — Medication 1500 MG: at 22:01

## 2020-07-31 RX ADMIN — SUCRALFATE 1 G: 1 TABLET ORAL at 08:26

## 2020-07-31 RX ADMIN — Medication 500 MG: at 08:26

## 2020-07-31 RX ADMIN — BUDESONIDE AND FORMOTEROL FUMARATE DIHYDRATE 2 PUFF: 80; 4.5 AEROSOL RESPIRATORY (INHALATION) at 07:57

## 2020-07-31 NOTE — PROGRESS NOTES
Problem: Mobility Impaired (Adult and Pediatric)  Goal: *Acute Goals and Plan of Care  Outcome: Progressing Towards Goal  Note: Acute PT Goals:  (1.)Alice Ambrose will move from supine to sit and sit to supine , scoot up and down, and roll side to side with MODIFIED INDEPENDENCE within 7 treatment day(s). (2.)Alice Ambrose will transfer from bed to chair and chair to bed with MODIFIED INDEPENDENCE using the least restrictive device within 7 treatment day(s). (3.)Alice Ambrose will ambulate with STAND BY ASSIST for 250 feet with the least restrictive device within 7 treatment day(s). (4.)Alice Ambrose will perform standing static and dynamic balance activities x 15 minutes with STAND BY ASSIST to improve safety and activity tolerance within 7 treatment day(s). (Sha Zepeda will perform bilateral lower extremity exercises x 15 min for HEP with INDEPENDENCE to improve strength, endurance, and functional mobility within 7 treatment day(s). PHYSICAL THERAPY: Daily Note and AM 7/31/2020  INPATIENT: PT Visit Days : 3  [place] / [place] / Product Type: Medicare /       NAME/AGE/GENDER: Shelby Gonzalez is a 66 y.o. female   PRIMARY DIAGNOSIS: Pulmonary embolism, bilateral ([facility] [I26.99]  Acute hypoxemic respiratory failure ([facility] [J96.01]  Person under investigation for COVID-19 [Z20.828] Acute hypoxemic respiratory failure ([facility] Acute hypoxemic respiratory failure ([facility]       ICD-10: Treatment Diagnosis:   · Generalized Muscle Weakness (M62.81)  · Other lack of cordination (R27.8)  · Difficulty in walking, Not elsewhere classified (R26.2)  · Other abnormalities of gait and mobility (R26.89)  · Repeated Falls (R29.6)  · History of falling (Z91.81)   Precaution/Allergies:  Flexeril [cyclobenzaprine]; Ketoprofen; and Plaquenil [hydroxychloroquine]      ASSESSMENT:     Pt. Seen this AM for bed mobility, transfer, & gait training.   Pt. Able to increase gait distance this AM. However, cont. To require assistance for transfers due to decreased strength & endurance. Pt. Cont. To mobilize below her baseline & benefits from cont. PT services to address. This section established at most recent assessment   PROBLEM LIST (Impairments causing functional limitations):  1. Decreased Strength  2. Decreased ADL/Functional Activities  3. Decreased Transfer Abilities  4. Decreased Ambulation Ability/Technique  5. Decreased Balance  6. Decreased Activity Tolerance  7. Increased Fatigue  8. Increased Shortness of Breath   INTERVENTIONS PLANNED: (Benefits and precautions of physical therapy have been discussed with the patient.)  1. Balance Exercise  2. Bed Mobility  3. Family Education  4. Gait Training  5. Home Exercise Program (HEP)  6. Neuromuscular Re-education/Strengthening  7. Range of Motion (ROM)  8. Therapeutic Activites  9. Therapeutic Exercise/Strengthening  10. Transfer Training     TREATMENT PLAN: Frequency/Duration: 3 times a week for duration of hospital stay  Rehabilitation Potential For Stated Goals: Good     REHAB RECOMMENDATIONS (at time of discharge pending progress):    Placement: It is my opinion, based on this patient's performance to date, that Ms. Josi Latif may benefit from intensive therapy at a [address] [place] [address] after discharge due to the functional deficits listed above that are likely to improve with skilled rehabilitation and concerns that he/she may be unsafe to be unsupervised at home due to medical complications and mobility deficits which put her at increase risk of functional decline and/or falling . Equipment:    None at this time            HISTORY:   History of Present Injury/Illness (Reason for Referral):  Per H&P: \"This is a 68-year-old female with past medical history significant for asthma, hypertension, presented to the ER because of cough and shortness of breath for the last week. Patient states that she was seen about 3 days ago for a nosebleed. She went to see ENT physician Dr. Nicol Arriaga today. Because of shortness of breath she was sent to the ER for further evaluation. Patient states that she has been having nonproductive cough for the last week. No hemoptysis, no sputum production. Shortness of breath at rest worse with activity. Progressively worsening shortness of breath for the last week. No orthopnea no paroxysmal nocturnal dyspnea. She denies any chest pain or palpitations. No fever no chills. She denies any headache or dizziness. At baseline patient is not on any oxygen. No nausea no vomiting no diarrhea, no constipation, no abdominal pain. No weakness tingling or numbness of extremities, she has bilateral lower extremity edema. No pain in the legs. No dysuria urgency or frequency of urination,  10 systems reviewed and negative except as noted in HPI. ER course:  Afebrile, tachycardic with heart rate of 95/min, tachypneic with rate of 30/min, initial blood pressure 93/60, recheck blood pressure was 150/80, oxygen saturation of 87% on room air. Placed on 4 L oxygen by nasal cannula. CBC with white count of 8.9 hemoglobin 10.7 platelet count 965. CMP is fairly unremarkable. High-sensitivity troponin of 21, NT proBNP of 5231. Arterial blood gas with pH of 7.46, PCO2 of 32.5, PO2 of 154, oxygen saturation 99% on 4 L oxygen by nasal cannula. Chest x-ray with increasing peripheral hazy opacity in the left midlung could represent a developing airspace consolidation or pneumonia. CT angiogram of the chest showed bilateral pulmonary emboli that originates in the peripheral right and left main bronchus and extends into the lobar pulmonary arteries. Clot burden is considered extensive. Cardiomegaly, dependent atelectasis with nonspecific irregular faint groundglass opacity in the bilateral lungs. Moderate-sized hiatal hernia, 2.6 cm left adrenal nodule, 4 mm right upper lobe pulmonary nodule.   Patient admitted for further evaluation management of acute hypoxemic respiratory failure, bilateral extensive PE. COVID test pending. \"  Past Medical History/Comorbidities:   Ms. Marbella Oconnor  has a past medical history of Abnormality of gait (7/18/2018), Anemia, Anxiety, Arthritis, Asthma, Calculus of kidney, Chronic pain, Contact dermatitis and other eczema, due to unspecified cause, Depressive disorder, not elsewhere classified, Encounter for chronic pain management, Esophageal reflux, Essential hypertension, benign, Frequent falls (7/18/2018), GERD (gastroesophageal reflux disease), and Pure hypercholesterolemia. Ms. Marbella Oconnor  has a past surgical history that includes hx knee replacement (Right); hx shoulder replacement (Left); hx hysterectomy; hx lithotripsy; hx cataract removal (Bilateral); hx retinal detachment repair; hx colonoscopy; hx gi; neurological procedure unlisted; and ir thrombectomy [facility] art primary non murray or intracranial (7/28/2020). Social History/Living Environment:   Home Environment: Private residence  # Steps to Enter: 0  One/Two Story Residence: One story  Living Alone: No  Support Systems: Friends \ neighbors  Patient Expects to be Discharged to[de-identified] Unknown  Current DME Used/Available at Home: Alethea Medico, rollator, Wheelchair, [address] chair  Tub or Shower Type: Tub/Shower combination  Prior Level of Function/Work/Activity:  Prior to hospital admission pt lives with a roommate in a one story home with no step(s) to enter (has a ramp). Her  lives in another home as she reports Elissa Carpenter is a pack rat\" - he is currently at rehab. Another friend assists her with getting to appointments. Pt endorses at least ten falls in past 6 months. Prior to admission Ms. Marblela Oconnor uses a rollator walker or a power scooter for mobility. She does not wear oxygen at baseline.    Number of Personal Factors/Comorbidities that affect the Plan of Care: 1-2: MODERATE COMPLEXITY   EXAMINATION:   Most Recent Physical Functioning:   Gross Assessment: Posture:  Posture (WDL): Exceptions to WDL  Posture Assessment: Trunk flexion, Rounded shoulders, Forward head  Balance:  Sitting: Intact  Standing: Impaired  Standing - Static: Fair  Standing - Dynamic : Fair Bed Mobility:  Supine to Sit: Minimum assistance  Wheelchair Mobility:     Transfers:  Sit to Stand: Minimum assistance  Stand to Sit: Minimum assistance  Gait:     Base of Support: Narrowed  Speed/Jenifer: Slow;Shuffled  Step Length: Right shortened;Left shortened  Stance: Right increased; Left increased  Gait Abnormalities: Decreased step clearance;Trunk sway increased; Path deviations; Step to gait  Distance (ft): 50 Feet (ft)(x1, 10'x1)  Assistive Device: Walker, rollator;Gait belt  Ambulation - Level of Assistance: Contact guard assistance      Body Structures Involved:  1. Heart  2. Lungs  3. Bones  4. Joints  5. Muscles Body Functions Affected:  1. Cardio  2. Respiratory  3. Neuromusculoskeletal  4. Movement Related Activities and Participation Affected:  1. General Tasks and Demands  2. Mobility  3. Self Care   Number of elements that affect the Plan of Care: 3: MODERATE COMPLEXITY   CLINICAL PRESENTATION:   Presentation: Evolving clinical presentation with changing clinical characteristics: MODERATE COMPLEXITY   CLINICAL DECISION MAKIN [facility] In[facility] Short Form  How much difficulty does the patient currently have. .. Unable A Lot A Little None   1. Turning over in bed (including adjusting bedclothes, sheets and blankets)? [] 1   [] 2   [x] 3   [] 4   2. Sitting down on and standing up from a chair with arms ( e.g., wheelchair, bedside commode, etc.)   [] 1   [] 2   [x] 3   [] 4   3. Moving from lying on back to sitting on the side of the bed? [] 1   [] 2   [x] 3   [] 4   How much help from another person does the patient currently need. .. Total A Lot A Little None   4. Moving to and from a bed to a chair (including a wheelchair)?    [] 1   [] 2 [x] 3   [] 4   5. Need to walk in hospital room? [] 1   [] 2   [x] 3   [] 4   6. Climbing 3-5 steps with a railing? [] 1   [x] 2   [] 3   [] 4   © 2007, Trustees of [address] [place] [address] [address] [id number], under license to [employer]. All rights reserved      Score:  Initial: 17 Most Recent: X (Date: -- )    Interpretation of Tool:  Represents activities that are increasingly more difficult (i.e. Bed mobility, Transfers, Gait). Medical Necessity:     · Patient is expected to demonstrate progress in   · strength, range of motion, balance, coordination, functional technique, and activity tolerance  ·  to   · increase independence with all mobility and decrease fall risk. · .  Reason for Services/Other Comments:  · Patient continues to require skilled intervention due to   · medical complications and mobility deficits which impact her level of function, safety, and independence  · . Use of outcome tool(s) and clinical judgement create a POC that gives a: Questionable prediction of patient's progress: MODERATE COMPLEXITY        TREATMENT:   (In addition to Assessment/Re-Assessment sessions the following treatments were rendered)   Pre-treatment Symptoms/Complaints:  \"I'm worried because they said if I have another nosebleed they will take me off my blood thinners. \"  Pain: Initial:   Pain Intensity 1: 0  Post Session:  0/10     Therapeutic Activity: (16 Minutes): Therapeutic activities including Bed transfers, Chair transfers, Ambulation on level ground, and BLE exercises to improve mobility, strength, balance, coordination, and activity tolerance . Required minimal cues  to promote static and dynamic balance in standing and promote coordination of bilateral, lower extremity(s). Gait Training ( 17 minutes):  Gait training to improve and/or restore physical functioning as related to mobility.   Ambulated 50 Feet (ft)(x1, 10'x1) with minimal assistance/contact guard assist & SBA to follow closely w/ w/c due to fatigue. Assistive Device: Walker, rollator, Gait belt  Ambulation - Level of Assistance: Contact guard assistance  Distance (ft): 50 Feet (ft)(x1, 10'x1)    Braces/Orthotics/Lines/Etc:   · IV  · O2 Device: Nasal cannula  Treatment/Session Assessment:    · Response to Treatment:  HR 88-93;  O2 97-94% 3 LPM NC O2;  Trial on RA per RT request 93-83%  · Interdisciplinary Collaboration:   o Physical Therapist  o Registered Nurse  o Rehabilitation Attendant  o Respiratory Therapist  · After treatment position/precautions:   o Up in chair  o Bed/Chair-wheels locked  o Bed in low position  o Call light within reach   · Compliance with Program/Exercises: Will assess as treatment progresses  · Recommendations/Intent for next treatment session: \"Next visit will focus on advancements to more challenging activities and reduction in assistance provided\".     Total Treatment Duration:  PT Patient Time In/Time Out  Time In: 1030  Time Out: Lynne 52, PT

## 2020-07-31 NOTE — PROGRESS NOTES
Oxygen Qualifier       Room air: SpO2 with O2 and liter flow   Resting SpO2  92%  96% on 3L   Ambulating SpO2  84% 92% on 3L       Completed by:    Merlin Speller, RT

## 2020-07-31 NOTE — PROGRESS NOTES
Hospitalist Progress Note     Admit Date:  2020  9:34 PM   Name:  Tia Murphy   Age:  66 y.o.  :  1945   MRN:  [id number]   PCP:  Clifford Winchester MD  Treatment Team: Attending Provider: Christopher Cheema MD; Utilization Review: Alberto Loomis RN; Care Manager: Camilo Block, RN; Staff Nurse: Dillon Chi RN; Physical Therapist: Ronaldo Saini PT    Subjective:   Chief complaint cough shortness of breath     This is a 75-year-old female with past medical history significant for asthma, hypertension, presented to the ER because of cough and shortness of breath for the last week. Patient states that she was seen about 3 days ago for a nosebleed. She went to see ENT physician Dr. Chidi Gómez today. Because of shortness of breath she was sent to the ER for further evaluation. Patient states that she has been having nonproductive cough for the last week. No hemoptysis, no sputum production. Shortness of breath at rest worse with activity. Progressively worsening shortness of breath for the last week. No orthopnea no paroxysmal nocturnal dyspnea. She denies any chest pain or palpitations. No fever no chills. She denies any headache or dizziness. At baseline patient is not on any oxygen. No nausea no vomiting no diarrhea, no constipation, no abdominal pain. No weakness tingling or numbness of extremities, she has bilateral lower extremity edema. No pain in the legs. No dysuria urgency or frequency of urination,     10 systems reviewed and negative except as noted in HPI.     ER course:     Afebrile, tachycardic with heart rate of 95/min, tachypneic with rate of 30/min, initial blood pressure 93/60, recheck blood pressure was 150/80, oxygen saturation of 87% on room air. Placed on 4 L oxygen by nasal cannula. Arterial blood gas with pH of 7.46, PCO2 of 32.5, PO2 of 154, oxygen saturation 99% on 4 L oxygen by nasal cannula.   Chest x-ray with increasing peripheral hazy opacity in the left midlung could represent a developing airspace consolidation or pneumonia. CT angiogram of the chest showed bilateral pulmonary emboli that originates in the peripheral right and left main bronchus and extends into the lobar pulmonary arteries. Clot burden is considered extensive. Cardiomegaly, dependent atelectasis with nonspecific irregular faint groundglass opacity in the bilateral lungs. Moderate-sized hiatal hernia, 2.6 cm left adrenal nodule, 4 mm right upper lobe pulmonary nodule.     Patient admitted for further evaluation management of acute hypoxemic respiratory failure, bilateral extensive PE. COVID test pending. 7/28/2020  C/o mild shortness of breath better then yesterday  Probably for IVC filter today  Npo  On heparin drip    7/29/2020  Pt says breathing mild better  Had IVC yesterday    7/30/2020  Says doing ok  Shortness of breath better  On 3 lit/min    7/31/2020  Pt says had nose bleed this am resolved  Says breathing ok        Objective:     Patient Vitals for the past 24 hrs:   Temp Pulse Resp BP SpO2   07/31/20 1130 98.2 °F (36.8 °C) 76 18 134/78 98 %   07/31/20 0804 98.6 °F (37 °C) 83 18 127/76 96 %   07/31/20 0355 97.8 °F (36.6 °C) 84 18 133/72 94 %   07/31/20 0032 97.7 °F (36.5 °C) 82 18 102/65 93 %   07/30/20 2139     92 %   07/30/20 2051 98.7 °F (37.1 °C) (!) 109 18 101/54 91 %   07/30/20 1526 (!) 96.2 °F (35.7 °C) 87 17 90/62 96 %     Oxygen Therapy  O2 Sat (%): 98 % (07/31/20 1130)  Pulse via Oximetry: 53 beats per minute (07/30/20 2139)  O2 Device: Nasal cannula (07/31/20 0700)  O2 Flow Rate (L/min): 3 l/min (07/31/20 0700)  ETCO2 (mmHg): 20 mmHg (07/28/20 1618)  No intake or output data in the 24 hours ending 07/31/20 1325      General:    Well nourished. Alert. Mild resp distress on oxygen  3 lit/min  heent- normal  CV:   RRR. No murmur, rub, or gallop. Lungs:   Coarse breath sounds  Abdomen:   Soft, nontender, nondistended.    Cns- no focal neurological deficits  Extremities: Warm and dry. No cyanosis or edema. Skin:     No rashes or jaundice. Data Review:  I have reviewed all labs, meds, telemetry events, and studies from the last 24 hours. Recent Results (from the past 24 hour(s))   CBC WITH AUTOMATED DIFF    Collection Time: 07/31/20  7:40 AM   Result Value Ref Range    WBC 8.9 4.3 - 11.1 K/uL    RBC 3.45 (L) 4.05 - 5.2 M/uL    HGB 9.2 (L) 11.7 - 15.4 g/dL    HCT 29.7 (L) 35.8 - 46.3 %    MCV 86.1 79.6 - 97.8 FL    MCH 26.7 26.1 - 32.9 PG    MCHC 31.0 (L) 31.4 - 35.0 g/dL    RDW 15.8 (H) 11.9 - 14.6 %    PLATELET 920 046 - 725 K/uL    MPV 10.8 9.4 - 12.3 FL    ABSOLUTE NRBC 0.03 0.0 - 0.2 K/uL    DF AUTOMATED      NEUTROPHILS 55 43 - 78 %    LYMPHOCYTES 28 13 - 44 %    MONOCYTES 12 4.0 - 12.0 %    EOSINOPHILS 3 0.5 - 7.8 %    BASOPHILS 1 0.0 - 2.0 %    IMMATURE GRANULOCYTES 1 0.0 - 5.0 %    ABS. NEUTROPHILS 5.0 1.7 - 8.2 K/UL    ABS. LYMPHOCYTES 2.5 0.5 - 4.6 K/UL    ABS. MONOCYTES 1.1 0.1 - 1.3 K/UL    ABS. EOSINOPHILS 0.3 0.0 - 0.8 K/UL    ABS. BASOPHILS 0.1 0.0 - 0.2 K/UL    ABS. IMM.  GRANS. 0.1 0.0 - 0.5 K/UL   METABOLIC PANEL, BASIC    Collection Time: 07/31/20  7:40 AM   Result Value Ref Range    Sodium 143 136 - 145 mmol/L    Potassium 3.8 3.5 - 5.1 mmol/L    Chloride 106 98 - 107 mmol/L    CO2 29 21 - 32 mmol/L    Anion gap 8 7 - 16 mmol/L    Glucose 93 65 - 100 mg/dL    BUN 10 8 - 23 MG/DL    Creatinine 0.60 0.6 - 1.0 MG/DL    GFR est AA >60 >60 ml/min/1.73m2    GFR est non-AA >60 >60 ml/min/1.73m2    Calcium 8.7 8.3 - 10.4 MG/DL        All Micro Results     None          Current Meds:  Current Facility-Administered Medications   Medication Dose Route Frequency    pantoprazole (PROTONIX) tablet 40 mg  40 mg Oral ACB    latanoprost (XALATAN) 0.005 % ophthalmic solution 1 Drop  1 Drop Both Eyes QPM    carboxymethylcellulose sodium (REFRESH LIQUIGEL) 1 % ophthalmic solution 1 Drop  1 Drop Both Eyes BID PRN    apixaban (ELIQUIS) tablet 10 mg 10 mg Oral Q12H    [START ON 8/5/2020] apixaban (ELIQUIS) tablet 5 mg  5 mg Oral Q12H    guaiFENesin (ROBITUSSIN) 100 mg/5 mL oral liquid 100 mg  100 mg Oral Q4H PRN    sodium chloride (NS) flush 5-40 mL  5-40 mL IntraVENous Q8H    sodium chloride (NS) flush 5-40 mL  5-40 mL IntraVENous PRN    polyethylene glycol (MIRALAX) packet 17 g  17 g Oral DAILY PRN    promethazine (PHENERGAN) tablet 12.5 mg  12.5 mg Oral Q6H PRN    Or    ondansetron (ZOFRAN) injection 4 mg  4 mg IntraVENous Q6H PRN    acetaminophen (TYLENOL) tablet 650 mg  650 mg Oral Q6H PRN    Or    acetaminophen (TYLENOL) suppository 650 mg  650 mg Rectal Q6H PRN    albuterol-ipratropium (DUO-NEB) 2.5 MG-0.5 MG/3 ML  3 mL Nebulization Q4H PRN    aspirin delayed-release tablet 81 mg  81 mg Oral DAILY    calcium carbonate (OS-JANA) tablet 500 mg [elemental]  500 mg Oral DAILY    cholecalciferol (VITAMIN D3) (1000 Units /25 mcg) tablet 1 Tab  1,000 Units Oral DAILY    clorazepate (TRANXENE) tablet 3.75 mg  3.75 mg Oral TID    fluticasone propionate (FLONASE) 50 mcg/actuation nasal spray 2 Spray  2 Spray Both Nostrils DAILY    melatonin cap 10 mg (Patient Supplied)  10 mg Oral QHS    montelukast (SINGULAIR) tablet 10 mg  10 mg Oral DAILY    sertraline (ZOLOFT) tablet 100 mg  100 mg Oral DAILY    sucralfate (CARAFATE) tablet 1 g  1 g Oral TID    budesonide-formoterol (SYMBICORT) 80-4.5 mcg inhaler  2 Puff Inhalation BID RT    tiotropium bromide (SPIRIVA RESPIMAT) 2.5 mcg /actuation  2 Puff Inhalation DAILY    ascorbic acid (vitamin C) (VITAMIN C) tablet 1,500 mg  1,500 mg Oral TID    zinc sulfate tablet 220 mg  220 mg Oral DAILY    hydrALAZINE (APRESOLINE) 20 mg/mL injection 10 mg  10 mg IntraVENous Q6H PRN       Other Studies (last 24 hours):  No results found.     Assessment and Plan:     Hospital Problems as of 7/31/2020 Date Reviewed: 5/13/2020          Codes Class Noted - Resolved POA    Hypokalemia ICD-10-CM: E87.6  ICD-9-CM: 276.8 7/29/2020 - Present Unknown        * (Principal) Acute hypoxemic respiratory failure (HCC) ICD-10-CM: J96.01  ICD-9-CM: 518.81  7/27/2020 - Present Unknown        Pulmonary embolism, bilateral ([address] ICD-10-CM: I26.99  ICD-9-CM: 415.19  7/27/2020 - Present Unknown        Person under investigation for COVID-19 ICD-10-CM: Z20.828  ICD-9-CM: V01.79  7/27/2020 - Present Unknown        Asthma ICD-10-CM: J45.909  ICD-9-CM: 493.90  7/27/2020 - Present Unknown        Esophageal reflux ICD-10-CM: K21.9  ICD-9-CM: 530.81  Unknown - Present Yes        Anxiety ICD-10-CM: F41.9  ICD-9-CM: 300.00  Unknown - Present Yes        Essential hypertension, benign ICD-10-CM: I10  ICD-9-CM: 401.1  Unknown - Present Yes              PLAN:    - acute hypoxic resp failure- secondary to bilateral PE. on eliquis. IVC filter 7/28/2020.  -Bilateral DVT  -R/o covid- negative   vitamin c and zn  - gerd  - htn  - asthma  - anxiety/depression - stable  Ct findings-   Moderate-sized hiatal hernia. 2.6 cm left adrenal nodule. 4 mm right upper lobe pulmonary nodule. - nose bleed/epitaxis- resolved. Spoke to daughter in length regarding pts medical condition and management.     DC planning/Dispo:    DVT ppx: eliquis    Signed:  Judd Del Cid MD

## 2020-07-31 NOTE — PROGRESS NOTES
Problem: Falls - Risk of  Goal: *Absence of Falls  Description: Document Devere [place]hen Fall Risk and appropriate interventions in the flowsheet.   Outcome: Progressing Towards Goal  Note: Fall Risk Interventions:  Mobility Interventions: Patient to call before getting OOB, Utilize walker, cane, or other assistive device         Medication Interventions: Patient to call before getting OOB    Elimination Interventions: Call light in reach, Patient to call for help with toileting needs, Toileting schedule/hourly rounds    History of Falls Interventions: Consult care management for discharge planning, Door open when patient unattended         Problem: Patient Education: Go to Patient Education Activity  Goal: Patient/Family Education  Outcome: Progressing Towards Goal

## 2020-08-01 LAB
ANION GAP SERPL CALC-SCNC: 7 MMOL/L (ref 7–16)
BACTERIA SPEC CULT: NORMAL
BACTERIA SPEC CULT: NORMAL
BASOPHILS # BLD: 0 K/UL (ref 0–0.2)
BASOPHILS NFR BLD: 1 % (ref 0–2)
BUN SERPL-MCNC: 12 MG/DL (ref 8–23)
CALCIUM SERPL-MCNC: 8.8 MG/DL (ref 8.3–10.4)
CHLORIDE SERPL-SCNC: 107 MMOL/L (ref 98–107)
CO2 SERPL-SCNC: 29 MMOL/L (ref 21–32)
CREAT SERPL-MCNC: 0.58 MG/DL (ref 0.6–1)
DIFFERENTIAL METHOD BLD: ABNORMAL
EOSINOPHIL # BLD: 0.4 K/UL (ref 0–0.8)
EOSINOPHIL NFR BLD: 5 % (ref 0.5–7.8)
ERYTHROCYTE [DISTWIDTH] IN BLOOD BY AUTOMATED COUNT: 15.7 % (ref 11.9–14.6)
GLUCOSE SERPL-MCNC: 108 MG/DL (ref 65–100)
HCT VFR BLD AUTO: 28.8 % (ref 35.8–46.3)
HGB BLD-MCNC: 9 G/DL (ref 11.7–15.4)
IMM GRANULOCYTES # BLD AUTO: 0.1 K/UL (ref 0–0.5)
IMM GRANULOCYTES NFR BLD AUTO: 1 % (ref 0–5)
LYMPHOCYTES # BLD: 1.9 K/UL (ref 0.5–4.6)
LYMPHOCYTES NFR BLD: 24 % (ref 13–44)
MCH RBC QN AUTO: 26.4 PG (ref 26.1–32.9)
MCHC RBC AUTO-ENTMCNC: 31.3 G/DL (ref 31.4–35)
MCV RBC AUTO: 84.5 FL (ref 79.6–97.8)
MM INDURATION POC: 0 MM (ref 0–5)
MONOCYTES # BLD: 1 K/UL (ref 0.1–1.3)
MONOCYTES NFR BLD: 13 % (ref 4–12)
NEUTS SEG # BLD: 4.6 K/UL (ref 1.7–8.2)
NEUTS SEG NFR BLD: 58 % (ref 43–78)
NRBC # BLD: 0.02 K/UL (ref 0–0.2)
PLATELET # BLD AUTO: 278 K/UL (ref 150–450)
PMV BLD AUTO: 10.2 FL (ref 9.4–12.3)
POTASSIUM SERPL-SCNC: 3.9 MMOL/L (ref 3.5–5.1)
PPD POC: NORMAL
RBC # BLD AUTO: 3.41 M/UL (ref 4.05–5.2)
SERVICE CMNT-IMP: NORMAL
SERVICE CMNT-IMP: NORMAL
SODIUM SERPL-SCNC: 143 MMOL/L (ref 136–145)
WBC # BLD AUTO: 7.9 K/UL (ref 4.3–11.1)

## 2020-08-01 PROCEDURE — 85025 COMPLETE CBC W/AUTO DIFF WBC: CPT

## 2020-08-01 PROCEDURE — 77010033678 HC OXYGEN DAILY

## 2020-08-01 PROCEDURE — 94760 N-INVAS EAR/PLS OXIMETRY 1: CPT

## 2020-08-01 PROCEDURE — 65270000029 HC RM PRIVATE

## 2020-08-01 PROCEDURE — 36415 COLL VENOUS BLD VENIPUNCTURE: CPT

## 2020-08-01 PROCEDURE — 94640 AIRWAY INHALATION TREATMENT: CPT

## 2020-08-01 PROCEDURE — 74011250637 HC RX REV CODE- 250/637: Performed by: FAMILY MEDICINE

## 2020-08-01 PROCEDURE — 80048 BASIC METABOLIC PNL TOTAL CA: CPT

## 2020-08-01 PROCEDURE — 74011250637 HC RX REV CODE- 250/637: Performed by: HOSPITALIST

## 2020-08-01 RX ADMIN — FLUTICASONE PROPIONATE 2 SPRAY: 50 SPRAY, METERED NASAL at 10:27

## 2020-08-01 RX ADMIN — Medication 1500 MG: at 17:32

## 2020-08-01 RX ADMIN — APIXABAN 10 MG: 5 TABLET, FILM COATED ORAL at 10:23

## 2020-08-01 RX ADMIN — SUCRALFATE 1 G: 1 TABLET ORAL at 17:32

## 2020-08-01 RX ADMIN — CHOLECALCIFEROL TAB 25 MCG (1000 UNIT) 1 TABLET: 25 TAB at 10:26

## 2020-08-01 RX ADMIN — CLORAZEPATE DIPOTASSIUM 3.75 MG: 7.5 TABLET ORAL at 17:33

## 2020-08-01 RX ADMIN — MONTELUKAST 10 MG: 10 TABLET, FILM COATED ORAL at 10:26

## 2020-08-01 RX ADMIN — Medication 20 ML: at 15:37

## 2020-08-01 RX ADMIN — Medication 500 MG: at 10:23

## 2020-08-01 RX ADMIN — APIXABAN 10 MG: 5 TABLET, FILM COATED ORAL at 21:22

## 2020-08-01 RX ADMIN — ASPIRIN 81 MG: 81 TABLET, COATED ORAL at 10:26

## 2020-08-01 RX ADMIN — BUDESONIDE AND FORMOTEROL FUMARATE DIHYDRATE 2 PUFF: 80; 4.5 AEROSOL RESPIRATORY (INHALATION) at 19:50

## 2020-08-01 RX ADMIN — PANTOPRAZOLE SODIUM 40 MG: 40 TABLET, DELAYED RELEASE ORAL at 05:29

## 2020-08-01 RX ADMIN — Medication 1500 MG: at 10:23

## 2020-08-01 RX ADMIN — Medication 1500 MG: at 21:26

## 2020-08-01 RX ADMIN — Medication 220 MG: at 10:23

## 2020-08-01 RX ADMIN — TIOTROPIUM BROMIDE INHALATION SPRAY 2 PUFF: 3.12 SPRAY, METERED RESPIRATORY (INHALATION) at 09:02

## 2020-08-01 RX ADMIN — Medication 10 ML: at 05:32

## 2020-08-01 RX ADMIN — CLORAZEPATE DIPOTASSIUM 3.75 MG: 7.5 TABLET ORAL at 10:26

## 2020-08-01 RX ADMIN — SUCRALFATE 1 G: 1 TABLET ORAL at 10:26

## 2020-08-01 RX ADMIN — SERTRALINE HYDROCHLORIDE 100 MG: 100 TABLET ORAL at 10:23

## 2020-08-01 RX ADMIN — CLORAZEPATE DIPOTASSIUM 3.75 MG: 7.5 TABLET ORAL at 21:21

## 2020-08-01 RX ADMIN — Medication 10 ML: at 21:23

## 2020-08-01 RX ADMIN — BUDESONIDE AND FORMOTEROL FUMARATE DIHYDRATE 2 PUFF: 80; 4.5 AEROSOL RESPIRATORY (INHALATION) at 09:02

## 2020-08-01 RX ADMIN — ACETAMINOPHEN 650 MG: 325 TABLET, FILM COATED ORAL at 10:23

## 2020-08-01 RX ADMIN — SUCRALFATE 1 G: 1 TABLET ORAL at 21:21

## 2020-08-01 RX ADMIN — LATANOPROST 1 DROP: 50 SOLUTION OPHTHALMIC at 21:35

## 2020-08-01 RX ADMIN — ACETAMINOPHEN 650 MG: 325 TABLET, FILM COATED ORAL at 05:29

## 2020-08-02 PROBLEM — K59.00 CONSTIPATION: Status: ACTIVE | Noted: 2020-08-02

## 2020-08-02 LAB
BASOPHILS # BLD: 0 K/UL (ref 0–0.2)
BASOPHILS NFR BLD: 1 % (ref 0–2)
DIFFERENTIAL METHOD BLD: ABNORMAL
EOSINOPHIL # BLD: 0.4 K/UL (ref 0–0.8)
EOSINOPHIL NFR BLD: 5 % (ref 0.5–7.8)
ERYTHROCYTE [DISTWIDTH] IN BLOOD BY AUTOMATED COUNT: 15.4 % (ref 11.9–14.6)
HCT VFR BLD AUTO: 32.4 % (ref 35.8–46.3)
HGB BLD-MCNC: 9.7 G/DL (ref 11.7–15.4)
IMM GRANULOCYTES # BLD AUTO: 0.1 K/UL (ref 0–0.5)
IMM GRANULOCYTES NFR BLD AUTO: 1 % (ref 0–5)
LYMPHOCYTES # BLD: 2.4 K/UL (ref 0.5–4.6)
LYMPHOCYTES NFR BLD: 29 % (ref 13–44)
MCH RBC QN AUTO: 25.7 PG (ref 26.1–32.9)
MCHC RBC AUTO-ENTMCNC: 29.9 G/DL (ref 31.4–35)
MCV RBC AUTO: 85.9 FL (ref 79.6–97.8)
MONOCYTES # BLD: 1 K/UL (ref 0.1–1.3)
MONOCYTES NFR BLD: 12 % (ref 4–12)
NEUTS SEG # BLD: 4.4 K/UL (ref 1.7–8.2)
NEUTS SEG NFR BLD: 53 % (ref 43–78)
NRBC # BLD: 0.02 K/UL (ref 0–0.2)
PLATELET # BLD AUTO: 318 K/UL (ref 150–450)
PMV BLD AUTO: 10.7 FL (ref 9.4–12.3)
RBC # BLD AUTO: 3.77 M/UL (ref 4.05–5.2)
WBC # BLD AUTO: 8.4 K/UL (ref 4.3–11.1)

## 2020-08-02 PROCEDURE — 85025 COMPLETE CBC W/AUTO DIFF WBC: CPT

## 2020-08-02 PROCEDURE — 74011250637 HC RX REV CODE- 250/637: Performed by: HOSPITALIST

## 2020-08-02 PROCEDURE — 65270000029 HC RM PRIVATE

## 2020-08-02 PROCEDURE — 74011250637 HC RX REV CODE- 250/637: Performed by: FAMILY MEDICINE

## 2020-08-02 PROCEDURE — 36415 COLL VENOUS BLD VENIPUNCTURE: CPT

## 2020-08-02 PROCEDURE — 94640 AIRWAY INHALATION TREATMENT: CPT

## 2020-08-02 RX ORDER — AMOXICILLIN 250 MG
1 CAPSULE ORAL DAILY
Status: DISCONTINUED | OUTPATIENT
Start: 2020-08-02 | End: 2020-08-03 | Stop reason: HOSPADM

## 2020-08-02 RX ORDER — ADHESIVE BANDAGE
30 BANDAGE TOPICAL DAILY PRN
Status: DISCONTINUED | OUTPATIENT
Start: 2020-08-02 | End: 2020-08-03 | Stop reason: HOSPADM

## 2020-08-02 RX ADMIN — SUCRALFATE 1 G: 1 TABLET ORAL at 21:16

## 2020-08-02 RX ADMIN — CLORAZEPATE DIPOTASSIUM 3.75 MG: 7.5 TABLET ORAL at 22:00

## 2020-08-02 RX ADMIN — Medication 1500 MG: at 09:44

## 2020-08-02 RX ADMIN — SERTRALINE HYDROCHLORIDE 100 MG: 100 TABLET ORAL at 09:45

## 2020-08-02 RX ADMIN — TIOTROPIUM BROMIDE INHALATION SPRAY 2 PUFF: 3.12 SPRAY, METERED RESPIRATORY (INHALATION) at 07:45

## 2020-08-02 RX ADMIN — ASPIRIN 81 MG: 81 TABLET, COATED ORAL at 09:42

## 2020-08-02 RX ADMIN — APIXABAN 10 MG: 5 TABLET, FILM COATED ORAL at 09:43

## 2020-08-02 RX ADMIN — LATANOPROST 1 DROP: 50 SOLUTION OPHTHALMIC at 21:13

## 2020-08-02 RX ADMIN — Medication 1500 MG: at 21:10

## 2020-08-02 RX ADMIN — BUDESONIDE AND FORMOTEROL FUMARATE DIHYDRATE 2 PUFF: 80; 4.5 AEROSOL RESPIRATORY (INHALATION) at 07:45

## 2020-08-02 RX ADMIN — CLORAZEPATE DIPOTASSIUM 3.75 MG: 7.5 TABLET ORAL at 09:42

## 2020-08-02 RX ADMIN — Medication 1500 MG: at 17:16

## 2020-08-02 RX ADMIN — MAGNESIUM HYDROXIDE 30 ML: 2400 SUSPENSION ORAL at 12:28

## 2020-08-02 RX ADMIN — MONTELUKAST 10 MG: 10 TABLET, FILM COATED ORAL at 09:45

## 2020-08-02 RX ADMIN — SUCRALFATE 1 G: 1 TABLET ORAL at 09:44

## 2020-08-02 RX ADMIN — APIXABAN 10 MG: 5 TABLET, FILM COATED ORAL at 21:10

## 2020-08-02 RX ADMIN — PANTOPRAZOLE SODIUM 40 MG: 40 TABLET, DELAYED RELEASE ORAL at 05:53

## 2020-08-02 RX ADMIN — SENNOSIDES AND DOCUSATE SODIUM 1 TABLET: 8.6; 5 TABLET ORAL at 12:28

## 2020-08-02 RX ADMIN — Medication 220 MG: at 09:44

## 2020-08-02 RX ADMIN — CLORAZEPATE DIPOTASSIUM 3.75 MG: 7.5 TABLET ORAL at 17:16

## 2020-08-02 RX ADMIN — CHOLECALCIFEROL TAB 25 MCG (1000 UNIT) 1 TABLET: 25 TAB at 09:43

## 2020-08-02 RX ADMIN — FLUTICASONE PROPIONATE 2 SPRAY: 50 SPRAY, METERED NASAL at 09:44

## 2020-08-02 RX ADMIN — Medication 500 MG: at 09:45

## 2020-08-02 NOTE — PROGRESS NOTES
Hospitalist Progress Note     Admit Date:  2020  9:34 PM   Name:  Rosalina Galeana   Age:  66 y.o.  :  1945   MRN:  [id number]   PCP:  Bharat Martinez MD  Treatment Team: Attending Provider: Elaine Rocha MD; Utilization Review: Lorrene Bosworth, RN; Care Manager: Ronak Chow RN    Subjective:   Chief complaint cough shortness of breath     This is a 57-year-old female with past medical history significant for asthma, hypertension, presented to the ER because of cough and shortness of breath for the last week. Patient states that she was seen about 3 days ago for a nosebleed. She went to see ENT physician Dr. Vera Hope today. Because of shortness of breath she was sent to the ER for further evaluation. Patient states that she has been having nonproductive cough for the last week. No hemoptysis, no sputum production. Shortness of breath at rest worse with activity. Progressively worsening shortness of breath for the last week. No orthopnea no paroxysmal nocturnal dyspnea. She denies any chest pain or palpitations. No fever no chills. She denies any headache or dizziness. At baseline patient is not on any oxygen. No nausea no vomiting no diarrhea, no constipation, no abdominal pain. No weakness tingling or numbness of extremities, she has bilateral lower extremity edema. No pain in the legs. No dysuria urgency or frequency of urination,     10 systems reviewed and negative except as noted in HPI.     ER course:     Afebrile, tachycardic with heart rate of 95/min, tachypneic with rate of 30/min, initial blood pressure 93/60, recheck blood pressure was 150/80, oxygen saturation of 87% on room air. Placed on 4 L oxygen by nasal cannula. Arterial blood gas with pH of 7.46, PCO2 of 32.5, PO2 of 154, oxygen saturation 99% on 4 L oxygen by nasal cannula.   Chest x-ray with increasing peripheral hazy opacity in the left midlung could represent a developing airspace consolidation or pneumonia. CT angiogram of the chest showed bilateral pulmonary emboli that originates in the peripheral right and left main bronchus and extends into the lobar pulmonary arteries. Clot burden is considered extensive. Cardiomegaly, dependent atelectasis with nonspecific irregular faint groundglass opacity in the bilateral lungs. Moderate-sized hiatal hernia, 2.6 cm left adrenal nodule, 4 mm right upper lobe pulmonary nodule.     Patient admitted for further evaluation management of acute hypoxemic respiratory failure, bilateral extensive PE. COVID test pending. 7/28/2020  C/o mild shortness of breath better then yesterday  Probably for IVC filter today  Npo  On heparin drip    7/29/2020  Pt says breathing mild better  Had IVC yesterday    7/30/2020  Says doing ok  Shortness of breath better  On 3 lit/min    7/31/2020  Pt says had nose bleed this am resolved  Says breathing ok    8/1/2020  Says no more nose bleeding    8/2/2020  C/o constipation   breathing ok        Objective:     Patient Vitals for the past 24 hrs:   Temp Pulse Resp BP SpO2   08/02/20 0745     94 %   08/02/20 0731 98.5 °F (36.9 °C) 87 18 126/77 96 %   08/02/20 0359 98.7 °F (37.1 °C) 93 18 108/56 94 %   08/01/20 2300 98.7 °F (37.1 °C) 83 18 107/50 95 %   08/01/20 1956 98.8 °F (37.1 °C) 86 18 103/43 98 %   08/01/20 1950     96 %   08/01/20 1732 98.6 °F (37 °C) 85 18 108/69 96 %   08/01/20 1307 98 °F (36.7 °C) 91 18 113/74 98 %     Oxygen Therapy  O2 Sat (%): 94 % (08/02/20 0745)  Pulse via Oximetry: 94 beats per minute (08/02/20 0745)  O2 Device: Nasal cannula (08/02/20 0745)  O2 Flow Rate (L/min): 2 l/min (08/02/20 0745)  FIO2 (%): 28 % (08/02/20 0745)  ETCO2 (mmHg): 20 mmHg (07/28/20 1618)  No intake or output data in the 24 hours ending 08/02/20 1224      General:    Well nourished. Alert. on oxygen  3 lit/min  heent- normal  CV:   RRR. No murmur, rub, or gallop.   Lungs:   Coarse breath sounds  Abdomen:   Soft, nontender, nondistended. Cns- no focal neurological deficits  Extremities: Warm and dry. No cyanosis or edema. Skin:     No rashes or jaundice. Data Review:  I have reviewed all labs, meds, telemetry events, and studies from the last 24 hours. Recent Results (from the past 24 hour(s))   CBC WITH AUTOMATED DIFF    Collection Time: 08/02/20  6:16 AM   Result Value Ref Range    WBC 8.4 4.3 - 11.1 K/uL    RBC 3.77 (L) 4.05 - 5.2 M/uL    HGB 9.7 (L) 11.7 - 15.4 g/dL    HCT 32.4 (L) 35.8 - 46.3 %    MCV 85.9 79.6 - 97.8 FL    MCH 25.7 (L) 26.1 - 32.9 PG    MCHC 29.9 (L) 31.4 - 35.0 g/dL    RDW 15.4 (H) 11.9 - 14.6 %    PLATELET 535 747 - 119 K/uL    MPV 10.7 9.4 - 12.3 FL    ABSOLUTE NRBC 0.02 0.0 - 0.2 K/uL    DF AUTOMATED      NEUTROPHILS 53 43 - 78 %    LYMPHOCYTES 29 13 - 44 %    MONOCYTES 12 4.0 - 12.0 %    EOSINOPHILS 5 0.5 - 7.8 %    BASOPHILS 1 0.0 - 2.0 %    IMMATURE GRANULOCYTES 1 0.0 - 5.0 %    ABS. NEUTROPHILS 4.4 1.7 - 8.2 K/UL    ABS. LYMPHOCYTES 2.4 0.5 - 4.6 K/UL    ABS. MONOCYTES 1.0 0.1 - 1.3 K/UL    ABS. EOSINOPHILS 0.4 0.0 - 0.8 K/UL    ABS. BASOPHILS 0.0 0.0 - 0.2 K/UL    ABS. IMM.  GRANS. 0.1 0.0 - 0.5 K/UL        All Micro Results     None          Current Meds:  Current Facility-Administered Medications   Medication Dose Route Frequency    magnesium hydroxide (MILK OF MAGNESIA) 400 mg/5 mL oral suspension 30 mL  30 mL Oral DAILY PRN    senna-docusate (PERICOLACE) 8.6-50 mg per tablet 1 Tab  1 Tab Oral DAILY    pantoprazole (PROTONIX) tablet 40 mg  40 mg Oral ACB    latanoprost (XALATAN) 0.005 % ophthalmic solution 1 Drop  1 Drop Both Eyes QPM    carboxymethylcellulose sodium (REFRESH LIQUIGEL) 1 % ophthalmic solution 1 Drop  1 Drop Both Eyes BID PRN    apixaban (ELIQUIS) tablet 10 mg  10 mg Oral Q12H    [START ON 8/5/2020] apixaban (ELIQUIS) tablet 5 mg  5 mg Oral Q12H    guaiFENesin (ROBITUSSIN) 100 mg/5 mL oral liquid 100 mg  100 mg Oral Q4H PRN    sodium chloride (NS) flush 5-40 mL 5-40 mL IntraVENous Q8H    sodium chloride (NS) flush 5-40 mL  5-40 mL IntraVENous PRN    polyethylene glycol (MIRALAX) packet 17 g  17 g Oral DAILY PRN    promethazine (PHENERGAN) tablet 12.5 mg  12.5 mg Oral Q6H PRN    Or    ondansetron (ZOFRAN) injection 4 mg  4 mg IntraVENous Q6H PRN    acetaminophen (TYLENOL) tablet 650 mg  650 mg Oral Q6H PRN    Or    acetaminophen (TYLENOL) suppository 650 mg  650 mg Rectal Q6H PRN    albuterol-ipratropium (DUO-NEB) 2.5 MG-0.5 MG/3 ML  3 mL Nebulization Q4H PRN    aspirin delayed-release tablet 81 mg  81 mg Oral DAILY    calcium carbonate (OS-JANA) tablet 500 mg [elemental]  500 mg Oral DAILY    cholecalciferol (VITAMIN D3) (1000 Units /25 mcg) tablet 1 Tab  1,000 Units Oral DAILY    clorazepate (TRANXENE) tablet 3.75 mg  3.75 mg Oral TID    fluticasone propionate (FLONASE) 50 mcg/actuation nasal spray 2 Spray  2 Spray Both Nostrils DAILY    melatonin cap 10 mg (Patient Supplied)  10 mg Oral QHS    montelukast (SINGULAIR) tablet 10 mg  10 mg Oral DAILY    sertraline (ZOLOFT) tablet 100 mg  100 mg Oral DAILY    sucralfate (CARAFATE) tablet 1 g  1 g Oral TID    budesonide-formoterol (SYMBICORT) 80-4.5 mcg inhaler  2 Puff Inhalation BID RT    tiotropium bromide (SPIRIVA RESPIMAT) 2.5 mcg /actuation  2 Puff Inhalation DAILY    ascorbic acid (vitamin C) (VITAMIN C) tablet 1,500 mg  1,500 mg Oral TID    zinc sulfate tablet 220 mg  220 mg Oral DAILY    hydrALAZINE (APRESOLINE) 20 mg/mL injection 10 mg  10 mg IntraVENous Q6H PRN       Other Studies (last 24 hours):  No results found.     Assessment and Plan:     Hospital Problems as of 8/2/2020 Date Reviewed: 5/13/2020          Codes Class Noted - Resolved POA    Constipation ICD-10-CM: K59.00  ICD-9-CM: 564.00  8/2/2020 - Present Unknown        Hypokalemia ICD-10-CM: E87.6  ICD-9-CM: 276.8  7/29/2020 - Present Unknown        * (Principal) Acute hypoxemic respiratory failure (HCC) ICD-10-CM: J96.01  ICD-9-CM: 518.81  7/27/2020 - Present Unknown        Pulmonary embolism, bilateral ([address] ICD-10-CM: I26.99  ICD-9-CM: 415.19  7/27/2020 - Present Unknown        Person under investigation for COVID-19 ICD-10-CM: Z20.828  ICD-9-CM: V01.79  7/27/2020 - Present Unknown        Asthma ICD-10-CM: J45.909  ICD-9-CM: 493.90  7/27/2020 - Present Unknown        Esophageal reflux ICD-10-CM: K21.9  ICD-9-CM: 530.81  Unknown - Present Yes        Anxiety ICD-10-CM: F41.9  ICD-9-CM: 300.00  Unknown - Present Yes        Essential hypertension, benign ICD-10-CM: I10  ICD-9-CM: 401.1  Unknown - Present Yes              PLAN:    - acute hypoxic resp failure- secondary to bilateral PE. on eliquis. IVC filter 7/28/2020.  -Bilateral DVT  - constipation- miralax  -R/o covid- negative   vitamin c and zn  - gerd  - htn  - asthma  - anxiety/depression - stable  Ct findings-   Moderate-sized hiatal hernia. 2.6 cm left adrenal nodule. 4 mm right upper lobe pulmonary nodule. - nose bleed/epitaxis- resolved.         DC planning/Dispo:    DVT ppx: eliquis    Signed:  Beverly Cotton MD

## 2020-08-02 NOTE — PROGRESS NOTES
Hospitalist Progress Note     Admit Date:  2020  9:34 PM   Name:  Omaira Beckett   Age:  66 y.o.  :  1945   MRN:  [id number]   PCP:  Hunter Fritz MD  Treatment Team: Attending Provider: Ana María Burnham MD; Utilization Review: Jesse Orellana RN; Care Manager: Stephany Melvin RN    Subjective:   Chief complaint cough shortness of breath     This is a 58-year-old female with past medical history significant for asthma, hypertension, presented to the ER because of cough and shortness of breath for the last week. Patient states that she was seen about 3 days ago for a nosebleed. She went to see ENT physician Dr. Deann Quiroz today. Because of shortness of breath she was sent to the ER for further evaluation. Patient states that she has been having nonproductive cough for the last week. No hemoptysis, no sputum production. Shortness of breath at rest worse with activity. Progressively worsening shortness of breath for the last week. No orthopnea no paroxysmal nocturnal dyspnea. She denies any chest pain or palpitations. No fever no chills. She denies any headache or dizziness. At baseline patient is not on any oxygen. No nausea no vomiting no diarrhea, no constipation, no abdominal pain. No weakness tingling or numbness of extremities, she has bilateral lower extremity edema. No pain in the legs. No dysuria urgency or frequency of urination,     10 systems reviewed and negative except as noted in HPI.     ER course:     Afebrile, tachycardic with heart rate of 95/min, tachypneic with rate of 30/min, initial blood pressure 93/60, recheck blood pressure was 150/80, oxygen saturation of 87% on room air. Placed on 4 L oxygen by nasal cannula. Arterial blood gas with pH of 7.46, PCO2 of 32.5, PO2 of 154, oxygen saturation 99% on 4 L oxygen by nasal cannula.   Chest x-ray with increasing peripheral hazy opacity in the left midlung could represent a developing airspace consolidation or pneumonia. CT angiogram of the chest showed bilateral pulmonary emboli that originates in the peripheral right and left main bronchus and extends into the lobar pulmonary arteries. Clot burden is considered extensive. Cardiomegaly, dependent atelectasis with nonspecific irregular faint groundglass opacity in the bilateral lungs. Moderate-sized hiatal hernia, 2.6 cm left adrenal nodule, 4 mm right upper lobe pulmonary nodule.     Patient admitted for further evaluation management of acute hypoxemic respiratory failure, bilateral extensive PE. COVID test pending. 7/28/2020  C/o mild shortness of breath better then yesterday  Probably for IVC filter today  Npo  On heparin drip    7/29/2020  Pt says breathing mild better  Had IVC yesterday    7/30/2020  Says doing ok  Shortness of breath better  On 3 lit/min    7/31/2020  Pt says had nose bleed this am resolved  Says breathing ok    8/1/2020  Says no more nose bleeding        Objective:     Patient Vitals for the past 24 hrs:   Temp Pulse Resp BP SpO2   08/02/20 0745     94 %   08/02/20 0731 98.5 °F (36.9 °C) 87 18 126/77 96 %   08/02/20 0359 98.7 °F (37.1 °C) 93 18 108/56 94 %   08/01/20 2300 98.7 °F (37.1 °C) 83 18 107/50 95 %   08/01/20 1956 98.8 °F (37.1 °C) 86 18 103/43 98 %   08/01/20 1950     96 %   08/01/20 1732 98.6 °F (37 °C) 85 18 108/69 96 %   08/01/20 1307 98 °F (36.7 °C) 91 18 113/74 98 %     Oxygen Therapy  O2 Sat (%): 94 % (08/02/20 0745)  Pulse via Oximetry: 94 beats per minute (08/02/20 0745)  O2 Device: Nasal cannula (08/02/20 0745)  O2 Flow Rate (L/min): 2 l/min (08/02/20 0745)  FIO2 (%): 28 % (08/02/20 0745)  ETCO2 (mmHg): 20 mmHg (07/28/20 1618)  No intake or output data in the 24 hours ending 08/02/20 1222      General:    Well nourished. Alert. Mild resp distress on oxygen  3 lit/min  heent- normal  CV:   RRR. No murmur, rub, or gallop. Lungs:   Coarse breath sounds  Abdomen:   Soft, nontender, nondistended.    Cns- no focal neurological deficits  Extremities: Warm and dry. No cyanosis or edema. Skin:     No rashes or jaundice. Data Review:  I have reviewed all labs, meds, telemetry events, and studies from the last 24 hours. Recent Results (from the past 24 hour(s))   CBC WITH AUTOMATED DIFF    Collection Time: 08/02/20  6:16 AM   Result Value Ref Range    WBC 8.4 4.3 - 11.1 K/uL    RBC 3.77 (L) 4.05 - 5.2 M/uL    HGB 9.7 (L) 11.7 - 15.4 g/dL    HCT 32.4 (L) 35.8 - 46.3 %    MCV 85.9 79.6 - 97.8 FL    MCH 25.7 (L) 26.1 - 32.9 PG    MCHC 29.9 (L) 31.4 - 35.0 g/dL    RDW 15.4 (H) 11.9 - 14.6 %    PLATELET 630 248 - 413 K/uL    MPV 10.7 9.4 - 12.3 FL    ABSOLUTE NRBC 0.02 0.0 - 0.2 K/uL    DF AUTOMATED      NEUTROPHILS 53 43 - 78 %    LYMPHOCYTES 29 13 - 44 %    MONOCYTES 12 4.0 - 12.0 %    EOSINOPHILS 5 0.5 - 7.8 %    BASOPHILS 1 0.0 - 2.0 %    IMMATURE GRANULOCYTES 1 0.0 - 5.0 %    ABS. NEUTROPHILS 4.4 1.7 - 8.2 K/UL    ABS. LYMPHOCYTES 2.4 0.5 - 4.6 K/UL    ABS. MONOCYTES 1.0 0.1 - 1.3 K/UL    ABS. EOSINOPHILS 0.4 0.0 - 0.8 K/UL    ABS. BASOPHILS 0.0 0.0 - 0.2 K/UL    ABS. IMM.  GRANS. 0.1 0.0 - 0.5 K/UL        All Micro Results     None          Current Meds:  Current Facility-Administered Medications   Medication Dose Route Frequency    magnesium hydroxide (MILK OF MAGNESIA) 400 mg/5 mL oral suspension 30 mL  30 mL Oral DAILY PRN    senna-docusate (PERICOLACE) 8.6-50 mg per tablet 1 Tab  1 Tab Oral DAILY    pantoprazole (PROTONIX) tablet 40 mg  40 mg Oral ACB    latanoprost (XALATAN) 0.005 % ophthalmic solution 1 Drop  1 Drop Both Eyes QPM    carboxymethylcellulose sodium (REFRESH LIQUIGEL) 1 % ophthalmic solution 1 Drop  1 Drop Both Eyes BID PRN    apixaban (ELIQUIS) tablet 10 mg  10 mg Oral Q12H    [START ON 8/5/2020] apixaban (ELIQUIS) tablet 5 mg  5 mg Oral Q12H    guaiFENesin (ROBITUSSIN) 100 mg/5 mL oral liquid 100 mg  100 mg Oral Q4H PRN    sodium chloride (NS) flush 5-40 mL  5-40 mL IntraVENous Q8H    sodium chloride (NS) flush 5-40 mL  5-40 mL IntraVENous PRN    polyethylene glycol (MIRALAX) packet 17 g  17 g Oral DAILY PRN    promethazine (PHENERGAN) tablet 12.5 mg  12.5 mg Oral Q6H PRN    Or    ondansetron (ZOFRAN) injection 4 mg  4 mg IntraVENous Q6H PRN    acetaminophen (TYLENOL) tablet 650 mg  650 mg Oral Q6H PRN    Or    acetaminophen (TYLENOL) suppository 650 mg  650 mg Rectal Q6H PRN    albuterol-ipratropium (DUO-NEB) 2.5 MG-0.5 MG/3 ML  3 mL Nebulization Q4H PRN    aspirin delayed-release tablet 81 mg  81 mg Oral DAILY    calcium carbonate (OS-JANA) tablet 500 mg [elemental]  500 mg Oral DAILY    cholecalciferol (VITAMIN D3) (1000 Units /25 mcg) tablet 1 Tab  1,000 Units Oral DAILY    clorazepate (TRANXENE) tablet 3.75 mg  3.75 mg Oral TID    fluticasone propionate (FLONASE) 50 mcg/actuation nasal spray 2 Spray  2 Spray Both Nostrils DAILY    melatonin cap 10 mg (Patient Supplied)  10 mg Oral QHS    montelukast (SINGULAIR) tablet 10 mg  10 mg Oral DAILY    sertraline (ZOLOFT) tablet 100 mg  100 mg Oral DAILY    sucralfate (CARAFATE) tablet 1 g  1 g Oral TID    budesonide-formoterol (SYMBICORT) 80-4.5 mcg inhaler  2 Puff Inhalation BID RT    tiotropium bromide (SPIRIVA RESPIMAT) 2.5 mcg /actuation  2 Puff Inhalation DAILY    ascorbic acid (vitamin C) (VITAMIN C) tablet 1,500 mg  1,500 mg Oral TID    zinc sulfate tablet 220 mg  220 mg Oral DAILY    hydrALAZINE (APRESOLINE) 20 mg/mL injection 10 mg  10 mg IntraVENous Q6H PRN       Other Studies (last 24 hours):  No results found.     Assessment and Plan:     Hospital Problems as of 8/2/2020 Date Reviewed: 5/13/2020          Codes Class Noted - Resolved POA    Hypokalemia ICD-10-CM: E87.6  ICD-9-CM: 276.8  7/29/2020 - Present Unknown        * (Principal) Acute hypoxemic respiratory failure ([facility] [address] ICD-10-CM: J96.01  ICD-9-CM: 518.81  7/27/2020 - Present Unknown        Pulmonary embolism, bilateral (HCC) ICD-10-CM: I26.99  ICD-9-CM: 415.19 7/27/2020 - Present Unknown        Person under investigation for COVID-19 ICD-10-CM: Z20.828  ICD-9-CM: V01.79  7/27/2020 - Present Unknown        Asthma ICD-10-CM: B88.976  ICD-9-CM: 493.90  7/27/2020 - Present Unknown        Esophageal reflux ICD-10-CM: K21.9  ICD-9-CM: 530.81  Unknown - Present Yes        Anxiety ICD-10-CM: F41.9  ICD-9-CM: 300.00  Unknown - Present Yes        Essential hypertension, benign ICD-10-CM: I10  ICD-9-CM: 401.1  Unknown - Present Yes              PLAN:    - acute hypoxic resp failure- secondary to bilateral PE. on eliquis. IVC filter 7/28/2020.  -Bilateral DVT  -R/o covid- negative   vitamin c and zn  - gerd  - htn  - asthma  - anxiety/depression - stable  Ct findings-   Moderate-sized hiatal hernia. 2.6 cm left adrenal nodule. 4 mm right upper lobe pulmonary nodule. - nose bleed/epitaxis- resolved.         DC planning/Dispo:    DVT ppx: eliquis    Signed:  Clau Tapia MD

## 2020-08-02 NOTE — PROGRESS NOTES
Problem: Falls - Risk of  Goal: *Absence of Falls  Description: Document Kenneth Chaparro Fall Risk and appropriate interventions in the flowsheet.   Outcome: Progressing Towards Goal  Note: Fall Risk Interventions:  Mobility Interventions: Communicate number of staff needed for ambulation/transfer, OT consult for ADLs, Patient to call before getting OOB, PT Consult for mobility concerns, PT Consult for assist device competence, Utilize walker, cane, or other assistive device         Medication Interventions: Evaluate medications/consider consulting pharmacy, Patient to call before getting OOB, Teach patient to arise slowly    Elimination Interventions: Call light in reach, Patient to call for help with toileting needs, Toilet paper/wipes in reach    History of Falls Interventions: Bed/chair exit alarm

## 2020-08-02 NOTE — PROGRESS NOTES
Sitting up in recliner beside bed. No complaints voiced, call bell within reach. Hourly rounds completed this shift.

## 2020-08-02 NOTE — PROGRESS NOTES
Problem: Falls - Risk of  Goal: *Absence of Falls  Description: Document Luana Rogekinga Fall Risk and appropriate interventions in the flowsheet. Outcome: Progressing Towards Goal  Note: Fall Risk Interventions:  Mobility Interventions: Communicate number of staff needed for ambulation/transfer         Medication Interventions: Evaluate medications/consider consulting pharmacy    Elimination Interventions: Call light in reach    History of Falls Interventions: Bed/chair exit alarm         Problem: Patient Education: Go to Patient Education Activity  Goal: Patient/Family Education  Outcome: Progressing Towards Goal     Problem: Pressure Injury - Risk of  Goal: *Prevention of pressure injury  Description: Document Edson Scale and appropriate interventions in the flowsheet.   Outcome: Progressing Towards Goal  Note: Pressure Injury Interventions:  Sensory Interventions: Assess changes in LOC    Moisture Interventions: Absorbent underpads    Activity Interventions: Pressure redistribution bed/mattress(bed type)    Mobility Interventions: Pressure redistribution bed/mattress (bed type)    Nutrition Interventions: Document food/fluid/supplement intake    Friction and Shear Interventions: Apply protective barrier, creams and emollients                Problem: Patient Education: Go to Patient Education Activity  Goal: Patient/Family Education  Outcome: Progressing Towards Goal

## 2020-08-02 NOTE — PROGRESS NOTES
CM met with patient on Friday to discuss a d/c plan. Patient states she was waiting on her roommate to give her answer if she will be able to assist her if she return back home. CM informed patient that she will be seen over the weekend and needed an answer soon. Patient assure CM that she would have an answer. CM met with patient today and patient informed CM that she will need rehab before returning back home because roommate is unable to assist her. Patient provided CM with [facility] and didn't wish to provided any other facilities. CM assure the patient that the referral will be completed and she would be ready for d/c as soon as facility accept her. Referral completed. CM will continue to monitor. Care Management Interventions  PCP Verified by CM: Yes(Dr. Mariano Harmon)  Mode of Transport at Discharge:  Other (see comment)(family to transport)  Transition of Care Consult (CM Consult): Discharge Planning  Physical Therapy Consult: Yes  Occupational Therapy Consult: Yes  Current Support Network: Own Home, Other(Room mate lives in home)  Confirm Follow Up Transport: Other (see comment)([facility] (private transport))  Freedom of Choice List was Provided with Basic Dialogue that Supports the Patient's Individualized Plan of Care/Goals, Treatment Preferences and Shares the Quality Data Associated with the Providers?: Yes  Discharge Location  Discharge Placement: Unable to determine at this time

## 2020-08-03 VITALS
HEART RATE: 80 BPM | WEIGHT: 140 LBS | TEMPERATURE: 97.9 F | OXYGEN SATURATION: 97 % | DIASTOLIC BLOOD PRESSURE: 72 MMHG | SYSTOLIC BLOOD PRESSURE: 112 MMHG | HEIGHT: 60 IN | BODY MASS INDEX: 27.48 KG/M2 | RESPIRATION RATE: 18 BRPM

## 2020-08-03 PROBLEM — I27.20 PULMONARY HTN (HCC): Status: ACTIVE | Noted: 2020-08-03

## 2020-08-03 PROBLEM — I07.1 MODERATE TRICUSPID REGURGITATION: Status: ACTIVE | Noted: 2020-08-03

## 2020-08-03 PROBLEM — I34.0 MODERATE MITRAL VALVE REGURGITATION: Status: ACTIVE | Noted: 2020-08-03

## 2020-08-03 PROBLEM — Z95.828 S/P IVC FILTER: Status: ACTIVE | Noted: 2020-08-03

## 2020-08-03 PROBLEM — M71.21 BAKER'S CYST OF KNEE, RIGHT: Status: ACTIVE | Noted: 2020-08-03

## 2020-08-03 PROBLEM — M67.911 ROTATOR CUFF DISORDER, RIGHT: Status: ACTIVE | Noted: 2020-08-03

## 2020-08-03 PROBLEM — I82.403 ACUTE DEEP VEIN THROMBOSIS (DVT) OF BOTH LOWER EXTREMITIES (HCC): Status: ACTIVE | Noted: 2020-08-03

## 2020-08-03 LAB
BASOPHILS # BLD: 0.1 K/UL (ref 0–0.2)
BASOPHILS NFR BLD: 1 % (ref 0–2)
DIFFERENTIAL METHOD BLD: ABNORMAL
EOSINOPHIL # BLD: 0.4 K/UL (ref 0–0.8)
EOSINOPHIL NFR BLD: 6 % (ref 0.5–7.8)
ERYTHROCYTE [DISTWIDTH] IN BLOOD BY AUTOMATED COUNT: 15.3 % (ref 11.9–14.6)
HCT VFR BLD AUTO: 30 % (ref 35.8–46.3)
HGB BLD-MCNC: 8.9 G/DL (ref 11.7–15.4)
IMM GRANULOCYTES # BLD AUTO: 0 K/UL (ref 0–0.5)
IMM GRANULOCYTES NFR BLD AUTO: 0 % (ref 0–5)
LYMPHOCYTES # BLD: 2.2 K/UL (ref 0.5–4.6)
LYMPHOCYTES NFR BLD: 28 % (ref 13–44)
MCH RBC QN AUTO: 25.1 PG (ref 26.1–32.9)
MCHC RBC AUTO-ENTMCNC: 29.7 G/DL (ref 31.4–35)
MCV RBC AUTO: 84.7 FL (ref 79.6–97.8)
MONOCYTES # BLD: 1 K/UL (ref 0.1–1.3)
MONOCYTES NFR BLD: 13 % (ref 4–12)
NEUTS SEG # BLD: 4.1 K/UL (ref 1.7–8.2)
NEUTS SEG NFR BLD: 52 % (ref 43–78)
NRBC # BLD: 0 K/UL (ref 0–0.2)
PLATELET # BLD AUTO: 328 K/UL (ref 150–450)
PMV BLD AUTO: 10.8 FL (ref 9.4–12.3)
RBC # BLD AUTO: 3.54 M/UL (ref 4.05–5.2)
WBC # BLD AUTO: 7.8 K/UL (ref 4.3–11.1)

## 2020-08-03 PROCEDURE — 36415 COLL VENOUS BLD VENIPUNCTURE: CPT

## 2020-08-03 PROCEDURE — 74011250637 HC RX REV CODE- 250/637: Performed by: FAMILY MEDICINE

## 2020-08-03 PROCEDURE — 77010033678 HC OXYGEN DAILY

## 2020-08-03 PROCEDURE — 74011250637 HC RX REV CODE- 250/637: Performed by: HOSPITALIST

## 2020-08-03 PROCEDURE — 85025 COMPLETE CBC W/AUTO DIFF WBC: CPT

## 2020-08-03 PROCEDURE — 97530 THERAPEUTIC ACTIVITIES: CPT

## 2020-08-03 RX ORDER — POLYETHYLENE GLYCOL 3350 17 G/17G
17 POWDER, FOR SOLUTION ORAL DAILY
Qty: 14 EACH | Refills: 0 | Status: SHIPPED | OUTPATIENT
Start: 2020-08-03

## 2020-08-03 RX ORDER — LATANOPROST 50 UG/ML
1 SOLUTION/ DROPS OPHTHALMIC EVERY EVENING
Qty: 1 BOTTLE | Refills: 0 | Status: SHIPPED
Start: 2020-08-03

## 2020-08-03 RX ORDER — IPRATROPIUM BROMIDE AND ALBUTEROL SULFATE 2.5; .5 MG/3ML; MG/3ML
3 SOLUTION RESPIRATORY (INHALATION)
Qty: 30 NEBULE | Refills: 0 | Status: SHIPPED
Start: 2020-08-03 | End: 2021-10-13

## 2020-08-03 RX ORDER — ADHESIVE BANDAGE
30 BANDAGE TOPICAL DAILY
Qty: 118 ML | Refills: 0 | Status: SHIPPED
Start: 2020-08-03 | End: 2021-10-13

## 2020-08-03 RX ADMIN — Medication 220 MG: at 08:06

## 2020-08-03 RX ADMIN — ASPIRIN 81 MG: 81 TABLET, COATED ORAL at 08:06

## 2020-08-03 RX ADMIN — MONTELUKAST 10 MG: 10 TABLET, FILM COATED ORAL at 08:06

## 2020-08-03 RX ADMIN — TIOTROPIUM BROMIDE INHALATION SPRAY 2 PUFF: 3.12 SPRAY, METERED RESPIRATORY (INHALATION) at 08:46

## 2020-08-03 RX ADMIN — CHOLECALCIFEROL TAB 25 MCG (1000 UNIT) 1 TABLET: 25 TAB at 08:06

## 2020-08-03 RX ADMIN — CLORAZEPATE DIPOTASSIUM 3.75 MG: 7.5 TABLET ORAL at 08:06

## 2020-08-03 RX ADMIN — BUDESONIDE AND FORMOTEROL FUMARATE DIHYDRATE 2 PUFF: 80; 4.5 AEROSOL RESPIRATORY (INHALATION) at 08:46

## 2020-08-03 RX ADMIN — Medication 500 MG: at 08:06

## 2020-08-03 RX ADMIN — SUCRALFATE 1 G: 1 TABLET ORAL at 08:06

## 2020-08-03 RX ADMIN — Medication 1500 MG: at 15:53

## 2020-08-03 RX ADMIN — PANTOPRAZOLE SODIUM 40 MG: 40 TABLET, DELAYED RELEASE ORAL at 06:06

## 2020-08-03 RX ADMIN — SERTRALINE HYDROCHLORIDE 100 MG: 100 TABLET ORAL at 08:06

## 2020-08-03 RX ADMIN — Medication 1500 MG: at 08:06

## 2020-08-03 RX ADMIN — CLORAZEPATE DIPOTASSIUM 3.75 MG: 7.5 TABLET ORAL at 15:55

## 2020-08-03 RX ADMIN — POLYETHYLENE GLYCOL 3350 17 G: 17 POWDER, FOR SOLUTION ORAL at 08:06

## 2020-08-03 RX ADMIN — SUCRALFATE 1 G: 1 TABLET ORAL at 15:53

## 2020-08-03 RX ADMIN — SENNOSIDES AND DOCUSATE SODIUM 1 TABLET: 8.6; 5 TABLET ORAL at 08:06

## 2020-08-03 RX ADMIN — APIXABAN 10 MG: 5 TABLET, FILM COATED ORAL at 08:06

## 2020-08-03 NOTE — PROGRESS NOTES
Problem: Mobility Impaired (Adult and Pediatric)  Goal: *Acute Goals and Plan of Care  Outcome: Progressing Towards Goal  Note: Acute PT Goals:  (1.)Alice Daley will move from supine to sit and sit to supine , scoot up and down, and roll side to side with MODIFIED INDEPENDENCE within 7 treatment day(s). (2.)Alice Daley will transfer from bed to chair and chair to bed with MODIFIED INDEPENDENCE using the least restrictive device within 7 treatment day(s). (3.)Alice Daley will ambulate with STAND BY ASSIST for 250 feet with the least restrictive device within 7 treatment day(s). (4.)Alice Daley will perform standing static and dynamic balance activities x 15 minutes with STAND BY ASSIST to improve safety and activity tolerance within 7 treatment day(s). (Zarco  will perform bilateral lower extremity exercises x 15 min for HEP with INDEPENDENCE to improve strength, endurance, and functional mobility within 7 treatment day(s). PHYSICAL THERAPY: Daily Note and PM 8/3/2020  INPATIENT: PT Visit Days : 4  [place] / [place] / Product Type: Medicare /       NAME/AGE/GENDER: Giorgio Dean is a 66 y.o. female   PRIMARY DIAGNOSIS: Pulmonary embolism, bilateral ([facility] [I26.99]  Acute hypoxemic respiratory failure ([facility] [J96.01]  Person under investigation for COVID-19 [Z20.828] Acute hypoxemic respiratory failure ([address] Acute hypoxemic respiratory failure ([facility] [address]       ICD-10: Treatment Diagnosis:   · Generalized Muscle Weakness (M62.81)  · Other lack of cordination (R27.8)  · Difficulty in walking, Not elsewhere classified (R26.2)  · Other abnormalities of gait and mobility (R26.89)  · Repeated Falls (R29.6)  · History of falling (Z91.81)   Precaution/Allergies:  Flexeril [cyclobenzaprine]; Ketoprofen; and Plaquenil [hydroxychloroquine]      ASSESSMENT:     Upon entering, pt resting in bed, agreeable to PT. On 2L O2 via nasal cannula.   she reports no pain at rest. Pt performed supine > sit with SBA, sitting EOB with good sitting balance control. Sit > stand with Jasiel using rollator walker. Fair standing balance control, requiring CGA/rollator walker. Gait x 50 ft with Min A/RW, cues for BLE coordination, balance control, walker progression. Assist and cues required to prevent loss of balance. At end of session pt sitting up in chair with all needs within reach, alarm activated for safety, RN notified. Pt presents as functioning below her baseline, with deficits in mobility including transfers, gait, balance, and activity tolerance. Pt will benefit from skilled therapy services to address stated deficits to promote return to highest level of function, independence, and safety. Will continue therapy efforts towards stated goals which continue to be appropriate. Making good progress with activity tolerance and overall mobility. This section established at most recent assessment   PROBLEM LIST (Impairments causing functional limitations):  1. Decreased Strength  2. Decreased ADL/Functional Activities  3. Decreased Transfer Abilities  4. Decreased Ambulation Ability/Technique  5. Decreased Balance  6. Decreased Activity Tolerance  7. Increased Fatigue  8. Increased Shortness of Breath   INTERVENTIONS PLANNED: (Benefits and precautions of physical therapy have been discussed with the patient.)  1. Balance Exercise  2. Bed Mobility  3. Family Education  4. Gait Training  5. Home Exercise Program (HEP)  6. Neuromuscular Re-education/Strengthening  7. Range of Motion (ROM)  8. Therapeutic Activites  9. Therapeutic Exercise/Strengthening  10. Transfer Training     TREATMENT PLAN: Frequency/Duration: 3 times a week for duration of hospital stay  Rehabilitation Potential For Stated Goals: Good     REHAB RECOMMENDATIONS (at time of discharge pending progress):    Placement: It is my opinion, based on this patient's performance to date, that Ms. Marbella Oconnor may benefit from intensive therapy at a [facility] after discharge due to the functional deficits listed above that are likely to improve with skilled rehabilitation and concerns that he/she may be unsafe to be unsupervised at home due to medical complications and mobility deficits which put her at increase risk of functional decline and/or falling . Equipment:    None at this time            HISTORY:   History of Present Injury/Illness (Reason for Referral):  Per H&P: \"This is a 68-year-old female with past medical history significant for asthma, hypertension, presented to the ER because of cough and shortness of breath for the last week. Patient states that she was seen about 3 days ago for a nosebleed. She went to see ENT physician Dr. Iftikhar Terrazas today. Because of shortness of breath she was sent to the ER for further evaluation. Patient states that she has been having nonproductive cough for the last week. No hemoptysis, no sputum production. Shortness of breath at rest worse with activity. Progressively worsening shortness of breath for the last week. No orthopnea no paroxysmal nocturnal dyspnea. She denies any chest pain or palpitations. No fever no chills. She denies any headache or dizziness. At baseline patient is not on any oxygen. No nausea no vomiting no diarrhea, no constipation, no abdominal pain. No weakness tingling or numbness of extremities, she has bilateral lower extremity edema. No pain in the legs. No dysuria urgency or frequency of urination,  10 systems reviewed and negative except as noted in HPI. ER course:  Afebrile, tachycardic with heart rate of 95/min, tachypneic with rate of 30/min, initial blood pressure 93/60, recheck blood pressure was 150/80, oxygen saturation of 87% on room air. Placed on 4 L oxygen by nasal cannula. CBC with white count of 8.9 hemoglobin 10.7 platelet count 040. CMP is fairly unremarkable. High-sensitivity troponin of 21, NT proBNP of 5231.   Arterial blood gas with pH of 7.46, PCO2 of 32.5, PO2 of 154, oxygen saturation 99% on 4 L oxygen by nasal cannula. Chest x-ray with increasing peripheral hazy opacity in the left midlung could represent a developing airspace consolidation or pneumonia. CT angiogram of the chest showed bilateral pulmonary emboli that originates in the peripheral right and left main bronchus and extends into the lobar pulmonary arteries. Clot burden is considered extensive. Cardiomegaly, dependent atelectasis with nonspecific irregular faint groundglass opacity in the bilateral lungs. Moderate-sized hiatal hernia, 2.6 cm left adrenal nodule, 4 mm right upper lobe pulmonary nodule. Patient admitted for further evaluation management of acute hypoxemic respiratory failure, bilateral extensive PE. COVID test pending. \"  Past Medical History/Comorbidities:   Ms. Luz Maria Nobles  has a past medical history of Abnormality of gait (7/18/2018), Anemia, Anxiety, Arthritis, Asthma, Calculus of kidney, Chronic pain, Contact dermatitis and other eczema, due to unspecified cause, Depressive disorder, not elsewhere classified, Encounter for chronic pain management, Esophageal reflux, Essential hypertension, benign, Frequent falls (7/18/2018), GERD (gastroesophageal reflux disease), and Pure hypercholesterolemia. Ms. Luz Maria Nobles  has a past surgical history that includes hx knee replacement (Right); hx shoulder replacement (Left); hx hysterectomy; hx lithotripsy; hx cataract removal (Bilateral); hx retinal detachment repair; hx colonoscopy; hx gi; neurological procedure unlisted; and ir thrombectomy [facility] art primary non murray or intracranial (7/28/2020).   Social History/Living Environment:   Home Environment: Private residence  # Steps to Enter: 0  One/Two Story Residence: One story  Living Alone: No  Support Systems: Friends \ neighbors  Patient Expects to be Discharged to[de-identified] Unknown  Current DME Used/Available at Home: Amanda Bread, rollator, Wheelchair, Shower chair  Tub or Shower Type: Tub/Shower combination  Prior Level of Function/Work/Activity:  Prior to hospital admission pt lives with a roommate in a one story home with no step(s) to enter (has a ramp). Her  lives in another home as she reports Yoel Keith is a pack rat\" - he is currently at rehab. Another friend assists her with getting to appointments. Pt endorses at least ten falls in past 6 months. Prior to admission Ms. Monse Bahena uses a rollator walker or a power scooter for mobility. She does not wear oxygen at baseline. Number of Personal Factors/Comorbidities that affect the Plan of Care: 1-2: MODERATE COMPLEXITY   EXAMINATION:   Most Recent Physical Functioning:   Gross Assessment:  AROM: Within functional limits  Strength: Generally decreased, functional  Sensation: Intact               Posture:     Balance:  Sitting: Intact  Standing: Impaired  Standing - Static: Fair  Standing - Dynamic : Fair;Constant support Bed Mobility:  Rolling: Stand-by assistance  Supine to Sit: Stand-by assistance  Sit to Supine: Stand-by assistance  Scooting: Stand-by assistance  Wheelchair Mobility:     Transfers:  Sit to Stand: Minimum assistance  Stand to Sit: Minimum assistance  Bed to Chair: Contact guard assistance;Minimum assistance  Interventions: Safety awareness training; Tactile cues; Verbal cues  Gait:     Base of Support: Center of gravity altered;Narrowed  Speed/Jenifer: Shuffled;Slow;Fluctuations  Gait Abnormalities: Decreased step clearance;Trunk sway increased; Path deviations; Shuffling gait  Distance (ft): 50 Feet (ft)  Assistive Device: Walker, rollator;Gait belt  Ambulation - Level of Assistance: Contact guard assistance;Minimal assistance      Body Structures Involved:  1. Heart  2. Lungs  3. Bones  4. Joints  5. Muscles Body Functions Affected:  1. Cardio  2. Respiratory  3. Neuromusculoskeletal  4. Movement Related Activities and Participation Affected:  1. General Tasks and Demands  2. Mobility  3.  Self Care   Number of elements that affect the Plan of Care: 3: MODERATE COMPLEXITY   CLINICAL PRESENTATION:   Presentation: Evolving clinical presentation with changing clinical characteristics: MODERATE COMPLEXITY   CLINICAL DECISION MAKIN [facility] Inpatient Short Form  How much difficulty does the patient currently have. .. Unable A Lot A Little None   1. Turning over in bed (including adjusting bedclothes, sheets and blankets)? [] 1   [] 2   [x] 3   [] 4   2. Sitting down on and standing up from a chair with arms ( e.g., wheelchair, bedside commode, etc.)   [] 1   [] 2   [x] 3   [] 4   3. Moving from lying on back to sitting on the side of the bed? [] 1   [] 2   [x] 3   [] 4   How much help from another person does the patient currently need. .. Total A Lot A Little None   4. Moving to and from a bed to a chair (including a wheelchair)? [] 1   [] 2   [x] 3   [] 4   5. Need to walk in hospital room? [] 1   [] 2   [x] 3   [] 4   6. Climbing 3-5 steps with a railing? [] 1   [x] 2   [] 3   [] 4   © , Trustees of [address] [place] [address], under license to [employer]. All rights reserved      Score:  Initial: 17 Most Recent: X (Date: -- )    Interpretation of Tool:  Represents activities that are increasingly more difficult (i.e. Bed mobility, Transfers, Gait). Medical Necessity:     · Patient is expected to demonstrate progress in   · strength, range of motion, balance, coordination, functional technique, and activity tolerance  ·  to   · increase independence with all mobility and decrease fall risk. · .  Reason for Services/Other Comments:  · Patient continues to require skilled intervention due to   · medical complications and mobility deficits which impact her level of function, safety, and independence  · .    Use of outcome tool(s) and clinical judgement create a POC that gives a: Questionable prediction of patient's progress: MODERATE COMPLEXITY        TREATMENT:   (In addition to Assessment/Re-Assessment sessions the following treatments were rendered)   Pre-treatment Symptoms/Complaints: In good spirits today  Pain: Initial:   Pain Intensity 1: 0  Post Session:  0/10     Therapeutic Activity: (23 Minutes): Therapeutic activities including Bed transfers, Chair transfers, Standing balance activities, and Ambulation on level ground to improve mobility, strength, balance, coordination, and activity tolerance . Required minimal cues  to promote static and dynamic balance in standing and promote coordination of bilateral, lower extremity(s). Braces/Orthotics/Lines/Etc:   · O2 Device: Nasal cannula  Treatment/Session Assessment:    · Response to Treatment: See above. · Interdisciplinary Collaboration:   o Physical Therapist  o Registered Nurse  · After treatment position/precautions:   o Up in chair  o Bed/Chair-wheels locked  o Bed in low position  o Call light within reach  o RN notified   · Compliance with Program/Exercises: Will assess as treatment progresses  · Recommendations/Intent for next treatment session: \"Next visit will focus on advancements to more challenging activities and reduction in assistance provided\".     Total Treatment Duration:  PT Patient Time In/Time Out  Time In: 1417  Time Out: [address]

## 2020-08-03 NOTE — PROGRESS NOTES
MSN, CM:  Patient to be discharged to [facility] today for rehab. Patient agrees with this discharge plan. Patient has met all milestones for this admission. [facility] to transport patient to facility.       Care Management Interventions  PCP Verified by CM: Yes(Dr. Radha Petersen)  Mode of Transport at Discharge: BLS([facility] to transport)  Transition of Care Consult (CM Consult): SNF  Partner SNF: No  Reason Why Partner SNF Not Chosen: Location  Physical Therapy Consult: Yes  Occupational Therapy Consult: Yes  Current Support Network: Own Home, Other(Room mate lives in home)  Confirm Follow Up Transport: Other (see comment)([facility] (private transport))  The Plan for Transition of Care is Related to the Following Treatment Goals : Short term rehab to regain strength back to baseline for self care  The Patient and/or Patient Representative was Provided with a Choice of Provider and Agrees with the Discharge Plan?: Yes  Name of the Patient Representative Who was Provided with a Choice of Provider and Agrees with the Discharge Plan: Ms. Mirian Dukes (patient)  Freedom of Choice List was Provided with Basic Dialogue that Supports the Patient's Individualized Plan of Care/Goals, Treatment Preferences and Shares the Quality Data Associated with the Providers?: Yes  Discharge Location  Discharge Placement: Skilled nursing facility

## 2020-08-03 NOTE — PROGRESS NOTES
Problem: Falls - Risk of  Goal: *Absence of Falls  Description: Document Edgardo Sun Fall Risk and appropriate interventions in the flowsheet.   Outcome: Progressing Towards Goal  Note: Fall Risk Interventions:  Mobility Interventions: Patient to call before getting OOB, Utilize walker, cane, or other assistive device         Medication Interventions: Patient to call before getting OOB    Elimination Interventions: Call light in reach, Patient to call for help with toileting needs, Toileting schedule/hourly rounds    History of Falls Interventions: Door open when patient unattended         Problem: Patient Education: Go to Patient Education Activity  Goal: Patient/Family Education  Outcome: Progressing Towards Goal

## 2020-08-03 NOTE — DISCHARGE SUMMARY
Hospitalist Discharge Summary     Admit Date:  2020  9:34 PM   Name:  Omaira Beckett   Age:  66 y.o.  :  1945   MRN:  [id number]   PCP:  Hunter Fritz MD  Treatment Team: Attending Provider: Ana María Burnham MD; Utilization Review: Jesse Orellana RN; Care Manager: Stephany Melvin RN; Staff Nurse: Domingo King RN; Occupational Therapist: Florina Reaves OT; Physical Therapist: George Wagner PT    Problem List for this Hospitalization:  Hospital Problems as of 8/3/2020 Date Reviewed: 2020          Codes Class Noted - Resolved POA    Acute deep vein thrombosis (DVT) of both lower extremities ([facility] ICD-10-CM: I82.403  ICD-9-CM: 453.40  8/3/2020 - Present Unknown        S/P IVC filter ICD-10-CM: T02.050  ICD-9-CM: V45.89  8/3/2020 - Present Unknown        Rotator cuff disorder, right ICD-10-CM: M67.911  ICD-9-CM: 726.10  8/3/2020 - Present Unknown        Baker's cyst of knee, right ICD-10-CM: M71.21  ICD-9-CM: 727.51  8/3/2020 - Present Unknown        Moderate tricuspid regurgitation ICD-10-CM: I07.1  ICD-9-CM: 397.0  8/3/2020 - Present Unknown        Moderate mitral valve regurgitation ICD-10-CM: I34.0  ICD-9-CM: 424.0  8/3/2020 - Present Unknown        Pulmonary HTN ([facility] ICD-10-CM: I27.20  ICD-9-CM: 416.8  8/3/2020 - Present Unknown        Constipation ICD-10-CM: K59.00  ICD-9-CM: 564.00  2020 - Present Unknown        Hypokalemia ICD-10-CM: E87.6  ICD-9-CM: 276.8  2020 - Present Unknown        * (Principal) Acute hypoxemic respiratory failure ([facility] ICD-10-CM: J96.01  ICD-9-CM: 518.81  2020 - Present Unknown        Pulmonary embolism, bilateral ([facility] ICD-10-CM: I26.99  ICD-9-CM: 415.19  2020 - Present Unknown        Person under investigation for COVID-19 ICD-10-CM: Z20.828  ICD-9-CM: V01.79  2020 - Present Unknown        Asthma ICD-10-CM: J45.909  ICD-9-CM: 493.90  2020 - Present Unknown        Postural imbalance ICD-10-CM: R29.3  ICD-9-CM: 729.90 7/18/2018 - Present Yes        Esophageal reflux ICD-10-CM: K21.9  ICD-9-CM: 530.81  Unknown - Present Yes        Anxiety ICD-10-CM: F41.9  ICD-9-CM: 300.00  Unknown - Present Yes        Essential hypertension, benign ICD-10-CM: I10  ICD-9-CM: 401.1  Unknown - Present Yes                Admission HPI from 7/27/2020: This is a 25-year-old female with past medical history significant for asthma, hypertension, presented to the ER because of cough and shortness of breath for the last week.  Patient states that she was seen about 3 days ago for a nosebleed.  She went to see ENT physician Dr. Sulma Judge today. [place] Ro[place] of shortness of breath she was sent to the ER for further evaluation. Km Tyler states that she has been having nonproductive cough for the last week.  No hemoptysis, no sputum production.  Shortness of breath at rest worse with activity.  Progressively worsening shortness of breath for the last week.   No orthopnea no paroxysmal nocturnal dyspnea.  She denies any chest pain or palpitations.  No fever no chills.  She denies any headache or dizziness.  At baseline patient is not on any oxygen.  No nausea no vomiting no diarrhea, no constipation, no abdominal pain.  No weakness tingling or numbness of extremities, she has bilateral lower extremity edema.  No pain in the legs. No dysuria urgency or frequency of urination,  ER course:     Afebrile, tachycardic with heart rate of 95/min, tachypneic with rate of 30/min, initial blood pressure 93/60, recheck blood pressure was 150/80, oxygen saturation of 87% on room air.  Placed on 4 L oxygen by nasal cannula. Arterial blood gas with pH of 7.46, PCO2 of 32.5, PO2 of 154, oxygen saturation 99% on 4 L oxygen by nasal cannula. Chest x-ray with increasing peripheral hazy opacity in the left midlung could represent a developing airspace consolidation or pneumonia.   CT angiogram of the chest showed bilateral pulmonary emboli that originates in the peripheral right and left main bronchus and extends into the lobar pulmonary arteries.  Clot burden is considered extensive.  Cardiomegaly, dependent atelectasis with nonspecific irregular faint groundglass opacity in the bilateral lungs.  Moderate-sized hiatal hernia, 2.6 cm left adrenal nodule, 4 mm right upper lobe pulmonary nodule.     Patient admitted for further evaluation management of acute hypoxemic respiratory failure, bilateral extensive PE. COVID test pending. Hospital Course:  Pt also found to have bilateral PE and DVT. Pt was continued on heparin drip. IR was consulted, pt had IVC filter placed on 7/28/2020. Pt covid test was negative. Pt was continued on oxygen. pts iv heparin was dced after IVC filter placement and changed to eliquis. Pro and cons of eliquis explained. pts aspirin dced. Echo showed severe pulmonary htn-RIGHT VENTRICLE: The size was normal. Systolic function was normal. Estimated peak pressure was in the range of 75-80 mmHg.   mild to moderate mitral regurgitation. moderate  Tricuspid regurgitation. Pt had episode of nose bleed during the stay, resolved. Pt was moved out of covid floor. PT following. Recommended rehab. Pt presently requiring 2 lit/min at rest.  Pt does have bed at the rehab. Chronic rt rotator cuff problem, has difficulty right upper extremity abduction. chronic gait problem ,pt says secondary to her cerebellar problems. Clinically stable for discharge. Follow up instructions below. Plan was discussed with patient. All questions answered. Patient was stable at time of discharge and was instructed to call or return if there are any concerns or recurrence of symptoms. Diagnostic Imaging/Tests:   Ct Chest W Cont    Result Date: 7/27/2020  CT OF THE CHEST WITH CONTRAST, PULMONARY EMBOLUS PROTOCOL.  CLINICAL INDICATION: Shortness of breath, Covid rule out, evaluate for pulmonary emboli PROCEDURE: Serial thin section axial images are obtained from the thoracic inlet through the upper abdomen following the administration of intravenous contrast per a dedicated, institutional pulmonary embolus protocol. Coronal MIP reformatted images are generated. Radiation dose reduction techniques were used for this study. Our CT scanners use one or all of the following: Automated exposure control, adjusted of the mA and/or kV according to patient size, iterative reconstruction COMPARISON: No prior FINDINGS: The aorta is unremarkable. Large filling defects are noted in the right and left main pulmonary arteries extending into all lobar pulmonary arteries in keeping with extensive acute pulmonary emboli. No mediastinal or axillary adenopathy. The heart is enlarged. No pleural or pericardial effusion evident. There is no pneumothorax. Dependent atelectasis is present. There is vague irregular groundglass opacity more likely secondary to imaging the patient in expiration. A 4 mm pulmonary nodule is noted in the right upper lobe on image 30 there is no pleural effusion. A moderate-sized hiatal hernia is present. A 2.6 cm left adrenal nodule is noted. No aggressive osseous is identified. IMPRESSION: 1. Bilateral pulmonary emboli that originates in the peripheral right and left main bronchus and extend into the lobar pulmonary arteries. Clot burden is considered extensive. 2. Cardiomegaly. 3. Dependent atelectasis with nonspecific, irregular faint groundglass opacity in the bilateral lungs more likely related to imaging the patient in expiration. No tom pneumonia appreciated. 4. Moderate-sized hiatal hernia. 5. 2.6 cm left adrenal nodule. 6. 4 mm right upper lobe pulmonary nodule. [facility] The critical results contained in this report were communicated to Dr. Almodovar Comes by Dr. Chasity Patel on 7/27/2020 at 7:10 PM. Critical results were communicated as outlined in Section II.C.2.a.i of the ACR Practice Guideline for Communication of Diagnostic Imaging Findings.       Ir Thrombectomy Mech Art Primary Non Danielle Or Intracranial    Result Date: 7/29/2020  PROCEDURE: Bilateral pulmonary angiogram. Mechanical thrombectomy of pulmonary emboli. IVC venography. IVC Filter Placement. Procedural Personnel Attending physician(s): Zofia Mendoza M.D. Pre-procedure diagnosis: Acute bilateral DVT with acute pulmonary embolus with right heart strain Post-procedure diagnosis: Same Indication: Acute bilateral DVT with acute pulmonary embolus with right heart strain Complications: No immediate complications. IMPRESSION: Bilateral pulmonary radiography reveals acute PE which correlate with the CT findings. Mechanical thrombectomy was performed, but only a small amount of clot was able to be removed. An IVC filter was placed given the bilateral DVT and concurrent right heart dysfunction. _______________________________________________________________ PROCEDURE SUMMARY: - Venous access with ultrasound guidance - Bilateral pulmonary angiogram - Mechanical thrombectomy of pulmonary emboli. - IVC venography. - IVC Filter Placement. PROCEDURE DETAILS: Pre-procedure Consent: Informed consent for the procedure including risks, benefits and alternatives was obtained and time-out was performed prior to the procedure. Preparation: The site was prepared and draped using maximal sterile barrier technique including cutaneous antisepsis. Anesthesia/sedation Level of anesthesia/sedation: Moderate sedation (conscious sedation) Anesthesia/sedation administered by: Independent trained observer under attending supervision with continuous monitoring of the patient?s level of consciousness and physiologic status Total intra-service sedation time (minutes): 105 Access Local anesthesia was administered. The vessel was sonographically evaluated and determined to be patent. Real time ultrasound was used to visualize needle entry into the vessel and a permanent image was stored. A 22 [place] Dryseal sheath was placed.  Vein accessed: Right common femoral vein Access technique: Micropuncture set with 21 gauge needle Pulmonary angiography and mechanical thrombectomy The left lower lobe pulmonary artery was catheterized using a 6 Armenian angled pigtail catheter. DSA of the left lower lobe pulmonary artery was performed which demonstrates acute pulmonary embolus correlating with the recent CT findings. Mechanical/suction aspiration was performed multiple times using the Inari Flowtriever system in the left upper and left lower lobe pulmonary arteries with removal of a small volume of thrombus. The pigtail catheter in conjunction with an 035 guidewire were then used to select the right pulmonary artery. DSA of the right pulmonary artery was performed which demonstrates acute pulmonary embolus primarily involving the right lower lobe lobar and segmental branches. An 035 Amplatz wire was placed in the right lower lobe pulmonary artery. The Inari Flowtriever system was unable to track over the wire without displacement. The patient was not tolerating the procedure well and was complaining of discomfort. The Inari system was removed. IVC Venography and IVC Filter Placement Utilizing a pigtail catheter, DSA of the IVC was performed. This revealed patency of the IVC without thrombus. Using anatomic landmarks, a Cook select IVC filter was deployed in the infrarenal IVC. Post IVC Filter Placement venography of the IVC was performed which demonstrates appropriate positioning of the IVC filter in the infrarenal IVC. Closure The sheath was removed and pressure was held for hemostasis. . A sterile dressing was applied.  Contrast Contrast agent: Isovue-300 Contrast volume (mL): 75 Radiation Dose Cumulative dose: 467 mGy Specimens removed: None Estimated blood loss (mL): Approximately 100    Xr Chest Port    Result Date: 7/27/2020  Portable chest x-ray CLINICAL INDICATION: Shortness of breath for a couple of days FINDINGS: Single AP view the chest compared to a similar exam dated 7/24/2020 shows increase in the hazy opacity within the left mid lung. Note the lungs are underexpanded. The cardiac silhouette and mediastinum are stable and unchanged. The patient is status post left shoulder arthroplasty. IMPRESSION: Increasing peripheral hazy opacity in the left midlung could represent a developing airspace consolidation or pneumonia. Duplex Lower Ext Venous Bilat    Result Date: 2020  EXAM: Bilateral leg venous ultrasound. INDICATION: Swelling. COMPARISON: None. TECHNIQUE: Evaluation of the bilateral leg veins was performed utilizing grey-scale, color Doppler and duplex ultrasound. FINDINGS: The bilateral common femoral, deep femoral and greater saphenous veins are patent and compressible, with normal response to augmentation. There is nonocclusive thrombus in the bilateral superficial femoral and popliteal veins, as well as in the right peroneal and posterior tibial veins. Occlusive thrombus is noted in the left peroneal and posterior tibial veins. Also noted is a 2.2 x 2.2 x 0.5 cm Baker's cyst in the right popliteal fossa. Ultrasound technologist has indicated she will notify the floor nurse. IMPRESSION: 1. Bilateral leg DVT. 2. 2.2 cm Baker's cyst in the right popliteal fossa.       Echocardiogram results:  Results for orders placed or performed during the hospital encounter of 20   2D ECHO COMPLETE ADULT (TTE) W OR [address] [facility]  [address] [place] [address]  [phone]    Transthoracic Echocardiogram  2D, M-mode, Doppler, and Color Doppler    Patient: Alok Leone  MR #: [id number]  : [id number]  Age: 66 years  Gender: Female  Study date: 2020  Account #: [de-identified]  Height: 60 in  Weight: 139.7 lb  BSA: 1.6 mï¾²  Status:Routine  Location: 819  BP: 112/ 54    Allergies: CYCLOBENZAPRINE, KETOPROFEN, HYDROXYCHLOROQUINE    Sonographer:  MARIANO Valentino  Group:  [facility]  Referring Physician:  oMna Marte MD  Reading Physician:  Solo Arciniega. [address] Aguillon MD [facility]    INDICATIONS: PE, Check for right heart strain    PROCEDURE: This was a routine study. A transthoracic echocardiogram was  performed. The study included complete 2D imaging, M-mode, complete spectral  Doppler, and color Doppler. Image quality was adequate. LEFT VENTRICLE: Size was normal. Systolic function was normal. Ejection  fraction was estimated in the range of 55 % to 60 %. There were no regional  wall motion abnormalities. Wall thickness was normal. Left ventricular  diastolic function is indeterminate based on assessment criteria. Avg E/e':  8.05.    RIGHT VENTRICLE: The size was normal. Systolic function was normal. Estimated  peak pressure was in the range of 75-80 mmHg. LEFT ATRIUM: Size was normal.    RIGHT ATRIUM: The atrium was mildly dilated. SYSTEMIC VEINS: IVC: The inferior vena cava was dilated. The respirophasic  change in diameter was less than 50%. AORTIC VALVE: The valve was trileaflet. Leaflets exhibited mild sclerosis. There was no evidence for stenosis. There was no insufficiency. MITRAL VALVE: Valve structure was normal. There was no evidence for stenosis. There was mild to moderate regurgitation. TRICUSPID VALVE: The valve structure was normal. There was no evidence for  stenosis. There was moderate regurgitation. PULMONIC VALVE: Not well visualized. There was no evidence for stenosis. There  was mild regurgitation. PERICARDIUM: There was no pericardial effusion. AORTA: The root exhibited normal size. SUMMARY:    -  Left ventricle: Systolic function was normal. Ejection fraction was  estimated in the range of 55 % to 60 %. There were no regional wall motion  abnormalities. -  Right atrium: The atrium was mildly dilated. -  Inferior vena cava, hepatic veins: The inferior vena cava was dilated. The  respirophasic change in diameter was less than 50%. -  Mitral valve:  There was mild to moderate regurgitation.    -  Tricuspid valve: There was moderate regurgitation. SYSTEM MEASUREMENT TABLES    2D mode  AoR Diam (2D): 2.8 cm  LA Dimension (2D): 3 cm  Left Atrium Systolic Volume Index; Method of Disks, Biplane; 2D mode;: 22.5  ml/m2  IVS/LVPW (2D): 1.1  IVSd (2D): 1 cm  LVIDd (2D): 4 cm  LVIDs (2D): 2.4 cm  LVOT Area (2D): 2.3 cm2  LVPWd (2D): 0.9 cm  RVIDd (2D): 2.5 cm    Tissue Doppler Imaging  LV Peak Early Jonhson Tissue Hector; Lateral MA (TDI): 8 cm/s  LV Peak Early Johnson Tissue Hector; Medial MA (TDI): 5.6 cm/s    Unspecified Scan Mode  Peak Grad; Mean; Antegrade Flow: 9 mm[Hg]  Vmax; Antegrade Flow: 146 cm/s  LVOT Diam: 1.7 cm  MV Peak Hector/LV Peak Tissue Hectro E-Wave; Lateral MA: 6.6  MV Peak Hector/LV Peak Tissue Hector E-Wave; Medial MA: 9.5    Prepared and signed by    Kenyatta Duncan. Estephania Caicedo MD [facility]  Signed 29-Jul-2020 12:49:09           All Micro Results     None          Labs: Results:       BMP, Mg, Phos Recent Labs     08/01/20  0700      K 3.9      CO2 29   AGAP 7   BUN 12   CREA 0.58*   CA 8.8   *      CBC Recent Labs     08/03/20  0628 08/02/20  0616 08/01/20  0700   WBC 7.8 8.4 7.9   RBC 3.54* 3.77* 3.41*   HGB 8.9* 9.7* 9.0*   HCT 30.0* 32.4* 28.8*    318 278   GRANS 52 53 58   LYMPH 28 29 24   EOS 6 5 5   MONOS 13* 12 13*   BASOS 1 1 1   IG 0 1 1   ANEU 4.1 4.4 4.6   ABL 2.2 2.4 1.9   YOLA 0.4 0.4 0.4   ABM 1.0 1.0 1.0   ABB 0.1 0.0 0.0   AIG 0.0 0.1 0.1      LFT No results for input(s): ALT, TBIL, AP, TP, ALB, GLOB, AGRAT in the last 72 hours.     No lab exists for component: SGOT, GPT   Cardiac Testing No results found for: BNPP, BNP, CPK, RCK1, RCK2, RCK3, RCK4, CKMB, CKNDX, CKND1, TROPT, TROIQ   Coagulation Tests Lab Results   Component Value Date/Time    Prothrombin time 14.5 07/28/2020 01:40 AM    Prothrombin time 14.0 07/27/2020 04:30 PM    INR 1.1 07/28/2020 01:40 AM    INR 1.1 07/27/2020 04:30 PM    aPTT 142.6 (H) 07/29/2020 08:53 AM    aPTT 46.7 (H) 07/29/2020 12:14 AM    aPTT 128.9 (H) 07/28/2020 04:08 PM      A1c Lab Results   Component Value Date/Time    Hemoglobin A1c 6.1 (H) 01/13/2020 03:21 PM    Hemoglobin A1c 6.0 (H) 08/19/2019 02:44 PM    Hemoglobin A1c 5.9 (H) 08/21/2018 02:43 PM      Lipid Panel Lab Results   Component Value Date/Time    Cholesterol, total 232 (H) 01/13/2020 03:21 PM    HDL Cholesterol 78 01/13/2020 03:21 PM    LDL, calculated 127 (H) 01/13/2020 03:21 PM    VLDL, calculated 27 01/13/2020 03:21 PM    Triglyceride 134 01/13/2020 03:21 PM      Thyroid Panel Lab Results   Component Value Date/Time    TSH 2.050 01/13/2020 03:21 PM    TSH 2.150 08/19/2019 02:44 PM        Most Recent UA No results found for: COLOR, APPRN, REFSG, JOSE E, PROTU, GLUCU, KETU, BILU, BLDU, UROU, JOHNNA, LEUKU     Allergies   Allergen Reactions    Flexeril [Cyclobenzaprine] Unknown (comments)    Ketoprofen Unknown (comments)    Plaquenil [Hydroxychloroquine] Unknown (comments)     Immunization History   Administered Date(s) Administered    Pneumococcal Conjugate (PCV-13) 11/06/2017    Pneumococcal Polysaccharide (PPSV-23) 02/18/2019    TB Skin Test (PPD) Intradermal 07/28/2020       All Labs from Last 24 Hrs:  Recent Results (from the past 24 hour(s))   CBC WITH AUTOMATED DIFF    Collection Time: 08/03/20  6:28 AM   Result Value Ref Range    WBC 7.8 4.3 - 11.1 K/uL    RBC 3.54 (L) 4.05 - 5.2 M/uL    HGB 8.9 (L) 11.7 - 15.4 g/dL    HCT 30.0 (L) 35.8 - 46.3 %    MCV 84.7 79.6 - 97.8 FL    MCH 25.1 (L) 26.1 - 32.9 PG    MCHC 29.7 (L) 31.4 - 35.0 g/dL    RDW 15.3 (H) 11.9 - 14.6 %    PLATELET 696 485 - 090 K/uL    MPV 10.8 9.4 - 12.3 FL    ABSOLUTE NRBC 0.00 0.0 - 0.2 K/uL    DF AUTOMATED      NEUTROPHILS 52 43 - 78 %    LYMPHOCYTES 28 13 - 44 %    MONOCYTES 13 (H) 4.0 - 12.0 %    EOSINOPHILS 6 0.5 - 7.8 %    BASOPHILS 1 0.0 - 2.0 %    IMMATURE GRANULOCYTES 0 0.0 - 5.0 %    ABS. NEUTROPHILS 4.1 1.7 - 8.2 K/UL    ABS. LYMPHOCYTES 2.2 0.5 - 4.6 K/UL    ABS.  MONOCYTES 1.0 0.1 - 1.3 K/UL    ABS. EOSINOPHILS 0.4 0.0 - 0.8 K/UL    ABS. BASOPHILS 0.1 0.0 - 0.2 K/UL    ABS. IMM. GRANS. 0.0 0.0 - 0.5 K/UL       Discharge Exam:  Patient Vitals for the past 24 hrs:   Temp Pulse Resp BP SpO2   08/03/20 0846     97 %   08/03/20 0753 97.9 °F (36.6 °C) 80 18 112/72 98 %   08/03/20 0410 98.1 °F (36.7 °C) 79 18 99/50 93 %   08/02/20 2300 98.1 °F (36.7 °C) 90 18 101/49 91 %   08/02/20 1901 98.1 °F (36.7 °C) 92 18 110/50 92 %   08/02/20 1656 98.2 °F (36.8 °C) 84 18 134/76 96 %     Oxygen Therapy  O2 Sat (%): 97 % (08/03/20 0846)  Pulse via Oximetry: 81 beats per minute (08/03/20 0846)  O2 Device: Nasal cannula (08/03/20 0846)  O2 Flow Rate (L/min): 2 l/min (08/03/20 0846)  FIO2 (%): 28 % (08/02/20 0745)  ETCO2 (mmHg): 20 mmHg (07/28/20 1618)  No intake or output data in the 24 hours ending 08/03/20 1152    General:    Well nourished. Alert. No distress. On 2 lit/min at rest  Eyes:   Normal sclera. Extraocular movements intact. ENT:  Normocephalic, atraumatic. Moist mucous membranes  CV:   Regular rate and rhythm. No murmur, rub, or gallop. Lungs:  Mild coarse breath sounds  Abdomen: Soft, nontender, nondistended. Bowel sounds normal.   Extremities: Warm and dry. No cyanosis or edema. chronic abduction limitation rt upper extremity. Chronic gait problems  Neurologic: CN II-XII grossly intact. Sensation intact. Skin:     No rashes or jaundice. Psych:  Normal mood and affect. Discharge Info:   Current Discharge Medication List      START taking these medications    Details   albuterol-ipratropium (DUO-NEB) 2.5 mg-0.5 mg/3 ml nebu 3 mL by Nebulization route every four (4) hours as needed for Wheezing or Shortness of Breath. Qty: 30 Nebule, Refills: 0      !! apixaban (ELIQUIS) 5 mg tablet Take 2 Tabs by mouth every twelve (12) hours for 1 day. Qty: 4 Tab, Refills: 0      !! apixaban (ELIQUIS) 5 mg tablet Take 1 Tab by mouth every twelve (12) hours.   Qty: 60 Tab, Refills: 0 latanoprost (XALATAN) 0.005 % ophthalmic solution Administer 1 Drop to both eyes every evening. Qty: 1 Bottle, Refills: 0      polyethylene glycol (MIRALAX) 17 gram packet Take 1 Packet by mouth daily. Qty: 14 Each, Refills: 0      magnesium hydroxide (MILK OF MAGNESIA) 400 mg/5 mL suspension Take 30 mL by mouth daily. Qty: 118 mL, Refills: 0       !! - Potential duplicate medications found. Please discuss with provider. CONTINUE these medications which have NOT CHANGED    Details   pantoprazole (Protonix) 40 mg tablet Take 1 Tab by mouth two (2) times a day. Qty: 180 Tab, Refills: 1    Associated Diagnoses: Gastroesophageal reflux disease without esophagitis      sertraline (ZOLOFT) 100 mg tablet Take 1 1/2 tab daily. Qty: 135 Tab, Refills: 1    Comments: **Patient requests 90 days supply**  Associated Diagnoses: Anxiety      ProAir HFA 90 mcg/actuation inhaler USE 2 PUFFS PO BID PRF COUGH  Qty: 3 Inhaler, Refills: 1      montelukast (Singulair) 10 mg tablet Take 1 Tab by mouth daily. Qty: 90 Tab, Refills: 1    Associated Diagnoses: Seasonal allergic rhinitis due to pollen      Symbicort 80-4.5 mcg/actuation HFAA INHALE 1 PUFF PO BID FOR COUGH  Qty: 3 Inhaler, Refills: 1      fluticasone propionate (FLONASE) 50 mcg/actuation nasal spray 2 Sprays by Both Nostrils route daily. Qty: 3 Bottle, Refills: 12    Comments: **Patient requests 90 days supply**  Associated Diagnoses: Seasonal allergic rhinitis due to pollen      clorazepate (TRANXENE) 3.75 mg tablet Take 1 Tab by mouth three (3) times daily. Max Daily Amount: 11.25 mg. Indications: anxious  Qty: 270 Tab, Refills: 1    Associated Diagnoses: Anxiety      cycloSPORINE (RESTASIS) 0.05 % dpet Restasis      nystatin-triamcinolone (MYCOLOG II) topical cream Apply  to affected area two (2) times a day. Qty: 60 g, Refills: 3    Associated Diagnoses: Yeast dermatitis      calcium carbonate (OS-JANA) 500 mg calcium (1,250 mg) tablet Take  by mouth daily. tiotropium (SPIRIVA WITH HANDIHALER) 18 mcg inhalation capsule Take 1 Cap by inhalation daily. ascorbic acid, vitamin C, (VITAMIN C) 500 mg tablet Take  by mouth.      melatonin 10 mg cap Take  by mouth. Iron Fum & PS Cmp-Vit C-Niacin (INTEGRA) 125-40-3 mg cap Take 125 mg by mouth daily. Qty: 90 Cap, Refills: 1      sucralfate (CARAFATE) 1 gram tablet Take 1 Tab by mouth three (3) times daily. Qty: 270 Tab, Refills: 3    Associated Diagnoses: Gastroesophageal reflux disease without esophagitis      dietary supplement cap Take  by mouth. docusate sodium (STOOL SOFTENER) 100 mg tab Take  by mouth as needed. glucosamine & chondroit sul. Na 750-400 mg cmpk Take  by mouth. omega-3 fatty acids-vitamin e (FISH OIL) 1,000 mg cap Take 1 Cap by mouth. 1 by oral route twice daily      VIT C/ABDIAZIZ AC/LUT/COPPER/ZNOX (PRESERVISION LUTEIN PO) Take  by mouth. 1 by oral route daily      cholecalciferol (VITAMIN D3) 1,000 unit tablet Take  by mouth daily. STOP taking these medications       lisinopriL (PRINIVIL, ZESTRIL) 5 mg tablet Comments:   Reason for Stopping:         aspirin delayed-release 81 mg tablet Comments:   Reason for Stopping:                 Disposition: Rehab  Activity: [facility] physical therapy recommendations. Diet: DIET CARDIAC Regular    Follow-up Appointments   Procedures    FOLLOW UP VISIT Appointment in: One Week Cbc and bmp in a week. Fall precautions and decubitus ulcer precautions. Continue oxygen. Physical therapy consult. F/u with Interventional radiology in 4 weeks. F/u with  pulmonary in 2-3 weeks, pulmonary . .. Cbc and bmp in a week. Fall precautions and decubitus ulcer precautions. Continue oxygen. Physical therapy consult. F/u with Interventional radiology in 4 weeks. F/u with  pulmonary in 2-3 weeks, pulmonary htn. Standing Status:   Standing     Number of Occurrences:   1     Order Specific Question:   Appointment in     Answer:    One Week Follow-up Information     Follow up With Specialties Details Why Contact Info    [address]  [phone]    Sarah Suarez, [address] [place] [address]  [phone]            Time spent in patient discharge planning and coordination 35 minutes.     Signed:  Dewey Howell MD

## 2020-08-03 NOTE — PROGRESS NOTES
Pt resting in bed, morning meds given, call light in reach and no other needs presently. Hourly rounds completed this shift, will continue to monitor until report with day nurse.

## 2020-08-03 NOTE — PROGRESS NOTES
Problem: Falls - Risk of  Goal: *Absence of Falls  Description: Document Cristy Cerna Fall Risk and appropriate interventions in the flowsheet. 8/3/2020 1717 by Nikita Crowder RN  Outcome: Resolved/Met  8/3/2020 0716 by Nikita Crowder RN  Outcome: Progressing Towards Goal  Note: Fall Risk Interventions:  Mobility Interventions: Patient to call before getting OOB, Utilize walker, cane, or other assistive device         Medication Interventions: Patient to call before getting OOB    Elimination Interventions: Call light in reach, Patient to call for help with toileting needs, Toileting schedule/hourly rounds    History of Falls Interventions: Door open when patient unattended         Problem: Patient Education: Go to Patient Education Activity  Goal: Patient/Family Education  8/3/2020 1717 by Nikita Crowder RN  Outcome: Resolved/Met  8/3/2020 0716 by Nikita Crowder RN  Outcome: Progressing Towards Goal     Problem: Pulmonary Embolism Care Plan (Adult)  Goal: *Improvement of existing pulmonary embolism  Outcome: Resolved/Met  Goal: *Absence of bleeding  Outcome: Resolved/Met  Goal: *Labs within defined limits  Outcome: Resolved/Met     Problem: Patient Education: Go to Patient Education Activity  Goal: Patient/Family Education  Outcome: Resolved/Met     Problem: Patient Education: Go to Patient Education Activity  Goal: Patient/Family Education  Outcome: Resolved/Met     Problem: Patient Education: Go to Patient Education Activity  Goal: Patient/Family Education  Description: 1. Patient will complete lower body bathing and dressing with SBA and adaptive equipment as needed. 2. Patient will complete toileting with supervision. 3. Patient will tolerate 25 minutes of OT treatment with 1-2 rest breaks to increase activity tolerance for ADLs. 4. Patient will complete functional transfers with supervision and adaptive equipment as needed.    5. Patient will complete self-grooming while standing edge of sink with supervision and adaptive equipment as needed. 6. Patient will complete functional mobility for household distances with supervision and adaptive equipment as needed. Timeframe: 7 visits     Outcome: Resolved/Met     Problem: Pressure Injury - Risk of  Goal: *Prevention of pressure injury  Description: Document Edson Scale and appropriate interventions in the flowsheet.   Outcome: Resolved/Met     Problem: Patient Education: Go to Patient Education Activity  Goal: Patient/Family Education  Outcome: Resolved/Met

## 2020-08-03 NOTE — PROGRESS NOTES
Oxygen Qualifier       Room air: SpO2 with O2 and liter flow   Resting SpO2 91%  91% on room air   Ambulating SpO2  86% 86% on 1L  86% on 2L  88% on 2L  89% on 3L  91% on 4L   Patient at rest 91% on 2L      Completed by:     Corbin Swan

## 2020-08-03 NOTE — PROGRESS NOTES
TRANSFER - OUT REPORT:    Verbal report given to Cheryl on Rosalina Galeana  being transferred to [address] for routine progression of care       Report consisted of patients Situation, Background, Assessment and   Recommendations(SBAR). Information from the following report(s) SBAR was reviewed with the receiving nurse. Lines:       Opportunity for questions and clarification was provided.       Patient transported with:   O2 @ 2 liters   With [employer]

## 2020-08-04 ENCOUNTER — PATIENT OUTREACH (OUTPATIENT)
Dept: CASE MANAGEMENT | Age: 75
End: 2020-08-04

## 2020-08-18 ENCOUNTER — PATIENT OUTREACH (OUTPATIENT)
Dept: CASE MANAGEMENT | Age: 75
End: 2020-08-18

## 2020-08-18 NOTE — PROGRESS NOTES
Community Care Team documentation for patient in [facility]    The information below provided by:[facility]    PT Update: SBA Bed Mob; CGA transfers; CGA Amb  ft rollator; Min A ADL's        Nursing Update:Medically stable, no new orders      Discharge Date:TBD      Assign to [address]

## 2020-08-20 ENCOUNTER — PATIENT OUTREACH (OUTPATIENT)
Dept: CASE MANAGEMENT | Age: 75
End: 2020-08-20

## 2020-08-20 NOTE — PROGRESS NOTES
Community Care Team documentation for patient in [facility]    The information below provided by:[facility]    PT Update: SBA-Sup Bed Mob; SBA-CGA Transfers; CGA Amb 70 ft RW; SBA ADL's        Nursing Update:DC'd 08/19 per request. stable      Discharge Date:8/19 W/ Interim HH      Assign to [address]

## 2020-08-21 ENCOUNTER — PATIENT OUTREACH (OUTPATIENT)
Dept: CASE MANAGEMENT | Age: 75
End: 2020-08-21

## 2020-08-21 NOTE — PROGRESS NOTES
Patient was admitted to [facility] on  and discharged on 8/3 for resp failure. Patient was contacted within 1 days of SNf d/c business days of discharge , (pcp follow up)     Top Discharge Challenges to be reviewed by the provider   Additional needs identified to be addressed with provider no  none  Discussed COVID-19 related testing which was not done at this time. Test results were not done. Patient informed of results, if available? na   Method of communication with provider : none       Advance Care Planning:   Does patient have an Advance Directive:  patient declined education     Inpatient Readmission Risk score: 30%  Was this a readmission? no   Patient stated reason for the admission: na  Patients top risk factors for readmission: medical condition, medication management  Interventions to address risk factors: morteza, follow up pcp, snf, PT [place]    Care Transition Nurse (CTN) contacted the patient by telephone to perform post hospital discharge assessment. Verified name and  with patient as identifiers. Provided introduction to self, and explanation of the CTN role. CTN reviewed discharge instructions, medical action plan and red flags with patient who verbalized understanding. Patient given an opportunity to ask questions and does not have any further questions or concerns at this time. The patient agrees to contact the PCP office for questions related to their healthcare. Medication reconciliation was performed with patient, who verbalizes understanding of administration of home medications. Advised obtaining a 90-day supply of all daily and as-needed medications.    Referral to Pharm D needed: no     Home Health/Outpatient orders at discharge: [facility]: new referal sent from PCP visit, uncertain of company  Date of initial visit: has made contact, patient missed call and awaiting Jamal ordered at discharge: [address] Company: [employer]  Durable Medical Equipment received: [employer]    Covid Risk Education    Patient has following risk factors of: acute respiratory failure. Education provided regarding infection prevention, and signs and symptoms of COVID-19 and when to seek medical attention with patient who verbalized understanding. Discussed exposure protocols and quarantine From CDC: Are you at higher risk for severe illness?  and given an opportunity for questions and concerns. The patient agrees to contact the COVID-19 hotline [phone] or PCP office for questions related to COVID-19. For more information on steps you can take to protect yourself, see CDC's How to Protect Yourself     Patient/family/caregiver given information for [employer] [facility] and agrees to enroll no  Patient's preferred e-mail: declines  Patient's preferred phone number: declines    Discussed follow-up appointments. If no appointment was previously scheduled, appointment scheduling offered: yes  [facility] follow up appointment(s): No future appointments. Patient saw PCP 8/20, day after SNF d/c  Non-[facility] follow up appointment(s): na  Plan for follow-up call in 3-5 days based on severity of symptoms and risk factors. CTN provided contact information for future needs. Goals Addressed                 This Visit's Progress     Patient/Family verbalizes understanding of self-management of chronic disease. Assess barriers to safe and effective d/c AEB    Patient able to obtain medicine after d/c    Patient able to verbalize medicine changes    Patient is aware and attends follow up appointments s/p d/c. Follow up with [facility] Pulmonary- patient would like to change pulmonologist and needs contact number. [phone]. CTN to follow up next week and speak. Patient stated she could not write down information at this time.

## 2020-08-27 ENCOUNTER — PATIENT OUTREACH (OUTPATIENT)
Dept: CASE MANAGEMENT | Age: 75
End: 2020-08-27

## 2020-08-27 NOTE — PROGRESS NOTES
Transition of Care Hospital Discharge Follow-Up      Date/Time:  2020 3:03 PM    Care Transition Nurse (CTN)/ Ambulatory Care Manager [facility]) contacted the patient by telephone to follow up post hospital discharge assessment. Verified name and  with patient as identifiers. CTN/ ACM reinforced discharge instructions and red flags with patient who verbalized understanding. Patient given an opportunity to ask questions and does not have any further questions or concerns at this time. The patient agrees to contact the PCP office for questions related to their healthcare. Disease Specific:   N/A    Patients top risk factors for readmission:  d medication management, medical condition  Home Health Active: sn,pt  [address]: interim    Medication(s):   Medication changes since discharge: no    Current Outpatient Medications   Medication Sig    apixaban (ELIQUIS) 5 mg tablet Take 1 Tab by mouth every twelve (12) hours.  tiotropium (Spiriva with HandiHaler) 18 mcg inhalation capsule Take 1 Cap by inhalation daily.  albuterol-ipratropium (DUO-NEB) 2.5 mg-0.5 mg/3 ml nebu 3 mL by Nebulization route every four (4) hours as needed for Wheezing or Shortness of Breath.  latanoprost (XALATAN) 0.005 % ophthalmic solution Administer 1 Drop to both eyes every evening.  polyethylene glycol (MIRALAX) 17 gram packet Take 1 Packet by mouth daily.  magnesium hydroxide (MILK OF MAGNESIA) 400 mg/5 mL suspension Take 30 mL by mouth daily.  pantoprazole (Protonix) 40 mg tablet Take 1 Tab by mouth two (2) times a day.  sertraline (ZOLOFT) 100 mg tablet Take 1 1/2 tab daily.  ProAir HFA 90 mcg/actuation inhaler USE 2 PUFFS PO BID PRF COUGH    montelukast (Singulair) 10 mg tablet Take 1 Tab by mouth daily.  Symbicort 80-4.5 mcg/actuation HFAA INHALE 1 PUFF PO BID FOR COUGH    fluticasone propionate (FLONASE) 50 mcg/actuation nasal spray 2 Sprays by Both Nostrils route daily.     clorazepate (TRANXENE) 3.75 mg tablet Take 1 Tab by mouth three (3) times daily. Max Daily Amount: 11.25 mg. Indications: anxious    cycloSPORINE (RESTASIS) 0.05 % dpet Restasis    nystatin-triamcinolone (MYCOLOG II) topical cream Apply  to affected area two (2) times a day.  calcium carbonate (OS-JANA) 500 mg calcium (1,250 mg) tablet Take  by mouth daily.  ascorbic acid, vitamin C, (VITAMIN C) 500 mg tablet Take  by mouth.  melatonin 10 mg cap Take  by mouth.  Iron Fum & PS Cmp-Vit C-Niacin (INTEGRA) 125-40-3 mg cap Take 125 mg by mouth daily.  sucralfate (CARAFATE) 1 gram tablet Take 1 Tab by mouth three (3) times daily.  dietary supplement cap Take  by mouth.  docusate sodium (STOOL SOFTENER) 100 mg tab Take  by mouth as needed.  glucosamine & chondroit sul. Na 750-400 mg cmpk Take  by mouth.  omega-3 fatty acids-vitamin e (FISH OIL) 1,000 mg cap Take 1 Cap by mouth. 1 by oral route twice daily    VIT C/ABDIAZIZ AC/LUT/COPPER/ZNOX (PRESERVISION LUTEIN PO) Take  by mouth. 1 by oral route daily    cholecalciferol (VITAMIN D3) 1,000 unit tablet Take  by mouth daily. No current facility-administered medications for this visit. [facility] follow up appointment(s): No future appointments. Non-[facility] follow up appointment(s): na  Patient attended follow up appointments since last contact:   What was the outcome of the appointment:     Other Issues/ Miscellaneous? (Transportation, access to meals, ability to perform ADLs, adequate caregiver support, etc.)  Referrals needed? (SW, Diabetes education, HH, etc.)      Goals      Patient/Family verbalizes understanding of self-management of chronic disease. Assess barriers to safe and effective d/c AEB    Patient able to obtain medicine after d/c    Patient able to verbalize medicine changes    Patient is aware and attends follow up appointments s/p d/c.               Next Outreach Scheduled: next week, follow up for IR appt and hematology

## 2020-09-15 ENCOUNTER — HOSPITAL ENCOUNTER (INPATIENT)
Age: 75
LOS: 4 days | Discharge: HOME HEALTH | DRG: 812 | End: 2020-09-19
Attending: EMERGENCY MEDICINE | Admitting: INTERNAL MEDICINE
Payer: MEDICARE

## 2020-09-15 ENCOUNTER — APPOINTMENT (OUTPATIENT)
Dept: ULTRASOUND IMAGING | Age: 75
DRG: 812 | End: 2020-09-15
Attending: INTERNAL MEDICINE
Payer: MEDICARE

## 2020-09-15 ENCOUNTER — APPOINTMENT (OUTPATIENT)
Dept: GENERAL RADIOLOGY | Age: 75
DRG: 812 | End: 2020-09-15
Attending: INTERNAL MEDICINE
Payer: MEDICARE

## 2020-09-15 DIAGNOSIS — I82.4Z3 ACUTE DEEP VEIN THROMBOSIS (DVT) OF DISTAL VEIN OF BOTH LOWER EXTREMITIES (HCC): ICD-10-CM

## 2020-09-15 DIAGNOSIS — R55 SYNCOPE AND COLLAPSE: ICD-10-CM

## 2020-09-15 DIAGNOSIS — D62 ACUTE BLOOD LOSS ANEMIA: ICD-10-CM

## 2020-09-15 DIAGNOSIS — I26.99 PULMONARY EMBOLISM, BILATERAL (HCC): ICD-10-CM

## 2020-09-15 DIAGNOSIS — R54 FRAILTY: ICD-10-CM

## 2020-09-15 DIAGNOSIS — R04.0 EPISTAXIS: Primary | ICD-10-CM

## 2020-09-15 DIAGNOSIS — R53.83 FATIGUE, UNSPECIFIED TYPE: ICD-10-CM

## 2020-09-15 DIAGNOSIS — Z51.5 ENCOUNTER FOR PALLIATIVE CARE: ICD-10-CM

## 2020-09-15 LAB
ANION GAP SERPL CALC-SCNC: 4 MMOL/L (ref 7–16)
APPEARANCE UR: ABNORMAL
APTT PPP: 29.9 SEC (ref 24.3–35.4)
BACTERIA URNS QL MICRO: 0 /HPF
BASOPHILS # BLD: 0.1 K/UL (ref 0–0.2)
BASOPHILS NFR BLD: 0 % (ref 0–2)
BILIRUB UR QL: NEGATIVE
BUN SERPL-MCNC: 14 MG/DL (ref 8–23)
CALCIUM SERPL-MCNC: 8.3 MG/DL (ref 8.3–10.4)
CASTS URNS QL MICRO: ABNORMAL /LPF
CHLORIDE SERPL-SCNC: 106 MMOL/L (ref 98–107)
CO2 SERPL-SCNC: 29 MMOL/L (ref 21–32)
COLOR UR: YELLOW
CREAT SERPL-MCNC: 0.59 MG/DL (ref 0.6–1)
DIFFERENTIAL METHOD BLD: ABNORMAL
EOSINOPHIL # BLD: 0.3 K/UL (ref 0–0.8)
EOSINOPHIL NFR BLD: 3 % (ref 0.5–7.8)
EPI CELLS #/AREA URNS HPF: ABNORMAL /HPF
ERYTHROCYTE [DISTWIDTH] IN BLOOD BY AUTOMATED COUNT: 16.8 % (ref 11.9–14.6)
GLUCOSE SERPL-MCNC: 120 MG/DL (ref 65–100)
GLUCOSE UR STRIP.AUTO-MCNC: NEGATIVE MG/DL
HCT VFR BLD AUTO: 29.2 % (ref 35.8–46.3)
HCT VFR BLD AUTO: 29.6 % (ref 35.8–46.3)
HGB BLD-MCNC: 8.6 G/DL (ref 11.7–15.4)
HGB BLD-MCNC: 8.7 G/DL (ref 11.7–15.4)
HGB UR QL STRIP: NEGATIVE
IMM GRANULOCYTES # BLD AUTO: 0 K/UL (ref 0–0.5)
IMM GRANULOCYTES NFR BLD AUTO: 0 % (ref 0–5)
INR PPP: 1.3
KETONES UR QL STRIP.AUTO: NEGATIVE MG/DL
LEUKOCYTE ESTERASE UR QL STRIP.AUTO: ABNORMAL
LYMPHOCYTES # BLD: 1.7 K/UL (ref 0.5–4.6)
LYMPHOCYTES NFR BLD: 14 % (ref 13–44)
MCH RBC QN AUTO: 23.6 PG (ref 26.1–32.9)
MCHC RBC AUTO-ENTMCNC: 29.5 G/DL (ref 31.4–35)
MCV RBC AUTO: 80.2 FL (ref 79.6–97.8)
MONOCYTES # BLD: 1.3 K/UL (ref 0.1–1.3)
MONOCYTES NFR BLD: 11 % (ref 4–12)
NEUTS SEG # BLD: 8.5 K/UL (ref 1.7–8.2)
NEUTS SEG NFR BLD: 72 % (ref 43–78)
NITRITE UR QL STRIP.AUTO: NEGATIVE
NRBC # BLD: 0 K/UL (ref 0–0.2)
PH UR STRIP: 7.5 [PH] (ref 5–9)
PLATELET # BLD AUTO: 346 K/UL (ref 150–450)
PMV BLD AUTO: 9.9 FL (ref 9.4–12.3)
POTASSIUM SERPL-SCNC: 4.7 MMOL/L (ref 3.5–5.1)
PROT UR STRIP-MCNC: NEGATIVE MG/DL
PROTHROMBIN TIME: 16.3 SEC (ref 12–14.7)
RBC # BLD AUTO: 3.64 M/UL (ref 4.05–5.2)
RBC #/AREA URNS HPF: ABNORMAL /HPF
SODIUM SERPL-SCNC: 139 MMOL/L (ref 136–145)
SP GR UR REFRACTOMETRY: 1.01 (ref 1–1.02)
UROBILINOGEN UR QL STRIP.AUTO: 0.2 EU/DL (ref 0.2–1)
WBC # BLD AUTO: 11.9 K/UL (ref 4.3–11.1)
WBC URNS QL MICRO: ABNORMAL /HPF

## 2020-09-15 PROCEDURE — 74011250637 HC RX REV CODE- 250/637: Performed by: EMERGENCY MEDICINE

## 2020-09-15 PROCEDURE — 96375 TX/PRO/DX INJ NEW DRUG ADDON: CPT

## 2020-09-15 PROCEDURE — 85018 HEMOGLOBIN: CPT

## 2020-09-15 PROCEDURE — 80048 BASIC METABOLIC PNL TOTAL CA: CPT

## 2020-09-15 PROCEDURE — 81001 URINALYSIS AUTO W/SCOPE: CPT

## 2020-09-15 PROCEDURE — 65270000029 HC RM PRIVATE

## 2020-09-15 PROCEDURE — 2Y41X5Z PACKING OF NASAL REGION USING PACKING MATERIAL: ICD-10-PCS | Performed by: EMERGENCY MEDICINE

## 2020-09-15 PROCEDURE — 74011250636 HC RX REV CODE- 250/636: Performed by: INTERNAL MEDICINE

## 2020-09-15 PROCEDURE — C9046 COCAINE HCL NASAL SOLUTION: HCPCS | Performed by: EMERGENCY MEDICINE

## 2020-09-15 PROCEDURE — 85025 COMPLETE CBC W/AUTO DIFF WBC: CPT

## 2020-09-15 PROCEDURE — 71045 X-RAY EXAM CHEST 1 VIEW: CPT

## 2020-09-15 PROCEDURE — 86900 BLOOD TYPING SEROLOGIC ABO: CPT

## 2020-09-15 PROCEDURE — 74011250637 HC RX REV CODE- 250/637: Performed by: INTERNAL MEDICINE

## 2020-09-15 PROCEDURE — 74011250636 HC RX REV CODE- 250/636: Performed by: EMERGENCY MEDICINE

## 2020-09-15 PROCEDURE — 74011000250 HC RX REV CODE- 250: Performed by: INTERNAL MEDICINE

## 2020-09-15 PROCEDURE — 86923 COMPATIBILITY TEST ELECTRIC: CPT

## 2020-09-15 PROCEDURE — 75810000284 HC CNTRL NASAL HEMORHRAGE SIMPLE

## 2020-09-15 PROCEDURE — 99284 EMERGENCY DEPT VISIT MOD MDM: CPT

## 2020-09-15 PROCEDURE — 96374 THER/PROPH/DIAG INJ IV PUSH: CPT

## 2020-09-15 PROCEDURE — 85730 THROMBOPLASTIN TIME PARTIAL: CPT

## 2020-09-15 PROCEDURE — 77030012341 HC CHMB SPCR OPTC MDI VYRM -A

## 2020-09-15 PROCEDURE — 85610 PROTHROMBIN TIME: CPT

## 2020-09-15 PROCEDURE — 94640 AIRWAY INHALATION TREATMENT: CPT

## 2020-09-15 RX ORDER — SODIUM CHLORIDE, SODIUM LACTATE, POTASSIUM CHLORIDE, CALCIUM CHLORIDE 600; 310; 30; 20 MG/100ML; MG/100ML; MG/100ML; MG/100ML
100 INJECTION, SOLUTION INTRAVENOUS CONTINUOUS
Status: DISCONTINUED | OUTPATIENT
Start: 2020-09-15 | End: 2020-09-17

## 2020-09-15 RX ORDER — POLYETHYLENE GLYCOL 3350 17 G/17G
17 POWDER, FOR SOLUTION ORAL DAILY PRN
Status: DISCONTINUED | OUTPATIENT
Start: 2020-09-15 | End: 2020-09-19 | Stop reason: HOSPADM

## 2020-09-15 RX ORDER — CLORAZEPATE DIPOTASSIUM 7.5 MG/1
3.75 TABLET ORAL 3 TIMES DAILY
Status: DISCONTINUED | OUTPATIENT
Start: 2020-09-15 | End: 2020-09-19 | Stop reason: HOSPADM

## 2020-09-15 RX ORDER — MONTELUKAST SODIUM 10 MG/1
10 TABLET ORAL DAILY
Status: DISCONTINUED | OUTPATIENT
Start: 2020-09-16 | End: 2020-09-19 | Stop reason: HOSPADM

## 2020-09-15 RX ORDER — SODIUM CHLORIDE 0.9 % (FLUSH) 0.9 %
5-40 SYRINGE (ML) INJECTION AS NEEDED
Status: DISCONTINUED | OUTPATIENT
Start: 2020-09-15 | End: 2020-09-19 | Stop reason: HOSPADM

## 2020-09-15 RX ORDER — SUCRALFATE 1 G/1
1 TABLET ORAL 3 TIMES DAILY
Status: DISCONTINUED | OUTPATIENT
Start: 2020-09-15 | End: 2020-09-19 | Stop reason: HOSPADM

## 2020-09-15 RX ORDER — ONDANSETRON 2 MG/ML
4 INJECTION INTRAMUSCULAR; INTRAVENOUS
Status: COMPLETED | OUTPATIENT
Start: 2020-09-15 | End: 2020-09-15

## 2020-09-15 RX ORDER — ACETAMINOPHEN 650 MG/1
650 SUPPOSITORY RECTAL
Status: DISCONTINUED | OUTPATIENT
Start: 2020-09-15 | End: 2020-09-19 | Stop reason: HOSPADM

## 2020-09-15 RX ORDER — OXYMETAZOLINE HCL 0.05 %
2 SPRAY, NON-AEROSOL (ML) NASAL
Status: COMPLETED | OUTPATIENT
Start: 2020-09-15 | End: 2020-09-15

## 2020-09-15 RX ORDER — PANTOPRAZOLE SODIUM 40 MG/1
40 TABLET, DELAYED RELEASE ORAL 2 TIMES DAILY
Status: DISCONTINUED | OUTPATIENT
Start: 2020-09-15 | End: 2020-09-19 | Stop reason: HOSPADM

## 2020-09-15 RX ORDER — BUDESONIDE AND FORMOTEROL FUMARATE DIHYDRATE 80; 4.5 UG/1; UG/1
2 AEROSOL RESPIRATORY (INHALATION)
Status: DISCONTINUED | OUTPATIENT
Start: 2020-09-15 | End: 2020-09-19 | Stop reason: HOSPADM

## 2020-09-15 RX ORDER — SODIUM CHLORIDE 0.9 % (FLUSH) 0.9 %
5-40 SYRINGE (ML) INJECTION EVERY 8 HOURS
Status: DISCONTINUED | OUTPATIENT
Start: 2020-09-15 | End: 2020-09-19 | Stop reason: HOSPADM

## 2020-09-15 RX ORDER — ONDANSETRON 2 MG/ML
4 INJECTION INTRAMUSCULAR; INTRAVENOUS
Status: DISCONTINUED | OUTPATIENT
Start: 2020-09-15 | End: 2020-09-19 | Stop reason: HOSPADM

## 2020-09-15 RX ORDER — MORPHINE SULFATE 2 MG/ML
2 INJECTION, SOLUTION INTRAMUSCULAR; INTRAVENOUS
Status: COMPLETED | OUTPATIENT
Start: 2020-09-15 | End: 2020-09-15

## 2020-09-15 RX ORDER — PROMETHAZINE HYDROCHLORIDE 25 MG/1
12.5 TABLET ORAL
Status: DISCONTINUED | OUTPATIENT
Start: 2020-09-15 | End: 2020-09-19 | Stop reason: HOSPADM

## 2020-09-15 RX ORDER — ACETAMINOPHEN 325 MG/1
650 TABLET ORAL
Status: DISCONTINUED | OUTPATIENT
Start: 2020-09-15 | End: 2020-09-19 | Stop reason: HOSPADM

## 2020-09-15 RX ORDER — IPRATROPIUM BROMIDE AND ALBUTEROL SULFATE 2.5; .5 MG/3ML; MG/3ML
3 SOLUTION RESPIRATORY (INHALATION)
Status: DISCONTINUED | OUTPATIENT
Start: 2020-09-15 | End: 2020-09-19 | Stop reason: HOSPADM

## 2020-09-15 RX ORDER — LATANOPROST 50 UG/ML
1 SOLUTION/ DROPS OPHTHALMIC EVERY EVENING
Status: DISCONTINUED | OUTPATIENT
Start: 2020-09-15 | End: 2020-09-19 | Stop reason: HOSPADM

## 2020-09-15 RX ORDER — SERTRALINE HYDROCHLORIDE 50 MG/1
50 TABLET, FILM COATED ORAL DAILY
Status: DISCONTINUED | OUTPATIENT
Start: 2020-09-16 | End: 2020-09-19 | Stop reason: HOSPADM

## 2020-09-15 RX ORDER — COCAINE HYDROCHLORIDE 40 MG/ML
SOLUTION NASAL
Status: COMPLETED | OUTPATIENT
Start: 2020-09-15 | End: 2020-09-15

## 2020-09-15 RX ADMIN — ONDANSETRON 4 MG: 2 INJECTION INTRAMUSCULAR; INTRAVENOUS at 15:44

## 2020-09-15 RX ADMIN — OXYMETAZOLINE HCL 2 SPRAY: 0.05 SPRAY NASAL at 14:52

## 2020-09-15 RX ADMIN — SODIUM CHLORIDE, SODIUM LACTATE, POTASSIUM CHLORIDE, AND CALCIUM CHLORIDE 100 ML/HR: 600; 310; 30; 20 INJECTION, SOLUTION INTRAVENOUS at 20:08

## 2020-09-15 RX ADMIN — Medication 5 ML: at 20:41

## 2020-09-15 RX ADMIN — CLORAZEPATE DIPOTASSIUM 3.75 MG: 7.5 TABLET ORAL at 20:40

## 2020-09-15 RX ADMIN — SODIUM CHLORIDE 1000 ML: 900 INJECTION, SOLUTION INTRAVENOUS at 15:29

## 2020-09-15 RX ADMIN — BUDESONIDE AND FORMOTEROL FUMARATE DIHYDRATE 2 PUFF: 80; 4.5 AEROSOL RESPIRATORY (INHALATION) at 19:29

## 2020-09-15 RX ADMIN — COCAINE HYDROCHLORIDE 4 ML: 40 SOLUTION NASAL at 14:51

## 2020-09-15 RX ADMIN — MORPHINE SULFATE 2 MG: 2 INJECTION, SOLUTION INTRAMUSCULAR; INTRAVENOUS at 15:01

## 2020-09-15 RX ADMIN — SUCRALFATE 1 G: 1 TABLET ORAL at 20:40

## 2020-09-15 RX ADMIN — LATANOPROST 1 DROP: 50 SOLUTION OPHTHALMIC at 20:40

## 2020-09-15 NOTE — ED NOTES
Prior to attempting to start IV, patient made a grunting noise, seemed to lose consciousness and either drooled or had her nose run. Dr Beny Sahni was called, she was placed laid back to a 30 degree incline, oxygen placed on her and normal saline ordered.

## 2020-09-15 NOTE — ED TRIAGE NOTES
Pt arrived from home via EMS for uncontrolled nose bleed. Pt reports she was going to the bathroom when the bleeding started. No fall, no injury. Pt is on blood thinners. Clots coming out of mouth as well as nose noted on arrival. Pt reports hx of this issue but never this bad. Bleeding unable to be controlled en route by EMS despite clamp and gauze applied. Pt denies any other issues at this time. Patient masked upon arrival to ED.

## 2020-09-15 NOTE — H&P
Admit date: 9/15/2020   Name:  Scott Cox   Age:  66 y.o.   :  1945   MRN:  [id number]   PCP:  Royal Rubio MD   Provider:  Josephine Choi MD      CHIEF COMPLAINT:  Nose bleed, syncope      HISTORY OF PRESENT ILLNESS:  57-year-old female with past medical history of GERD, hypertension, anxiety, asthma, recent admission for bilateral pulmonary embolism in the setting of bilateral lower extremity DVT currently on Eliquis that presented in the setting of a nosebleed. The patient states that she was in her usual state of health until this morning when she started presented with a nosebleed that did not improve over time so she decided to come to the emergency department. Here in the emergency department patient was found to be profusely bleeding from her nose despite clamp and gauze application so rapid Rhino was applied on right nostril without further bleeding. In the emergency department the patient had a syncopal episode. She was placed on oxygen and given IV fluids with recovery of consciousness. The patient denies any shortness of breath, no chest pain, although since she started bleeding she states she has been having some cough. Denies any fever or chills, no nausea vomiting, no abdominal pain, no melena or hematochezia. In the emergency department hemoglobin noted to drop from 9.8-8.6. She will be admitted to the medical floors for further management.       PAST MEDICAL HISTORY:  Past Medical History:   Diagnosis Date    Abnormality of gait 2018    Anemia     Anxiety     Arthritis     Asthma     Calculus of kidney     Chronic pain     Contact dermatitis and other eczema, due to unspecified cause     Depressive disorder, not elsewhere classified     Encounter for chronic pain management     Esophageal reflux     Essential hypertension, benign     Frequent falls 2018    GERD (gastroesophageal reflux disease)     Pure hypercholesterolemia          HOME MEDICATION:  Prior to Admission medications    Medication Sig Start Date End Date Taking? Authorizing Provider   apixaban (ELIQUIS) 5 mg tablet Take 1 Tab by mouth every twelve (12) hours. 8/21/20   Humphrey Koch MD   tiotropium (Spiriva with HandiHaler) 18 mcg inhalation capsule Take 1 Cap by inhalation daily. 8/21/20   Humphrey Koch MD   albuterol-ipratropium (DUO-NEB) 2.5 mg-0.5 mg/3 ml nebu 3 mL by Nebulization route every four (4) hours as needed for Wheezing or Shortness of Breath. 8/3/20   Theodora Apodaca MD   latanoprost (XALATAN) 0.005 % ophthalmic solution Administer 1 Drop to both eyes every evening. 8/3/20   Theodora Apodaca MD   polyethylene glycol (MIRALAX) 17 gram packet Take 1 Packet by mouth daily. 8/3/20   Theodora Apodaca MD   magnesium hydroxide (MILK OF MAGNESIA) 400 mg/5 mL suspension Take 30 mL by mouth daily. 8/3/20   Theodora Apodaca MD   pantoprazole (Protonix) 40 mg tablet Take 1 Tab by mouth two (2) times a day. 5/13/20   Humphrey Koch MD   sertraline (ZOLOFT) 100 mg tablet Take 1 1/2 tab daily. 5/6/20   Humphrey Koch MD   ProAir HFA 90 mcg/actuation inhaler USE 2 PUFFS PO BID PRF COUGH 3/27/20   Humphrey Koch MD   montelukast (Singulair) 10 mg tablet Take 1 Tab by mouth daily. 3/17/20   Humphrey Koch MD   Symbicort 80-4.5 mcg/actuation HFAA INHALE 1 PUFF PO BID FOR COUGH 3/17/20   Humphrey Koch MD   fluticasone propionate (FLONASE) 50 mcg/actuation nasal spray 2 Sprays by Both Nostrils route daily. 3/16/20   Humphrey Koch MD   clorazepate (TRANXENE) 3.75 mg tablet Take 1 Tab by mouth three (3) times daily. Max Daily Amount: 11.25 mg. Indications: anxious 2/6/20   Humphrey Koch MD   cycloSPORINE (RESTASIS) 0.05 % dpet Restasis    Provider, Historical   nystatin-triamcinolone (MYCOLOG II) topical cream Apply  to affected area two (2) times a day.  4/18/19   Humphrey Koch MD   calcium carbonate (OS-JANA) 500 mg calcium (1,250 mg) tablet Take  by mouth daily. Provider, Historical   ascorbic acid, vitamin C, (VITAMIN C) 500 mg tablet Take  by mouth. Provider, Historical   melatonin 10 mg cap Take  by mouth. Provider, Historical   Iron Fum & PS Cmp-Vit C-Niacin (INTEGRA) 125-40-3 mg cap Take 125 mg by mouth daily. 9/6/18   Meir Antoine MD   sucralfate (CARAFATE) 1 gram tablet Take 1 Tab by mouth three (3) times daily. 8/21/18   Meir Antoine MD   dietary supplement cap Take  by mouth. Provider, Historical   docusate sodium (STOOL SOFTENER) 100 mg tab Take  by mouth as needed. Provider, Historical   glucosamine & chondroit sul. Na 750-400 mg cmpk Take  by mouth. Provider, Historical   omega-3 fatty acids-vitamin e (FISH OIL) 1,000 mg cap Take 1 Cap by mouth. 1 by oral route twice daily    Provider, Historical   VIT C/ABDIAZIZ AC/LUT/COPPER/ZNOX (PRESERVISION LUTEIN PO) Take  by mouth. 1 by oral route daily    Provider, Historical   cholecalciferol (VITAMIN D3) 1,000 unit tablet Take  by mouth daily.     Provider, Historical         REVIEW OF SYSTEMS:  14 ROS negative except from stated on HPI      SOCIAL HISTORY:  Social History     Tobacco Use    Smoking status: Never Smoker    Smokeless tobacco: Never Used   Substance Use Topics    Alcohol use: No    Drug use: No         FAMILY HISTORY:  Family History   Problem Relation Age of Onset    Arthritis-osteo Mother     Cancer Father     Stroke Maternal Grandmother     Stroke Paternal Grandmother          ALLERGIES:  Allergies   Allergen Reactions    Flexeril [Cyclobenzaprine] Unknown (comments)    Ketoprofen Unknown (comments)    Plaquenil [Hydroxychloroquine] Unknown (comments)         VITAL SIGNS:  Vitals:    09/15/20 1526 09/15/20 1532 09/15/20 1547 09/15/20 1602   BP: (!) 95/46 (!) 123/58 118/60 (!) 146/63   Pulse:       Resp:       Temp:       SpO2: 98% 100% 100% 100%   Weight:       Height:             PHYSICAL EXAM:  General: Alert, oriented, NAD  HEENT: NC/AT, EOM are intact  Neck: supple, no JVD  Cardiovascular: RRR, S1, S2, no murmurs  Respiratory: Lungs are clear, no wheezes or rales  Abdomen: Soft, NT, ND  Back: No CVA tenderness, no paraspinal tenderness  Extremities: LE without pedal edema, no erythema  Neuro: A&O, CN are intact, no focal deficits  Skin: no rash or ulcers  Psych: good mood and affect      I have personally reviewed patients laboratory data showing  Lab Results   Component Value Date    WBC 11.9 (H) 09/15/2020    HGB 8.6 (L) 09/15/2020    HCT 29.2 (L) 09/15/2020    MCV 80.2 09/15/2020     09/15/2020     No results found for: CKMB  Lab Results   Component Value Date    GLU 95 08/21/2020     08/21/2020    K 4.6 08/21/2020    CO2 25 08/21/2020     08/21/2020    BUN 11 08/21/2020       I have personally reviewed patients imaging showing  XR CHEST SNGL V   Final Result   IMPRESSION:   Unchanged linear hazy opacities in the left lower lung , indeterminate for   scarring or recurrent pneumonia and/or blood from reported nosebleed. DUPLEX LOWER EXT VENOUS BILAT    (Results Pending)       ASSESSMENT AND PLAN  64-year-old female with past medical history of GERD, hypertension, anxiety, asthma, recent admission for bilateral pulmonary embolism in the setting of bilateral lower extremity DVT currently on Eliquis that presented in the setting of a nosebleed. 1.  Acute blood loss anemia and syncope likely in the setting of profuse epistaxis. Rapid Rhino was placed in the emergency department which we will continue and assess in the morning for removal.  For hemoglobin currently 8.6. Will repeat another hemoglobin this evening and will transfuse if hemoglobin lower than 7. Will hold Eliquis for now. During syncopal episode the patient denied any chest pain or palpitations. Will obtain EKG and put the patient on telemetry monitoring for now.   Also during syncope no signs of limb movements concerning of seizure also no postictal state. Will put the patient on IV fluids. Obtain BMP and urinalysis. 2.  Recent bilateral pulmonary embolism in the setting of bilateral lower extremity DVT on Eliquis. Due to acute blood loss anemia in the setting of epistaxis will get a hold her Eliquis for now. The patient does have IVC filter. If no further bleeding will consider restarting Eliquis in the morning. Will also consider hematology consultation and will repeat bilateral lower extremity ultrasound    3. History of hypertension we will hold her lisinopril in the setting of syncopal episode. 4.  For her history of GERD we will continue on PPI. 5.  For her history of anxiety we will continue on her home dose of Tranxene and Zoloft. 6.  For her history of asthma we will continue on home inhalers and DuoNeb.     DVT prophylaxis will hold Eliquis in the setting of acute blood loss anemia    Full code    Patient aware of plan    Likely length of stay more than 2 midnights in the setting of acute blood loss anemia

## 2020-09-15 NOTE — ED NOTES
TRANSFER - OUT REPORT:    Verbal report given to CATHY Lo on Rosalina Galeana  being transferred to [phone] for routine progression of care       Report consisted of patients Situation, Background, Assessment and   Recommendations(SBAR). Information from the following report(s) SBAR, Kardex, ED Summary, [facility] and Recent Results was reviewed with the receiving nurse. Lines:   Peripheral IV 09/15/20 Left Antecubital (Active)        Opportunity for questions and clarification was provided.       Patient transported with:   O2 @ 2 liters

## 2020-09-15 NOTE — ED PROVIDER NOTES
75-year-old female presents with bloody nose for the past 2 hours. She has had recurrent epistaxis from the right nose in the past.  She states bleeding initially started on the right, but is now out of both. She is taking Eliquis. No nasal trauma. Do not pick nose. No headache or sinus problems. Has not required packing in the past.    July 2020:  IMPRESSION:     Bilateral pulmonary radiography reveals acute PE which correlate with the CT  findings. Mechanical thrombectomy was performed, but only a small amount of clot  was able to be removed. An IVC filter was placed given the bilateral DVT and  concurrent right heart dysfunction.     _______________________________________________________________     PROCEDURE SUMMARY:  - Venous access with ultrasound guidance  - Bilateral pulmonary angiogram  - Mechanical thrombectomy of pulmonary emboli. - IVC venography.  - IVC Filter Placement.       Epistaxis           Past Medical History:   Diagnosis Date    Abnormality of gait 7/18/2018    Anemia     Anxiety     Arthritis     Asthma     Calculus of kidney     Chronic pain     Contact dermatitis and other eczema, due to unspecified cause     Depressive disorder, not elsewhere classified     Encounter for chronic pain management     Esophageal reflux     Essential hypertension, benign     Frequent falls 7/18/2018    GERD (gastroesophageal reflux disease)     Pure hypercholesterolemia        Past Surgical History:   Procedure Laterality Date    HX CATARACT REMOVAL Bilateral     HX COLONOSCOPY      HX GI      HX HYSTERECTOMY      HX KNEE REPLACEMENT Right     HX LITHOTRIPSY      HX RETINAL DETACHMENT REPAIR      HX SHOULDER REPLACEMENT Left     IR THROMBECTOMY MECH ART PRIMARY NON CHIRAG OR INTRACRANIAL  7/28/2020    NEUROLOGICAL PROCEDURE UNLISTED           Family History:   Problem Relation Age of Onset    Arthritis-osteo Mother     Cancer Father     Stroke Maternal Grandmother     Stroke Paternal Grandmother        Social History     Socioeconomic History    Marital status:      Spouse name: Not on file    Number of children: Not on file    Years of education: Not on file    Highest education level: Not on file   Occupational History    Not on file   Social Needs    Financial resource strain: Not on file    Food insecurity     Worry: Not on file     Inability: Not on file    Transportation needs     Medical: Not on file     Non-medical: Not on file   Tobacco Use    Smoking status: Never Smoker    Smokeless tobacco: Never Used   Substance and Sexual Activity    Alcohol use: No    Drug use: No    Sexual activity: Not on file   Lifestyle    Physical activity     Days per week: Not on file     Minutes per session: Not on file    Stress: Not on file   Relationships    Social connections     Talks on phone: Not on file     Gets together: Not on file     Attends Confucianist service: Not on file     Active member of club or organization: Not on file     Attends meetings of clubs or organizations: Not on file     Relationship status: Not on file    Intimate partner violence     Fear of current or ex partner: Not on file     Emotionally abused: Not on file     Physically abused: Not on file     Forced sexual activity: Not on file   Other Topics Concern    Not on file   Social History Narrative    Patient and  reside together. She denies any in-house stairs. 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 Activity is reported as cooking and household chores as tolerable. Exercise is reported as intolerable. ALLERGIES: Flexeril [cyclobenzaprine]; Ketoprofen; and Plaquenil [hydroxychloroquine]    Review of Systems   Constitutional: Negative for fever. HENT: Positive for nosebleeds. Negative for congestion. Eyes: Negative for visual disturbance. Respiratory: Negative for cough and shortness of breath. Gastrointestinal: Negative for vomiting. Musculoskeletal: Negative for joint swelling. Skin: Negative for wound. Neurological: Negative for headaches. Psychiatric/Behavioral: Negative for confusion. Vitals:    09/15/20 1429   BP: (!) 175/81   Pulse: 92   Resp: 18   Temp: 98.5 °F (36.9 °C)   SpO2: 91%   Weight: 67.1 kg (148 lb)   Height: 5' (1.524 m)            Physical Exam  Vitals signs and nursing note reviewed. Constitutional:       Appearance: She is well-developed. She is ill-appearing. HENT:      Head: Normocephalic and atraumatic. Nose:      Right Nostril: Epistaxis present. Comments: Brisk epistaxis right nare with ovefrlow to left. Large clots in pharynx  Eyes:      Pupils: Pupils are equal, round, and reactive to light. Neck:      Musculoskeletal: Normal range of motion and neck supple. Cardiovascular:      Rate and Rhythm: Regular rhythm. Heart sounds: Normal heart sounds. Pulmonary:      Effort: Pulmonary effort is normal.      Breath sounds: Normal breath sounds. Abdominal:      Palpations: Abdomen is soft. Tenderness: There is no abdominal tenderness. Musculoskeletal: Normal range of motion. Skin:     General: Skin is warm and dry. Coloration: Skin is pale. Neurological:      Mental Status: She is alert. Psychiatric:         Behavior: Behavior normal.          MDM  Number of Diagnoses or Management Options  Diagnosis management comments: Parts of this document were created using dragon voice recognition software. The chart has been reviewed but errors may still be present. I wore appropriate PPE throughout this patient's ED visit. Cecilia Campbell MD, 3:02 PM    3:31 PM  Pt had syncopal episode with few seconds of seizure activity. BP dropped to 90 systolic. She has had frequent recent nosebleeds and Hb low at 8. 6. will admit for recheck Hb and monitoring. Hospitalist paged.        Amount and/or Complexity of Data Reviewed  Clinical lab tests: ordered and reviewed (Results for orders placed or performed during the hospital encounter of 09/15/20  -CBC WITH AUTOMATED DIFF       Result                      Value             Ref Range           WBC                         11.9 (H)          4.3 - 11.1 K*       RBC                         3.64 (L)          4.05 - 5.2 M*       HGB                         8.6 (L)           11.7 - 15.4 *       HCT                         29.2 (L)          35.8 - 46.3 %       MCV                         80.2              79.6 - 97.8 *       MCH                         23.6 (L)          26.1 - 32.9 *       MCHC                        29.5 (L)          31.4 - 35.0 *       RDW                         16.8 (H)          11.9 - 14.6 %       PLATELET                    346               150 - 450 K/*       MPV                         9.9               9.4 - 12.3 FL       ABSOLUTE NRBC               0.00              0.0 - 0.2 K/*       DF                          AUTOMATED                             NEUTROPHILS                 72                43 - 78 %           LYMPHOCYTES                 14                13 - 44 %           MONOCYTES                   11                4.0 - 12.0 %        EOSINOPHILS                 3                 0.5 - 7.8 %         BASOPHILS                   0                 0.0 - 2.0 %         IMMATURE GRANULOCYTES       0                 0.0 - 5.0 %         ABS. NEUTROPHILS            8.5 (H)           1.7 - 8.2 K/*       ABS. LYMPHOCYTES            1.7               0.5 - 4.6 K/*       ABS. MONOCYTES              1.3               0.1 - 1.3 K/*       ABS. EOSINOPHILS            0.3               0.0 - 0.8 K/*       ABS. BASOPHILS              0.1               0.0 - 0.2 K/*       ABS. IMM.  GRANS.            0.0               0.0 - 0.5 K/*  -PROTHROMBIN TIME + INR       Result                      Value             Ref Range           Prothrombin time            16.3 (H)          12.0 - 14.7 * INR                         1.3                              -PTT       Result                      Value             Ref Range           aPTT                        29.9              24.3 - 35.4 *  )  Tests in the medicine section of CPT®: reviewed and ordered           Epistaxis Management    Date/Time: 9/15/2020 3:02 PM  Performed by: Ese Davis MD  Authorized by: Ese Davis MD     Consent:     Consent obtained:  Emergent situation    Risks discussed:  Pain and nasal injury    Alternatives discussed:  Referral  Anesthesia (see MAR for exact dosages): Anesthesia method:  Topical application    Topical anesthetic:  Cocaine (afrin)  Procedure details:     Treatment site:  Unable to specify    Treatment method:  Nasal balloon (7.5mm rapid rhino)    Treatment complexity:  Limited    Treatment episode: recurring    Post-procedure details:     Assessment:  Bleeding decreased    Patient tolerance of procedure:   Tolerated well, no immediate complications

## 2020-09-16 ENCOUNTER — APPOINTMENT (OUTPATIENT)
Dept: ULTRASOUND IMAGING | Age: 75
DRG: 812 | End: 2020-09-16
Attending: INTERNAL MEDICINE
Payer: MEDICARE

## 2020-09-16 LAB
ANION GAP SERPL CALC-SCNC: 4 MMOL/L (ref 7–16)
ATRIAL RATE: 90 BPM
BUN SERPL-MCNC: 19 MG/DL (ref 8–23)
CALCIUM SERPL-MCNC: 8 MG/DL (ref 8.3–10.4)
CALCULATED P AXIS, ECG09: 132 DEGREES
CALCULATED R AXIS, ECG10: 24 DEGREES
CALCULATED T AXIS, ECG11: 51 DEGREES
CHLORIDE SERPL-SCNC: 108 MMOL/L (ref 98–107)
CO2 SERPL-SCNC: 30 MMOL/L (ref 21–32)
CREAT SERPL-MCNC: 0.49 MG/DL (ref 0.6–1)
DIAGNOSIS, 93000: NORMAL
ERYTHROCYTE [DISTWIDTH] IN BLOOD BY AUTOMATED COUNT: 17.2 % (ref 11.9–14.6)
FERRITIN SERPL-MCNC: 15 NG/ML (ref 8–388)
FOLATE SERPL-MCNC: 32.1 NG/ML (ref 3.1–17.5)
GLUCOSE SERPL-MCNC: 89 MG/DL (ref 65–100)
HCT VFR BLD AUTO: 24.2 % (ref 35.8–46.3)
HCT VFR BLD AUTO: 28 % (ref 35.8–46.3)
HGB BLD-MCNC: 7 G/DL (ref 11.7–15.4)
HGB BLD-MCNC: 8.7 G/DL (ref 11.7–15.4)
HISTORY CHECKED?,CKHIST: NORMAL
IRON SATN MFR SERPL: 33 %
IRON SERPL-MCNC: 110 UG/DL (ref 35–150)
MCH RBC QN AUTO: 23.4 PG (ref 26.1–32.9)
MCHC RBC AUTO-ENTMCNC: 28.9 G/DL (ref 31.4–35)
MCV RBC AUTO: 80.9 FL (ref 79.6–97.8)
NRBC # BLD: 0 K/UL (ref 0–0.2)
P-R INTERVAL, ECG05: 148 MS
PLATELET # BLD AUTO: 287 K/UL (ref 150–450)
PMV BLD AUTO: 9.8 FL (ref 9.4–12.3)
POTASSIUM SERPL-SCNC: 3.7 MMOL/L (ref 3.5–5.1)
Q-T INTERVAL, ECG07: 362 MS
QRS DURATION, ECG06: 78 MS
QTC CALCULATION (BEZET), ECG08: 442 MS
RBC # BLD AUTO: 2.99 M/UL (ref 4.05–5.2)
SODIUM SERPL-SCNC: 142 MMOL/L (ref 136–145)
TIBC SERPL-MCNC: 336 UG/DL (ref 250–450)
VENTRICULAR RATE, ECG03: 90 BPM
VIT B12 SERPL-MCNC: 775 PG/ML (ref 193–986)
WBC # BLD AUTO: 8.8 K/UL (ref 4.3–11.1)

## 2020-09-16 PROCEDURE — 82746 ASSAY OF FOLIC ACID SERUM: CPT

## 2020-09-16 PROCEDURE — 94640 AIRWAY INHALATION TREATMENT: CPT

## 2020-09-16 PROCEDURE — 74011250636 HC RX REV CODE- 250/636: Performed by: INTERNAL MEDICINE

## 2020-09-16 PROCEDURE — 36415 COLL VENOUS BLD VENIPUNCTURE: CPT

## 2020-09-16 PROCEDURE — 30233N1 TRANSFUSION OF NONAUTOLOGOUS RED BLOOD CELLS INTO PERIPHERAL VEIN, PERCUTANEOUS APPROACH: ICD-10-PCS | Performed by: INTERNAL MEDICINE

## 2020-09-16 PROCEDURE — 36430 TRANSFUSION BLD/BLD COMPNT: CPT

## 2020-09-16 PROCEDURE — 74011000250 HC RX REV CODE- 250: Performed by: INTERNAL MEDICINE

## 2020-09-16 PROCEDURE — 82607 VITAMIN B-12: CPT

## 2020-09-16 PROCEDURE — 74011250637 HC RX REV CODE- 250/637: Performed by: STUDENT IN AN ORGANIZED HEALTH CARE EDUCATION/TRAINING PROGRAM

## 2020-09-16 PROCEDURE — 80048 BASIC METABOLIC PNL TOTAL CA: CPT

## 2020-09-16 PROCEDURE — 82728 ASSAY OF FERRITIN: CPT

## 2020-09-16 PROCEDURE — 74011250637 HC RX REV CODE- 250/637: Performed by: INTERNAL MEDICINE

## 2020-09-16 PROCEDURE — 83540 ASSAY OF IRON: CPT

## 2020-09-16 PROCEDURE — 93970 EXTREMITY STUDY: CPT

## 2020-09-16 PROCEDURE — 77030013140 HC MSK NEB VYRM -A

## 2020-09-16 PROCEDURE — 74011000302 HC RX REV CODE- 302: Performed by: INTERNAL MEDICINE

## 2020-09-16 PROCEDURE — 74011250637 HC RX REV CODE- 250/637

## 2020-09-16 PROCEDURE — 99223 1ST HOSP IP/OBS HIGH 75: CPT | Performed by: INTERNAL MEDICINE

## 2020-09-16 PROCEDURE — 85027 COMPLETE CBC AUTOMATED: CPT

## 2020-09-16 PROCEDURE — 65270000029 HC RM PRIVATE

## 2020-09-16 PROCEDURE — 85018 HEMOGLOBIN: CPT

## 2020-09-16 PROCEDURE — 86580 TB INTRADERMAL TEST: CPT | Performed by: INTERNAL MEDICINE

## 2020-09-16 PROCEDURE — 2709999900 HC NON-CHARGEABLE SUPPLY

## 2020-09-16 PROCEDURE — 94760 N-INVAS EAR/PLS OXIMETRY 1: CPT

## 2020-09-16 PROCEDURE — P9016 RBC LEUKOCYTES REDUCED: HCPCS

## 2020-09-16 RX ORDER — SODIUM CHLORIDE 9 MG/ML
250 INJECTION, SOLUTION INTRAVENOUS AS NEEDED
Status: DISCONTINUED | OUTPATIENT
Start: 2020-09-16 | End: 2020-09-19 | Stop reason: HOSPADM

## 2020-09-16 RX ADMIN — MONTELUKAST 10 MG: 10 TABLET, FILM COATED ORAL at 08:26

## 2020-09-16 RX ADMIN — CLORAZEPATE DIPOTASSIUM 3.75 MG: 7.5 TABLET ORAL at 08:25

## 2020-09-16 RX ADMIN — SODIUM CHLORIDE, SODIUM LACTATE, POTASSIUM CHLORIDE, AND CALCIUM CHLORIDE 100 ML/HR: 600; 310; 30; 20 INJECTION, SOLUTION INTRAVENOUS at 05:42

## 2020-09-16 RX ADMIN — SALINE NASAL SPRAY 2 SPRAY: 1.5 SOLUTION NASAL at 22:21

## 2020-09-16 RX ADMIN — SUCRALFATE 1 G: 1 TABLET ORAL at 08:26

## 2020-09-16 RX ADMIN — SUCRALFATE 1 G: 1 TABLET ORAL at 22:16

## 2020-09-16 RX ADMIN — Medication 10 ML: at 14:33

## 2020-09-16 RX ADMIN — ACETAMINOPHEN 650 MG: 325 TABLET, FILM COATED ORAL at 22:17

## 2020-09-16 RX ADMIN — BUDESONIDE AND FORMOTEROL FUMARATE DIHYDRATE 2 PUFF: 80; 4.5 AEROSOL RESPIRATORY (INHALATION) at 19:56

## 2020-09-16 RX ADMIN — BUDESONIDE AND FORMOTEROL FUMARATE DIHYDRATE 2 PUFF: 80; 4.5 AEROSOL RESPIRATORY (INHALATION) at 07:47

## 2020-09-16 RX ADMIN — IPRATROPIUM BROMIDE AND ALBUTEROL SULFATE 3 ML: .5; 3 SOLUTION RESPIRATORY (INHALATION) at 07:53

## 2020-09-16 RX ADMIN — SERTRALINE HYDROCHLORIDE 50 MG: 50 TABLET ORAL at 08:26

## 2020-09-16 RX ADMIN — LATANOPROST 1 DROP: 50 SOLUTION OPHTHALMIC at 22:19

## 2020-09-16 RX ADMIN — CLORAZEPATE DIPOTASSIUM 3.75 MG: 7.5 TABLET ORAL at 22:16

## 2020-09-16 RX ADMIN — PANTOPRAZOLE SODIUM 40 MG: 40 TABLET, DELAYED RELEASE ORAL at 08:26

## 2020-09-16 RX ADMIN — CLORAZEPATE DIPOTASSIUM 3.75 MG: 7.5 TABLET ORAL at 16:00

## 2020-09-16 RX ADMIN — ACETAMINOPHEN 650 MG: 325 TABLET, FILM COATED ORAL at 04:26

## 2020-09-16 RX ADMIN — PANTOPRAZOLE SODIUM 40 MG: 40 TABLET, DELAYED RELEASE ORAL at 16:37

## 2020-09-16 RX ADMIN — SUCRALFATE 1 G: 1 TABLET ORAL at 16:37

## 2020-09-16 RX ADMIN — Medication 10 ML: at 05:35

## 2020-09-16 NOTE — PROGRESS NOTES
Problem: Falls - Risk of  Goal: *Absence of Falls  Description: Document Isaura Ware Fall Risk and appropriate interventions in the flowsheet.   Outcome: Progressing Towards Goal  Note: Fall Risk Interventions:  Mobility Interventions: Communicate number of staff needed for ambulation/transfer, Patient to call before getting OOB         Medication Interventions: Assess postural VS orthostatic hypotension, Patient to call before getting OOB, Teach patient to arise slowly    Elimination Interventions: Call light in reach, Patient to call for help with toileting needs    History of Falls Interventions: Door open when patient unattended, Room close to nurse's station         Problem: Patient Education: Go to Patient Education Activity  Goal: Patient/Family Education  Outcome: Progressing Towards Goal

## 2020-09-16 NOTE — CONSULTS
[address] [facility] Hematology & Oncology        Inpatient Hematology / Oncology Consult Note    Reason for Consult:  Acute blood loss anemia [D62]  Referring Physician:  David Zazueta MD    History of Present Illness:  Ms. Aleshia Daley is a 66 y.o. female admitted on 9/15/2020 with a primary diagnosis of The primary encounter diagnosis was Epistaxis. Diagnoses of Acute blood loss anemia and Syncope and collapse were also pertinent to this visit. .      Ms. Aleshia Daley was admitted on 9/15/2020 with a diagnosis of epistaxis/anemia on Eliquis. She has a past medical history of GERD, hypertension, anxiety, asthma, recent admission for bilateral pulmonary embolism in the setting of bilateral lower extremity DVT currently on Eliquis. She presented to the ER on 9/15/2020 with a nosebleed, improved with rapid Rhino. She denies any other sources of bleeding. She was found to have a drop in her hemoglobin to 8.6 and was admitted for medical management. Eliquis was placed on hold d/t bleeding and anemia. She does have an IVC filter in place. Her Hemoglobin decreased further on the morning of 9/16 and she will receive 1 unit of blood. She had a repeat BLE doppler which showed bilateral lower extremity DVT, greater on the left. Patient admits to having frequent nosebleeds every few weeks or so but she has always been able to stop it in reasonable amount of time. She states that she has seen ENT before and did not find source of bleed. She does admit to using Flonase on a daily basis and based on her description of administration, she may be causing irritation. We were consulted for our recommendation given epitaxis while on Eliquis for BL pulmonary embolism and BL lower extremity DVT. Review of Systems:  Constitutional +fatigue. Denies fever, chills, weight loss, appetite changes, night sweats.    HEENT Denies trauma, blurry vision, hearing loss, ear pain, nosebleeds, sore throat, neck pain     Skin Denies lesions or rashes. Lungs Denies dyspnea, cough, sputum production or hemoptysis. Cardiovascular Denies chest pain, palpitations, or lower extremity edema. Gastrointestinal Denies nausea, vomiting, changes in bowel habits, bloody or black stools, abdominal pain. Neuro Denies headaches, visual changes or ataxia. Denies tingling, tremors, sensory change, speech change, focal weakness or headaches. Hematology + bleeding and nosebleed. MSK Denies back pain, arthralgias, myalgias or frequent falls. Psychiatric/Behavioral The patient is not nervous/anxious.          Allergies   Allergen Reactions    Flexeril [Cyclobenzaprine] Unknown (comments)    Ketoprofen Unknown (comments)    Plaquenil [Hydroxychloroquine] Unknown (comments)     Past Medical History:   Diagnosis Date    Abnormality of gait 7/18/2018    Anemia     Anxiety     Arthritis     Asthma     Calculus of kidney     Chronic pain     Contact dermatitis and other eczema, due to unspecified cause     Depressive disorder, not elsewhere classified     Encounter for chronic pain management     Esophageal reflux     Essential hypertension, benign     Frequent falls 7/18/2018    GERD (gastroesophageal reflux disease)     Pure hypercholesterolemia      Past Surgical History:   Procedure Laterality Date    HX CATARACT REMOVAL Bilateral     HX COLONOSCOPY      HX GI      HX HYSTERECTOMY      HX KNEE REPLACEMENT Right     HX LITHOTRIPSY      HX RETINAL DETACHMENT REPAIR      HX SHOULDER REPLACEMENT Left     IR THROMBECTOMY MECH ART PRIMARY NON CHIRAG OR INTRACRANIAL  7/28/2020    NEUROLOGICAL PROCEDURE UNLISTED       Family History   Problem Relation Age of Onset    Arthritis-osteo Mother     Cancer Father     Stroke Maternal Grandmother     Stroke Paternal Grandmother      Social History     Socioeconomic History    Marital status:      Spouse name: Not on file    Number of children: Not on file    Years of education: Not on file    Highest education level: Not on file   Occupational History    Not on file   Social Needs    Financial resource strain: Not on file    Food insecurity     Worry: Not on file     Inability: Not on file    Transportation needs     Medical: Not on file     Non-medical: Not on file   Tobacco Use    Smoking status: Never Smoker    Smokeless tobacco: Never Used   Substance and Sexual Activity    Alcohol use: No    Drug use: No    Sexual activity: Not on file   Lifestyle    Physical activity     Days per week: Not on file     Minutes per session: Not on file    Stress: Not on file   Relationships    Social connections     Talks on phone: Not on file     Gets together: Not on file     Attends Yazidism service: Not on file     Active member of club or organization: Not on file     Attends meetings of clubs or organizations: Not on file     Relationship status: Not on file    Intimate partner violence     Fear of current or ex partner: Not on file     Emotionally abused: Not on file     Physically abused: Not on file     Forced sexual activity: Not on file   Other Topics Concern    Not on file   Social History Narrative    Patient and  reside together. She denies any in-house stairs. 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 Activity is reported as cooking and household chores as tolerable. Exercise is reported as intolerable.       Current Facility-Administered Medications   Medication Dose Route Frequency Provider Last Rate Last Dose    0.9% sodium chloride infusion 250 mL  250 mL IntraVENous PRN Jhoana Guillen MD        sodium chloride (NS) flush 5-40 mL  5-40 mL IntraVENous Q8H Margarette Quintanilla MD   10 mL at 09/16/20 0535    sodium chloride (NS) flush 5-40 mL  5-40 mL IntraVENous PRN Malcolm Hawk MD        acetaminophen (TYLENOL) tablet 650 mg  650 mg Oral Q6H PRN Malcolm Hawk MD   650 mg at 09/16/20 0426    Or    acetaminophen (TYLENOL) suppository 650 mg  650 mg Rectal Q6H PRN Malaika Ryan MD        polyethylene glycol (MIRALAX) packet 17 g  17 g Oral DAILY PRN Joey Kumari MD        promethazine (PHENERGAN) tablet 12.5 mg  12.5 mg Oral Q6H PRN Joey Kumari MD        Or    [facility] 4 mg  4 mg IntraVENous Q6H PRN Joey Kumari MD        lactated Ringers infusion  100 mL/hr IntraVENous CONTINUOUS Joey Kumari  mL/hr at 09/16/20 0542 100 mL/hr at 09/16/20 0542    albuterol-ipratropium (DUO-NEB) 2.5 MG-0.5 MG/3 ML  3 mL Nebulization Q4H PRN Malaika Ryan MD   3 mL at 09/16/20 0753    clorazepate (TRANXENE) tablet 3.75 mg  3.75 mg Oral TID Joey Kumari MD   3.75 mg at 09/16/20 0825    latanoprost (XALATAN) 0.005 % ophthalmic solution 1 Drop  1 Drop Both Eyes QPM Malaika Ryan MD   1 Drop at 09/15/20 2040    montelukast (SINGULAIR) tablet 10 mg  10 mg Oral DAILY Malaika Ryan MD   10 mg at 09/16/20 0826    pantoprazole (PROTONIX) tablet 40 mg  40 mg Oral BID Malaika Ryan MD   40 mg at 09/16/20 6615    sertraline (ZOLOFT) tablet 50 mg  50 mg Oral DAILY Malaika Ryan MD   50 mg at 09/16/20 0819    sucralfate (CARAFATE) tablet 1 g  1 g Oral TID Joey Kumari MD   1 g at 09/16/20 0826    budesonide-formoterol (SYMBICORT) 80-4.5 mcg inhaler  2 Puff Inhalation BID RT Malaika Ryan MD   2 Puff at 09/16/20 0747    tiotropium bromide (SPIRIVA RESPIMAT) 2.5 mcg /actuation  2 Puff Inhalation DAILY Ranger Purl, MD   2 Puff at 09/16/20 6843       OBJECTIVE:  Patient Vitals for the past 8 hrs:   BP Temp Pulse Resp SpO2   09/16/20 1208 (!) 129/58 98.5 °F (36.9 °C) 89 18 92 %   09/16/20 1031 (!) 100/43 98.2 °F (36.8 °C) 92 18 92 %   09/16/20 1015 (!) 100/44 98.1 °F (36.7 °C) 90 18 93 %   09/16/20 0751 (!) 135/59 98.1 °F (36.7 °C) 78 18 91 %   09/16/20 0749     90 % Temp (24hrs), Av.2 °F (36.8 °C), Min:98 °F (36.7 °C), Max:98.5 °F (36.9 °C)    No intake/output data recorded. Physical Exam:  Constitutional: Well developed, well nourished female in no acute distress, sitting comfortably in the hospital bed. HEENT: Normocephalic and atraumatic. Oropharynx is clear, mucous membranes are moist.  Neck supple. Left nares has dried blood   Skin Warm and dry. No bruising and no rash noted. No erythema. No pallor. Respiratory Lungs are clear to auscultation bilaterally without wheezes, rales or rhonchi, normal air exchange without accessory muscle use. CVS Normal rate, regular rhythm and normal S1 and S2. No murmurs, gallops, or rubs. Abdomen Soft, nontender and nondistended, normoactive bowel sounds. Neuro Grossly nonfocal with no obvious sensory or motor deficits. MSK Normal range of motion in general.  No edema and no tenderness. Psych Appropriate mood and affect. Labs:    Recent Results (from the past 24 hour(s))   CBC WITH AUTOMATED DIFF    Collection Time: 09/15/20  2:49 PM   Result Value Ref Range    WBC 11.9 (H) 4.3 - 11.1 K/uL    RBC 3.64 (L) 4.05 - 5.2 M/uL    HGB 8.6 (L) 11.7 - 15.4 g/dL    HCT 29.2 (L) 35.8 - 46.3 %    MCV 80.2 79.6 - 97.8 FL    MCH 23.6 (L) 26.1 - 32.9 PG    MCHC 29.5 (L) 31.4 - 35.0 g/dL    RDW 16.8 (H) 11.9 - 14.6 %    PLATELET 125 966 - 024 K/uL    MPV 9.9 9.4 - 12.3 FL    ABSOLUTE NRBC 0.00 0.0 - 0.2 K/uL    DF AUTOMATED      NEUTROPHILS 72 43 - 78 %    LYMPHOCYTES 14 13 - 44 %    MONOCYTES 11 4.0 - 12.0 %    EOSINOPHILS 3 0.5 - 7.8 %    BASOPHILS 0 0.0 - 2.0 %    IMMATURE GRANULOCYTES 0 0.0 - 5.0 %    ABS. NEUTROPHILS 8.5 (H) 1.7 - 8.2 K/UL    ABS. LYMPHOCYTES 1.7 0.5 - 4.6 K/UL    ABS. MONOCYTES 1.3 0.1 - 1.3 K/UL    ABS. EOSINOPHILS 0.3 0.0 - 0.8 K/UL    ABS. BASOPHILS 0.1 0.0 - 0.2 K/UL    ABS. IMM.  GRANS. 0.0 0.0 - 0.5 K/UL   PROTHROMBIN TIME + INR    Collection Time: 09/15/20  2:49 PM   Result Value Ref Range Prothrombin time 16.3 (H) 12.0 - 14.7 sec    INR 1.3     PTT    Collection Time: 09/15/20  2:49 PM   Result Value Ref Range    aPTT 29.9 24.3 - 28.0 SEC   METABOLIC PANEL, BASIC    Collection Time: 09/15/20  2:49 PM   Result Value Ref Range    Sodium 139 136 - 145 mmol/L    Potassium 4.7 3.5 - 5.1 mmol/L    Chloride 106 98 - 107 mmol/L    CO2 29 21 - 32 mmol/L    Anion gap 4 (L) 7 - 16 mmol/L    Glucose 120 (H) 65 - 100 mg/dL    BUN 14 8 - 23 MG/DL    Creatinine 0.59 (L) 0.6 - 1.0 MG/DL    GFR est AA >60 >60 ml/min/1.73m2    GFR est non-AA >60 >60 ml/min/1.73m2    Calcium 8.3 8.3 - 10.4 MG/DL   EKG, 12 LEAD, INITIAL    Collection Time: 09/15/20  6:15 PM   Result Value Ref Range    Ventricular Rate 90 BPM    Atrial Rate 90 BPM    P-R Interval 148 ms    QRS Duration 78 ms    Q-T Interval 362 ms    QTC Calculation (Bezet) 442 ms    Calculated P Axis 132 degrees    Calculated R Axis 24 degrees    Calculated T Axis 51 degrees    Diagnosis       !! AGE AND GENDER SPECIFIC ECG ANALYSIS !!   Normal sinus rhythm  Nonspecific ST and T wave abnormality  Abnormal ECG  When compared with ECG of 27-JUL-2020 16:22,  Ectopic atrial rhythm has replaced Sinus rhythm  Nonspecific T wave abnormality no longer evident in Anterior leads  Nonspecific T wave abnormality now evident in Lateral leads  Confirmed by HAMMAD AZUL (), MAEVE ROD ([id number]) on 9/16/2020 6:19:47 AM     HGB & HCT    Collection Time: 09/15/20  6:23 PM   Result Value Ref Range    HGB 8.7 (L) 11.7 - 15.4 g/dL    HCT 29.6 (L) 35.8 - 46.3 %   TYPE & SCREEN    Collection Time: 09/15/20  6:23 PM   Result Value Ref Range    Crossmatch Expiration 09/18/2020     ABO/Rh(D) A POSITIVE     Antibody screen NEG     Unit number [id number]     Blood component type RC LR     Unit division 00     Status of unit ISSUED     Crossmatch result Compatible    URINALYSIS W/ RFLX MICROSCOPIC    Collection Time: 09/15/20  6:23 PM   Result Value Ref Range    Color YELLOW      Appearance CLOUDY Specific gravity 1.010 1.001 - 1.023      pH (UA) 7.5 5.0 - 9.0      Protein Negative NEG mg/dL    Glucose Negative mg/dL    Ketone Negative NEG mg/dL    Bilirubin Negative NEG      Blood Negative NEG      Urobilinogen 0.2 0.2 - 1.0 EU/dL    Nitrites Negative NEG      Leukocyte Esterase SMALL (A) NEG      WBC 3-5 0 /hpf    RBC 0-3 0 /hpf    Epithelial cells 0-3 0 /hpf    Bacteria 0 0 /hpf    Casts 0-3 0 /lpf   METABOLIC PANEL, BASIC    Collection Time: 09/16/20  5:33 AM   Result Value Ref Range    Sodium 142 136 - 145 mmol/L    Potassium 3.7 3.5 - 5.1 mmol/L    Chloride 108 (H) 98 - 107 mmol/L    CO2 30 21 - 32 mmol/L    Anion gap 4 (L) 7 - 16 mmol/L    Glucose 89 65 - 100 mg/dL    BUN 19 8 - 23 MG/DL    Creatinine 0.49 (L) 0.6 - 1.0 MG/DL    GFR est AA >60 >60 ml/min/1.73m2    GFR est non-AA >60 >60 ml/min/1.73m2    Calcium 8.0 (L) 8.3 - 10.4 MG/DL   CBC W/O DIFF    Collection Time: 09/16/20  5:33 AM   Result Value Ref Range    WBC 8.8 4.3 - 11.1 K/uL    RBC 2.99 (L) 4.05 - 5.2 M/uL    HGB 7.0 (L) 11.7 - 15.4 g/dL    HCT 24.2 (L) 35.8 - 46.3 %    MCV 80.9 79.6 - 97.8 FL    MCH 23.4 (L) 26.1 - 32.9 PG    MCHC 28.9 (L) 31.4 - 35.0 g/dL    RDW 17.2 (H) 11.9 - 14.6 %    PLATELET 565 687 - 862 K/uL    MPV 9.8 9.4 - 12.3 FL    ABSOLUTE NRBC 0.00 0.0 - 0.2 K/uL   RBC, ALLOCATE    Collection Time: 09/16/20  7:00 AM   Result Value Ref Range    HISTORY CHECKED? Historical check performed        Imaging:  DUPLEX LOWER EXT VENOUS BILAT [[id number]]  Collected: 09/16/20 0945    Order Status: Completed  Updated: 09/16/20 0950    Narrative:      Clinical History: The Female patient is 66years old  presenting with symptoms   of DVT. Comparisons:  None     Findings:       The right common femoral, deep femoral, greater saphenous and visualized calf   veins are patent demonstrating normal compressibility, normal color flow, and   normal response to distal augmentation.  There is nonocclusive minimal thrombus   the distal superficial femoral vein and throughout the popliteal vein. The left common femoral, deep femoral, greater saphenous veins are patent. There   is nonocclusive thrombus in the distal superficial femoral and popliteal veins   with occlusive thrombus in the posterior tibial and peroneal veins    Impression:      Impression:       1. Bilateral lower extremity DVT, greater on the left as described above. CPT code(s) 93194          XR CHEST [facility] [[id number]]  Collected: 09/15/20 1718    Order Status: Completed  Updated: 09/15/20 1723    Narrative:      EXAMINATION: CHEST RADIOGRAPH 9/15/2020 5:08 PM     INDICATION: Shortness of breath and uncontrolled nosebleed. COMPARISON: Chest radiograph July 27, 2020 and CT chest July 27, 2020 point     TECHNIQUE: A single view of the chest was obtained. FINDINGS:     Support Devices: None     Osseous : Prior left shoulder arthroplasty and right humeral partially included   ORIF     Cardiomediastinal Silhouette: Normal in size for technique. Hiatal hernia noted. Lungs: Hazy linear opacities in the left lower lobe are not significantly   changed. Pulmonary vascularity is normal.     Pleura: Persistent elevation of the left hemidiaphragm. No evidence of pleural   fluid or pneumothorax. Upper Abdomen: No abnormality. Impression:      IMPRESSION:   Unchanged linear hazy opacities in the left lower lung , indeterminate for   scarring or recurrent pneumonia and/or blood from reported nosebleed.           ASSESSMENT:  Problem List  Date Reviewed: 8/21/2020          Codes Class Noted    * (Principal) Acute blood loss anemia ICD-10-CM: D62  ICD-9-CM: 285.1  9/15/2020        Acute deep vein thrombosis (DVT) of both lower extremities ([facility] [address] ICD-10-CM: I82.403  ICD-9-CM: 453.40  8/3/2020        S/P IVC filter ICD-10-CM: W62.300  ICD-9-CM: V45.89  8/3/2020        Rotator cuff disorder, right ICD-10-CM: M67.911  ICD-9-CM: 726.10  8/3/2020        Baker's cyst of knee, right ICD-10-CM: M71.21  ICD-9-CM: 727.51  8/3/2020        Moderate tricuspid regurgitation ICD-10-CM: I07.1  ICD-9-CM: 397.0  8/3/2020        Moderate mitral valve regurgitation ICD-10-CM: I34.0  ICD-9-CM: 424.0  8/3/2020        Pulmonary HTN (HCC) ICD-10-CM: I27.20  ICD-9-CM: 416.8  8/3/2020        Constipation ICD-10-CM: K59.00  ICD-9-CM: 564.00  8/2/2020        Hypokalemia ICD-10-CM: E87.6  ICD-9-CM: 276.8  7/29/2020        Acute hypoxemic respiratory failure (HCC) ICD-10-CM: J96.01  ICD-9-CM: 518.81  7/27/2020        Pulmonary embolism, bilateral ([facility] [address] ICD-10-CM: I26.99  ICD-9-CM: 415.19  7/27/2020        Person under investigation for COVID-19 ICD-10-CM: Z20.828  ICD-9-CM: V01.79  7/27/2020        Asthma ICD-10-CM: J45.909  ICD-9-CM: 493.90  7/27/2020        DDD (degenerative disc disease), cervical ICD-10-CM: M50.30  ICD-9-CM: 722.4  12/11/2018        Cerebellar atrophy ([facility] [address] ICD-10-CM: G31.9  ICD-9-CM: 331.9  10/29/2018        Cervical stenosis of spinal canal ICD-10-CM: M48.02  ICD-9-CM: 723.0  10/29/2018        Abnormality of gait ICD-10-CM: R26.9  ICD-9-CM: 781.2  7/18/2018        Frequent falls ICD-10-CM: R29.6  ICD-9-CM: V15.88  7/18/2018        Neck pain ICD-10-CM: M54.2  ICD-9-CM: 723.1  7/18/2018        Postural imbalance ICD-10-CM: R29.3  ICD-9-CM: 729.90  7/18/2018        Back pain ICD-10-CM: M54.9  ICD-9-CM: 724.5  7/20/2015        Contact dermatitis and other eczema, due to unspecified cause ICD-10-CM: L25.9  ICD-9-CM: 692.9  Unknown        Esophageal reflux ICD-10-CM: K21.9  ICD-9-CM: 530.81  Unknown        Pure hypercholesterolemia ICD-10-CM: E78.00  ICD-9-CM: 272.0  Unknown        Depressive disorder, not elsewhere classified ICD-10-CM: F32.9  ICD-9-CM: 311  Unknown        Anxiety ICD-10-CM: F41.9  ICD-9-CM: 300.00  Unknown        Essential hypertension, benign ICD-10-CM: I10  ICD-9-CM: 401.1  Unknown                RECOMMENDATIONS:  Acute Blood Loss with Epistaxis  - Will receive 1 unit PRBC today for Hgb 7.0. Eliquis currently on hold. Rapid Rhino removed this AM.  -Recommend ENT consult. -Recommend avoiding nose sprays if she is to continue Eliquis    Recent BL pulmonary embolism with BLE DVT  -  Hx of IVC filter.    - Repeat BLE doppler pendig    PMH PAKO  -labs last checked in August with iron saturation 6% not on oral replacement  -Repeat iron studies and treat if still low    Lab studies and imaging studies (CXR) were personally reviewed. Pertinent old records were reviewed. Thank you for allowing us to participate in the care of Ms. Dwayne Melvin. Letha Castillo NP    [facility] [place] [facility]  [address] [facility] [address]  Office : [phone]  Fax : [phone]   I personally saw, exammed and counselled the patient, and discussed with NP, agree with above history/assessment/plan. 66 y. o.female found B/L PE/DVT in 7/2020 and started Eliquis, admitted for severe epistaxis and acute anemia, held Eliquis, reported frequent nose bleeding for a year and had ENT eval without significant finding, desired to consult ENT for second opinion, also reported improper use of flonase nasal spray and discussed it can cause bleeding, will follow. Natacha Bradford M.D.   [address]  Office : [phone]  Fax : [phone]

## 2020-09-16 NOTE — PROGRESS NOTES
Progress Note    Patient: Prince Paget MRN: [id number]  SSN: [id number]    YOB: 1945  Age: 66 y.o. Sex: female      Admit Date: 9/15/2020    LOS: 1 day     Subjective:   78-year-old female with past medical history of GERD, hypertension, anxiety, asthma, recent admission for bilateral pulmonary embolism in the setting of bilateral lower extremity DVT currently on Eliquis that presented in the setting of a nosebleed. Patient seen and examined at bedside. This morning feeling weak, denies further nose bleed. Rocket rhino removed. Denies chest pain, no SOB. Objective:     Vitals:    09/15/20 2230 09/16/20 0303 09/16/20 0749 09/16/20 0751   BP: (!) 110/45 (!) 111/44  (!) 135/59   Pulse: 77 86  78   Resp: 18 18  18   Temp: 98.2 °F (36.8 °C) 98 °F (36.7 °C)  98.1 °F (36.7 °C)   SpO2: 95% 90% 90% 91%   Weight:       Height:            Intake and Output:  Current Shift: No intake/output data recorded.   Last three shifts: 09/14 1901 - 09/16 0700  In: -   Out: 850 [Urine:850]    ROS  10 ROS negative except from stated on subjective    Physical Exam:   General: Alert, oriented, NAD  HEENT: NC/AT, EOM are intact  Neck: supple, no JVD  Cardiovascular: RRR, S1, S2, no murmurs  Respiratory: Lungs are clear, no wheezes or rales  Abdomen: Soft, NT, ND  Back: No CVA tenderness, no paraspinal tenderness  Extremities: LE without pedal edema, no erythema  Neuro: A&O, CN are intact, no focal deficits  Skin: no rash or ulcers  Psych: good mood and affect    Lab/Data Review:  I have personally reviewed patients laboratory data showing  Recent Results (from the past 24 hour(s))   CBC WITH AUTOMATED DIFF    Collection Time: 09/15/20  2:49 PM   Result Value Ref Range    WBC 11.9 (H) 4.3 - 11.1 K/uL    RBC 3.64 (L) 4.05 - 5.2 M/uL    HGB 8.6 (L) 11.7 - 15.4 g/dL    HCT 29.2 (L) 35.8 - 46.3 %    MCV 80.2 79.6 - 97.8 FL    MCH 23.6 (L) 26.1 - 32.9 PG    MCHC 29.5 (L) 31.4 - 35.0 g/dL    RDW 16.8 (H) 11.9 - 14.6 % PLATELET 055 397 - 181 K/uL    MPV 9.9 9.4 - 12.3 FL    ABSOLUTE NRBC 0.00 0.0 - 0.2 K/uL    DF AUTOMATED      NEUTROPHILS 72 43 - 78 %    LYMPHOCYTES 14 13 - 44 %    MONOCYTES 11 4.0 - 12.0 %    EOSINOPHILS 3 0.5 - 7.8 %    BASOPHILS 0 0.0 - 2.0 %    IMMATURE GRANULOCYTES 0 0.0 - 5.0 %    ABS. NEUTROPHILS 8.5 (H) 1.7 - 8.2 K/UL    ABS. LYMPHOCYTES 1.7 0.5 - 4.6 K/UL    ABS. MONOCYTES 1.3 0.1 - 1.3 K/UL    ABS. EOSINOPHILS 0.3 0.0 - 0.8 K/UL    ABS. BASOPHILS 0.1 0.0 - 0.2 K/UL    ABS. IMM. GRANS. 0.0 0.0 - 0.5 K/UL   PROTHROMBIN TIME + INR    Collection Time: 09/15/20  2:49 PM   Result Value Ref Range    Prothrombin time 16.3 (H) 12.0 - 14.7 sec    INR 1.3     PTT    Collection Time: 09/15/20  2:49 PM   Result Value Ref Range    aPTT 29.9 24.3 - 51.0 SEC   METABOLIC PANEL, BASIC    Collection Time: 09/15/20  2:49 PM   Result Value Ref Range    Sodium 139 136 - 145 mmol/L    Potassium 4.7 3.5 - 5.1 mmol/L    Chloride 106 98 - 107 mmol/L    CO2 29 21 - 32 mmol/L    Anion gap 4 (L) 7 - 16 mmol/L    Glucose 120 (H) 65 - 100 mg/dL    BUN 14 8 - 23 MG/DL    Creatinine 0.59 (L) 0.6 - 1.0 MG/DL    GFR est AA >60 >60 ml/min/1.73m2    GFR est non-AA >60 >60 ml/min/1.73m2    Calcium 8.3 8.3 - 10.4 MG/DL   EKG, 12 LEAD, INITIAL    Collection Time: 09/15/20  6:15 PM   Result Value Ref Range    Ventricular Rate 90 BPM    Atrial Rate 90 BPM    P-R Interval 148 ms    QRS Duration 78 ms    Q-T Interval 362 ms    QTC Calculation (Bezet) 442 ms    Calculated P Axis 132 degrees    Calculated R Axis 24 degrees    Calculated T Axis 51 degrees    Diagnosis       !! AGE AND GENDER SPECIFIC ECG ANALYSIS !!   Normal sinus rhythm  Nonspecific ST and T wave abnormality  Abnormal ECG  When compared with ECG of 27-JUL-2020 16:22,  Ectopic atrial rhythm has replaced Sinus rhythm  Nonspecific T wave abnormality no longer evident in Anterior leads  Nonspecific T wave abnormality now evident in Lateral leads  Confirmed by CEBE  MD (), Bernardo Gama ([id number]) on 9/16/2020 6:19:47 AM     HGB & HCT    Collection Time: 09/15/20  6:23 PM   Result Value Ref Range    HGB 8.7 (L) 11.7 - 15.4 g/dL    HCT 29.6 (L) 35.8 - 46.3 %   TYPE & SCREEN    Collection Time: 09/15/20  6:23 PM   Result Value Ref Range    Crossmatch Expiration 09/18/2020     ABO/Rh(D) A POSITIVE     Antibody screen NEG     Unit number [id number]     Blood component type RC LR     Unit division 00     Status of unit ALLOCATED     Crossmatch result Compatible    URINALYSIS W/ RFLX MICROSCOPIC    Collection Time: 09/15/20  6:23 PM   Result Value Ref Range    Color YELLOW      Appearance CLOUDY      Specific gravity 1.010 1.001 - 1.023      pH (UA) 7.5 5.0 - 9.0      Protein Negative NEG mg/dL    Glucose Negative mg/dL    Ketone Negative NEG mg/dL    Bilirubin Negative NEG      Blood Negative NEG      Urobilinogen 0.2 0.2 - 1.0 EU/dL    Nitrites Negative NEG      Leukocyte Esterase SMALL (A) NEG      WBC 3-5 0 /hpf    RBC 0-3 0 /hpf    Epithelial cells 0-3 0 /hpf    Bacteria 0 0 /hpf    Casts 0-3 0 /lpf   METABOLIC PANEL, BASIC    Collection Time: 09/16/20  5:33 AM   Result Value Ref Range    Sodium 142 136 - 145 mmol/L    Potassium 3.7 3.5 - 5.1 mmol/L    Chloride 108 (H) 98 - 107 mmol/L    CO2 30 21 - 32 mmol/L    Anion gap 4 (L) 7 - 16 mmol/L    Glucose 89 65 - 100 mg/dL    BUN 19 8 - 23 MG/DL    Creatinine 0.49 (L) 0.6 - 1.0 MG/DL    GFR est AA >60 >60 ml/min/1.73m2    GFR est non-AA >60 >60 ml/min/1.73m2    Calcium 8.0 (L) 8.3 - 10.4 MG/DL   CBC W/O DIFF    Collection Time: 09/16/20  5:33 AM   Result Value Ref Range    WBC 8.8 4.3 - 11.1 K/uL    RBC 2.99 (L) 4.05 - 5.2 M/uL    HGB 7.0 (L) 11.7 - 15.4 g/dL    HCT 24.2 (L) 35.8 - 46.3 %    MCV 80.9 79.6 - 97.8 FL    MCH 23.4 (L) 26.1 - 32.9 PG    MCHC 28.9 (L) 31.4 - 35.0 g/dL    RDW 17.2 (H) 11.9 - 14.6 %    PLATELET 382 609 - 864 K/uL    MPV 9.8 9.4 - 12.3 FL    ABSOLUTE NRBC 0.00 0.0 - 0.2 K/uL   RBC, ALLOCATE    Collection Time: 09/16/20  7:00 AM Result Value Ref Range    HISTORY CHECKED? Historical check performed         Image:  I have personally reviewed patients imaging showing  DUPLEX LOWER EXT VENOUS BILAT   Final Result   Impression:         1. Bilateral lower extremity DVT, greater on the left as described above. CPT code(s) 11793                  XR CHEST SNGL V   Final Result   IMPRESSION:   Unchanged linear hazy opacities in the left lower lung , indeterminate for   scarring or recurrent pneumonia and/or blood from reported nosebleed. Hospital problems     Principal Problem:    Acute blood loss anemia (9/15/2020)    Active Problems:    Esophageal reflux ()      Anxiety ()      Essential hypertension, benign ()      Pulmonary embolism, bilateral ([address] (7/27/2020)      Asthma (7/27/2020)        Assessment and Plan:   42-year-old female with past medical history of GERD, hypertension, anxiety, asthma, recent admission for bilateral pulmonary embolism in the setting of bilateral lower extremity DVT currently on Eliquis that presented in the setting of a nosebleed.      1. Acute blood loss anemia and syncope likely in the setting of profuse epistaxis  - Rapid Rhino removed this morning   - Hb 7 this am  - Transfuse 1 unit RBC  - Continue to hold Eliquis for now  - Monitor H&H  - Transfuse if Hb<7     2. Recent bilateral pulmonary embolism in the setting of bilateral lower extremity DVT on Eliquis. - Due to acute blood loss anemia will continue to hold Eliquis for now  - Has IVC filter  - Follow [address]  - Hematology consultation      3. HTN  - Continue to hold lisinopril in the setting of syncopal episode     4. GERD  - Continue PPI     5. Anxiety   - Continue on her home dose of Tranxene and Zoloft     6.   Asthma  - Continue on home inhalers and DuoNeb prn     DVT prophylaxis will hold Eliquis in the setting of acute blood loss anemia    I have reviewed, updated, and verified this note's content and spent 38 minutes of my 42 minutes visit performing counseling and coordination of care regarding medical management.      Signed By: Nabil Pa MD     September 16, 2020

## 2020-09-16 NOTE — PROGRESS NOTES
Problem: Falls - Risk of  Goal: *Absence of Falls  Description: Document Bao Rodriguez Fall Risk and appropriate interventions in the flowsheet.   Outcome: Progressing Towards Goal  Note: Fall Risk Interventions:  Mobility Interventions: Communicate number of staff needed for ambulation/transfer, Patient to call before getting OOB         Medication Interventions: Assess postural VS orthostatic hypotension, Patient to call before getting OOB, Teach patient to arise slowly    Elimination Interventions: Call light in reach, Patient to call for help with toileting needs    History of Falls Interventions: Door open when patient unattended, Room close to nurse's station         Problem: Patient Education: Go to Patient Education Activity  Goal: Patient/Family Education  Outcome: Resolved/Met     Problem: Anemia Care Plan (Adult and Pediatric)  Goal: *Labs within defined limits  Outcome: Progressing Towards Goal  Goal: *Tolerates increased activity  Outcome: Progressing Towards Goal     Problem: Patient Education: Go to Patient Education Activity  Goal: Patient/Family Education  Outcome: Resolved/Met

## 2020-09-16 NOTE — PROGRESS NOTES
Care Management Interventions  PCP Verified by CM: Yes  Mode of Transport at Discharge: (family)  Transition of Care Consult (CM Consult): Other  Current Support Network: Other(lives with a roomate)  Confirm Follow Up Transport: Family  The Patient and/or Patient Representative was Provided with a Choice of Provider and Agrees with the Discharge Plan?: Yes  Freedom of Choice List was Provided with Basic Dialogue that Supports the Patient's Individualized Plan of Care/Goals, Treatment Preferences and Shares the Quality Data Associated with the Providers?: Yes  Discharge Location  Discharge Placement: Home  Visited with pt regarding plans for discharge, pt states that she lives at home with her roommate, she is inde with her walker or electric wheelchair. States that she try's to use her walker more, to keep her leg strength up. She lives in a single story home and her son(Kane) built her a ramp in the front. She does not drive, but her roommate does. Current with her PCP, Dr. Juan Pablo Jasso. Is hoping to d/c home once medically stable, PT/PPD orders written.

## 2020-09-16 NOTE — CONSULTS
Rut Miguel is a 66 y.o. female currently admitted for the following:   Chief Complaint   Patient presents with   [address] [place] [address] Epistaxis   ENT service has been consulted for evaluation and recommendations. HPI:  Patient states that she has had episodes of epistaxis primarily on the right side that have been off and on over the last several months. She states this time the epistaxis was worse than her usual and would not control with self maintenance at home. This is the first bleed that she has had since starting her Eliquis approximately 1 month ago. She states the bleeding is spontaneous and denies any trauma to the nose. At admission patient hemoglobin 7.0. Status post 1 unit packed red blood cells and now 8.7. Per the patient and also the patient's records it appears that she was packed with a rapid Rhino balloon packing overnight in the ER. This packing was then subsequently removed this morning and has had no further bleeding.   Allergies   Allergen Reactions    Flexeril [Cyclobenzaprine] Unknown (comments)    Ketoprofen Unknown (comments)    Plaquenil [Hydroxychloroquine] Unknown (comments)       Current Facility-Administered Medications   Medication    0.9% sodium chloride infusion 250 mL    tuberculin injection 5 Units    sodium chloride (NS) flush 5-40 mL    sodium chloride (NS) flush 5-40 mL    acetaminophen (TYLENOL) tablet 650 mg    Or    acetaminophen (TYLENOL) suppository 650 mg    polyethylene glycol (MIRALAX) packet 17 g    promethazine (PHENERGAN) tablet 12.5 mg    Or    ondansetron (ZOFRAN) injection 4 mg    lactated Ringers infusion    albuterol-ipratropium (DUO-NEB) 2.5 MG-0.5 MG/3 ML    clorazepate (TRANXENE) tablet 3.75 mg    latanoprost (XALATAN) 0.005 % ophthalmic solution 1 Drop    montelukast (SINGULAIR) tablet 10 mg    pantoprazole (PROTONIX) tablet 40 mg    sertraline (ZOLOFT) tablet 50 mg    sucralfate (CARAFATE) tablet 1 g    budesonide-formoterol (SYMBICORT) 80-4.5 mcg inhaler    tiotropium bromide (SPIRIVA RESPIMAT) 2.5 mcg /actuation       Past Medical History:   Diagnosis Date    Abnormality of gait 7/18/2018    Anemia     Anxiety     Arthritis     Asthma     Calculus of kidney     Chronic pain     Contact dermatitis and other eczema, due to unspecified cause     Depressive disorder, not elsewhere classified     Encounter for chronic pain management     Esophageal reflux     Essential hypertension, benign     Frequent falls 7/18/2018    GERD (gastroesophageal reflux disease)     Pure hypercholesterolemia        Past Surgical History:   Procedure Laterality Date    HX CATARACT REMOVAL Bilateral     HX COLONOSCOPY      HX GI      HX HYSTERECTOMY      HX KNEE REPLACEMENT Right     HX LITHOTRIPSY      HX RETINAL DETACHMENT REPAIR      HX SHOULDER REPLACEMENT Left     IR THROMBECTOMY MECH ART PRIMARY NON CHIRAG OR INTRACRANIAL  7/28/2020    NEUROLOGICAL PROCEDURE UNLISTED         Social History     Tobacco Use    Smoking status: Never Smoker    Smokeless tobacco: Never Used   Substance Use Topics    Alcohol use: No        Family History   Problem Relation Age of Onset    Arthritis-osteo Mother     Cancer Father     Stroke Maternal Grandmother     Stroke Paternal Grandmother        HPI     Review of Systems   Constitutional: Negative for chills and fever. HENT: Positive for nosebleeds. Negative for hearing loss. Eyes: Negative for blurred vision. Respiratory: Negative for cough. Cardiovascular: Negative for chest pain. Gastrointestinal: Negative for heartburn. Genitourinary: Negative for dysuria. Musculoskeletal: Negative for myalgias. Skin: Negative for rash. Neurological: Negative for dizziness. Psychiatric/Behavioral: Negative for depression. Physical Exam  Vitals signs and nursing note reviewed. Constitutional:       General: She is awake. She is not in acute distress. Appearance: Normal appearance.  She is well-developed. She is obese. She is not ill-appearing or diaphoretic. HENT:      Head: Normocephalic and atraumatic. Jaw: No trismus, tenderness, swelling or pain on movement. Salivary Glands: Right salivary gland is not diffusely enlarged or tender. Left salivary gland is not diffusely enlarged or tender. Right Ear: Tympanic membrane, ear canal and external ear normal. No drainage, swelling or tenderness. No middle ear effusion. There is no impacted cerumen. Tympanic membrane is not perforated. Left Ear: Tympanic membrane, ear canal and external ear normal. No drainage, swelling or tenderness. No middle ear effusion. There is no impacted cerumen. Tympanic membrane is not perforated. Nose: No nasal deformity, nasal tenderness, mucosal edema, congestion or rhinorrhea. Right Nostril: No epistaxis, septal hematoma or occlusion. Left Nostril: No epistaxis, septal hematoma or occlusion. Comments: No active epistaxis bilaterally. No large blood clots. Evidence of prior old blood bilaterally left greater than right. Nasal septum is intact with no perforations. No obvious areas of trauma to the nasal cavity but overall has a dry appearance. No excoriations or prominent blood vessels. Mouth/Throat:      Lips: No lesions. Mouth: Mucous membranes are moist. No injury or oral lesions. Tongue: No lesions. Tongue does not deviate from midline. Palate: No mass and lesions. Pharynx: Oropharynx is clear. Uvula midline. No pharyngeal swelling, oropharyngeal exudate, posterior oropharyngeal erythema or uvula swelling. Tonsils: No tonsillar exudate or tonsillar abscesses. Eyes:      General: No scleral icterus. Extraocular Movements: Extraocular movements intact. Conjunctiva/sclera: Conjunctivae normal.      Pupils: Pupils are equal, round, and reactive to light. Neck:      Musculoskeletal: Normal range of motion and neck supple.  No edema, erythema or neck rigidity. Thyroid: No thyroid mass or thyromegaly. Trachea: Trachea and phonation normal. No tracheal tenderness. Pulmonary:      Effort: Pulmonary effort is normal. No tachypnea. Breath sounds: No stridor. Lymphadenopathy:      Head:      Right side of head: No submental, submandibular, preauricular or posterior auricular adenopathy. Left side of head: No submental, submandibular, preauricular or posterior auricular adenopathy. Cervical: No cervical adenopathy. Right cervical: No superficial, deep or posterior cervical adenopathy. Left cervical: No superficial, deep or posterior cervical adenopathy. Skin:     General: Skin is warm and dry. Neurological:      Mental Status: She is alert and oriented to person, place, and time. Cranial Nerves: Cranial nerves are intact. No facial asymmetry. Psychiatric:         Mood and Affect: Mood and affect normal.         Behavior: Behavior normal.          1. Epistaxis  Controlled at this time. On Eliquis. Recommend holding fluticasone nasal spray for the next 10 to 15 days. And also educated patient on proper use use of this nasal spray by directing the spray tip up and out away from her nasal septum. Recommend nasal saline irrigations 4 times a day for the next 10 days. Then she can use nasal saline irrigations 1 or 2 times a day going forward to prevent further bleeding episodes. No acute ENT intervention indicated at this present time. Please do not hesitate to reconsult if any further needs.     2. Acute blood loss anemia  -2/2 to above            Patricia Alcantara DO

## 2020-09-17 LAB
ABO + RH BLD: NORMAL
ANION GAP SERPL CALC-SCNC: 5 MMOL/L (ref 7–16)
BASOPHILS # BLD: 0.1 K/UL (ref 0–0.2)
BASOPHILS NFR BLD: 1 % (ref 0–2)
BLD PROD TYP BPU: NORMAL
BLOOD GROUP ANTIBODIES SERPL: NORMAL
BPU ID: NORMAL
BUN SERPL-MCNC: 9 MG/DL (ref 8–23)
CALCIUM SERPL-MCNC: 8.2 MG/DL (ref 8.3–10.4)
CHLORIDE SERPL-SCNC: 110 MMOL/L (ref 98–107)
CO2 SERPL-SCNC: 29 MMOL/L (ref 21–32)
CREAT SERPL-MCNC: 0.51 MG/DL (ref 0.6–1)
CROSSMATCH RESULT,%XM: NORMAL
DIFFERENTIAL METHOD BLD: ABNORMAL
EOSINOPHIL # BLD: 0.5 K/UL (ref 0–0.8)
EOSINOPHIL NFR BLD: 5 % (ref 0.5–7.8)
ERYTHROCYTE [DISTWIDTH] IN BLOOD BY AUTOMATED COUNT: 16.6 % (ref 11.9–14.6)
GLUCOSE SERPL-MCNC: 95 MG/DL (ref 65–100)
HCT VFR BLD AUTO: 27.5 % (ref 35.8–46.3)
HGB BLD-MCNC: 8.4 G/DL (ref 11.7–15.4)
IMM GRANULOCYTES # BLD AUTO: 0 K/UL (ref 0–0.5)
IMM GRANULOCYTES NFR BLD AUTO: 1 % (ref 0–5)
LYMPHOCYTES # BLD: 2.4 K/UL (ref 0.5–4.6)
LYMPHOCYTES NFR BLD: 27 % (ref 13–44)
MCH RBC QN AUTO: 24.9 PG (ref 26.1–32.9)
MCHC RBC AUTO-ENTMCNC: 30.5 G/DL (ref 31.4–35)
MCV RBC AUTO: 81.6 FL (ref 79.6–97.8)
MONOCYTES # BLD: 1.2 K/UL (ref 0.1–1.3)
MONOCYTES NFR BLD: 13 % (ref 4–12)
NEUTS SEG # BLD: 4.7 K/UL (ref 1.7–8.2)
NEUTS SEG NFR BLD: 53 % (ref 43–78)
NRBC # BLD: 0 K/UL (ref 0–0.2)
PLATELET # BLD AUTO: 283 K/UL (ref 150–450)
PMV BLD AUTO: 10.1 FL (ref 9.4–12.3)
POTASSIUM SERPL-SCNC: 3.6 MMOL/L (ref 3.5–5.1)
RBC # BLD AUTO: 3.37 M/UL (ref 4.05–5.2)
SODIUM SERPL-SCNC: 144 MMOL/L (ref 136–145)
SPECIMEN EXP DATE BLD: NORMAL
STATUS OF UNIT,%ST: NORMAL
UNIT DIVISION, %UDIV: 0
WBC # BLD AUTO: 8.8 K/UL (ref 4.3–11.1)

## 2020-09-17 PROCEDURE — 97161 PT EVAL LOW COMPLEX 20 MIN: CPT

## 2020-09-17 PROCEDURE — 97165 OT EVAL LOW COMPLEX 30 MIN: CPT

## 2020-09-17 PROCEDURE — 74011250636 HC RX REV CODE- 250/636: Performed by: INTERNAL MEDICINE

## 2020-09-17 PROCEDURE — 99232 SBSQ HOSP IP/OBS MODERATE 35: CPT | Performed by: INTERNAL MEDICINE

## 2020-09-17 PROCEDURE — 80048 BASIC METABOLIC PNL TOTAL CA: CPT

## 2020-09-17 PROCEDURE — 74011250637 HC RX REV CODE- 250/637: Performed by: INTERNAL MEDICINE

## 2020-09-17 PROCEDURE — 74011250637 HC RX REV CODE- 250/637: Performed by: STUDENT IN AN ORGANIZED HEALTH CARE EDUCATION/TRAINING PROGRAM

## 2020-09-17 PROCEDURE — 65270000029 HC RM PRIVATE

## 2020-09-17 PROCEDURE — 94760 N-INVAS EAR/PLS OXIMETRY 1: CPT

## 2020-09-17 PROCEDURE — 2709999900 HC NON-CHARGEABLE SUPPLY

## 2020-09-17 PROCEDURE — 94640 AIRWAY INHALATION TREATMENT: CPT

## 2020-09-17 PROCEDURE — 36415 COLL VENOUS BLD VENIPUNCTURE: CPT

## 2020-09-17 PROCEDURE — 85025 COMPLETE CBC W/AUTO DIFF WBC: CPT

## 2020-09-17 RX ORDER — ALBUTEROL SULFATE 90 UG/1
2 AEROSOL, METERED RESPIRATORY (INHALATION)
Status: DISCONTINUED | OUTPATIENT
Start: 2020-09-17 | End: 2020-09-19 | Stop reason: HOSPADM

## 2020-09-17 RX ORDER — OXYMETAZOLINE HCL 0.05 %
2 SPRAY, NON-AEROSOL (ML) NASAL 2 TIMES DAILY
Status: COMPLETED | OUTPATIENT
Start: 2020-09-17 | End: 2020-09-18

## 2020-09-17 RX ORDER — OXYMETAZOLINE HCL 0.05 %
2 SPRAY, NON-AEROSOL (ML) NASAL ONCE
Status: DISCONTINUED | OUTPATIENT
Start: 2020-09-17 | End: 2020-09-17

## 2020-09-17 RX ADMIN — ACETAMINOPHEN 650 MG: 325 TABLET, FILM COATED ORAL at 13:26

## 2020-09-17 RX ADMIN — SALINE NASAL SPRAY 2 SPRAY: 1.5 SOLUTION NASAL at 21:20

## 2020-09-17 RX ADMIN — CLORAZEPATE DIPOTASSIUM 3.75 MG: 7.5 TABLET ORAL at 18:07

## 2020-09-17 RX ADMIN — BUDESONIDE AND FORMOTEROL FUMARATE DIHYDRATE 2 PUFF: 80; 4.5 AEROSOL RESPIRATORY (INHALATION) at 08:15

## 2020-09-17 RX ADMIN — CLORAZEPATE DIPOTASSIUM 3.75 MG: 7.5 TABLET ORAL at 21:18

## 2020-09-17 RX ADMIN — SUCRALFATE 1 G: 1 TABLET ORAL at 18:07

## 2020-09-17 RX ADMIN — CLORAZEPATE DIPOTASSIUM 3.75 MG: 7.5 TABLET ORAL at 08:02

## 2020-09-17 RX ADMIN — Medication 5 ML: at 21:18

## 2020-09-17 RX ADMIN — SUCRALFATE 1 G: 1 TABLET ORAL at 21:17

## 2020-09-17 RX ADMIN — Medication 10 ML: at 12:24

## 2020-09-17 RX ADMIN — PANTOPRAZOLE SODIUM 40 MG: 40 TABLET, DELAYED RELEASE ORAL at 18:07

## 2020-09-17 RX ADMIN — SUCRALFATE 1 G: 1 TABLET ORAL at 08:02

## 2020-09-17 RX ADMIN — LATANOPROST 1 DROP: 50 SOLUTION OPHTHALMIC at 21:18

## 2020-09-17 RX ADMIN — SALINE NASAL SPRAY 2 SPRAY: 1.5 SOLUTION NASAL at 08:02

## 2020-09-17 RX ADMIN — ALBUTEROL SULFATE 2 PUFF: 90 AEROSOL, METERED RESPIRATORY (INHALATION) at 20:20

## 2020-09-17 RX ADMIN — Medication 5 ML: at 05:10

## 2020-09-17 RX ADMIN — BUDESONIDE AND FORMOTEROL FUMARATE DIHYDRATE 2 PUFF: 80; 4.5 AEROSOL RESPIRATORY (INHALATION) at 20:18

## 2020-09-17 RX ADMIN — SALINE NASAL SPRAY 2 SPRAY: 1.5 SOLUTION NASAL at 18:07

## 2020-09-17 RX ADMIN — OXYMETAZOLINE HCL 2 SPRAY: 0.05 SPRAY NASAL at 18:07

## 2020-09-17 RX ADMIN — SERTRALINE HYDROCHLORIDE 50 MG: 50 TABLET ORAL at 08:02

## 2020-09-17 RX ADMIN — SODIUM CHLORIDE, SODIUM LACTATE, POTASSIUM CHLORIDE, AND CALCIUM CHLORIDE 100 ML/HR: 600; 310; 30; 20 INJECTION, SOLUTION INTRAVENOUS at 01:08

## 2020-09-17 RX ADMIN — PANTOPRAZOLE SODIUM 40 MG: 40 TABLET, DELAYED RELEASE ORAL at 08:02

## 2020-09-17 RX ADMIN — MONTELUKAST 10 MG: 10 TABLET, FILM COATED ORAL at 08:02

## 2020-09-17 NOTE — PROGRESS NOTES
HPI:  George Husbands is a 66 y.o. female seen for follow up on Epistaxis. Ms. Bruno Barr was seen yesterday afternoon under consultation for epistaxis in the setting of Eliquis that is being used for bilateral pulmonary embolism. At that time there is no active bleeding. Unfortunately around middle of the day today she had an episode of epistaxis on the right side that was controlled with conservative measures by clamping the nasal bridge adding ice pack and the bleeding was controlled after about 10 to 20 minutes. This episode of epistaxis was not nearly as severe as her episode at the time of admission. Had no further bleeding since that episode earlier today. Eliquis is still been on hold since her admission. Past Medical History, Past Surgical History, Family history, Social History, and Medications were all reviewed with the patient today and updated as necessary.      Allergies   Allergen Reactions    Flexeril [Cyclobenzaprine] Unknown (comments)    Ketoprofen Unknown (comments)    Plaquenil [Hydroxychloroquine] Unknown (comments)       Current Facility-Administered Medications   Medication    oxymetazoline (AFRIN) 0.05 % nasal spray 2 Spray    0.9% sodium chloride infusion 250 mL    tuberculin injection 5 Units    sodium chloride (OCEAN) 0.65 % nasal squeeze bottle 2 Spray    sodium chloride (NS) flush 5-40 mL    sodium chloride (NS) flush 5-40 mL    acetaminophen (TYLENOL) tablet 650 mg    Or    acetaminophen (TYLENOL) suppository 650 mg    polyethylene glycol (MIRALAX) packet 17 g    promethazine (PHENERGAN) tablet 12.5 mg    Or    ondansetron (ZOFRAN) injection 4 mg    albuterol-ipratropium (DUO-NEB) 2.5 MG-0.5 MG/3 ML    clorazepate (TRANXENE) tablet 3.75 mg    latanoprost (XALATAN) 0.005 % ophthalmic solution 1 Drop    montelukast (SINGULAIR) tablet 10 mg    pantoprazole (PROTONIX) tablet 40 mg    sertraline (ZOLOFT) tablet 50 mg    sucralfate (CARAFATE) tablet 1 g    budesonide-formoterol (SYMBICORT) 80-4.5 mcg inhaler    tiotropium bromide (SPIRIVA RESPIMAT) 2.5 mcg /actuation        Patient Active Problem List   Diagnosis Code    Contact dermatitis and other eczema, due to unspecified cause L25.9    Esophageal reflux K21.9    Pure hypercholesterolemia E78.00    Depressive disorder, not elsewhere classified F32.9    Anxiety F41.9    Essential hypertension, benign I10    Back pain M54.9    Abnormality of gait R26.9    Frequent falls R29.6    Neck pain M54.2    Postural imbalance R29.3    Cerebellar atrophy (HCC) G31.9    Cervical stenosis of spinal canal M48.02    DDD (degenerative disc disease), cervical M50.30    Acute hypoxemic respiratory failure (HCC) J96.01    Pulmonary embolism, bilateral (HCC) I26.99    Person under investigation for COVID-19 Z20.828    Asthma J45.909    Hypokalemia E87.6    Constipation K59.00    Acute deep vein thrombosis (DVT) of both lower extremities (HCC) I82.403    S/P IVC filter Z95.828    Rotator cuff disorder, right M67.911    Baker's cyst of knee, right M71.21    Moderate tricuspid regurgitation I07.1    Moderate mitral valve regurgitation I34.0    Pulmonary HTN (HCC) I27.20    Acute blood loss anemia D62       Current Facility-Administered Medications   Medication Dose Route Frequency    oxymetazoline (AFRIN) 0.05 % nasal spray 2 Spray  2 Spray Both Nostrils ONCE    0.9% sodium chloride infusion 250 mL  250 mL IntraVENous PRN    tuberculin injection 5 Units  5 Units IntraDERMal ONCE    sodium chloride (OCEAN) 0.65 % nasal squeeze bottle 2 Spray  2 Spray Both Nostrils QID    sodium chloride (NS) flush 5-40 mL  5-40 mL IntraVENous Q8H    sodium chloride (NS) flush 5-40 mL  5-40 mL IntraVENous PRN    acetaminophen (TYLENOL) tablet 650 mg  650 mg Oral Q6H PRN    Or    acetaminophen (TYLENOL) suppository 650 mg  650 mg Rectal Q6H PRN    polyethylene glycol (MIRALAX) packet 17 g  17 g Oral DAILY PRN    promethazine (PHENERGAN) tablet 12.5 mg  12.5 mg Oral Q6H PRN    Or    ondansetron (ZOFRAN) injection 4 mg  4 mg IntraVENous Q6H PRN    albuterol-ipratropium (DUO-NEB) 2.5 MG-0.5 MG/3 ML  3 mL Nebulization Q4H PRN    clorazepate (TRANXENE) tablet 3.75 mg  3.75 mg Oral TID    latanoprost (XALATAN) 0.005 % ophthalmic solution 1 Drop  1 Drop Both Eyes QPM    montelukast (SINGULAIR) tablet 10 mg  10 mg Oral DAILY    pantoprazole (PROTONIX) tablet 40 mg  40 mg Oral BID    sertraline (ZOLOFT) tablet 50 mg  50 mg Oral DAILY    sucralfate (CARAFATE) tablet 1 g  1 g Oral TID    budesonide-formoterol (SYMBICORT) 80-4.5 mcg inhaler  2 Puff Inhalation BID RT    tiotropium bromide (SPIRIVA RESPIMAT) 2.5 mcg /actuation  2 Puff Inhalation DAILY       Past Medical History:   Diagnosis Date    Abnormality of gait 7/18/2018    Anemia     Anxiety     Arthritis     Asthma     Calculus of kidney     Chronic pain     Contact dermatitis and other eczema, due to unspecified cause     Depressive disorder, not elsewhere classified     Encounter for chronic pain management     Esophageal reflux     Essential hypertension, benign     Frequent falls 7/18/2018    GERD (gastroesophageal reflux disease)     Pure hypercholesterolemia        Past Surgical History:   Procedure Laterality Date    HX CATARACT REMOVAL Bilateral     HX COLONOSCOPY      HX GI      HX HYSTERECTOMY      HX KNEE REPLACEMENT Right     HX LITHOTRIPSY      HX RETINAL DETACHMENT REPAIR      HX SHOULDER REPLACEMENT Left     IR THROMBECTOMY [facility] ART PRIMARY NON CHIRAG OR INTRACRANIAL  7/28/2020    NEUROLOGICAL PROCEDURE UNLISTED         Social History     Tobacco Use    Smoking status: Never Smoker    Smokeless tobacco: Never Used   Substance Use Topics    Alcohol use: No       Family History   Problem Relation Age of Onset    Arthritis-osteo Mother     Cancer Father     Stroke Maternal Grandmother     Stroke Paternal Grandmother ROS:    ROS       PHYSICAL EXAM:    Visit Vitals  /60 (BP 1 Location: Left arm, BP Patient Position: At rest)   Pulse 78   Temp 98.1 °F (36.7 °C)   Resp 18   Ht 5' (1.524 m)   Wt 139 lb 14.4 oz (63.5 kg)   SpO2 94%   BMI 27.32 kg/m²       Physical Exam  Vitals signs and nursing note reviewed. Constitutional:       General: She is awake. She is not in acute distress. Appearance: Normal appearance. She is well-developed. She is not ill-appearing or diaphoretic. HENT:      Head: Normocephalic and atraumatic. Jaw: No trismus, tenderness, swelling or pain on movement. Salivary Glands: Right salivary gland is not diffusely enlarged or tender. Left salivary gland is not diffusely enlarged or tender. Right Ear: Tympanic membrane, ear canal and external ear normal. No drainage, swelling or tenderness. No middle ear effusion. There is no impacted cerumen. Tympanic membrane is not perforated. Left Ear: Tympanic membrane, ear canal and external ear normal. No drainage, swelling or tenderness. No middle ear effusion. There is no impacted cerumen. Tympanic membrane is not perforated. Nose: Nose normal. No nasal deformity, nasal tenderness, mucosal edema, congestion or rhinorrhea. Right Nostril: No epistaxis, septal hematoma or occlusion. Left Nostril: No epistaxis, septal hematoma or occlusion. Comments: No active epistaxis noted on bilateral nasal cavities. No excoriations to the nasal septum. No prominent vessels. No old blood or clots present at this time. Patient states she recently rinsed her nose with nasal saline spray. Mouth/Throat:      Lips: No lesions. Mouth: Mucous membranes are moist. No injury or oral lesions. Tongue: No lesions. Tongue does not deviate from midline. Palate: No mass and lesions. Pharynx: Oropharynx is clear. Uvula midline.  No pharyngeal swelling, oropharyngeal exudate, posterior oropharyngeal erythema or uvula swelling. Tonsils: No tonsillar exudate or tonsillar abscesses. Comments: No active bleeding or old blood clots to the posterior oropharynx. Eyes:      General: No scleral icterus. Extraocular Movements: Extraocular movements intact. Conjunctiva/sclera: Conjunctivae normal.      Pupils: Pupils are equal, round, and reactive to light. Neck:      Musculoskeletal: Normal range of motion and neck supple. No edema, erythema or neck rigidity. Thyroid: No thyroid mass or thyromegaly. Trachea: Trachea and phonation normal. No tracheal tenderness. Pulmonary:      Effort: Pulmonary effort is normal. No tachypnea. Breath sounds: No stridor. Lymphadenopathy:      Head:      Right side of head: No submental, submandibular, preauricular or posterior auricular adenopathy. Left side of head: No submental, submandibular, preauricular or posterior auricular adenopathy. Cervical: No cervical adenopathy. Right cervical: No superficial, deep or posterior cervical adenopathy. Left cervical: No superficial, deep or posterior cervical adenopathy. Skin:     General: Skin is warm and dry. Neurological:      Mental Status: She is alert and oriented to person, place, and time. Cranial Nerves: Cranial nerves are intact. No facial asymmetry. Psychiatric:         Mood and Affect: Mood and affect normal.         Behavior: Behavior normal.            ASSESSMENT and PLAN      1. Epistaxis  No active bleeding on current exam of nasal cavity bilaterally. No evidence of blood clots or old blood to the nasal cavity. No evidence of active bleeding or blood clots to the posterior oropharynx or nasopharynx. Hemoglobin seems to have stabilized at 8.4 today. Hematology was considering restarting her Eliquis prior to today's epistaxis at a lower dose. I do agree with that plan as long as she is free of epistaxis for 24 to 48 hours.    -If bleeding persists either here or after discharge then we can consider taking her to the operating room for nasal endoscopy and control of epistaxis. -recommend to continue nasal saline irrigations 3-4 times a day with no blowing of her nose.    -educated patient on avoiding bending over or exerting herself. -recommend Afrin to be used scheduled twice a day for the next 3 days. 2. Acute blood loss anemia    3.  Pulmonary embolism, bilateral ([address]        Veda Dan,   9/17/2020

## 2020-09-17 NOTE — PROGRESS NOTES
Progress Note    Patient: Rut Miguel MRN: [id number]  SSN: [id number]    YOB: 1945  Age: 66 y.o. Sex: female      Admit Date: 9/15/2020    LOS: 2 days     Subjective:   66-year-old female with past medical history of GERD, hypertension, anxiety, asthma, recent admission for bilateral pulmonary embolism in the setting of bilateral lower extremity DVT currently on Eliquis that presented in the setting of a nosebleed. Patient seen and examined at bedside. This morning feeling weak, had another episode of nose bleed. Denies chest pain, no abdominal pain, no diarrhea, no black or bloody stools. Objective:     Vitals:    09/17/20 0240 09/17/20 0750 09/17/20 0824 09/17/20 1200   BP: (!) 134/59 (!) 159/78  (!) 140/66   Pulse: 78 77  78   Resp: 18 18  18   Temp: 98.4 °F (36.9 °C) 97.8 °F (36.6 °C)  98.3 °F (36.8 °C)   SpO2: 90% 93% 91% 93%   Weight:       Height:            Intake and Output:  Current Shift: 09/17 0701 - 09/17 1900  In: -   Out: 500 [Urine:500]  Last three shifts: 09/15 1901 - 09/17 0700  In: 1501 [P.O.:120;  I.V.:2814]  Out: 2650 [Urine:2650]    ROS  10 ROS negative except from stated on subjective    Physical Exam:   General: Alert, oriented, NAD  HEENT: NC/AT, EOM are intact  Neck: supple, no JVD  Cardiovascular: RRR, S1, S2, no murmurs  Respiratory: Decrease breath sounds, no wheezes  Abdomen: Soft, NT, ND  Back: No CVA tenderness, no paraspinal tenderness  Extremities: BL LE edema, no erythema  Neuro: A&O, CN are intact, no focal deficits  Skin: no rash or ulcers  Psych: good mood and affect    Lab/Data Review:  I have personally reviewed patients laboratory data showing  Recent Results (from the past 24 hour(s))   VITAMIN B12    Collection Time: 09/16/20  3:31 PM   Result Value Ref Range    Vitamin B12 775 193 - 986 pg/mL   FOLATE    Collection Time: 09/16/20  3:31 PM   Result Value Ref Range    Folate 32.1 (H) 3.1 - 17.5 ng/mL   TRANSFERRIN SATURATION    Collection Time: 09/16/20  3:31 PM   Result Value Ref Range    Iron 110 35 - 150 ug/dL    TIBC 336 250 - 450 ug/dL    Transferrin Saturation 33 %   FERRITIN    Collection Time: 09/16/20  3:31 PM   Result Value Ref Range    Ferritin 15 8 - 388 NG/ML   HGB & HCT    Collection Time: 09/16/20  6:25 PM   Result Value Ref Range    HGB 8.7 (L) 11.7 - 15.4 g/dL    HCT 28.0 (L) 35.8 - 46.3 %   CBC WITH AUTOMATED DIFF    Collection Time: 09/17/20  5:17 AM   Result Value Ref Range    WBC 8.8 4.3 - 11.1 K/uL    RBC 3.37 (L) 4.05 - 5.2 M/uL    HGB 8.4 (L) 11.7 - 15.4 g/dL    HCT 27.5 (L) 35.8 - 46.3 %    MCV 81.6 79.6 - 97.8 FL    MCH 24.9 (L) 26.1 - 32.9 PG    MCHC 30.5 (L) 31.4 - 35.0 g/dL    RDW 16.6 (H) 11.9 - 14.6 %    PLATELET 174 327 - 078 K/uL    MPV 10.1 9.4 - 12.3 FL    ABSOLUTE NRBC 0.00 0.0 - 0.2 K/uL    DF AUTOMATED      NEUTROPHILS 53 43 - 78 %    LYMPHOCYTES 27 13 - 44 %    MONOCYTES 13 (H) 4.0 - 12.0 %    EOSINOPHILS 5 0.5 - 7.8 %    BASOPHILS 1 0.0 - 2.0 %    IMMATURE GRANULOCYTES 1 0.0 - 5.0 %    ABS. NEUTROPHILS 4.7 1.7 - 8.2 K/UL    ABS. LYMPHOCYTES 2.4 0.5 - 4.6 K/UL    ABS. MONOCYTES 1.2 0.1 - 1.3 K/UL    ABS. EOSINOPHILS 0.5 0.0 - 0.8 K/UL    ABS. BASOPHILS 0.1 0.0 - 0.2 K/UL    ABS. IMM. GRANS. 0.0 0.0 - 0.5 K/UL   METABOLIC PANEL, BASIC    Collection Time: 09/17/20  5:17 AM   Result Value Ref Range    Sodium 144 136 - 145 mmol/L    Potassium 3.6 3.5 - 5.1 mmol/L    Chloride 110 (H) 98 - 107 mmol/L    CO2 29 21 - 32 mmol/L    Anion gap 5 (L) 7 - 16 mmol/L    Glucose 95 65 - 100 mg/dL    BUN 9 8 - 23 MG/DL    Creatinine 0.51 (L) 0.6 - 1.0 MG/DL    GFR est AA >60 >60 ml/min/1.73m2    GFR est non-AA >60 >60 ml/min/1.73m2    Calcium 8.2 (L) 8.3 - 10.4 MG/DL        Image:  I have personally reviewed patients imaging showing  DUPLEX LOWER EXT VENOUS BILAT   Final Result   Impression:         1. Bilateral lower extremity DVT, greater on the left as described above.       CPT code(s) 52870                  XR CHEST SNGL V   Final Result   IMPRESSION:   Unchanged linear hazy opacities in the left lower lung , indeterminate for   scarring or recurrent pneumonia and/or blood from reported nosebleed. Hospital problems     Principal Problem:    Acute blood loss anemia (9/15/2020)    Active Problems:    Esophageal reflux ()      Anxiety ()      Essential hypertension, benign ()      Pulmonary embolism, bilateral ([address] (7/27/2020)      Asthma (7/27/2020)        Assessment and Plan:   73-year-old female with past medical history of GERD, hypertension, anxiety, asthma, recent admission for bilateral pulmonary embolism in the setting of bilateral lower extremity DVT currently on Eliquis that presented in the setting of a nosebleed.      1. Acute blood loss anemia and syncope likely in the setting of profuse epistaxis  - Rapid Rhino removed yesterday  - Had another episode of epistaxis this am  - Transfused 1 unit RBC 9/16/20  - Continue to hold Eliquis   - Monitor H&H  - Transfuse if Hb<7     2. Recent bilateral pulmonary embolism in the setting of bilateral lower extremity DVT on Eliquis. - Due to acute blood loss anemia will continue to hold Eliquis  - Has IVC filter  - BL LE US showed BL LE DVT  - Hematology recs appreciated  - Since unable to anticoagulate her she has a poor prognosis and will consult palliative care     3. HTN  - Continue to hold lisinopril in the setting of syncopal episode     4. GERD  - Continue PPI     5. Anxiety   - Continue on her home dose of Tranxene and Zoloft     6. Asthma  - Continue on home inhalers and DuoNeb prn     DVT prophylaxis will hold Eliquis in the setting of acute blood loss anemia    I have reviewed, updated, and verified this note's content and spent 38 minutes of my 42 minutes visit performing counseling and coordination of care regarding medical management.      Signed By: Jesus Hampton MD     September 17, 2020

## 2020-09-17 NOTE — PROGRESS NOTES
Problem: Falls - Risk of  Goal: *Absence of Falls  Description: Document Jessi Nye Fall Risk and appropriate interventions in the flowsheet. Outcome: Progressing Towards Goal  Note: Fall Risk Interventions:  Mobility Interventions: Bed/chair exit alarm, Patient to call before getting OOB         Medication Interventions: Teach patient to arise slowly    Elimination Interventions: Call light in reach    History of Falls Interventions: Room close to nurse's station         Problem: Anemia Care Plan (Adult and Pediatric)  Goal: *Labs within defined limits  Outcome: Progressing Towards Goal  Goal: *Tolerates increased activity  Outcome: Progressing Towards Goal     Problem: Pressure Injury - Risk of  Goal: *Prevention of pressure injury  Description: Document Edson Scale and appropriate interventions in the flowsheet.   Outcome: Progressing Towards Goal  Note: Pressure Injury Interventions:       Moisture Interventions: Apply protective barrier, creams and emollients, Maintain skin hydration (lotion/cream)    Activity Interventions: Increase time out of bed, Pressure redistribution bed/mattress(bed type)    Mobility Interventions: Pressure redistribution bed/mattress (bed type)    Nutrition Interventions: Document food/fluid/supplement intake, Offer support with meals,snacks and hydration    Friction and Shear Interventions: Lift sheet                Problem: Patient Education: Go to Patient Education Activity  Goal: Patient/Family Education  Outcome: Resolved/Met

## 2020-09-17 NOTE — PROGRESS NOTES
[facility]ematology [facility] On[facility]        Inpatient Hematology / Oncology Progress Note      Admission Date: 9/15/2020  2:31 PM  Reason for Admission/Hospital Course: Acute blood loss anemia [D62]      24 Hour Events:  Seen by ENT who recommends holding nasal sprays. Doppler + for BLE DVT L>R. No complaints this AM     ROS:  +fatigue  +nose bleeds on admission   +bleeding. 10 point review of systems is otherwise negative with the exception of the elements mentioned above in the HPI. Allergies   Allergen Reactions    Flexeril [Cyclobenzaprine] Unknown (comments)    Ketoprofen Unknown (comments)    Plaquenil [Hydroxychloroquine] Unknown (comments)       OBJECTIVE:  Patient Vitals for the past 8 hrs:   BP Temp Pulse Resp SpO2   20 0824     91 %   20 0750 (!) 159/78 97.8 °F (36.6 °C) 77 18 93 %   20 0240 (!) 134/59 98.4 °F (36.9 °C) 78 18 90 %     Temp (24hrs), Av.2 °F (36.8 °C), Min:97.8 °F (36.6 °C), Max:98.5 °F (36.9 °C)     0701 -  1900  In: -   Out: 500 [Urine:500]    Physical Exam:  Constitutional: Well developed, well nourished female in no acute distress, sitting comfortably in the hospital bed. HEENT: Normocephalic and atraumatic. Oropharynx is clear, mucous membranes are moist.  Pupils are equal, round, and reactive to light. Extraocular muscles are intact. Sclerae anicteric. Neck supple without JVD. No thyromegaly present. Lymph node   Deferred    Skin Warm and dry. No bruising and no rash noted. No erythema. No pallor. Respiratory Lungs are clear to auscultation bilaterally without wheezes, rales or rhonchi, normal air exchange without accessory muscle use. CVS Normal rate, regular rhythm and normal S1 and S2. No murmurs, gallops, or rubs. Abdomen Soft, nontender and nondistended, normoactive bowel sounds. No palpable mass. No hepatosplenomegaly. Neuro Grossly nonfocal with no obvious sensory or motor deficits.    MSK Normal range of motion in general.  No edema and no tenderness. Psych Appropriate mood and affect. Labs:      Recent Labs     09/17/20 0517 09/16/20  1825 09/16/20  0533  09/15/20  1449   WBC 8.8  --  8.8  --  11.9*   RBC 3.37*  --  2.99*  --  3.64*   HGB 8.4* 8.7* 7.0*   < > 8.6*   HCT 27.5* 28.0* 24.2*   < > 29.2*   MCV 81.6  --  80.9  --  80.2   MCH 24.9*  --  23.4*  --  23.6*   MCHC 30.5*  --  28.9*  --  29.5*   RDW 16.6*  --  17.2*  --  16.8*     --  287  --  346   GRANS 53  --   --   --  72   LYMPH 27  --   --   --  14   MONOS 13*  --   --   --  11   EOS 5  --   --   --  3   BASOS 1  --   --   --  0   IG 1  --   --   --  0   DF AUTOMATED  --   --   --  AUTOMATED   ANEU 4.7  --   --   --  8.5*   ABL 2.4  --   --   --  1.7   ABM 1.2  --   --   --  1.3   YOLA 0.5  --   --   --  0.3   ABB 0.1  --   --   --  0.1   AIG 0.0  --   --   --  0.0    < > = values in this interval not displayed.         Recent Labs     09/17/20  0517 09/16/20  0533 09/15/20  1449    142 139   K 3.6 3.7 4.7   * 108* 106   CO2 29 30 29   AGAP 5* 4* 4*   GLU 95 89 120*   BUN 9 19 14   CREA 0.51* 0.49* 0.59*   GFRAA >60 >60 >60   GFRNA >60 >60 >60   CA 8.2* 8.0* 8.3         Imaging:    Medications:  Current Facility-Administered Medications   Medication Dose Route Frequency    0.9% sodium chloride infusion 250 mL  250 mL IntraVENous PRN    tuberculin injection 5 Units  5 Units IntraDERMal ONCE    sodium chloride (OCEAN) 0.65 % nasal squeeze bottle 2 Spray  2 Spray Both Nostrils QID    sodium chloride (NS) flush 5-40 mL  5-40 mL IntraVENous Q8H    sodium chloride (NS) flush 5-40 mL  5-40 mL IntraVENous PRN    acetaminophen (TYLENOL) tablet 650 mg  650 mg Oral Q6H PRN    Or    acetaminophen (TYLENOL) suppository 650 mg  650 mg Rectal Q6H PRN    polyethylene glycol (MIRALAX) packet 17 g  17 g Oral DAILY PRN    promethazine (PHENERGAN) tablet 12.5 mg  12.5 mg Oral Q6H PRN    Or    ondansetron (ZOFRAN) injection 4 mg  4 mg IntraVENous Q6H PRN    lactated Ringers infusion  100 mL/hr IntraVENous CONTINUOUS    albuterol-ipratropium (DUO-NEB) 2.5 MG-0.5 MG/3 ML  3 mL Nebulization Q4H PRN    clorazepate (TRANXENE) tablet 3.75 mg  3.75 mg Oral TID    latanoprost (XALATAN) 0.005 % ophthalmic solution 1 Drop  1 Drop Both Eyes QPM    montelukast (SINGULAIR) tablet 10 mg  10 mg Oral DAILY    pantoprazole (PROTONIX) tablet 40 mg  40 mg Oral BID    sertraline (ZOLOFT) tablet 50 mg  50 mg Oral DAILY    sucralfate (CARAFATE) tablet 1 g  1 g Oral TID    budesonide-formoterol (SYMBICORT) 80-4.5 mcg inhaler  2 Puff Inhalation BID RT    tiotropium bromide (SPIRIVA RESPIMAT) 2.5 mcg /actuation  2 Puff Inhalation DAILY         ASSESSMENT:    Problem List  Date Reviewed: 8/21/2020          Codes Class Noted    * (Principal) Acute blood loss anemia ICD-10-CM: D62  ICD-9-CM: 285.1  9/15/2020        Acute deep vein thrombosis (DVT) of both lower extremities (HCC) ICD-10-CM: I82.403  ICD-9-CM: 453.40  8/3/2020        S/P IVC filter ICD-10-CM: Y10.202  ICD-9-CM: V45.89  8/3/2020        Rotator cuff disorder, right ICD-10-CM: M67.911  ICD-9-CM: 726.10  8/3/2020        Baker's cyst of knee, right ICD-10-CM: M71.21  ICD-9-CM: 727.51  8/3/2020        Moderate tricuspid regurgitation ICD-10-CM: I07.1  ICD-9-CM: 397.0  8/3/2020        Moderate mitral valve regurgitation ICD-10-CM: I34.0  ICD-9-CM: 424.0  8/3/2020        Pulmonary HTN (HCC) ICD-10-CM: I27.20  ICD-9-CM: 416.8  8/3/2020        Constipation ICD-10-CM: K59.00  ICD-9-CM: 564.00  8/2/2020        Hypokalemia ICD-10-CM: E87.6  ICD-9-CM: 276.8  7/29/2020        Acute hypoxemic respiratory failure (HCC) ICD-10-CM: J96.01  ICD-9-CM: 518.81  7/27/2020        Pulmonary embolism, bilateral ([address] ICD-10-CM: I26.99  ICD-9-CM: 415.19  7/27/2020        Person under investigation for COVID-19 ICD-10-CM: Z20.828  ICD-9-CM: V01.79  7/27/2020        Asthma ICD-10-CM: J45.909  ICD-9-CM: 493.90  7/27/2020 DDD (degenerative disc disease), cervical ICD-10-CM: M50.30  ICD-9-CM: 722.4  12/11/2018        Cerebellar atrophy ([facility] [address] ICD-10-CM: G31.9  ICD-9-CM: 331.9  10/29/2018        Cervical stenosis of spinal canal ICD-10-CM: M48.02  ICD-9-CM: 723.0  10/29/2018        Abnormality of gait ICD-10-CM: R26.9  ICD-9-CM: 781.2  7/18/2018        Frequent falls ICD-10-CM: R29.6  ICD-9-CM: V15.88  7/18/2018        Neck pain ICD-10-CM: M54.2  ICD-9-CM: 723.1  7/18/2018        Postural imbalance ICD-10-CM: R29.3  ICD-9-CM: 729.90  7/18/2018        Back pain ICD-10-CM: M54.9  ICD-9-CM: 724.5  7/20/2015        Contact dermatitis and other eczema, due to unspecified cause ICD-10-CM: L25.9  ICD-9-CM: 692.9  Unknown        Esophageal reflux ICD-10-CM: K21.9  ICD-9-CM: 530.81  Unknown        Pure hypercholesterolemia ICD-10-CM: E78.00  ICD-9-CM: 272.0  Unknown        Depressive disorder, not elsewhere classified ICD-10-CM: F32.9  ICD-9-CM: 311  Unknown        Anxiety ICD-10-CM: F41.9  ICD-9-CM: 300.00  Unknown        Essential hypertension, benign ICD-10-CM: I10  ICD-9-CM: 401.1  Unknown            Ms. Franny Coleman was admitted on 9/15/2020 with a diagnosis of epistaxis/anemia on Eliquis. She has a past medical history of GERD, hypertension, anxiety, asthma, recent admission for bilateral pulmonary embolism in the setting of bilateral lower extremity DVT currently on Eliquis. She presented to the ER on 9/15/2020 with a nosebleed, improved with rapid Rhino. She denies any other sources of bleeding. She was found to have a drop in her hemoglobin to 8.6 and was admitted for medical management. Eliquis was placed on hold d/t bleeding and anemia. She does have an IVC filter in place. Her Hemoglobin decreased further on the morning of 9/16 and she will receive 1 unit of blood. She had a repeat BLE doppler which showed bilateral lower extremity DVT, greater on the left.  Patient admits to having frequent nosebleeds every few weeks or so but she has always been able to stop it in reasonable amount of time. She states that she has seen ENT before and did not find source of bleed. She does admit to using Flonase on a daily basis and based on her description of administration, she may be causing irritation. We were consulted for our recommendation given epitaxis while on Eliquis for BL pulmonary embolism and BL lower extremity DVT. PLAN:  Acute Blood Loss with Epistaxis  - Will receive 1 unit PRBC today for Hgb 7.0. Eliquis currently on hold. Rapid Rhino removed this AM.  -Recommend ENT consult. -Recommend avoiding nose sprays if she is to continue Eliquis  9/17 seen by ENT who recommends nasal irrigations and holding on nasal sprays.      Recent BL pulmonary embolism with BLE DVT  -  Hx of IVC filter.    - Repeat BLE doppler pendig  9/17 repeat  Doppler with B DVT left greater than right. Recommend lower dose of Eliquis BID (Dr. Brown Orf discussed with hospitalist).      PMH PAKO  -labs last checked in August with iron saturation 6% not on oral replacement  -Repeat iron studies and treat if still low  9/17 iron stores WNL. Addendum at [phone]: Dr. Abundio Ortiz made our team aware that patient has had another nose bleed, but it was able to be stopped. Eliquis is still hold. He plans to have palliative care consult. Goals and plan of care reviewed with the patient. All questions answered to the best of our ability. Charo Breen, [address] [facility]  [address]  Office : [phone]  Fax : [phone]     I personally saw, exammed and counselled the patient, and discussed with NP, agree with above history/assessment/plan. 66 y. o.female found B/L PE/DVT in 7/2020 and started Eliquis, admitted for severe epistaxis and acute anemia, held Eliquis, reported frequent nose bleeding for a year and had ENT eval without significant finding, also reported improper use of flonase nasal spray and discussed it can cause bleeding, consulted ENT for second opinion and agreed with the improper use of flonase was causing septum bleeding, discussed with Dr. Dora Jones to resume half dose anticoagulation.       Yanelis Collins M.D.   [address]  Office : [phone]  Fax : [phone]

## 2020-09-17 NOTE — PROGRESS NOTES
Problem: Self Care Deficits Care Plan (Adult)  Goal: *Acute Goals and Plan of Care (Insert Text)  Description: 1. Patient will perform grooming with supervision standing at sink. 2. Patient will perform Upper body dressing with supervision  3. Patient will perform lower body dressing with contact guard assistance. 4. Patient will perform upper and lower body bathing stand by assistance. 5. Patient will perform toilet transfers with stand by assistance. 6. Patient will perform shower transfer with stand by assistance. 7. Patient will participate in 30 + minutes of ADL/ therapeutic exercise/therapeutic activity with min rest breaks to increase activity tolerance for self care. 8. Patient will perform ADL functional mobility in room with stand by assistance. Goals to be achieved in 7 days. Outcome: Progressing Towards Goal     OCCUPATIONAL THERAPY: Initial Assessment and PM 9/17/2020  INPATIENT:    [place] / [place] / Product Type: Medicare /      NAME/AGE/GENDER: Kamini Terry is a 66 y.o. female   PRIMARY DIAGNOSIS:  Acute blood loss anemia [D62] Acute blood loss anemia Acute blood loss anemia        ICD-10: Treatment Diagnosis:    Generalized Muscle Weakness (M62.81)   Precautions/Allergies:     Flexeril [cyclobenzaprine]; Ketoprofen; and Plaquenil [hydroxychloroquine]      ASSESSMENT:     Ms. Amandeep Vogel presents with above diagnosis and was seen in room with PT present. Pt noted to have generalized weakness. Pt came from home where she is independent with self care and mobility with a rolling walker and scooter. Pt has a roommate that can assist with minimal activities. Pt should do well to return home but could use OT to maximize safety and independence with self care and functional mobility prior to discharge and perhaps continued OT at home with home health.       This section established at most recent assessment   PROBLEM LIST (Impairments causing functional limitations):  Decreased Strength  Decreased Balance  Decreased Activity Tolerance   INTERVENTIONS PLANNED: (Benefits and precautions of occupational therapy have been discussed with the patient.)  Activities of daily living training  Adaptive equipment training  Therapeutic activity  Therapeutic exercise     TREATMENT PLAN: Frequency/Duration: Follow patient 3 times per week to address above goals. Rehabilitation Potential For Stated Goals: Good     REHAB RECOMMENDATIONS (at time of discharge pending progress):    Placement: It is my opinion, based on this patient's performance to date, that Ms. Rosendo Wade may benefit from [address] after discharge due to the functional deficits listed above that are likely to improve with skilled rehabilitation because she is home bound but could benefit from continued therapy . Equipment:   None at this time              OCCUPATIONAL PROFILE AND HISTORY:   History of Present Injury/Illness (Reason for Referral):  See H&P  Past Medical History/Comorbidities:   Ms. Rosendo Wade  has a past medical history of Abnormality of gait (7/18/2018), Anemia, Anxiety, Arthritis, Asthma, Calculus of kidney, Chronic pain, Contact dermatitis and other eczema, due to unspecified cause, Depressive disorder, not elsewhere classified, Encounter for chronic pain management, Esophageal reflux, Essential hypertension, benign, Frequent falls (7/18/2018), GERD (gastroesophageal reflux disease), and Pure hypercholesterolemia. Ms. Rosendo Wade  has a past surgical history that includes hx knee replacement (Right); hx shoulder replacement (Left); hx hysterectomy; hx lithotripsy; hx cataract removal (Bilateral); hx retinal detachment repair; hx colonoscopy; hx gi; neurological procedure unlisted; and ir thrombectomy mech art primary non murray or intracranial (7/28/2020).   Social History/Living Environment:   Home Environment: Private residence  Wheelchair Ramp: Yes  One/Two Story Residence: One story  Living Alone: No  Support Systems: Other (comments)(roommate)  Patient Expects to be Discharged to[de-identified] Private residence  Current DME Used/Available at Home: Walker, rolling, Wheelchair, power  Prior Level of Function/Work/Activity:  Modified independent per pt. Number of Personal Factors/Comorbidities that affect the Plan of Care: Brief history (0):  LOW COMPLEXITY   ASSESSMENT OF OCCUPATIONAL PERFORMANCE[de-identified]   Activities of Daily Living:   Basic ADLs (From Assessment) Complex ADLs (From Assessment)   Feeding: Setup  Oral Facial Hygiene/Grooming: Setup  Bathing: Minimum assistance  Upper Body Dressing: Stand-by assistance  Lower Body Dressing: Minimum assistance  Toileting: Minimum assistance     Grooming/Bathing/Dressing Activities of Daily Living     Cognitive Retraining  Safety/Judgement: Awareness of environment                 Functional Transfers  Bathroom Mobility: Contact guard assistance  Toilet Transfer : Minimum assistance  Shower Transfer: Minimum assistance     Bed/Mat Mobility  Supine to Sit: Stand-by assistance  Sit to Stand: Contact guard assistance  Stand to Sit: Contact guard assistance  Bed to Chair: Contact guard assistance  Scooting: Additional time     Most Recent Physical Functioning:   Gross Assessment:                  Posture:  Posture (WDL): Exceptions to WDL  Posture Assessment: Rounded shoulders, Trunk flexion, Genu valgus right, Genu valgus left  Balance:  Sitting: Intact  Standing: Pull to stand; With support Bed Mobility:  Supine to Sit: Stand-by assistance  Scooting:  Additional time  Wheelchair Mobility:     Transfers:  Sit to Stand: Contact guard assistance  Stand to Sit: Contact guard assistance  Bed to Chair: Contact guard assistance            Patient Vitals for the past 6 hrs:   BP BP Patient Position SpO2 Pulse   09/17/20 1200 (!) 140/66 At rest 93 % 78       Mental Status  Neurologic State: Alert  Orientation Level: Oriented X4  Cognition: Appropriate decision making  Perception: Appears intact  Perseveration: No perseveration noted  Safety/Judgement: Awareness of environment                          Physical Skills Involved:  Balance  Strength  Activity Tolerance Cognitive Skills Affected (resulting in the inability to perform in a timely and safe manner):  none Psychosocial Skills Affected:  Environmental Adaptation   Number of elements that affect the Plan of Care: 3-5:  MODERATE COMPLEXITY   CLINICAL DECISION MAKIN[address] [place] [address] AM-PAC 6 Clicks   Daily Activity Inpatient Short Form  How much help from another person does the patient currently need. .. Total A Lot A Little None   1. Putting on and taking off regular lower body clothing? [] 1   [] 2   [x] 3   [] 4   2. Bathing (including washing, rinsing, drying)? [] 1   [] 2   [x] 3   [] 4   3. Toileting, which includes using toilet, bedpan or urinal?   [] 1   [] 2   [x] 3   [] 4   4. Putting on and taking off regular upper body clothing? [] 1   [] 2   [] 3   [x] 4   5. Taking care of personal grooming such as brushing teeth? [] 1   [] 2   [] 3   [x] 4   6. Eating meals? [] 1   [] 2   [] 3   [x] 4   © , Trustees of [address] [place] [address], under license to [employer]. All rights reserved      Score:  Initial: 21 Most Recent: X (Date: -- )    Interpretation of Tool:  Represents activities that are increasingly more difficult (i.e. Bed mobility, Transfers, Gait).      Use of outcome tool(s) and clinical judgement create a POC that gives a: LOW COMPLEXITY         TREATMENT:   (In addition to Assessment/Re-Assessment sessions the following treatments were rendered)     Pre-treatment Symptoms/Complaints:    Pain: Initial:     0 Post Session:  0     Assessment/Reassessment only, no treatment provided today    Braces/Orthotics/Lines/Etc:   O2 Device: Room air  Treatment/Session Assessment:    Response to Treatment:  pt up to recliner tolerated well  Interdisciplinary Collaboration:   Physical Therapist  Occupational Therapist  Registered Nurse  After treatment position/precautions:   Up in chair  Bed/Chair-wheels locked  Call light within reach  RN notified   Compliance with Program/Exercises: Compliant all of the time. Recommendations/Intent for next treatment session: \"Next visit will focus on advancements to more challenging activities and reduction in assistance provided\".   Total Treatment Duration:  OT Patient Time In/Time Out  Time In: 1400  Time Out: Kota Corey [address]

## 2020-09-18 PROCEDURE — 2709999900 HC NON-CHARGEABLE SUPPLY

## 2020-09-18 PROCEDURE — 99232 SBSQ HOSP IP/OBS MODERATE 35: CPT | Performed by: INTERNAL MEDICINE

## 2020-09-18 PROCEDURE — 65270000029 HC RM PRIVATE

## 2020-09-18 PROCEDURE — 94760 N-INVAS EAR/PLS OXIMETRY 1: CPT

## 2020-09-18 PROCEDURE — 94640 AIRWAY INHALATION TREATMENT: CPT

## 2020-09-18 PROCEDURE — 99222 1ST HOSP IP/OBS MODERATE 55: CPT | Performed by: INTERNAL MEDICINE

## 2020-09-18 PROCEDURE — 74011250637 HC RX REV CODE- 250/637: Performed by: INTERNAL MEDICINE

## 2020-09-18 RX ADMIN — Medication 10 ML: at 05:32

## 2020-09-18 RX ADMIN — SUCRALFATE 1 G: 1 TABLET ORAL at 17:09

## 2020-09-18 RX ADMIN — ALBUTEROL SULFATE 2 PUFF: 90 AEROSOL, METERED RESPIRATORY (INHALATION) at 20:40

## 2020-09-18 RX ADMIN — SALINE NASAL SPRAY 2 SPRAY: 1.5 SOLUTION NASAL at 13:30

## 2020-09-18 RX ADMIN — SALINE NASAL SPRAY 2 SPRAY: 1.5 SOLUTION NASAL at 08:22

## 2020-09-18 RX ADMIN — Medication 10 ML: at 13:29

## 2020-09-18 RX ADMIN — Medication 5 ML: at 21:16

## 2020-09-18 RX ADMIN — MONTELUKAST 10 MG: 10 TABLET, FILM COATED ORAL at 08:22

## 2020-09-18 RX ADMIN — OXYMETAZOLINE HCL 2 SPRAY: 0.05 SPRAY NASAL at 08:22

## 2020-09-18 RX ADMIN — CLORAZEPATE DIPOTASSIUM 3.75 MG: 7.5 TABLET ORAL at 17:09

## 2020-09-18 RX ADMIN — SALINE NASAL SPRAY 2 SPRAY: 1.5 SOLUTION NASAL at 17:09

## 2020-09-18 RX ADMIN — SUCRALFATE 1 G: 1 TABLET ORAL at 21:13

## 2020-09-18 RX ADMIN — CLORAZEPATE DIPOTASSIUM 3.75 MG: 7.5 TABLET ORAL at 21:15

## 2020-09-18 RX ADMIN — BUDESONIDE AND FORMOTEROL FUMARATE DIHYDRATE 2 PUFF: 80; 4.5 AEROSOL RESPIRATORY (INHALATION) at 07:40

## 2020-09-18 RX ADMIN — OXYMETAZOLINE HCL 2 SPRAY: 0.05 SPRAY NASAL at 17:09

## 2020-09-18 RX ADMIN — ALBUTEROL SULFATE 2 PUFF: 90 AEROSOL, METERED RESPIRATORY (INHALATION) at 07:39

## 2020-09-18 RX ADMIN — CLORAZEPATE DIPOTASSIUM 3.75 MG: 7.5 TABLET ORAL at 08:22

## 2020-09-18 RX ADMIN — BUDESONIDE AND FORMOTEROL FUMARATE DIHYDRATE 2 PUFF: 80; 4.5 AEROSOL RESPIRATORY (INHALATION) at 20:39

## 2020-09-18 RX ADMIN — ACETAMINOPHEN 650 MG: 325 TABLET, FILM COATED ORAL at 08:22

## 2020-09-18 RX ADMIN — SUCRALFATE 1 G: 1 TABLET ORAL at 08:22

## 2020-09-18 RX ADMIN — SERTRALINE HYDROCHLORIDE 50 MG: 50 TABLET ORAL at 08:22

## 2020-09-18 RX ADMIN — SALINE NASAL SPRAY 2 SPRAY: 1.5 SOLUTION NASAL at 21:17

## 2020-09-18 RX ADMIN — LATANOPROST 1 DROP: 50 SOLUTION OPHTHALMIC at 21:17

## 2020-09-18 RX ADMIN — PANTOPRAZOLE SODIUM 40 MG: 40 TABLET, DELAYED RELEASE ORAL at 17:09

## 2020-09-18 RX ADMIN — PANTOPRAZOLE SODIUM 40 MG: 40 TABLET, DELAYED RELEASE ORAL at 08:22

## 2020-09-18 NOTE — ACP (ADVANCE CARE PLANNING)
Advance Care Planning     Advance Care Planning Activator (Inpatient)  Conversation Note      Date of ACP Conversation: 09/18/20     Conversation Conducted with:   Patient Steffi Garcia     ACP Activator: Keith Meade RN    *When Decision Maker makes decisions on behalf of the incapacitated patient: Decision Maker is asked to consider and make decisions based on patient values, known preferences, or best interests. Health Care Decision Maker:    Current Designated Health Care Decision Maker:   (If there is a valid [place] named in the [address] [place] [address] Makers\" box in the ACP activity, but it is not visible above, be sure to open that field and then select the health care decision maker relationship (ie \"primary\") in the blank space to the right of the name.) Validate  this information as still accurate & up-to-date; edit [place] field as needed.)    Note: Assess and validate information in current ACP documents, as indicated. If no Decision Maker listed above or available through scanned documents, then:    If no Authorized Decision Maker has previously been identified, then patient chooses [place]:  \"Who would you like to name as your primary health care decision-maker? \"    Name: Laith Running  Relationship: Daughter Phone number: [phone]  [place] Rather this person be reached easily? \" YES  \"Who would you like to name as your back-up decision maker? \"   Name:     Relationship:  Phone number:   \"Can this person be reached easily? \"    Note: If the relationship of these Decision-Makers to the patient does NOT follow your state's Next of Kin hierarchy, recommend that patient complete ACP document that meets state-specific requirements to allow them to act on the patient's behalf when appropriate. Care Preferences    Ventilation:   \"If you were in your present state of health and suddenly became very ill and were unable to breathe on your own, what would your preference be about the use of a ventilator (breathing machine) if it were available to you? \"      If patient would desire the use of a ventilator (breathing machine), answer \"yes\", if not \"no\":yes    \"If your health worsens and it becomes clear that your chance of recovery is unlikely, what would your preference be about the use of a ventilator (breathing machine) if it were available to you? \"     Would the patient desire the use of a ventilator (breathing machine)? YES      Resuscitation  \"CPR works best to restart the heart when there is a sudden event, like a heart attack, in someone who is otherwise healthy. Unfortunately, CPR does not typically restart the heart for people who have serious health conditions or who are very sick. \"    \"In the event your heart stopped as a result of an underlying serious health condition, would you want attempts to be made to restart your heart (answer \"yes\" for attempt to resuscitate) or would you prefer a natural death (answer \"no\" for do not attempt to resuscitate)? \" yes      NOTE: If the patient has a valid advance directive AND now provides care preference(s) that are inconsistent with that prior directive, advise the patient to consider either: creating a new advance directive that complies with state-specific requirements; or, if that is not possible, orally revoking that prior directive in accordance with state-specific requirements, which must be documented in the EHR. [x] Yes  [] No   Educated Patient / Jose Weinberg regarding differences between Advance Directives and portable DNR orders.     Length of ACP Conversation in minutes:      Conversation Outcomes:  [x] ACP discussion completed  [] Existing advance directive reviewed with patient; no changes to patient's previously recorded wishes     [] New Advance Directive completed   [] Portable Do Not Resuscitate prepared for Provider review and signature  [] POLST/POST/MOLST/MOST prepared for Provider review and signature      Follow-up plan:    [] Schedule follow-up conversation to continue planning  [] Referred individual to Provider for additional questions/concerns   [] Advised patient/agent/surrogate to review completed ACP document and update if needed with changes in condition, patient preferences or care setting     [] This note routed to one or more involved healthcare providers      These are patient's current wishes as discussed at bedside today and are not intended to take place of [address]

## 2020-09-18 NOTE — PROGRESS NOTES
Progress Note    Patient: Rut Miguel MRN: [id number]  SSN: [id number]    YOB: 1945  Age: 66 y.o. Sex: female      Admit Date: 9/15/2020    LOS: 3 days     Subjective:   18-year-old female with past medical history of GERD, hypertension, anxiety, asthma, recent admission for bilateral pulmonary embolism in the setting of bilateral lower extremity DVT currently on Eliquis that presented in the setting of a nosebleed. Patient seen and examined at bedside. This morning feeling beetter, no nose bleed today. Denies chest pain, no abdominal pain, no diarrhea, no black or bloody stools. Objective:     Vitals:    09/18/20 0350 09/18/20 0741 09/18/20 0750 09/18/20 1141   BP: 135/60  (!) 149/67 132/66   Pulse: 81  73 67   Resp: 18 18 18   Temp: 98.2 °F (36.8 °C)  97.9 °F (36.6 °C) 97.4 °F (36.3 °C)   SpO2: 96% 96% 92% 96%   Weight:       Height:            Intake and Output:  Current Shift: 09/18 0701 - 09/18 1900  In: 240 [P.O.:240]  Out: 500 [Urine:500]  Last three shifts: 09/16 1901 - 09/18 0700  In: 3633 [P. O.:600; I.V.:2814]  Out: 2050 [Urine:2050]    ROS  10 ROS negative except from stated on subjective    Physical Exam:   General: Alert, oriented, NAD  HEENT: NC/AT, EOM are intact  Neck: supple, no JVD  Cardiovascular: RRR, S1, S2, no murmurs  Respiratory: Decrease breath sounds, no wheezes  Abdomen: Soft, NT, ND  Back: No CVA tenderness, no paraspinal tenderness  Extremities: BL LE edema, no erythema  Neuro: A&O, CN are intact, no focal deficits  Skin: no rash or ulcers  Psych: good mood and affect    Lab/Data Review:  I have personally reviewed patients laboratory data showing  No results found for this or any previous visit (from the past 24 hour(s)). Image:  I have personally reviewed patients imaging showing  DUPLEX LOWER EXT VENOUS BILAT   Final Result   Impression:         1. Bilateral lower extremity DVT, greater on the left as described above.       CPT code(s) 15913 XR CHEST SNGL V   Final Result   IMPRESSION:   Unchanged linear hazy opacities in the left lower lung , indeterminate for   scarring or recurrent pneumonia and/or blood from reported nosebleed. Hospital problems     Principal Problem:    Acute blood loss anemia (9/15/2020)    Active Problems:    Esophageal reflux ()      Anxiety ()      Essential hypertension, benign ()      Pulmonary embolism, bilateral ([address] (7/27/2020)      Asthma (7/27/2020)        Assessment and Plan:   22-year-old female with past medical history of GERD, hypertension, anxiety, asthma, recent admission for bilateral pulmonary embolism in the setting of bilateral lower extremity DVT currently on Eliquis that presented in the setting of a nosebleed.      1. Acute blood loss anemia and syncope likely in the setting of profuse epistaxis  - Rapid Rhino removed   - Had another episode of epistaxis yesterday  - Transfused 1 unit RBC 9/16/20  - Continue to hold Eliquis   - Monitor H&H  - Transfuse if Hb<7  - ENT recs appreciated     2. Recent bilateral pulmonary embolism in the setting of bilateral lower extremity DVT on Eliquis    - Due to acute blood loss anemia will continue to hold Eliquis  - Has IVC filter  - BL LE US showed BL LE DVT  - Hematology recs appreciated  - Spoke to Dr Chacha Snell (Hematology) today and recommends holding anticoagulation for a month and then reassess to resume it  - Palliative care recs appreciated     3. HTN  - Continue to hold lisinopril     4. GERD  - Continue PPI     5. Anxiety   - Continue on her home dose of Tranxene and Zoloft     6. Asthma  - Continue on home inhalers and DuoNeb prn     DVT prophylaxis will hold Eliquis in the setting of acute blood loss anemia    I have reviewed, updated, and verified this note's content and spent 38 minutes of my 42 minutes visit performing counseling and coordination of care regarding medical management.      Signed By: Leland Maradiaga MD September 18, 2020

## 2020-09-18 NOTE — PROGRESS NOTES
Care Management Interventions  PCP Verified by CM: Yes  Palliative Care Criteria Met (RRAT>21 & CHF Dx)?: No(Risk 25% Dx Acute blood loss anemia )  Mode of Transport at Discharge: (family)  Transition of Care Consult (CM Consult): Discharge Planning, [address]: Yes  Discharge Durable Medical Equipment: No(Rolling Walker and electric wheelchair)  Physical Therapy Consult: Yes  Occupational Therapy Consult: Yes  Speech Therapy Consult: No  Current Support Network: Other(lives with roommate)  Confirm Follow Up Transport: Family  The Plan for Transition of Care is Related to the Following Treatment Goals : debility current with [facility]   The Patient and/or Patient Representative was Provided with a Choice of Provider and Agrees with the Discharge Plan?: Yes  Name of the Patient Representative Who was Provided with a Choice of Provider and Agrees with the Discharge Plan: Patient Shahab Reyna  [place] of Choice List was Provided with Basic Dialogue that Supports the Patient's Individualized Plan of Care/Goals, Treatment Preferences and Shares the Quality Data Associated with the Providers?: Yes  Discharge Location  Discharge Placement: Home with home health  F/U with patient for d/c planning. Patient lives with a roommate in one story home and is independent of ADL's. She has Rolling walker and electric wheelchair. She has transportation as needed. She has Medicare and  and is able to obtain her medications. She is current with [facility]/OT/RN and this was confirmed with Tyrone Marquez at [facility] she is current. D/C plan is home with roommate and [facility]. CM following.

## 2020-09-18 NOTE — CONSULTS
Palliative Care    Patient: Jane Cross MRN: [id number]  SSN: [id number]    YOB: 1945  Age: 66 y.o. Sex: female       Date of Request: 9/18/2020  Date of Consult:  9/18/2020  Reason for Consult:  goals of care and medical decision making  Requesting Physician: Dr. Thaddeus Knox     Assessment/Plan:     Principal Diagnosis:    Debility, Unspecified  R53.81    Additional Diagnoses:   · Frailty  R54  · Counseling, Encounter for Medical Advice  Z71.9    Palliative Performance Scale (PPS):       Medical Decision Making:   Reviewed and summarized labs and imaging. Met with pt at bedside. We discussed her current medical situation and concerns. Pt expressed her understanding. She hopes she improves and returns home. We discussed her wishes overall. Pt has family who live nearby and lives with a friend currently who helps her at home. Pt daughter, Gloria Hernandez, is HCPOA. Pt wishes to remain full code but would not wish to remain intubated should she not be improving. Pt states she has made these goals clear with her daughter and son. Pt does not have a hospice diagnosis. Current medical plan is to resume eliquis at lower dose should pt not experience any further bleeding. Will be available if needed. Will discuss findings with members of the interdisciplinary team.      Thank you for this referral.         Subjective:     History obtained from:  Patient and Chart    Chief Complaint: fatigue  History of Present Illness:  75-year-old female with past medical history of GERD, hypertension, anxiety, asthma, recent admission for bilateral pulmonary embolism in the setting of bilateral lower extremity DVT currently on Eliquis that presented in the setting of a nosebleed. Patient seen and examined at bedside. This morning feeling weak, had another episode of nose bleed.  Denies chest pain, no abdominal pain, no diarrhea, no black or bloody stools  No further bleeding noted since yesterday    Advance Directive: Yes       Code Status:  Full Code            Health Care Power of : pt daughter is HCPOA    Past Medical History:   Diagnosis Date    Abnormality of gait 7/18/2018    Anemia     Anxiety     Arthritis     Asthma     Calculus of kidney     Chronic pain     Contact dermatitis and other eczema, due to unspecified cause     Depressive disorder, not elsewhere classified     Encounter for chronic pain management     Esophageal reflux     Essential hypertension, benign     Frequent falls 7/18/2018    GERD (gastroesophageal reflux disease)     Pure hypercholesterolemia       Past Surgical History:   Procedure Laterality Date    HX CATARACT REMOVAL Bilateral     HX COLONOSCOPY      HX GI      HX HYSTERECTOMY      HX KNEE REPLACEMENT Right     HX LITHOTRIPSY      HX RETINAL DETACHMENT REPAIR      HX SHOULDER REPLACEMENT Left     IR THROMBECTOMY MECH ART PRIMARY NON CHIRAG OR INTRACRANIAL  7/28/2020    NEUROLOGICAL PROCEDURE UNLISTED       Family History   Problem Relation Age of Onset    Arthritis-osteo Mother     Cancer Father     Stroke Maternal Grandmother     Stroke Paternal Grandmother       Social History     Tobacco Use    Smoking status: Never Smoker    Smokeless tobacco: Never Used   Substance Use Topics    Alcohol use: No     Prior to Admission medications    Medication Sig Start Date End Date Taking? Authorizing Provider   apixaban (ELIQUIS) 5 mg tablet Take 1 Tab by mouth every twelve (12) hours. 8/21/20   Marilee Farooq MD   tiotropium (Spiriva with HandiHaler) 18 mcg inhalation capsule Take 1 Cap by inhalation daily. 8/21/20   Marilee Farooq MD   albuterol-ipratropium (DUO-NEB) 2.5 mg-0.5 mg/3 ml nebu 3 mL by Nebulization route every four (4) hours as needed for Wheezing or Shortness of Breath. 8/3/20   Mercedez Larson MD   latanoprost (XALATAN) 0.005 % ophthalmic solution Administer 1 Drop to both eyes every evening.  8/3/20   Mercedez Larson MD polyethylene glycol (MIRALAX) 17 gram packet Take 1 Packet by mouth daily. 8/3/20   Juan Pablo Garcia MD   magnesium hydroxide (MILK OF MAGNESIA) 400 mg/5 mL suspension Take 30 mL by mouth daily. 8/3/20   Juan Pablo Garcia MD   pantoprazole (Protonix) 40 mg tablet Take 1 Tab by mouth two (2) times a day. 5/13/20   Duglas Gamble MD   sertraline (ZOLOFT) 100 mg tablet Take 1 1/2 tab daily. 5/6/20   Duglas Gamble MD   ProAir HFA 90 mcg/actuation inhaler USE 2 PUFFS PO BID PRF COUGH 3/27/20   Duglas Gamble MD   montelukast (Singulair) 10 mg tablet Take 1 Tab by mouth daily. 3/17/20   Duglas Gamble MD   Symbicort 80-4.5 mcg/actuation HFAA INHALE 1 PUFF PO BID FOR COUGH 3/17/20   Duglas Gamble MD   fluticasone propionate (FLONASE) 50 mcg/actuation nasal spray 2 Sprays by Both Nostrils route daily. 3/16/20   Duglas Gamble MD   clorazepate (TRANXENE) 3.75 mg tablet Take 1 Tab by mouth three (3) times daily. Max Daily Amount: 11.25 mg. Indications: anxious 2/6/20   Duglas Gamble MD   cycloSPORINE (RESTASIS) 0.05 % dpet Restasis    Provider, Historical   nystatin-triamcinolone (MYCOLOG II) topical cream Apply  to affected area two (2) times a day. 4/18/19   Duglas Gamble MD   calcium carbonate (OS-JANA) 500 mg calcium (1,250 mg) tablet Take  by mouth daily. Provider, Historical   ascorbic acid, vitamin C, (VITAMIN C) 500 mg tablet Take  by mouth. Provider, Historical   melatonin 10 mg cap Take  by mouth. Provider, Historical   Iron Fum & PS Cmp-Vit C-Niacin (INTEGRA) 125-40-3 mg cap Take 125 mg by mouth daily. 9/6/18   Duglas Gamble MD   sucralfate (CARAFATE) 1 gram tablet Take 1 Tab by mouth three (3) times daily. 8/21/18   Duglas Gamble MD   dietary supplement cap Take  by mouth. Provider, Historical   docusate sodium (STOOL SOFTENER) 100 mg tab Take  by mouth as needed. Provider, Historical   glucosamine & chondroit sul. Na 750-400 mg cmpk Take  by mouth.     Provider, Historical   omega-3 fatty acids-vitamin e (FISH OIL) 1,000 mg cap Take 1 Cap by mouth. 1 by oral route twice daily    Provider, Historical   VIT C/ABDIAZIZ AC/LUT/COPPER/ZNOX (PRESERVISION LUTEIN PO) Take  by mouth. 1 by oral route daily    Provider, Historical   cholecalciferol (VITAMIN D3) 1,000 unit tablet Take  by mouth daily. Provider, Historical       Allergies   Allergen Reactions    Flexeril [Cyclobenzaprine] Unknown (comments)    Ketoprofen Unknown (comments)    Plaquenil [Hydroxychloroquine] Unknown (comments)        Review of systems negative with exception of noted above     Objective:     Visit Vitals  BP (!) 149/67 (BP 1 Location: Right arm)   Pulse 73   Temp 97.9 °F (36.6 °C)   Resp 18   Ht 5' (1.524 m)   Wt 139 lb 14.4 oz (63.5 kg)   SpO2 92%   BMI 27.32 kg/m²        Physical Exam:    General:  Cooperative. No acute distress. Eyes:  Conjunctivae/corneas clear    Nose: Nares normal. Septum midline. Neck: Supple, symmetrical, trachea midline, no JVD   Lungs:   Clear to auscultation bilaterally, unlabored   Heart:  Regular rate and rhythm, no murmur    Abdomen:   Soft, non-tender, non-distended. Positive bowel sounds   Extremities: Normal, atraumatic, no cyanosis or edema   Skin: Skin color, texture, turgor normal. No rash or lesions.    Neurologic: Nonfocal   Psych: Alert and oriented      Assessment:     Hospital Problems  Date Reviewed: 8/21/2020          Codes Class Noted POA    * (Principal) Acute blood loss anemia ICD-10-CM: D62  ICD-9-CM: 285.1  9/15/2020 Unknown        Pulmonary embolism, bilateral ([facility] [address] ICD-10-CM: I26.99  ICD-9-CM: 415.19  7/27/2020 Yes        Asthma ICD-10-CM: J45.909  ICD-9-CM: 493.90  7/27/2020 Yes        Esophageal reflux ICD-10-CM: K21.9  ICD-9-CM: 530.81  Unknown Yes        Anxiety ICD-10-CM: F41.9  ICD-9-CM: 300.00  Unknown Yes        Essential hypertension, benign ICD-10-CM: I10  ICD-9-CM: 401.1  Unknown Yes              Signed By: Vaibhav Weinberg MD September 18, 2020

## 2020-09-18 NOTE — PROGRESS NOTES
Problem: Falls - Risk of  Goal: *Absence of Falls  Description: Document Carmelita Stade Fall Risk and appropriate interventions in the flowsheet. Outcome: Progressing Towards Goal  Note: Fall Risk Interventions:  Mobility Interventions: Communicate number of staff needed for ambulation/transfer         Medication Interventions: Teach patient to arise slowly    Elimination Interventions: Call light in reach    History of Falls Interventions: Evaluate medications/consider consulting pharmacy         Problem: Anemia Care Plan (Adult and Pediatric)  Goal: *Labs within defined limits  Outcome: Progressing Towards Goal  Goal: *Tolerates increased activity  Outcome: Progressing Towards Goal     Problem: Pressure Injury - Risk of  Goal: *Prevention of pressure injury  Description: Document Edson Scale and appropriate interventions in the flowsheet.   Outcome: Progressing Towards Goal  Note: Pressure Injury Interventions:       Moisture Interventions: Apply protective barrier, creams and emollients    Activity Interventions: Increase time out of bed    Mobility Interventions: PT/OT evaluation    Nutrition Interventions: Document food/fluid/supplement intake    Friction and Shear Interventions: Feet elevated on foot rest, Lift team/patient mobility team                Problem: Patient Education: Go to Patient Education Activity  Goal: Patient/Family Education  Outcome: Progressing Towards Goal     Problem: Patient Education: Go to Patient Education Activity  Goal: Patient/Family Education  Outcome: Progressing Towards Goal

## 2020-09-18 NOTE — PROGRESS NOTES
[facility]        Inpatient Hematology / Oncology Progress Note      Admission Date: 9/15/2020  2:31 PM  Reason for Admission/Hospital Course: Acute blood loss anemia [D62]      24 Hour Events:  Sitting in the chair reading a book  Wondering when she can go home  Had another nose bleed yesterday that stopped within 10 minutes     ROS:  +fatigue  +nose bleeds on admission   +bleeding. 10 point review of systems is otherwise negative with the exception of the elements mentioned above in the HPI. Allergies   Allergen Reactions    Flexeril [Cyclobenzaprine] Unknown (comments)    Ketoprofen Unknown (comments)    Plaquenil [Hydroxychloroquine] Unknown (comments)       OBJECTIVE:  Patient Vitals for the past 8 hrs:   BP Temp Pulse Resp SpO2   20 0750 (!) 149/67 97.9 °F (36.6 °C) 73 18 92 %   20 0741     96 %   20 0350 135/60 98.2 °F (36.8 °C) 81 18 96 %     Temp (24hrs), Av.1 °F (36.7 °C), Min:97.5 °F (36.4 °C), Max:98.7 °F (37.1 °C)    No intake/output data recorded. Physical Exam:  Constitutional: Well developed, well nourished female in no acute distress, sitting comfortably in the chair. HEENT: Normocephalic and atraumatic. Oropharynx is clear, mucous membranes are moist.  Pupils are equal, round, and reactive to light. Extraocular muscles are intact. Sclerae anicteric. Neck supple without JVD. No thyromegaly present. Lymph node   Deferred    Skin Warm and dry. No bruising and no rash noted. No erythema. No pallor. Respiratory Lungs are clear to auscultation bilaterally without wheezes, rales or rhonchi, normal air exchange without accessory muscle use. CVS Normal rate, regular rhythm and normal S1 and S2. No murmurs, gallops, or rubs. Abdomen Soft, nontender and nondistended, normoactive bowel sounds. No palpable mass. No hepatosplenomegaly. Neuro Grossly nonfocal with no obvious sensory or motor deficits.    MSK Normal range of motion in general.  No edema and no tenderness. Psych Appropriate mood and affect. Labs:      Recent Labs     09/17/20  0517 09/16/20  1825 09/16/20  0533  09/15/20  1449   WBC 8.8  --  8.8  --  11.9*   RBC 3.37*  --  2.99*  --  3.64*   HGB 8.4* 8.7* 7.0*   < > 8.6*   HCT 27.5* 28.0* 24.2*   < > 29.2*   MCV 81.6  --  80.9  --  80.2   MCH 24.9*  --  23.4*  --  23.6*   MCHC 30.5*  --  28.9*  --  29.5*   RDW 16.6*  --  17.2*  --  16.8*     --  287  --  346   GRANS 53  --   --   --  72   LYMPH 27  --   --   --  14   MONOS 13*  --   --   --  11   EOS 5  --   --   --  3   BASOS 1  --   --   --  0   IG 1  --   --   --  0   DF AUTOMATED  --   --   --  AUTOMATED   ANEU 4.7  --   --   --  8.5*   ABL 2.4  --   --   --  1.7   ABM 1.2  --   --   --  1.3   YOLA 0.5  --   --   --  0.3   ABB 0.1  --   --   --  0.1   AIG 0.0  --   --   --  0.0    < > = values in this interval not displayed.         Recent Labs     09/17/20  0517 09/16/20  0533 09/15/20  1449    142 139   K 3.6 3.7 4.7   * 108* 106   CO2 29 30 29   AGAP 5* 4* 4*   GLU 95 89 120*   BUN 9 19 14   CREA 0.51* 0.49* 0.59*   GFRAA >60 >60 >60   GFRNA >60 >60 >60   CA 8.2* 8.0* 8.3         Imaging:    Medications:  Current Facility-Administered Medications   Medication Dose Route Frequency    oxymetazoline (AFRIN) 0.05 % nasal spray 2 Spray  2 Spray Both Nostrils BID    albuterol (PROVENTIL HFA, VENTOLIN HFA, PROAIR HFA) inhaler 2 Puff  2 Puff Inhalation BID RT    0.9% sodium chloride infusion 250 mL  250 mL IntraVENous PRN    tuberculin injection 5 Units  5 Units IntraDERMal ONCE    sodium chloride (OCEAN) 0.65 % nasal squeeze bottle 2 Spray  2 Spray Both Nostrils QID    sodium chloride (NS) flush 5-40 mL  5-40 mL IntraVENous Q8H    sodium chloride (NS) flush 5-40 mL  5-40 mL IntraVENous PRN    acetaminophen (TYLENOL) tablet 650 mg  650 mg Oral Q6H PRN    Or    acetaminophen (TYLENOL) suppository 650 mg  650 mg Rectal Q6H PRN    polyethylene glycol (MIRALAX) packet 17 g  17 g Oral DAILY PRN    promethazine (PHENERGAN) tablet 12.5 mg  12.5 mg Oral Q6H PRN    Or    ondansetron (ZOFRAN) injection 4 mg  4 mg IntraVENous Q6H PRN    albuterol-ipratropium (DUO-NEB) 2.5 MG-0.5 MG/3 ML  3 mL Nebulization Q4H PRN    clorazepate (TRANXENE) tablet 3.75 mg  3.75 mg Oral TID    latanoprost (XALATAN) 0.005 % ophthalmic solution 1 Drop  1 Drop Both Eyes QPM    montelukast (SINGULAIR) tablet 10 mg  10 mg Oral DAILY    pantoprazole (PROTONIX) tablet 40 mg  40 mg Oral BID    sertraline (ZOLOFT) tablet 50 mg  50 mg Oral DAILY    sucralfate (CARAFATE) tablet 1 g  1 g Oral TID    budesonide-formoterol (SYMBICORT) 80-4.5 mcg inhaler  2 Puff Inhalation BID RT    tiotropium bromide (SPIRIVA RESPIMAT) 2.5 mcg /actuation  2 Puff Inhalation DAILY         ASSESSMENT:    Problem List  Date Reviewed: 8/21/2020          Codes Class Noted    * (Principal) Acute blood loss anemia ICD-10-CM: D62  ICD-9-CM: 285.1  9/15/2020        Acute deep vein thrombosis (DVT) of both lower extremities (HCC) ICD-10-CM: I82.403  ICD-9-CM: 453.40  8/3/2020        S/P IVC filter ICD-10-CM: J10.216  ICD-9-CM: V45.89  8/3/2020        Rotator cuff disorder, right ICD-10-CM: M67.911  ICD-9-CM: 726.10  8/3/2020        Baker's cyst of knee, right ICD-10-CM: M71.21  ICD-9-CM: 727.51  8/3/2020        Moderate tricuspid regurgitation ICD-10-CM: I07.1  ICD-9-CM: 397.0  8/3/2020        Moderate mitral valve regurgitation ICD-10-CM: I34.0  ICD-9-CM: 424.0  8/3/2020        Pulmonary HTN (HCC) ICD-10-CM: I27.20  ICD-9-CM: 416.8  8/3/2020        Constipation ICD-10-CM: K59.00  ICD-9-CM: 564.00  8/2/2020        Hypokalemia ICD-10-CM: E87.6  ICD-9-CM: 276.8  7/29/2020        Acute hypoxemic respiratory failure (HCC) ICD-10-CM: J96.01  ICD-9-CM: 518.81  7/27/2020        Pulmonary embolism, bilateral ([facility] [address] ICD-10-CM: I26.99  ICD-9-CM: 415.19  7/27/2020        Person under investigation for COVID-19 ICD-10-CM: Z20.828  ICD-9-CM: V01.79  7/27/2020        Asthma ICD-10-CM: J45.909  ICD-9-CM: 493.90  7/27/2020        DDD (degenerative disc disease), cervical ICD-10-CM: M50.30  ICD-9-CM: 722.4  12/11/2018        Cerebellar atrophy ([address] ICD-10-CM: G31.9  ICD-9-CM: 331.9  10/29/2018        Cervical stenosis of spinal canal ICD-10-CM: M48.02  ICD-9-CM: 723.0  10/29/2018        Abnormality of gait ICD-10-CM: R26.9  ICD-9-CM: 781.2  7/18/2018        Frequent falls ICD-10-CM: R29.6  ICD-9-CM: V15.88  7/18/2018        Neck pain ICD-10-CM: M54.2  ICD-9-CM: 723.1  7/18/2018        Postural imbalance ICD-10-CM: R29.3  ICD-9-CM: 729.90  7/18/2018        Back pain ICD-10-CM: M54.9  ICD-9-CM: 724.5  7/20/2015        Contact dermatitis and other eczema, due to unspecified cause ICD-10-CM: L25.9  ICD-9-CM: 692.9  Unknown        Esophageal reflux ICD-10-CM: K21.9  ICD-9-CM: 530.81  Unknown        Pure hypercholesterolemia ICD-10-CM: E78.00  ICD-9-CM: 272.0  Unknown        Depressive disorder, not elsewhere classified ICD-10-CM: F32.9  ICD-9-CM: 311  Unknown        Anxiety ICD-10-CM: F41.9  ICD-9-CM: 300.00  Unknown        Essential hypertension, benign ICD-10-CM: I10  ICD-9-CM: 401.1  Unknown            Ms. Ayleen Jenkins was admitted on 9/15/2020 with a diagnosis of epistaxis/anemia on Eliquis. She has a past medical history of GERD, hypertension, anxiety, asthma, recent admission for bilateral pulmonary embolism in the setting of bilateral lower extremity DVT currently on Eliquis. She presented to the ER on 9/15/2020 with a nosebleed, improved with rapid Rhino. She denies any other sources of bleeding. She was found to have a drop in her hemoglobin to 8.6 and was admitted for medical management. Eliquis was placed on hold d/t bleeding and anemia. She does have an IVC filter in place. Her Hemoglobin decreased further on the morning of 9/16 and she will receive 1 unit of blood.   She had a repeat BLE doppler which showed bilateral lower extremity DVT, greater on the left. Patient admits to having frequent nosebleeds every few weeks or so but she has always been able to stop it in reasonable amount of time. She states that she has seen ENT before and did not find source of bleed. She does admit to using Flonase on a daily basis and based on her description of administration, she may be causing irritation. We were consulted for our recommendation given epitaxis while on Eliquis for BL pulmonary embolism and BL lower extremity DVT. PLAN:  Acute Blood Loss with Epistaxis  - Will receive 1 unit PRBC today for Hgb 7.0. Eliquis currently on hold. Rapid Rhino removed this AM.  -Recommend ENT consult. -Recommend avoiding nose sprays if she is to continue Eliquis  9/17 seen by ENT who recommends nasal irrigations and holding on nasal sprays. 9/18 episode noted again yesterday, but stopped within 10 minutes. ENT still following and recommend to continue nasal irritations, Afrin, and ok with her restarting Eliquis if she is free of epistaxis. Hold Eliquis at this time.      Recent BL pulmonary embolism with BLE DVT  -  Hx of IVC filter.    - Repeat BLE doppler pendig  9/17 repeat  Doppler with B DVT left greater than right. Recommend lower dose of Eliquis BID (Dr. aNt Aleman discussed with hospitalist). 9/18 another episode of epistaxis noted yesterday. Dr. Nat Aleman recommends a 1 month break from Eliquis and then she can most likely resume lower dose Eliquis BID as long as she has no more episodes. Dr. Nat Aleman discussed with Dr. Phuc Simmons  -labs last checked in August with iron saturation 6% not on oral replacement  -Repeat iron studies and treat if still low  9/17 iron stores WNL. Goals and plan of care reviewed with the patient. All questions answered to the best of our ability.              Charo Russell, [facility]  [address] [place] [address]  Office : [phone]  Fax : [phone]      I personally saw, exammed and counselled the patient, and discussed with NP, agree with above history/assessment/plan. 75 y. o.female found B/L PE/DVT in 7/2020 and started Eliquis, admitted for severe epistaxis and acute anemia, held Eliquis, reported frequent nose bleeding for a year and had ENT eval without significant finding, also reported improper use of flonase nasal spray and discussed it can cause bleeding, consulted ENT for second opinion and agreed with the improper use of flonase was causing septum bleeding, discussed with Dr. Yung Mccoy to resume half dose anticoagulation but she had another episode of epistaxis without resuming AC, again discussed her high risk of bleeding makes AC unpractical and rec 2-4 weeks of break.       Rachell Verdin M.D.   [facility] [address]  Office : [phone]  Fax : [phone]

## 2020-09-18 NOTE — PROGRESS NOTES
Physical therapy - attempted this am. Pt. Refused saying she just got back in bed. Will try later. In pm, pt. Sound asleep. Attempted to wake and pt. Opened eyes briefly and closed them back. Will try tomorrow.

## 2020-09-19 VITALS
OXYGEN SATURATION: 99 % | DIASTOLIC BLOOD PRESSURE: 80 MMHG | BODY MASS INDEX: 27.47 KG/M2 | TEMPERATURE: 98.1 F | HEIGHT: 60 IN | HEART RATE: 71 BPM | SYSTOLIC BLOOD PRESSURE: 97 MMHG | WEIGHT: 139.9 LBS | RESPIRATION RATE: 18 BRPM

## 2020-09-19 LAB
ANION GAP SERPL CALC-SCNC: 3 MMOL/L (ref 7–16)
BASOPHILS # BLD: 0.1 K/UL (ref 0–0.2)
BASOPHILS NFR BLD: 1 % (ref 0–2)
BUN SERPL-MCNC: 14 MG/DL (ref 8–23)
CALCIUM SERPL-MCNC: 8.4 MG/DL (ref 8.3–10.4)
CHLORIDE SERPL-SCNC: 107 MMOL/L (ref 98–107)
CO2 SERPL-SCNC: 31 MMOL/L (ref 21–32)
CREAT SERPL-MCNC: 0.58 MG/DL (ref 0.6–1)
DIFFERENTIAL METHOD BLD: ABNORMAL
EOSINOPHIL # BLD: 0.5 K/UL (ref 0–0.8)
EOSINOPHIL NFR BLD: 7 % (ref 0.5–7.8)
ERYTHROCYTE [DISTWIDTH] IN BLOOD BY AUTOMATED COUNT: 17.2 % (ref 11.9–14.6)
GLUCOSE SERPL-MCNC: 105 MG/DL (ref 65–100)
HCT VFR BLD AUTO: 27.4 % (ref 35.8–46.3)
HGB BLD-MCNC: 8.4 G/DL (ref 11.7–15.4)
IMM GRANULOCYTES # BLD AUTO: 0 K/UL (ref 0–0.5)
IMM GRANULOCYTES NFR BLD AUTO: 0 % (ref 0–5)
LYMPHOCYTES # BLD: 2.1 K/UL (ref 0.5–4.6)
LYMPHOCYTES NFR BLD: 32 % (ref 13–44)
MCH RBC QN AUTO: 24.7 PG (ref 26.1–32.9)
MCHC RBC AUTO-ENTMCNC: 30.7 G/DL (ref 31.4–35)
MCV RBC AUTO: 80.6 FL (ref 79.6–97.8)
MONOCYTES # BLD: 0.9 K/UL (ref 0.1–1.3)
MONOCYTES NFR BLD: 14 % (ref 4–12)
NEUTS SEG # BLD: 3 K/UL (ref 1.7–8.2)
NEUTS SEG NFR BLD: 46 % (ref 43–78)
NRBC # BLD: 0 K/UL (ref 0–0.2)
PLATELET # BLD AUTO: 287 K/UL (ref 150–450)
PMV BLD AUTO: 9.9 FL (ref 9.4–12.3)
POTASSIUM SERPL-SCNC: 3.6 MMOL/L (ref 3.5–5.1)
RBC # BLD AUTO: 3.4 M/UL (ref 4.05–5.2)
SODIUM SERPL-SCNC: 141 MMOL/L (ref 136–145)
WBC # BLD AUTO: 6.6 K/UL (ref 4.3–11.1)

## 2020-09-19 PROCEDURE — 36415 COLL VENOUS BLD VENIPUNCTURE: CPT

## 2020-09-19 PROCEDURE — 97116 GAIT TRAINING THERAPY: CPT

## 2020-09-19 PROCEDURE — 94640 AIRWAY INHALATION TREATMENT: CPT

## 2020-09-19 PROCEDURE — 74011250637 HC RX REV CODE- 250/637: Performed by: INTERNAL MEDICINE

## 2020-09-19 PROCEDURE — 94760 N-INVAS EAR/PLS OXIMETRY 1: CPT

## 2020-09-19 PROCEDURE — 85025 COMPLETE CBC W/AUTO DIFF WBC: CPT

## 2020-09-19 PROCEDURE — 80048 BASIC METABOLIC PNL TOTAL CA: CPT

## 2020-09-19 PROCEDURE — 99232 SBSQ HOSP IP/OBS MODERATE 35: CPT | Performed by: INTERNAL MEDICINE

## 2020-09-19 PROCEDURE — 97535 SELF CARE MNGMENT TRAINING: CPT

## 2020-09-19 RX ORDER — VITAMIN A 3000 MCG
1 CAPSULE ORAL
Qty: 15 ML | Refills: 0 | Status: SHIPPED | OUTPATIENT
Start: 2020-09-19

## 2020-09-19 RX ADMIN — SUCRALFATE 1 G: 1 TABLET ORAL at 07:38

## 2020-09-19 RX ADMIN — ALBUTEROL SULFATE 2 PUFF: 90 AEROSOL, METERED RESPIRATORY (INHALATION) at 11:13

## 2020-09-19 RX ADMIN — PANTOPRAZOLE SODIUM 40 MG: 40 TABLET, DELAYED RELEASE ORAL at 07:38

## 2020-09-19 RX ADMIN — MONTELUKAST 10 MG: 10 TABLET, FILM COATED ORAL at 07:38

## 2020-09-19 RX ADMIN — SALINE NASAL SPRAY 2 SPRAY: 1.5 SOLUTION NASAL at 08:00

## 2020-09-19 RX ADMIN — BUDESONIDE AND FORMOTEROL FUMARATE DIHYDRATE 2 PUFF: 80; 4.5 AEROSOL RESPIRATORY (INHALATION) at 11:14

## 2020-09-19 RX ADMIN — CLORAZEPATE DIPOTASSIUM 3.75 MG: 7.5 TABLET ORAL at 07:38

## 2020-09-19 RX ADMIN — SALINE NASAL SPRAY 2 SPRAY: 1.5 SOLUTION NASAL at 13:00

## 2020-09-19 RX ADMIN — Medication 10 ML: at 05:33

## 2020-09-19 NOTE — PROGRESS NOTES
Problem: Falls - Risk of  Goal: *Absence of Falls  Description: Document Benny Marinelli Fall Risk and appropriate interventions in the flowsheet. Outcome: Progressing Towards Goal  Note: Fall Risk Interventions:  Mobility Interventions: Communicate number of staff needed for ambulation/transfer         Medication Interventions: Teach patient to arise slowly    Elimination Interventions: Call light in reach    History of Falls Interventions: Investigate reason for fall         Problem: Anemia Care Plan (Adult and Pediatric)  Goal: *Labs within defined limits  Outcome: Progressing Towards Goal  Goal: *Tolerates increased activity  Outcome: Progressing Towards Goal     Problem: Pressure Injury - Risk of  Goal: *Prevention of pressure injury  Description: Document Edson Scale and appropriate interventions in the flowsheet.   Outcome: Progressing Towards Goal  Note: Pressure Injury Interventions:       Moisture Interventions: Apply protective barrier, creams and emollients    Activity Interventions: Increase time out of bed    Mobility Interventions: Pressure redistribution bed/mattress (bed type)    Nutrition Interventions: Document food/fluid/supplement intake    Friction and Shear Interventions: Lift team/patient mobility team                Problem: Patient Education: Go to Patient Education Activity  Goal: Patient/Family Education  Outcome: Progressing Towards Goal     Problem: Patient Education: Go to Patient Education Activity  Goal: Patient/Family Education  Outcome: Progressing Towards Goal

## 2020-09-19 NOTE — DISCHARGE SUMMARY
Date of Admission: 9/15/2020  Date of Discharge: 9/19/2020    Discharge Diagnoses:  1.  Acute blood loss anemia and syncope likely in the setting of profuse epistaxis     Discharge Medications:  Discharge Medication List as of 9/19/2020 12:50 PM      START taking these medications    Details   sodium chloride (Saline Nasal) 0.65 % nasal squeeze bottle 0.05 mL by Both Nostrils route four (4) times daily as needed for Congestion or Nasal Dryness., Normal, Disp-15 mL,R-0         CONTINUE these medications which have NOT CHANGED    Details   tiotropium (Spiriva with HandiHaler) 18 mcg inhalation capsule Take 1 Cap by inhalation daily. , Normal, Disp-90 Cap,R-1      albuterol-ipratropium (DUO-NEB) 2.5 mg-0.5 mg/3 ml nebu 3 mL by Nebulization route every four (4) hours as needed for Wheezing or Shortness of Breath., No Print, Disp-30 Nebule,R-0      latanoprost (XALATAN) 0.005 % ophthalmic solution Administer 1 Drop to both eyes every evening., No Print, Disp-1 Bottle,R-0      polyethylene glycol (MIRALAX) 17 gram packet Take 1 Packet by mouth daily. , Print, Disp-14 Each,R-0      magnesium hydroxide (MILK OF MAGNESIA) 400 mg/5 mL suspension Take 30 mL by mouth daily. , No Print, Disp-118 mL,R-0      pantoprazole (Protonix) 40 mg tablet Take 1 Tab by mouth two (2) times a day., Normal, Disp-180 Tab, R-1      sertraline (ZOLOFT) 100 mg tablet Take 1 1/2 tab daily. , Normal, Disp-135 Tab, R-1**Patient requests 90 days supply**      ProAir HFA 90 mcg/actuation inhaler USE 2 PUFFS PO BID PRF COUGH, Normal, Disp-3 Inhaler, R-1, SWETHA      montelukast (Singulair) 10 mg tablet Take 1 Tab by mouth daily. , Normal, Disp-90 Tab, R-1      Symbicort 80-4.5 mcg/actuation HFAA INHALE 1 PUFF PO BID FOR COUGH, Normal, Disp-3 Inhaler, R-1, SWETHA      clorazepate (TRANXENE) 3.75 mg tablet Take 1 Tab by mouth three (3) times daily. Max Daily Amount: 11.25 mg.  Indications: anxious, Print, Disp-270 Tab, R-1      cycloSPORINE (RESTASIS) 0.05 % dpet Restasis, Historical Med      nystatin-triamcinolone (MYCOLOG II) topical cream Apply  to affected area two (2) times a day., Normal, Disp-60 g, R-3      calcium carbonate (OS-AJNA) 500 mg calcium (1,250 mg) tablet Take  by mouth daily. , Historical Med      ascorbic acid, vitamin C, (VITAMIN C) 500 mg tablet Take  by mouth., Historical Med      melatonin 10 mg cap Take  by mouth., Historical Med      Iron Fum & PS Cmp-Vit C-Niacin (INTEGRA) 125-40-3 mg cap Take 125 mg by mouth daily. , Normal, Disp-90 Cap, R-1      sucralfate (CARAFATE) 1 gram tablet Take 1 Tab by mouth three (3) times daily. , Normal, Disp-270 Tab, R-3      dietary supplement cap Take  by mouth., Historical Med      docusate sodium (STOOL SOFTENER) 100 mg tab Take  by mouth as needed., Historical Med      glucosamine & chondroit sul. Na 750-400 mg cmpk Take  by mouth., Historical Med      omega-3 fatty acids-vitamin e (FISH OIL) 1,000 mg cap Take 1 Cap by mouth. 1 by oral route twice daily, Historical Med      VIT C/ABDIAZIZ AC/LUT/COPPER/ZNOX (PRESERVISION LUTEIN PO) Take  by mouth. 1 by oral route daily, Historical Med      cholecalciferol (VITAMIN D3) 1,000 unit tablet Take  by mouth daily. , Historical Med         STOP taking these medications       apixaban (ELIQUIS) 5 mg tablet Comments:   Reason for Stopping:         fluticasone propionate (FLONASE) 50 mcg/actuation nasal spray Comments:   Reason for Stopping:                Pending Labs:  None    Follow-up (including scheduled tests):   Follow-up Information     Follow up With Specialties Details Why Contact Info    Dillon Arteaga MD Family Medicine   [address]      Don Abreu MD Oncology, Internal Medicine   [address] [facility]  [address] [facility] [address] [phone]        Schedule an appointment as soon as possible for a visit in 1 month             History of Present Illness:  24-year-old female with past medical history of GERD, hypertension, anxiety, asthma, recent admission for bilateral pulmonary embolism in the setting of bilateral lower extremity DVT currently on Eliquis that presented in the setting of a nosebleed. The patient states that she was in her usual state of health until this morning when she started presented with a nosebleed that did not improve over time so she decided to come to the emergency department. Here in the emergency department patient was found to be profusely bleeding from her nose despite clamp and gauze application so rapid Rhino was applied on right nostril without further bleeding. In the emergency department the patient had a syncopal episode. She was placed on oxygen and given IV fluids with recovery of consciousness. The patient denies any shortness of breath, no chest pain, although since she started bleeding she states she has been having some cough. Denies any fever or chills, no nausea vomiting, no abdominal pain, no melena or hematochezia. In the emergency department hemoglobin noted to drop from 9.8-8.6. She will be admitted to the medical floors for further management. Past Medical History:  Past Medical History:   Diagnosis Date    Abnormality of gait 7/18/2018    Anemia     Anxiety     Arthritis     Asthma     Calculus of kidney     Chronic pain     Contact dermatitis and other eczema, due to unspecified cause     Depressive disorder, not elsewhere classified     Encounter for chronic pain management     Esophageal reflux     Essential hypertension, benign     Frequent falls 7/18/2018    GERD (gastroesophageal reflux disease)     Pure hypercholesterolemia        Allergies:   Allergies   Allergen Reactions    Flexeril [Cyclobenzaprine] Unknown (comments)    Ketoprofen Unknown (comments)    Plaquenil [Hydroxychloroquine] Unknown (comments)       Hospital Course:  47-year-old female with past medical history of GERD, hypertension, anxiety, asthma, recent admission for bilateral pulmonary embolism in the setting of bilateral lower extremity DVT currently on Eliquis that presented in the setting of a nosebleed.      1.  Acute blood loss anemia and syncope likely in the setting of profuse epistaxis  - Eliquis held  - Rapid Rhino placed and removed   - Hemoglobin dropped   - Transfused 1 unit RBC 9/16/20  - Had another episode of epistaxis 9/17/20  - Hem consulted  - ENT consulted  - Per hem/onc will not resume anticoagulation for a month and will follow outpatient     2.  Recent bilateral pulmonary embolism in the setting of bilateral lower extremity DVT on Eliquis    - Eliquis held due to acute anemia in the setting of epistaxis   - Has IVC filter  - BL LE US showed BL LE DVT  - Hematology consulted  - Spoke to Dr Gabbi June and Peter Lundy (Hem/Onc)and they emphasize not resuming anticoagulation for a month and to follow with them outpatient.       Procedures:  None    Discharge Day Information:  Follow with PMD    Discharge Physical Exam:  General: Alert, oriented, NAD  HEENT: NC/AT, EOM are intact  Neck: supple, no JVD  Cardiovascular: RRR, S1, S2, no murmurs  Respiratory: Lungs are clear, no wheezes or rales  Abdomen: Soft, NT, ND  Back: No CVA tenderness, no paraspinal tenderness  Extremities: LE without pedal edema, no erythema  Neuro: A&O, CN are intact, no focal deficits  Skin: no rash or ulcers  Psych: good mood and affect    Condition: Improved    Disposition: Home with services    Consultants During This Hospitalization: Hematology, ENT       Spent 35 minutes on discharge services

## 2020-09-19 NOTE — PROGRESS NOTES
Problem: Mobility Impaired (Adult and Pediatric)  Goal: *Acute Goals and Plan of Care (Insert Text)  Outcome: Progressing Towards Goal  Note:    LTG:  (1.)Ms. Ross Woodson will move from supine to sit and sit to supine  in bed with SUPERVISION within 7 treatment day(s). (2.)Ms. Ross Woodson will transfer from bed to chair and chair to bed with STAND BY ASSIST using the least restrictive device within 7 treatment day(s). (3.)Ms. Ross Woodson will ambulate with STAND BY ASSIST for 30 feet with the least restrictive device within 7 treatment day(s). (4)Ms. Ross Woodson will perform HEP with cues and assistance to increase safety on her feet in 7 days. ________________________________________________________________________________________________       PHYSICAL THERAPY: Initial Assessment and PM 9/19/2020  INPATIENT: PT Visit Days : 2  [place] / [place] / Product Type: Medicare /       NAME/AGE/GENDER: Janee Blas is a 66 y.o. female   PRIMARY DIAGNOSIS: Acute blood loss anemia [D62] Acute blood loss anemia Acute blood loss anemia       ICD-10: Treatment Diagnosis:    · Generalized Muscle Weakness (M62.81)  · Other abnormalities of gait and mobility (R26.89)   Precaution/Allergies:  Flexeril [cyclobenzaprine]; Ketoprofen; and Plaquenil [hydroxychloroquine]      ASSESSMENT:     Ms. Ross Woodson presents with general weakness with decreased functional mobility and gait. She lives with her roommate and reports she is independent at home. She only walks short distances with RW and uses her scooter otherwise. Pt more steady this AM. She requires CGA and some verbal cues for ambulation and especially turning with RW but overall demos improved endurance and control with gait. Ambulated back to recliner and pt performed light exercises while reclined. Left with needs in reach.      This section established at most recent assessment   PROBLEM LIST (Impairments causing functional limitations):  1. Decreased Strength  2. Decreased ADL/Functional Activities  3. Decreased Transfer Abilities  4. Decreased Ambulation Ability/Technique  5. Decreased Balance  6. Decreased Activity Tolerance  7. Increased Fatigue  8. Decreased Flexibility/Joint Mobility  9. Decreased [place] with Home Exercise Program   INTERVENTIONS PLANNED: (Benefits and precautions of physical therapy have been discussed with the patient.)  1. Balance Exercise  2. Bed Mobility  3. Family Education  4. Gait Training  5. Home Exercise Program (HEP)  6. Range of Motion (ROM)  7. Therapeutic Activites  8. Therapeutic Exercise/Strengthening  9. Transfer Training     TREATMENT PLAN: Frequency/Duration: daily for duration of hospital stay  Rehabilitation Potential For Stated Goals: [facility] (at time of discharge pending progress):    Placement:  HHPT  Equipment:    None at this time              HISTORY:   History of Present Injury/Illness (Reason for Referral):  66-year-old female with past medical history of GERD, hypertension, anxiety, asthma, recent admission for bilateral pulmonary embolism in the setting of bilateral lower extremity DVT currently on Eliquis that presented in the setting of a nosebleed. The patient states that she was in her usual state of health until this morning when she started presented with a nosebleed that did not improve over time so she decided to come to the emergency department. Here in the emergency department patient was found to be profusely bleeding from her nose despite clamp and gauze application so rapid Rhino was applied on right nostril without further bleeding. In the emergency department the patient had a syncopal episode. She was placed on oxygen and given IV fluids with recovery of consciousness. The patient denies any shortness of breath, no chest pain, although since she started bleeding she states she has been having some cough.   Denies any fever or chills, no nausea vomiting, no abdominal pain, no melena or hematochezia. In the emergency department hemoglobin noted to drop from 9.8-8.6. She will be admitted to the medical floors for further management. Past Medical History/Comorbidities:   Ms. Henna Gould  has a past medical history of Abnormality of gait (7/18/2018), Anemia, Anxiety, Arthritis, Asthma, Calculus of kidney, Chronic pain, Contact dermatitis and other eczema, due to unspecified cause, Depressive disorder, not elsewhere classified, Encounter for chronic pain management, Esophageal reflux, Essential hypertension, benign, Frequent falls (7/18/2018), GERD (gastroesophageal reflux disease), and Pure hypercholesterolemia. Ms. Henna Gould  has a past surgical history that includes hx knee replacement (Right); hx shoulder replacement (Left); hx hysterectomy; hx lithotripsy; hx cataract removal (Bilateral); hx retinal detachment repair; hx colonoscopy; hx gi; neurological procedure unlisted; and ir thrombectomy [facility] art primary non murray or intracranial (7/28/2020). Social History/Living Environment:   Home Environment: Private residence  Wheelchair Ramp: Yes  One/Two Story Residence: One story  Living Alone: No  Support Systems: Other (comments)(roommate)  Patient Expects to be Discharged to[de-identified] Private residence  Current DME Used/Available at Home: Walker, rolling, Wheelchair, power  Prior Level of Function/Work/Activity:  Independent, short distance gait     Number of Personal Factors/Comorbidities that affect the Plan of Care: 3+: HIGH COMPLEXITY   EXAMINATION:   Most Recent Physical Functioning:   Gross Assessment:  AROM: Generally decreased, functional  Strength: Generally decreased, functional               Posture:     Balance:  Sitting: Intact  Standing: Intact;Pull to stand; With support Bed Mobility:  Rolling: Supervision  Supine to Sit: Contact guard assistance  Scooting: Supervision  Wheelchair Mobility:     Transfers:  Sit to Stand: Stand-by assistance  Stand to Sit: Contact guard assistance  Bed to Chair: Contact guard assistance  Gait:     Speed/Jenifer: Slow;Pace decreased (<100 feet/min)  Step Length: Right shortened;Left shortened  Gait Abnormalities: Decreased step clearance  Distance (ft): 84 Feet (ft)  Assistive Device: Walker, rolling  Ambulation - Level of Assistance: Contact guard assistance  Interventions: Safety awareness training;Verbal cues; Visual/Demos  Duration: 10 Minutes      Body Structures Involved:  1. Bones  2. Joints  3. Muscles  4. Ligaments Body Functions Affected:  1. Movement Related Activities and Participation Affected:  1. Mobility   Number of elements that affect the Plan of Care: 3: MODERATE COMPLEXITY   CLINICAL PRESENTATION:   Presentation: Stable and uncomplicated: LOW COMPLEXITY   CLINICAL DECISION MAKING:   Alina Breath AM-PAC 6 Clicks   Basic Mobility Inpatient Short Form  How much difficulty does the patient currently have. .. Unable A Lot A Little None   1. Turning over in bed (including adjusting bedclothes, sheets and blankets)? [] 1   [] 2   [x] 3   [] 4   2. Sitting down on and standing up from a chair with arms ( e.g., wheelchair, bedside commode, etc.)   [] 1   [] 2   [x] 3   [] 4   3. Moving from lying on back to sitting on the side of the bed? [] 1   [] 2   [x] 3   [] 4   How much help from another person does the patient currently need. .. Total A Lot A Little None   4. Moving to and from a bed to a chair (including a wheelchair)? [] 1   [] 2   [x] 3   [] 4   5. Need to walk in hospital room? [] 1   [] 2   [x] 3   [] 4   6. Climbing 3-5 steps with a railing? [] 1   [x] 2   [] 3   [] 4   © 2007, Trustees of [employer], under license to [employer] All rights reserved      Score:  Initial: 17 Most Recent: X (Date: -- )    Interpretation of Tool:  Represents activities that are increasingly more difficult (i.e. Bed mobility, Transfers, Gait).     Medical Necessity: · Patient is expected to demonstrate progress in   · strength, range of motion, and balance  ·  to   · decrease assistance required with exercises and functional mobility  · . Reason for Services/Other Comments:  · Patient   · continues to require present interventions due to patient's inability to perform exercises and functional mobility independently  · . Use of outcome tool(s) and clinical judgement create a POC that gives a: Questionable prediction of patient's progress: MODERATE COMPLEXITY            TREATMENT:   (In addition to Assessment/Re-Assessment sessions the following treatments were rendered)   Pre-treatment Symptoms/Complaints:  A little tired still  Pain: Initial:   Pain Intensity 1: 0  Post Session:  0     Gait Training (10 Minutes):  Gait training to improve and/or restore physical functioning as related to mobility, strength and balance. Ambulated 80 Feet (ft) with Contact guard assistance using a Walker, rolling and moderate Safety awareness training;Verbal cues; Visual/Demos related to their ankle position and motion and hip position and motion to promote proper body alignment and promote proper body posture. Instruction in performance of turning safely within walker to correct push off and heel strike. Therapeutic Exercise: (6 Minutes):  Exercises per grid below to improve mobility and strength. Required minimal verbal cues to promote proper body alignment. Progressed complexity of movement as indicated.      Date:  9/19 Date:   Date:     Activity/Exercise Parameters Parameters Parameters   Ankle pumps 20     SLRs 10 B     Hip abd 20 B                                   Braces/Orthotics/Lines/Etc:   · O2 Device: Room air  Treatment/Session Assessment:    · Response to Treatment:  Improved gait  · Interdisciplinary Collaboration:   o Physical Therapist  o Registered Nurse  · After treatment position/precautions:   o Up in chair  o Bed/Chair-wheels locked  o Bed in low position  o Call light within reach   · Compliance with Program/Exercises: Will assess as treatment progresses  · Recommendations/Intent for next treatment session: \"Next visit will focus on advancements to more challenging activities and reduction in assistance provided\".   Total Treatment Duration:  PT Patient Time In/Time Out  Time In: 1050  Time Out: George [id number], PT

## 2020-09-19 NOTE — PROGRESS NOTES
Problem: Self Care Deficits Care Plan (Adult)  Goal: *Acute Goals and Plan of Care (Insert Text)  Description: 1. Patient will perform grooming with supervision standing at sink. 2. Patient will perform Upper body dressing with supervision  3. Patient will perform lower body dressing with contact guard assistance. 4. Patient will perform upper and lower body bathing stand by assistance. 5. Patient will perform toilet transfers with stand by assistance. 6. Patient will perform shower transfer with stand by assistance. 7. Patient will participate in 30 + minutes of ADL/ therapeutic exercise/therapeutic activity with min rest breaks to increase activity tolerance for self care. 8. Patient will perform ADL functional mobility in room with stand by assistance. Goals to be achieved in 7 days. Outcome: Progressing Towards Goal     OCCUPATIONAL THERAPY: Daily Note and PM 9/19/2020  INPATIENT: OT Visit Days: 2  [place] / Product Type: Medicare /      NAME/AGE/GENDER: Prince Paget is a 66 y.o. female   PRIMARY DIAGNOSIS:  Acute blood loss anemia [D62] Acute blood loss anemia Acute blood loss anemia       ICD-10: Treatment Diagnosis:    · Generalized Muscle Weakness (M62.81)   Precautions/Allergies:     Flexeril [cyclobenzaprine]; Ketoprofen; and Plaquenil [hydroxychloroquine]      ASSESSMENT:     Ms. Ashu Sofia presents with above diagnosis and was seen in room with PT present. Pt noted to have generalized weakness. Pt came from home where she is independent with self care and mobility with a rolling walker and scooter. Pt has a roommate that can assist with minimal activities. Pt should do well to return home but could use OT to maximize safety and independence with self care and functional mobility prior to discharge and perhaps continued OT at home with home health.       9/19/2020 Pt was sitting in chair upon arrival. Pt completed functional mobility with supervision using rolling walker. Pt completed toileting with supervision. Pt completed dressing with the assistance listed below. Pt is progressing towards goals. Continue POC. This section established at most recent assessment   PROBLEM LIST (Impairments causing functional limitations):  1. Decreased Strength  2. Decreased Balance  3. Decreased Activity Tolerance   INTERVENTIONS PLANNED: (Benefits and precautions of occupational therapy have been discussed with the patient.)  1. Activities of daily living training  2. Adaptive equipment training  3. Therapeutic activity  4. Therapeutic exercise     TREATMENT PLAN: Frequency/Duration: Follow patient 3 times per week to address above goals. Rehabilitation Potential For Stated Goals: Good     REHAB RECOMMENDATIONS (at time of discharge pending progress):    Placement: It is my opinion, based on this patient's performance to date, that Ms. Josesito Fritz may benefit from [address] after discharge due to the functional deficits listed above that are likely to improve with skilled rehabilitation because she is home bound but could benefit from continued therapy . Equipment:    None at this time              OCCUPATIONAL PROFILE AND HISTORY:   History of Present Injury/Illness (Reason for Referral):  See H&P  Past Medical History/Comorbidities:   Ms. Josesito Fritz  has a past medical history of Abnormality of gait (7/18/2018), Anemia, Anxiety, Arthritis, Asthma, Calculus of kidney, Chronic pain, Contact dermatitis and other eczema, due to unspecified cause, Depressive disorder, not elsewhere classified, Encounter for chronic pain management, Esophageal reflux, Essential hypertension, benign, Frequent falls (7/18/2018), GERD (gastroesophageal reflux disease), and Pure hypercholesterolemia.   Ms. Josesito Fritz  has a past surgical history that includes hx knee replacement (Right); hx shoulder replacement (Left); hx hysterectomy; hx lithotripsy; hx cataract removal (Bilateral); hx retinal detachment repair; hx colonoscopy; hx gi; neurological procedure unlisted; and ir thrombectomy [facility] art primary non murray or intracranial (7/28/2020). Social History/Living Environment:   Home Environment: Private residence  Wheelchair Ramp: Yes  One/Two Story Residence: One story  Living Alone: No  Support Systems: Other (comments)(roommate)  Patient Expects to be Discharged to[de-identified] Private residence  Current DME Used/Available at Home: Walker, rolling, Wheelchair, power  Prior Level of Function/Work/Activity:  Modified independent per pt.       Number of Personal Factors/Comorbidities that affect the Plan of Care: Brief history (0):  LOW COMPLEXITY   ASSESSMENT OF OCCUPATIONAL PERFORMANCE[de-identified]   Activities of Daily Living:   Basic ADLs (From Assessment) Complex ADLs (From Assessment)   Feeding: Setup  Oral Facial Hygiene/Grooming: Setup  Bathing: Minimum assistance  Upper Body Dressing: Stand-by assistance  Lower Body Dressing: Minimum assistance  Toileting: Minimum assistance     Grooming/Bathing/Dressing Activities of Daily Living   Grooming  Washing Hands: Set-up Cognitive Retraining  Safety/Judgement: Fall prevention           Toileting  Bladder Hygiene: Independent   Upper Body Dressing Assistance  Dressing Assistance: Independent  Bra: Independent  Pullover Shirt: Independent Functional Transfers  Toilet Transfer : Independent   Lower Body Dressing Assistance  Dressing Assistance: Minimum assistance  Underpants: Supervision  Pants With Elastic Waist: Minimum assistance  Socks: Minimum assistance  Slip on Shoes with Back: Minimum assistance Bed/Mat Mobility  Rolling: Supervision  Supine to Sit: Contact guard assistance  Sit to Stand: Stand-by assistance  Stand to Sit: Supervision  Bed to Chair: Contact guard assistance  Scooting: Supervision     Most Recent Physical Functioning:   Gross Assessment:                  Posture:  Posture (WDL): Exceptions to WDL  Posture Assessment: Rounded shoulders, Trunk flexion, Genu valgus right, Genu valgus left  Balance:  Sitting: Intact  Standing: Intact Bed Mobility:  Rolling: Supervision  Supine to Sit: Contact guard assistance  Scooting: Supervision  Wheelchair Mobility:     Transfers:  Sit to Stand: Stand-by assistance  Stand to Sit: Supervision  Bed to Chair: Contact guard assistance            Patient Vitals for the past 6 hrs:   BP BP Patient Position SpO2 Pulse   20 0743 (!) 142/74 At rest;Supine 95 % 69   20 1119   97 %    20 1152 97/80 At rest;Sitting 99 % 71       Mental Status  Neurologic State: Alert  Orientation Level: Oriented X4  Cognition: Appropriate decision making, Appropriate for age attention/concentration  Perception: Verbal, Tactile  Perseveration: Tactile cues provided, Verbal cues provided  Safety/Judgement: Fall prevention                          Physical Skills Involved:  1. Balance  2. Strength  3. Activity Tolerance Cognitive Skills Affected (resulting in the inability to perform in a timely and safe manner): 1. none Psychosocial Skills Affected:  1. Environmental Adaptation   Number of elements that affect the Plan of Care: 3-5:  MODERATE COMPLEXITY   CLINICAL DECISION MAKIN[address] [place] [address] AM-PAC 6 Clicks   Daily Activity Inpatient Short Form  How much help from another person does the patient currently need. .. Total A Lot A Little None   1. Putting on and taking off regular lower body clothing? [] 1   [] 2   [x] 3   [] 4   2. Bathing (including washing, rinsing, drying)? [] 1   [] 2   [x] 3   [] 4   3. Toileting, which includes using toilet, bedpan or urinal?   [] 1   [] 2   [x] 3   [] 4   4. Putting on and taking off regular upper body clothing? [] 1   [] 2   [] 3   [x] 4   5. Taking care of personal grooming such as brushing teeth? [] 1   [] 2   [] 3   [x] 4   6. Eating meals? [] 1   [] 2   [] 3   [x] 4   © , Trustees of [address] [place] [address], under license to [employer].  All rights reserved      Score:  Initial: 21 Most Recent: X (Date: -- )    Interpretation of Tool:  Represents activities that are increasingly more difficult (i.e. Bed mobility, Transfers, Gait). Use of outcome tool(s) and clinical judgement create a POC that gives a: LOW COMPLEXITY         TREATMENT:   (In addition to Assessment/Re-Assessment sessions the following treatments were rendered)     Pre-treatment Symptoms/Complaints:    Pain: Initial:   Pain Intensity 1: 0 0 Post Session:  0     Self Care: (30): Procedure(s) (per grid) utilized to improve and/or restore self-care/home management as related to dressing and toileting. Required min verbal and manual cueing to facilitate activities of daily living skills. Braces/Orthotics/Lines/Etc:   · O2 Device: Room air  Treatment/Session Assessment:    · Response to Treatment:  pt up to recliner tolerated well  · Interdisciplinary Collaboration:   o Physical Therapist  o Certified Occupational Therapy Assistant  o Registered Nurse  · After treatment position/precautions:   o Up in chair  o Bed/Chair-wheels locked  o Call light within reach  o RN notified   · Compliance with Program/Exercises: Compliant all of the time. · Recommendations/Intent for next treatment session: \"Next visit will focus on advancements to more challenging activities and reduction in assistance provided\".   Total Treatment Duration:  OT Patient Time In/Time Out  Time In: 1240  Time Out: 88 Renea Huitron

## 2020-09-19 NOTE — PROGRESS NOTES
[facility]        Inpatient Hematology / Oncology Progress Note      Admission Date: 9/15/2020  2:31 PM  Reason for Admission/Hospital Course: Acute blood loss anemia [D62]      24 Hour Events:  No documented bleeding episodes  Sitting in the bedside chair wondering when she can go home     ROS:  +fatigue  +nose bleeds on admission   +bleeding. 10 point review of systems is otherwise negative with the exception of the elements mentioned above in the HPI. Allergies   Allergen Reactions    Flexeril [Cyclobenzaprine] Unknown (comments)    Ketoprofen Unknown (comments)    Plaquenil [Hydroxychloroquine] Unknown (comments)       OBJECTIVE:  Patient Vitals for the past 8 hrs:   BP Temp Pulse Resp SpO2   20 0743 (!) 142/74 97.5 °F (36.4 °C) 69 20 95 %   20 0326 121/65 98.4 °F (36.9 °C) 89 18 94 %     Temp (24hrs), Av °F (36.7 °C), Min:97.4 °F (36.3 °C), Max:98.5 °F (36.9 °C)     07 -  1900  In: -   Out: 600 [Urine:600]    Physical Exam:  Constitutional: Well developed, well nourished female in no acute distress, sitting comfortably in the chair. HEENT: Normocephalic and atraumatic. Oropharynx is clear, mucous membranes are moist.  Pupils are equal, round, and reactive to light. Extraocular muscles are intact. Sclerae anicteric. Neck supple without JVD. No thyromegaly present. Lymph node   Deferred    Skin Warm and dry. No bruising and no rash noted. No erythema. No pallor. Respiratory Lungs are clear to auscultation bilaterally without wheezes, rales or rhonchi, normal air exchange without accessory muscle use. CVS Normal rate, regular rhythm and normal S1 and S2. No murmurs, gallops, or rubs. Abdomen Soft, nontender and nondistended, normoactive bowel sounds. No palpable mass. No hepatosplenomegaly. Neuro Grossly nonfocal with no obvious sensory or motor deficits. MSK Normal range of motion in general.  No edema and no tenderness. Psych Appropriate mood and affect.         Labs:      Recent Labs     09/19/20 0425 09/17/20  0517 09/16/20  1825   WBC 6.6 8.8  --    RBC 3.40* 3.37*  --    HGB 8.4* 8.4* 8.7*   HCT 27.4* 27.5* 28.0*   MCV 80.6 81.6  --    MCH 24.7* 24.9*  --    MCHC 30.7* 30.5*  --    RDW 17.2* 16.6*  --     283  --    GRANS 46 53  --    LYMPH 32 27  --    MONOS 14* 13*  --    EOS 7 5  --    BASOS 1 1  --    IG 0 1  --    DF AUTOMATED AUTOMATED  --    ANEU 3.0 4.7  --    ABL 2.1 2.4  --    ABM 0.9 1.2  --    YOLA 0.5 0.5  --    ABB 0.1 0.1  --    AIG 0.0 0.0  --         Recent Labs     09/19/20 0425 09/17/20  0517    144   K 3.6 3.6    110*   CO2 31 29   AGAP 3* 5*   * 95   BUN 14 9   CREA 0.58* 0.51*   GFRAA >60 >60   GFRNA >60 >60   CA 8.4 8.2*         Imaging:    Medications:  Current Facility-Administered Medications   Medication Dose Route Frequency    albuterol (PROVENTIL HFA, VENTOLIN HFA, PROAIR HFA) inhaler 2 Puff  2 Puff Inhalation BID RT    0.9% sodium chloride infusion 250 mL  250 mL IntraVENous PRN    sodium chloride (OCEAN) 0.65 % nasal squeeze bottle 2 Spray  2 Spray Both Nostrils QID    sodium chloride (NS) flush 5-40 mL  5-40 mL IntraVENous Q8H    sodium chloride (NS) flush 5-40 mL  5-40 mL IntraVENous PRN    acetaminophen (TYLENOL) tablet 650 mg  650 mg Oral Q6H PRN    Or    acetaminophen (TYLENOL) suppository 650 mg  650 mg Rectal Q6H PRN    polyethylene glycol (MIRALAX) packet 17 g  17 g Oral DAILY PRN    promethazine (PHENERGAN) tablet 12.5 mg  12.5 mg Oral Q6H PRN    Or    ondansetron (ZOFRAN) injection 4 mg  4 mg IntraVENous Q6H PRN    albuterol-ipratropium (DUO-NEB) 2.5 MG-0.5 MG/3 ML  3 mL Nebulization Q4H PRN    clorazepate (TRANXENE) tablet 3.75 mg  3.75 mg Oral TID    latanoprost (XALATAN) 0.005 % ophthalmic solution 1 Drop  1 Drop Both Eyes QPM    montelukast (SINGULAIR) tablet 10 mg  10 mg Oral DAILY    pantoprazole (PROTONIX) tablet 40 mg  40 mg Oral BID    sertraline (ZOLOFT) tablet 50 mg  50 mg Oral DAILY    sucralfate (CARAFATE) tablet 1 g  1 g Oral TID    budesonide-formoterol (SYMBICORT) 80-4.5 mcg inhaler  2 Puff Inhalation BID RT    tiotropium bromide (SPIRIVA RESPIMAT) 2.5 mcg /actuation  2 Puff Inhalation DAILY         ASSESSMENT:    Problem List  Date Reviewed: 8/21/2020          Codes Class Noted    * (Principal) Acute blood loss anemia ICD-10-CM: D62  ICD-9-CM: 285.1  9/15/2020        Acute deep vein thrombosis (DVT) of both lower extremities (HCC) ICD-10-CM: I82.403  ICD-9-CM: 453.40  8/3/2020        S/P IVC filter ICD-10-CM: D32.528  ICD-9-CM: V45.89  8/3/2020        Rotator cuff disorder, right ICD-10-CM: M67.911  ICD-9-CM: 726.10  8/3/2020        Baker's cyst of knee, right ICD-10-CM: M71.21  ICD-9-CM: 727.51  8/3/2020        Moderate tricuspid regurgitation ICD-10-CM: I07.1  ICD-9-CM: 397.0  8/3/2020        Moderate mitral valve regurgitation ICD-10-CM: I34.0  ICD-9-CM: 424.0  8/3/2020        Pulmonary HTN (HCC) ICD-10-CM: I27.20  ICD-9-CM: 416.8  8/3/2020        Constipation ICD-10-CM: K59.00  ICD-9-CM: 564.00  8/2/2020        Hypokalemia ICD-10-CM: E87.6  ICD-9-CM: 276.8  7/29/2020        Acute hypoxemic respiratory failure ([facility] [address] [address] ICD-10-CM: J96.01  ICD-9-CM: 518.81  7/27/2020        Pulmonary embolism, bilateral ([facility] [address] ICD-10-CM: I26.99  ICD-9-CM: 415.19  7/27/2020        Person under investigation for COVID-19 ICD-10-CM: Z20.828  ICD-9-CM: V01.79  7/27/2020        Asthma ICD-10-CM: J45.909  ICD-9-CM: 493.90  7/27/2020        DDD (degenerative disc disease), cervical ICD-10-CM: M50.30  ICD-9-CM: 722.4  12/11/2018        Cerebellar atrophy ([facility] [address] ICD-10-CM: G31.9  ICD-9-CM: 331.9  10/29/2018        Cervical stenosis of spinal canal ICD-10-CM: M48.02  ICD-9-CM: 723.0  10/29/2018        Abnormality of gait ICD-10-CM: R26.9  ICD-9-CM: 781.2  7/18/2018        Frequent falls ICD-10-CM: R29.6  ICD-9-CM: V15.88  7/18/2018        Neck pain ICD-10-CM: M54.2  ICD-9-CM: 723.1  7/18/2018        Postural imbalance ICD-10-CM: R29.3  ICD-9-CM: 729.90  7/18/2018        Back pain ICD-10-CM: M54.9  ICD-9-CM: 724.5  7/20/2015        Contact dermatitis and other eczema, due to unspecified cause ICD-10-CM: L25.9  ICD-9-CM: 692.9  Unknown        Esophageal reflux ICD-10-CM: K21.9  ICD-9-CM: 530.81  Unknown        Pure hypercholesterolemia ICD-10-CM: E78.00  ICD-9-CM: 272.0  Unknown        Depressive disorder, not elsewhere classified ICD-10-CM: F32.9  ICD-9-CM: 311  Unknown        Anxiety ICD-10-CM: F41.9  ICD-9-CM: 300.00  Unknown        Essential hypertension, benign ICD-10-CM: I10  ICD-9-CM: 401.1  Unknown            Ms. Josi Latif was admitted on 9/15/2020 with a diagnosis of epistaxis/anemia on Eliquis. She has a past medical history of GERD, hypertension, anxiety, asthma, recent admission for bilateral pulmonary embolism in the setting of bilateral lower extremity DVT currently on Eliquis. She presented to the ER on 9/15/2020 with a nosebleed, improved with rapid Rhino. She denies any other sources of bleeding. She was found to have a drop in her hemoglobin to 8.6 and was admitted for medical management. Eliquis was placed on hold d/t bleeding and anemia. She does have an IVC filter in place. Her Hemoglobin decreased further on the morning of 9/16 and she will receive 1 unit of blood. She had a repeat BLE doppler which showed bilateral lower extremity DVT, greater on the left. Patient admits to having frequent nosebleeds every few weeks or so but she has always been able to stop it in reasonable amount of time. She states that she has seen ENT before and did not find source of bleed. She does admit to using Flonase on a daily basis and based on her description of administration, she may be causing irritation. We were consulted for our recommendation given epitaxis while on Eliquis for BL pulmonary embolism and BL lower extremity DVT.     PLAN:  Acute Blood Loss with Epistaxis  - Will receive 1 unit PRBC today for Hgb 7.0. Eliquis currently on hold. Rapid Rhino removed this AM.  -Recommend ENT consult. -Recommend avoiding nose sprays if she is to continue Eliquis  9/17 seen by ENT who recommends nasal irrigations and holding on nasal sprays. 9/18 episode noted again yesterday, but stopped within 10 minutes. ENT still following and recommend to continue nasal irritations, Afrin, and ok with her restarting Eliquis if she is free of epistaxis. Hold Eliquis at this time.      Recent BL pulmonary embolism with BLE DVT  -  Hx of IVC filter.    - Repeat BLE doppler pendig  9/17 repeat  Doppler with B DVT left greater than right. Recommend lower dose of Eliquis BID (Dr. Chacha Snell discussed with hospitalist). 9/18 another episode of epistaxis noted yesterday. Dr. Chacha Snell recommends a 1 month break from Eliquis and then she can most likely resume lower dose Eliquis BID as long as she has no more episodes. Dr. Chacha Snell discussed with Dr. Chanel Ledsema. 9/19 recommend to continue to hold eliquis for 1 month before considering to resume.      PMH PAKO  -labs last checked in August with iron saturation 6% not on oral replacement  -Repeat iron studies and treat if still low  9/17 iron stores WNL. Disposition: ok to discharge from a hem/onc perspective. She will need follow up with Dr. Chacha Snell with in 1 month of  Discharge to discuss resuming her Eliquis. Goals and plan of care reviewed with the patient. All questions answered to the best of our ability. Charo Barroso, [facility]  [address]  Office : [phone]  Fax : [phone]     I personally saw, exammed and counselled the patient, and discussed with NP, agree with above history/assessment/plan. 75 y. o.female found B/L PE/DVT in 7/2020 and started Eliquis, admitted for severe epistaxis and acute anemia, held Eliquis, reported frequent nose bleeding for a year and had ENT eval without significant finding, also reported improper use of flonase nasal spray and discussed it can cause bleeding, consulted ENT for second opinion and agreed with the improper use of flonase was causing septum bleeding, discussed with Dr. Tristan Recinos to resume half dose anticoagulation but she had another episode of epistaxis without resuming AC, again discussed her high risk of bleeding makes AC unpractical and agreed to have 2-4 weeks of break, follow in office.  Patricia Lagunas M.D.   [address]  Office : [phone]

## 2020-09-19 NOTE — DISCHARGE INSTRUCTIONS
DISCHARGE SUMMARY from Nurse    PATIENT INSTRUCTIONS:  Please call physician after your discharge if the following symptoms present:    1. Temperature greater than 100.5  2. Heart rate greater than 90 beats per minute  3. Increased respirations   4. Chills  5. Cough with any sputum (color: yellow, white, green, or bloody?)  6. Shortness of breath or change in breathing pattern  7. Bleeding:  Any prolonged bleeding presented from skin tears, cuts, bleeding from brushing teeth, nose bleed, bleeding noted in stool  8. Tenderness, swelling, or drainage from IV site or central line catheter    Diet:Regular      Thrombocytopenic Precautions      What to do at Home:  Recommended activity: Activity as tolerated      *  Please give a list of your current medications to your Primary Care Provider. *  Please update this list whenever your medications are discontinued, doses are      changed, or new medications (including over-the-counter products) are added. *  Please carry medication information at all times in case of emergency situations. These are general instructions for a healthy lifestyle:    No smoking/ No tobacco products/ Avoid exposure to second hand smoke  Surgeon General's Warning:  Quitting smoking now greatly reduces serious risk to your health. Obesity, smoking, and sedentary lifestyle greatly increases your risk for illness    A healthy diet, regular physical exercise & weight monitoring are important for maintaining a healthy lifestyle    You may be retaining fluid if you have a history of heart failure or if you experience any of the following symptoms:  Weight gain of 3 pounds or more overnight or 5 pounds in a week, increased swelling in our hands or feet or shortness of breath while lying flat in bed. Please call your doctor as soon as you notice any of these symptoms; do not wait until your next office visit. The discharge information has been reviewed with the patient.   The patient verbalized understanding. Discharge medications reviewed with the patient and appropriate educational materials and side effects teaching were provided.   ___________________________________________________________________________________________________________________________________

## 2020-09-19 NOTE — PROGRESS NOTES
0275-Bedside report received from Edwina Squires RN. Resting in bed. No needs voiced. No s/s of acute distress. 1255-Discharge medications reviewed with patient and appropriate educational materials and side effects teaching were provided. I have reviewed discharge instructions with the patient. The patient verbalized understanding. Pt's IV removed with tip intact.

## 2020-09-20 NOTE — PROGRESS NOTES
Discharge summaries and resumption orders faxed to [facility].      Pancho Christensen, [address]    [address] [facility]    * [email or web address]    ( [phone]

## 2020-09-21 ENCOUNTER — PATIENT OUTREACH (OUTPATIENT)
Dept: CASE MANAGEMENT | Age: 75
End: 2020-09-21

## 2020-09-21 NOTE — PROGRESS NOTES
Patient was admitted to [facility] on 9/15 and discharged on  for acute blood loss. Patient was contacted within 1 business days of discharge. Top Discharge Challenges to be reviewed by the provider   Additional needs identified to be addressed with provider no  none  Discussed COVID-19 related testing which was not done at this time. Test results were not done. Patient informed of results, if available? na   Method of communication with provider : none       Advance Care Planning:   Does patient have an Advance Directive:  health care decision makers updated    Inpatient Readmission Risk score: 31%  Was this a readmission? yes   Patient stated reason for the admission: nose bleed  Patients top risk factors for readmission: medical condition and medication management  Interventions to address risk factors: na    Care Transition Nurse (CTN) contacted the patient by telephone to perform post hospital discharge assessment. Verified name and  with patient as identifiers. Provided introduction to self, and explanation of the CTN role. CTN reviewed discharge instructions, medical action plan and red flags with patient who verbalized understanding. Patient given an opportunity to ask questions and does not have any further questions or concerns at this time. The patient agrees to contact the PCP office for questions related to their healthcare. Medication reconciliation was performed with patient, who verbalizes understanding of administration of home medications. Advised obtaining a 90-day supply of all daily and as-needed medications.    Referral to Pharm D needed: no     Home Health/Outpatient orders at discharge: home health care  [address]: INterim  Date of initial visit:     [facility] ordered at discharge: [address]  received: na    Covid Risk Education    Patient has following risk factors of: asthma and acute respiratory failure. Education provided regarding infection prevention, and signs and symptoms of COVID-19 and when to seek medical attention with patient who verbalized understanding. Discussed exposure protocols and quarantine From CDC: Are you at higher risk for severe illness?  and given an opportunity for questions and concerns. The patient agrees to contact the COVID-19 hotline [phone] or PCP office for questions related to COVID-19. For more information on steps you can take to protect yourself, see CDC's How to Protect Yourself     Patient/family/caregiver given information for [employer] and agrees to enroll no  Patient's preferred e-mail: declines  Patient's preferred phone number: declines    Discussed follow-up appointments. If no appointment was previously scheduled, appointment scheduling offered: yes  [facility] follow up appointment(s):   Future Appointments   Date Time Provider Ken Zamora   10/21/2020 12:40 PM Gina [address] [facility]   10/21/2020  1:15 PM William Thakkar MD [facility] follow up appointment(s): na  Plan for follow-up call in 10-14 days based on severity of symptoms and risk factors. CTN provided contact information for future needs. Goals Addressed                 This Visit's Progress     Patient/Family verbalizes understanding of self-management of chronic disease. On track     Assess barriers to safe and effective d/c AEB    Patient able to obtain medicine after d/c    Patient able to verbalize medicine changes    Patient is aware and attends follow up appointments s/p d/c. Patient known to CTN. No questions/concerns at this time. Patient aware of all changes and appts. Feeling well.  Currently no bleeding and apixaban is on hold for 1 month

## 2020-09-29 NOTE — PROGRESS NOTES
Physician Progress Note      PATIENT:               Bijan Vann  CSN #:                  [id number]  :                       1945  ADMIT DATE:       9/15/2020 2:31 PM  [address] DATE:        2020 2:41 PM  RESPONDING  PROVIDER #:        Daja Carmona MD          QUERY TEXT:    Pt admitted with  anemia due to epistaxis and has pulmonary embolism documented. If possible, please document in progress notes and discharge summary if you are evaluating and/or treating any of the following: The medical record reflects the following:  Risk Factors 66 yr old with recent PE and DVT, here with anemia d/t epistaxis  Clinical Indicators: pt on Eliquis. DVT still present. No Chest Ct repeated. No chest pain. No shortness of breath. Pt has an IVC filter. Treatment: Eliquis stopped. Pt to follow up with Heme/ Onc within a month. Thank you,  Bridger Polanco RN CDs  [phone]  Options provided:  -- Acute pulmonary embolism  -- Recurrent acute pulmonary embolism  -- Chronic pulmonary embolism  -- Other - I will add my own diagnosis  -- Disagree - Not applicable / Not valid  -- Disagree - Clinically unable to determine / Unknown  -- Refer to Clinical Documentation Reviewer    PROVIDER RESPONSE TEXT:    This patient has a chronic pulmonary embolism.     Query created by: Daisy Nelson on 2020 3:43 PM      Electronically signed by:  Daja Carmona MD 2020 7:03 AM

## 2020-10-07 ENCOUNTER — PATIENT OUTREACH (OUTPATIENT)
Dept: CASE MANAGEMENT | Age: 75
End: 2020-10-07

## 2020-10-07 NOTE — PROGRESS NOTES
Transition of Care Hospital Discharge Follow-Up      Date/Time:  10/7/2020 1:30 PM    Care Transition Nurse (CTN)/ Ambulatory Care Manager [facility]) contacted the patient by telephone to follow up post hospital discharge assessment. Verified name and  with patient as identifiers. CTN/ ACM reinforced discharge instructions and red flags with patient who verbalized understanding. Patient given an opportunity to ask questions and does not have any further questions or concerns at this time. The patient agrees to contact the PCP office for questions related to their healthcare. Disease Specific:   N/A    Patients top risk factors for readmission:  medical condition, medication management    Home Health Active: none  [address]: na    Medication(s):   Medication changes since discharge: Integra Plus added    Current Outpatient Medications   Medication Sig    montelukast (Singulair) 10 mg tablet Take 1 Tab by mouth daily.  sertraline (ZOLOFT) 100 mg tablet Take 1 1/2 tab daily.  Iron Fum & P-FA-Vit B & C No.9 (Integra Plus) 125 mg iron- 1 mg cap Take 1 Cap by mouth daily.  sodium chloride (Saline Nasal) 0.65 % nasal squeeze bottle 0.05 mL by Both Nostrils route four (4) times daily as needed for Congestion or Nasal Dryness.  tiotropium (Spiriva with HandiHaler) 18 mcg inhalation capsule Take 1 Cap by inhalation daily.  albuterol-ipratropium (DUO-NEB) 2.5 mg-0.5 mg/3 ml nebu 3 mL by Nebulization route every four (4) hours as needed for Wheezing or Shortness of Breath.  latanoprost (XALATAN) 0.005 % ophthalmic solution Administer 1 Drop to both eyes every evening.  polyethylene glycol (MIRALAX) 17 gram packet Take 1 Packet by mouth daily.  magnesium hydroxide (MILK OF MAGNESIA) 400 mg/5 mL suspension Take 30 mL by mouth daily.  pantoprazole (Protonix) 40 mg tablet Take 1 Tab by mouth two (2) times a day.     ProAir HFA 90 mcg/actuation inhaler USE 2 PUFFS PO BID PRF COUGH    Symbicort 80-4.5 mcg/actuation HFAA INHALE 1 PUFF PO BID FOR COUGH    clorazepate (TRANXENE) 3.75 mg tablet Take 1 Tab by mouth three (3) times daily. Max Daily Amount: 11.25 mg. Indications: anxious    cycloSPORINE (RESTASIS) 0.05 % dpet Restasis    nystatin-triamcinolone (MYCOLOG II) topical cream Apply  to affected area two (2) times a day.  calcium carbonate (OS-JANA) 500 mg calcium (1,250 mg) tablet Take  by mouth daily.  ascorbic acid, vitamin C, (VITAMIN C) 500 mg tablet Take  by mouth.  melatonin 10 mg cap Take  by mouth.  Iron Fum & PS Cmp-Vit C-Niacin (INTEGRA) 125-40-3 mg cap Take 125 mg by mouth daily.  sucralfate (CARAFATE) 1 gram tablet Take 1 Tab by mouth three (3) times daily.  dietary supplement cap Take  by mouth.  docusate sodium (STOOL SOFTENER) 100 mg tab Take  by mouth as needed.  glucosamine & chondroit sul. Na 750-400 mg cmpk Take  by mouth.  omega-3 fatty acids-vitamin e (FISH OIL) 1,000 mg cap Take 1 Cap by mouth. 1 by oral route twice daily    VIT C/ABDIAZIZ AC/LUT/COPPER/ZNOX (PRESERVISION LUTEIN PO) Take  by mouth. 1 by oral route daily    cholecalciferol (VITAMIN D3) 1,000 unit tablet Take  by mouth daily. No current facility-administered medications for this visit. [facility] follow up appointment(s):   Future Appointments   Date Time Provider Ken Zamora   10/14/2020  2:50 PM [address] [place] [address] OUTREACH INSURANCE [address]   10/14/2020  3:15 PM Jose Maria Chu MD [facility]   10/27/2020  2:30 PM Cholo Sanford MD [facility]      Non-[facility] follow up appointment(s): na  Patient attended follow up appointments since last contact:   What was the outcome of the appointment: Peggy Rios plus added  Other Issues/ Miscellaneous?  (Transportation, access to meals, ability to perform ADLs, adequate caregiver support, etc.)  Referrals needed? (SW, Diabetes education, HH, etc.)      Goals      Patient/Family verbalizes understanding of self-management of chronic disease. Assess barriers to safe and effective d/c AEB    Patient able to obtain medicine after d/c    Patient able to verbalize medicine changes    Patient is aware and attends follow up appointments s/p d/c.               Next Outreach Scheduled: 2 weeks, final follow up if no needs arise

## 2020-10-14 ENCOUNTER — HOSPITAL ENCOUNTER (OUTPATIENT)
Dept: LAB | Age: 75
Discharge: HOME | End: 2020-10-14
Payer: MEDICARE

## 2020-10-14 DIAGNOSIS — I26.99 PULMONARY EMBOLISM, BILATERAL (HCC): ICD-10-CM

## 2020-10-14 LAB
ALBUMIN SERPL-MCNC: 3.4 G/DL (ref 3.2–4.6)
ALBUMIN/GLOB SERPL: 0.8 {RATIO} (ref 1.2–3.5)
ALP SERPL-CCNC: 97 U/L (ref 50–136)
ALT SERPL-CCNC: 19 U/L (ref 12–65)
ANION GAP SERPL CALC-SCNC: 5 MMOL/L (ref 7–16)
AST SERPL-CCNC: 17 U/L (ref 15–37)
BASOPHILS # BLD: 0.1 K/UL (ref 0–0.2)
BASOPHILS NFR BLD: 1 % (ref 0–2)
BILIRUB SERPL-MCNC: 0.2 MG/DL (ref 0.2–1.1)
BUN SERPL-MCNC: 18 MG/DL (ref 8–23)
CALCIUM SERPL-MCNC: 9.2 MG/DL (ref 8.3–10.4)
CHLORIDE SERPL-SCNC: 106 MMOL/L (ref 98–107)
CO2 SERPL-SCNC: 29 MMOL/L (ref 21–32)
CREAT SERPL-MCNC: 0.7 MG/DL (ref 0.6–1)
DIFFERENTIAL METHOD BLD: ABNORMAL
EOSINOPHIL # BLD: 0.3 K/UL (ref 0–0.8)
EOSINOPHIL NFR BLD: 4 % (ref 0.5–7.8)
ERYTHROCYTE [DISTWIDTH] IN BLOOD BY AUTOMATED COUNT: 19.9 % (ref 11.9–14.6)
GLOBULIN SER CALC-MCNC: 4.1 G/DL (ref 2.3–3.5)
GLUCOSE SERPL-MCNC: 96 MG/DL (ref 65–100)
HCT VFR BLD AUTO: 36.6 % (ref 35.8–46.3)
HGB BLD-MCNC: 10.6 G/DL (ref 11.7–15.4)
IMM GRANULOCYTES # BLD AUTO: 0 K/UL (ref 0–0.5)
IMM GRANULOCYTES NFR BLD AUTO: 0 % (ref 0–5)
LYMPHOCYTES # BLD: 2 K/UL (ref 0.5–4.6)
LYMPHOCYTES NFR BLD: 27 % (ref 13–44)
MCH RBC QN AUTO: 23.8 PG (ref 26.1–32.9)
MCHC RBC AUTO-ENTMCNC: 29 G/DL (ref 31.4–35)
MCV RBC AUTO: 82.2 FL (ref 79.6–97.8)
MONOCYTES # BLD: 0.8 K/UL (ref 0.1–1.3)
MONOCYTES NFR BLD: 11 % (ref 4–12)
NEUTS SEG # BLD: 4.4 K/UL (ref 1.7–8.2)
NEUTS SEG NFR BLD: 58 % (ref 43–78)
NRBC # BLD: 0 K/UL (ref 0–0.2)
PLATELET # BLD AUTO: 364 K/UL (ref 150–450)
PMV BLD AUTO: 9.3 FL (ref 9.4–12.3)
POTASSIUM SERPL-SCNC: 4.7 MMOL/L (ref 3.5–5.1)
PROT SERPL-MCNC: 7.5 G/DL (ref 6.3–8.2)
RBC # BLD AUTO: 4.45 M/UL (ref 4.05–5.25)
SODIUM SERPL-SCNC: 140 MMOL/L (ref 136–145)
WBC # BLD AUTO: 7.6 K/UL (ref 4.3–11.1)

## 2020-10-14 PROCEDURE — 80053 COMPREHEN METABOLIC PANEL: CPT

## 2020-10-14 PROCEDURE — 85025 COMPLETE CBC W/AUTO DIFF WBC: CPT

## 2020-10-14 PROCEDURE — 36415 COLL VENOUS BLD VENIPUNCTURE: CPT

## 2020-10-21 ENCOUNTER — PATIENT OUTREACH (OUTPATIENT)
Dept: CASE MANAGEMENT | Age: 75
End: 2020-10-21

## 2020-10-21 NOTE — PROGRESS NOTES
Transition of Care Hospital Discharge Follow-Up      Date/Time:  10/21/2020 11:44 AM    Care Transition Nurse (CTN)/ Ambulatory Care Manager [facility]) contacted the patient by telephone to follow up post hospital discharge assessment. Verified name and  with patient as identifiers. CTN/ ACM reinforced discharge instructions and red flags with patient who verbalized understanding. Patient given an opportunity to ask questions and does not have any further questions or concerns at this time. The patient agrees to contact the PCP office for questions related to their healthcare. Disease Specific:   N/A    Patients top risk factors for readmission:  medical condition, medication management    Home Health Active: none  [address]: na    Medication(s):   Medication changes since discharge: Apixaban     Current Outpatient Medications   Medication Sig    lisinopriL (PRINIVIL, ZESTRIL) 5 mg tablet TAKE ONE TABLET BY MOUTH EVERY DAY    nystatin-triamcinolone (MYCOLOG II) topical cream Apply  to affected area two (2) times a day.  apixaban (ELIQUIS) 2.5 mg tablet Take 1 Tab by mouth two (2) times a day.  montelukast (Singulair) 10 mg tablet Take 1 Tab by mouth daily.  sertraline (ZOLOFT) 100 mg tablet Take 1 1/2 tab daily.  Iron Fum & P-FA-Vit B & C No.9 (Integra Plus) 125 mg iron- 1 mg cap Take 1 Cap by mouth daily.  sodium chloride (Saline Nasal) 0.65 % nasal squeeze bottle 0.05 mL by Both Nostrils route four (4) times daily as needed for Congestion or Nasal Dryness.  tiotropium (Spiriva with HandiHaler) 18 mcg inhalation capsule Take 1 Cap by inhalation daily.  albuterol-ipratropium (DUO-NEB) 2.5 mg-0.5 mg/3 ml nebu 3 mL by Nebulization route every four (4) hours as needed for Wheezing or Shortness of Breath.  latanoprost (XALATAN) 0.005 % ophthalmic solution Administer 1 Drop to both eyes every evening.  polyethylene glycol (MIRALAX) 17 gram packet Take 1 Packet by mouth daily.  magnesium hydroxide (MILK OF MAGNESIA) 400 mg/5 mL suspension Take 30 mL by mouth daily.  pantoprazole (Protonix) 40 mg tablet Take 1 Tab by mouth two (2) times a day.  ProAir HFA 90 mcg/actuation inhaler USE 2 PUFFS PO BID PRF COUGH    Symbicort 80-4.5 mcg/actuation HFAA INHALE 1 PUFF PO BID FOR COUGH    clorazepate (TRANXENE) 3.75 mg tablet Take 1 Tab by mouth three (3) times daily. Max Daily Amount: 11.25 mg. Indications: anxious    cycloSPORINE (RESTASIS) 0.05 % dpet Restasis    calcium carbonate (OS-JANA) 500 mg calcium (1,250 mg) tablet Take  by mouth daily.  ascorbic acid, vitamin C, (VITAMIN C) 500 mg tablet Take  by mouth.  melatonin 10 mg cap Take  by mouth.  Iron Fum & PS Cmp-Vit C-Niacin (INTEGRA) 125-40-3 mg cap Take 125 mg by mouth daily.  sucralfate (CARAFATE) 1 gram tablet Take 1 Tab by mouth three (3) times daily.  dietary supplement cap Take  by mouth.  docusate sodium (STOOL SOFTENER) 100 mg tab Take  by mouth as needed.  glucosamine & chondroit sul. Na 750-400 mg cmpk Take  by mouth.  omega-3 fatty acids-vitamin e (FISH OIL) 1,000 mg cap Take 1 Cap by mouth. 1 by oral route twice daily    VIT C/ABDIAZIZ AC/LUT/COPPER/ZNOX (PRESERVISION LUTEIN PO) Take  by mouth. 1 by oral route daily    cholecalciferol (VITAMIN D3) 1,000 unit tablet Take  by mouth daily. No current facility-administered medications for this visit. [facility] follow up appointment(s):   Future Appointments   Date Time Provider Ken Zamora   10/27/2020  2:30 PM Shelby Mccullough MD [facility]   11/11/2020  2:20 PM Gina Orlando [facility]   11/11/2020  2:45 PM Ramiro Ruggiero MD [facility]      Non-[facility] follow up appointment(s): na  Patient attended follow up appointments since last contact:   What was the outcome of the appointment: stable, dose of eliquis reduced, follow up 1 month    Other Issues/ Miscellaneous?  (Transportation, access to meals, ability to perform ADLs, adequate caregiver support, etc.)  Referrals needed? (SW, Diabetes education, HH, etc.)      Goals    None          Next Outreach Scheduled: complete CHERI episode, no current needs or s/s to report. Has contact number for CTN shall any arise.

## 2020-11-11 ENCOUNTER — HOSPITAL ENCOUNTER (OUTPATIENT)
Dept: LAB | Age: 75
Discharge: HOME | End: 2020-11-11
Payer: MEDICARE

## 2020-11-11 DIAGNOSIS — I26.99 PULMONARY EMBOLISM, BILATERAL (HCC): ICD-10-CM

## 2020-11-11 LAB
ALBUMIN SERPL-MCNC: 3.2 G/DL (ref 3.2–4.6)
ALBUMIN/GLOB SERPL: 0.8 {RATIO} (ref 1.2–3.5)
ALP SERPL-CCNC: 82 U/L (ref 50–136)
ALT SERPL-CCNC: 19 U/L (ref 12–65)
ANION GAP SERPL CALC-SCNC: 5 MMOL/L (ref 7–16)
AST SERPL-CCNC: 14 U/L (ref 15–37)
BASOPHILS # BLD: 0 K/UL (ref 0–0.2)
BASOPHILS NFR BLD: 1 % (ref 0–2)
BILIRUB SERPL-MCNC: 0.3 MG/DL (ref 0.2–1.1)
BUN SERPL-MCNC: 13 MG/DL (ref 8–23)
CALCIUM SERPL-MCNC: 9.1 MG/DL (ref 8.3–10.4)
CHLORIDE SERPL-SCNC: 105 MMOL/L (ref 98–107)
CO2 SERPL-SCNC: 28 MMOL/L (ref 21–32)
CREAT SERPL-MCNC: 0.6 MG/DL (ref 0.6–1)
DIFFERENTIAL METHOD BLD: ABNORMAL
EOSINOPHIL # BLD: 0.2 K/UL (ref 0–0.8)
EOSINOPHIL NFR BLD: 3 % (ref 0.5–7.8)
ERYTHROCYTE [DISTWIDTH] IN BLOOD BY AUTOMATED COUNT: 20.9 % (ref 11.9–14.6)
GLOBULIN SER CALC-MCNC: 4.2 G/DL (ref 2.3–3.5)
GLUCOSE SERPL-MCNC: 119 MG/DL (ref 65–100)
HCT VFR BLD AUTO: 36.4 % (ref 35.8–46.3)
HGB BLD-MCNC: 10.8 G/DL (ref 11.7–15.4)
IMM GRANULOCYTES # BLD AUTO: 0 K/UL (ref 0–0.5)
IMM GRANULOCYTES NFR BLD AUTO: 0 % (ref 0–5)
LYMPHOCYTES # BLD: 1.8 K/UL (ref 0.5–4.6)
LYMPHOCYTES NFR BLD: 25 % (ref 13–44)
MCH RBC QN AUTO: 24.3 PG (ref 26.1–32.9)
MCHC RBC AUTO-ENTMCNC: 29.7 G/DL (ref 31.4–35)
MCV RBC AUTO: 82 FL (ref 79.6–97.8)
MONOCYTES # BLD: 0.8 K/UL (ref 0.1–1.3)
MONOCYTES NFR BLD: 10 % (ref 4–12)
NEUTS SEG # BLD: 4.6 K/UL (ref 1.7–8.2)
NEUTS SEG NFR BLD: 61 % (ref 43–78)
NRBC # BLD: 0 K/UL (ref 0–0.2)
PLATELET # BLD AUTO: 310 K/UL (ref 150–450)
PMV BLD AUTO: 9.4 FL (ref 9.4–12.3)
POTASSIUM SERPL-SCNC: 3.7 MMOL/L (ref 3.5–5.1)
PROT SERPL-MCNC: 7.4 G/DL (ref 6.3–8.2)
RBC # BLD AUTO: 4.44 M/UL (ref 4.05–5.25)
SODIUM SERPL-SCNC: 138 MMOL/L (ref 136–145)
WBC # BLD AUTO: 7.4 K/UL (ref 4.3–11.1)

## 2020-11-11 PROCEDURE — 85025 COMPLETE CBC W/AUTO DIFF WBC: CPT

## 2020-11-11 PROCEDURE — 80053 COMPREHEN METABOLIC PANEL: CPT

## 2020-11-11 PROCEDURE — 36415 COLL VENOUS BLD VENIPUNCTURE: CPT

## 2020-11-16 ENCOUNTER — HOSPITAL ENCOUNTER (EMERGENCY)
Age: 75
Discharge: HOME | End: 2020-11-16
Attending: EMERGENCY MEDICINE
Payer: MEDICARE

## 2020-11-16 VITALS
RESPIRATION RATE: 18 BRPM | HEART RATE: 90 BPM | DIASTOLIC BLOOD PRESSURE: 63 MMHG | WEIGHT: 155 LBS | SYSTOLIC BLOOD PRESSURE: 142 MMHG | BODY MASS INDEX: 31.84 KG/M2 | OXYGEN SATURATION: 95 % | TEMPERATURE: 98 F

## 2020-11-16 DIAGNOSIS — R04.0 EPISTAXIS: Primary | ICD-10-CM

## 2020-11-16 PROCEDURE — 74011250637 HC RX REV CODE- 250/637: Performed by: EMERGENCY MEDICINE

## 2020-11-16 PROCEDURE — 75810000284 HC CNTRL NASAL HEMORHRAGE SIMPLE

## 2020-11-16 PROCEDURE — 99284 EMERGENCY DEPT VISIT MOD MDM: CPT

## 2020-11-16 RX ORDER — OXYMETAZOLINE HCL 0.05 %
2 SPRAY, NON-AEROSOL (ML) NASAL
Status: COMPLETED | OUTPATIENT
Start: 2020-11-16 | End: 2020-11-16

## 2020-11-16 RX ORDER — AMOXICILLIN 250 MG/1
500 CAPSULE ORAL 3 TIMES DAILY
Qty: 60 CAP | Refills: 0 | Status: SHIPPED | OUTPATIENT
Start: 2020-11-16 | End: 2020-11-26

## 2020-11-16 RX ADMIN — OXYMETAZOLINE HCL 2 SPRAY: 0.05 SPRAY NASAL at 11:19

## 2020-11-16 NOTE — ED NOTES
I have reviewed discharge instructions with the patient. The patient verbalized understanding. Patient left ED via Discharge Method: wheelchair to Home with son. Opportunity for questions and clarification provided. Patient given 1 scripts. To continue your aftercare when you leave the hospital, you may receive an automated call from our care team to check in on how you are doing. This is a free service and part of our promise to provide the best care and service to meet your aftercare needs.  If you have questions, or wish to unsubscribe from this service please call [phone]. Thank you for Choosing our [facility] Emergency Department.

## 2020-11-16 NOTE — ED PROVIDER NOTES
Patient has a history of hypertension, hyperlipidemia, GERD and Eliquis for blood clots complaining of epistaxis. She states it started this morning, mostly on the right but just a little bit on the left. She tried using Afrin without success. She got concerned about the persistence of the bleeding and came here for evaluation. She denies any trauma or obvious precipitating event.            Past Medical History:   Diagnosis Date    Abnormality of gait 7/18/2018    Anemia     Anxiety     Arthritis     Asthma     Calculus of kidney     Chronic pain     Contact dermatitis and other eczema, due to unspecified cause     Depressive disorder, not elsewhere classified     Encounter for chronic pain management     Esophageal reflux     Essential hypertension, benign     Frequent falls 7/18/2018    GERD (gastroesophageal reflux disease)     Pure hypercholesterolemia        Past Surgical History:   Procedure Laterality Date    HX CATARACT REMOVAL Bilateral     HX COLONOSCOPY      HX GI      HX HYSTERECTOMY      HX KNEE REPLACEMENT Right     HX LITHOTRIPSY      HX RETINAL DETACHMENT REPAIR      HX SHOULDER REPLACEMENT Left     IR THROMBECTOMY MECH ART PRIMARY NON CHIRAG OR INTRACRANIAL  7/28/2020    NEUROLOGICAL PROCEDURE UNLISTED           Family History:   Problem Relation Age of Onset    Arthritis-osteo Mother     Cancer Father     Stroke Maternal Grandmother     Stroke Paternal Grandmother        Social History     Socioeconomic History    Marital status:      Spouse name: Not on file    Number of children: Not on file    Years of education: Not on file    Highest education level: Not on file   Occupational History    Not on file   Social Needs    Financial resource strain: Not on file    Food insecurity     Worry: Not on file     Inability: Not on file    Transportation needs     Medical: Not on file     Non-medical: Not on file   Tobacco Use    Smoking status: Never Smoker  Smokeless tobacco: Never Used   Substance and Sexual Activity    Alcohol use: No    Drug use: No    Sexual activity: Not on file   Lifestyle    Physical activity     Days per week: Not on file     Minutes per session: Not on file    Stress: Not on file   Relationships    Social connections     Talks on phone: Not on file     Gets together: Not on file     Attends Religion service: Not on file     Active member of club or organization: Not on file     Attends meetings of clubs or organizations: Not on file     Relationship status: Not on file    Intimate partner violence     Fear of current or ex partner: Not on file     Emotionally abused: Not on file     Physically abused: Not on file     Forced sexual activity: Not on file   Other Topics Concern    Not on file   Social History Narrative    Patient and  reside together. She denies any in-house stairs. 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 Activity is reported as cooking and household chores as tolerable. Exercise is reported as intolerable. ALLERGIES: Flexeril [cyclobenzaprine]; Ketoprofen; and Plaquenil [hydroxychloroquine]    Review of Systems   Constitutional: Negative for chills and fever. Gastrointestinal: Negative for nausea and vomiting. All other systems reviewed and are negative. Vitals:    11/16/20 1016   BP: (!) 168/74   Pulse: 90   Resp: 18   Temp: 98 °F (36.7 °C)   SpO2: 94%   Weight: 70.3 kg (155 lb)            Physical Exam  Vitals signs and nursing note reviewed. Constitutional:       Appearance: Normal appearance. She is well-developed and normal weight. HENT:      Head: Normocephalic and atraumatic. Nose:      Comments: Brisk bleeding from right nare, minimal bleeding from the left nare  Eyes:      Conjunctiva/sclera: Conjunctivae normal.      Pupils: Pupils are equal, round, and reactive to light.    Neck:      Musculoskeletal: Normal range of motion and neck supple. Pulmonary:      Effort: Pulmonary effort is normal. No respiratory distress. Musculoskeletal:         General: No tenderness. Skin:     General: Skin is warm and dry. Neurological:      Mental Status: She is alert and oriented to person, place, and time. Psychiatric:         Behavior: Behavior normal.          MDM  Number of Diagnoses or Management Options  Epistaxis: new and requires workup  Diagnosis management comments: Bleeding has ceased from her right nare    Risk of Complications, Morbidity, and/or Mortality  Presenting problems: moderate  Diagnostic procedures: low  Management options: low    Patient Progress  Patient progress: improved         Epistaxis Management    Date/Time: 11/16/2020 10:45 AM  Performed by: Khris Barraza MD  Authorized by: Khris Barraza MD     Consent:     Consent obtained:  Verbal    Consent given by:  Patient    Risks discussed:  Bleeding, infection, nasal injury and pain    Alternatives discussed:  No treatment, delayed treatment, observation and referral  Anesthesia (see MAR for exact dosages): Anesthesia method:  None  Procedure details:     Treatment site:  R anterior    Treatment method:  Nasal tampon    Treatment complexity:  Limited    Treatment episode: initial    Post-procedure details:     Assessment:  Bleeding stopped    Patient tolerance of procedure:   Tolerated well, no immediate complications

## 2020-12-03 ENCOUNTER — TRANSCRIBE ORDER (OUTPATIENT)
Dept: SCHEDULING | Age: 75
End: 2020-12-03

## 2020-12-07 ENCOUNTER — TRANSCRIBE ORDER (OUTPATIENT)
Dept: SCHEDULING | Age: 75
End: 2020-12-07

## 2020-12-07 DIAGNOSIS — I26.99 PULMONARY EMBOLISM (HCC): ICD-10-CM

## 2020-12-07 DIAGNOSIS — I82.403 DVT OF LOWER EXTREMITY, BILATERAL (HCC): Primary | ICD-10-CM

## 2020-12-16 ENCOUNTER — HOSPITAL ENCOUNTER (OUTPATIENT)
Dept: ULTRASOUND IMAGING | Age: 75
Discharge: HOME | End: 2020-12-16
Attending: RADIOLOGY

## 2020-12-16 DIAGNOSIS — I82.403 DVT OF LOWER EXTREMITY, BILATERAL (HCC): ICD-10-CM

## 2020-12-16 DIAGNOSIS — I26.99 PULMONARY EMBOLISM (HCC): ICD-10-CM

## 2021-02-09 ENCOUNTER — TRANSCRIBE ORDER (OUTPATIENT)
Dept: SCHEDULING | Age: 76
End: 2021-02-09

## 2021-02-11 ENCOUNTER — TRANSCRIBE ORDER (OUTPATIENT)
Dept: SCHEDULING | Age: 76
End: 2021-02-11

## 2021-02-11 DIAGNOSIS — I82.409 DEEP VENOUS THROMBOSIS OF LOWER EXTREMITY (HCC): Primary | ICD-10-CM

## 2021-02-16 ENCOUNTER — HOSPITAL ENCOUNTER (OUTPATIENT)
Dept: LAB | Age: 76
Discharge: HOME | End: 2021-02-16
Payer: MEDICARE

## 2021-02-16 DIAGNOSIS — I82.4Z3 ACUTE DEEP VEIN THROMBOSIS (DVT) OF DISTAL VEIN OF BOTH LOWER EXTREMITIES (HCC): ICD-10-CM

## 2021-02-16 LAB
ALBUMIN SERPL-MCNC: 3.6 G/DL (ref 3.2–4.6)
ALBUMIN/GLOB SERPL: 0.8 {RATIO} (ref 1.2–3.5)
ALP SERPL-CCNC: 85 U/L (ref 50–136)
ALT SERPL-CCNC: 24 U/L (ref 12–65)
ANION GAP SERPL CALC-SCNC: 6 MMOL/L (ref 7–16)
AST SERPL-CCNC: 21 U/L (ref 15–37)
BASOPHILS # BLD: 0 K/UL (ref 0–0.2)
BASOPHILS NFR BLD: 0 % (ref 0–2)
BILIRUB SERPL-MCNC: 0.2 MG/DL (ref 0.2–1.1)
BUN SERPL-MCNC: 8 MG/DL (ref 8–23)
CALCIUM SERPL-MCNC: 9 MG/DL (ref 8.3–10.4)
CHLORIDE SERPL-SCNC: 103 MMOL/L (ref 98–107)
CO2 SERPL-SCNC: 31 MMOL/L (ref 21–32)
CREAT SERPL-MCNC: 0.8 MG/DL (ref 0.6–1)
DIFFERENTIAL METHOD BLD: ABNORMAL
EOSINOPHIL # BLD: 0.2 K/UL (ref 0–0.8)
EOSINOPHIL NFR BLD: 3 % (ref 0.5–7.8)
ERYTHROCYTE [DISTWIDTH] IN BLOOD BY AUTOMATED COUNT: 16.3 % (ref 11.9–14.6)
GLOBULIN SER CALC-MCNC: 4.3 G/DL (ref 2.3–3.5)
GLUCOSE SERPL-MCNC: 109 MG/DL (ref 65–100)
HCT VFR BLD AUTO: 46.2 % (ref 35.8–46.3)
HGB BLD-MCNC: 14.3 G/DL (ref 11.7–15.4)
IMM GRANULOCYTES # BLD AUTO: 0 K/UL (ref 0–0.5)
IMM GRANULOCYTES NFR BLD AUTO: 0 % (ref 0–5)
LYMPHOCYTES # BLD: 2.1 K/UL (ref 0.5–4.6)
LYMPHOCYTES NFR BLD: 29 % (ref 13–44)
MCH RBC QN AUTO: 28.3 PG (ref 26.1–32.9)
MCHC RBC AUTO-ENTMCNC: 31 G/DL (ref 31.4–35)
MCV RBC AUTO: 91.3 FL (ref 79.6–97.8)
MONOCYTES # BLD: 0.8 K/UL (ref 0.1–1.3)
MONOCYTES NFR BLD: 11 % (ref 4–12)
NEUTS SEG # BLD: 4.1 K/UL (ref 1.7–8.2)
NEUTS SEG NFR BLD: 57 % (ref 43–78)
NRBC # BLD: 0 K/UL (ref 0–0.2)
PLATELET # BLD AUTO: 226 K/UL (ref 150–450)
PMV BLD AUTO: 9.5 FL (ref 9.4–12.3)
POTASSIUM SERPL-SCNC: 3.6 MMOL/L (ref 3.5–5.1)
PROT SERPL-MCNC: 7.9 G/DL (ref 6.3–8.2)
RBC # BLD AUTO: 5.06 M/UL (ref 4.05–5.25)
SODIUM SERPL-SCNC: 140 MMOL/L (ref 136–145)
WBC # BLD AUTO: 7.3 K/UL (ref 4.3–11.1)

## 2021-02-16 PROCEDURE — 36415 COLL VENOUS BLD VENIPUNCTURE: CPT

## 2021-02-16 PROCEDURE — 85025 COMPLETE CBC W/AUTO DIFF WBC: CPT

## 2021-02-16 PROCEDURE — 80053 COMPREHEN METABOLIC PANEL: CPT

## 2021-02-24 ENCOUNTER — TRANSCRIBE ORDER (OUTPATIENT)
Dept: SCHEDULING | Age: 76
End: 2021-02-24

## 2021-02-24 DIAGNOSIS — Z12.31 SCREENING MAMMOGRAM, ENCOUNTER FOR: Primary | ICD-10-CM

## 2021-03-11 ENCOUNTER — HOSPITAL ENCOUNTER (OUTPATIENT)
Dept: ULTRASOUND IMAGING | Age: 76
Discharge: HOME | End: 2021-03-11
Attending: RADIOLOGY

## 2021-03-11 DIAGNOSIS — I82.409 DEEP VENOUS THROMBOSIS OF LOWER EXTREMITY (HCC): ICD-10-CM

## 2021-03-24 ENCOUNTER — HOSPITAL ENCOUNTER (OUTPATIENT)
Dept: LAB | Age: 76
Discharge: HOME | End: 2021-03-24
Payer: MEDICARE

## 2021-03-24 DIAGNOSIS — I82.4Y9 DEEP VEIN THROMBOSIS (DVT) OF PROXIMAL LOWER EXTREMITY, UNSPECIFIED CHRONICITY, UNSPECIFIED LATERALITY (HCC): ICD-10-CM

## 2021-03-24 LAB
ALBUMIN SERPL-MCNC: 3.7 G/DL (ref 3.2–4.6)
ALBUMIN/GLOB SERPL: 0.9 {RATIO} (ref 1.2–3.5)
ALP SERPL-CCNC: 81 U/L (ref 50–136)
ALT SERPL-CCNC: 31 U/L (ref 12–65)
ANION GAP SERPL CALC-SCNC: 4 MMOL/L (ref 7–16)
AST SERPL-CCNC: 26 U/L (ref 15–37)
BASOPHILS # BLD: 0 K/UL (ref 0–0.2)
BASOPHILS NFR BLD: 0 % (ref 0–2)
BILIRUB SERPL-MCNC: 0.5 MG/DL (ref 0.2–1.1)
BUN SERPL-MCNC: 11 MG/DL (ref 8–23)
CALCIUM SERPL-MCNC: 9.3 MG/DL (ref 8.3–10.4)
CHLORIDE SERPL-SCNC: 104 MMOL/L (ref 98–107)
CO2 SERPL-SCNC: 31 MMOL/L (ref 21–32)
CREAT SERPL-MCNC: 0.7 MG/DL (ref 0.6–1)
DIFFERENTIAL METHOD BLD: ABNORMAL
EOSINOPHIL # BLD: 0.2 K/UL (ref 0–0.8)
EOSINOPHIL NFR BLD: 2 % (ref 0.5–7.8)
ERYTHROCYTE [DISTWIDTH] IN BLOOD BY AUTOMATED COUNT: 15.3 % (ref 11.9–14.6)
GLOBULIN SER CALC-MCNC: 4 G/DL (ref 2.3–3.5)
GLUCOSE SERPL-MCNC: 106 MG/DL (ref 65–100)
HCT VFR BLD AUTO: 45.3 % (ref 35.8–46.3)
HGB BLD-MCNC: 14.5 G/DL (ref 11.7–15.4)
IMM GRANULOCYTES # BLD AUTO: 0 K/UL (ref 0–0.5)
IMM GRANULOCYTES NFR BLD AUTO: 0 % (ref 0–5)
LYMPHOCYTES # BLD: 2.2 K/UL (ref 0.5–4.6)
LYMPHOCYTES NFR BLD: 29 % (ref 13–44)
MCH RBC QN AUTO: 29.7 PG (ref 26.1–32.9)
MCHC RBC AUTO-ENTMCNC: 32 G/DL (ref 31.4–35)
MCV RBC AUTO: 92.8 FL (ref 79.6–97.8)
MONOCYTES # BLD: 0.8 K/UL (ref 0.1–1.3)
MONOCYTES NFR BLD: 11 % (ref 4–12)
NEUTS SEG # BLD: 4.4 K/UL (ref 1.7–8.2)
NEUTS SEG NFR BLD: 58 % (ref 43–78)
NRBC # BLD: 0 K/UL (ref 0–0.2)
PLATELET # BLD AUTO: 236 K/UL (ref 150–450)
PMV BLD AUTO: 10 FL (ref 9.4–12.3)
POTASSIUM SERPL-SCNC: 3.7 MMOL/L (ref 3.5–5.1)
PROT SERPL-MCNC: 7.7 G/DL (ref 6.3–8.2)
RBC # BLD AUTO: 4.88 M/UL (ref 4.05–5.2)
SODIUM SERPL-SCNC: 139 MMOL/L (ref 136–145)
WBC # BLD AUTO: 7.7 K/UL (ref 4.3–11.1)

## 2021-03-24 PROCEDURE — 36415 COLL VENOUS BLD VENIPUNCTURE: CPT

## 2021-03-24 PROCEDURE — 80053 COMPREHEN METABOLIC PANEL: CPT

## 2021-03-24 PROCEDURE — 85025 COMPLETE CBC W/AUTO DIFF WBC: CPT

## 2021-03-29 ENCOUNTER — HOSPITAL ENCOUNTER (OUTPATIENT)
Dept: INTERVENTIONAL RADIOLOGY/VASCULAR | Age: 76
Setting detail: OBSERVATION
Discharge: HOME | End: 2021-03-30
Attending: RADIOLOGY | Admitting: HOSPITALIST
Payer: MEDICARE

## 2021-03-29 DIAGNOSIS — I82.409 DEEP VEIN THROMBOSIS (DVT) (HCC): ICD-10-CM

## 2021-03-29 DIAGNOSIS — R29.6 FREQUENT FALLS: ICD-10-CM

## 2021-03-29 DIAGNOSIS — R26.9 ABNORMALITY OF GAIT: ICD-10-CM

## 2021-03-29 PROBLEM — I10 ESSENTIAL HYPERTENSION: Status: ACTIVE | Noted: 2021-03-29

## 2021-03-29 PROBLEM — Z86.711 HISTORY OF PULMONARY EMBOLISM: Status: ACTIVE | Noted: 2021-03-29

## 2021-03-29 PROBLEM — S35.10XA: Status: ACTIVE | Noted: 2021-03-29

## 2021-03-29 LAB
ERYTHROCYTE [DISTWIDTH] IN BLOOD BY AUTOMATED COUNT: 15.1 % (ref 11.9–14.6)
HCT VFR BLD AUTO: 40.2 % (ref 35.8–46.3)
HGB BLD-MCNC: 13 G/DL (ref 11.7–15.4)
MCH RBC QN AUTO: 30.2 PG (ref 26.1–32.9)
MCHC RBC AUTO-ENTMCNC: 32.3 G/DL (ref 31.4–35)
MCV RBC AUTO: 93.3 FL (ref 79.6–97.8)
NRBC # BLD: 0 K/UL (ref 0–0.2)
PLATELET # BLD AUTO: 206 K/UL (ref 150–450)
PMV BLD AUTO: 9.8 FL (ref 9.4–12.3)
RBC # BLD AUTO: 4.31 M/UL (ref 4.05–5.2)
WBC # BLD AUTO: 10.4 K/UL (ref 4.3–11.1)

## 2021-03-29 PROCEDURE — 85027 COMPLETE CBC AUTOMATED: CPT

## 2021-03-29 PROCEDURE — 74011250637 HC RX REV CODE- 250/637: Performed by: HOSPITALIST

## 2021-03-29 PROCEDURE — C1769 GUIDE WIRE: HCPCS

## 2021-03-29 PROCEDURE — 74011000250 HC RX REV CODE- 250: Performed by: HOSPITALIST

## 2021-03-29 PROCEDURE — 77030004524 HC CATH ANGI DX FLS COOK -B

## 2021-03-29 PROCEDURE — 77030022017 HC DRSG HEMO QCLOT ZMED -A

## 2021-03-29 PROCEDURE — 77030021532 HC CATH ANGI DX IMPRS MRTM -B

## 2021-03-29 PROCEDURE — 74011250637 HC RX REV CODE- 250/637: Performed by: RADIOLOGY

## 2021-03-29 PROCEDURE — C1751 CATH, INF, PER/CENT/MIDLINE: HCPCS

## 2021-03-29 PROCEDURE — 37193 REM ENDOVAS VENA CAVA FILTER: CPT

## 2021-03-29 PROCEDURE — 37248 TRLUML BALO ANGIOP 1ST VEIN: CPT

## 2021-03-29 PROCEDURE — 77030004629 HC CATH ANGI SZ AUR COOK -B

## 2021-03-29 PROCEDURE — 74011250636 HC RX REV CODE- 250/636: Performed by: RADIOLOGY

## 2021-03-29 PROCEDURE — 77030010507 HC ADH SKN DERMBND J&J -B

## 2021-03-29 PROCEDURE — C1773 RET DEV, INSERTABLE: HCPCS

## 2021-03-29 PROCEDURE — 77030004565 HC CATH ANGI DX TMPO CARD -B

## 2021-03-29 PROCEDURE — C1894 INTRO/SHEATH, NON-LASER: HCPCS

## 2021-03-29 PROCEDURE — 36415 COLL VENOUS BLD VENIPUNCTURE: CPT

## 2021-03-29 PROCEDURE — 74011000250 HC RX REV CODE- 250: Performed by: RADIOLOGY

## 2021-03-29 PROCEDURE — 99218 HC RM OBSERVATION: CPT

## 2021-03-29 PROCEDURE — C1725 CATH, TRANSLUMIN NON-LASER: HCPCS

## 2021-03-29 PROCEDURE — 74011000636 HC RX REV CODE- 636: Performed by: RADIOLOGY

## 2021-03-29 RX ORDER — MONTELUKAST SODIUM 10 MG/1
10 TABLET ORAL DAILY
Status: DISCONTINUED | OUTPATIENT
Start: 2021-03-30 | End: 2021-03-30 | Stop reason: HOSPADM

## 2021-03-29 RX ORDER — DIPHENHYDRAMINE HYDROCHLORIDE 50 MG/ML
25-50 INJECTION, SOLUTION INTRAMUSCULAR; INTRAVENOUS
Status: DISCONTINUED | OUTPATIENT
Start: 2021-03-29 | End: 2021-03-29

## 2021-03-29 RX ORDER — SODIUM CHLORIDE 0.9 % (FLUSH) 0.9 %
5-40 SYRINGE (ML) INJECTION EVERY 8 HOURS
Status: DISCONTINUED | OUTPATIENT
Start: 2021-03-29 | End: 2021-03-30 | Stop reason: HOSPADM

## 2021-03-29 RX ORDER — IPRATROPIUM BROMIDE AND ALBUTEROL SULFATE 2.5; .5 MG/3ML; MG/3ML
3 SOLUTION RESPIRATORY (INHALATION)
Status: DISCONTINUED | OUTPATIENT
Start: 2021-03-29 | End: 2021-03-30 | Stop reason: HOSPADM

## 2021-03-29 RX ORDER — LISINOPRIL 5 MG/1
5 TABLET ORAL DAILY
Status: DISCONTINUED | OUTPATIENT
Start: 2021-03-30 | End: 2021-03-30 | Stop reason: HOSPADM

## 2021-03-29 RX ORDER — ACETAMINOPHEN 325 MG/1
650 TABLET ORAL
Status: DISCONTINUED | OUTPATIENT
Start: 2021-03-29 | End: 2021-03-30 | Stop reason: HOSPADM

## 2021-03-29 RX ORDER — POLYETHYLENE GLYCOL 3350 17 G/17G
17 POWDER, FOR SOLUTION ORAL DAILY PRN
Status: DISCONTINUED | OUTPATIENT
Start: 2021-03-29 | End: 2021-03-30 | Stop reason: HOSPADM

## 2021-03-29 RX ORDER — PANTOPRAZOLE SODIUM 40 MG/1
40 TABLET, DELAYED RELEASE ORAL 2 TIMES DAILY
Status: DISCONTINUED | OUTPATIENT
Start: 2021-03-30 | End: 2021-03-30 | Stop reason: HOSPADM

## 2021-03-29 RX ORDER — POTASSIUM CHLORIDE 7.45 MG/ML
10 INJECTION INTRAVENOUS AS NEEDED
Status: DISCONTINUED | OUTPATIENT
Start: 2021-03-29 | End: 2021-03-30 | Stop reason: HOSPADM

## 2021-03-29 RX ORDER — OXYCODONE AND ACETAMINOPHEN 5; 325 MG/1; MG/1
1 TABLET ORAL
Status: DISCONTINUED | OUTPATIENT
Start: 2021-03-29 | End: 2021-03-30 | Stop reason: HOSPADM

## 2021-03-29 RX ORDER — FENTANYL CITRATE 50 UG/ML
25-100 INJECTION, SOLUTION INTRAMUSCULAR; INTRAVENOUS
Status: DISCONTINUED | OUTPATIENT
Start: 2021-03-29 | End: 2021-03-29

## 2021-03-29 RX ORDER — SODIUM CHLORIDE 0.9 % (FLUSH) 0.9 %
5-40 SYRINGE (ML) INJECTION AS NEEDED
Status: DISCONTINUED | OUTPATIENT
Start: 2021-03-29 | End: 2021-03-30 | Stop reason: HOSPADM

## 2021-03-29 RX ORDER — LIDOCAINE HYDROCHLORIDE 20 MG/ML
20-200 INJECTION, SOLUTION INFILTRATION; PERINEURAL
Status: DISCONTINUED | OUTPATIENT
Start: 2021-03-29 | End: 2021-03-29

## 2021-03-29 RX ORDER — ADHESIVE BANDAGE
30 BANDAGE TOPICAL DAILY
Status: DISCONTINUED | OUTPATIENT
Start: 2021-03-30 | End: 2021-03-30 | Stop reason: HOSPADM

## 2021-03-29 RX ORDER — CALCIUM CARBONATE 500(1250)
500 TABLET ORAL DAILY
Status: DISCONTINUED | OUTPATIENT
Start: 2021-03-30 | End: 2021-03-30 | Stop reason: HOSPADM

## 2021-03-29 RX ORDER — SUCRALFATE 1 G/1
1 TABLET ORAL 3 TIMES DAILY
Status: DISCONTINUED | OUTPATIENT
Start: 2021-03-29 | End: 2021-03-30 | Stop reason: HOSPADM

## 2021-03-29 RX ORDER — BUDESONIDE AND FORMOTEROL FUMARATE DIHYDRATE 80; 4.5 UG/1; UG/1
2 AEROSOL RESPIRATORY (INHALATION)
Status: DISCONTINUED | OUTPATIENT
Start: 2021-03-30 | End: 2021-03-30 | Stop reason: HOSPADM

## 2021-03-29 RX ORDER — SODIUM CHLORIDE 9 MG/ML
500 INJECTION, SOLUTION INTRAVENOUS CONTINUOUS
Status: DISCONTINUED | OUTPATIENT
Start: 2021-03-29 | End: 2021-03-29

## 2021-03-29 RX ORDER — ACETAMINOPHEN 650 MG/1
650 SUPPOSITORY RECTAL
Status: DISCONTINUED | OUTPATIENT
Start: 2021-03-29 | End: 2021-03-30 | Stop reason: HOSPADM

## 2021-03-29 RX ORDER — HEPARIN SODIUM 200 [USP'U]/100ML
1000-8000 INJECTION, SOLUTION INTRAVENOUS CONTINUOUS
Status: DISCONTINUED | OUTPATIENT
Start: 2021-03-29 | End: 2021-03-29

## 2021-03-29 RX ORDER — MAGNESIUM SULFATE HEPTAHYDRATE 40 MG/ML
2 INJECTION, SOLUTION INTRAVENOUS AS NEEDED
Status: DISCONTINUED | OUTPATIENT
Start: 2021-03-29 | End: 2021-03-30 | Stop reason: HOSPADM

## 2021-03-29 RX ORDER — PROMETHAZINE HYDROCHLORIDE 25 MG/1
12.5 TABLET ORAL
Status: DISCONTINUED | OUTPATIENT
Start: 2021-03-29 | End: 2021-03-30 | Stop reason: HOSPADM

## 2021-03-29 RX ORDER — SERTRALINE HYDROCHLORIDE 50 MG/1
150 TABLET, FILM COATED ORAL DAILY
Status: DISCONTINUED | OUTPATIENT
Start: 2021-03-30 | End: 2021-03-30 | Stop reason: HOSPADM

## 2021-03-29 RX ORDER — ONDANSETRON 2 MG/ML
4 INJECTION INTRAMUSCULAR; INTRAVENOUS
Status: DISCONTINUED | OUTPATIENT
Start: 2021-03-29 | End: 2021-03-30 | Stop reason: HOSPADM

## 2021-03-29 RX ORDER — MIDAZOLAM HYDROCHLORIDE 1 MG/ML
.5-2 INJECTION, SOLUTION INTRAMUSCULAR; INTRAVENOUS
Status: DISCONTINUED | OUTPATIENT
Start: 2021-03-29 | End: 2021-03-29

## 2021-03-29 RX ADMIN — SUCRALFATE 1 G: 1 TABLET ORAL at 21:25

## 2021-03-29 RX ADMIN — FENTANYL CITRATE 25 MCG: 50 INJECTION, SOLUTION INTRAMUSCULAR; INTRAVENOUS at 12:38

## 2021-03-29 RX ADMIN — MIDAZOLAM 1 MG: 1 INJECTION INTRAMUSCULAR; INTRAVENOUS at 11:53

## 2021-03-29 RX ADMIN — FENTANYL CITRATE 50 MCG: 50 INJECTION, SOLUTION INTRAMUSCULAR; INTRAVENOUS at 12:55

## 2021-03-29 RX ADMIN — SODIUM CHLORIDE 10 ML: 9 INJECTION, SOLUTION INTRAMUSCULAR; INTRAVENOUS; SUBCUTANEOUS at 21:25

## 2021-03-29 RX ADMIN — FENTANYL CITRATE 50 MCG: 50 INJECTION, SOLUTION INTRAMUSCULAR; INTRAVENOUS at 12:32

## 2021-03-29 RX ADMIN — FENTANYL CITRATE 50 MCG: 50 INJECTION, SOLUTION INTRAMUSCULAR; INTRAVENOUS at 13:15

## 2021-03-29 RX ADMIN — MIDAZOLAM 0.5 MG: 1 INJECTION INTRAMUSCULAR; INTRAVENOUS at 13:15

## 2021-03-29 RX ADMIN — OXYCODONE HYDROCHLORIDE AND ACETAMINOPHEN 1 TABLET: 5; 325 TABLET ORAL at 16:42

## 2021-03-29 RX ADMIN — LIDOCAINE HYDROCHLORIDE 180 MG: 20 INJECTION, SOLUTION INFILTRATION; PERINEURAL at 12:27

## 2021-03-29 RX ADMIN — MIDAZOLAM 0.5 MG: 1 INJECTION INTRAMUSCULAR; INTRAVENOUS at 12:48

## 2021-03-29 RX ADMIN — MIDAZOLAM 1 MG: 1 INJECTION INTRAMUSCULAR; INTRAVENOUS at 12:21

## 2021-03-29 RX ADMIN — FENTANYL CITRATE 50 MCG: 50 INJECTION, SOLUTION INTRAMUSCULAR; INTRAVENOUS at 12:21

## 2021-03-29 RX ADMIN — MIDAZOLAM 1 MG: 1 INJECTION INTRAMUSCULAR; INTRAVENOUS at 12:55

## 2021-03-29 RX ADMIN — FENTANYL CITRATE 25 MCG: 50 INJECTION, SOLUTION INTRAMUSCULAR; INTRAVENOUS at 12:48

## 2021-03-29 RX ADMIN — SODIUM CHLORIDE 500 ML: 900 INJECTION, SOLUTION INTRAVENOUS at 12:12

## 2021-03-29 RX ADMIN — MIDAZOLAM 0.5 MG: 1 INJECTION INTRAMUSCULAR; INTRAVENOUS at 12:38

## 2021-03-29 RX ADMIN — IOPAMIDOL 140 ML: 612 INJECTION, SOLUTION INTRAVENOUS at 13:33

## 2021-03-29 NOTE — PROGRESS NOTES
Patient resting comfortably. Safety precautions in place. Call light and personal items within reach.

## 2021-03-29 NOTE — PROGRESS NOTES
TRANSFER - OUT REPORT:    Verbal report given to Waldo Ac RN(name) on Lyla Sanchez  being transferred to IR room 4 for routine post - op       Report consisted of patients Situation, Background, Assessment and   Recommendations(SBAR). Information from the following report(s) SBAR, Procedure Summary and MAR was reviewed with the receiving nurse. Opportunity for questions and clarification was provided. Conscious Sedation:   250 Mcg of Fentanyl administered  4.5 Mg of Versed administered    Pt tolerated procedure fair.      Visit Vitals  BP (!) 153/73   Pulse 62   Temp 98.7 °F (37.1 °C)   Resp 18   Ht 4' 10\" (1.473 m)   Wt 70.8 kg (156 lb)   SpO2 99%   Breastfeeding No   BMI 32.60 kg/m²     Past Medical History:   Diagnosis Date    Abnormality of gait 7/18/2018    Anemia     Anxiety     Arthritis     Asthma     Calculus of kidney     Chronic pain     Contact dermatitis and other eczema, due to unspecified cause     Depressive disorder, not elsewhere classified     Encounter for chronic pain management     Esophageal reflux     Essential hypertension, benign     Frequent falls 7/18/2018    GERD (gastroesophageal reflux disease)     Pure hypercholesterolemia      Peripheral IV 03/29/21 Left;Posterior Hand (Active)

## 2021-03-29 NOTE — H&P
HOSPITALIST H&P/CONSULT  NAME:  Igor Hoyos   Age:  66 y.o.  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 Kala Umaña (hematology), status post IVC filter, GERD, dyslipidemia, arthritis, depression who presented to the IR for IVC filter removal.  Case discussed with Dr. Renetta Wolfe who stated that IVC filter was embedded in IVC, retraction caused caval injury which was not thought to be through and through. Temporary ballooning was done with no further bleeding from the site of the tear. Patient denies any abdominal pain, nausea vomiting, chest pain, shortness of breath or cough. Patient will be admitted for observation to monitor signs of bleeding and further post procedure complications. Complete ROS done and is as stated in HPI or otherwise negative  Past Medical History:   Diagnosis Date    Abnormality of gait 2018    Anemia     Anxiety     Arthritis     Asthma     Calculus of kidney     Chronic pain     Contact dermatitis and other eczema, due to unspecified cause     Depressive disorder, not elsewhere classified     Encounter for chronic pain management     Esophageal reflux     Essential hypertension, benign     Frequent falls 2018    GERD (gastroesophageal reflux disease)     Pure hypercholesterolemia       Past Surgical History:   Procedure Laterality Date    HX CATARACT REMOVAL Bilateral     HX COLONOSCOPY      HX GI      HX HYSTERECTOMY      HX KNEE REPLACEMENT Right     HX LITHOTRIPSY      HX RETINAL DETACHMENT REPAIR      HX SHOULDER REPLACEMENT Left     IR THROMBECTOMY MECH ART PRIMARY NON CHIRAG OR INTRACRANIAL  2020    NEUROLOGICAL PROCEDURE UNLISTED        Home medications:  Reviewed and reconciled in MAR.      Allergies   Allergen Reactions    Flexeril [Cyclobenzaprine] Unknown (comments)    Ketoprofen Unknown (comments)    Plaquenil [Hydroxychloroquine] Unknown (comments)      Social History     Tobacco Use    Smoking status: Never Smoker    Smokeless tobacco: Never Used   Substance Use Topics    Alcohol use: No      Family History   Problem Relation Age of Onset    Arthritis-osteo Mother     Cancer Father     Stroke Maternal Grandmother     Stroke Paternal Grandmother       Objective:     Visit Vitals  BP (!) 182/84   Pulse 74   Temp 98.7 °F (37.1 °C)   Resp 16   Ht 4' 10\" (1.473 m)   Wt 70.8 kg (156 lb)   SpO2 91%   Breastfeeding No   BMI 32.60 kg/m²      Temp (24hrs), Av.7 °F (37.1 °C), Min:98.7 °F (37.1 °C), Max:98.7 °F (37.1 °C)    Oxygen Therapy  O2 Sat (%): 91 % (21 1356)  Pulse via Oximetry: 74 beats per minute (21 1356)  O2 Device: Room air (21 1356)  O2 Flow Rate (L/min): 4 l/min (21 1321)  ETCO2 (mmHg): 43 mmHg (21 1344)  Physical Exam:  General:    Alert, awake, NAD, on 2 L via NC  Head:   Normocephalic, without obvious abnormality, atraumatic. Nose:  Nares normal. No drainage or sinus tenderness. Lungs:   Clear to auscultation bilaterally. No Wheezing or Rhonchi. No rales. Heart:   Regular rate and rhythm,  no murmur, rub or gallop. Abdomen:   Soft, non-tender. Not distended. Bowel sounds normal.   Extremities: No cyanosis. No edema. No clubbing  Skin:     Texture, turgor normal. No rashes or lesions. Not Jaundiced  Neurologic: GCS 15, no motor or sensory deficits, cranial through 4 grossly intact. Psych:              AO x3, mood and affect appropriate. Data Review:   No results found for this or any previous visit (from the past 24 hour(s)). Imaging /Procedures /Studies   All agnostic imaging personally reviewed by me. Assessment and Plan:      Active Hospital Problems    Diagnosis Date Noted    Injury of inferior vena cava 2021     Priority: 1 - One    Essential hypertension 03/29/2021    Pulmonary HTN ([facility] [address] 08/03/2020    Esophageal reflux     Pure hypercholesterolemia        PLAN  · Admit to observation to monitor signs of bleeding following injury to IVC during IVC filter removal.  · Patient had a ballooning procedure done at the time of IVC filter removal that stopped immediate bleeding. · Case discussed with Dr. Esequiel Weiss, who recommends patient to be off her Eliquis for at least 3 days and can follow-up with her primary hematologist Dr. Miki Moran as outpatient. · We will do a stat CBC and recheck in morning for any signs of anemia. · Wean off oxygen as able, titrate SPO2 greater than 92%. · Pulmonary hypertension: Restarting Symbicort 2 puffs twice daily along with Spiriva 2 puffs twice daily. DuoNeb as needed  · Continue Singulair 10 mg p.o. daily. · HTN: BP is optimally controlled, restarting lisinopril 5 mg p.o. daily. · Depression: Restarting Zoloft 150 mg p.o. daily. · GERD: Starting Protonix 40 mg p.o. daily along with Carafate 1 g 3 times daily. · DVT prophylaxis with SCD  · PT and OT eval    Code Status: Full  High risk greater than 2 midnights.   Anticipated discharge:     Signed By: Deo Marc MD     March 29, 2021

## 2021-03-29 NOTE — PROGRESS NOTES
IR Nurse Pre-Procedure Checklist Part 2          Consent signed: Yes    H&P complete:  Yes    Antibiotics: Not applicable    Airway/Mallampati Done: Yes    Shaved: Yes    Pregnancy Form:Not applicable    Patient Position: Yes    MD Side: Yes     Biopsy Worksheet: Not applicable    Specimen Medium: Not applicable    Pt to IR suite 1 via stretcher with RN

## 2021-03-29 NOTE — PROCEDURES
Department of Interventional Radiology  [phone]        Interventional Radiology Brief Procedure Note    Patient: Lyla Sanchez MRN: [id number]  SSN: [id number]    YOB: 1945  Age: 66 y.o. Sex: female      Date of Procedure: 3/29/2021    Pre-Procedure Diagnosis: Indwelling IVC filter    Post-Procedure Diagnosis: SAME    Procedure(s): IVC filter removal. IVC venography    Brief Description of Procedure: IVC venography reveals caval patency. The hook of the IVC filter was unable to be snared as it was embedded. Loop-snare technique was used with a 16 Fr sheath to remove the IVC filter. Given that the IVC filter was approximately 5 months old, the tines were also embedded in the caval wall. The filter was removed intact without any tines/legs missing. Repeat IVC venography demonstrated a probable small tear in the IVC. A 30 mm balloon was used to tamponade this area for 3 minutes. Repeat IVC venography was performed without evidence of ongoing hemorrhage. Right IJ vein access. Performed By: Iona Najera MD     Assistants: None    Anesthesia:Moderate Sedation    Estimated Blood Loss: 50 ml    Specimens:  None    Implants:  None    Findings: As above    Complications: Retroperitoneal hemorrhage, appears to be resolved following balloon tamponade intra-procedure. Plan for admission overnight for monitoring.      Signed By: Iona Najera MD     March 29, 2021

## 2021-03-29 NOTE — PROGRESS NOTES
Purewick placed at this time. Patient unable to walk to bathroom and has a difficult time getting on and off the bedpan.

## 2021-03-30 VITALS
WEIGHT: 156 LBS | SYSTOLIC BLOOD PRESSURE: 113 MMHG | HEIGHT: 58 IN | DIASTOLIC BLOOD PRESSURE: 48 MMHG | HEART RATE: 110 BPM | RESPIRATION RATE: 18 BRPM | BODY MASS INDEX: 32.75 KG/M2 | TEMPERATURE: 98.2 F | OXYGEN SATURATION: 94 %

## 2021-03-30 LAB
ANION GAP SERPL CALC-SCNC: 4 MMOL/L (ref 7–16)
BUN SERPL-MCNC: 8 MG/DL (ref 8–23)
CALCIUM SERPL-MCNC: 8.9 MG/DL (ref 8.3–10.4)
CHLORIDE SERPL-SCNC: 106 MMOL/L (ref 98–107)
CO2 SERPL-SCNC: 30 MMOL/L (ref 21–32)
CREAT SERPL-MCNC: 0.42 MG/DL (ref 0.6–1)
ERYTHROCYTE [DISTWIDTH] IN BLOOD BY AUTOMATED COUNT: 15.4 % (ref 11.9–14.6)
GLUCOSE SERPL-MCNC: 94 MG/DL (ref 65–100)
HCT VFR BLD AUTO: 42.6 % (ref 35.8–46.3)
HGB BLD-MCNC: 13.5 G/DL (ref 11.7–15.4)
MAGNESIUM SERPL-MCNC: 2.1 MG/DL (ref 1.8–2.4)
MCH RBC QN AUTO: 30.1 PG (ref 26.1–32.9)
MCHC RBC AUTO-ENTMCNC: 31.7 G/DL (ref 31.4–35)
MCV RBC AUTO: 94.9 FL (ref 79.6–97.8)
NRBC # BLD: 0 K/UL (ref 0–0.2)
PLATELET # BLD AUTO: 216 K/UL (ref 150–450)
PMV BLD AUTO: 10.3 FL (ref 9.4–12.3)
POTASSIUM SERPL-SCNC: 3.8 MMOL/L (ref 3.5–5.1)
RBC # BLD AUTO: 4.49 M/UL (ref 4.05–5.2)
SODIUM SERPL-SCNC: 140 MMOL/L (ref 136–145)
WBC # BLD AUTO: 11.8 K/UL (ref 4.3–11.1)

## 2021-03-30 PROCEDURE — 94760 N-INVAS EAR/PLS OXIMETRY 1: CPT

## 2021-03-30 PROCEDURE — 74011000250 HC RX REV CODE- 250: Performed by: HOSPITALIST

## 2021-03-30 PROCEDURE — 94640 AIRWAY INHALATION TREATMENT: CPT

## 2021-03-30 PROCEDURE — 80048 BASIC METABOLIC PNL TOTAL CA: CPT

## 2021-03-30 PROCEDURE — 99218 HC RM OBSERVATION: CPT

## 2021-03-30 PROCEDURE — 97161 PT EVAL LOW COMPLEX 20 MIN: CPT

## 2021-03-30 PROCEDURE — 97530 THERAPEUTIC ACTIVITIES: CPT

## 2021-03-30 PROCEDURE — 85027 COMPLETE CBC AUTOMATED: CPT

## 2021-03-30 PROCEDURE — 36415 COLL VENOUS BLD VENIPUNCTURE: CPT

## 2021-03-30 PROCEDURE — 74011250637 HC RX REV CODE- 250/637: Performed by: HOSPITALIST

## 2021-03-30 PROCEDURE — 74011250637 HC RX REV CODE- 250/637: Performed by: RADIOLOGY

## 2021-03-30 PROCEDURE — 97112 NEUROMUSCULAR REEDUCATION: CPT

## 2021-03-30 PROCEDURE — 97535 SELF CARE MNGMENT TRAINING: CPT

## 2021-03-30 PROCEDURE — 97166 OT EVAL MOD COMPLEX 45 MIN: CPT

## 2021-03-30 PROCEDURE — 83735 ASSAY OF MAGNESIUM: CPT

## 2021-03-30 RX ORDER — NYSTATIN 100000 [USP'U]/G
POWDER TOPICAL AS NEEDED
Status: DISCONTINUED | OUTPATIENT
Start: 2021-03-30 | End: 2021-03-30 | Stop reason: HOSPADM

## 2021-03-30 RX ADMIN — TIOTROPIUM BROMIDE INHALATION SPRAY 2 PUFF: 3.12 SPRAY, METERED RESPIRATORY (INHALATION) at 07:27

## 2021-03-30 RX ADMIN — BUDESONIDE AND FORMOTEROL FUMARATE DIHYDRATE 2 PUFF: 80; 4.5 AEROSOL RESPIRATORY (INHALATION) at 08:00

## 2021-03-30 RX ADMIN — SERTRALINE 150 MG: 50 TABLET, FILM COATED ORAL at 09:57

## 2021-03-30 RX ADMIN — SUCRALFATE 1 G: 1 TABLET ORAL at 09:57

## 2021-03-30 RX ADMIN — OXYCODONE HYDROCHLORIDE AND ACETAMINOPHEN 1 TABLET: 5; 325 TABLET ORAL at 09:58

## 2021-03-30 RX ADMIN — SODIUM CHLORIDE 10 ML: 9 INJECTION, SOLUTION INTRAMUSCULAR; INTRAVENOUS; SUBCUTANEOUS at 06:11

## 2021-03-30 RX ADMIN — PANTOPRAZOLE SODIUM 40 MG: 40 TABLET, DELAYED RELEASE ORAL at 09:58

## 2021-03-30 RX ADMIN — OXYCODONE HYDROCHLORIDE AND ACETAMINOPHEN 1 TABLET: 5; 325 TABLET ORAL at 02:19

## 2021-03-30 RX ADMIN — MONTELUKAST 10 MG: 10 TABLET, FILM COATED ORAL at 09:58

## 2021-03-30 RX ADMIN — CALCIUM 500 MG: 500 TABLET ORAL at 09:57

## 2021-03-30 RX ADMIN — LISINOPRIL 5 MG: 5 TABLET ORAL at 09:58

## 2021-03-30 NOTE — PROGRESS NOTES
Department of Interventional Radiology  [phone]                                 Progress Note  Patient: Gurpreet Cleaning MRN: [id number]  SSN: [id number]    YOB: 1945  Age: 66 y.o. Sex: female      Subjective:   Mrs. Brittni Jerez is seen eating breakfast without complaint this AM. She has not been able to ambulate as her walker was taken home by her friend yesterday evening. She says her back pain is near her baseline without any worsening. Objective:     Vitals:    03/29/21 2351 03/30/21 0406 03/30/21 0723 03/30/21 0728   BP: 133/73 (!) 154/67 (!) 121/45    Pulse: 70 75 76    Resp: 18 19 18    Temp: 97.5 °F (36.4 °C) 98.1 °F (36.7 °C) 98.2 °F (36.8 °C)    SpO2: 90% 93% 90% 94%   Weight:       Height:            Pain Assessment  Pain Intensity 1: 0 (03/30/21 0755)  Pain Location 1: Back     Patient Stated Pain Goal: 0    Intake and Output:  External Female Catheter 03/29/21 (Active)   Site Assessment Clean, dry, & intact 03/30/21 0755   Repositioned No 03/30/21 0755   Perineal Care No 03/30/21 0755   Wick Changed No 03/30/21 0755   Suction Canister/Tubing Changed No 03/30/21 0755     Physical Exam:   General: Pleasant elderly female in NAD. AAOx4. Lungs: Respirations are even and non-labored. Neck: Right neck access site is c/d/i with moderate TTP. No palpable hematoma. Lab/Data Review: All lab results for the last 24 hours reviewed. Assessment:   65 yo female with multiple medical co-morbidities including DVT/PE who had IVC filter removed 3/29/21. Complex removal given dwell time of 9 months with embedded filter hook/tines. Transient retroperitoneal hemorrhage during procedure. Doing well following monitoring overnight with stable hgb. Plan:     Discharge home. Re-start [facility] Thursday 4/1. Follow up in IR clinic as needed.      Signed By: Alirio Driver MD     March 30, 2021

## 2021-03-30 NOTE — DISCHARGE INSTRUCTIONS
[address]  Department of Interventional Radiology  [facility]  [phone] Office  [phone] Fax       Inferior Vena Cava Filter Removal Discharge Instructions    General Information:   Today you had an inferior vena cava filter removed. This was removed because it was determined that you no longer needed this filter. Home Care Instructions: You can resume your regular diet and medication regimen. Do not drink alcohol, drive, or make any important legal decisions in the next 24 hours. Do not lift anything heavier than a gallon of milk, or do anything strenuous for the next 24 hours. You will notice a dressing on your neck or groin. This was the insertion site for the retrieval of the device. Keep the site covered until the puncture site has totally healed. You make take a shower with the bandage, but do cover it with plastic wrap. Do not immerse yourself in water until the wound has totally healed. After your puncture wound heals, there is no special care that is needed. You may resume your normal level of activity slowly, after about one week of light activity. Call If:   You should call your Physician and/or the Radiology Nurse if you have any bleeding other than a small spot on your bandage. Please call if you have any signs of infection; fever, swelling, or increased pain at the site. Call if you have any questions of how to take care of your wound. Follow-Up Instructions: Please see your ordering doctor as he/she has requested. To Reach Us: If you have any questions about your procedure, please call the Interventional Radiology department at [phone]. After business hours (5pm) and weekends, call the answering service at [phone] and ask for the Radiologist on call to be paged. Si tiene Preguntas acerca del procedimiento, por favor llame al departamento de Radiología Intervencional al [phone]. Después de horas de oficina (5 pm) y los fines de [place], llamar al Barbara myers [phone] y pregunte por el Radiologo de [place]. Interventional Radiology General Nurse Discharge    After general anesthesia or intravenous sedation, for 24 hours or while taking prescription Narcotics:  · Limit your activities  · Do not drive and operate hazardous machinery  · Do not make important personal or business decisions  · Do  not drink alcoholic beverages  · If you have not urinated within 8 hours after discharge, please contact your surgeon on call. * Please give a list of your current medications to your Primary Care Provider. * Please update this list whenever your medications are discontinued, doses are     changed, or new medications (including over-the-counter products) are added. * Please carry medication information at all times in case of emergency situations. These are general instructions for a healthy lifestyle:    No smoking/ No tobacco products/ Avoid exposure to second hand smoke  Surgeon General's Warning:  Quitting smoking now greatly reduces serious risk to your health. Obesity, smoking, and sedentary lifestyle greatly increases your risk for illness  A healthy diet, regular physical exercise & weight monitoring are important for maintaining a healthy lifestyle    You may be retaining fluid if you have a history of heart failure or if you experience any of the following symptoms:  Weight gain of 3 pounds or more overnight or 5 pounds in a week, increased swelling in our hands or feet or shortness of breath while lying flat in bed. Please call your doctor as soon as you notice any of these symptoms; do not wait until your next office visit.     Recognize signs and symptoms of STROKE:  F-face looks uneven    A-arms unable to move or move unevenly    S-speech slurred or non-existent    T-time-call 911 as soon as signs and symptoms begin-DO NOT go       Back to bed or wait to see if you get better-TIME IS BRAIN. Patient Signature:  Date: 3/29/2021  Discharging Nurse: Mckay Deras from Nurse    PATIENT INSTRUCTIONS:    After general anesthesia or intravenous sedation, for 24 hours or while taking prescription Narcotics:  · Limit your activities  · Do not drive and operate hazardous machinery  · Do not make important personal or business decisions  · Do  not drink alcoholic beverages  · If you have not urinated within 8 hours after discharge, please contact your surgeon on call. Report the following to your surgeon:  · Excessive pain, swelling, redness or odor of or around the surgical area  · Temperature over 100.5  · Nausea and vomiting lasting longer than 4 hours or if unable to take medications  · Any signs of decreased circulation or nerve impairment to extremity: change in color, persistent  numbness, tingling, coldness or increase pain  · Any questions    What to do at Home:  Recommended activity: Activity as tolerated    If you experience any of the following symptoms increased shortness of breath, cough with yellow or green or bloody mucous, temperature > 1004, pain unrelieved by medication, nausea or vomiting unrelieved by medication then please follow up with Primary Care Physician or ER as needed. *  Please give a list of your current medications to your Primary Care Provider. *  Please update this list whenever your medications are discontinued, doses are      changed, or new medications (including over-the-counter products) are added. *  Please carry medication information at all times in case of emergency situations. These are general instructions for a healthy lifestyle:    No smoking/ No tobacco products/ Avoid exposure to second hand smoke  Surgeon General's Warning:  Quitting smoking now greatly reduces serious risk to your health.     Obesity, smoking, and sedentary lifestyle greatly increases your risk for illness    A healthy diet, regular physical exercise & weight monitoring are important for maintaining a healthy lifestyle    You may be retaining fluid if you have a history of heart failure or if you experience any of the following symptoms:  Weight gain of 3 pounds or more overnight or 5 pounds in a week, increased swelling in our hands or feet or shortness of breath while lying flat in bed. Please call your doctor as soon as you notice any of these symptoms; do not wait until your next office visit. The discharge information has been reviewed with the patient. The patient verbalized understanding. Discharge medications reviewed with the patient and appropriate educational materials and side effects teaching were provided.   ___________________________________________________________________________________________________________________________________

## 2021-03-30 NOTE — DISCHARGE SUMMARY
[address] [facility] Discharge Summary    Patient ID:  Shu Prajapati. female. 1945.  [id number]    Admit date: 3/29/2021  8:22 PM    Discharge date: 03/30/21     Admitting Physician: Shanelle Torres MD  Attending Physician: Artis Stanton MD  Primary Care Physician: Summer Wise MD     Discharge Physician: Jennyfer Rees NP    Discharged Condition: Stable    Indication for Admission: No chief complaint on file. Reason for hospitalization:   Patient Active Problem List   Diagnosis Code    Contact dermatitis and other eczema, due to unspecified cause L25.9    Esophageal reflux K21.9    Pure hypercholesterolemia E78.00    Depressive disorder, not elsewhere classified F32.9    Anxiety F41.9    Essential hypertension, benign I10    Back pain M54.9    Abnormality of gait R26.9    Frequent falls R29.6    Neck pain M54.2    Postural imbalance R29.3    Cerebellar atrophy (HCC) G31.9    Cervical stenosis of spinal canal M48.02    DDD (degenerative disc disease), cervical M50.30    Acute hypoxemic respiratory failure (HCC) J96.01    Pulmonary embolism, bilateral (HCC) I26.99    Person under investigation for COVID-19 Z20.822    Asthma J45.909    Hypokalemia E87.6    Constipation K59.00    Acute deep vein thrombosis (DVT) of both lower extremities (HCC) I82.403    S/P IVC filter Z95.828    Rotator cuff disorder, right M67.911    Baker's cyst of knee, right M71.21    Moderate tricuspid regurgitation I07.1    Moderate mitral valve regurgitation I34.0    Pulmonary HTN (HCC) I27.20    Acute blood loss anemia D62    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 Simone Young within 3 days. Follow up with your PCP within one week.   Did Patient have Sepsis (YES OR NO): [facility] [place] Course:   66 y.o. female with history of PE/DVT on Eliquis since last year following up with Dr. Aneudy Mendoza (hematology), status post IVC filter, GERD, dyslipidemia, arthritis, depression who presented to the IR for IVC filter removal.  Case discussed with Dr. Keara Henry who stated that IVC filter was embedded in IVC, retraction caused caval injury which was not thought to be through and through. Temporary ballooning was done with no further bleeding from the site of the tear; venography performed. Patient denies any abdominal pain, nausea vomiting, chest pain, shortness of breath or cough. Patient was admitted for observation to monitor signs of bleeding and further post procedure complications. Mar/30: Patient seen and examined. Gauze at R neck c/d/i.  VS stable. Afebrile. Patient denies CP, SOB, N/V, dizziness, chills, and pain at entry site. She is appropriate for discharge; case d/w Attending and IR physician. Assessment and Plan:    IVC filter removal  Hx PE/DVT  - No further bleeding; venography performed  - H&H stable  - Ok for discharge today per Dr. Keara Henry, IR Physician  - Follow up with Dr. Aneudy Mendoza within 3 days  - Plan to re-start apixaban on April 2, 2021.     HTN  - Cont lisinopril    Pulm HTN  - Cont inhalants    GERD  - Cont pantoprazole    Depression   - Cont sertraline      Discharge Exam:  Visit Vitals  BP (!) 121/45 (BP 1 Location: Left upper arm, BP Patient Position: At rest)   Pulse 76   Temp 98.2 °F (36.8 °C)   Resp 18   Ht 4' 10\" (1.473 m)   Wt 70.8 kg (156 lb)   SpO2 94%   Breastfeeding No   BMI 32.60 kg/m²      General: No acute distress, speaking in full sentences  HEENT: PERRL, oropharynx is clear, gauze at R IJ c/d/i  Neck: Supple, no lymphadenopathy, no JVD   Lungs: No tachypnea, no use of accessory muscles   Cardiovascular: Regular rate and rhythm with normal S1 and S2   Abdomen: Soft, nondistended  Extremities: No cyanosis clubbing or edema   Neuro: Nonfocal, A&O x3   Psych: Normal affect     Consults: IP CONSULT TO HOSPITALIST    Code Status: Full Code    Significant Diagnostic Studies:   CMP:   Lab Results   Component Value Date/Time     03/30/2021 06:19 AM    K 3.8 03/30/2021 06:19 AM     03/30/2021 06:19 AM    CO2 30 03/30/2021 06:19 AM    AGAP 4 (L) 03/30/2021 06:19 AM    GLU 94 03/30/2021 06:19 AM    BUN 8 03/30/2021 06:19 AM    CREA 0.42 (L) 03/30/2021 06:19 AM    GFRAA >60 03/30/2021 06:19 AM    GFRNA >60 03/30/2021 06:19 AM    CA 8.9 03/30/2021 06:19 AM    MG 2.1 03/30/2021 06:19 AM         CBC:    Lab Results   Component Value Date/Time    WBC 11.8 (H) 03/30/2021 06:19 AM    HGB 13.5 03/30/2021 06:19 AM    HCT 42.6 03/30/2021 06:19 AM     03/30/2021 06:19 AM       Lab Results   Component Value Date/Time    INR 1.3 09/15/2020 02:49 PM    INR 1.1 07/28/2020 01:40 AM    INR 1.1 07/27/2020 04:30 PM    Prothrombin time 16.3 (H) 09/15/2020 02:49 PM    Prothrombin time 14.5 07/28/2020 01:40 AM    Prothrombin time 14.0 07/27/2020 04:30 PM       ABG:  No results found for: PH, PHI, PCO2, PCO2I, PO2, PO2I, HCO3, HCO3I, FIO2, FIO2I        No results found for: CPK, RCK1, RCK2, RCK3, RCK4, CKMB, CKNDX, CKND1, TROPT, TROIQ, BNPP, BNP        Radiology Reports :   [unfilled]    EKG:  EKG Results     None          Discharge Medications:  Current Discharge Medication List      CONTINUE these medications which have NOT CHANGED    Details   apixaban (ELIQUIS) 2.5 mg tablet Take 1 Tab by mouth two (2) times a day. Qty: 180 Tab, Refills: 1    Associated Diagnoses: Pulmonary embolism, bilateral (HCC)      sucralfate (Carafate) 1 gram tablet Take 1 Tab by mouth three (3) times daily. Qty: 270 Tab, Refills: 3    Associated Diagnoses: Gastroesophageal reflux disease without esophagitis      clorazepate (TRANXENE) 3.75 mg tablet Take 1 Tab by mouth three (3) times daily. Max Daily Amount: 11.25 mg.  Indications: anxious  Qty: 270 Tab, Refills: 1    Associated Diagnoses: Anxiety montelukast (Singulair) 10 mg tablet Take 1 Tab by mouth daily. Qty: 90 Tab, Refills: 1    Associated Diagnoses: Seasonal allergic rhinitis due to pollen      sertraline (ZOLOFT) 100 mg tablet Take 1 1/2 tab daily. Qty: 135 Tab, Refills: 1    Comments: **Patient requests 90 days supply**  Associated Diagnoses: Anxiety      lisinopriL (PRINIVIL, ZESTRIL) 5 mg tablet TAKE ONE TABLET BY MOUTH EVERY DAY  Qty: 90 Tab, Refills: 0    Associated Diagnoses: Essential hypertension      olopatadine (Pataday) 0.2 % drop ophthalmic solution Administer 1 Drop to both eyes daily. Qty: 15 mL, Refills: 3    Comments: 15 ml = 3 bottles for 90 days  Associated Diagnoses: Allergic conjunctivitis of both eyes      nystatin-triamcinolone (MYCOLOG II) topical cream Apply  to affected area two (2) times a day. Qty: 60 g, Refills: 3    Associated Diagnoses: Yeast dermatitis      Iron Fum & P-FA-Vit B & C No.9 (Integra Plus) 125 mg iron- 1 mg cap Take 1 Cap by mouth daily. Qty: 30 Cap, Refills: 6    Associated Diagnoses: Iron deficiency anemia, unspecified iron deficiency anemia type      sodium chloride (Saline Nasal) 0.65 % nasal squeeze bottle 0.05 mL by Both Nostrils route four (4) times daily as needed for Congestion or Nasal Dryness. Qty: 15 mL, Refills: 0      tiotropium (Spiriva with HandiHaler) 18 mcg inhalation capsule Take 1 Cap by inhalation daily. Qty: 90 Cap, Refills: 1    Associated Diagnoses: Mild intermittent asthma, unspecified whether complicated      albuterol-ipratropium (DUO-NEB) 2.5 mg-0.5 mg/3 ml nebu 3 mL by Nebulization route every four (4) hours as needed for Wheezing or Shortness of Breath. Qty: 30 Nebule, Refills: 0      latanoprost (XALATAN) 0.005 % ophthalmic solution Administer 1 Drop to both eyes every evening. Qty: 1 Bottle, Refills: 0      polyethylene glycol (MIRALAX) 17 gram packet Take 1 Packet by mouth daily.   Qty: 14 Each, Refills: 0      magnesium hydroxide (MILK OF MAGNESIA) 400 mg/5 mL suspension Take 30 mL by mouth daily. Qty: 118 mL, Refills: 0      pantoprazole (Protonix) 40 mg tablet Take 1 Tab by mouth two (2) times a day. Qty: 180 Tab, Refills: 1    Associated Diagnoses: Gastroesophageal reflux disease without esophagitis      ProAir HFA 90 mcg/actuation inhaler USE 2 PUFFS PO BID PRF COUGH  Qty: 3 Inhaler, Refills: 1      Symbicort 80-4.5 mcg/actuation HFAA INHALE 1 PUFF PO BID FOR COUGH  Qty: 3 Inhaler, Refills: 1      cycloSPORINE (RESTASIS) 0.05 % dpet Restasis      calcium carbonate (OS-JANA) 500 mg calcium (1,250 mg) tablet Take  by mouth daily. ascorbic acid, vitamin C, (VITAMIN C) 500 mg tablet Take  by mouth.      melatonin 10 mg cap Take  by mouth. Iron Fum & PS Cmp-Vit C-Niacin (INTEGRA) 125-40-3 mg cap Take 125 mg by mouth daily. Qty: 90 Cap, Refills: 1      dietary supplement cap Take  by mouth. docusate sodium (STOOL SOFTENER) 100 mg tab Take  by mouth as needed. glucosamine & chondroit sul. Na 750-400 mg cmpk Take  by mouth. omega-3 fatty acids-vitamin e (FISH OIL) 1,000 mg cap Take 1 Cap by mouth. 1 by oral route twice daily      VIT C/ABDIAZIZ AC/LUT/COPPER/ZNOX (PRESERVISION LUTEIN PO) Take  by mouth. 1 by oral route daily      cholecalciferol (VITAMIN D3) 1,000 unit tablet Take  by mouth daily. Medications Discontinued during this hospitalization:   Medications Discontinued During This Encounter   Medication Reason    0.9% sodium chloride infusion 500 mL     heparin (PF) 2 units/ml in NS infusion 2,000-16,000 Units     lidocaine (XYLOCAINE) 20 mg/mL (2 %) injection  mg     diphenhydrAMINE (BENADRYL) injection 25-50 mg     fentaNYL citrate (PF) injection  mcg     midazolam (VERSED) injection 0.5-2 mg        Patient Instructions: Follow up with Dr. Manisha Lu after being discharged from the hospital.  Resume taking apixaban on April 2. Activity: as tolerated.     Diet:   DIET CARDIAC Regular; 2 GM NA (House Low NA)    Therapy Ordered:  No therapy plan of the specified type found. Follow-up appointments: Follow-up Appointments   Procedures    FOLLOW UP VISIT Appointment in: Other (Specify)     Standing Status:   Standing     Number of Occurrences:   1     Order Specific Question:   Appointment in     Answer: Other (Specify)     VS, labs, charting reviewed with Care team.  Ms. Terry Dejesus is appropriate for discharge today. Time Spent on Discharge greater than 30 minutes.     Marilee Valencia NP  3/30/2021 9:21 AM

## 2021-03-30 NOTE — PROGRESS NOTES
MSN, CM:  Spoke with patient this AM about discharge planning. Patient lives with a roommate \"Glenna\" in own house with ramps for entrance. Patient has a son \"Kane\" and a daughter Ether [place]ads" that live locally. Patient's  Jamal Rm away in December 2020. Patient is independent with all ADL's and uses a rollator for ambulation. Other DME includes walker, electric w/c, raised toilet seat, shower chair, and grab bars. Patient denies any home oxygen at this time. Patient confirms she has received rehab services from [facility] and [facility] services from [facility]. Patient confirms PCP is Dr. Grecia Madsen and roommate Kary Chandra drives her to all appointments. PT and OT consulted for evaluation and recommendations. PT/OT worked with patient this AM.    Patient to be discharged with no services ordered or requested. Patient agrees with this discharge plan and states \"I get around good, I don't need anything\". Patient has met all milestones for this admission. Family to transport patient home. Care Management Interventions  PCP Verified by CM: Yes(Adam)  Mode of Transport at Discharge:  Other (see comment)(family to transport)  Transition of Care Consult (CM Consult): Discharge Planning  Physical Therapy Consult: Yes  Occupational Therapy Consult: Yes  Current Support Network: Own Home, Other(roommate lives in home)  Confirm Follow Up Transport: Other (see comment)(roommate)  Freedom of Choice List was Provided with Basic Dialogue that Supports the Patient's Individualized Plan of Care/Goals, Treatment Preferences and Shares the Quality Data Associated with the Providers?: Yes  Discharge Location  Discharge Placement: Home

## 2021-03-30 NOTE — PROGRESS NOTES
ACUTE OT GOALS:  (Developed with and agreed upon by patient and/or caregiver.)  1. Patient will bathe and dress total body with supervision and adaptive device as needed. 2. Patient will toilet with modified independence and adaptive device as needed. 3. Patient will tolerate 30 minutes of OT treatment with up to 2 rest breaks to increase activity tolerance for ADLs. 4. Patient will complete functional transfers with modified independence. 5. Patient will complete functional mobility for ADLs with modified independence. 6. Patient will demonstrate improved standing balance for ADLs with stand by assistance and adaptive equipment as needed.      Timeframe: 7 visits     OCCUPATIONAL THERAPY ASSESSMENT: Initial Assessment and Daily Note OT Treatment Day # 1    Jose Eduardo Vidal is a 66 y.o. 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]  Essential hypertension [I10]    1 Day Post-Op  Reason for Referral:    ICD-10: Treatment Diagnosis: Generalized Muscle Weakness (M62.81)  Other lack of cordination (R27.8)  Repeated Falls (R29.6)  History of falling (Z91.81)  Low Back Pain (M54.5)  [place] / Product Type: Medicare /   ASSESSMENT:     REHAB RECOMMENDATIONS:   Recommendation to date pending progress:  Setting:   Short-term Rehab  Equipment:    None     PRIOR LEVEL OF FUNCTION:  (Prior to Hospitalization)  INITIAL/CURRENT LEVEL OF FUNCTION:  (Based on today's evaluation)   Bathing:   Modified Independent using shower chair  Dressing:   Independent  Feeding/Grooming:   Independent  Toileting:   Modified Independent using raised toilet seat  Functional Mobility:   Modified Independent using rollator Bathing:   Moderate Assistance  Dressing:   Minimal Assistance  Feeding/Grooming:   Contact Guard Assistance  Toileting:   Minimal Assistance  Functional Mobility:   Minimal Assistance x 2 to ModAx2     ASSESSMENT:  Ms. Brittni Jerez presents with inferior vena cava injury during IVC filter removal. At baseline pt lives with a roommate, completes ADLs and ambulates with Herbert using rollator. Endorses numerous falls, reports hx cerebellar dysfunction. Pt impulsive with deficits in safety awareness, strength, balance, and endurance impacting mobility and ADLs. Pt with poor activity tolerance and balance causing her to be at high risk for falls. Educated on benefits of STR however pt refusing, reports she is agreeable to HHOT/PT. Pt practiced functional transfers with CGA-Jasiel, ambulation with ModAx2/RW and Min-ModAx2/rollator. Frequent cueing for safety, posture, AD management, pacing. Toileting, dressing, and grooming with Jasiel/cueign for safety. Pt is functioning below baseline and would benefit from continued OT to increase safety and independence. SUBJECTIVE:   Ms. Brittni Jerez states, \"If y'all give me a hard time I won't tell you that joke. \"    SOCIAL HISTORY/LIVING ENVIRONMENT: At baseline pt lives with a roommate, completes ADLs and ambulates with Herbert using rollator. Endorses numerous falls, reports hx cerebellar dysfunction. Pt's , Sandy Reyes,  at [facility] 2020. Pt has a dog named Edin Bynum. Hx LBP.    Home Environment: Private residence  # Steps to Enter: 1  One/Two Story Residence: One story  Living Alone: No  Support Systems: Friends \ neighbors(roommate)    OBJECTIVE:     PAIN: VITAL SIGNS: LINES/DRAINS:   Pre Treatment: Pain Screen  Pain Scale 1: Numeric (0 - 10)  Pain Intensity 1: 0  Post Treatment: same Vital Signs  Pulse (Heart Rate): (!) 110  BP: (!) 113/48  MAP (Calculated): 70  BP 1 Location: Left upper arm  BP 1 Method: Automatic  BP Patient Position: Sitting  O2 Device: Room air  O2 Flow Rate (L/min): 93 l/min   O2 Device: Room air     GROSS EVALUATION:  BUEs Within Functional Limits Abnormal/ Functional Abnormal/ Non-Functional (see comments) Not Tested Comments:   AROM [] [x] [] [] R RTC tear   PROM [] [] [] [x]    Strength [] [x] [] []    Balance [] [x] [x] []    Posture [] [x] [] []    Sensation [] [] [] []    Coordination [] [x] [] []    Tone [] [] [] []    Edema [] [] [] []    Activity Tolerance [] [x] [x] []     [] [] [] []      COGNITION/  PERCEPTION: Intact Impaired   (see comments) Comments:   Orientation [x] []    Vision [x] []    Hearing [x] []    Judgment/ Insight [] [x]    Attention [x] []    Memory [x] []    Command Following [x] []    Emotional Regulation [x] []     [] []      ACTIVITIES OF DAILY LIVING: I Mod I S SBA CGA Min Mod Max Total NT Comments: Assist for balance, safety, transfers   BASIC ADLs:              Bathing/ Showering [] [] [] [] [] [] [] [] [] [x]    Toileting [] [] [] [] [] [x] [] [] [] [] Change brief   Dressing [] [] [] [] [] [x] [] [] [] [] [place] pull up   Feeding [] [] [] [] [] [] [] [] [] [x]    Grooming [] [] [] [] [x] [] [] [] [] [] Wash hands, brush teeth; standing   Personal Device Care [] [] [] [] [] [] [] [] [] [x]    Functional Mobility [] [] [] [] [] [x] [x] [] [] [] Min-ModAx2/rollator   I=Independent, Mod I=Modified Independent, S=Supervision, SBA=Standby Assistance, CGA=Contact Guard Assistance,   Min=Minimal Assistance, Mod=Moderate Assistance, Max=Maximal Assistance, Total=Total Assistance, NT=Not Tested    MOBILITY: I Mod I S SBA CGA Min Mod Max Total  NT x2 Comments:   Supine to sit [] [] [] [] [] [] [] [] [] [x] []    Sit to supine [] [] [] [] [] [] [] [] [] [x] []    Sit to stand [] [] [] [] [x] [x] [] [] [] [] []    Bed to chair [] [] [] [] [] [x] [x] [] [] [] [x] Rollator   I=Independent, Mod I=Modified Independent, S=Supervision, SBA=Standby Assistance, CGA=Contact Guard Assistance,   Min=Minimal Assistance, Mod=Moderate Assistance, Max=Maximal Assistance, Total=Total Assistance, NT=Not Tested    [address] [facility] [address] [id number] AM-PAC 6 Clicks   Daily Activity Inpatient Short Form        How much help from another person does the patient currently need. .. Total A Lot A Little None   1. Putting on and taking off regular lower body clothing? [] 1   [] 2   [x] 3   [] 4   2. Bathing (including washing, rinsing, drying)? [] 1   [x] 2   [] 3   [] 4   3. Toileting, which includes using toilet, bedpan or urinal?   [] 1   [] 2   [x] 3   [] 4   4. Putting on and taking off regular upper body clothing? [] 1   [] 2   [x] 3   [] 4   5. Taking care of personal grooming such as brushing teeth? [] 1   [] 2   [x] 3   [] 4   6. Eating meals? [] 1   [] 2   [] 3   [x] 4   © 2007, Trustees of [address] [place] [address] [address] [id number], under license to [employer]. All rights reserved     Score:  Initial: 18 3/30/21 Most Recent: X (Date: -- )   Interpretation of Tool:  Represents activities that are increasingly more difficult (i.e. Bed mobility, Transfers, Gait). PLAN:   FREQUENCY/DURATION: OT Plan of Care: 3 times/week for duration of hospital stay or until stated goals are met, whichever comes first.    PROBLEM LIST:   (Skilled intervention is medically necessary to address:)  1. Decreased ADL/Functional Activities  2. Decreased Activity Tolerance  3. Decreased AROM/PROM  4. Decreased Balance  5. Decreased Coordination  6. Decreased Gait Ability  7. Decreased Strength  8. Decreased Transfer Abilities  9. Increased Pain   INTERVENTIONS PLANNED:   (Benefits and precautions of occupational therapy have been discussed with the patient.)  1. Self Care Training  2. Therapeutic Activity  3. Therapeutic Exercise/HEP  4. Neuromuscular Re-education  5. Gait Training  6.  Education     TREATMENT:     EVALUATION: Moderate Complexity : (Untimed Charge)    TREATMENT:   (     )  Co-Treatment PT/OT necessary due to patient's decreased overall endurance/tolerance levels, as well as need for high level skilled assistance to complete functional transfers/mobility and functional tasks  Self Care (23 Minutes): Self care including Toileting, Lower Body Dressing, Grooming and ADL Adaptive Equipment Training to increase independence and decrease level of assistance required. Neuromuscular Re-education (15 Minutes): Neuromuscular Re-education included Balance Training, Coordination training, Functional mobility with facilitation, Postural training and Standing balance training to improve Balance, Coordination, Functional Mobility and Postural Control.     TREATMENT GRID:  N/A    AFTER TREATMENT POSITION/PRECAUTIONS:  Chair and Needs within reach    INTERDISCIPLINARY COLLABORATION:  RN/PCT, PT/PTA, OT/KLEIN and RN Case Manager/     TOTAL TREATMENT DURATION:  OT Patient Time In/Time Out  Time In: 0893  Time Out: Hernesto Brewster 71, OTR/L

## 2021-03-30 NOTE — PROGRESS NOTES
ACUTE PHYSICAL THERAPY GOALS:  (Developed with and agreed upon by patient and/or caregiver. )  LTG:  (1.)Ms. Nik Burns will move from supine to sit and sit to supine, scoot up and down and roll side to side in bed INDEPENDENTLY with bed flat within 7 treatment day(s). (2.)Ms. Nik Burns will transfer from bed to chair and chair to bed with MODIFIED INDEPENDENCE using the least restrictive device within 7 treatment day(s). (3.)Ms. Nik Burns will ambulate with SUPERVISION for 150+ feet with the least restrictive device within 7 treatment day(s).   ________________________________________________________________________________________________      PHYSICAL THERAPY ASSESSMENT: Initial Assessment and AM PT Treatment Day # 1      Rick Corey is a 66 y.o. 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]  Essential hypertension [I10]    1 Day Post-Op  Reason for Referral:  Weakness, unsteady gait  ICD-10: Treatment Diagnosis: Generalized Muscle Weakness (M62.81)  Difficulty in walking, Not elsewhere classified (R26.2)  Other abnormalities of gait and mobility (R26.89)  [place] / Product Type: Medicare /     ASSESSMENT:     REHAB RECOMMENDATIONS:   Recommendation to date pending progress:  Setting:   Short-term Rehab  Equipment:    To Be Determined     PRIOR LEVEL OF FUNCTION:  (Prior to Hospitalization) INITIAL/CURRENT LEVEL OF FUNCTION:  (Most Recently Demonstrated)   Bed Mobility:   Independent  Sit to Stand:   Modified Independent  Transfers:   Supervision  Gait/Mobility:   Supervision Bed Mobility:   Standby Assistance  Sit to Stand:   Contact Guard Assistance  Transfers:   Minimal Assistance x 2  Gait/Mobility:   Minimal Assistance x 2 to moderate assist of 2 for safety     ASSESSMENT:  Ms. Nik Burns is admitted for observation after a small tear in inferior vena cava following IVC filter removal. She lives with roommate and is ambulatory with rollator at baseline; chronic balance deficits due to cerebellar dysfunction per pt report with frequent falls. Presents with significantly impaired transfers, seated and standing balance, strength, activity tolerance, and impaired gait safety. Close CGA for sit-stand transfers from multiple surfaces with cueing for safety and technique. Mostly constant min assist of 1-2 for standing static and dynamic balance with brief mod assist of 2 at times. Attempted mobility with RW however pt extremely unsafe. Took seated break, then provided with rollator and attempted ambulation x several other short trials with continued very unsteady gait, poor body mechanics (forward trunk flexion), impulsivity, and poor safety awareness. Admits to feeling weaker than baseline and attributes this to \"spending a day or two in bed\". Feel she would greatly benefit from rehab at [place] however pt adamantly refusing and requesting to return home, so will need  PT at a minimum. SUBJECTIVE:   Ms. Eva Romero states, \"I'm not going to physical therapy. \"    SOCIAL HISTORY/LIVING ENVIRONMENT: Lives with roommate. Single story home with 1 step. Uses rollator at baseline. Frequent falls.    Home Environment: Private residence  # Steps to Enter: 1  One/Two Story Residence: One story  Living Alone: No  Support Systems: Friends \ neighbors(roommate)  OBJECTIVE:     PAIN: VITAL SIGNS: LINES/DRAINS:   Pre Treatment: Pain Screen  Pain Scale 1: Numeric (0 - 10)  Pain Intensity 1: 0  Post Treatment: 0/10 Vital Signs  O2 Device: Room air none  O2 Device: Room air     GROSS EVALUATION:   Within Functional Limits Abnormal/ Functional Abnormal/ Non-Functional (see comments) Not Tested Comments:   AROM [x] [] [] []    PROM [] [] [] []    Strength [] [x] [] []    Balance [] [x] [] []    Posture [] [x] [] []    Sensation [] [] [] []    Coordination [] [] [] []    Tone [] [] [] []    Edema [] [] [] []    Activity Tolerance [] [x] [] []     [] [] [] []      COGNITION/  PERCEPTION: Intact Impaired   (see comments) Comments:   Orientation [x] []    Vision [x] []    Hearing [x] []    Command Following [] []    Safety Awareness [] [x] impulsive    [] []      MOBILITY: I Mod I S SBA CGA Min Mod Max Total  NT x2 Comments:   Bed Mobility    Rolling [] [] [] [x] [] [] [] [] [] [] []    Supine to Sit [] [] [] [x] [] [] [] [] [] [] []    Scooting [] [] [] [x] [] [] [] [] [] [] []    Sit to Supine [] [] [] [] [] [] [] [] [] [x] []    Transfers    Sit to Stand [] [] [] [] [x] [] [] [] [] [] []    Bed to Chair [] [] [] [] [] [x] [] [] [] [] [x]    Stand to Sit [] [] [] [] [x] [] [] [] [] [] []    I=Independent, Mod I=Modified Independent, S=Supervision, SBA=Standby Assistance, CGA=Contact Guard Assistance,   Min=Minimal Assistance, Mod=Moderate Assistance, Max=Maximal Assistance, Total=Total Assistance, NT=Not Tested  GAIT: I Mod I S SBA CGA Min Mod Max Total  NT x2 Comments:   Level of Assistance [] [] [] [] [] [x] [x] [] [] [] [x]    Distance 10 ft x2, 30 ft, 20 ft, 10 ft (multiple short trials)    DME Rolling Walker and rollator    Gait Quality Poor balance, impaired coordination    Weightbearing Status N/A     I=Independent, Mod I=Modified Independent, S=Supervision, SBA=Standby Assistance, CGA=Contact Guard Assistance,   Min=Minimal Assistance, Mod=Moderate Assistance, Max=Maximal Assistance, Total=Total Assistance, NT=Not [facility]       How much difficulty does the patient currently have. .. Unable A Lot A Little None   1. Turning over in bed (including adjusting bedclothes, sheets and blankets)? [] 1   [] 2   [] 3   [x] 4   2. Sitting down on and standing up from a chair with arms ( e.g., wheelchair, bedside commode, etc.)   [] 1   [] 2   [x] 3   [] 4   3. Moving from lying on back to sitting on the side of the bed?    [] 1   [] 2   [x] 3   [] 4   How much help from another person does the patient currently need. .. Total A Lot A Little None   4. Moving to and from a bed to a chair (including a wheelchair)? [] 1   [] 2   [x] 3   [] 4   5. Need to walk in hospital room? [] 1   [x] 2   [] 3   [] 4   6. Climbing 3-5 steps with a railing? [x] 1   [] 2   [] 3   [] 4   © 2007, Trustees of [facility] [employer], under license to [employer]. All rights reserved     Score:  Initial: 16 Most Recent: X (Date: -- )    Interpretation of Tool:  Represents activities that are increasingly more difficult (i.e. Bed mobility, Transfers, Gait). PLAN:   FREQUENCY/DURATION: PT Plan of Care: 3 times/week for duration of hospital stay or until stated goals are met, whichever comes first.    PROBLEM LIST:   (Skilled intervention is medically necessary to address:)  1. Decreased Activity Tolerance  2. Decreased Balance  3. Decreased Coordination  4. Decreased Gait Ability  5. Decreased Strength  6. Decreased Transfer Abilities   INTERVENTIONS PLANNED:   (Benefits and precautions of physical therapy have been discussed with the patient.)  1. Therapeutic Activity  2. Therapeutic Exercise/HEP  3. Neuromuscular Re-education  4. Gait Training  5. Manual Therapy  6. Education     TREATMENT:     EVALUATION: Low Complexity : (Untimed Charge)    TREATMENT:   (     )  Co-Treatment PT/OT necessary due to patient's decreased overall endurance/tolerance levels, as well as need for high level skilled assistance to complete functional transfers/mobility and functional tasks  Therapeutic Activity (38 Minutes): Therapeutic activity included Rolling, Supine to Sit, Scooting, Transfer Training, Ambulation on level ground, Sitting balance , Standing balance and seated and standing static/dynamic balance activities to improve functional Mobility, Strength, Activity tolerance and balance, posture, gait safety.     TREATMENT GRID:  N/A    AFTER TREATMENT POSITION/PRECAUTIONS:  Chair, Needs within reach and RN notified    INTERDISCIPLINARY COLLABORATION:  RN/PCT, PT/PTA, OT/KLEIN and RN Case Manager/     TOTAL TREATMENT DURATION:  PT Patient Time In/Time Out  Time In: 0900  Time Out: [address], DPT

## 2021-03-30 NOTE — PROGRESS NOTES
TRANSFER - OUT REPORT:    Verbal report given to CATHY Holden on Elma Mcgee  being transferred to 8th floor for routine progression of care     Report consisted of patients Situation, Background, Assessment and   Recommendations(SBAR). Information from the following report(s) SBAR, Procedure Summary and MAR was reviewed with the receiving nurse. Opportunity for questions and clarification was provided. Conscious Sedation:       Pt tolerated procedure well.      Visit Vitals  BP (!) 153/70   Pulse 70   Temp 98.7 °F (37.1 °C)   Resp 16   Ht 4' 10\" (1.473 m)   Wt 70.8 kg (156 lb)   SpO2 95%   Breastfeeding No   BMI 32.60 kg/m²     Past Medical History:   Diagnosis Date    Abnormality of gait 7/18/2018    Anemia     Anxiety     Arthritis     Asthma     Calculus of kidney     Chronic pain     Contact dermatitis and other eczema, due to unspecified cause     Depressive disorder, not elsewhere classified     Encounter for chronic pain management     Esophageal reflux     Essential hypertension, benign     Frequent falls 7/18/2018    GERD (gastroesophageal reflux disease)     Pure hypercholesterolemia      Peripheral IV 03/29/21 Left;Posterior Hand (Active)

## 2021-04-02 ENCOUNTER — HOSPITAL ENCOUNTER (OUTPATIENT)
Dept: LAB | Age: 76
Discharge: HOME | End: 2021-04-02
Payer: MEDICARE

## 2021-04-02 DIAGNOSIS — I82.4Z3 ACUTE DEEP VEIN THROMBOSIS (DVT) OF DISTAL VEIN OF BOTH LOWER EXTREMITIES (HCC): ICD-10-CM

## 2021-04-02 LAB
ALBUMIN SERPL-MCNC: 3.5 G/DL (ref 3.2–4.6)
ALBUMIN/GLOB SERPL: 0.8 {RATIO} (ref 1.2–3.5)
ALP SERPL-CCNC: 78 U/L (ref 50–136)
ALT SERPL-CCNC: 27 U/L (ref 12–65)
ANION GAP SERPL CALC-SCNC: 5 MMOL/L (ref 7–16)
AST SERPL-CCNC: 19 U/L (ref 15–37)
BASOPHILS # BLD: 0 K/UL (ref 0–0.2)
BASOPHILS NFR BLD: 0 % (ref 0–2)
BILIRUB SERPL-MCNC: 0.6 MG/DL (ref 0.2–1.1)
BUN SERPL-MCNC: 9 MG/DL (ref 8–23)
CALCIUM SERPL-MCNC: 9.1 MG/DL (ref 8.3–10.4)
CHLORIDE SERPL-SCNC: 103 MMOL/L (ref 98–107)
CO2 SERPL-SCNC: 30 MMOL/L (ref 21–32)
CREAT SERPL-MCNC: 0.5 MG/DL (ref 0.6–1)
DIFFERENTIAL METHOD BLD: ABNORMAL
EOSINOPHIL # BLD: 0.2 K/UL (ref 0–0.8)
EOSINOPHIL NFR BLD: 3 % (ref 0.5–7.8)
ERYTHROCYTE [DISTWIDTH] IN BLOOD BY AUTOMATED COUNT: 15.1 % (ref 11.9–14.6)
GLOBULIN SER CALC-MCNC: 4.2 G/DL (ref 2.3–3.5)
GLUCOSE SERPL-MCNC: 99 MG/DL (ref 65–100)
HCT VFR BLD AUTO: 42.2 % (ref 35.8–46.3)
HGB BLD-MCNC: 13.3 G/DL (ref 11.7–15.4)
IMM GRANULOCYTES # BLD AUTO: 0 K/UL (ref 0–0.5)
IMM GRANULOCYTES NFR BLD AUTO: 0 % (ref 0–5)
LYMPHOCYTES # BLD: 1.9 K/UL (ref 0.5–4.6)
LYMPHOCYTES NFR BLD: 25 % (ref 13–44)
MCH RBC QN AUTO: 29.8 PG (ref 26.1–32.9)
MCHC RBC AUTO-ENTMCNC: 31.5 G/DL (ref 31.4–35)
MCV RBC AUTO: 94.6 FL (ref 79.6–97.8)
MONOCYTES # BLD: 0.8 K/UL (ref 0.1–1.3)
MONOCYTES NFR BLD: 10 % (ref 4–12)
NEUTS SEG # BLD: 4.5 K/UL (ref 1.7–8.2)
NEUTS SEG NFR BLD: 61 % (ref 43–78)
NRBC # BLD: 0 K/UL (ref 0–0.2)
PLATELET # BLD AUTO: 241 K/UL (ref 150–450)
PMV BLD AUTO: 9.6 FL (ref 9.4–12.3)
POTASSIUM SERPL-SCNC: 4.2 MMOL/L (ref 3.5–5.1)
PROT SERPL-MCNC: 7.7 G/DL (ref 6.3–8.2)
RBC # BLD AUTO: 4.46 M/UL (ref 4.05–5.2)
SODIUM SERPL-SCNC: 138 MMOL/L (ref 136–145)
WBC # BLD AUTO: 7.5 K/UL (ref 4.3–11.1)

## 2021-04-02 PROCEDURE — 80053 COMPREHEN METABOLIC PANEL: CPT

## 2021-04-02 PROCEDURE — 36415 COLL VENOUS BLD VENIPUNCTURE: CPT

## 2021-04-02 PROCEDURE — 85025 COMPLETE CBC W/AUTO DIFF WBC: CPT

## 2021-04-05 ENCOUNTER — HOSPITAL ENCOUNTER (OUTPATIENT)
Dept: MAMMOGRAPHY | Age: 76
Discharge: HOME | End: 2021-04-05
Attending: FAMILY MEDICINE

## 2021-04-05 DIAGNOSIS — Z12.31 SCREENING MAMMOGRAM, ENCOUNTER FOR: ICD-10-CM

## 2021-07-08 ENCOUNTER — HOSPITAL ENCOUNTER (OUTPATIENT)
Dept: LAB | Age: 76
Discharge: HOME | End: 2021-07-08
Payer: MEDICARE

## 2021-07-08 DIAGNOSIS — I26.99 PULMONARY EMBOLISM, BILATERAL (HCC): ICD-10-CM

## 2021-07-08 LAB
ALBUMIN SERPL-MCNC: 3.6 G/DL (ref 3.2–4.6)
ALBUMIN/GLOB SERPL: 0.9 {RATIO} (ref 1.2–3.5)
ALP SERPL-CCNC: 78 U/L (ref 50–136)
ALT SERPL-CCNC: 25 U/L (ref 12–65)
ANION GAP SERPL CALC-SCNC: 4 MMOL/L (ref 7–16)
AST SERPL-CCNC: 16 U/L (ref 15–37)
BASOPHILS # BLD: 0 K/UL (ref 0–0.2)
BASOPHILS NFR BLD: 1 % (ref 0–2)
BILIRUB SERPL-MCNC: 0.5 MG/DL (ref 0.2–1.1)
BUN SERPL-MCNC: 14 MG/DL (ref 8–23)
CALCIUM SERPL-MCNC: 9.1 MG/DL (ref 8.3–10.4)
CHLORIDE SERPL-SCNC: 104 MMOL/L (ref 98–107)
CO2 SERPL-SCNC: 29 MMOL/L (ref 21–32)
CREAT SERPL-MCNC: 0.6 MG/DL (ref 0.6–1)
DIFFERENTIAL METHOD BLD: ABNORMAL
EOSINOPHIL # BLD: 0.4 K/UL (ref 0–0.8)
EOSINOPHIL NFR BLD: 5 % (ref 0.5–7.8)
ERYTHROCYTE [DISTWIDTH] IN BLOOD BY AUTOMATED COUNT: 13.2 % (ref 11.9–14.6)
GLOBULIN SER CALC-MCNC: 4.1 G/DL (ref 2.3–3.5)
GLUCOSE SERPL-MCNC: 115 MG/DL (ref 65–100)
HCT VFR BLD AUTO: 45.4 % (ref 35.8–46.3)
HGB BLD-MCNC: 14.8 G/DL (ref 11.7–15.4)
IMM GRANULOCYTES # BLD AUTO: 0 K/UL (ref 0–0.5)
IMM GRANULOCYTES NFR BLD AUTO: 0 % (ref 0–5)
LYMPHOCYTES # BLD: 2.6 K/UL (ref 0.5–4.6)
LYMPHOCYTES NFR BLD: 30 % (ref 13–44)
MCH RBC QN AUTO: 31.6 PG (ref 26.1–32.9)
MCHC RBC AUTO-ENTMCNC: 32.6 G/DL (ref 31.4–35)
MCV RBC AUTO: 96.8 FL (ref 79.6–97.8)
MONOCYTES # BLD: 0.8 K/UL (ref 0.1–1.3)
MONOCYTES NFR BLD: 10 % (ref 4–12)
NEUTS SEG # BLD: 4.7 K/UL (ref 1.7–8.2)
NEUTS SEG NFR BLD: 55 % (ref 43–78)
NRBC # BLD: 0 K/UL (ref 0–0.2)
PLATELET # BLD AUTO: 210 K/UL (ref 150–450)
PMV BLD AUTO: 9.1 FL (ref 9.4–12.3)
POTASSIUM SERPL-SCNC: 3.9 MMOL/L (ref 3.5–5.1)
PROT SERPL-MCNC: 7.7 G/DL (ref 6.3–8.2)
RBC # BLD AUTO: 4.69 M/UL (ref 4.05–5.2)
SODIUM SERPL-SCNC: 137 MMOL/L (ref 136–145)
WBC # BLD AUTO: 8.5 K/UL (ref 4.3–11.1)

## 2021-07-08 PROCEDURE — 36415 COLL VENOUS BLD VENIPUNCTURE: CPT

## 2021-07-08 PROCEDURE — 85025 COMPLETE CBC W/AUTO DIFF WBC: CPT

## 2021-07-08 PROCEDURE — 80053 COMPREHEN METABOLIC PANEL: CPT

## 2021-08-03 PROBLEM — I10 ESSENTIAL HYPERTENSION, BENIGN: Status: RESOLVED | Noted: 2021-08-03 | Resolved: 2021-08-03

## 2021-09-01 NOTE — ED TRIAGE NOTES
Pt c/o SOB with cough x 1 week. States he was seen Friday for a nosebleed and had some low O2 levels then but they resolved. Pt appears pale and lethargic.  Masked on arrival. Crescentic Advancement Flap Text: The defect edges were debeveled with a #15 scalpel blade.  Given the location of the defect and the proximity to free margins a crescentic advancement flap was deemed most appropriate.  Using a sterile surgical marker, the appropriate advancement flap was drawn incorporating the defect and placing the expected incisions within the relaxed skin tension lines where possible.    The area thus outlined was incised deep to adipose tissue with a #15 scalpel blade.  The skin margins were undermined to an appropriate distance in all directions utilizing iris scissors.

## 2021-10-13 ENCOUNTER — HOSPITAL ENCOUNTER (OUTPATIENT)
Dept: LAB | Age: 76
Discharge: HOME | End: 2021-10-13
Payer: MEDICARE

## 2021-10-13 DIAGNOSIS — D50.9 IRON DEFICIENCY ANEMIA, UNSPECIFIED IRON DEFICIENCY ANEMIA TYPE: ICD-10-CM

## 2021-10-13 LAB
ALBUMIN SERPL-MCNC: 3.3 G/DL (ref 3.2–4.6)
ALBUMIN/GLOB SERPL: 0.8 {RATIO} (ref 1.2–3.5)
ALP SERPL-CCNC: 73 U/L (ref 50–136)
ALT SERPL-CCNC: 25 U/L (ref 12–65)
ANION GAP SERPL CALC-SCNC: 7 MMOL/L (ref 7–16)
AST SERPL-CCNC: 18 U/L (ref 15–37)
BASOPHILS # BLD: 0 K/UL (ref 0–0.2)
BASOPHILS NFR BLD: 1 % (ref 0–2)
BILIRUB SERPL-MCNC: 0.4 MG/DL (ref 0.2–1.1)
BUN SERPL-MCNC: 10 MG/DL (ref 8–23)
CALCIUM SERPL-MCNC: 8.8 MG/DL (ref 8.3–10.4)
CHLORIDE SERPL-SCNC: 102 MMOL/L (ref 98–107)
CO2 SERPL-SCNC: 29 MMOL/L (ref 21–32)
CREAT SERPL-MCNC: 0.6 MG/DL (ref 0.6–1)
DIFFERENTIAL METHOD BLD: ABNORMAL
EOSINOPHIL # BLD: 0.2 K/UL (ref 0–0.8)
EOSINOPHIL NFR BLD: 3 % (ref 0.5–7.8)
ERYTHROCYTE [DISTWIDTH] IN BLOOD BY AUTOMATED COUNT: 13.5 % (ref 11.9–14.6)
FERRITIN SERPL-MCNC: 39 NG/ML (ref 8–388)
GLOBULIN SER CALC-MCNC: 4.1 G/DL (ref 2.3–3.5)
GLUCOSE SERPL-MCNC: 96 MG/DL (ref 65–100)
HCT VFR BLD AUTO: 44.9 % (ref 35.8–46.3)
HGB BLD-MCNC: 14.5 G/DL (ref 11.7–15.4)
IMM GRANULOCYTES # BLD AUTO: 0 K/UL (ref 0–0.5)
IMM GRANULOCYTES NFR BLD AUTO: 0 % (ref 0–5)
IRON SATN MFR SERPL: 20 %
IRON SERPL-MCNC: 64 UG/DL (ref 35–150)
LYMPHOCYTES # BLD: 2.3 K/UL (ref 0.5–4.6)
LYMPHOCYTES NFR BLD: 35 % (ref 13–44)
MCH RBC QN AUTO: 31.7 PG (ref 26.1–32.9)
MCHC RBC AUTO-ENTMCNC: 32.3 G/DL (ref 31.4–35)
MCV RBC AUTO: 98.2 FL (ref 79.6–97.8)
MONOCYTES # BLD: 0.8 K/UL (ref 0.1–1.3)
MONOCYTES NFR BLD: 13 % (ref 4–12)
NEUTS SEG # BLD: 3.1 K/UL (ref 1.7–8.2)
NEUTS SEG NFR BLD: 48 % (ref 43–78)
NRBC # BLD: 0 K/UL (ref 0–0.2)
PLATELET # BLD AUTO: 190 K/UL (ref 150–450)
PMV BLD AUTO: 9 FL (ref 9.4–12.3)
POTASSIUM SERPL-SCNC: 3.9 MMOL/L (ref 3.5–5.1)
PROT SERPL-MCNC: 7.4 G/DL (ref 6.3–8.2)
RBC # BLD AUTO: 4.57 M/UL (ref 4.05–5.2)
SODIUM SERPL-SCNC: 138 MMOL/L (ref 136–145)
TIBC SERPL-MCNC: 320 UG/DL (ref 250–450)
WBC # BLD AUTO: 6.4 K/UL (ref 4.3–11.1)

## 2021-10-13 PROCEDURE — 36415 COLL VENOUS BLD VENIPUNCTURE: CPT

## 2021-10-13 PROCEDURE — 80053 COMPREHEN METABOLIC PANEL: CPT

## 2021-10-13 PROCEDURE — 85025 COMPLETE CBC W/AUTO DIFF WBC: CPT

## 2021-10-13 PROCEDURE — 83540 ASSAY OF IRON: CPT

## 2021-10-13 PROCEDURE — 82728 ASSAY OF FERRITIN: CPT

## 2021-11-18 PROBLEM — D50.9 IRON DEFICIENCY ANEMIA: Status: ACTIVE | Noted: 2021-11-18

## 2021-11-18 PROBLEM — M54.31 SCIATICA, RIGHT SIDE: Status: ACTIVE | Noted: 2021-11-18

## 2021-11-18 PROBLEM — R73.9 HIGH BLOOD SUGAR: Status: ACTIVE | Noted: 2021-11-18

## 2022-01-31 ENCOUNTER — HOSPITAL ENCOUNTER (INPATIENT)
Age: 77
LOS: 4 days | Discharge: HOME HEALTH | DRG: 193 | End: 2022-02-04
Attending: EMERGENCY MEDICINE | Admitting: FAMILY MEDICINE
Payer: MEDICARE

## 2022-01-31 ENCOUNTER — APPOINTMENT (OUTPATIENT)
Dept: GENERAL RADIOLOGY | Age: 77
DRG: 193 | End: 2022-01-31
Attending: EMERGENCY MEDICINE
Payer: MEDICARE

## 2022-01-31 ENCOUNTER — APPOINTMENT (OUTPATIENT)
Dept: CT IMAGING | Age: 77
DRG: 193 | End: 2022-01-31
Attending: EMERGENCY MEDICINE
Payer: MEDICARE

## 2022-01-31 DIAGNOSIS — Z95.828 S/P IVC FILTER: ICD-10-CM

## 2022-01-31 DIAGNOSIS — J96.01 ACUTE RESPIRATORY FAILURE WITH HYPOXIA (HCC): ICD-10-CM

## 2022-01-31 DIAGNOSIS — U07.1 COVID-19: ICD-10-CM

## 2022-01-31 DIAGNOSIS — J96.01 ACUTE HYPOXEMIC RESPIRATORY FAILURE (HCC): ICD-10-CM

## 2022-01-31 DIAGNOSIS — R91.8 PULMONARY INFILTRATE: ICD-10-CM

## 2022-01-31 DIAGNOSIS — R29.818 SUSPECTED SLEEP APNEA: ICD-10-CM

## 2022-01-31 DIAGNOSIS — R09.89 SUSPECTED CHF (CONGESTIVE HEART FAILURE): ICD-10-CM

## 2022-01-31 DIAGNOSIS — I26.99 PULMONARY EMBOLISM, BILATERAL (HCC): ICD-10-CM

## 2022-01-31 DIAGNOSIS — R53.81 DEBILITY: ICD-10-CM

## 2022-01-31 DIAGNOSIS — I27.20 PULMONARY HTN (HCC): ICD-10-CM

## 2022-01-31 DIAGNOSIS — I50.33 ACUTE ON CHRONIC DIASTOLIC CONGESTIVE HEART FAILURE (HCC): Primary | ICD-10-CM

## 2022-01-31 DIAGNOSIS — Z86.16 HISTORY OF 2019 NOVEL CORONAVIRUS DISEASE (COVID-19): ICD-10-CM

## 2022-01-31 LAB
ALBUMIN SERPL-MCNC: 2.2 G/DL (ref 3.2–4.6)
ALBUMIN/GLOB SERPL: 0.5 {RATIO} (ref 1.2–3.5)
ALP SERPL-CCNC: 90 U/L (ref 50–136)
ALT SERPL-CCNC: 16 U/L (ref 12–65)
ANION GAP SERPL CALC-SCNC: 7 MMOL/L (ref 7–16)
AST SERPL-CCNC: 15 U/L (ref 15–37)
BASOPHILS # BLD: 0 K/UL (ref 0–0.2)
BASOPHILS NFR BLD: 0 % (ref 0–2)
BILIRUB SERPL-MCNC: 0.3 MG/DL (ref 0.2–1.1)
BNP SERPL-MCNC: 2673 PG/ML
BUN SERPL-MCNC: 10 MG/DL (ref 8–23)
CALCIUM SERPL-MCNC: 8.9 MG/DL (ref 8.3–10.4)
CHLORIDE SERPL-SCNC: 106 MMOL/L (ref 98–107)
CO2 SERPL-SCNC: 27 MMOL/L (ref 21–32)
COVID-19 RAPID TEST, COVR: NOT DETECTED
CREAT SERPL-MCNC: 0.51 MG/DL (ref 0.6–1)
DIFFERENTIAL METHOD BLD: ABNORMAL
EOSINOPHIL # BLD: 0.2 K/UL (ref 0–0.8)
EOSINOPHIL NFR BLD: 2 % (ref 0.5–7.8)
ERYTHROCYTE [DISTWIDTH] IN BLOOD BY AUTOMATED COUNT: 14.7 % (ref 11.9–14.6)
FERRITIN SERPL-MCNC: 73 NG/ML (ref 8–388)
GLOBULIN SER CALC-MCNC: 4.4 G/DL (ref 2.3–3.5)
GLUCOSE SERPL-MCNC: 143 MG/DL (ref 65–100)
HCT VFR BLD AUTO: 33.5 % (ref 35.8–46.3)
HGB BLD-MCNC: 10.4 G/DL (ref 11.7–15.4)
IMM GRANULOCYTES # BLD AUTO: 0.1 K/UL (ref 0–0.5)
IMM GRANULOCYTES NFR BLD AUTO: 1 % (ref 0–5)
IRON SATN MFR SERPL: 6 %
IRON SERPL-MCNC: 13 UG/DL (ref 35–150)
LYMPHOCYTES # BLD: 1.9 K/UL (ref 0.5–4.6)
LYMPHOCYTES NFR BLD: 20 % (ref 13–44)
MAGNESIUM SERPL-MCNC: 2.1 MG/DL (ref 1.8–2.4)
MCH RBC QN AUTO: 29.9 PG (ref 26.1–32.9)
MCHC RBC AUTO-ENTMCNC: 31 G/DL (ref 31.4–35)
MCV RBC AUTO: 96.3 FL (ref 79.6–97.8)
MONOCYTES # BLD: 1.1 K/UL (ref 0.1–1.3)
MONOCYTES NFR BLD: 12 % (ref 4–12)
NEUTS SEG # BLD: 5.9 K/UL (ref 1.7–8.2)
NEUTS SEG NFR BLD: 64 % (ref 43–78)
NRBC # BLD: 0 K/UL (ref 0–0.2)
PLATELET # BLD AUTO: 357 K/UL (ref 150–450)
PMV BLD AUTO: 9.3 FL (ref 9.4–12.3)
POTASSIUM SERPL-SCNC: 3.3 MMOL/L (ref 3.5–5.1)
PROT SERPL-MCNC: 6.6 G/DL (ref 6.3–8.2)
RBC # BLD AUTO: 3.48 M/UL (ref 4.05–5.2)
SODIUM SERPL-SCNC: 140 MMOL/L (ref 136–145)
SOURCE, COVRS: NORMAL
TIBC SERPL-MCNC: 208 UG/DL (ref 250–450)
TROPONIN-HIGH SENSITIVITY: 34.6 PG/ML (ref 0–14)
TROPONIN-HIGH SENSITIVITY: 35 PG/ML (ref 0–14)
WBC # BLD AUTO: 9.3 K/UL (ref 4.3–11.1)

## 2022-01-31 PROCEDURE — 93005 ELECTROCARDIOGRAM TRACING: CPT | Performed by: EMERGENCY MEDICINE

## 2022-01-31 PROCEDURE — 80053 COMPREHEN METABOLIC PANEL: CPT

## 2022-01-31 PROCEDURE — 84484 ASSAY OF TROPONIN QUANT: CPT

## 2022-01-31 PROCEDURE — 74011250636 HC RX REV CODE- 250/636: Performed by: EMERGENCY MEDICINE

## 2022-01-31 PROCEDURE — 71260 CT THORAX DX C+: CPT

## 2022-01-31 PROCEDURE — 65660000000 HC RM CCU STEPDOWN

## 2022-01-31 PROCEDURE — 83540 ASSAY OF IRON: CPT

## 2022-01-31 PROCEDURE — 71045 X-RAY EXAM CHEST 1 VIEW: CPT

## 2022-01-31 PROCEDURE — 74011000250 HC RX REV CODE- 250: Performed by: EMERGENCY MEDICINE

## 2022-01-31 PROCEDURE — 83880 ASSAY OF NATRIURETIC PEPTIDE: CPT

## 2022-01-31 PROCEDURE — 83735 ASSAY OF MAGNESIUM: CPT

## 2022-01-31 PROCEDURE — 74011000258 HC RX REV CODE- 258: Performed by: EMERGENCY MEDICINE

## 2022-01-31 PROCEDURE — 87635 SARS-COV-2 COVID-19 AMP PRB: CPT

## 2022-01-31 PROCEDURE — 74011636637 HC RX REV CODE- 636/637: Performed by: FAMILY MEDICINE

## 2022-01-31 PROCEDURE — 74011000250 HC RX REV CODE- 250: Performed by: FAMILY MEDICINE

## 2022-01-31 PROCEDURE — 85025 COMPLETE CBC W/AUTO DIFF WBC: CPT

## 2022-01-31 PROCEDURE — 94762 N-INVAS EAR/PLS OXIMTRY CONT: CPT

## 2022-01-31 PROCEDURE — 74011250637 HC RX REV CODE- 250/637: Performed by: EMERGENCY MEDICINE

## 2022-01-31 PROCEDURE — 99285 EMERGENCY DEPT VISIT HI MDM: CPT

## 2022-01-31 PROCEDURE — 74011250637 HC RX REV CODE- 250/637: Performed by: FAMILY MEDICINE

## 2022-01-31 PROCEDURE — 82728 ASSAY OF FERRITIN: CPT

## 2022-01-31 PROCEDURE — 74011000636 HC RX REV CODE- 636: Performed by: EMERGENCY MEDICINE

## 2022-01-31 RX ORDER — SUCRALFATE 1 G/1
1 TABLET ORAL 3 TIMES DAILY
Status: DISCONTINUED | OUTPATIENT
Start: 2022-01-31 | End: 2022-02-04 | Stop reason: HOSPADM

## 2022-01-31 RX ORDER — POLYETHYLENE GLYCOL 3350 17 G/17G
17 POWDER, FOR SOLUTION ORAL DAILY PRN
Status: DISCONTINUED | OUTPATIENT
Start: 2022-01-31 | End: 2022-02-04 | Stop reason: HOSPADM

## 2022-01-31 RX ORDER — ACETAMINOPHEN 650 MG/1
650 SUPPOSITORY RECTAL
Status: DISCONTINUED | OUTPATIENT
Start: 2022-01-31 | End: 2022-02-04 | Stop reason: HOSPADM

## 2022-01-31 RX ORDER — ACETAMINOPHEN 325 MG/1
650 TABLET ORAL
Status: DISCONTINUED | OUTPATIENT
Start: 2022-01-31 | End: 2022-02-04 | Stop reason: HOSPADM

## 2022-01-31 RX ORDER — POLYETHYLENE GLYCOL 3350 17 G/17G
17 POWDER, FOR SOLUTION ORAL DAILY
Status: DISCONTINUED | OUTPATIENT
Start: 2022-02-01 | End: 2022-02-04 | Stop reason: HOSPADM

## 2022-01-31 RX ORDER — ASCORBIC ACID 500 MG
500 TABLET ORAL DAILY
Status: DISCONTINUED | OUTPATIENT
Start: 2022-02-01 | End: 2022-02-04 | Stop reason: HOSPADM

## 2022-01-31 RX ORDER — ONDANSETRON 4 MG/1
4 TABLET, ORALLY DISINTEGRATING ORAL
Status: DISCONTINUED | OUTPATIENT
Start: 2022-01-31 | End: 2022-02-04 | Stop reason: HOSPADM

## 2022-01-31 RX ORDER — ALBUTEROL SULFATE 0.83 MG/ML
2.5 SOLUTION RESPIRATORY (INHALATION)
Status: DISCONTINUED | OUTPATIENT
Start: 2022-01-31 | End: 2022-02-04 | Stop reason: HOSPADM

## 2022-01-31 RX ORDER — LATANOPROST 50 UG/ML
1 SOLUTION/ DROPS OPHTHALMIC
Status: DISCONTINUED | OUTPATIENT
Start: 2022-01-31 | End: 2022-02-04 | Stop reason: HOSPADM

## 2022-01-31 RX ORDER — SODIUM CHLORIDE 0.9 % (FLUSH) 0.9 %
5-10 SYRINGE (ML) INJECTION AS NEEDED
Status: DISCONTINUED | OUTPATIENT
Start: 2022-01-31 | End: 2022-02-03

## 2022-01-31 RX ORDER — SODIUM CHLORIDE 0.9 % (FLUSH) 0.9 %
5-40 SYRINGE (ML) INJECTION EVERY 8 HOURS
Status: DISCONTINUED | OUTPATIENT
Start: 2022-01-31 | End: 2022-02-04 | Stop reason: HOSPADM

## 2022-01-31 RX ORDER — PREDNISONE 20 MG/1
40 TABLET ORAL
Status: DISCONTINUED | OUTPATIENT
Start: 2022-01-31 | End: 2022-02-01

## 2022-01-31 RX ORDER — KETOTIFEN FUMARATE 0.35 MG/ML
1 SOLUTION/ DROPS OPHTHALMIC 2 TIMES DAILY
Status: DISCONTINUED | OUTPATIENT
Start: 2022-01-31 | End: 2022-02-04 | Stop reason: HOSPADM

## 2022-01-31 RX ORDER — BUDESONIDE AND FORMOTEROL FUMARATE DIHYDRATE 80; 4.5 UG/1; UG/1
2 AEROSOL RESPIRATORY (INHALATION)
Status: DISCONTINUED | OUTPATIENT
Start: 2022-01-31 | End: 2022-02-04 | Stop reason: HOSPADM

## 2022-01-31 RX ORDER — POTASSIUM CHLORIDE 20 MEQ/1
40 TABLET, EXTENDED RELEASE ORAL
Status: COMPLETED | OUTPATIENT
Start: 2022-01-31 | End: 2022-01-31

## 2022-01-31 RX ORDER — FUROSEMIDE 10 MG/ML
40 INJECTION INTRAMUSCULAR; INTRAVENOUS
Status: COMPLETED | OUTPATIENT
Start: 2022-01-31 | End: 2022-01-31

## 2022-01-31 RX ORDER — LISINOPRIL 5 MG/1
5 TABLET ORAL DAILY
Status: DISCONTINUED | OUTPATIENT
Start: 2022-02-01 | End: 2022-02-04 | Stop reason: HOSPADM

## 2022-01-31 RX ORDER — ONDANSETRON 2 MG/ML
4 INJECTION INTRAMUSCULAR; INTRAVENOUS
Status: DISCONTINUED | OUTPATIENT
Start: 2022-01-31 | End: 2022-02-04 | Stop reason: HOSPADM

## 2022-01-31 RX ORDER — SODIUM CHLORIDE 0.9 % (FLUSH) 0.9 %
10 SYRINGE (ML) INJECTION
Status: ACTIVE | OUTPATIENT
Start: 2022-01-31 | End: 2022-02-01

## 2022-01-31 RX ORDER — CLORAZEPATE DIPOTASSIUM 7.5 MG/1
3.75 TABLET ORAL 3 TIMES DAILY
Status: DISCONTINUED | OUTPATIENT
Start: 2022-01-31 | End: 2022-02-01

## 2022-01-31 RX ORDER — PANTOPRAZOLE SODIUM 40 MG/1
40 TABLET, DELAYED RELEASE ORAL 2 TIMES DAILY
Status: DISCONTINUED | OUTPATIENT
Start: 2022-01-31 | End: 2022-02-04 | Stop reason: HOSPADM

## 2022-01-31 RX ORDER — SODIUM CHLORIDE 0.9 % (FLUSH) 0.9 %
5-10 SYRINGE (ML) INJECTION EVERY 8 HOURS
Status: DISCONTINUED | OUTPATIENT
Start: 2022-01-31 | End: 2022-02-03

## 2022-01-31 RX ORDER — FUROSEMIDE 40 MG/1
40 TABLET ORAL DAILY
Status: DISCONTINUED | OUTPATIENT
Start: 2022-02-01 | End: 2022-02-01

## 2022-01-31 RX ORDER — MONTELUKAST SODIUM 10 MG/1
10 TABLET ORAL DAILY
Status: DISCONTINUED | OUTPATIENT
Start: 2022-02-01 | End: 2022-02-04 | Stop reason: HOSPADM

## 2022-01-31 RX ORDER — SODIUM CHLORIDE 0.9 % (FLUSH) 0.9 %
5-40 SYRINGE (ML) INJECTION AS NEEDED
Status: DISCONTINUED | OUTPATIENT
Start: 2022-01-31 | End: 2022-02-04 | Stop reason: HOSPADM

## 2022-01-31 RX ADMIN — SODIUM CHLORIDE 100 ML: 900 INJECTION, SOLUTION INTRAVENOUS at 17:30

## 2022-01-31 RX ADMIN — FUROSEMIDE 40 MG: 40 INJECTION, SOLUTION INTRAMUSCULAR; INTRAVENOUS at 20:10

## 2022-01-31 RX ADMIN — IOPAMIDOL 100 ML: 755 INJECTION, SOLUTION INTRAVENOUS at 17:30

## 2022-01-31 RX ADMIN — PANTOPRAZOLE SODIUM 40 MG: 40 TABLET, DELAYED RELEASE ORAL at 20:10

## 2022-01-31 RX ADMIN — KETOTIFEN FUMARATE 1 DROP: 0.35 SOLUTION/ DROPS OPHTHALMIC at 20:48

## 2022-01-31 RX ADMIN — SUCRALFATE 1 G: 1 TABLET ORAL at 21:32

## 2022-01-31 RX ADMIN — SODIUM CHLORIDE, PRESERVATIVE FREE 10 ML: 5 INJECTION INTRAVENOUS at 22:00

## 2022-01-31 RX ADMIN — PREDNISONE 40 MG: 10 TABLET ORAL at 20:09

## 2022-01-31 RX ADMIN — POTASSIUM CHLORIDE 40 MEQ: 20 TABLET, EXTENDED RELEASE ORAL at 21:32

## 2022-01-31 RX ADMIN — NITROGLYCERIN 1 INCH: 20 OINTMENT TOPICAL at 20:10

## 2022-01-31 NOTE — ED PROVIDER NOTES
Patient is a 75-year-old female presenting to the emergency department today secondary to increasing shortness of breath which started last night. The patient states she is not having any chest pain. 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 Patient says she has had a persistent cough ever since she was diagnosed with Covid in December 2021. The patient denies any abdominal pain nausea vomiting. She was found to be in the upper 80s on room air when she was seen by EMS and they put her on 4 L nasal cannula and able to get her oxygen saturation to 96%. The patient says she started to feel little bit better. She was given Solu-Medrol and terbutaline and a breathing treatment in route.            Past Medical History:   Diagnosis Date    Abnormality of gait 7/18/2018    Anemia     Anxiety     Arthritis     Asthma     Calculus of kidney     Chronic pain     Contact dermatitis and other eczema, due to unspecified cause     Depressive disorder, not elsewhere classified     Encounter for chronic pain management     Esophageal reflux     Essential hypertension, benign     Frequent falls 7/18/2018    GERD (gastroesophageal reflux disease)     Pure hypercholesterolemia        Past Surgical History:   Procedure Laterality Date    HX CATARACT REMOVAL Bilateral     HX COLONOSCOPY      HX GI      HX HYSTERECTOMY      HX KNEE REPLACEMENT Right     HX LITHOTRIPSY      HX RETINAL DETACHMENT REPAIR      HX SHOULDER REPLACEMENT Left     IR REMOVE IVC FILTER W SI  3/29/2021    IR THROMBECTOMY MECH ART PRIMARY NON CHIRAG OR INTRACRANIAL  7/28/2020    NEUROLOGICAL PROCEDURE UNLISTED           Family History:   Problem Relation Age of Onset    OSTEOARTHRITIS Mother     Cancer Father     Stroke Maternal Grandmother     Stroke Paternal Grandmother        Social History     Socioeconomic History    Marital status:  Spouse name: Not on file    Number of children: Not on file    Years of education: Not on file    Highest education level: Not on file   Occupational History    Not on file   Tobacco Use    Smoking status: Never Smoker    Smokeless tobacco: Never Used   Substance and Sexual Activity    Alcohol use: No    Drug use: No    Sexual activity: Not on file   Other Topics Concern    Not on file   Social History Narrative    Patient and  reside together. She denies any in-house stairs. 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 Activity is reported as cooking and household chores as tolerable. Exercise is reported as intolerable. Social Determinants of Health     Financial Resource Strain:     Difficulty of Paying Living Expenses: Not on file   Food Insecurity:     Worried About Running Out of Food in the Last Year: Not on file    Theresa of Food in the Last Year: Not on file   Transportation Needs:     Lack of Transportation (Medical): Not on file    Lack of Transportation (Non-Medical):  Not on file   Physical Activity:     Days of Exercise per Week: Not on file    Minutes of Exercise per Session: Not on file   Stress:     Feeling of Stress : Not on file   Social Connections:     Frequency of Communication with Friends and Family: Not on file    Frequency of Social Gatherings with Friends and Family: Not on file    Attends [employer] Services: Not on file    Active Member of Clubs or Organizations: Not on file    Attends Club or Organization Meetings: Not on file    Marital Status: Not on file   Intimate Partner Violence:     Fear of Current or Ex-Partner: Not on file    Emotionally Abused: Not on file    Physically Abused: Not on file    Sexually Abused: Not on file   Housing Stability:     Unable to Pay for Housing in the Last Year: Not on file    Number of [place] in the Last Year: Not on file    Unstable Housing in the Last Year: Not on file         ALLERGIES: Flexeril [cyclobenzaprine], Ketoprofen, and Plaquenil [hydroxychloroquine]    Review of Systems   Constitutional: Positive for fatigue. Respiratory: Positive for cough and shortness of breath. All other systems reviewed and are negative. Vitals:    01/31/22 1551 01/31/22 1816 01/31/22 1831 01/31/22 1846   BP: (!) 145/56 137/70 (!) 145/73 (!) 142/71   Pulse: (!) 103 95 (!) 104 (!) 104   Resp: 30 27 28 25   Temp: 98.1 °F (36.7 °C)      SpO2: 96% 93% 95% 94%   Weight: 70.3 kg (155 lb)      Height: 5' (1.524 m)               Physical Exam     GENERAL:The patient has Body mass index is 30.27 kg/m². Well-hydrated. VITAL SIGNS: Heart rate, blood pressure, respiratory rate reviewed as recorded in  nurse's notes  EYES: Pupils reactive. Extraocular motion intact. No conjunctival redness or drainage. NECK: Supple, no meningeal signs. Trachea midline. No masses or thyromegaly. LUNGS:  accessory muscle use with respirations. Tachypnea present. The patient has some scattered rhonchi and diminished breath sounds in the lower lung fields. CHEST: No deformity  CARDIOVASCULAR: Sinus tachycardia no murmurs gallops or rubs appreciated. ABDOMEN: Soft without tenderness. No palpable masses or organomegaly. No  peritoneal signs. No rigidity. EXTREMITIES: No clubbing or cyanosis. No joint swelling. Normal muscle tone. No  restricted range of motion appreciated. NEUROLOGIC: Sensation is grossly intact. Cranial nerve exam reveals face is  symmetrical, tongue is midline speech is clear. SKIN: No rash or petechiae. Good skin turgor palpated. PSYCHIATRIC: Alert and oriented. Appropriate behavior and judgment.       MDM  Number of Diagnoses or Management Options  Diagnosis management comments: acute exacerbation asthma, COPD, CHF, COVID-19    Bronchitis, aspiration pneumonia, pneumonia,    PE, rib fracture, rib dysfunction, pleurisy, pneumothorax, aspiration of foreign body         Amount and/or Complexity of Data Reviewed  Clinical lab tests: ordered and reviewed  Tests in the radiology section of CPT®: ordered and reviewed  Tests in the medicine section of CPT®: reviewed and ordered  Review and summarize past medical records: yes  Independent visualization of images, tracings, or specimens: yes      ED Course as of 01/31/22 1907 Mon Jan 31, 2022   1709 COVID-19 rapid test: Not detected []   1709 XR Chest Port  IMPRESSIONs: New edema-like pattern to the lungs. [KH]   1751 NT pro-BNP(!): 2,673 [KH]   1906 Talk to the patient by the findings in the emergency department need for admission to the hospital.  The patient is agreeable this plan. Hospitalist was consulted through the use of the perfect serve system and they will come and see the patient. []      ED Course User Index  [] Jihan Zee DO       EKG    Date/Time: 1/31/2022 5:11 PM  Performed by: Jihan Zee DO  Authorized by: Jihan Zee DO     ECG reviewed by ED Physician in the absence of a cardiologist: yes    Comments:      Sinus tachycardia rate 104 bpm with no ST elevations appreciated.

## 2022-01-31 NOTE — ED TRIAGE NOTES
Pt arrives from home via [facility]. Called to home for worsening SOB that started last night. Covid 12/29 and ended up hospitalized with Covid Pneumonia. 2.5 albuterol, 125 solumedrol, .25 terbutaline, 150 NS given by EMS. Tachy at 120's when EMS arrived.   Initial O2 91% on RA, placed on 4L NC

## 2022-02-01 ENCOUNTER — APPOINTMENT (OUTPATIENT)
Dept: GENERAL RADIOLOGY | Age: 77
DRG: 193 | End: 2022-02-01
Attending: INTERNAL MEDICINE
Payer: MEDICARE

## 2022-02-01 ENCOUNTER — HOSPITAL ENCOUNTER (INPATIENT)
Dept: NON INVASIVE DIAGNOSTICS | Age: 77
Discharge: HOME | DRG: 193 | End: 2022-02-01
Attending: FAMILY MEDICINE
Payer: MEDICARE

## 2022-02-01 PROBLEM — R29.818 SUSPECTED SLEEP APNEA: Status: ACTIVE | Noted: 2022-02-01

## 2022-02-01 PROBLEM — R53.81 DEBILITY: Status: ACTIVE | Noted: 2022-02-01

## 2022-02-01 PROBLEM — Z86.16 HISTORY OF 2019 NOVEL CORONAVIRUS DISEASE (COVID-19): Status: ACTIVE | Noted: 2022-02-01

## 2022-02-01 PROBLEM — R91.8 PULMONARY INFILTRATE: Status: ACTIVE | Noted: 2022-02-01

## 2022-02-01 LAB
ANION GAP SERPL CALC-SCNC: 5 MMOL/L (ref 7–16)
ATRIAL RATE: 104 BPM
B PERT DNA SPEC QL NAA+PROBE: NOT DETECTED
BORDETELLA PARAPERTUSSIS PCR, BORPAR: NOT DETECTED
BUN SERPL-MCNC: 11 MG/DL (ref 8–23)
C PNEUM DNA SPEC QL NAA+PROBE: NOT DETECTED
CALCIUM SERPL-MCNC: 9.1 MG/DL (ref 8.3–10.4)
CALCULATED P AXIS, ECG09: 20 DEGREES
CALCULATED R AXIS, ECG10: 0 DEGREES
CALCULATED T AXIS, ECG11: -39 DEGREES
CHLORIDE SERPL-SCNC: 106 MMOL/L (ref 98–107)
CO2 SERPL-SCNC: 31 MMOL/L (ref 21–32)
CREAT SERPL-MCNC: 0.62 MG/DL (ref 0.6–1)
DIAGNOSIS, 93000: NORMAL
ECHO AO ASC DIAM: 2.9 CM
ECHO AO ASCENDING AORTA INDEX: 1.74 CM/M2
ECHO AO ROOT DIAM: 2.8 CM
ECHO AO ROOT INDEX: 1.68 CM/M2
ECHO AV AREA PEAK VELOCITY: 2.1 CM2
ECHO AV AREA VTI: 2.4 CM2
ECHO AV AREA/BSA PEAK VELOCITY: 1.3 CM2/M2
ECHO AV AREA/BSA VTI: 1.4 CM2/M2
ECHO AV MEAN GRADIENT: 4 MMHG
ECHO AV MEAN VELOCITY: 1 M/S
ECHO AV PEAK GRADIENT: 10 MMHG
ECHO AV PEAK VELOCITY: 1.6 M/S
ECHO AV VELOCITY RATIO: 0.69
ECHO AV VTI: 30.5 CM
ECHO EST RA PRESSURE: 3 MMHG
ECHO IVC PROX: 2 CM
ECHO LA AREA 2C: 19.4 CM2
ECHO LA AREA 4C: 23.6 CM2
ECHO LA DIAMETER INDEX: 1.74 CM/M2
ECHO LA DIAMETER: 2.9 CM
ECHO LA MAJOR AXIS: 6 CM
ECHO LA MINOR AXIS: 5.3 CM
ECHO LA TO AORTIC ROOT RATIO: 1.04
ECHO LA VOL BP: 68 ML (ref 22–52)
ECHO LA VOL/BSA BIPLANE: 41 ML/M2 (ref 16–34)
ECHO LV E' LATERAL VELOCITY: 8 CM/S
ECHO LV E' SEPTAL VELOCITY: 7 CM/S
ECHO LV EDV A2C: 47 ML
ECHO LV EDV A4C: 56 ML
ECHO LV EDV BP: 52 ML (ref 56–104)
ECHO LV EDV INDEX A4C: 34 ML/M2
ECHO LV EDV INDEX BP: 31 ML/M2
ECHO LV EDV NDEX A2C: 28 ML/M2
ECHO LV EJECTION FRACTION A2C: 42 %
ECHO LV EJECTION FRACTION A4C: 52 %
ECHO LV EJECTION FRACTION BIPLANE: 45 % (ref 55–100)
ECHO LV ESV A2C: 27 ML
ECHO LV ESV A4C: 27 ML
ECHO LV ESV INDEX A2C: 16 ML/M2
ECHO LV ESV INDEX A4C: 16 ML/M2
ECHO LV FRACTIONAL SHORTENING: 32 % (ref 28–44)
ECHO LV INTERNAL DIMENSION DIASTOLE INDEX: 2.22 CM/M2
ECHO LV INTERNAL DIMENSION DIASTOLIC: 3.7 CM (ref 3.9–5.3)
ECHO LV INTERNAL DIMENSION SYSTOLIC INDEX: 1.5 CM/M2
ECHO LV INTERNAL DIMENSION SYSTOLIC: 2.5 CM
ECHO LV IVSD: 0.9 CM (ref 0.6–0.9)
ECHO LV MASS 2D: 89.5 G (ref 67–162)
ECHO LV MASS INDEX 2D: 53.6 G/M2 (ref 43–95)
ECHO LV POSTERIOR WALL DIASTOLIC: 0.8 CM (ref 0.6–0.9)
ECHO LV RELATIVE WALL THICKNESS RATIO: 0.43
ECHO LVOT AREA: 3.1 CM2
ECHO LVOT AV VTI INDEX: 0.76
ECHO LVOT DIAM: 2 CM
ECHO LVOT MEAN GRADIENT: 2 MMHG
ECHO LVOT PEAK GRADIENT: 4 MMHG
ECHO LVOT PEAK VELOCITY: 1.1 M/S
ECHO LVOT STROKE VOLUME INDEX: 43.8 ML/M2
ECHO LVOT SV: 73.2 ML
ECHO LVOT VTI: 23.3 CM
ECHO MV A VELOCITY: 1.07 M/S
ECHO MV AREA VTI: 2.4 CM2
ECHO MV E DECELERATION TIME (DT): 185 MS
ECHO MV E VELOCITY: 0.93 M/S
ECHO MV E/A RATIO: 0.87
ECHO MV E/E' LATERAL: 11.63
ECHO MV E/E' RATIO (AVERAGED): 12.46
ECHO MV E/E' SEPTAL: 13.29
ECHO MV LVOT VTI INDEX: 1.31
ECHO MV MAX VELOCITY: 1.1 M/S
ECHO MV MEAN GRADIENT: 3 MMHG
ECHO MV MEAN VELOCITY: 0.8 M/S
ECHO MV PEAK GRADIENT: 5 MMHG
ECHO MV VTI: 30.5 CM
ECHO PV ACCELERATION TIME (AT): 169 MS
ECHO PV MAX VELOCITY: 0.9 M/S
ECHO PV PEAK GRADIENT: 3 MMHG
ECHO RV BASAL DIMENSION: 3.9 CM
ECHO RV MID DIMENSION: 2.9 CM
ECHO RV TAPSE: 1.6 CM (ref 1.5–2)
ERYTHROCYTE [DISTWIDTH] IN BLOOD BY AUTOMATED COUNT: 14.5 % (ref 11.9–14.6)
FLUAV SUBTYP SPEC NAA+PROBE: NOT DETECTED
FLUBV RNA SPEC QL NAA+PROBE: NOT DETECTED
GLUCOSE BLD STRIP.AUTO-MCNC: 127 MG/DL (ref 65–100)
GLUCOSE BLD STRIP.AUTO-MCNC: 152 MG/DL (ref 65–100)
GLUCOSE SERPL-MCNC: 241 MG/DL (ref 65–100)
HADV DNA SPEC QL NAA+PROBE: NOT DETECTED
HCOV 229E RNA SPEC QL NAA+PROBE: NOT DETECTED
HCOV HKU1 RNA SPEC QL NAA+PROBE: NOT DETECTED
HCOV NL63 RNA SPEC QL NAA+PROBE: NOT DETECTED
HCOV OC43 RNA SPEC QL NAA+PROBE: NOT DETECTED
HCT VFR BLD AUTO: 32.1 % (ref 35.8–46.3)
HGB BLD-MCNC: 10.1 G/DL (ref 11.7–15.4)
HMPV RNA SPEC QL NAA+PROBE: NOT DETECTED
HPIV1 RNA SPEC QL NAA+PROBE: NOT DETECTED
HPIV2 RNA SPEC QL NAA+PROBE: NOT DETECTED
HPIV3 RNA SPEC QL NAA+PROBE: NOT DETECTED
HPIV4 RNA SPEC QL NAA+PROBE: NOT DETECTED
M PNEUMO DNA SPEC QL NAA+PROBE: NOT DETECTED
MCH RBC QN AUTO: 30 PG (ref 26.1–32.9)
MCHC RBC AUTO-ENTMCNC: 31.5 G/DL (ref 31.4–35)
MCV RBC AUTO: 95.3 FL (ref 79.6–97.8)
NRBC # BLD: 0 K/UL (ref 0–0.2)
P-R INTERVAL, ECG05: 136 MS
PLATELET # BLD AUTO: 366 K/UL (ref 150–450)
PMV BLD AUTO: 10 FL (ref 9.4–12.3)
POTASSIUM SERPL-SCNC: 4.3 MMOL/L (ref 3.5–5.1)
Q-T INTERVAL, ECG07: 360 MS
QRS DURATION, ECG06: 95 MS
QTC CALCULATION (BEZET), ECG08: 474 MS
RBC # BLD AUTO: 3.37 M/UL (ref 4.05–5.2)
RSV RNA SPEC QL NAA+PROBE: NOT DETECTED
RV+EV RNA SPEC QL NAA+PROBE: NOT DETECTED
SARS-COV-2 PCR, COVPCR: NOT DETECTED
SERVICE CMNT-IMP: ABNORMAL
SERVICE CMNT-IMP: ABNORMAL
SODIUM SERPL-SCNC: 142 MMOL/L (ref 136–145)
VENTRICULAR RATE, ECG03: 104 BPM
WBC # BLD AUTO: 5.2 K/UL (ref 4.3–11.1)

## 2022-02-01 PROCEDURE — 85613 RUSSELL VIPER VENOM DILUTED: CPT

## 2022-02-01 PROCEDURE — 99223 1ST HOSP IP/OBS HIGH 75: CPT | Performed by: INTERNAL MEDICINE

## 2022-02-01 PROCEDURE — 74011250637 HC RX REV CODE- 250/637: Performed by: FAMILY MEDICINE

## 2022-02-01 PROCEDURE — 0202U NFCT DS 22 TRGT SARS-COV-2: CPT

## 2022-02-01 PROCEDURE — 82962 GLUCOSE BLOOD TEST: CPT

## 2022-02-01 PROCEDURE — 94640 AIRWAY INHALATION TREATMENT: CPT

## 2022-02-01 PROCEDURE — 94664 DEMO&/EVAL PT USE INHALER: CPT

## 2022-02-01 PROCEDURE — 85027 COMPLETE CBC AUTOMATED: CPT

## 2022-02-01 PROCEDURE — 74011636637 HC RX REV CODE- 636/637: Performed by: FAMILY MEDICINE

## 2022-02-01 PROCEDURE — 74011250636 HC RX REV CODE- 250/636: Performed by: PHYSICIAN ASSISTANT

## 2022-02-01 PROCEDURE — 65660000000 HC RM CCU STEPDOWN

## 2022-02-01 PROCEDURE — 93306 TTE W/DOPPLER COMPLETE: CPT

## 2022-02-01 PROCEDURE — 97161 PT EVAL LOW COMPLEX 20 MIN: CPT

## 2022-02-01 PROCEDURE — 36415 COLL VENOUS BLD VENIPUNCTURE: CPT

## 2022-02-01 PROCEDURE — 74011000250 HC RX REV CODE- 250: Performed by: EMERGENCY MEDICINE

## 2022-02-01 PROCEDURE — 86431 RHEUMATOID FACTOR QUANT: CPT

## 2022-02-01 PROCEDURE — 97535 SELF CARE MNGMENT TRAINING: CPT

## 2022-02-01 PROCEDURE — 97530 THERAPEUTIC ACTIVITIES: CPT

## 2022-02-01 PROCEDURE — 74011000250 HC RX REV CODE- 250: Performed by: FAMILY MEDICINE

## 2022-02-01 PROCEDURE — 97166 OT EVAL MOD COMPLEX 45 MIN: CPT

## 2022-02-01 PROCEDURE — 74011636637 HC RX REV CODE- 636/637: Performed by: PHYSICIAN ASSISTANT

## 2022-02-01 PROCEDURE — 80048 BASIC METABOLIC PNL TOTAL CA: CPT

## 2022-02-01 PROCEDURE — 86580 TB INTRADERMAL TEST: CPT | Performed by: FAMILY MEDICINE

## 2022-02-01 PROCEDURE — 74011250637 HC RX REV CODE- 250/637: Performed by: PHYSICIAN ASSISTANT

## 2022-02-01 RX ORDER — CLORAZEPATE DIPOTASSIUM 7.5 MG/1
3.75 TABLET ORAL 3 TIMES DAILY
Status: DISCONTINUED | OUTPATIENT
Start: 2022-02-01 | End: 2022-02-01

## 2022-02-01 RX ORDER — INSULIN LISPRO 100 [IU]/ML
0-10 INJECTION, SOLUTION INTRAVENOUS; SUBCUTANEOUS
Status: DISCONTINUED | OUTPATIENT
Start: 2022-02-01 | End: 2022-02-04 | Stop reason: HOSPADM

## 2022-02-01 RX ORDER — CLORAZEPATE DIPOTASSIUM 7.5 MG/1
3.75 TABLET ORAL 2 TIMES DAILY
Status: DISCONTINUED | OUTPATIENT
Start: 2022-02-01 | End: 2022-02-04 | Stop reason: HOSPADM

## 2022-02-01 RX ORDER — FUROSEMIDE 10 MG/ML
40 INJECTION INTRAMUSCULAR; INTRAVENOUS 2 TIMES DAILY
Status: DISCONTINUED | OUTPATIENT
Start: 2022-02-01 | End: 2022-02-01

## 2022-02-01 RX ORDER — FUROSEMIDE 40 MG/1
40 TABLET ORAL DAILY
Status: DISCONTINUED | OUTPATIENT
Start: 2022-02-02 | End: 2022-02-04 | Stop reason: HOSPADM

## 2022-02-01 RX ORDER — CLORAZEPATE DIPOTASSIUM 7.5 MG/1
7.5 TABLET ORAL ONCE
Status: COMPLETED | OUTPATIENT
Start: 2022-02-01 | End: 2022-02-01

## 2022-02-01 RX ADMIN — LATANOPROST 1 DROP: 50 SOLUTION OPHTHALMIC at 21:21

## 2022-02-01 RX ADMIN — CLORAZEPATE DIPOTASSIUM 7.5 MG: 7.5 TABLET ORAL at 02:17

## 2022-02-01 RX ADMIN — CLORAZEPATE DIPOTASSIUM 3.75 MG: 7.5 TABLET ORAL at 11:54

## 2022-02-01 RX ADMIN — KETOTIFEN FUMARATE 1 DROP: 0.35 SOLUTION/ DROPS OPHTHALMIC at 21:21

## 2022-02-01 RX ADMIN — FUROSEMIDE 40 MG: 40 INJECTION, SOLUTION INTRAMUSCULAR; INTRAVENOUS at 11:54

## 2022-02-01 RX ADMIN — SODIUM CHLORIDE, PRESERVATIVE FREE 10 ML: 5 INJECTION INTRAVENOUS at 21:22

## 2022-02-01 RX ADMIN — CLORAZEPATE DIPOTASSIUM 3.75 MG: 7.5 TABLET ORAL at 21:18

## 2022-02-01 RX ADMIN — KETOTIFEN FUMARATE 1 DROP: 0.35 SOLUTION/ DROPS OPHTHALMIC at 11:57

## 2022-02-01 RX ADMIN — SODIUM CHLORIDE, PRESERVATIVE FREE 10 ML: 5 INJECTION INTRAVENOUS at 14:00

## 2022-02-01 RX ADMIN — APIXABAN 2.5 MG: 2.5 TABLET, FILM COATED ORAL at 21:18

## 2022-02-01 RX ADMIN — BUDESONIDE AND FORMOTEROL FUMARATE DIHYDRATE 2 PUFF: 80; 4.5 AEROSOL RESPIRATORY (INHALATION) at 08:49

## 2022-02-01 RX ADMIN — POLYETHYLENE GLYCOL 3350 17 G: 17 POWDER, FOR SOLUTION ORAL at 11:58

## 2022-02-01 RX ADMIN — Medication 500 MG: at 11:53

## 2022-02-01 RX ADMIN — LATANOPROST 1 DROP: 50 SOLUTION OPHTHALMIC at 02:01

## 2022-02-01 RX ADMIN — SUCRALFATE 1 G: 1 TABLET ORAL at 17:14

## 2022-02-01 RX ADMIN — TUBERCULIN PURIFIED PROTEIN DERIVATIVE 5 UNITS: 5 INJECTION INTRADERMAL at 14:52

## 2022-02-01 RX ADMIN — APIXABAN 2.5 MG: 2.5 TABLET, FILM COATED ORAL at 11:53

## 2022-02-01 RX ADMIN — LISINOPRIL 5 MG: 5 TABLET ORAL at 11:54

## 2022-02-01 RX ADMIN — PANTOPRAZOLE SODIUM 40 MG: 40 TABLET, DELAYED RELEASE ORAL at 11:53

## 2022-02-01 RX ADMIN — PANTOPRAZOLE SODIUM 40 MG: 40 TABLET, DELAYED RELEASE ORAL at 21:18

## 2022-02-01 RX ADMIN — MONTELUKAST 10 MG: 10 TABLET, FILM COATED ORAL at 11:53

## 2022-02-01 RX ADMIN — SUCRALFATE 1 G: 1 TABLET ORAL at 11:53

## 2022-02-01 RX ADMIN — SODIUM CHLORIDE, PRESERVATIVE FREE 10 ML: 5 INJECTION INTRAVENOUS at 21:19

## 2022-02-01 RX ADMIN — SERTRALINE 150 MG: 100 TABLET, FILM COATED ORAL at 17:14

## 2022-02-01 RX ADMIN — PREDNISONE 40 MG: 10 TABLET ORAL at 17:14

## 2022-02-01 RX ADMIN — INSULIN LISPRO 2 UNITS: 100 INJECTION, SOLUTION INTRAVENOUS; SUBCUTANEOUS at 21:19

## 2022-02-01 RX ADMIN — BUDESONIDE AND FORMOTEROL FUMARATE DIHYDRATE 2 PUFF: 80; 4.5 AEROSOL RESPIRATORY (INHALATION) at 21:15

## 2022-02-01 RX ADMIN — SUCRALFATE 1 G: 1 TABLET ORAL at 21:19

## 2022-02-01 NOTE — PROGRESS NOTES
ACUTE OT GOALS:  (Developed with and agreed upon by patient and/or caregiver.)  1. Pt will toilet with min A   2. Pt will complete functional transfers for ADLs with min A  3. Pt will complete lower body dressing with min A using AE as needed  4. Pt will complete grooming and hygiene with set up  5. Pt will demonstrate independence with HEP to promote increased BUE strength and functional use for ADLs  6. Pt will tolerate 15 minutes functional activity with min or fewer rest breaks to promote increased endurance for ADLs  7. Pt will complete upper body bathing and dressing with set up using AE as needed    Timeframe: 7 days      OCCUPATIONAL THERAPY ASSESSMENT: Initial Assessment and Daily Note OT Treatment Day # 1    Parker Obando is a 64 y.o. female   PRIMARY DIAGNOSIS: Acute hypoxemic respiratory failure (HCC)  Suspected CHF (congestive heart failure) [R09.89]  Hypokalemia [E87.6]       Reason for Referral:  Generalized weakness, CHF  ICD-10: Treatment Diagnosis: Generalized Muscle Weakness (M62.81)  History of falling (Z91.81)  [place]PAT[place]: [place] / [place] / Product Type: Medicare /   ASSESSMENT:     REHAB RECOMMENDATIONS:   Recommendation to date pending progress:  [address] [place] [address]  Equipment:    None     PRIOR LEVEL OF FUNCTION:  (Prior to Hospitalization)  INITIAL/CURRENT LEVEL OF FUNCTION:  (Based on today's evaluation)   Bathing:   Minimal Assistance  Dressing:   Minimal Assistance  Feeding/Grooming:   Independent  Toileting:   Minimal Assistance  Functional Mobility:   Minimal Assistance Bathing:   Moderate Assistance  Dressing:   Moderate Assistance  Feeding/Grooming:   Contact Guard Assistance  Toileting:   Moderate Assistance  Functional Mobility:   Moderate Assistance     ASSESSMENT:  Ms. Khris Hopkins presented generally weak with increased O2 needs and deficits in strength, endurance, mobility, and balance impacting ADLs.  Pt reports that she was independent at baseline but has been requiring assistance for ADLs since she recently had COVID. Pt demonstrated ADLs with min-mod A, mobility for ADLs with mod A to stand. Pt is below her functional baseline and would benefit from skilled OT services to address deficits. SUBJECTIVE:   Ms. Jessica Cotton states, \"I was independent before I had COVID. \"    SOCIAL HISTORY/LIVING ENVIRONMENT: Lives w/ friend who assists w/ bathing, dressing, toileting, and transfers. Is able to complete UB bathing/ dressing but requires some assistance for LB, is most challenged by standing and maintaining standing balance during ADLs. WC for mobility. 1L home w/ ramp. Sponge bathes. Elevated toilet, BSC. Home Environment: Private residence  # Steps to Enter: 0  One/Two Story Residence: One story  Living Alone: No  Support Systems: Friend/Neighbor    OBJECTIVE:     PAIN: VITAL SIGNS: LINES/DRAINS:   Pre Treatment: Pain Screen  Pain Scale 1: Numeric (0 - 10)  Pain Intensity 1: 0  Post Treatment: 0   Purewick  O2 Device: Nasal cannula     GROSS EVALUATION:  BUE Within Functional Limits Abnormal/ Functional Abnormal/ Non-Functional (see comments) Not Tested Comments:   AROM [] [x] [] [] Poor ROM R shoulder d/t rotator cuff problems.     PROM [] [] [] []    Strength [] [x] [] []    Balance [] [x] [] []    Posture [] [] [] []    Sensation [] [] [] []    Coordination [] [x] [] []    Tone [] [] [] []    Edema [] [] [] []    Activity Tolerance [] [x] [] []     [] [] [] []      COGNITION/  PERCEPTION: Intact Impaired   (see comments) Comments:   Orientation [x] []    Vision [x] []    Hearing [x] []    Judgment/ Insight [x] []    Attention [x] []    Memory [x] []    Command Following [x] []    Emotional Regulation [x] []     [] []      ACTIVITIES OF DAILY LIVING: I Mod I S SBA CGA Min Mod Max Total NT Comments   BASIC ADLs:              Bathing/ Showering [] [] [] [] [] [] [] [] [] []    Toileting [] [] [] [] [] [] [] [] [] []    Dressing [] [] [] [] [] [x] [] [] [] [] slippers   Feeding [] [] [] [] [] [] [] [] [] []    Grooming [] [] [] [] [x] [] [] [] [] []    Personal Device Care [] [] [] [] [] [] [] [] [] []    Functional Mobility [] [] [] [] [] [] [] [] [] []    I=Independent, Mod I=Modified Independent, S=Supervision, SBA=Standby Assistance, CGA=Contact Guard Assistance,   Min=Minimal Assistance, Mod=Moderate Assistance, Max=Maximal Assistance, Total=Total Assistance, NT=Not Tested    MOBILITY: I Mod I S SBA CGA Min Mod Max Total  NT x2 Comments:   Supine to sit [] [] [] [] [x] [] [] [] [] [] []    Sit to supine [] [] [] [] [] [x] [] [] [] [] []    Sit to stand [] [] [] [] [] [] [x] [] [] [] []    Bed to chair [] [] [] [] [] [] [] [] [] [] []    I=Independent, Mod I=Modified Independent, S=Supervision, SBA=Standby Assistance, CGA=Contact Guard Assistance,   Min=Minimal Assistance, Mod=Moderate Assistance, Max=Maximal Assistance, Total=Total Assistance, NT=Not Tested    [address] [facility] [address] [id number] AM-PAC 6 Clicks   Daily Activity Inpatient Short Form        How much help from another person does the patient currently need. .. Total A Lot A Little None   1. Putting on and taking off regular lower body clothing? [] 1   [x] 2   [] 3   [] 4   2. Bathing (including washing, rinsing, drying)? [] 1   [x] 2   [] 3   [] 4   3. Toileting, which includes using toilet, bedpan or urinal?   [] 1   [x] 2   [] 3   [] 4   4. Putting on and taking off regular upper body clothing? [] 1   [] 2   [x] 3   [] 4   5. Taking care of personal grooming such as brushing teeth? [] 1   [] 2   [] 3   [x] 4   6. Eating meals? [] 1   [] 2   [] 3   [x] 4   © 2007, Trustees of [address] [place] [address] [address] [id number], under license to [employer]. All rights reserved     Score:  Initial: 17 Most Recent: X (Date: -- )   Interpretation of Tool:  Represents activities that are increasingly more difficult (i.e. Bed mobility, Transfers, Gait).     PLAN:   FREQUENCY/DURATION: OT Plan of Care: 3 times/week for duration of hospital stay or until stated goals are met, whichever comes first.    PROBLEM LIST:   (Skilled intervention is medically necessary to address:)  1. Decreased ADL/Functional Activities  2. Decreased Activity Tolerance  3. Decreased AROM/PROM  4. Decreased Balance  5. Decreased Strength  6. Decreased Transfer Abilities   INTERVENTIONS PLANNED:   (Benefits and precautions of occupational therapy have been discussed with the patient.)  1. Self Care Training  2. Therapeutic Activity  3. Therapeutic Exercise/HEP  4. Neuromuscular Re-education  5. Education     TREATMENT:     EVALUATION: Moderate Complexity : (Untimed Charge)    TREATMENT:   ($$ Self Care/Home Management: 8-22 mins    )  Self Care (8 Minutes): Self care including Lower Body Dressing to increase independence and decrease level of assistance required.     TREATMENT GRID:  N/A    AFTER TREATMENT POSITION/PRECAUTIONS:  Bed, Needs within reach, RN notified and Visitors at bedside    INTERDISCIPLINARY COLLABORATION:  RN/PCT    TOTAL TREATMENT DURATION:  OT Patient Time In/Time Out  Time In: 1430  Time Out: [address] [facility]  Hsieh [place]

## 2022-02-01 NOTE — H&P
Hospitalist Admission History and Physical     NAME:  Hector Lees   Age:  64 y.o.  :   1945   MRN:   [id number]  PCP: Kalyn Avalos MD  Consulting MD:  Treatment Team: Attending Provider: Marcio Harris DO; Respiratory Therapist: Judd Santos RT; Primary Nurse: Jodi Li RN    Chief Complaint   Patient presents with    Shortness of Breath         HPI:   Patient is a 64 y.o. female who presented to the ED for cc SOB with leg edema since yesterday. Hx of DVT/PE on Eliquis, GERD, HLD, depression, HTN, pulmonary HTN with RV pressure 75-80mmHg, COVID in Dec 2021. EMS placed her on 4L NC after being found mid 80s on RA. Vitals - mild tachycardia    Labs- K 3.3. ProBNP 2,673. COVID negative    CT chest PE protocol - No evidence of central or proximal segmental pulmonary embolism with the remaining branches unable to be assessed due to excessive motio. Findings most compatible with viral pneumonitis.   Past Medical History:   Diagnosis Date    Abnormality of gait 2018    Anemia     Anxiety     Arthritis     Asthma     Calculus of kidney     Chronic pain     Contact dermatitis and other eczema, due to unspecified cause     Depressive disorder, not elsewhere classified     Encounter for chronic pain management     Esophageal reflux     Essential hypertension, benign     Frequent falls 2018    GERD (gastroesophageal reflux disease)     Pure hypercholesterolemia         Past Surgical History:   Procedure Laterality Date    HX CATARACT REMOVAL Bilateral     HX COLONOSCOPY      HX GI      HX HYSTERECTOMY      HX KNEE REPLACEMENT Right     HX LITHOTRIPSY      HX RETINAL DETACHMENT REPAIR      HX SHOULDER REPLACEMENT Left     IR REMOVE IVC FILTER W SI  3/29/2021    IR THROMBECTOMY MECH ART PRIMARY NON CHIRAG OR INTRACRANIAL  2020    NEUROLOGICAL PROCEDURE UNLISTED          Family History   Problem Relation Age of Onset    OSTEOARTHRITIS Mother    Steph Vasquez Father     Stroke Maternal Grandmother     Stroke Paternal Grandmother        Social History     Social History Narrative    Patient and  reside together. She denies any in-house stairs. 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 Activity is reported as cooking and household chores as tolerable. Exercise is reported as intolerable. Social History     Tobacco Use    Smoking status: Never Smoker    Smokeless tobacco: Never Used   Substance Use Topics    Alcohol use: No        Social History     Substance and Sexual Activity   Drug Use No         Allergies   Allergen Reactions    Flexeril [Cyclobenzaprine] Unknown (comments)    Ketoprofen Unknown (comments)    Plaquenil [Hydroxychloroquine] Unknown (comments)       Prior to Admission medications    Medication Sig Start Date End Date Taking? Authorizing Provider   lisinopriL (PRINIVIL, ZESTRIL) 5 mg tablet TAKE ONE TABLET BY MOUTH EVERY DAY 11/15/21   Yemi Arshad MD   sertraline (ZOLOFT) 100 mg tablet Take 1 1/2 tab daily. 11/2/21   Yemi Arshad MD   nystatin-triamcinolone [facility]) topical cream APPLY CREAM TO AFFECTED AREA TWICE A DAY 10/25/21   McCraw, Leitha Landau, MD   lifitegrast Reyes Paramjit) 5 % dpet Apply  to eye. Provider, Historical   apixaban (ELIQUIS) 2.5 mg tablet Take 1 Tablet by mouth two (2) times a day. 10/13/21   Brina Marshall MD   Iron Fum & PS Cmp-Vit C-Niacin (Integra) 125-40-3 mg cap Take 125 mg by mouth daily. 10/11/21   Yemi Arshad MD   clorazepate (TRANXENE) 3.75 mg tablet Take 1 Tablet by mouth three (3) times daily. Max Daily Amount: 11.25 mg.  Indications: anxious 10/11/21   Yemi Arshad MD   ProAir HFA 90 mcg/actuation inhaler INHALE TWO PUFFS BY MOUTH TWICE A DAY AS NEEDED FOR COUGH 10/11/21   McCraw, Leitha Landau, MD   Symbicort 80-4.5 mcg/actuation HFAA INHALE 1 PUFF MOUTH TWICE A DAY FOR COUGH RINSE MOUTH AFTER [facility] DOSE (DISCARD THREE MONTHS AFTER REMOVAL FROM FOIL POUCH) 10/11/21   Nathan Whitley MD   pantoprazole (Protonix) 40 mg tablet Take 1 Tablet by mouth two (2) times a day. 10/11/21   Nathan Whitley MD   chlorhexidine (PERIDEX) 0.12 % solution SWISH AND DIP BRUSH INTO RINSE AND CLEAN TEETH EXPOSED IMPLANT TWO TIMES DAILY. 8/25/21   Provider, Historical   montelukast (SINGULAIR) 10 mg tablet TAKE ONE TABLET BY MOUTH EVERY DAY 8/29/21   Nathan Whitley MD   sucralfate (Carafate) 1 gram tablet Take 1 Tab by mouth three (3) times daily. 3/3/21   Nathan Whitley MD   olopatadine (Pataday) 0.2 % drop ophthalmic solution Administer 1 Drop to both eyes daily. 10/27/20   Nathan Whitley MD   sodium chloride (Saline Nasal) 0.65 % nasal squeeze bottle 0.05 mL by Both Nostrils route four (4) times daily as needed for Congestion or Nasal Dryness. 9/19/20   Mark Antonio MD   latanoprost (XALATAN) 0.005 % ophthalmic solution Administer 1 Drop to both eyes every evening. 8/3/20   Gus Moon MD   polyethylene glycol (MIRALAX) 17 gram packet Take 1 Packet by mouth daily. 8/3/20   Gus Moon MD   calcium carbonate (OS-JANA) 500 mg calcium (1,250 mg) tablet Take  by mouth daily. Provider, Historical   ascorbic acid, vitamin C, (VITAMIN C) 500 mg tablet Take  by mouth. Provider, Historical   melatonin 10 mg cap Take  by mouth. Provider, Historical   dietary supplement cap Take  by mouth. Provider, Historical   docusate sodium (STOOL SOFTENER) 100 mg tab Take  by mouth as needed. Provider, Historical   glucosamine & chondroit sul. Na 750-400 mg cmpk Take  by mouth. Provider, Historical   omega-3 fatty acids-vitamin e (FISH OIL) 1,000 mg cap Take 1 Cap by mouth. 1 by oral route twice daily    Provider, Historical   VIT C/ABDIAZIZ AC/LUT/COPPER/ZNOX (PRESERVISION LUTEIN PO) Take  by mouth.  1 by oral route daily    Provider, Historical   cholecalciferol (VITAMIN D3) 1,000 unit tablet Take  by mouth daily.    Provider, Historical           Review of Systems    Constitutional: NAD  Eyes:  no change in visual acuity, no photophobia  Ears, nose, mouth, throat, and face: no  Odynphagia, dysphagia, no thrush or exudate, negative for chronic sinus congestion, recurrent headaches  Respiratory: SOB  Cardiovascular: negative for CP, palpitations, or PND  Gastrointestinal: negative for abdominal pain, no hematemesis, hematochezia or BRBPR  Genitourinary: no urgency, frequency, or dysuria, no nocturia  Integument/breast: negative for skin rash or skin lesions  Hematologic/lymphatic: negative for known bleeding disorder  Musculoskeletal:LE edema  Neurological: generalized weakness  Behavioral/Psych: negative for depression or chronic anxiety,   Endocrine: negative for polydyspia, polyuria or intolerance to heat or cold  Allergic/Immunologic: negative for chronic allergic rhinitis, or known connective tissue disorder      Objective:     Patient Vitals for the past 24 hrs:   Temp Pulse Resp BP SpO2   01/31/22 1846  (!) 104 25 (!) 142/71 94 %   01/31/22 1831  (!) 104 28 (!) 145/73 95 %   01/31/22 1816  95 27 137/70 93 %   01/31/22 1551 98.1 °F (36.7 °C) (!) 103 30 (!) 145/56 96 %        No intake/output data recorded. No intake/output data recorded.     Data Review:   Recent Results (from the past 24 hour(s))   EKG, 12 LEAD, INITIAL    Collection Time: 01/31/22  3:58 PM   Result Value Ref Range    Ventricular Rate 104 BPM    Atrial Rate 104 BPM    P-R Interval 136 ms    QRS Duration 95 ms    Q-T Interval 360 ms    QTC Calculation (Bezet) 474 ms    Calculated P Axis 20 degrees    Calculated R Axis 0 degrees    Calculated T Axis -39 degrees    Diagnosis       Sinus tachycardia  Low voltage, precordial leads  Nonspecific T abnormalities, diffuse leads     CBC WITH AUTOMATED DIFF    Collection Time: 01/31/22  4:00 PM   Result Value Ref Range    WBC 9.3 4.3 - 11.1 K/uL    RBC 3.48 (L) 4.05 - 5.2 M/uL    HGB 10.4 (L) 11.7 - 15.4 g/dL    HCT 33.5 (L) 35.8 - 46.3 %    MCV 96.3 79.6 - 97.8 FL    MCH 29.9 26.1 - 32.9 PG    MCHC 31.0 (L) 31.4 - 35.0 g/dL    RDW 14.7 (H) 11.9 - 14.6 %    PLATELET 262 052 - 793 K/uL    MPV 9.3 (L) 9.4 - 12.3 FL    ABSOLUTE NRBC 0.00 0.0 - 0.2 K/uL    DF AUTOMATED      NEUTROPHILS 64 43 - 78 %    LYMPHOCYTES 20 13 - 44 %    MONOCYTES 12 4.0 - 12.0 %    EOSINOPHILS 2 0.5 - 7.8 %    BASOPHILS 0 0.0 - 2.0 %    IMMATURE GRANULOCYTES 1 0.0 - 5.0 %    ABS. NEUTROPHILS 5.9 1.7 - 8.2 K/UL    ABS. LYMPHOCYTES 1.9 0.5 - 4.6 K/UL    ABS. MONOCYTES 1.1 0.1 - 1.3 K/UL    ABS. EOSINOPHILS 0.2 0.0 - 0.8 K/UL    ABS. BASOPHILS 0.0 0.0 - 0.2 K/UL    ABS. IMM. GRANS. 0.1 0.0 - 0.5 K/UL   METABOLIC PANEL, COMPREHENSIVE    Collection Time: 01/31/22  4:00 PM   Result Value Ref Range    Sodium 140 136 - 145 mmol/L    Potassium 3.3 (L) 3.5 - 5.1 mmol/L    Chloride 106 98 - 107 mmol/L    CO2 27 21 - 32 mmol/L    Anion gap 7 7 - 16 mmol/L    Glucose 143 (H) 65 - 100 mg/dL    BUN 10 8 - 23 MG/DL    Creatinine 0.51 (L) 0.6 - 1.0 MG/DL    GFR est AA >60 >60 ml/min/1.73m2    GFR est non-AA >60 >60 ml/min/1.73m2    Calcium 8.9 8.3 - 10.4 MG/DL    Bilirubin, total 0.3 0.2 - 1.1 MG/DL    ALT (SGPT) 16 12 - 65 U/L    AST (SGOT) 15 15 - 37 U/L    Alk.  phosphatase 90 50 - 136 U/L    Protein, total 6.6 6.3 - 8.2 g/dL    Albumin 2.2 (L) 3.2 - 4.6 g/dL    Globulin 4.4 (H) 2.3 - 3.5 g/dL    A-G Ratio 0.5 (L) 1.2 - 3.5     MAGNESIUM    Collection Time: 01/31/22  4:00 PM   Result Value Ref Range    Magnesium 2.1 1.8 - 2.4 mg/dL   TROPONIN-HIGH SENSITIVITY    Collection Time: 01/31/22  4:00 PM   Result Value Ref Range    Troponin-High Sensitivity 34.6 (H) 0 - 14 pg/mL   NT-PRO BNP    Collection Time: 01/31/22  4:12 PM   Result Value Ref Range    NT pro-BNP 2,673 (H) <450 PG/ML   COVID-19 RAPID TEST    Collection Time: 01/31/22  4:15 PM   Result Value Ref Range    Specimen source NASAL      COVID-19 rapid test Not detected NOTD     TROPONIN-HIGH SENSITIVITY Collection Time: 01/31/22  5:58 PM   Result Value Ref Range    Troponin-High Sensitivity 35.0 (H) 0 - 14 pg/mL       Physical Exam:     General:  Alert, cooperative, increased work of breathing noted. Very pale   Eyes:  Conjunctivae/corneas clear. PERRL   Ears:  Normal TMs and external ear canals both ears. Nose: Nares normal.    Mouth/Throat: Lips, mucosa, and tongue normal. Teeth and gums normal.   Neck:  mild JVD. Back:   deferred   Lungs:   Clear to auscultation bilaterally. Heart:  Regular rate and rhythm, S1, S2 normal   Abdomen:   Soft, non-tender. Bowel sounds normal. Obese   Extremities: 3+ edema to LE bilaterally    Pulses: 2+ and symmetric all extremities. Skin: Skin color, texture, turgor normal. No rashes or lesions   Lymph nodes: Cervical, supraclavicular, and axillary nodes normal.   Neurologic: CNII-XII intact. Limited ROM in bed. Assessment and Plan     Active Problems:    Anxiety ()      Pulmonary embolism, bilateral ([address] (7/27/2020)      Hypokalemia (7/29/2020)      Constipation (8/2/2020)      S/P IVC filter (8/3/2020)      Pulmonary HTN ([address] (8/3/2020)      Essential hypertension (3/29/2021)      Iron deficiency anemia (11/18/2021)    Suspected CHF exacerbation - No ECHO since 2020 and COVID can cause cardiomyopathy. Also with hx of severe pulmonary HTN. Start lasix 40mg daily. Strict Is and Os. Cardiac monitor. Repeat ECHO    Viral pneumonitis - Could be residual from recent covid. Steroid burst. Symbicort. PRN albuterol    Normocytic anemia - Hg 10.4 from 14.3 two months ago. No obvious bleeding but she is very  pale on exam. Will have cardiac monitor--see what Hg is tomorrow. Iron studies.      Hypokalemia - Supplement     HTN- ACE    Hx of DVT/PE- Eliquis    PPI/carafate    PPD/PT/OT    DVT prophylaxis - Low dose Eliquis (had significant nose bleed with higher dose)  Signed By: Tommy Samuels DO   January 31, 2022

## 2022-02-01 NOTE — PROGRESS NOTES
Hospitalist Progress Note   Admit Date:  2022  3:41 PM   Name:  Elena Jean   Age:  64 y.o. Sex:  female  :  1945   MRN:  [id number]   [place]  [facility] [place]    Presenting Complaint: Shortness of Breath    Reason(s) for Admission: Suspected CHF (congestive heart failure) [R09.89]  Hypokalemia [E87.6]     Hospital Course & Interval History:   Robin Jenkins is a 75-year-old female with history HTN, b/l pulmonary embolism, iron deficiency anemia, and recent COVID pneumonia who presented to the ED with increasing shortness of breath starting the night before. She reports continued cough ever since having COVID in 2021. She denies chest pain, abdominal pain, or N/V. She reports she is compliant with her medications. EMS found her sats to be in upper 80s on room air and was placed on 4L O2 nasal cannula and given solumedrol, terbutaline, and albuterol nebulizer treatment. Chest Xray 2022 shows bilateral pulmonary edema. COVID panel 2022 negative. BNP elevated at 2673. Echo ordered for suspected CHF but showed no signs of systolic/diastolic dysfunction or ventricular enlargement. Subjective (22): \"I feel a little better today. \" Pleasant WF laying in bed. A&Ox3. Reports continued SOB but has improved since yesterday with lasix treatment. Denies orthopnea, paroxysmal nocturnal dyspnea, or chest pain but admits to HANKINS and anxiety. Review of Systems:  10 systems reviewed and negative except as noted in HPI.       Assessment & Plan:     Principal Problem:    Acute hypoxemic respiratory failure ([address] (2020)  -CXR 2022 shows bilateral pulmonary edema  -lasix IV 40mg  -4L O2 via nasal cannula, wean as tolerated  -symbicort bid / prednisone 40mg daily  -CT chest negative for acute PE    Active Problems:    Acute pulmonary edema b/l  -strict I&O  -feels some improvement with diuresis, cont lasix 40mg daily  -negative respiratory virus panel  -echo with preserved EF and normal diastolic fxn      Acute / worsening pneumonitis  - remote COVID infection 12/2021  - comparison CT 7/27/2020 to current CT chest with worsening b/l infiltrative / GGO findings  - will consult pulm for further recs  - check RF / lupus   - cont oral corticosteroids          Essential hypertension (3/29/2021)  -adequate control on lisinopril 5mg  -last /83 mmHg      Iron deficiency anemia (11/18/2021)  -Hgb 10.1 (2/1/2022)  -iron 13 ug/dl (1/31/2022)  -iron PO and vitamin C  -recheck CBC AM  -history of nose bleed 3 weeks ago that needed packing      Hypokalemia (7/29/2020)  -4.3 mmol/l (2/1/2022) after supplementation       Hyperglycemia / Diabetes  -glucose 241 mg/dl (2/1/2022)  -sliding scale insulin  -HA1C 6.0% (11/18/2021)  -consider starting oral agent on dc     Anxiety  -Tranxene bid restarted      Pulmonary embolism, bilateral ([address] (7/27/2020)  -CT 1/31/2022 showed no evidence of current PE  -eliquis PO      S/P IVC filter (8/3/2020)  -removed 3/29/2021      Constipation (8/2/2020)  -reports no constipation today  -Miralax PRN        Dispo/Discharge Planning:      Expect at least 2 midnight stays    Diet:  ADULT DIET Regular; 4 carb choices (60 gm/meal);  Low Fat/Low Chol/High Fiber/2 gm Na; 1200 ml  DVT PPx: Eliquis  Code status: Full Code    Hospital Problems as of 2/1/2022 Date Reviewed: 10/13/2021          Codes Class Noted - Resolved POA    Suspected CHF (congestive heart failure) ICD-10-CM: R09.89  ICD-9-CM: 785.9  1/31/2022 - Present Unknown        Iron deficiency anemia ICD-10-CM: D50.9  ICD-9-CM: 280.9  11/18/2021 - Present Yes        Essential hypertension ICD-10-CM: I10  ICD-9-CM: 401.9  3/29/2021 - Present Yes        S/P IVC filter ICD-10-CM: F05.186  ICD-9-CM: V45.89  8/3/2020 - Present Yes        Pulmonary HTN ([facility] [address] ICD-10-CM: I27.20  ICD-9-CM: 416.8  8/3/2020 - Present Yes        Constipation ICD-10-CM: K59.00  ICD-9-CM: 564.00  8/2/2020 - Present Yes        Hypokalemia ICD-10-CM: E87.6  ICD-9-CM: 276.8  7/29/2020 - Present Yes        * (Principal) Acute hypoxemic respiratory failure (HCC) ICD-10-CM: J96.01  ICD-9-CM: 518.81  7/27/2020 - Present Yes        Pulmonary embolism, bilateral (HCC) ICD-10-CM: I26.99  ICD-9-CM: 415.19  7/27/2020 - Present Yes        Anxiety ICD-10-CM: F41.9  ICD-9-CM: 300.00  Unknown - Present Yes              Objective:     Patient Vitals for the past 24 hrs:   Temp Pulse Resp BP SpO2   02/01/22 0849     97 %   02/01/22 0745  77 20 (!) 155/83 96 %   02/01/22 0741  77 19 (!) 165/94 97 %   02/01/22 0530  71 17 132/72 95 %   02/01/22 0430  76 18 123/64 95 %   02/01/22 0330  80 19 132/76    02/01/22 0053  83      02/01/22 0020  88   94 %   01/31/22 2222  88 28  97 %   01/31/22 2210  88 29  96 %   01/31/22 2136  (!) 104  (!) 168/86 96 %   01/31/22 2108  92 29  93 %   01/31/22 2101  93 26 (!) 147/76 (!) 88 %   01/31/22 1846  (!) 104 25 (!) 142/71 94 %   01/31/22 1831  (!) 104 28 (!) 145/73 95 %   01/31/22 1816  95 27 137/70 93 %   01/31/22 1551 98.1 °F (36.7 °C) (!) 103 30 (!) 145/56 96 %     Oxygen Therapy  O2 Sat (%): 97 % (02/01/22 0849)  Pulse via Oximetry: 80 beats per minute (02/01/22 0849)  O2 Device: Nasal cannula (02/01/22 0849)  O2 Flow Rate (L/min): 4 l/min (02/01/22 0849)  FIO2 (%): 36 % (02/01/22 0849)    Estimated body mass index is 30.27 kg/m² as calculated from the following:    Height as of this encounter: 5' (1.524 m). Weight as of this encounter: 70.3 kg (155 lb). No intake or output data in the 24 hours ending 02/01/22 1024      Physical Exam:   Blood pressure 139/83, pulse 77, temperature 97.3 °F (36.3 °C), resp. rate 20, height 5' (1.524 m), weight 70.3 kg (155 lb), SpO2 97 %. General:    Well nourished. No overt distress  Head:  Normocephalic, atraumatic  Eyes:  Pale conjunctiva. Sclerae appear normal.  Pupils equally round. ENT:  Nares appear normal, no drainage.   Moist oral mucosa  Neck:  No restricted ROM. Trachea midline   CV:   RRR. No m/r/g. No jugular venous distension. Lungs:   Rales bilaterally. No wheezing or rhonchi. Respirations even with slightly increased effort. 4L O2 via nasal cannula  Abdomen: Bowel sounds present. Soft, nontender, nondistended. Extremities: No cyanosis or clubbing. 1+ pitting edema bilaterally. Skin:     No rashes and normal coloration. Warm and dry. Neuro:  CN II-XII grossly intact. Sensation intact. A&Ox3  Psych:  Normal mood and affect. I have reviewed ordered lab tests and independently visualized imaging below:    Recent Labs:  Recent Results (from the past 48 hour(s))   EKG, 12 LEAD, INITIAL    Collection Time: 01/31/22  3:58 PM   Result Value Ref Range    Ventricular Rate 104 BPM    Atrial Rate 104 BPM    P-R Interval 136 ms    QRS Duration 95 ms    Q-T Interval 360 ms    QTC Calculation (Bezet) 474 ms    Calculated P Axis 20 degrees    Calculated R Axis 0 degrees    Calculated T Axis -39 degrees    Diagnosis       Sinus tachycardia  Low voltage, precordial leads  Nonspecific T abnormalities, diffuse leads    Confirmed by DELROY AZUL (), ADELITA SPRING ([id number]) on 2/1/2022 6:21:05 AM     CBC WITH AUTOMATED DIFF    Collection Time: 01/31/22  4:00 PM   Result Value Ref Range    WBC 9.3 4.3 - 11.1 K/uL    RBC 3.48 (L) 4.05 - 5.2 M/uL    HGB 10.4 (L) 11.7 - 15.4 g/dL    HCT 33.5 (L) 35.8 - 46.3 %    MCV 96.3 79.6 - 97.8 FL    MCH 29.9 26.1 - 32.9 PG    MCHC 31.0 (L) 31.4 - 35.0 g/dL    RDW 14.7 (H) 11.9 - 14.6 %    PLATELET 958 028 - 187 K/uL    MPV 9.3 (L) 9.4 - 12.3 FL    ABSOLUTE NRBC 0.00 0.0 - 0.2 K/uL    DF AUTOMATED      NEUTROPHILS 64 43 - 78 %    LYMPHOCYTES 20 13 - 44 %    MONOCYTES 12 4.0 - 12.0 %    EOSINOPHILS 2 0.5 - 7.8 %    BASOPHILS 0 0.0 - 2.0 %    IMMATURE GRANULOCYTES 1 0.0 - 5.0 %    ABS. NEUTROPHILS 5.9 1.7 - 8.2 K/UL    ABS. LYMPHOCYTES 1.9 0.5 - 4.6 K/UL    ABS. MONOCYTES 1.1 0.1 - 1.3 K/UL    ABS.  EOSINOPHILS 0.2 0.0 - 0.8 K/UL ABS. BASOPHILS 0.0 0.0 - 0.2 K/UL    ABS. IMM. GRANS. 0.1 0.0 - 0.5 K/UL   METABOLIC PANEL, COMPREHENSIVE    Collection Time: 01/31/22  4:00 PM   Result Value Ref Range    Sodium 140 136 - 145 mmol/L    Potassium 3.3 (L) 3.5 - 5.1 mmol/L    Chloride 106 98 - 107 mmol/L    CO2 27 21 - 32 mmol/L    Anion gap 7 7 - 16 mmol/L    Glucose 143 (H) 65 - 100 mg/dL    BUN 10 8 - 23 MG/DL    Creatinine 0.51 (L) 0.6 - 1.0 MG/DL    GFR est AA >60 >60 ml/min/1.73m2    GFR est non-AA >60 >60 ml/min/1.73m2    Calcium 8.9 8.3 - 10.4 MG/DL    Bilirubin, total 0.3 0.2 - 1.1 MG/DL    ALT (SGPT) 16 12 - 65 U/L    AST (SGOT) 15 15 - 37 U/L    Alk.  phosphatase 90 50 - 136 U/L    Protein, total 6.6 6.3 - 8.2 g/dL    Albumin 2.2 (L) 3.2 - 4.6 g/dL    Globulin 4.4 (H) 2.3 - 3.5 g/dL    A-G Ratio 0.5 (L) 1.2 - 3.5     MAGNESIUM    Collection Time: 01/31/22  4:00 PM   Result Value Ref Range    Magnesium 2.1 1.8 - 2.4 mg/dL   TROPONIN-HIGH SENSITIVITY    Collection Time: 01/31/22  4:00 PM   Result Value Ref Range    Troponin-High Sensitivity 34.6 (H) 0 - 14 pg/mL   NT-PRO BNP    Collection Time: 01/31/22  4:12 PM   Result Value Ref Range    NT pro-BNP 2,673 (H) <450 PG/ML   COVID-19 RAPID TEST    Collection Time: 01/31/22  4:15 PM   Result Value Ref Range    Specimen source NASAL      COVID-19 rapid test Not detected NOTD     TROPONIN-HIGH SENSITIVITY    Collection Time: 01/31/22  5:58 PM   Result Value Ref Range    Troponin-High Sensitivity 35.0 (H) 0 - 14 pg/mL   FERRITIN    Collection Time: 01/31/22  5:58 PM   Result Value Ref Range    Ferritin 73 8 - 388 NG/ML   TRANSFERRIN SATURATION    Collection Time: 01/31/22  5:58 PM   Result Value Ref Range    Iron 13 (L) 35 - 150 ug/dL    TIBC 208 (L) 250 - 450 ug/dL    Transferrin Saturation 6 (L) >34 %   METABOLIC PANEL, BASIC    Collection Time: 02/01/22  4:17 AM   Result Value Ref Range    Sodium 142 136 - 145 mmol/L    Potassium 4.3 3.5 - 5.1 mmol/L    Chloride 106 98 - 107 mmol/L    CO2 31 21 - 32 mmol/L    Anion gap 5 (L) 7 - 16 mmol/L    Glucose 241 (H) 65 - 100 mg/dL    BUN 11 8 - 23 MG/DL    Creatinine 0.62 0.6 - 1.0 MG/DL    GFR est AA >60 >60 ml/min/1.73m2    GFR est non-AA >60 >60 ml/min/1.73m2    Calcium 9.1 8.3 - 10.4 MG/DL   CBC W/O DIFF    Collection Time: 02/01/22  4:17 AM   Result Value Ref Range    WBC 5.2 4.3 - 11.1 K/uL    RBC 3.37 (L) 4.05 - 5.2 M/uL    HGB 10.1 (L) 11.7 - 15.4 g/dL    HCT 32.1 (L) 35.8 - 46.3 %    MCV 95.3 79.6 - 97.8 FL    MCH 30.0 26.1 - 32.9 PG    MCHC 31.5 31.4 - 35.0 g/dL    RDW 14.5 11.9 - 14.6 %    PLATELET 750 753 - 896 K/uL    MPV 10.0 9.4 - 12.3 FL    ABSOLUTE NRBC 0.00 0.0 - 0.2 K/uL   RESPIRATORY VIRUS PANEL W/COVID-19, PCR    Collection Time: 02/01/22  7:27 AM    Specimen: Nasopharyngeal   Result Value Ref Range    Adenovirus NOT DETECTED NOTDET      Coronavirus 229E NOT DETECTED NOTDET      Coronavirus HKU1 NOT DETECTED NOTDET      Coronavirus CVNL63 NOT DETECTED NOTDET      Coronavirus OC43 NOT DETECTED NOTDET      SARS-CoV-2, PCR NOT DETECTED NOTDET      Metapneumovirus NOT DETECTED NOTDET      Rhinovirus and Enterovirus NOT DETECTED NOTDET      Influenza A NOT DETECTED NOTDET      Influenza B NOT DETECTED NOTDET      Parainfluenza 1 NOT DETECTED NOTDET      Parainfluenza 2 NOT DETECTED NOTDET      Parainfluenza 3 NOT DETECTED NOTDET      Parainfluenza virus 4 NOT DETECTED NOTDET      RSV by PCR NOT DETECTED NOTDET      B. parapertussis, PCR NOT DETECTED NOTDET      Bordetella pertussis - PCR NOT DETECTED NOTDET      Chlamydophila pneumoniae DNA, QL, PCR NOT DETECTED NOTDET      Mycoplasma pneumoniae DNA, QL, PCR NOT DETECTED NOTDET         All Micro Results     Procedure Component Value Units Date/Time    RESPIRATORY VIRUS PANEL W/COVID-19, PCR [[id number]] Collected: 02/01/22 0727    Order Status: Completed Specimen: Nasopharyngeal Updated: 02/01/22 0853     Adenovirus NOT DETECTED        Coronavirus 229E NOT DETECTED        Coronavirus HKU1 NOT DETECTED        Coronavirus CVNL63 NOT DETECTED        Coronavirus OC43 NOT DETECTED        SARS-CoV-2, PCR NOT DETECTED        Metapneumovirus NOT DETECTED        Rhinovirus and Enterovirus NOT DETECTED        Influenza A NOT DETECTED        Influenza B NOT DETECTED        Parainfluenza 1 NOT DETECTED        Parainfluenza 2 NOT DETECTED        Parainfluenza 3 NOT DETECTED        Parainfluenza virus 4 NOT DETECTED        RSV by PCR NOT DETECTED        B. parapertussis, PCR NOT DETECTED        Bordetella pertussis - PCR NOT DETECTED        Chlamydophila pneumoniae DNA, QL, PCR NOT DETECTED        Mycoplasma pneumoniae DNA, QL, PCR NOT DETECTED       COVID-19 RAPID TEST [[id number]] Collected: 01/31/22 1615    Order Status: Completed Specimen: Nasopharyngeal Updated: 01/31/22 1708     Specimen source NASAL        COVID-19 rapid test Not detected        Comment:      The specimen is NEGATIVE for SARS-CoV-2, the novel coronavirus associated with COVID-19. A negative result does not rule out COVID-19. This test has been authorized by the FDA under an Emergency Use Authorization (EUA) for use by authorized laboratories. Fact sheet for Healthcare Providers: ConventionUpdate.co.nz  Fact sheet for Patients: ConventionUpdate.co.nz       Methodology: Isothermal Nucleic Acid Amplification               Other Studies:  XR CHEST PORT    Result Date: 1/31/2022  History: Shortness of breath Exam: portable chest Comparison: 9/15/2020 Findings: There is an edema-like pattern to the lungs, new when compared with the prior exam. No change in the appearance of the mediastinal contour or osseous structures. IMPRESSIONs: New edema-like pattern to the lungs. CT CHEST PULMONARY EMBOLISM    Result Date: 1/31/2022  CT chest with contrast (pulmonary embolism protocol) History: worsening SOB that started last night.   Covid 12/29 and ended up hospitalized with Covid Pneumonia Technique: Helically acquired images were obtained from the lung apices to the domes of the diaphragms reconstructed at 2.5mm intervals after the uneventful administration of 100 cc's intravenous Isovue-370 in order to evaluate the pulmonary arteries. Coronal and sagittal reformatted images were submitted. Radiation dose reduction techniques were used for this study:  Our CT scanners use one or all of the following: Automated exposure control, adjustment of the mA and/or kVp according to patient's size, iterative reconstruction. Comparison: 01/27/2020 Findings: There is respiratory motion artifact. There is no pleural or pericardial effusion. The heart is at least mildly enlarged. There are no filling defects within the central or proximal segmental pulmonary arteries. The distal segmental branches are not well assessed due to motion. Moderate bilateral interstitial opacities are present bilaterally. There is a large hiatal hernia. No adenopathy is present within the thorax. There is a left adrenal gland nodule measuring 2.6 cm without significant change. There is a remote compression deformity involving a lower thoracic vertebra, unchanged. 1. No evidence of central or proximal segmental pulmonary embolism with the remaining branches unable to be assessed due to excessive motion 2. Findings most compatible with viral pneumonitis. 3. Large hiatal hernia. .       Current Meds:  Current Facility-Administered Medications   Medication Dose Route Frequency    furosemide (LASIX) injection 40 mg  40 mg IntraVENous BID    insulin lispro (HUMALOG) injection 0-10 Units  0-10 Units SubCUTAneous AC&HS    clorazepate (TRANXENE) tablet 3.75 mg  3.75 mg Oral BID    sodium chloride (NS) flush 5-10 mL  5-10 mL IntraVENous Q8H    sodium chloride (NS) flush 5-10 mL  5-10 mL IntraVENous PRN    tuberculin injection 5 Units  5 Units IntraDERMal ONCE    predniSONE (DELTASONE) tablet 40 mg  40 mg Oral DAILY WITH DINNER    apixaban (ELIQUIS) tablet 2.5 mg  2.5 mg Oral Q12H    ascorbic acid (vitamin C) (VITAMIN C) tablet 500 mg  500 mg Oral DAILY    Iron Fum & PS Cmp-Vit C-Niacin 125-40-3 mg cap 1 Caplet (Patient Supplied)  1 Caplet Oral DAILY    latanoprost (XALATAN) 0.005 % ophthalmic solution 1 Drop  1 Drop Both Eyes QHS    lifitegrast 5 % dpet 1 Drop (Patient Supplied)  1 Drop Both Eyes Q12H    lisinopriL (PRINIVIL, ZESTRIL) tablet 5 mg  5 mg Oral DAILY    montelukast (SINGULAIR) tablet 10 mg  10 mg Oral DAILY    ketotifen (ZADITOR) 0.025 % (0.035 %) ophthalmic solution 1 Drop  1 Drop Both Eyes BID    pantoprazole (PROTONIX) tablet 40 mg  40 mg Oral BID    polyethylene glycol (MIRALAX) packet 17 g  17 g Oral DAILY    sertraline (ZOLOFT) tablet 150 mg  150 mg Oral QPM    sucralfate (CARAFATE) tablet 1 g  1 g Oral TID    budesonide-formoterol (SYMBICORT) 80-4.5 mcg inhaler  2 Puff Inhalation BID RT    albuterol (PROVENTIL VENTOLIN) nebulizer solution 2.5 mg  2.5 mg Nebulization Q6H PRN    sodium chloride (NS) flush 5-40 mL  5-40 mL IntraVENous Q8H    sodium chloride (NS) flush 5-40 mL  5-40 mL IntraVENous PRN    acetaminophen (TYLENOL) tablet 650 mg  650 mg Oral Q6H PRN    Or    acetaminophen (TYLENOL) suppository 650 mg  650 mg Rectal Q6H PRN    polyethylene glycol (MIRALAX) packet 17 g  17 g Oral DAILY PRN    ondansetron (ZOFRAN ODT) tablet 4 mg  4 mg Oral Q8H PRN    Or    ondansetron (ZOFRAN) injection 4 mg  4 mg IntraVENous Q6H PRN       Signed:  BRYAN LópezS      Addendum:  Pt seen and examined independently. CT chest reviewed, compared to 7/2021 CT chest, concerning for worsening ILD. Cont systemic steroids, check RF / lupus, pulm consulted. Plan of care discussed and formulated with PA student. Signed By: ZEE Ibanez     February 1, 2022          Part of this note may have been written by using a voice dictation software.   The note has been proof read but may still contain some grammatical/other typographical errors.

## 2022-02-01 NOTE — ACP (ADVANCE CARE PLANNING)
[facility] Hospitalist [facility]  At the heart of better care     Advance Care Planning   Admit Date:  2022  3:41 PM   Name:  Elena Jean   Age:  64 y.o. Sex:  female  :  1945   MRN:  [id number]   Room:  [place] [address]    Elena Jean is able to make her own decisions: Yes    If pt unable to make decisions, POA/surrogate decision maker:  Daughter    Other members present in the meeting:   NA    Patient / surrogate decision-maker directed:  Code Status: FULL CODE -full aggressive medical and surgical interventions, including intubations, resuscitations, pressors, artificial tube feeding        Patient or surrogate consented to discussion of the current conditions, workup, management plans, prognosis, and understand the risk for further deterioration. Time spent: 20 minutes in direct discussion (face to face and/or over phone).     Signed:  ZEE Celestin

## 2022-02-01 NOTE — PROGRESS NOTES
ACUTE PHYSICAL THERAPY GOALS:  (Developed with and agreed upon by patient and/or caregiver. )  LTG:  (1.)Ms. Jessica Cotton will move from supine to sit and sit to supine , scoot up and down and roll side to side in bed with MODIFIED INDEPENDENCE within 7 treatment day(s). (2.)Ms. Jessica Cotton will transfer from bed to chair and chair to bed with STAND BY ASSIST using the least restrictive device within 7 treatment day(s). (3.)Ms. Jessica Cotton will ambulate with CONTACT GUARD ASSIST for 150+ feet with the least restrictive device within 7 treatment day(s).   ________________________________________________________________________________________________      PHYSICAL THERAPY ASSESSMENT: Initial Assessment and AM PT Treatment Day # 1      Charlet Bumpers is a 64 y.o. female   PRIMARY DIAGNOSIS: Acute hypoxemic respiratory failure ([facility] [address]  Suspected CHF (congestive heart failure) [R09.89]  Hypokalemia [E87.6]       Reason for Referral:  Respiratory failure, CHF  ICD-10: Treatment Diagnosis: Generalized Muscle Weakness (M62.81)  Difficulty in walking, Not elsewhere classified (R26.2)  Other abnormalities of gait and mobility (R26.89)  History of falling (Z91.81)  [place]PAT[place]: [place] / [place] / Product Type: Medicare /     ASSESSMENT:     REHAB RECOMMENDATIONS:   Recommendation to date pending progress:  [address] [place] [address]  Equipment:    To Be Determined     PRIOR LEVEL OF FUNCTION:  (Prior to Hospitalization) INITIAL/CURRENT LEVEL OF FUNCTION:  (Most Recently Demonstrated)   Bed Mobility:   Independent  Sit to Stand:   Modified Independent  Transfers:   Modified Independent  Gait/Mobility:   Modified Independent Bed Mobility:   Minimal Assistance  Sit to Stand:   Minimal Assistance-moderate assistance  Transfers:   Minimal Assistance-moderate assistance  Gait/Mobility:   Moderate Assistance     ASSESSMENT:  Ms. Jessica Cotton is admitted from home with weakness, respiratory failure, CHF. She is s/p recent hospitalization with COVID and subsequent rehab stay. Lives with friend who assists with mobility, transfers, and ADLs as needed. States she has been mostly using wheelchair since having COVID and needing assistance with mobility. Two recent falls. Seen for evaluation in ER, appears generally weak with history of R UE injury. Min assist for bed mobility/transfer to sitting. Worked on seated activities, sit-stand transfers, and standing functional activities at edge of bed/stretcher to address balance/activity tolerance. Needs heavy assist for self care activities in standing. On third stand, able to take 2-3 small very shuffled steps from bed over to wheelchair for transfer to floor, needs mod assist and heavy walker use with significantly flexed posture and impaired balance. Left in wheelchair with staff. Ms. Tanna George seems weaker than baseline and has had recent falls, more limited with mobility. Discussed dc needs with her- she seems adamantly against another rehab stay and wants to return home with friend. Will benefit from continued therapy during hospital stay to maximize safety/independence with mobility. SUBJECTIVE:   Ms. Tanna George states, \"I am rehab-ed out\"    SOCIAL HISTORY/LIVING ENVIRONMENT: two recent falls (since getting covid). Lives w/friend who helps. Using wc since covid, prior to this she was mod I with rollator.    Home Environment: Private residence  # Steps to Enter: 0  One/Two Story Residence: One story  Living Alone: No  Support Systems: Friend/Neighbor  OBJECTIVE:     PAIN: VITAL SIGNS: LINES/DRAINS:   Pre Treatment: Pain Screen  Pain Scale 1: Numeric (0 - 10)  Pain Intensity 1: 0  Post Treatment: 0/10 Vital Signs  O2 Device: Nasal cannula  O2 Flow Rate (L/min): 4 l/min Purewick  O2 Device: Nasal cannula     GROSS EVALUATION:   Within Functional Limits Abnormal/ Functional Abnormal/ Non-Functional (see comments) Not Tested Comments:   AROM [] [x] [] [] Limited R UE chronically   PROM [] [] [] []    Strength [] [x] [] []    Balance [] [x] [] []    Posture [] [x] [] []    Sensation [] [] [] []    Coordination [] [] [] []    Tone [] [] [] []    Edema [] [] [] []    Activity Tolerance [] [x] [] []     [] [] [] []      COGNITION/  PERCEPTION: Intact Impaired   (see comments) Comments:   Orientation [x] []    Vision [x] []    Hearing [x] []    Command Following [x] []    Safety Awareness [] [x]     [] []      MOBILITY: I Mod I S SBA CGA Min Mod Max Total  NT x2 Comments:   Bed Mobility    Rolling [] [] [] [] [] [x] [] [] [] [] []    Supine to Sit [] [] [] [] [] [x] [] [] [] [] []    Scooting [] [] [] [] [] [x] [] [] [] [] []    Sit to Supine [] [] [] [] [] [] [] [] [] [] []    Transfers    Sit to Stand [] [] [] [] [] [x] [x] [] [] [] []    Bed to Chair [] [] [] [] [] [] [x] [] [] [] []    Stand to Sit [] [] [] [] [] [x] [x] [] [] [] []    I=Independent, Mod I=Modified Independent, S=Supervision, SBA=Standby Assistance, CGA=Contact Guard Assistance,   Min=Minimal Assistance, Mod=Moderate Assistance, Max=Maximal Assistance, Total=Total Assistance, NT=Not Tested  GAIT: I Mod I S SBA CGA Min Mod Max Total  NT x2 Comments:   Level of Assistance [] [] [] [] [] [] [x] [] [] [] []    Distance 3 ft    DME Rolling Walker    Gait Quality Small shuffled steps from stretcher to wheelchair, impaired balance and safety    Weightbearing Status N/A     I=Independent, Mod I=Modified Independent, S=Supervision, SBA=Standby Assistance, CGA=Contact Guard Assistance,   Min=Minimal Assistance, Mod=Moderate Assistance, Max=Maximal Assistance, Total=Total Assistance, NT=Not Tested    [facility] [address] [id number] [employer] [address] [place] [address],[address] Floor Inpatient Short Form       How much difficulty does the patient currently have. .. Unable A Lot A Little None   1. Turning over in bed (including adjusting bedclothes, sheets and blankets)? [] 1   [] 2   [x] 3   [] 4   2.   Sitting down on and standing up from a chair with arms ( e.g., wheelchair, bedside commode, etc.)   [] 1   [] 2   [x] 3   [] 4   3. Moving from lying on back to sitting on the side of the bed? [] 1   [] 2   [x] 3   [] 4   How much help from another person does the patient currently need. .. Total A Lot A Little None   4. Moving to and from a bed to a chair (including a wheelchair)? [] 1   [x] 2   [] 3   [] 4   5. Need to walk in hospital room? [x] 1   [] 2   [] 3   [] 4   6. Climbing 3-5 steps with a railing? [x] 1   [] 2   [] 3   [] 4   © 2007, Trustees of [address] [place] [address] [address] [id number], under license to [employer]. All rights reserved     Score:  Initial: 13 Most Recent: X (Date: -- )    Interpretation of Tool:  Represents activities that are increasingly more difficult (i.e. Bed mobility, Transfers, Gait). PLAN:   FREQUENCY/DURATION: PT Plan of Care: 3 times/week for duration of hospital stay or until stated goals are met, whichever comes first.    PROBLEM LIST:   (Skilled intervention is medically necessary to address:)  1. Decreased Activity Tolerance  2. Decreased AROM/PROM  3. Decreased Balance  4. Decreased Coordination  5. Decreased Gait Ability  6. Decreased Strength  7. Decreased Transfer Abilities   INTERVENTIONS PLANNED:   (Benefits and precautions of physical therapy have been discussed with the patient.)  1. Therapeutic Activity  2. Therapeutic Exercise/HEP  3. Neuromuscular Re-education  4. Gait Training  5. Manual Therapy  6. Education     TREATMENT:     EVALUATION: Low Complexity : (Untimed Charge)    TREATMENT:   ($$ Therapeutic Activity: 8-22 mins    )  Therapeutic Activity (10 Minutes): Therapeutic activity included Rolling, Supine to Sit, Scooting, Transfer Training, Ambulation on level ground, Sitting balance  and Standing balance to improve functional Mobility, Strength, Activity tolerance and balance.     TREATMENT GRID:  N/A    AFTER TREATMENT POSITION/PRECAUTIONS:  pt left in wheelchair with staff taking to floor    INTERDISCIPLINARY COLLABORATION:  RN/PCT and PT/PTA    TOTAL TREATMENT DURATION:  PT Patient Time In/Time Out  Time In: 0935  Time Out: [address] , DPT

## 2022-02-02 ENCOUNTER — APPOINTMENT (OUTPATIENT)
Dept: GENERAL RADIOLOGY | Age: 77
DRG: 193 | End: 2022-02-02
Attending: INTERNAL MEDICINE
Payer: MEDICARE

## 2022-02-02 LAB
ANION GAP SERPL CALC-SCNC: 6 MMOL/L (ref 7–16)
BASOPHILS # BLD: 0 K/UL (ref 0–0.2)
BASOPHILS NFR BLD: 0 % (ref 0–2)
BNP SERPL-MCNC: 698 PG/ML
BUN SERPL-MCNC: 19 MG/DL (ref 8–23)
CALCIUM SERPL-MCNC: 9.4 MG/DL (ref 8.3–10.4)
CHLORIDE SERPL-SCNC: 104 MMOL/L (ref 98–107)
CO2 SERPL-SCNC: 30 MMOL/L (ref 21–32)
CREAT SERPL-MCNC: 0.5 MG/DL (ref 0.6–1)
CRP SERPL HS-MCNC: 42.8 MG/L
DIFFERENTIAL METHOD BLD: ABNORMAL
EOSINOPHIL # BLD: 0 K/UL (ref 0–0.8)
EOSINOPHIL NFR BLD: 0 % (ref 0.5–7.8)
ERYTHROCYTE [DISTWIDTH] IN BLOOD BY AUTOMATED COUNT: 14.7 % (ref 11.9–14.6)
ERYTHROCYTE [SEDIMENTATION RATE] IN BLOOD: 80 MM/HR (ref 0–30)
GLUCOSE BLD STRIP.AUTO-MCNC: 126 MG/DL (ref 65–100)
GLUCOSE BLD STRIP.AUTO-MCNC: 129 MG/DL (ref 65–100)
GLUCOSE BLD STRIP.AUTO-MCNC: 181 MG/DL (ref 65–100)
GLUCOSE BLD STRIP.AUTO-MCNC: 99 MG/DL (ref 65–100)
GLUCOSE SERPL-MCNC: 185 MG/DL (ref 65–100)
HCT VFR BLD AUTO: 33.6 % (ref 35.8–46.3)
HGB BLD-MCNC: 10.5 G/DL (ref 11.7–15.4)
IMM GRANULOCYTES # BLD AUTO: 0.1 K/UL (ref 0–0.5)
IMM GRANULOCYTES NFR BLD AUTO: 1 % (ref 0–5)
LYMPHOCYTES # BLD: 1.5 K/UL (ref 0.5–4.6)
LYMPHOCYTES NFR BLD: 13 % (ref 13–44)
MCH RBC QN AUTO: 29.7 PG (ref 26.1–32.9)
MCHC RBC AUTO-ENTMCNC: 31.3 G/DL (ref 31.4–35)
MCV RBC AUTO: 95.2 FL (ref 79.6–97.8)
MM INDURATION POC: 0 MM (ref 0–5)
MONOCYTES # BLD: 1 K/UL (ref 0.1–1.3)
MONOCYTES NFR BLD: 9 % (ref 4–12)
NEUTS SEG # BLD: 8.7 K/UL (ref 1.7–8.2)
NEUTS SEG NFR BLD: 77 % (ref 43–78)
NRBC # BLD: 0 K/UL (ref 0–0.2)
PHOSPHATE SERPL-MCNC: 2.6 MG/DL (ref 2.3–3.7)
PLATELET # BLD AUTO: 445 K/UL (ref 150–450)
PMV BLD AUTO: 9.4 FL (ref 9.4–12.3)
POTASSIUM SERPL-SCNC: 3.5 MMOL/L (ref 3.5–5.1)
PPD POC: NEGATIVE NEGATIVE
RBC # BLD AUTO: 3.53 M/UL (ref 4.05–5.2)
SERVICE CMNT-IMP: ABNORMAL
SERVICE CMNT-IMP: NORMAL
SODIUM SERPL-SCNC: 140 MMOL/L (ref 136–145)
WBC # BLD AUTO: 11.3 K/UL (ref 4.3–11.1)

## 2022-02-02 PROCEDURE — 97530 THERAPEUTIC ACTIVITIES: CPT

## 2022-02-02 PROCEDURE — 65660000000 HC RM CCU STEPDOWN

## 2022-02-02 PROCEDURE — 74011250637 HC RX REV CODE- 250/637: Performed by: FAMILY MEDICINE

## 2022-02-02 PROCEDURE — 80048 BASIC METABOLIC PNL TOTAL CA: CPT

## 2022-02-02 PROCEDURE — 74011000250 HC RX REV CODE- 250: Performed by: EMERGENCY MEDICINE

## 2022-02-02 PROCEDURE — 86141 C-REACTIVE PROTEIN HS: CPT

## 2022-02-02 PROCEDURE — 71046 X-RAY EXAM CHEST 2 VIEWS: CPT

## 2022-02-02 PROCEDURE — 84100 ASSAY OF PHOSPHORUS: CPT

## 2022-02-02 PROCEDURE — 94760 N-INVAS EAR/PLS OXIMETRY 1: CPT

## 2022-02-02 PROCEDURE — 36415 COLL VENOUS BLD VENIPUNCTURE: CPT

## 2022-02-02 PROCEDURE — 74011636637 HC RX REV CODE- 636/637: Performed by: PHYSICIAN ASSISTANT

## 2022-02-02 PROCEDURE — 85025 COMPLETE CBC W/AUTO DIFF WBC: CPT

## 2022-02-02 PROCEDURE — 74011250637 HC RX REV CODE- 250/637: Performed by: PHYSICIAN ASSISTANT

## 2022-02-02 PROCEDURE — 85652 RBC SED RATE AUTOMATED: CPT

## 2022-02-02 PROCEDURE — 77010033678 HC OXYGEN DAILY

## 2022-02-02 PROCEDURE — 83735 ASSAY OF MAGNESIUM: CPT

## 2022-02-02 PROCEDURE — 83880 ASSAY OF NATRIURETIC PEPTIDE: CPT

## 2022-02-02 PROCEDURE — 94640 AIRWAY INHALATION TREATMENT: CPT

## 2022-02-02 PROCEDURE — 82962 GLUCOSE BLOOD TEST: CPT

## 2022-02-02 PROCEDURE — 97535 SELF CARE MNGMENT TRAINING: CPT

## 2022-02-02 PROCEDURE — 74011000250 HC RX REV CODE- 250: Performed by: FAMILY MEDICINE

## 2022-02-02 RX ORDER — POTASSIUM CHLORIDE 750 MG/1
10 TABLET, EXTENDED RELEASE ORAL DAILY
Status: DISCONTINUED | OUTPATIENT
Start: 2022-02-02 | End: 2022-02-04 | Stop reason: HOSPADM

## 2022-02-02 RX ORDER — POTASSIUM CHLORIDE 750 MG/1
40 TABLET, EXTENDED RELEASE ORAL
Status: COMPLETED | OUTPATIENT
Start: 2022-02-02 | End: 2022-02-02

## 2022-02-02 RX ADMIN — LISINOPRIL 5 MG: 5 TABLET ORAL at 09:25

## 2022-02-02 RX ADMIN — SUCRALFATE 1 G: 1 TABLET ORAL at 09:26

## 2022-02-02 RX ADMIN — MONTELUKAST 10 MG: 10 TABLET, FILM COATED ORAL at 09:26

## 2022-02-02 RX ADMIN — POLYETHYLENE GLYCOL 3350 17 G: 17 POWDER, FOR SOLUTION ORAL at 09:27

## 2022-02-02 RX ADMIN — FUROSEMIDE 40 MG: 40 TABLET ORAL at 09:25

## 2022-02-02 RX ADMIN — LATANOPROST 1 DROP: 50 SOLUTION OPHTHALMIC at 21:24

## 2022-02-02 RX ADMIN — SERTRALINE 150 MG: 100 TABLET, FILM COATED ORAL at 17:40

## 2022-02-02 RX ADMIN — SODIUM CHLORIDE, PRESERVATIVE FREE 10 ML: 5 INJECTION INTRAVENOUS at 06:21

## 2022-02-02 RX ADMIN — SODIUM CHLORIDE, PRESERVATIVE FREE 10 ML: 5 INJECTION INTRAVENOUS at 15:31

## 2022-02-02 RX ADMIN — BUDESONIDE AND FORMOTEROL FUMARATE DIHYDRATE 2 PUFF: 80; 4.5 AEROSOL RESPIRATORY (INHALATION) at 21:05

## 2022-02-02 RX ADMIN — KETOTIFEN FUMARATE 1 DROP: 0.35 SOLUTION/ DROPS OPHTHALMIC at 21:24

## 2022-02-02 RX ADMIN — SODIUM CHLORIDE, PRESERVATIVE FREE 10 ML: 5 INJECTION INTRAVENOUS at 06:22

## 2022-02-02 RX ADMIN — APIXABAN 2.5 MG: 2.5 TABLET, FILM COATED ORAL at 09:25

## 2022-02-02 RX ADMIN — SODIUM CHLORIDE, PRESERVATIVE FREE 10 ML: 5 INJECTION INTRAVENOUS at 21:21

## 2022-02-02 RX ADMIN — POTASSIUM CHLORIDE 40 MEQ: 750 TABLET, EXTENDED RELEASE ORAL at 15:30

## 2022-02-02 RX ADMIN — APIXABAN 2.5 MG: 2.5 TABLET, FILM COATED ORAL at 21:20

## 2022-02-02 RX ADMIN — CLORAZEPATE DIPOTASSIUM 3.75 MG: 7.5 TABLET ORAL at 09:25

## 2022-02-02 RX ADMIN — BUDESONIDE AND FORMOTEROL FUMARATE DIHYDRATE 2 PUFF: 80; 4.5 AEROSOL RESPIRATORY (INHALATION) at 08:23

## 2022-02-02 RX ADMIN — SUCRALFATE 1 G: 1 TABLET ORAL at 21:21

## 2022-02-02 RX ADMIN — Medication 500 MG: at 09:25

## 2022-02-02 RX ADMIN — PANTOPRAZOLE SODIUM 40 MG: 40 TABLET, DELAYED RELEASE ORAL at 09:25

## 2022-02-02 RX ADMIN — SODIUM CHLORIDE, PRESERVATIVE FREE 10 ML: 5 INJECTION INTRAVENOUS at 21:22

## 2022-02-02 RX ADMIN — CLORAZEPATE DIPOTASSIUM 3.75 MG: 7.5 TABLET ORAL at 21:21

## 2022-02-02 RX ADMIN — PANTOPRAZOLE SODIUM 40 MG: 40 TABLET, DELAYED RELEASE ORAL at 21:21

## 2022-02-02 RX ADMIN — SODIUM CHLORIDE, PRESERVATIVE FREE 10 ML: 5 INJECTION INTRAVENOUS at 15:32

## 2022-02-02 RX ADMIN — SUCRALFATE 1 G: 1 TABLET ORAL at 15:32

## 2022-02-02 RX ADMIN — KETOTIFEN FUMARATE 1 DROP: 0.35 SOLUTION/ DROPS OPHTHALMIC at 09:26

## 2022-02-02 RX ADMIN — INSULIN LISPRO 2 UNITS: 100 INJECTION, SOLUTION INTRAVENOUS; SUBCUTANEOUS at 08:11

## 2022-02-02 RX ADMIN — POTASSIUM CHLORIDE 10 MEQ: 750 TABLET, EXTENDED RELEASE ORAL at 15:31

## 2022-02-02 NOTE — PROGRESS NOTES
Hospitalist Progress Note   Admit Date:  2022  3:41 PM   Name:  Aniket Kelly   Age:  64 y.o. Sex:  female  :  1945   MRN:  [id number]   Room:      Presenting Complaint: Shortness of Breath    Reason(s) for Admission: Suspected CHF (congestive heart failure) [R09.89]  Hypokalemia [E87.6]     Hospital Course & Interval History:   Sumaya Garcia is a 69-year-old female with history HTN, b/l pulmonary embolism, iron deficiency anemia, and recent COVID pneumonia who presented to the ED with increasing shortness of breath starting the night before. She reports continued cough ever since having COVID in 2021. She denies chest pain, abdominal pain, or N/V. She reports she is compliant with her medications. EMS found her sats to be in upper 80s on room air and was placed on 4L O2 nasal cannula and given solumedrol, terbutaline, and albuterol nebulizer treatment. Chest Xray 2022 shows bilateral pulmonary edema. COVID panel 2022 negative. BNP elevated at 2673. Echo ordered for suspected CHF but showed no signs of systolic/diastolic dysfunction or ventricular enlargement. Subjective (22): \"I am breathing better today. \"  Pleasant WF sitting up in bed admitting to improved dyspnea this AM    Review of Systems:  10 systems reviewed and negative except as noted in HPI.       Assessment & Plan:     Principal Problem:    Acute hypoxemic respiratory failure ([address] (2020)  - clinically improved  - weaning supplemental O2 to 2L via NC today  - in setting of remote COVID with some pneumonitis and pulm edema   - cont lasix as dosed  -CT chest negative for acute PE    Active Problems:    Acute pulmonary edema b/l  - clinically improved  - repeat BNP this AM improved though cxray with persistent b/l infiltrates (probable post COVID etiology)  - cont diuretics  - echo with preserved EF and normal diastolic fxn      Acute / worsening pneumonitis  - remote COVID infection 12/2021  - appreciate pulmonary assistance   - cont pred as dosed      Essential hypertension (3/29/2021)  - adequate control on lisinopril 5mg      Iron deficiency anemia (11/18/2021)  - h/h stable      Hypokalemia (7/29/2020)  - anticipate due to ongoing diuretics will need daily supplementation  - KCL 40meq now then 10meq daily      Newly dx'd diabetes  - cont sliding scale  - nutrition for diabetic diet teaching  - consider starting oral agent on dc     Anxiety  -Tranxene bid restarted  - mood stable      Pulmonary embolism, bilateral ([address]   - CT 1/31/2022 showed no evidence of current PE  - eliquis PO      S/P IVC filter   -removed 3/29/2021      Constipation (8/2/2020)  - cont bowel regimen      Debility  - appreciate PT note; pt not interested in STR on dc; will need home health  - possibly home next 1-2 days if clinical condition continues to improve      Dispo/Discharge Planning:      Expect at least 2 midnight stay    Diet:  ADULT DIET Regular; 4 carb choices (60 gm/meal);  Low Fat/Low Chol/High Fiber/2 gm Na; 1200 ml  DVT PPx: Eliquis  Code status: Full Code    Hospital Problems as of 2/2/2022 Date Reviewed: 10/13/2021          Codes Class Noted - Resolved POA    History of 2019 novel coronavirus disease (COVID-19) ICD-10-CM: Z86.16  ICD-9-CM: V12.09  2/1/2022 - Present Unknown        Pulmonary infiltrate ICD-10-CM: R91.8  ICD-9-CM: 793.19  2/1/2022 - Present Unknown        Debility ICD-10-CM: R53.81  ICD-9-CM: 799.3  2/1/2022 - Present Unknown        Suspected sleep apnea ICD-10-CM: R29.818  ICD-9-CM: 781.99  2/1/2022 - Present Unknown        Suspected CHF (congestive heart failure) ICD-10-CM: R09.89  ICD-9-CM: 785.9  1/31/2022 - Present Unknown        Iron deficiency anemia ICD-10-CM: D50.9  ICD-9-CM: 280.9  11/18/2021 - Present Yes        Essential hypertension ICD-10-CM: I10  ICD-9-CM: 401.9  3/29/2021 - Present Yes        S/P IVC filter ICD-10-CM: W00.756  ICD-9-CM: V45.89  8/3/2020 - Present Yes Pulmonary HTN ([facility] [address] ICD-10-CM: I27.20  ICD-9-CM: 416.8  8/3/2020 - Present Yes        Constipation ICD-10-CM: K59.00  ICD-9-CM: 564.00  8/2/2020 - Present Yes        Hypokalemia ICD-10-CM: E87.6  ICD-9-CM: 276.8  7/29/2020 - Present Yes        * (Principal) Acute hypoxemic respiratory failure (HCC) ICD-10-CM: J96.01  ICD-9-CM: 518.81  7/27/2020 - Present Yes        Pulmonary embolism, bilateral (HCC) ICD-10-CM: I26.99  ICD-9-CM: 415.19  7/27/2020 - Present Yes        Anxiety ICD-10-CM: F41.9  ICD-9-CM: 300.00  Unknown - Present Yes              Objective:     Patient Vitals for the past 24 hrs:   Temp Pulse Resp BP SpO2   02/02/22 1123 98.2 °F (36.8 °C) 87 18 117/63 94 %   02/02/22 1032     91 %   02/02/22 0826     95 %   02/02/22 0825     95 %   02/02/22 0715 97.8 °F (36.6 °C) 76 18 134/74 96 %   02/02/22 0400  76      02/02/22 0345 97.4 °F (36.3 °C) 76 20 (!) 143/73 96 %   02/02/22 0000  87      02/01/22 2258 98.1 °F (36.7 °C) 87 18 (!) 117/54 92 %   02/01/22 2224  87      02/01/22 2115  87   92 %   02/01/22 2000  87      02/01/22 1921 98.2 °F (36.8 °C) 87 18 119/63 92 %   02/01/22 1628 97.9 °F (36.6 °C) 85  107/63 93 %   02/01/22 1425     95 %   02/01/22 1424     96 %     Oxygen Therapy  O2 Sat (%): 94 % (02/02/22 1123)  Pulse via Oximetry: 72 beats per minute (02/02/22 0826)  O2 Device: Nasal cannula (02/02/22 1032)  O2 Flow Rate (L/min): 2 l/min (02/02/22 1032)  FIO2 (%): 36 % (02/01/22 0849)    Estimated body mass index is 33.22 kg/m² as calculated from the following:    Height as of this encounter: 5' (1.524 m). Weight as of this encounter: 77.2 kg (170 lb 1.6 oz). Intake/Output Summary (Last 24 hours) at 2/2/2022 1255  Last data filed at 2/1/2022 2351  Gross per 24 hour   Intake    Output 1350 ml   Net -1350 ml         Physical Exam:   Blood pressure 117/63, pulse 87, temperature 98.2 °F (36.8 °C), resp.  rate 18, height 5' (1.524 m), weight 77.2 kg (170 lb 1.6 oz), SpO2 94 %. General:    Well nourished. No overt distress  Head:  Normocephalic, atraumatic  Eyes:  Pale conjunctiva. Sclerae appear normal.  Pupils equally round. ENT:  Nares appear normal, no drainage. Moist oral mucosa  Neck:  No restricted ROM. Trachea midline   CV:   RRR. No m/r/g. No jugular venous distension. Lungs:   Rales bilaterally. No wheezing or rhonchi. Respirations even with slightly increased effort. 2L O2 via nasal cannula  Abdomen: Bowel sounds present. Soft, nontender, nondistended. Extremities: No cyanosis or clubbing. 1+ pitting edema bilaterally. Skin:     No rashes and normal coloration. Warm and dry. Neuro:  CN II-XII grossly intact. Sensation intact. A&Ox3  Psych:  Normal mood and affect.       I have reviewed ordered lab tests and independently visualized imaging below:    Recent Labs:  Recent Results (from the past 48 hour(s))   EKG, 12 LEAD, INITIAL    Collection Time: 01/31/22  3:58 PM   Result Value Ref Range    Ventricular Rate 104 BPM    Atrial Rate 104 BPM    P-R Interval 136 ms    QRS Duration 95 ms    Q-T Interval 360 ms    QTC Calculation (Bezet) 474 ms    Calculated P Axis 20 degrees    Calculated R Axis 0 degrees    Calculated T Axis -39 degrees    Diagnosis       Sinus tachycardia  Low voltage, precordial leads  Nonspecific T abnormalities, diffuse leads    Confirmed by DELROY AZUL (), ADELITA SPRING ([id number]) on 2/1/2022 6:21:05 AM     CBC WITH AUTOMATED DIFF    Collection Time: 01/31/22  4:00 PM   Result Value Ref Range    WBC 9.3 4.3 - 11.1 K/uL    RBC 3.48 (L) 4.05 - 5.2 M/uL    HGB 10.4 (L) 11.7 - 15.4 g/dL    HCT 33.5 (L) 35.8 - 46.3 %    MCV 96.3 79.6 - 97.8 FL    MCH 29.9 26.1 - 32.9 PG    MCHC 31.0 (L) 31.4 - 35.0 g/dL    RDW 14.7 (H) 11.9 - 14.6 %    PLATELET 404 444 - 823 K/uL    MPV 9.3 (L) 9.4 - 12.3 FL    ABSOLUTE NRBC 0.00 0.0 - 0.2 K/uL    DF AUTOMATED      NEUTROPHILS 64 43 - 78 %    LYMPHOCYTES 20 13 - 44 %    MONOCYTES 12 4.0 - 12.0 % EOSINOPHILS 2 0.5 - 7.8 %    BASOPHILS 0 0.0 - 2.0 %    IMMATURE GRANULOCYTES 1 0.0 - 5.0 %    ABS. NEUTROPHILS 5.9 1.7 - 8.2 K/UL    ABS. LYMPHOCYTES 1.9 0.5 - 4.6 K/UL    ABS. MONOCYTES 1.1 0.1 - 1.3 K/UL    ABS. EOSINOPHILS 0.2 0.0 - 0.8 K/UL    ABS. BASOPHILS 0.0 0.0 - 0.2 K/UL    ABS. IMM. GRANS. 0.1 0.0 - 0.5 K/UL   METABOLIC PANEL, COMPREHENSIVE    Collection Time: 01/31/22  4:00 PM   Result Value Ref Range    Sodium 140 136 - 145 mmol/L    Potassium 3.3 (L) 3.5 - 5.1 mmol/L    Chloride 106 98 - 107 mmol/L    CO2 27 21 - 32 mmol/L    Anion gap 7 7 - 16 mmol/L    Glucose 143 (H) 65 - 100 mg/dL    BUN 10 8 - 23 MG/DL    Creatinine 0.51 (L) 0.6 - 1.0 MG/DL    GFR est AA >60 >60 ml/min/1.73m2    GFR est non-AA >60 >60 ml/min/1.73m2    Calcium 8.9 8.3 - 10.4 MG/DL    Bilirubin, total 0.3 0.2 - 1.1 MG/DL    ALT (SGPT) 16 12 - 65 U/L    AST (SGOT) 15 15 - 37 U/L    Alk.  phosphatase 90 50 - 136 U/L    Protein, total 6.6 6.3 - 8.2 g/dL    Albumin 2.2 (L) 3.2 - 4.6 g/dL    Globulin 4.4 (H) 2.3 - 3.5 g/dL    A-G Ratio 0.5 (L) 1.2 - 3.5     MAGNESIUM    Collection Time: 01/31/22  4:00 PM   Result Value Ref Range    Magnesium 2.1 1.8 - 2.4 mg/dL   TROPONIN-HIGH SENSITIVITY    Collection Time: 01/31/22  4:00 PM   Result Value Ref Range    Troponin-High Sensitivity 34.6 (H) 0 - 14 pg/mL   NT-PRO BNP    Collection Time: 01/31/22  4:12 PM   Result Value Ref Range    NT pro-BNP 2,673 (H) <450 PG/ML   COVID-19 RAPID TEST    Collection Time: 01/31/22  4:15 PM   Result Value Ref Range    Specimen source NASAL      COVID-19 rapid test Not detected NOTD     TROPONIN-HIGH SENSITIVITY    Collection Time: 01/31/22  5:58 PM   Result Value Ref Range    Troponin-High Sensitivity 35.0 (H) 0 - 14 pg/mL   FERRITIN    Collection Time: 01/31/22  5:58 PM   Result Value Ref Range    Ferritin 73 8 - 388 NG/ML   TRANSFERRIN SATURATION    Collection Time: 01/31/22  5:58 PM   Result Value Ref Range    Iron 13 (L) 35 - 150 ug/dL    TIBC 208 (L) 250 - 450 ug/dL    Transferrin Saturation 6 (L) >29 %   METABOLIC PANEL, BASIC    Collection Time: 02/01/22  4:17 AM   Result Value Ref Range    Sodium 142 136 - 145 mmol/L    Potassium 4.3 3.5 - 5.1 mmol/L    Chloride 106 98 - 107 mmol/L    CO2 31 21 - 32 mmol/L    Anion gap 5 (L) 7 - 16 mmol/L    Glucose 241 (H) 65 - 100 mg/dL    BUN 11 8 - 23 MG/DL    Creatinine 0.62 0.6 - 1.0 MG/DL    GFR est AA >60 >60 ml/min/1.73m2    GFR est non-AA >60 >60 ml/min/1.73m2    Calcium 9.1 8.3 - 10.4 MG/DL   CBC W/O DIFF    Collection Time: 02/01/22  4:17 AM   Result Value Ref Range    WBC 5.2 4.3 - 11.1 K/uL    RBC 3.37 (L) 4.05 - 5.2 M/uL    HGB 10.1 (L) 11.7 - 15.4 g/dL    HCT 32.1 (L) 35.8 - 46.3 %    MCV 95.3 79.6 - 97.8 FL    MCH 30.0 26.1 - 32.9 PG    MCHC 31.5 31.4 - 35.0 g/dL    RDW 14.5 11.9 - 14.6 %    PLATELET 219 982 - 666 K/uL    MPV 10.0 9.4 - 12.3 FL    ABSOLUTE NRBC 0.00 0.0 - 0.2 K/uL   RESPIRATORY VIRUS PANEL W/COVID-19, PCR    Collection Time: 02/01/22  7:27 AM    Specimen: Nasopharyngeal   Result Value Ref Range    Adenovirus NOT DETECTED NOTDET      Coronavirus 229E NOT DETECTED NOTDET      Coronavirus HKU1 NOT DETECTED NOTDET      Coronavirus CVNL63 NOT DETECTED NOTDET      Coronavirus OC43 NOT DETECTED NOTDET      SARS-CoV-2, PCR NOT DETECTED NOTDET      Metapneumovirus NOT DETECTED NOTDET      Rhinovirus and Enterovirus NOT DETECTED NOTDET      Influenza A NOT DETECTED NOTDET      Influenza B NOT DETECTED NOTDET      Parainfluenza 1 NOT DETECTED NOTDET      Parainfluenza 2 NOT DETECTED NOTDET      Parainfluenza 3 NOT DETECTED NOTDET      Parainfluenza virus 4 NOT DETECTED NOTDET      RSV by PCR NOT DETECTED NOTDET      B. parapertussis, PCR NOT DETECTED NOTDET      Bordetella pertussis - PCR NOT DETECTED NOTDET      Chlamydophila pneumoniae DNA, QL, PCR NOT DETECTED NOTDET      Mycoplasma pneumoniae DNA, QL, PCR NOT DETECTED NOTDET     ECHO ADULT COMPLETE    Collection Time: 02/01/22 11:29 AM   Result Value Ref Range    LV EDV A2C 47 mL    LV EDV A4C 56 mL    LV EDV BP 52 (A) 56 - 104 mL    LV ESV A2C 27 mL    LV ESV A4C 27 mL    IVSd 0.9 0.6 - 0.9 cm    LVIDd 3.7 (A) 3.9 - 5.3 cm    LVIDs 2.5 cm    LVOT Diameter 2.0 cm    LVOT Mean Gradient 2 mmHg    LVOT VTI 23.3 cm    LVOT Peak Velocity 1.1 m/s    LVOT Peak Gradient 4 mmHg    LVPWd 0.8 0.6 - 0.9 cm    LV E' Lateral Velocity 8 cm/s    LV E' Septal Velocity 7 cm/s    LV Ejection Fraction A2C 42 %    LV Ejection Fraction A4C 52 %    EF BP 45 (A) 55 - 100 %    LVOT Area 3.1 cm2    LVOT SV 73.2 ml    LA Minor Axis 5.3 cm    LA Major [place] 6.0 cm    LA Area 2C 19.4 cm2    LA Area 4C 23.6 cm2    LA Volume BP 68 (A) 22 - 52 mL    LA Diameter 2.9 cm    AV Mean Velocity 1.0 m/s    AV Mean Gradient 4 mmHg    AV VTI 30.5 cm    AV Peak Velocity 1.6 m/s    AV Peak Gradient 10 mmHg    AV Area by VTI 2.4 cm2    AV Area by Peak Velocity 2.1 cm2    Aortic Root 2.8 cm    Ascending Aorta 2.9 cm    IVC Proxmal 2.0 cm    MV E Wave Deceleration Time 185.0 ms    MV A Velocity 1.07 m/s    MV E Velocity 0.93 m/s    MV Mean Gradient 3 mmHg    MV VTI 30.5 cm    MV Mean Velocity 0.8 m/s    MV Max Velocity 1.1 m/s    MV Peak Gradient 5 mmHg    MV Area by VTI 2.4 cm2    PV .0 ms    PV Max Velocity 0.9 m/s    PV Peak Gradient 3 mmHg    RV Basal Dimension 3.9 cm    RV Mid Dimension 2.9 cm    TAPSE 1.6 1.5 - 2.0 cm    Fractional Shortening 2D 32 28 - 44 %    LV EDV Index BP 31 mL/m2    LV ESV Index A4C 16 mL/m2    LV EDV Index A4C 34 mL/m2    LV ESV Index A2C 16 mL/m2    LV EDV Index A2C 28 mL/m2    LVIDd Index 2.22 cm/m2    LVIDs Index 1.50 cm/m2    LV RWT Ratio 0.43     LV Mass 2D 89.5 67 - 162 g    LV Mass 2D Index 53.6 43 - 95 g/m2    MV E/A 0.87     E/E' Ratio (Averaged) 12.46     E/E' Lateral 11.63     E/E' Septal 13.29     LA Volume Index BP 41 (A) 16 - 34 ml/m2    LVOT Stroke Volume Index 43.8 mL/m2    LA Size Index 1.74 cm/m2    LA/AO Root Ratio 1.04     Ao Root Index 1.68 cm/m2 Ascending Aorta Index 1.74 cm/m2    AV Velocity Ratio 0.69     LVOT:AV VTI Index 0.76     MARIYA/BSA VTI 1.4 cm2/m2    MARIYA/BSA Peak Velocity 1.3 cm2/m2    MV:LVOT VTI Index 1.31     Est. RA Pressure 3 mmHg   GLUCOSE, POC    Collection Time: 02/01/22  4:41 PM   Result Value Ref Range    Glucose (POC) 127 (H) 65 - 100 mg/dL    Performed by Luke Gao    GLUCOSE, POC    Collection Time: 02/01/22  8:37 PM   Result Value Ref Range    Glucose (POC) 152 (H) 65 - 100 mg/dL    Performed by [facility]    MISC. LAB TEST    Collection Time: 02/01/22  9:10 PM   Result Value Ref Range    Test Description: 6,502      Reference Lab: [email or web address]      Results: PENDING    GLUCOSE, POC    Collection Time: 02/02/22  7:43 AM   Result Value Ref Range    Glucose (POC) 181 (H) 65 - 100 mg/dL    Performed by Sharif    CBC WITH AUTOMATED DIFF    Collection Time: 02/02/22 10:26 AM   Result Value Ref Range    WBC 11.3 (H) 4.3 - 11.1 K/uL    RBC 3.53 (L) 4.05 - 5.2 M/uL    HGB 10.5 (L) 11.7 - 15.4 g/dL    HCT 33.6 (L) 35.8 - 46.3 %    MCV 95.2 79.6 - 97.8 FL    MCH 29.7 26.1 - 32.9 PG    MCHC 31.3 (L) 31.4 - 35.0 g/dL    RDW 14.7 (H) 11.9 - 14.6 %    PLATELET 882 860 - 988 K/uL    MPV 9.4 9.4 - 12.3 FL    ABSOLUTE NRBC 0.00 0.0 - 0.2 K/uL    DF AUTOMATED      NEUTROPHILS 77 43 - 78 %    LYMPHOCYTES 13 13 - 44 %    MONOCYTES 9 4.0 - 12.0 %    EOSINOPHILS 0 (L) 0.5 - 7.8 %    BASOPHILS 0 0.0 - 2.0 %    IMMATURE GRANULOCYTES 1 0.0 - 5.0 %    ABS. NEUTROPHILS 8.7 (H) 1.7 - 8.2 K/UL    ABS. LYMPHOCYTES 1.5 0.5 - 4.6 K/UL    ABS. MONOCYTES 1.0 0.1 - 1.3 K/UL    ABS. EOSINOPHILS 0.0 0.0 - 0.8 K/UL    ABS. BASOPHILS 0.0 0.0 - 0.2 K/UL    ABS. IMM.  GRANS. 0.1 0.0 - 0.5 K/UL   METABOLIC PANEL, BASIC    Collection Time: 02/02/22 10:26 AM   Result Value Ref Range    Sodium 140 136 - 145 mmol/L    Potassium 3.5 3.5 - 5.1 mmol/L    Chloride 104 98 - 107 mmol/L    CO2 30 21 - 32 mmol/L    Anion gap 6 (L) 7 - 16 mmol/L    Glucose 185 (H) 65 - 100 mg/dL    BUN 19 8 - 23 MG/DL    Creatinine 0.50 (L) 0.6 - 1.0 MG/DL    GFR est AA >60 >60 ml/min/1.73m2    GFR est non-AA >60 >60 ml/min/1.73m2    Calcium 9.4 8.3 - 10.4 MG/DL   PHOSPHORUS    Collection Time: 02/02/22 10:26 AM   Result Value Ref Range    Phosphorus 2.6 2.3 - 3.7 MG/DL   NT-PRO BNP    Collection Time: 02/02/22 10:26 AM   Result Value Ref Range    NT pro- (H) <450 PG/ML   GLUCOSE, POC    Collection Time: 02/02/22 11:48 AM   Result Value Ref Range    Glucose (POC) 126 (H) 65 - 100 mg/dL    Performed by Efren        All Micro Results     Procedure Component Value Units Date/Time    RESPIRATORY VIRUS PANEL W/COVID-19, PCR [[id number]] Collected: 02/01/22 0727    Order Status: Completed Specimen: Nasopharyngeal Updated: 02/01/22 0853     Adenovirus NOT DETECTED        Coronavirus 229E NOT DETECTED        Coronavirus HKU1 NOT DETECTED        Coronavirus CVNL63 NOT DETECTED        Coronavirus OC43 NOT DETECTED        SARS-CoV-2, PCR NOT DETECTED        Metapneumovirus NOT DETECTED        Rhinovirus and Enterovirus NOT DETECTED        Influenza A NOT DETECTED        Influenza B NOT DETECTED        Parainfluenza 1 NOT DETECTED        Parainfluenza 2 NOT DETECTED        Parainfluenza 3 NOT DETECTED        Parainfluenza virus 4 NOT DETECTED        RSV by PCR NOT DETECTED        B. parapertussis, PCR NOT DETECTED        Bordetella pertussis - PCR NOT DETECTED        Chlamydophila pneumoniae DNA, QL, PCR NOT DETECTED        Mycoplasma pneumoniae DNA, QL, PCR NOT DETECTED       COVID-19 RAPID TEST [[id number]] Collected: 01/31/22 1615    Order Status: Completed Specimen: Nasopharyngeal Updated: 01/31/22 1708     Specimen source NASAL        COVID-19 rapid test Not detected        Comment:      The specimen is NEGATIVE for SARS-CoV-2, the novel coronavirus associated with COVID-19. A negative result does not rule out COVID-19.        This test has been authorized by the FDA under an Emergency Use Authorization (EUA) for use by authorized laboratories. Fact sheet for Healthcare Providers: ConventionUpdate.co.nz  Fact sheet for Patients: ConventionUpdate.co.nz       Methodology: Isothermal Nucleic Acid Amplification               Other Studies:  XR CHEST PA LAT    Result Date: 2/2/2022  EXAM: Chest x-ray. INDICATION: Dyspnea. COMPARISON: January 31, 2022. TECHNIQUE: Frontal and lateral views of the chest were obtained. FINDINGS: Patchy diffuse bilateral lung edema or infiltrates are unchanged. The heart remains enlarged. No pneumothorax or pleural effusion is seen. Again noted are degenerative changes in the spine, old lower thoracic spine compression fracture, a left shoulder arthroplasty and a right humerus fixation plate. Unchanged cardiomegaly and bilateral lung edema or infiltrates.       Current Meds:  Current Facility-Administered Medications   Medication Dose Route Frequency    insulin lispro (HUMALOG) injection 0-10 Units  0-10 Units SubCUTAneous AC&HS    clorazepate (TRANXENE) tablet 3.75 mg  3.75 mg Oral BID    furosemide (LASIX) tablet 40 mg  40 mg Oral DAILY    sodium chloride (NS) flush 5-10 mL  5-10 mL IntraVENous Q8H    sodium chloride (NS) flush 5-10 mL  5-10 mL IntraVENous PRN    tuberculin injection 5 Units  5 Units IntraDERMal ONCE    apixaban (ELIQUIS) tablet 2.5 mg  2.5 mg Oral Q12H    ascorbic acid (vitamin C) (VITAMIN C) tablet 500 mg  500 mg Oral DAILY    Iron Fum & PS Cmp-Vit C-Niacin 125-40-3 mg cap 1 Caplet (Patient Supplied)  1 Caplet Oral DAILY    latanoprost (XALATAN) 0.005 % ophthalmic solution 1 Drop  1 Drop Both Eyes QHS    lifitegrast 5 % dpet 1 Drop (Patient Supplied)  1 Drop Both Eyes Q12H    lisinopriL (PRINIVIL, ZESTRIL) tablet 5 mg  5 mg Oral DAILY    montelukast (SINGULAIR) tablet 10 mg  10 mg Oral DAILY    ketotifen (ZADITOR) 0.025 % (0.035 %) ophthalmic solution 1 Drop  1 Drop Both Eyes BID    pantoprazole (PROTONIX) tablet 40 mg  40 mg Oral BID    polyethylene glycol (MIRALAX) packet 17 g  17 g Oral DAILY    sertraline (ZOLOFT) tablet 150 mg  150 mg Oral QPM    sucralfate (CARAFATE) tablet 1 g  1 g Oral TID    budesonide-formoterol (SYMBICORT) 80-4.5 mcg inhaler  2 Puff Inhalation BID RT    albuterol (PROVENTIL VENTOLIN) nebulizer solution 2.5 mg  2.5 mg Nebulization Q6H PRN    sodium chloride (NS) flush 5-40 mL  5-40 mL IntraVENous Q8H    sodium chloride (NS) flush 5-40 mL  5-40 mL IntraVENous PRN    acetaminophen (TYLENOL) tablet 650 mg  650 mg Oral Q6H PRN    Or    acetaminophen (TYLENOL) suppository 650 mg  650 mg Rectal Q6H PRN    polyethylene glycol (MIRALAX) packet 17 g  17 g Oral DAILY PRN    ondansetron (ZOFRAN ODT) tablet 4 mg  4 mg Oral Q8H PRN    Or    ondansetron (ZOFRAN) injection 4 mg  4 mg IntraVENous Q6H PRN       Signed:  ZEE Ibanez,       Part of this note may have been written by using a voice dictation software. The note has been proof read but may still contain some grammatical/other typographical errors.

## 2022-02-02 NOTE — PROGRESS NOTES
RNCM received call from patient's daughter, Sabra Tolbert. All questions answered. Requesting NP to call in regards to medical questions.

## 2022-02-02 NOTE — PROGRESS NOTES
RNCM met with patient in room ENDO 1. Patient awake in bed, alert and oriented x 4. Patient lives in own home with \"roommate\" Leonor Katz who assists her with ADL's. Patient post covid 12/29/21, did not require home O2 but currently on [facility]. Patient lives in one level home with ramp to enter. Patient has the following DME: elevated toilet seat, BSC, hand rails, wheelchair and rollator. Patient current with [facility]. Confirmed demographics, PCP, emergency contact and uses mail order scripts with [employer] but sometimes uses [facility] [address]. PT/OT eval recommend home health when stable for discharge. HH order placed. And referral started/faxed to [phone]. CM following for additional supportive needs.          Care Management Interventions  PCP Verified by CM:  (Dr Tigist Brennan [phone])  Mode of Transport at Discharge: Self  Transition of Care Consult (CM Consult): Discharge [address] (current with [facility])  [address]: No  Reason Outside [facility]: Patient already serviced by other home care/hospice agency  Discharge Durable Medical Equipment: Yes (DME: wheelchair, rollator, BSC, raised toilet, hand rails)  Physical Therapy Consult: Yes  Occupational Therapy Consult: Yes  Support Systems: Friend/Neighbor,Child(ranjith) (family friend: Dru Pham, [address] [facility] [phone]; son Ashlee Gil [phone])  Confirm Follow Up Transport: Self  The Plan for Transition of Care is Related to the Following Treatment Goals : return to safe level of independence  The Patient and/or Patient Representative was Provided with a Choice of Provider and Agrees with the Discharge Plan?: Yes  Freedom of Choice List was Provided with Basic Dialogue that Supports the Patient's Individualized Plan of Care/Goals, Treatment Preferences and Shares the Quality Data Associated with the Providers?: Yes   Resource Information Provided?: No  Discharge Location  Patient Expects to be Discharged to[de-identified] Home with home health

## 2022-02-02 NOTE — CONSULTS
History and Physical Initial Visit NOTE           2/1/2022    Parker Obando                        Date of Admission:  1/31/2022    The patient's chart is reviewed and the patient is discussed with the staff. Subjective:     Patient is a 64 y.o.  female seen and evaluated at the request of Dr. Kay Cabrera for pulmonary infiltrates    Patient has history of DVT/PE on Eliquis, ?pulmonary hypertension , Covid infection in December 2021, reflux, etc.  Patient came in with low oxygenation in the 80s on room air and required oxygen at 4 L/min. BNP was elevated 2673. Prior echocardiogram showed normal EF with pulmonary hypertension. No fevers, chills or infections reported. While patient was at [address] for her COVID-19 infection did get Decadron, remdesivir and antibiotics with azithromycin. Patient diuresed while in the hospital and a CT scan done yesterday showed the patient had increasing infiltrates compared to the prior study done in June 2020. Patient is diuresing and currently down to 2 L/min oxygen. Call to see if we can help assess her pulmonary infiltrates. Blood work for rheumatoid lupus were sent. Please note during this admission patient did have Covid PCR testing and also a rapid both of which were negative. Currently patient is in the room awake and alert. Indicates she feels okay except when she walks around she gets a little winded. He does report compliance with all her medications including her blood thinners. Patient did have a CT scan with PE protocol done today that was negative for central proximal pulmonary embolism. .    Patient does report that her  used to smoke all the time but he quit. She indicated in the past he was smoking 3 packs/day for a long time. She reported that eventually transition of cigars and pipe smoke, but he quit a couple years ago.   She indicates she also has been diagnosed with asthma and uses Symbicort and ProAir inhaler at home. Review of Systems:  -Fever  -Headaches  -Chest pain  +Dyspnea with exertion, -wheezing,+ cough (AM cough)  -Abdominal pain, -constipation  + Leg swelling ( more than normal)  All other organ systems grossly normal.      Prior to Admission Medications   Prescriptions Last Dose Informant Patient Reported? Taking? Iron Fum & PS Cmp-Vit C-Niacin (Integra) 125-40-3 mg cap   No No   Sig: Take 125 mg by mouth daily. ProAir HFA 90 mcg/actuation inhaler   No No   Sig: INHALE TWO PUFFS BY MOUTH TWICE A DAY AS NEEDED FOR COUGH   Symbicort 80-4.5 mcg/actuation HFAA   No No   Sig: INHALE 1 PUFF MOUTH TWICE A DAY FOR COUGH RINSE MOUTH AFTER EACH DOSE (DISCARD THREE MONTHS AFTER REMOVAL FROM FOIL POUCH)   VIT C/ABDIAZIZ AC/LUT/COPPER/ZNOX (PRESERVISION LUTEIN PO)   Yes No   Sig: Take  by mouth. 1 by oral route daily   apixaban (ELIQUIS) 2.5 mg tablet   No No   Sig: Take 1 Tablet by mouth two (2) times a day. ascorbic acid, vitamin C, (VITAMIN C) 500 mg tablet   Yes No   Sig: Take  by mouth.   calcium carbonate (OS-JANA) 500 mg calcium (1,250 mg) tablet   Yes No   Sig: Take  by mouth daily. chlorhexidine (PERIDEX) 0.12 % solution   Yes No   Sig: SWISH AND DIP BRUSH INTO RINSE AND CLEAN TEETH EXPOSED IMPLANT TWO TIMES DAILY. cholecalciferol (VITAMIN D3) 1,000 unit tablet   Yes No   Sig: Take  by mouth daily. clorazepate (TRANXENE) 3.75 mg tablet   No No   Sig: Take 1 Tablet by mouth three (3) times daily. Max Daily Amount: 11.25 mg. Indications: anxious   dietary supplement cap   Yes No   Sig: Take  by mouth. docusate sodium (STOOL SOFTENER) 100 mg tab   Yes No   Sig: Take  by mouth as needed. glucosamine & chondroit sul. Na 750-400 mg cmpk   Yes No   Sig: Take  by mouth.   latanoprost (XALATAN) 0.005 % ophthalmic solution   No No   Sig: Administer 1 Drop to both eyes every evening.    lifitegrast (Xiidra) 5 % dpet   Yes No   Sig: Apply  to eye.   lisinopriL (PRINIVIL, ZESTRIL) 5 mg tablet   No No   Sig: TAKE ONE TABLET BY MOUTH EVERY DAY   melatonin 10 mg cap   Yes No   Sig: Take  by mouth.   montelukast (SINGULAIR) 10 mg tablet   No No   Sig: TAKE ONE TABLET BY MOUTH EVERY DAY   nystatin-triamcinolone (MYCOLOG II) topical cream   No No   Sig: APPLY CREAM TO AFFECTED AREA TWICE A DAY   olopatadine (Pataday) 0.2 % drop ophthalmic solution   No No   Sig: Administer 1 Drop to both eyes daily. omega-3 fatty acids-vitamin e (FISH OIL) 1,000 mg cap   Yes No   Sig: Take 1 Cap by mouth. 1 by oral route twice daily   pantoprazole (Protonix) 40 mg tablet   No No   Sig: Take 1 Tablet by mouth two (2) times a day. polyethylene glycol (MIRALAX) 17 gram packet   No No   Sig: Take 1 Packet by mouth daily. sertraline (ZOLOFT) 100 mg tablet   No No   Sig: Take 1 1/2 tab daily. sodium chloride (Saline Nasal) 0.65 % nasal squeeze bottle   No No   Si.05 mL by Both Nostrils route four (4) times daily as needed for Congestion or Nasal Dryness. sucralfate (Carafate) 1 gram tablet   No No   Sig: Take 1 Tab by mouth three (3) times daily.       Facility-Administered Medications: None     Past Medical History:   Diagnosis Date    Abnormality of gait 2018    Anemia     Anxiety     Arthritis     Asthma     Calculus of kidney     Chronic pain     Contact dermatitis and other eczema, due to unspecified cause     Depressive disorder, not elsewhere classified     Encounter for chronic pain management     Esophageal reflux     Essential hypertension, benign     Frequent falls 2018    GERD (gastroesophageal reflux disease)     Pure hypercholesterolemia      Past Surgical History:   Procedure Laterality Date    HX CATARACT REMOVAL Bilateral     HX COLONOSCOPY      HX GI      HX HYSTERECTOMY      HX KNEE REPLACEMENT Right     HX LITHOTRIPSY      HX RETINAL DETACHMENT REPAIR      HX SHOULDER REPLACEMENT Left     IR REMOVE IVC FILTER W SI  3/29/2021    IR THROMBECTOMY MECH ART PRIMARY NON CHIRAG OR INTRACRANIAL  7/28/2020    NEUROLOGICAL PROCEDURE UNLISTED       Social History     Socioeconomic History    Marital status:      Spouse name: Not on file    Number of children: Not on file    Years of education: Not on file    Highest education level: Not on file   Occupational History    Not on file   Tobacco Use    Smoking status: Never Smoker    Smokeless tobacco: Never Used   Substance and Sexual Activity    Alcohol use: No    Drug use: No    Sexual activity: Not on file   Other Topics Concern    Not on file   Social History Narrative    Patient and  reside together. She denies any in-house stairs. 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 Activity is reported as cooking and household chores as tolerable. Exercise is reported as intolerable. Social Determinants of Health     Financial Resource Strain:     Difficulty of Paying Living Expenses: Not on file   Food Insecurity:     Worried About Running Out of Food in the Last Year: Not on file    Theresa of Food in the Last Year: Not on file   Transportation Needs:     Lack of Transportation (Medical): Not on file    Lack of Transportation (Non-Medical):  Not on file   Physical Activity:     Days of Exercise per Week: Not on file    Minutes of Exercise per Session: Not on file   Stress:     Feeling of Stress : Not on file   Social Connections:     Frequency of Communication with Friends and Family: Not on file    Frequency of Social Gatherings with Friends and Family: Not on file    Attends Scientologist Services: Not on file    Active Member of Clubs or Organizations: Not on file    Attends Club or Organization Meetings: Not on file    Marital Status: Not on file   Intimate Partner Violence:     Fear of Current or Ex-Partner: Not on file    Emotionally Abused: Not on file    Physically Abused: Not on file    Sexually Abused: Not on file   Housing Stability:  Unable to Pay for Housing in the Last Year: Not on file    Number of Places Lived in the Last Year: Not on file    Unstable Housing in the Last Year: Not on file     Family History   Problem Relation Age of Onset    OSTEOARTHRITIS Mother     Cancer Father     Stroke Maternal Grandmother     Stroke Paternal Grandmother      Allergies   Allergen Reactions    Flexeril [Cyclobenzaprine] Unknown (comments)    Ketoprofen Unknown (comments)    Plaquenil [Hydroxychloroquine] Unknown (comments)     Current Facility-Administered Medications   Medication Dose Route Frequency    insulin lispro (HUMALOG) injection 0-10 Units  0-10 Units SubCUTAneous AC&HS    clorazepate (TRANXENE) tablet 3.75 mg  3.75 mg Oral BID    [START ON 2/2/2022] furosemide (LASIX) tablet 40 mg  40 mg Oral DAILY    sodium chloride (NS) flush 5-10 mL  5-10 mL IntraVENous Q8H    sodium chloride (NS) flush 5-10 mL  5-10 mL IntraVENous PRN    tuberculin injection 5 Units  5 Units IntraDERMal ONCE    predniSONE (DELTASONE) tablet 40 mg  40 mg Oral DAILY WITH DINNER    apixaban (ELIQUIS) tablet 2.5 mg  2.5 mg Oral Q12H    ascorbic acid (vitamin C) (VITAMIN C) tablet 500 mg  500 mg Oral DAILY    Iron Fum & PS Cmp-Vit C-Niacin 125-40-3 mg cap 1 Caplet (Patient Supplied)  1 Caplet Oral DAILY    latanoprost (XALATAN) 0.005 % ophthalmic solution 1 Drop  1 Drop Both Eyes QHS    lifitegrast 5 % dpet 1 Drop (Patient Supplied)  1 Drop Both Eyes Q12H    lisinopriL (PRINIVIL, ZESTRIL) tablet 5 mg  5 mg Oral DAILY    montelukast (SINGULAIR) tablet 10 mg  10 mg Oral DAILY    ketotifen (ZADITOR) 0.025 % (0.035 %) ophthalmic solution 1 Drop  1 Drop Both Eyes BID    pantoprazole (PROTONIX) tablet 40 mg  40 mg Oral BID    polyethylene glycol (MIRALAX) packet 17 g  17 g Oral DAILY    sertraline (ZOLOFT) tablet 150 mg  150 mg Oral QPM    sucralfate (CARAFATE) tablet 1 g  1 g Oral TID    budesonide-formoterol (SYMBICORT) 80-4.5 mcg inhaler  2 Puff Inhalation BID RT    albuterol (PROVENTIL VENTOLIN) nebulizer solution 2.5 mg  2.5 mg Nebulization Q6H PRN    sodium chloride (NS) flush 5-40 mL  5-40 mL IntraVENous Q8H    sodium chloride (NS) flush 5-40 mL  5-40 mL IntraVENous PRN    acetaminophen (TYLENOL) tablet 650 mg  650 mg Oral Q6H PRN    Or    acetaminophen (TYLENOL) suppository 650 mg  650 mg Rectal Q6H PRN    polyethylene glycol (MIRALAX) packet 17 g  17 g Oral DAILY PRN    ondansetron (ZOFRAN ODT) tablet 4 mg  4 mg Oral Q8H PRN    Or    ondansetron (ZOFRAN) injection 4 mg  4 mg IntraVENous Q6H PRN     Objective:   Blood pressure 119/63, pulse 87, temperature 98.2 °F (36.8 °C), resp. rate 18, height 5' (1.524 m), weight 155 lb (70.3 kg), SpO2 92 %. Intake/Output Summary (Last 24 hours) at 2/1/2022 2019  Last data filed at 2/1/2022 2006  Gross per 24 hour   Intake    Output 1250 ml   Net -1250 ml     PHYSICAL EXAM   Constitutional:  the patient is elderly slightly obese  female and in no acute distress  EENMT:  Sclera clear, pupils equal, oral mucosa moist  Respiratory: Distant but clear to auscultation bilaterally no wheezing no crackles. On 2 L/min  Cardiovascular:  RRR without M,G,R  Gastrointestinal: soft and non-tender; with positive bowel sounds. Musculoskeletal: warm without cyanosis. There is +2 left and +1 right lower extremity edema. Positive for mild calf tenderness on her right leg versus her left leg  Skin:  no jaundice or rashes, no visible wounds   Neurologic: no gross neuro deficits     Psychiatric:  alert and oriented x three    CXR: None today    CT Chest: From January 31, 2022  Progression of ground glass infiltrates from 1/22 vs June 2020.  Does have a resolution of subcentimeter lesion in RUL since June 2020 as well      Recent Labs     02/01/22  0417 01/31/22  1758 01/31/22  1612 01/31/22  1600   WBC 5.2  --   --  9.3   HGB 10.1*  --   --  10.4*   HCT 32.1*  --   --  33.5*     --   --  357     -- --  140   K 4.3  --   --  3.3*     --   --  106   CO2 31  --   --  27   *  --   --  143*   BUN 11  --   --  10   CREA 0.62  --   --  0.51*   MG  --   --   --  2.1   CA 9.1  --   --  8.9   ALB  --   --   --  2.2*   AST  --   --   --  15   ALT  --   --   --  16   AP  --   --   --  90   TROPHS  --  35.0*  --  34.6*   BNPNT  --   --  2,673*  --      ECHO: Results from Hospital Encounter encounter on 01/31/22    ECHO ADULT COMPLETE    Interpretation Summary    Left Ventricle: Left ventricle size is normal. Mildly increased wall thickness. Normal wall motion. Normal left ventricular systolic function with a visually estimated EF of 60 - 65%. Normal diastolic function.   Mitral Valve: Mildly thickened leaflets. Mild transvalvular regurgitation.   IVC/SVC: IVC diameter is less than or equal to 21 mm and decreases less than 50% during inspiration; therefore the estimated right atrial pressure is intermediate (~8 mmHg).      Results     Procedure Component Value Units Date/Time    RESPIRATORY VIRUS PANEL W/COVID-19, PCR [[id number]] Collected: 02/01/22 0727    Order Status: Completed Specimen: Nasopharyngeal Updated: 02/01/22 0853     Adenovirus NOT DETECTED        Coronavirus 229E NOT DETECTED        Coronavirus HKU1 NOT DETECTED        Coronavirus CVNL63 NOT DETECTED        Coronavirus OC43 NOT DETECTED        SARS-CoV-2, PCR NOT DETECTED        Metapneumovirus NOT DETECTED        Rhinovirus and Enterovirus NOT DETECTED        Influenza A NOT DETECTED        Influenza B NOT DETECTED        Parainfluenza 1 NOT DETECTED        Parainfluenza 2 NOT DETECTED        Parainfluenza 3 NOT DETECTED        Parainfluenza virus 4 NOT DETECTED        RSV by PCR NOT DETECTED        B. parapertussis, PCR NOT DETECTED        Bordetella pertussis - PCR NOT DETECTED        Chlamydophila pneumoniae DNA, QL, PCR NOT DETECTED        Mycoplasma pneumoniae DNA, QL, PCR NOT DETECTED       COVID-19 RAPID TEST [[id number]] Collected: 01/31/22 1615    Order Status: Completed Specimen: Nasopharyngeal Updated: 01/31/22 1708     Specimen source NASAL        COVID-19 rapid test Not detected        Comment:      The specimen is NEGATIVE for SARS-CoV-2, the novel coronavirus associated with COVID-19. A negative result does not rule out COVID-19. This test has been authorized by the FDA under an Emergency Use Authorization (EUA) for use by authorized laboratories. Fact sheet for Healthcare Providers: ConventionUpdate.co.nz  Fact sheet for Patients: ConventionUpdate.co.nz       Methodology: Isothermal Nucleic Acid Amplification             Inpat Anti-Infectives (From admission, onward)    None        Assessment and Plan:  (Medical Decision Making)     Principal Problem:    Acute hypoxemic respiratory failure ([address] (7/27/2020)  --improved with diureses. Taper oxygen as tolerated    Active Problems:    Pulmonary infiltrate (2/1/2022)   --Pulmonary infiltrates etiology is unfortunate likely multifactorial.  Please note the patient had COVID-19 infection about a month ago and a lot of this may be just residual scarring from COVID-19 infection. However BNP was markedly elevated. Since patient also has improvement in oxygen needs since the patient has been diuresed given her BNP was 2600 would recommend a follow-up x-ray and should recheck BNP to see if it is coming in the normal range. --Inconsequential, oxygen needs are coming down at this point. --Can run inflammatory markers such as ESR, CRP to get baseline and see if has inflammation vs volume overload. --Please note current Covid tests are negative so stop steroids. Pulmonary embolism, bilateral ([address] (7/27/2020)  New CAT scan does not show central pulmonary embolism but does have a prior history of DVT. Eventually may need a VQ scan to rule out any peripheral pulmonary embolism.   Would continue current anticoagulation  --Does have some right calf tenderness, but previously did have a history of DVT and does have an IVC filter. So no need for an ultrasound. Suspected CHF (congestive heart failure) (1/31/2022)  BNP was elevated recheck. Echo is normal.  Please note new echo also does not show the presence of pulmonary hypertension which was reported in the past.      History of 2019 novel coronavirus disease (COVID-19) (2/1/2022)  Recent infection this past December. New Covid's tests are negative  --stop steroids, likely contributing to fluid retention. Can restart if inflammatory markers are elevated. S/P IVC filter (8/3/2020)  Prior history      Anxiety ()  Per PMD        Pulmonary HTN ([address] (8/3/2020)  New echo does not show this      Debility  We will get PT and OT to see the patient.  --get incentive spirometry. Iron deficiency anemia (11/18/2021)  Per PMD noted iron levels are low      History of Asthma  --reported using inhalers at home and will make sure she has them here. .     Obesity  Suspect KWASI  --consider a sleep study in the future, if agreeable. Full Code    More than 50% of the time documented was spent in face-to-face contact with the patient and in the care of the patient on the floor/unit where the patient is located. Thank you very much for this referral.  We appreciate the opportunity to participate in this patient's care. Will follow along with above stated plan.     Kerrie Banda MD

## 2022-02-02 NOTE — PROGRESS NOTES
ACUTE OT GOALS:  (Developed with and agreed upon by patient and/or caregiver.)  1. Pt will toilet with min A   2. Pt will complete functional transfers for ADLs with min A  3. Pt will complete lower body dressing with min A using AE as needed  4. Pt will complete grooming and hygiene with set up  5. Pt will demonstrate independence with HEP to promote increased BUE strength and functional use for ADLs  6. Pt will tolerate 15 minutes functional activity with min or fewer rest breaks to promote increased endurance for ADLs  7. Pt will complete upper body bathing and dressing with set up using AE as needed     Timeframe: 7 days    OCCUPATIONAL THERAPY: Daily Note OT Treatment Day # 2    Charlet Bumpers is a 64 y.o. female   PRIMARY DIAGNOSIS: Acute hypoxemic respiratory failure (HCC)  Suspected CHF (congestive heart failure) [R09.89]  Hypokalemia [E87.6]       [place] / [place] / Product Type: Medicare /   ASSESSMENT:     REHAB RECOMMENDATIONS: CURRENT LEVEL OF FUNCTION:  (Most Recently Demonstrated)   Recommendation to date pending progress:  [address] [place] [address] Therapy  Equipment:    None Bathing:   Not tested  Dressing:   Minimal Assistance (to don slippers)  Feeding/Grooming:   Independent  Toileting:   Moderate Assistance  Functional Mobility:   Minimal Assistance     ASSESSMENT:  Ms. Jessica Cotton presents supine in bed and agreeable to co-treatment therapy session. Pt moved from supine>sit with CGA and is able to scoot out to EOB and maintain sitting balance with no assistance. Pt donned slippers in sitting with min A and performed grooming task at EOB. She performed several episodes of sit<>stand and steps along the EOB with rest breaks throughout. Pt 85% on RA during activity and recovered to 94% on 2L NC. Pt donned new gown and linens changed. She was left supine in bed with all needs met and in reach. Continue per OT POC.       SUBJECTIVE:   Ms. Jessica Cotton states, \"I was using my facemask to cover my eyes so they brought be this one\"    SOCIAL HISTORY/LIVING ENVIRONMENT: Lives w/ friend who assists w/ bathing, dressing, toileting, and transfers. Is able to complete UB bathing/ dressing but requires some assistance for LB, is most challenged by standing and maintaining standing balance during ADLs. WC for mobility. 1L home w/ ramp. Sponge bathes. Elevated toilet, BSC.   Home Environment: Private residence  # Steps to Enter: 0  One/Two Story Residence: One story  Living Alone: No  Support Systems: Friend/Neighbor,Child(ranjith) (family friend: Robert Caicedo, [address] [place] [address] [phone]; son Nazario Barrera [phone])    OBJECTIVE:     PAIN: VITAL SIGNS: LINES/DRAINS:   Pre Treatment: Pain Screen  Pain Scale 1: Numeric (0 - 10)  Pain Intensity 1: 0  Post Treatment: 0   Purewick  O2 Device: Nasal cannula     ACTIVITIES OF DAILY LIVING: I Mod I S SBA CGA Min Mod Max Total NT Comments   BASIC ADLs:              Bathing/ Showering [] [] [] [] [] [] [] [] [] [x]    Toileting [] [] [] [] [] [] [] [] [] [x]    Dressing [] [] [] [] [] [x] [] [] [] [] New gown and shoes   Feeding [] [] [] [] [] [] [] [] [] [x]    Grooming [] [] [x] [] [] [] [] [] [] []    Personal Device Care [] [] [] [] [] [] [] [] [] [x]    Functional Mobility [] [] [] [] [x] [x] [] [] [] [] Bed mobility and steps at EOB   I=Independent, Mod I=Modified Independent, S=Supervision, SBA=Standby Assistance, CGA=Contact Guard Assistance,   Min=Minimal Assistance, Mod=Moderate Assistance, Max=Maximal Assistance, Total=Total Assistance, NT=Not Tested    MOBILITY: I Mod I S SBA CGA Min Mod Max Total  NT x2 Comments:   Supine to sit [] [] [] [] [] [x] [] [] [] [] []    Sit to supine [] [] [] [] [] [x] [] [] [] [] []    Sit to stand [] [] [] [] [x] [x] [] [] [] [] [] Steps along EOB   Bed to chair [] [] [] [] [] [] [] [] [] [x] []    I=Independent, Mod I=Modified Independent, S=Supervision, SBA=Standby Assistance, CGA=Contact Guard Assistance,   Min=Minimal Assistance, Mod=Moderate Assistance, Max=Maximal Assistance, Total=Total Assistance, NT=Not Tested    BALANCE: Good Fair+ Fair Fair- Poor NT Comments   Sitting Static [x] [] [] [] [] []    Sitting Dynamic [x] [] [] [] [] []              Standing Static [] [] [x] [] [] []    Standing Dynamic [] [] [x] [] [] []      PLAN:   FREQUENCY/DURATION: OT Plan of Care: 3 times/week for duration of hospital stay or until stated goals are met, whichever comes first.    TREATMENT:   TREATMENT:   ($$ Self Care/Home Management: 23-37 mins    )  Co-Treatment PT/OT necessary due to patient's decreased overall endurance/tolerance levels, as well as need for high level skilled assistance to complete functional transfers/mobility and functional tasks  Self Care (30 Minutes): Self care including Upper Body Dressing, Lower Body Dressing, Grooming, Energy Conservation Training and functional mobility in prep for OOB ADLs to increase independence and decrease level of assistance required.     TREATMENT GRID:  N/A    AFTER TREATMENT POSITION/PRECAUTIONS:  Bed, Needs within reach and RN notified    INTERDISCIPLINARY COLLABORATION:  RN/PCT and PT/PTA    TOTAL TREATMENT DURATION:  OT Patient Time In/Time Out  Time In: 1030  Time Out: [address], [place]

## 2022-02-02 NOTE — PROGRESS NOTES
ACUTE PHYSICAL THERAPY GOALS:  (Developed with and agreed upon by patient and/or caregiver. )  LTG:  (1.)Ms. Keyur Palmer will move from supine to sit and sit to supine , scoot up and down and roll side to side in bed with MODIFIED INDEPENDENCE within 7 treatment day(s). (2.)Ms. Keyur Palmer will transfer from bed to chair and chair to bed with STAND BY ASSIST using the least restrictive device within 7 treatment day(s). (3.)Ms. Keyur Palmer will ambulate with CONTACT GUARD ASSIST for 150+ feet with the least restrictive device within 7 treatment day(s). PHYSICAL THERAPY: Daily Note and AM Treatment Day # 2    Jacques Simons is a 64 y.o. female   PRIMARY DIAGNOSIS: Acute hypoxemic respiratory failure (HCC)  Suspected CHF (congestive heart failure) [R09.89]  Hypokalemia [E87.6]         ASSESSMENT:     REHAB RECOMMENDATIONS: CURRENT LEVEL OF FUNCTION:  (Most Recently Demonstrated)   Recommendation to date pending progress:  Settin [facility] (pt refusing STR after recent stay)  Equipment:    None Bed Mobility:   Minimal Assistance  Sit to Stand:   Contact Guard Assistance  Transfers:   Contact Guard Assistance-minimal assist  Gait/Mobility:   Contact Guard Assistance-minimal assist x 1-2 mostly for safety     ASSESSMENT:  Ms. Keyur Palmer demonstrates improved bed mobility and transfer independence today compared to initial evaluation. Worked on seated activities, several sit-stand reps, standing balance, and side steps at edge of bed x 2 trials with RW, CGA-min assist of 2 for safety only. Fatigues quickly and has progressively more forward flexed posture with ambulation, however does not need heavy assist. SpO2 after activity on room air drops to 85%, 91% on 2L. Ms. Keyur Palmer is eager to dc home with roommate and [facility] PT- does not want another rehab stay.  PT.     SUBJECTIVE:   Ms. Keyur Palmer states, \"thank you for coming back\"    SOCIAL HISTORY/ LIVING ENVIRONMENT: two recent falls (since getting covid). Lives w/friend who helps. Using wc since covid, prior to this she was mod I with rollator.   Home Environment: Private residence  # Steps to Enter: 0  One/Two Story Residence: One story  Living Alone: No  Support Systems: Friend/Neighbor,Child(ranjith) (family friend: Robert Caicedo, [address] [place] [address] [phone]; son Nazario Barrera [phone])  OBJECTIVE:     PAIN: VITAL SIGNS: LINES/DRAINS:   Pre Treatment: Pain Screen  Pain Scale 1: Numeric (0 - 10)  Pain Intensity 1: 0  Post Treatment: 0/10 Vital Signs  O2 Sat (%): 91 %  O2 Device: Nasal cannula  O2 Flow Rate (L/min): 2 l/min purewick  O2 Device: Nasal cannula     MOBILITY: I Mod I S SBA CGA Min Mod Max Total  NT x2 Comments:   Bed Mobility    Rolling [] [] [] [] [x] [] [] [] [] [] []    Supine to Sit [] [] [] [] [] [x] [] [] [] [] []    Scooting [] [] [] [] [x] [] [] [] [] [] [] CGA scooting EOB, assist x 2 scooting up in supine   Sit to Supine [] [] [] [] [] [x] [] [] [] [] []    Transfers    Sit to Stand [] [] [] [] [x] [] [] [] [] [] []    Bed to Chair [] [] [] [] [] [] [] [] [] [] []    Stand to Sit [] [] [] [] [x] [] [] [] [] [] []    I=Independent, Mod I=Modified Independent, S=Supervision, SBA=Standby Assistance, CGA=Contact Guard Assistance,   Min=Minimal Assistance, Mod=Moderate Assistance, Max=Maximal Assistance, Total=Total Assistance, NT=Not Tested    BALANCE: Good Fair+ Fair Fair- Poor NT Comments   Sitting Static [x] [] [] [] [] []    Sitting Dynamic [x] [] [] [] [] []              Standing Static [] [] [x] [] [] []    Standing Dynamic [] [] [x] [] [] []      GAIT: I Mod I S SBA CGA Min Mod Max Total  NT x2 Comments:   Level of Assistance [] [] [] [] [x] [x] [] [] [] [] [x]    Distance 6ft x2    DME Rolling Walker    Gait Quality Forward flexed posture, slow, shuffled, decreased step clearance    Weightbearing  Status N/A     I=Independent, Mod I=Modified Independent, S=Supervision, SBA=Standby Assistance, CGA=Contact Guard Assistance,   Min=Minimal Assistance, Mod=Moderate Assistance, Max=Maximal Assistance, Total=Total Assistance, NT=Not Tested    PLAN:   FREQUENCY/DURATION: PT Plan of Care: 3 times/week for duration of hospital stay or until stated goals are met, whichever comes first.  TREATMENT:     TREATMENT:   ($$ Therapeutic Activity: 23-37 mins    )  Co-Treatment PT/OT necessary due to patient's decreased overall endurance/tolerance levels, as well as need for high level skilled assistance to complete functional transfers/mobility and functional tasks  Therapeutic Activity (28 Minutes): Therapeutic activity included Rolling, Supine to Sit, Sit to Supine, Scooting, Transfer Training, Ambulation on level ground, Sitting balance  and Standing balance to improve functional Mobility, Strength, Activity tolerance and balance.     TREATMENT GRID:  N/A    AFTER TREATMENT POSITION/PRECAUTIONS:  Bed, Needs within reach and RN notified    INTERDISCIPLINARY COLLABORATION:  RN/PCT, PT/PTA and OT/KLEIN    TOTAL TREATMENT DURATION:  PT Patient Time In/Time Out  Time In: 1032  Time Out: Radha 80, DPT

## 2022-02-03 LAB
ANION GAP SERPL CALC-SCNC: 4 MMOL/L (ref 7–16)
BASOPHILS # BLD: 0.1 K/UL (ref 0–0.2)
BASOPHILS NFR BLD: 0 % (ref 0–2)
BUN SERPL-MCNC: 20 MG/DL (ref 8–23)
CALCIUM SERPL-MCNC: 9.5 MG/DL (ref 8.3–10.4)
CHLORIDE SERPL-SCNC: 102 MMOL/L (ref 98–107)
CO2 SERPL-SCNC: 32 MMOL/L (ref 21–32)
CREAT SERPL-MCNC: 0.5 MG/DL (ref 0.6–1)
DIFFERENTIAL METHOD BLD: ABNORMAL
EOSINOPHIL # BLD: 0.4 K/UL (ref 0–0.8)
EOSINOPHIL NFR BLD: 3 % (ref 0.5–7.8)
ERYTHROCYTE [DISTWIDTH] IN BLOOD BY AUTOMATED COUNT: 14.6 % (ref 11.9–14.6)
GLUCOSE BLD STRIP.AUTO-MCNC: 120 MG/DL (ref 65–100)
GLUCOSE BLD STRIP.AUTO-MCNC: 121 MG/DL (ref 65–100)
GLUCOSE BLD STRIP.AUTO-MCNC: 261 MG/DL (ref 65–100)
GLUCOSE BLD STRIP.AUTO-MCNC: 98 MG/DL (ref 65–100)
GLUCOSE SERPL-MCNC: 113 MG/DL (ref 65–100)
HCT VFR BLD AUTO: 38.1 % (ref 35.8–46.3)
HGB BLD-MCNC: 11.8 G/DL (ref 11.7–15.4)
IMM GRANULOCYTES # BLD AUTO: 0.1 K/UL (ref 0–0.5)
IMM GRANULOCYTES NFR BLD AUTO: 1 % (ref 0–5)
INTERPRETATION, 117893: NORMAL
LYMPHOCYTES # BLD: 2.8 K/UL (ref 0.5–4.6)
LYMPHOCYTES NFR BLD: 23 % (ref 13–44)
MCH RBC QN AUTO: 29.6 PG (ref 26.1–32.9)
MCHC RBC AUTO-ENTMCNC: 31 G/DL (ref 31.4–35)
MCV RBC AUTO: 95.5 FL (ref 79.6–97.8)
MM INDURATION POC: 0 MM (ref 0–5)
MM INDURATION POC: 0 MM (ref 0–5)
MONOCYTES # BLD: 1.4 K/UL (ref 0.1–1.3)
MONOCYTES NFR BLD: 12 % (ref 4–12)
NEUTS SEG # BLD: 7.4 K/UL (ref 1.7–8.2)
NEUTS SEG NFR BLD: 61 % (ref 43–78)
NRBC # BLD: 0 K/UL (ref 0–0.2)
PLATELET # BLD AUTO: 509 K/UL (ref 150–450)
PMV BLD AUTO: 9.4 FL (ref 9.4–12.3)
POTASSIUM SERPL-SCNC: 3.8 MMOL/L (ref 3.5–5.1)
PPD POC: NEGATIVE NEGATIVE
PPD POC: NEGATIVE NEGATIVE
RBC # BLD AUTO: 3.99 M/UL (ref 4.05–5.2)
SCREEN APTT: 29.6 SEC (ref 0–51.9)
SCREEN DRVVT: 41.8 SEC (ref 0–47)
SERVICE CMNT-IMP: ABNORMAL
SERVICE CMNT-IMP: NORMAL
SODIUM SERPL-SCNC: 138 MMOL/L (ref 136–145)
WBC # BLD AUTO: 12.1 K/UL (ref 4.3–11.1)

## 2022-02-03 PROCEDURE — 82962 GLUCOSE BLOOD TEST: CPT

## 2022-02-03 PROCEDURE — 80048 BASIC METABOLIC PNL TOTAL CA: CPT

## 2022-02-03 PROCEDURE — 74011250637 HC RX REV CODE- 250/637: Performed by: PHYSICIAN ASSISTANT

## 2022-02-03 PROCEDURE — 94640 AIRWAY INHALATION TREATMENT: CPT

## 2022-02-03 PROCEDURE — 83735 ASSAY OF MAGNESIUM: CPT

## 2022-02-03 PROCEDURE — 36415 COLL VENOUS BLD VENIPUNCTURE: CPT

## 2022-02-03 PROCEDURE — 74011000250 HC RX REV CODE- 250: Performed by: FAMILY MEDICINE

## 2022-02-03 PROCEDURE — 77010033678 HC OXYGEN DAILY

## 2022-02-03 PROCEDURE — 74011636637 HC RX REV CODE- 636/637: Performed by: PHYSICIAN ASSISTANT

## 2022-02-03 PROCEDURE — 94760 N-INVAS EAR/PLS OXIMETRY 1: CPT

## 2022-02-03 PROCEDURE — 85025 COMPLETE CBC W/AUTO DIFF WBC: CPT

## 2022-02-03 PROCEDURE — 99232 SBSQ HOSP IP/OBS MODERATE 35: CPT | Performed by: INTERNAL MEDICINE

## 2022-02-03 PROCEDURE — 74011636637 HC RX REV CODE- 636/637: Performed by: INTERNAL MEDICINE

## 2022-02-03 PROCEDURE — 65660000000 HC RM CCU STEPDOWN

## 2022-02-03 PROCEDURE — 74011250637 HC RX REV CODE- 250/637: Performed by: FAMILY MEDICINE

## 2022-02-03 RX ORDER — PREDNISONE 20 MG/1
40 TABLET ORAL
Status: DISCONTINUED | OUTPATIENT
Start: 2022-02-03 | End: 2022-02-04 | Stop reason: HOSPADM

## 2022-02-03 RX ADMIN — POLYETHYLENE GLYCOL 3350 17 G: 17 POWDER, FOR SOLUTION ORAL at 08:59

## 2022-02-03 RX ADMIN — SERTRALINE 150 MG: 100 TABLET, FILM COATED ORAL at 17:07

## 2022-02-03 RX ADMIN — KETOTIFEN FUMARATE 1 DROP: 0.35 SOLUTION/ DROPS OPHTHALMIC at 21:14

## 2022-02-03 RX ADMIN — Medication 500 MG: at 09:00

## 2022-02-03 RX ADMIN — SODIUM CHLORIDE, PRESERVATIVE FREE 10 ML: 5 INJECTION INTRAVENOUS at 21:03

## 2022-02-03 RX ADMIN — PREDNISONE 40 MG: 20 TABLET ORAL at 14:43

## 2022-02-03 RX ADMIN — APIXABAN 2.5 MG: 2.5 TABLET, FILM COATED ORAL at 09:00

## 2022-02-03 RX ADMIN — KETOTIFEN FUMARATE 1 DROP: 0.35 SOLUTION/ DROPS OPHTHALMIC at 09:03

## 2022-02-03 RX ADMIN — SODIUM CHLORIDE, PRESERVATIVE FREE 10 ML: 5 INJECTION INTRAVENOUS at 14:43

## 2022-02-03 RX ADMIN — POTASSIUM CHLORIDE 10 MEQ: 750 TABLET, EXTENDED RELEASE ORAL at 09:01

## 2022-02-03 RX ADMIN — CLORAZEPATE DIPOTASSIUM 3.75 MG: 7.5 TABLET ORAL at 20:58

## 2022-02-03 RX ADMIN — MONTELUKAST 10 MG: 10 TABLET, FILM COATED ORAL at 09:00

## 2022-02-03 RX ADMIN — SODIUM CHLORIDE, PRESERVATIVE FREE 10 ML: 5 INJECTION INTRAVENOUS at 06:22

## 2022-02-03 RX ADMIN — CLORAZEPATE DIPOTASSIUM 3.75 MG: 7.5 TABLET ORAL at 09:00

## 2022-02-03 RX ADMIN — LISINOPRIL 5 MG: 5 TABLET ORAL at 09:00

## 2022-02-03 RX ADMIN — BUDESONIDE AND FORMOTEROL FUMARATE DIHYDRATE 2 PUFF: 80; 4.5 AEROSOL RESPIRATORY (INHALATION) at 08:22

## 2022-02-03 RX ADMIN — SUCRALFATE 1 G: 1 TABLET ORAL at 17:07

## 2022-02-03 RX ADMIN — APIXABAN 2.5 MG: 2.5 TABLET, FILM COATED ORAL at 20:59

## 2022-02-03 RX ADMIN — SUCRALFATE 1 G: 1 TABLET ORAL at 20:58

## 2022-02-03 RX ADMIN — LATANOPROST 1 DROP: 50 SOLUTION OPHTHALMIC at 21:14

## 2022-02-03 RX ADMIN — FUROSEMIDE 40 MG: 40 TABLET ORAL at 09:01

## 2022-02-03 RX ADMIN — PANTOPRAZOLE SODIUM 40 MG: 40 TABLET, DELAYED RELEASE ORAL at 09:01

## 2022-02-03 RX ADMIN — INSULIN LISPRO 6 UNITS: 100 INJECTION, SOLUTION INTRAVENOUS; SUBCUTANEOUS at 22:54

## 2022-02-03 RX ADMIN — SUCRALFATE 1 G: 1 TABLET ORAL at 09:00

## 2022-02-03 NOTE — PROGRESS NOTES
CM following patient's chart. Per previous CM note, DCP is for patient to return home with [facility] ([facility]). Referral already sent by previous CM. D/C summary will need to be faxed to [facility] on the day of d/c. CM will continue to follow to assist with any needs that may arise.

## 2022-02-03 NOTE — PROGRESS NOTES
Charlet Bumpers  Admission Date: 1/31/2022         Daily Progress Note: 2/3/2022    The patient's chart is reviewed and the patient is discussed with the staff. Background: 64year old female presents with dyspnea and hypoxia. CXR/CT with bilateral infiltrates. BNP 2673. Started on lasix with improvement. Echo with normal EF but + diastolic dysfunction[facility]. CT and CXR by report in Care Everywhere reports bilateral infiltrates. Was not discharged home on oxygen at that time. CRP here 42.8 and ESR 80. Steroids started on 2/1 but stopped on 2/2 after seen by pulmonary. Oxygen weaned to 2 liters. Clinically and radiographically improved with diuresis. Subjective:     Seen eating lunch. Feels better. Asking about when she can go home. Does not want to go to rehab. Lives with room mate and has children close by.      Current Facility-Administered Medications   Medication Dose Route Frequency    potassium chloride (KLOR-CON M10) tablet 10 mEq  10 mEq Oral DAILY    insulin lispro (HUMALOG) injection 0-10 Units  0-10 Units SubCUTAneous AC&HS    clorazepate (TRANXENE) tablet 3.75 mg  3.75 mg Oral BID    furosemide (LASIX) tablet 40 mg  40 mg Oral DAILY    apixaban (ELIQUIS) tablet 2.5 mg  2.5 mg Oral Q12H    ascorbic acid (vitamin C) (VITAMIN C) tablet 500 mg  500 mg Oral DAILY    Iron Fum & PS Cmp-Vit C-Niacin 125-40-3 mg cap 1 Caplet (Patient Supplied)  1 Caplet Oral DAILY    latanoprost (XALATAN) 0.005 % ophthalmic solution 1 Drop  1 Drop Both Eyes QHS    lifitegrast 5 % dpet 1 Drop (Patient Supplied)  1 Drop Both Eyes Q12H    lisinopriL (PRINIVIL, ZESTRIL) tablet 5 mg  5 mg Oral DAILY    montelukast (SINGULAIR) tablet 10 mg  10 mg Oral DAILY    ketotifen (ZADITOR) 0.025 % (0.035 %) ophthalmic solution 1 Drop  1 Drop Both Eyes BID    pantoprazole (PROTONIX) tablet 40 mg  40 mg Oral BID    polyethylene glycol (MIRALAX) packet 17 g  17 g Oral DAILY    sertraline (ZOLOFT) tablet 150 mg  150 mg Oral QPM    sucralfate (CARAFATE) tablet 1 g  1 g Oral TID    budesonide-formoterol (SYMBICORT) 80-4.5 mcg inhaler  2 Puff Inhalation BID RT    albuterol (PROVENTIL VENTOLIN) nebulizer solution 2.5 mg  2.5 mg Nebulization Q6H PRN    sodium chloride (NS) flush 5-40 mL  5-40 mL IntraVENous Q8H    sodium chloride (NS) flush 5-40 mL  5-40 mL IntraVENous PRN    acetaminophen (TYLENOL) tablet 650 mg  650 mg Oral Q6H PRN    Or    acetaminophen (TYLENOL) suppository 650 mg  650 mg Rectal Q6H PRN    polyethylene glycol (MIRALAX) packet 17 g  17 g Oral DAILY PRN    ondansetron (ZOFRAN ODT) tablet 4 mg  4 mg Oral Q8H PRN    Or    ondansetron (ZOFRAN) injection 4 mg  4 mg IntraVENous Q6H PRN     Review of Systems  + dyspnea but better  Constitutional: negative for fever, chills, sweats  Cardiovascular: negative for chest pain, palpitations, syncope, recent leg edema  Gastrointestinal:  negative for dysphagia, reflux, vomiting, diarrhea, abdominal pain, or melena  Neurologic:  negative for focal weakness, numbness, headache  Objective:     Vitals:    02/03/22 0730 02/03/22 0736 02/03/22 0823 02/03/22 1101   BP:  133/78  125/62   Pulse:  84  90   Resp:  18  18   Temp:  98.6 °F (37 °C)  98.4 °F (36.9 °C)   SpO2:  92% 91% 90%   Weight: 174 lb 6.1 oz (79.1 kg)      Height: 5' (1.524 m)          Intake/Output Summary (Last 24 hours) at 2/3/2022 1248  Last data filed at 2/2/2022 2005  Gross per 24 hour   Intake    Output 550 ml   Net -550 ml     Physical Exam:   Constitution:  the patient is well developed and in no acute distress  HEENT:  Sclera clear, pupils equal, oral mucosa moist  Respiratory: crackles from posterior. Wearing 2 liter cannula  Cardiovascular:  RRR without M,G,R  Gastrointestinal: soft and non-tender; with positive bowel sounds. Musculoskeletal: warm without cyanosis. There is no lower extremity edema.   Skin:  no jaundice or rashes, no wounds Neurologic: no gross neuro deficits     Psychiatric:  alert and oriented, calm at present    CXR:   2/2 1/31        LAB:  Recent Labs     02/03/22  0858 02/02/22  1026 02/01/22  0417   WBC 12.1* 11.3* 5.2   HGB 11.8 10.5* 10.1*   HCT 38.1 33.6* 32.1*   * 445 366     Recent Labs     02/03/22  0858 02/02/22  1026 02/01/22  0417 01/31/22  1600 01/31/22  1600    140 142   < > 140   K 3.8 3.5 4.3   < > 3.3*    104 106   < > 106   CO2 32 30 31   < > 27   * 185* 241*   < > 143*   BUN 20 19 11   < > 10   CREA 0.50* 0.50* 0.62   < > 0.51*   MG  --   --   --   --  2.1   CA 9.5 9.4 9.1   < > 8.9   PHOS  --  2.6  --   --   --    ALB  --   --   --   --  2.2*   AST  --   --   --   --  15   ALT  --   --   --   --  16   AP  --   --   --   --  90    < > = values in this interval not displayed. Recent Labs     02/02/22  1026 01/31/22  1758 01/31/22  1612 01/31/22  1600   TROPHS  --  35.0*  --  34.6*   BNPNT 698*  --  2,673*  --      No results for input(s): PH, PCO2, PO2, HCO3, PHI, PCO2I, PO2I, HCO3I in the last 72 hours. No results for input(s): SDES, CULT in the last 72 hours. Assessment and Plan:  (Medical Decision Making)   Principal Problem:    Acute hypoxemic respiratory failure ([facility] [address] (7/27/2020)    Oxygen down to 2 liters and sat in the low 90s. CXR improved but still with bilateral infiltrates. Elevated BNP 2673 -> 690 with diuresis. Lasix 40mg/day at present. Echo with diastolic dysfunction. Has elevated ESR and CRP - ? Pneumonitis. Recent COVID in December but discharged on room air at that time. Lupus study negative. Steroids stopped 2/2 - ? Restart due to ongoing hypoxia/infiltrates. Will need office follow up with CXR    Active Problems:    Anxiety ()    On Tranxene. Calm at present      Pulmonary embolism, bilateral ([address] (7/27/2020)    Hx of. On Eliquis. No evidence of per CT here      Hypokalemia (7/29/2020)    Corrected.  Secondary to diuresis      Constipation (8/2/2020) noted      S/P IVC filter (8/3/2020)    Removed 3/2021      Pulmonary HTN ([address] (8/3/2020)    No evidence of on recent echo      Essential hypertension (3/29/2021)    controlled      Iron deficiency anemia (11/18/2021)    stable      Suspected CHF (congestive heart failure) (1/31/2022)   elevated BNP and improved with diuresis      History of 2019 novel coronavirus disease (COVID-19) (2/1/2022)    Dec 2021. ? How much of what is seen no xray secondary to this - probably combination of edema and recent COVID      Pulmonary infiltrate (2/1/2022)    As above      Debility (2/1/2022)   does not plan to go to rehab but rather home with PT there. Lives with a room mate      Suspected sleep apnea (2/1/2022)    Will rediscuss when seen back in the office - can do overnight oximetry before discharge to assess for desaturations. Lasix 40 mg per day - monitor labs/blood pressure/oxygen needs  ? Restart steroids - elevated ESR, CRP, hypoxia, abnormal CXR with recent COVID      More than 50% of the time documented was spent in face-to-face contact with the patient and in the care of the patient on the floor/unit where the patient is located. Conor Draper NP       I have spoken with and examined the patient. I agree with the above assessment and plan as documented. Gen: NAD, sitting up in bed  Lungs:  B inspiratory crackles  Heart:  RRR with no Murmur/Rubs/Gallops  Abd: soft, nt, nd, nabs  Ext:  No le edema    Patient feels better with diuresis. Now seems euvolemic but still on 1L o2 and breathing not quite back to normal. Last CXR with ongoing infiltrates. Will start prednisone 40mg daily and repeat CXR in the am.   Would plan to have on this does for 1 week then drop to 30mg daily x 1 week, 20mg until seen back in pulmonary office with repeat CXR then as well to lay out further taper schedule.      René Barnett MD

## 2022-02-03 NOTE — PROGRESS NOTES
Oxygen Qualifier       Room air: SpO2 with O2 and liter flow   Resting SpO2  88%  94% on 1L   Ambulating SpO2  1L 87%, 2L 95%         Completed by:     Humphrey Garcia, RT

## 2022-02-03 NOTE — PROGRESS NOTES
Hospitalist Progress Note   Admit Date:  2022  3:41 PM   Name:  Tuyet Pavon   Age:  64 y.o. Sex:  female  :  1945   MRN:  [id number]   Room:      Presenting Complaint: Shortness of Breath    Reason(s) for Admission: Suspected CHF (congestive heart failure) [R09.89]  Hypokalemia [E87.6]     Hospital Course & Interval History:   Ms. Paola Carlin is a 66-year-old female with a PMH of HTN, b/l pulmonary embolism, iron deficiency anemia, and recent COVID pneumonia who presented to the ER with increasing shortness of breath starting the night before. She reports continued cough ever since having COVID in 2021. She denies chest pain, abdominal pain, or N/V. She reports she is compliant with her medications. EMS found her sats to be in upper 80s on room air and was placed on 4L O2 nasal cannula and given solumedrol, terbutaline, and albuterol nebulizer treatment.      Chest Xray 2022 shows bilateral pulmonary edema. COVID panel 2022 negative. BNP elevated at 2673. Echo ordered for suspected CHF but showed no signs of systolic/diastolic dysfunction or ventricular enlargement. Subjective (22): No AEO. Patient doing better on 1L NC. Pulmonology recs appreciated. Repeat CXR in the morning; hopeful discharge home with home health tomorrow depending on how she does.     Assessment & Plan:     Principal Problem:    Acute hypoxemic respiratory failure ([address] (2020)  - clinically improved  - weaning supplemental O2 to 1L via NC today  - in setting of remote COVID with some pneumonitis and pulm edema   - cont furosemide as dosed  - CT chest negative for acute PE     Active Problems:    Acute pulmonary edema b/l  - clinically improved  - repeat BNP this AM improved though cxray with persistent b/l infiltrates (probable post COVID etiology)  - cont furosemide  - TTE with preserved EF and normal diastolic fxn       Acute pneumonitis  - remote COVID infection 12/2021  - appreciate pulmonary assistance   - Pulm to begin a prednisone taper Feb/03  - Repeat CXR in the a.m.  - Arrange for outpatient Pulm follow up w/ repeat CXR and further discussion of prednisone tapering       Essential hypertension (3/29/2021)  - adequate control on lisinopril 5mg       Iron deficiency anemia (11/18/2021)  - h/h stable       Hypokalemia (7/29/2020)  - anticipate due to ongoing diuretics will need daily supplementation  - Cont KCl 10meq daily       Newly dx'd diabetes  - cont sliding scale  - nutrition for diabetic diet teaching  - consider starting oral agent on dc      Anxiety  - Tranxene bid restarted  - mood stable       Pulmonary embolism, bilateral ([address]   - CT 1/31/2022 showed no evidence of current PE  - Eliquis PO       S/P IVC filter   -removed 3/29/2021       Constipation (8/2/2020)  - cont bowel regimen       Debility  - appreciate PT note; pt not interested in STR on dc; will need home health  - possibly home next 1-2 days if clinical condition continues to improve      Obesity Class 1  - Complicates care      Dispo/Discharge Planning: hopefully tomorrow    Diet:  ADULT DIET Regular; 4 carb choices (60 gm/meal);  Low Fat/Low Chol/High Fiber/2 gm Na; 1200 ml  DVT PPx: apixaban  Code status: Full Code    Hospital Problems as of 2/3/2022 Date Reviewed: 10/13/2021          Codes Class Noted - Resolved POA    History of 2019 novel coronavirus disease (COVID-19) ICD-10-CM: Z86.16  ICD-9-CM: V12.09  2/1/2022 - Present Unknown        Pulmonary infiltrate ICD-10-CM: R91.8  ICD-9-CM: 793.19  2/1/2022 - Present Unknown        Debility ICD-10-CM: R53.81  ICD-9-CM: 799.3  2/1/2022 - Present Unknown        Suspected sleep apnea ICD-10-CM: R29.818  ICD-9-CM: 781.99  2/1/2022 - Present Unknown        Suspected CHF (congestive heart failure) ICD-10-CM: R09.89  ICD-9-CM: 785.9  1/31/2022 - Present Unknown        Iron deficiency anemia ICD-10-CM: D50.9  ICD-9-CM: 280.9  11/18/2021 - Present Yes Essential hypertension ICD-10-CM: I10  ICD-9-CM: 401.9  3/29/2021 - Present Yes        S/P IVC filter ICD-10-CM: H76.128  ICD-9-CM: V45.89  8/3/2020 - Present Yes        Pulmonary HTN ([address] ICD-10-CM: I27.20  ICD-9-CM: 416.8  8/3/2020 - Present Yes        Constipation ICD-10-CM: K59.00  ICD-9-CM: 564.00  8/2/2020 - Present Yes        Hypokalemia ICD-10-CM: E87.6  ICD-9-CM: 276.8  7/29/2020 - Present Yes        * (Principal) Acute hypoxemic respiratory failure (HCC) ICD-10-CM: J96.01  ICD-9-CM: 518.81  7/27/2020 - Present Yes        Pulmonary embolism, bilateral (HCC) ICD-10-CM: I26.99  ICD-9-CM: 415.19  7/27/2020 - Present Yes        Anxiety ICD-10-CM: F41.9  ICD-9-CM: 300.00  Unknown - Present Yes              Objective:     Patient Vitals for the past 24 hrs:   Temp Pulse Resp BP SpO2   02/03/22 1101 98.4 °F (36.9 °C) 90 18 125/62 90 %   02/03/22 0823     91 %   02/03/22 0736 98.6 °F (37 °C) 84 18 133/78 92 %   02/03/22 0413 98 °F (36.7 °C) 79 16 130/72 94 %   02/03/22 0001 98 °F (36.7 °C) 84 20 (!) 127/49 92 %   02/02/22 1932 97.7 °F (36.5 °C) 85 26 (!) 109/52 90 %   02/02/22 1612 98 °F (36.7 °C) 85 18 (!) 114/49 94 %     Oxygen Therapy  O2 Sat (%): 90 % (02/03/22 1101)  Pulse via Oximetry: 80 beats per minute (02/03/22 0823)  O2 Device: Nasal cannula (02/03/22 1222)  O2 Flow Rate (L/min): 1 l/min (02/03/22 1222)  FIO2 (%): 36 % (02/01/22 0849)    Estimated body mass index is 34.06 kg/m² as calculated from the following:    Height as of this encounter: 5' (1.524 m). Weight as of this encounter: 79.1 kg (174 lb 6.1 oz). Intake/Output Summary (Last 24 hours) at 2/3/2022 1506  Last data filed at 2/3/2022 1030  Gross per 24 hour   Intake 200 ml   Output 1250 ml   Net -1050 ml         Physical Exam:   Blood pressure 125/62, pulse 90, temperature 98.4 °F (36.9 °C), resp. rate 18, height 5' (1.524 m), weight 79.1 kg (174 lb 6.1 oz), SpO2 90 %.   General:    No overt distress  Head:  Normocephalic, atraumatic  Eyes:  Sclerae appear normal.  Pupils equally round. ENT:  Nares appear normal, no drainage. Moist oral mucosa  Neck:  No restricted ROM. Trachea midline   CV:   RRR. No m/r/g. Lungs: No wheezing. Respirations even, unlabored at rest on 1L. Abdomen:   Soft, nondistended. Extremities: No cyanosis or clubbing. Skin:     No rashes and normal coloration. Neuro:  CN II-XII grossly intact. A&Ox3  Psych:  Normal mood and affect. I have reviewed ordered lab tests and independently visualized imaging below:    Recent Labs:  Recent Results (from the past 48 hour(s))   GLUCOSE, POC    Collection Time: 02/01/22  4:41 PM   Result Value Ref Range    Glucose (POC) 127 (H) 65 - 100 mg/dL    Performed by Pauline Don    GLUCOSE, POC    Collection Time: 02/01/22  8:37 PM   Result Value Ref Range    Glucose (POC) 152 (H) 65 - 100 mg/dL    Performed by Uriel    LUPUS ANTICOAGULANT PANEL W/ REFLEX    Collection Time: 02/01/22  9:10 PM   Result Value Ref Range    PTT-LA 29.6 0.0 - 51.9 sec    dRVVT 41.8 0.0 - 47.0 sec    Interpretation Comment:    MISC.  LAB TEST    Collection Time: 02/01/22  9:10 PM   Result Value Ref Range    Test Description: 6,502      Reference Lab: [email or web address]      Results: PENDING    GLUCOSE, POC    Collection Time: 02/02/22  7:43 AM   Result Value Ref Range    Glucose (POC) 181 (H) 65 - 100 mg/dL    Performed by Sharif    SED RATE, AUTOMATED    Collection Time: 02/02/22 10:26 AM   Result Value Ref Range    Sed rate, automated 80 (H) 0 - 30 mm/hr   CRP, HIGH SENSITIVITY    Collection Time: 02/02/22 10:26 AM   Result Value Ref Range    CRP, High sensitivity 42.8 mg/L   CBC WITH AUTOMATED DIFF    Collection Time: 02/02/22 10:26 AM   Result Value Ref Range    WBC 11.3 (H) 4.3 - 11.1 K/uL    RBC 3.53 (L) 4.05 - 5.2 M/uL    HGB 10.5 (L) 11.7 - 15.4 g/dL    HCT 33.6 (L) 35.8 - 46.3 %    MCV 95.2 79.6 - 97.8 FL    MCH 29.7 26.1 - 32.9 PG    MCHC 31.3 (L) 31.4 - 35.0 g/dL    RDW 14.7 (H) 11.9 - 14.6 %    PLATELET 117 179 - 710 K/uL    MPV 9.4 9.4 - 12.3 FL    ABSOLUTE NRBC 0.00 0.0 - 0.2 K/uL    DF AUTOMATED      NEUTROPHILS 77 43 - 78 %    LYMPHOCYTES 13 13 - 44 %    MONOCYTES 9 4.0 - 12.0 %    EOSINOPHILS 0 (L) 0.5 - 7.8 %    BASOPHILS 0 0.0 - 2.0 %    IMMATURE GRANULOCYTES 1 0.0 - 5.0 %    ABS. NEUTROPHILS 8.7 (H) 1.7 - 8.2 K/UL    ABS. LYMPHOCYTES 1.5 0.5 - 4.6 K/UL    ABS. MONOCYTES 1.0 0.1 - 1.3 K/UL    ABS. EOSINOPHILS 0.0 0.0 - 0.8 K/UL    ABS. BASOPHILS 0.0 0.0 - 0.2 K/UL    ABS. IMM.  GRANS. 0.1 0.0 - 0.5 K/UL   METABOLIC PANEL, BASIC    Collection Time: 02/02/22 10:26 AM   Result Value Ref Range    Sodium 140 136 - 145 mmol/L    Potassium 3.5 3.5 - 5.1 mmol/L    Chloride 104 98 - 107 mmol/L    CO2 30 21 - 32 mmol/L    Anion gap 6 (L) 7 - 16 mmol/L    Glucose 185 (H) 65 - 100 mg/dL    BUN 19 8 - 23 MG/DL    Creatinine 0.50 (L) 0.6 - 1.0 MG/DL    GFR est AA >60 >60 ml/min/1.73m2    GFR est non-AA >60 >60 ml/min/1.73m2    Calcium 9.4 8.3 - 10.4 MG/DL   PHOSPHORUS    Collection Time: 02/02/22 10:26 AM   Result Value Ref Range    Phosphorus 2.6 2.3 - 3.7 MG/DL   NT-PRO BNP    Collection Time: 02/02/22 10:26 AM   Result Value Ref Range    NT pro- (H) <450 PG/ML   GLUCOSE, POC    Collection Time: 02/02/22 11:48 AM   Result Value Ref Range    Glucose (POC) 126 (H) 65 - 100 mg/dL    Performed by [facility]    GLUCOSE, POC    Collection Time: 02/02/22  4:48 PM   Result Value Ref Range    Glucose (POC) 99 65 - 100 mg/dL    Performed by [facility]    PLEASE READ & DOCUMENT PPD TEST IN 24 HRS    Collection Time: 02/02/22  8:05 PM   Result Value Ref Range    PPD Negative Negative    mm Induration 0 0 - 5 mm   GLUCOSE, POC    Collection Time: 02/02/22  9:32 PM   Result Value Ref Range    Glucose (POC) 129 (H) 65 - 100 mg/dL    Performed by Brayan    GLUCOSE, POC    Collection Time: 02/03/22  6:37 AM   Result Value Ref Range    Glucose (POC) 98 65 - 100 mg/dL    Performed by [facility]    CBC WITH AUTOMATED DIFF    Collection Time: 02/03/22  8:58 AM   Result Value Ref Range    WBC 12.1 (H) 4.3 - 11.1 K/uL    RBC 3.99 (L) 4.05 - 5.2 M/uL    HGB 11.8 11.7 - 15.4 g/dL    HCT 38.1 35.8 - 46.3 %    MCV 95.5 79.6 - 97.8 FL    MCH 29.6 26.1 - 32.9 PG    MCHC 31.0 (L) 31.4 - 35.0 g/dL    RDW 14.6 11.9 - 14.6 %    PLATELET 651 (H) 866 - 450 K/uL    MPV 9.4 9.4 - 12.3 FL    ABSOLUTE NRBC 0.00 0.0 - 0.2 K/uL    DF AUTOMATED      NEUTROPHILS 61 43 - 78 %    LYMPHOCYTES 23 13 - 44 %    MONOCYTES 12 4.0 - 12.0 %    EOSINOPHILS 3 0.5 - 7.8 %    BASOPHILS 0 0.0 - 2.0 %    IMMATURE GRANULOCYTES 1 0.0 - 5.0 %    ABS. NEUTROPHILS 7.4 1.7 - 8.2 K/UL    ABS. LYMPHOCYTES 2.8 0.5 - 4.6 K/UL    ABS. MONOCYTES 1.4 (H) 0.1 - 1.3 K/UL    ABS. EOSINOPHILS 0.4 0.0 - 0.8 K/UL    ABS. BASOPHILS 0.1 0.0 - 0.2 K/UL    ABS. IMM.  GRANS. 0.1 0.0 - 0.5 K/UL   METABOLIC PANEL, BASIC    Collection Time: 02/03/22  8:58 AM   Result Value Ref Range    Sodium 138 136 - 145 mmol/L    Potassium 3.8 3.5 - 5.1 mmol/L    Chloride 102 98 - 107 mmol/L    CO2 32 21 - 32 mmol/L    Anion gap 4 (L) 7 - 16 mmol/L    Glucose 113 (H) 65 - 100 mg/dL    BUN 20 8 - 23 MG/DL    Creatinine 0.50 (L) 0.6 - 1.0 MG/DL    GFR est AA >60 >60 ml/min/1.73m2    GFR est non-AA >60 >60 ml/min/1.73m2    Calcium 9.5 8.3 - 10.4 MG/DL   GLUCOSE, POC    Collection Time: 02/03/22 11:02 AM   Result Value Ref Range    Glucose (POC) 120 (H) 65 - 100 mg/dL    Performed by Andi Jang        All Micro Results     Procedure Component Value Units Date/Time    RESPIRATORY VIRUS PANEL W/COVID-19, PCR [[id number]] Collected: 02/01/22 0727    Order Status: Completed Specimen: Nasopharyngeal Updated: 02/01/22 0853     Adenovirus NOT DETECTED        Coronavirus 229E NOT DETECTED        Coronavirus HKU1 NOT DETECTED        Coronavirus CVNL63 NOT DETECTED        Coronavirus OC43 NOT DETECTED        SARS-CoV-2, PCR NOT DETECTED Metapneumovirus NOT DETECTED        Rhinovirus and Enterovirus NOT DETECTED        Influenza A NOT DETECTED        Influenza B NOT DETECTED        Parainfluenza 1 NOT DETECTED        Parainfluenza 2 NOT DETECTED        Parainfluenza 3 NOT DETECTED        Parainfluenza virus 4 NOT DETECTED        RSV by PCR NOT DETECTED        B. parapertussis, PCR NOT DETECTED        Bordetella pertussis - PCR NOT DETECTED        Chlamydophila pneumoniae DNA, QL, PCR NOT DETECTED        Mycoplasma pneumoniae DNA, QL, PCR NOT DETECTED       COVID-19 RAPID TEST [[id number]] Collected: 01/31/22 1615    Order Status: Completed Specimen: Nasopharyngeal Updated: 01/31/22 1708     Specimen source NASAL        COVID-19 rapid test Not detected        Comment:      The specimen is NEGATIVE for SARS-CoV-2, the novel coronavirus associated with COVID-19. A negative result does not rule out COVID-19. This test has been authorized by the FDA under an Emergency Use Authorization (EUA) for use by authorized laboratories. Fact sheet for Healthcare Providers: [employer]  Fact sheet for Patients: [employer]       Methodology: Isothermal Nucleic Acid Amplification               Other Studies:  No results found.     Current Meds:  Current Facility-Administered Medications   Medication Dose Route Frequency    predniSONE (DELTASONE) tablet 40 mg  40 mg Oral DAILY WITH BREAKFAST    potassium chloride (KLOR-CON M10) tablet 10 mEq  10 mEq Oral DAILY    insulin lispro (HUMALOG) injection 0-10 Units  0-10 Units SubCUTAneous AC&HS    clorazepate (TRANXENE) tablet 3.75 mg  3.75 mg Oral BID    furosemide (LASIX) tablet 40 mg  40 mg Oral DAILY    apixaban (ELIQUIS) tablet 2.5 mg  2.5 mg Oral Q12H    ascorbic acid (vitamin C) (VITAMIN C) tablet 500 mg  500 mg Oral DAILY    Iron Fum & PS Cmp-Vit C-Niacin 125-40-3 mg cap 1 Caplet (Patient Supplied)  1 Caplet Oral DAILY    latanoprost (XALATAN) 0.005 % ophthalmic solution 1 Drop  1 Drop Both Eyes QHS    lifitegrast 5 % dpet 1 Drop (Patient Supplied)  1 Drop Both Eyes Q12H    lisinopriL (PRINIVIL, ZESTRIL) tablet 5 mg  5 mg Oral DAILY    montelukast (SINGULAIR) tablet 10 mg  10 mg Oral DAILY    ketotifen (ZADITOR) 0.025 % (0.035 %) ophthalmic solution 1 Drop  1 Drop Both Eyes BID    pantoprazole (PROTONIX) tablet 40 mg  40 mg Oral BID    polyethylene glycol (MIRALAX) packet 17 g  17 g Oral DAILY    sertraline (ZOLOFT) tablet 150 mg  150 mg Oral QPM    sucralfate (CARAFATE) tablet 1 g  1 g Oral TID    budesonide-formoterol (SYMBICORT) 80-4.5 mcg inhaler  2 Puff Inhalation BID RT    albuterol (PROVENTIL VENTOLIN) nebulizer solution 2.5 mg  2.5 mg Nebulization Q6H PRN    sodium chloride (NS) flush 5-40 mL  5-40 mL IntraVENous Q8H    sodium chloride (NS) flush 5-40 mL  5-40 mL IntraVENous PRN    acetaminophen (TYLENOL) tablet 650 mg  650 mg Oral Q6H PRN    Or    acetaminophen (TYLENOL) suppository 650 mg  650 mg Rectal Q6H PRN    polyethylene glycol (MIRALAX) packet 17 g  17 g Oral DAILY PRN    ondansetron (ZOFRAN ODT) tablet 4 mg  4 mg Oral Q8H PRN    Or    ondansetron (ZOFRAN) injection 4 mg  4 mg IntraVENous Q6H PRN       Signed:  Manuel Munoz NP    Part of this note may have been written by using a voice dictation software. The note has been proof read but may still contain some grammatical/other typographical errors.

## 2022-02-04 ENCOUNTER — APPOINTMENT (OUTPATIENT)
Dept: GENERAL RADIOLOGY | Age: 77
DRG: 193 | End: 2022-02-04
Attending: INTERNAL MEDICINE
Payer: MEDICARE

## 2022-02-04 VITALS
HEART RATE: 88 BPM | TEMPERATURE: 98 F | RESPIRATION RATE: 18 BRPM | SYSTOLIC BLOOD PRESSURE: 129 MMHG | DIASTOLIC BLOOD PRESSURE: 56 MMHG | HEIGHT: 60 IN | WEIGHT: 174.38 LBS | OXYGEN SATURATION: 94 % | BODY MASS INDEX: 34.24 KG/M2

## 2022-02-04 LAB
GLUCOSE BLD STRIP.AUTO-MCNC: 134 MG/DL (ref 65–100)
GLUCOSE BLD STRIP.AUTO-MCNC: 146 MG/DL (ref 65–100)
Lab: NORMAL
REFERENCE LAB,REFLB: NORMAL
SERVICE CMNT-IMP: ABNORMAL
SERVICE CMNT-IMP: ABNORMAL
TEST DESCRIPTION:,ATST: NORMAL

## 2022-02-04 PROCEDURE — 94760 N-INVAS EAR/PLS OXIMETRY 1: CPT

## 2022-02-04 PROCEDURE — 74011636637 HC RX REV CODE- 636/637: Performed by: INTERNAL MEDICINE

## 2022-02-04 PROCEDURE — 71045 X-RAY EXAM CHEST 1 VIEW: CPT

## 2022-02-04 PROCEDURE — 74011250637 HC RX REV CODE- 250/637: Performed by: PHYSICIAN ASSISTANT

## 2022-02-04 PROCEDURE — 82962 GLUCOSE BLOOD TEST: CPT

## 2022-02-04 PROCEDURE — 99232 SBSQ HOSP IP/OBS MODERATE 35: CPT | Performed by: INTERNAL MEDICINE

## 2022-02-04 PROCEDURE — 97530 THERAPEUTIC ACTIVITIES: CPT

## 2022-02-04 PROCEDURE — 94640 AIRWAY INHALATION TREATMENT: CPT

## 2022-02-04 PROCEDURE — 77010033678 HC OXYGEN DAILY

## 2022-02-04 PROCEDURE — 74011250637 HC RX REV CODE- 250/637: Performed by: FAMILY MEDICINE

## 2022-02-04 PROCEDURE — 74011000250 HC RX REV CODE- 250: Performed by: FAMILY MEDICINE

## 2022-02-04 RX ORDER — FUROSEMIDE 40 MG/1
40 TABLET ORAL DAILY
Qty: 30 TABLET | Refills: 0 | Status: SHIPPED | OUTPATIENT
Start: 2022-02-05 | End: 2022-03-07

## 2022-02-04 RX ORDER — POTASSIUM CHLORIDE 750 MG/1
10 TABLET, EXTENDED RELEASE ORAL DAILY
Qty: 30 TABLET | Refills: 0 | Status: SHIPPED | OUTPATIENT
Start: 2022-02-05 | End: 2022-02-24 | Stop reason: SDUPTHER

## 2022-02-04 RX ORDER — PREDNISONE 20 MG/1
40 TABLET ORAL
Qty: 14 TABLET | Refills: 0 | Status: SHIPPED | OUTPATIENT
Start: 2022-02-05 | End: 2022-02-12

## 2022-02-04 RX ORDER — PREDNISONE 20 MG/1
20 TABLET ORAL DAILY
Qty: 30 TABLET | Refills: 0 | Status: SHIPPED | OUTPATIENT
Start: 2022-02-12 | End: 2022-02-24 | Stop reason: SDUPTHER

## 2022-02-04 RX ADMIN — CLORAZEPATE DIPOTASSIUM 3.75 MG: 7.5 TABLET ORAL at 08:37

## 2022-02-04 RX ADMIN — FUROSEMIDE 40 MG: 40 TABLET ORAL at 08:37

## 2022-02-04 RX ADMIN — APIXABAN 2.5 MG: 2.5 TABLET, FILM COATED ORAL at 08:37

## 2022-02-04 RX ADMIN — SUCRALFATE 1 G: 1 TABLET ORAL at 08:37

## 2022-02-04 RX ADMIN — Medication 500 MG: at 08:38

## 2022-02-04 RX ADMIN — PANTOPRAZOLE SODIUM 40 MG: 40 TABLET, DELAYED RELEASE ORAL at 08:37

## 2022-02-04 RX ADMIN — PREDNISONE 40 MG: 20 TABLET ORAL at 08:37

## 2022-02-04 RX ADMIN — KETOTIFEN FUMARATE 1 DROP: 0.35 SOLUTION/ DROPS OPHTHALMIC at 08:45

## 2022-02-04 RX ADMIN — POTASSIUM CHLORIDE 10 MEQ: 750 TABLET, EXTENDED RELEASE ORAL at 08:38

## 2022-02-04 RX ADMIN — BUDESONIDE AND FORMOTEROL FUMARATE DIHYDRATE 2 PUFF: 80; 4.5 AEROSOL RESPIRATORY (INHALATION) at 08:00

## 2022-02-04 RX ADMIN — LISINOPRIL 5 MG: 5 TABLET ORAL at 08:38

## 2022-02-04 RX ADMIN — SODIUM CHLORIDE, PRESERVATIVE FREE 10 ML: 5 INJECTION INTRAVENOUS at 05:38

## 2022-02-04 RX ADMIN — SODIUM CHLORIDE, PRESERVATIVE FREE 10 ML: 5 INJECTION INTRAVENOUS at 13:21

## 2022-02-04 RX ADMIN — MONTELUKAST 10 MG: 10 TABLET, FILM COATED ORAL at 08:37

## 2022-02-04 NOTE — PROGRESS NOTES
Problem: Patient Education: Go to Patient Education Activity  Goal: Patient/Family Education  Outcome: Progressing Towards Goal     Problem: Patient Education: Go to Patient Education Activity  Goal: Patient/Family Education  Outcome: Progressing Towards Goal     Problem: Falls - Risk of  Goal: *Absence of Falls  Description: Document Nika Lucas Fall Risk and appropriate interventions in the flowsheet.   Outcome: Progressing Towards Goal  Note: Fall Risk Interventions:  Mobility Interventions: Bed/chair exit alarm         Medication Interventions: Bed/chair exit alarm    Elimination Interventions: Bed/chair exit alarm              Problem: Patient Education: Go to Patient Education Activity  Goal: Patient/Family Education  Outcome: Progressing Towards Goal

## 2022-02-04 NOTE — PROGRESS NOTES
Hector Lees  Admission Date: 1/31/2022         Daily Progress Note: 2/4/2022    The patient's chart is reviewed and the patient is discussed with the staff. Background: 64year old female presents with dyspnea and hypoxia. CXR/CT with bilateral infiltrates. BNP 2673. Started on lasix with improvement. Echo with normal EF but + diastolic dysfunction. [facility] and has to be hospitalized. CT and CXR by report in Care Everywhere reports bilateral infiltrates. Was not discharged home on oxygen at that time. CRP here 42.8 and ESR 80. Steroids started on 2/1 but stopped on 2/2 after seen by pulmonary. Oxygen weaned to 2 liters but had to be increased to 3 liters with exertion per PT. Clinically and radiographically improved with diuresis. CXR with persistent infiltrates so steroids restarted on 2/3 with plans to continue with slow taper and plans for outpt follow up. Subjective:      Ambulated with PT earlier and oxygen had to be increased to 3 liters. Discussed need for oxygen at discharge and plans for outpatient follow up.        Current Facility-Administered Medications   Medication Dose Route Frequency    predniSONE (DELTASONE) tablet 40 mg  40 mg Oral DAILY WITH BREAKFAST    potassium chloride (KLOR-CON M10) tablet 10 mEq  10 mEq Oral DAILY    insulin lispro (HUMALOG) injection 0-10 Units  0-10 Units SubCUTAneous AC&HS    clorazepate (TRANXENE) tablet 3.75 mg  3.75 mg Oral BID    furosemide (LASIX) tablet 40 mg  40 mg Oral DAILY    apixaban (ELIQUIS) tablet 2.5 mg  2.5 mg Oral Q12H    ascorbic acid (vitamin C) (VITAMIN C) tablet 500 mg  500 mg Oral DAILY    Iron Fum & PS Cmp-Vit C-Niacin 125-40-3 mg cap 1 Caplet (Patient Supplied)  1 Caplet Oral DAILY    latanoprost (XALATAN) 0.005 % ophthalmic solution 1 Drop  1 Drop Both Eyes QHS    lifitegrast 5 % dpet 1 Drop (Patient Supplied)  1 Drop Both Eyes Q12H    lisinopriL (PRINIVIL, ZESTRIL) tablet 5 mg  5 mg Oral DAILY    montelukast (SINGULAIR) tablet 10 mg  10 mg Oral DAILY    ketotifen (ZADITOR) 0.025 % (0.035 %) ophthalmic solution 1 Drop  1 Drop Both Eyes BID    pantoprazole (PROTONIX) tablet 40 mg  40 mg Oral BID    polyethylene glycol (MIRALAX) packet 17 g  17 g Oral DAILY    sertraline (ZOLOFT) tablet 150 mg  150 mg Oral QPM    sucralfate (CARAFATE) tablet 1 g  1 g Oral TID    budesonide-formoterol (SYMBICORT) 80-4.5 mcg inhaler  2 Puff Inhalation BID RT    albuterol (PROVENTIL VENTOLIN) nebulizer solution 2.5 mg  2.5 mg Nebulization Q6H PRN    sodium chloride (NS) flush 5-40 mL  5-40 mL IntraVENous Q8H    sodium chloride (NS) flush 5-40 mL  5-40 mL IntraVENous PRN    acetaminophen (TYLENOL) tablet 650 mg  650 mg Oral Q6H PRN    Or    acetaminophen (TYLENOL) suppository 650 mg  650 mg Rectal Q6H PRN    polyethylene glycol (MIRALAX) packet 17 g  17 g Oral DAILY PRN    ondansetron (ZOFRAN ODT) tablet 4 mg  4 mg Oral Q8H PRN    Or    ondansetron (ZOFRAN) injection 4 mg  4 mg IntraVENous Q6H PRN     Review of Systems  + dyspnea but better  Constitutional: negative for fever, chills, sweats  Cardiovascular: negative for chest pain, palpitations, syncope, + recent leg edema  Gastrointestinal:  negative for dysphagia, reflux, vomiting, diarrhea, abdominal pain, or melena  Neurologic:  negative for focal weakness, numbness, headache  Objective:     Vitals:    02/04/22 0342 02/04/22 0705 02/04/22 0900 02/04/22 1105   BP: 130/81 139/60  (!) 129/56   Pulse: 97 70  88   Resp: 18 17  18   Temp: 98 °F (36.7 °C) 97.5 °F (36.4 °C)  98 °F (36.7 °C)   SpO2: 97% 96% (!) 82% 94%   Weight:       Height:           Intake/Output Summary (Last 24 hours) at 2/4/2022 1128  Last data filed at 2/4/2022 0910  Gross per 24 hour   Intake 720 ml   Output 1550 ml   Net -830 ml     Physical Exam:   Constitution:  the patient is well developed and in no acute distress  HEENT:  Sclera clear, pupils equal, oral mucosa moist  Respiratory: crackles from anterior and posterior. Wearing 3 liter cannula  Cardiovascular:  RRR with \"whistling\" systolic murmur  Gastrointestinal: soft and non-tender; with positive bowel sounds. Musculoskeletal: warm without cyanosis. There is no lower extremity edema. Skin:  no jaundice or rashes, no wounds   Neurologic: no gross neuro deficits     Psychiatric:  alert and oriented, calm at present    Echo:       CXR:     2/4 2/2 1/31        LAB:  Recent Labs     02/03/22  0858 02/02/22  1026   WBC 12.1* 11.3*   HGB 11.8 10.5*   HCT 38.1 33.6*   * 445     Recent Labs     02/03/22  0858 02/02/22  1026    140   K 3.8 3.5    104   CO2 32 30   * 185*   BUN 20 19   CREA 0.50* 0.50*   CA 9.5 9.4   PHOS  --  2.6     Recent Labs     02/02/22  1026   BNPNT 698*     No results for input(s): PH, PCO2, PO2, HCO3, PHI, PCO2I, PO2I, HCO3I in the last 72 hours. No results for input(s): SDES, CULT in the last 72 hours. Assessment and Plan:  (Medical Decision Making)   Principal Problem:    Acute hypoxemic respiratory failure ([address] (7/27/2020)    Oxygen down to 1 liter with rest but desaturated to 84% this AM with PT so had to increase to 3 liters for exertion. Will notify  that she will need home oxygen. CXR continues to improve but still with bilateral infiltrates and noted hypoxia. Elevated BNP on admission - 2673 -> 690 with diuresis. Lasix 40mg/day at present. Echo with diastolic dysfunction. Has a notable systolic murmur on exam but not evidence valvular heart disease per echo. Has elevated ESR (80) and CRP (42) - ? Pneumonitis. Recent COVID in December but discharged on room air at that time. Lupus study negative. Steroids stopped 2/2 - but restarted on 2/3 due to inflammatory markers.  Plan to discharge home on Prednisone (40 mg per day for first week and then taper to 20 mg per week thereafter) and have sent message to the office to schedule follow up visit in 2 to 3 weeks with CXR. Continue daily lasix - 40 mg. Active Problems:    Anxiety ()    On Tranxene. Calm at present      Pulmonary embolism, bilateral ([address] (7/27/2020)    Hx of. On Eliquis. No evidence of per CT here      Hypokalemia (7/29/2020)    Corrected. Secondary to diuresis      Constipation (8/2/2020)    noted      S/P IVC filter (8/3/2020)    Removed 3/2021      Pulmonary HTN ([address] (8/3/2020)    No evidence of on recent echo      Essential hypertension (3/29/2021)    controlled      Iron deficiency anemia (11/18/2021)    stable      Suspected CHF (congestive heart failure) (1/31/2022)   elevated BNP and improved with diuresis. Echo with diastolic dysfunction      History of 2019 novel coronavirus disease (COVID-19) (2/1/2022)    Dec 2021. ? How much of what is seen no xray secondary to this - probably combination of edema and recent COVID      Pulmonary infiltrate (2/1/2022)    As above      Debility (2/1/2022)   does not plan to go to rehab but rather home with PT there. Lives with a room mate. Working with PT here      Suspected sleep apnea (2/1/2022)    Will rediscuss when seen back in the office - can do overnight oximetry before discharge to assess for desaturations. ? Home soon      More than 50% of the time documented was spent in face-to-face contact with the patient and in the care of the patient on the floor/unit where the patient is located. Ynr Walsh NP       I have spoken with and examined the patient. I agree with the above assessment and plan as documented. Gen: NAD, sitting up in bed. Lungs:  Mild B inspiratory crackles  Heart:  RRR with no Murmur/Rubs/Gallops  Abd: soft, nt, nd, nabs  Ext: no le edema    Patient being discharged today. Needing 1L o2 at rest and 3L with exertion.    Would have her discharged on 40mg prednisone started 2/3 with plans for 40mg daily x 1 week, 30mg daily x 1 week, then 20mg daily until seen back in pulmonary office with repeat CXR and walking o2 sat check at that time.      Ruthy Neely MD

## 2022-02-04 NOTE — PROGRESS NOTES
Problem: Patient Education: Go to Patient Education Activity  Goal: Patient/Family Education  2/4/2022 1507 by Rony Sample  Outcome: Resolved/Met  2/4/2022 0952 by Rony Sample  Outcome: Progressing Towards Goal     Problem: Patient Education: Go to Patient Education Activity  Goal: Patient/Family Education  2/4/2022 1507 by Rony Sample  Outcome: Resolved/Met  2/4/2022 0952 by Rony Sample  Outcome: Progressing Towards Goal     Problem: Falls - Risk of  Goal: *Absence of Falls  Description: Document Raza Kenney Fall Risk and appropriate interventions in the flowsheet.   2/4/2022 1507 by Rony Sample  Outcome: Resolved/Met  2/4/2022 0952 by Rony Sample  Outcome: Progressing Towards Goal  Note: Fall Risk Interventions:  Mobility Interventions: Bed/chair exit alarm         Medication Interventions: Bed/chair exit alarm    Elimination Interventions: Bed/chair exit alarm              Problem: Patient Education: Go to Patient Education Activity  Goal: Patient/Family Education  2/4/2022 1507 by Rony Sample  Outcome: Resolved/Met  2/4/2022 0952 by Rony Sample  Outcome: Progressing Towards Goal

## 2022-02-04 NOTE — PROGRESS NOTES
Pt d/c home today per orders. Spoke with Lelo Rodriguez RN. Inter[facility] HH resumed and will need qualifying sat note for O2 of 3L. Will set up with [facility]. Information placed in d/c instructions.      Care Management Interventions  PCP Verified by CM:  (Dr Fabian King [phone])  Mode of Transport at Discharge: Self  Transition of Care Consult (CM Consult): [address]: No  Reason Outside [place]: Patient already serviced by other home care/hospice agency  Discharge Durable Medical Equipment: Yes (DME: wheelchair, rollator, BSC, raised toilet, hand rails)  Physical Therapy Consult: Yes  Occupational Therapy Consult: Yes  Support Systems: Friend/Neighbor,Child(arnjith) (family friend: Malgorzata Garcia, [address] [place] [address] [phone]; son [facility] [phone])  Confirm Follow Up Transport: Self  The Plan for Transition of Care is Related to the Following Treatment Goals : return to safe level of independence  The Patient and/or Patient Representative was Provided with a Choice of Provider and Agrees with the Discharge Plan?: Yes  Freedom of Choice List was Provided with Basic Dialogue that Supports the Patient's Individualized Plan of Care/Goals, Treatment Preferences and Shares the Quality Data Associated with the Providers?: Yes   Resource Information Provided?: No  Discharge Location  Patient Expects to be Discharged to[de-identified] Home with home health (resumed Inter[facility] HH and O2 set up)

## 2022-02-04 NOTE — DISCHARGE SUMMARY
Hospitalist Discharge Summary   Admit Date:  2022  3:41 PM   DC Note date: 2022  Name:  Elena Jean   Age:  64 y.o.   Sex:  female  :  1945   MRN:  [id number]   Room:  [place] [address]  PCP:  Lindsay Urias MD    Presenting Complaint: Shortness of Breath    Initial Admission Diagnosis: Suspected CHF (congestive heart failure) [R09.89]  Hypokalemia [E87.6]     Problem List for this Hospitalization:  Hospital Problems as of 2022 Date Reviewed: 10/13/2021          Codes Class Noted - Resolved POA    History of 2019 novel coronavirus disease (COVID-19) ICD-10-CM: Z86.16  ICD-9-CM: V12.09  2022 - Present Unknown        Pulmonary infiltrate ICD-10-CM: R91.8  ICD-9-CM: 793.19  2022 - Present Unknown        Debility ICD-10-CM: R53.81  ICD-9-CM: 799.3  2022 - Present Unknown        Suspected sleep apnea ICD-10-CM: R29.818  ICD-9-CM: 781.99  2022 - Present Unknown        Suspected CHF (congestive heart failure) ICD-10-CM: R09.89  ICD-9-CM: 785.9  2022 - Present Unknown        Iron deficiency anemia ICD-10-CM: D50.9  ICD-9-CM: 280.9  2021 - Present Yes        Essential hypertension ICD-10-CM: I10  ICD-9-CM: 401.9  3/29/2021 - Present Yes        S/P IVC filter ICD-10-CM: Z95.828  ICD-9-CM: V45.89  8/3/2020 - Present Yes        Pulmonary HTN ([address] ICD-10-CM: I27.20  ICD-9-CM: 416.8  8/3/2020 - Present Yes        Constipation ICD-10-CM: K59.00  ICD-9-CM: 564.00  2020 - Present Yes        Hypokalemia ICD-10-CM: E87.6  ICD-9-CM: 276.8  2020 - Present Yes        * (Principal) Acute hypoxemic respiratory failure ([facility] ICD-10-CM: J96.01  ICD-9-CM: 518.81  2020 - Present Yes        Pulmonary embolism, bilateral ([facility] ICD-10-CM: I26.99  ICD-9-CM: 415.19  2020 - Present Yes        Anxiety ICD-10-CM: F41.9  ICD-9-CM: 300.00  Unknown - Present Yes            Did Patient have Sepsis (YES OR NO): No    Hospital Course:  Ms. Callie Duran is a 77-year-old female with a PMH of HTN, b/l pulmonary embolism, iron deficiency anemia, and recent COVID pneumonia who presented to the ER with increasing shortness of breath starting the night before. She reports continued cough ever since having COVID in December 2021. She denies chest pain, abdominal pain, or N/V. She reports she is compliant with her medications. EMS found her sats to be in upper 80s on room air and was placed on 4L O2 nasal cannula and given solumedrol, terbutaline, and albuterol nebulizer treatment.      Chest Xray 1/31/2022 shows bilateral pulmonary edema. COVID panel 2/1/2022 negative. BNP elevated at 2673. Echo ordered for suspected CHF but showed no signs of systolic/diastolic dysfunction or ventricular enlargement. Repeat CXR on Feb/04 showed improvement. Pulmonology has followed the patient and their recommendations are appreciated. Ms. Michele Sorenson will discharge on prednisone 40 mg daily x 7 days and then start 20 mg daily per L. Verl Litten, NP note on day of discharge. She will continue furosemide. She is to follow up with Pulmonology outpatient in 2-3 weeks for a repeat CXR and further discussion on steroid tapering at that time. Ms. Michele Sorenson also qualifies for home O2 and this will be arranged prior to discharge. We also encourage her to see her PCP within 2 weeks of discharge. Ms. Michele Sorenson has improved during her inpatient stay and is appropriate for discharge home with Home health on Feb/04/2022. Principal Problem:    Acute hypoxemic respiratory failure ([address] (7/27/2020)  - clinically improved  - weaning supplemental O2 to 1L via NC today  - in setting of remote COVID with some pneumonitis and pulm edema; repeat CXR improved.   - cont furosemide as dosed  - CT chest negative for acute PE  - Lupus study negative; TSH wnl     Active Problems:    Acute pulmonary edema b/l  - clinically improved  - repeat BNP improved though cxray with persistent b/l infiltrates (probable post COVID etiology)  - cont furosemide at discharge per Pulm  - TTE with preserved EF and normal diastolic fxn       Acute pneumonitis  - remote COVID infection 12/2021  - appreciate pulmonary assistance   - Pulm to begin a prednisone taper Feb/03  - Repeat CXR showed improvement  - Arrange for outpatient Pulm follow up w/ repeat CXR; start prednisone taper per Pulm direction  - patient qualifies for home O2 based on ambulation test with RT;  to arrange       Essential hypertension (3/29/2021)  - adequate control on lisinopril 5mg       Iron deficiency anemia (11/18/2021)  - h/h stable       Hypokalemia (7/29/2020)  - anticipate due to ongoing diuretics will need daily supplementation  - Cont KCl 10mEq daily      Anxiety  - Tranxene bid restarted  - mood stable       Pulmonary embolism, bilateral ([address]   - CT 1/31/2022 showed no evidence of current PE  - Eliquis PO       S/P IVC filter   -removed 3/29/2021       Constipation (8/2/2020)  - cont bowel regimen       Debility  - appreciate PT note; pt not interested in STR on dc; will need home health       Obesity Class 1  - Complicates care      Disposition: Home Health Care Svc  Diet: ADULT DIET Regular; 4 carb choices (60 gm/meal); Low Fat/Low Chol/High Fiber/2 gm Na; 1200 ml  Code Status: Full Code    Follow Up Orders: Follow-up Appointments   Procedures    FOLLOW UP VISIT Appointment in: Other (Specify)     Standing Status:   Standing     Number of Occurrences:   1     Order Specific Question:   Appointment in     Answer:    Other (Specify)       Follow-up Information     Follow up With Specialties Details Why Contact Info    Gerson Leach MD Family Medicine In 2 weeks post discharge check up and medication review [id number] [facility]      Edenilson Guardado MD Pulmonary Disease, Internal Medicine In 3 weeks follow up per Pulmonology, will need chest x-ray [facility] [address] [facility]  [phone]            Time spent in patient discharge and coordination 35 minutes. Plan was discussed with Ms. Lockhart and HIEU Hansen NP. All questions answered. Patient was stable at time of discharge. Instructions given to call a physician or return if any concerns. Discharge Info:   Current Discharge Medication List      START taking these medications    Details   furosemide (LASIX) 40 mg tablet Take 1 Tablet by mouth daily for 30 days. Qty: 30 Tablet, Refills: 0  Start date: 2/5/2022, End date: 3/7/2022      potassium chloride (KLOR-CON M10) 10 mEq tablet Take 1 Tablet by mouth daily for 30 days. Qty: 30 Tablet, Refills: 0  Start date: 2/5/2022, End date: 3/7/2022      !! predniSONE (DELTASONE) 20 mg tablet Take 40 mg by mouth daily (with breakfast) for 7 days. Qty: 14 Tablet, Refills: 0  Start date: 2/5/2022, End date: 2/12/2022      !! predniSONE (DELTASONE) 20 mg tablet Take 20 mg by mouth daily for 30 days. Qty: 30 Tablet, Refills: 0  Start date: 2/12/2022, End date: 3/14/2022       !! - Potential duplicate medications found. Please discuss with provider. CONTINUE these medications which have NOT CHANGED    Details   lisinopriL (PRINIVIL, ZESTRIL) 5 mg tablet TAKE ONE TABLET BY MOUTH EVERY DAY  Qty: 90 Tablet, Refills: 1    Associated Diagnoses: Essential hypertension      sertraline (ZOLOFT) 100 mg tablet Take 1 1/2 tab daily. Qty: 135 Tablet, Refills: 1    Comments: **Patient requests 90 days supply**  Associated Diagnoses: Anxiety      nystatin-triamcinolone (MYCOLOG II) topical cream APPLY CREAM TO AFFECTED AREA TWICE A DAY  Qty: 60 g, Refills: 3    Associated Diagnoses: Yeast dermatitis      lifitegrast (Xiidra) 5 % dpet Apply  to eye. apixaban (ELIQUIS) 2.5 mg tablet Take 1 Tablet by mouth two (2) times a day. Qty: 180 Tablet, Refills: 3    Associated Diagnoses: Pulmonary embolism, bilateral (HCC)      Iron Fum & PS Cmp-Vit C-Niacin (Integra) 125-40-3 mg cap Take 125 mg by mouth daily.   Qty: 90 Capsule, Refills: 1 clorazepate (TRANXENE) 3.75 mg tablet Take 1 Tablet by mouth three (3) times daily. Max Daily Amount: 11.25 mg. Indications: anxious  Qty: 270 Tablet, Refills: 1    Associated Diagnoses: Anxiety      ProAir HFA 90 mcg/actuation inhaler INHALE TWO PUFFS BY MOUTH TWICE A DAY AS NEEDED FOR COUGH  Qty: 3 Each, Refills: 1    Comments: 90 day supply      Symbicort 80-4.5 mcg/actuation HFAA INHALE 1 PUFF MOUTH TWICE A DAY FOR COUGH RINSE MOUTH AFTER EACH DOSE (DISCARD THREE MONTHS AFTER REMOVAL FROM FOIL POUCH)  Qty: 3 Each, Refills: 1      pantoprazole (Protonix) 40 mg tablet Take 1 Tablet by mouth two (2) times a day. Qty: 180 Tablet, Refills: 1    Associated Diagnoses: Gastroesophageal reflux disease without esophagitis      chlorhexidine (PERIDEX) 0.12 % solution SWISH AND DIP BRUSH INTO RINSE AND CLEAN TEETH EXPOSED IMPLANT TWO TIMES DAILY. montelukast (SINGULAIR) 10 mg tablet TAKE ONE TABLET BY MOUTH EVERY DAY  Qty: 90 Tablet, Refills: 1    Associated Diagnoses: Seasonal allergic rhinitis due to pollen      sucralfate (Carafate) 1 gram tablet Take 1 Tab by mouth three (3) times daily. Qty: 270 Tab, Refills: 3    Associated Diagnoses: Gastroesophageal reflux disease without esophagitis      olopatadine (Pataday) 0.2 % drop ophthalmic solution Administer 1 Drop to both eyes daily. Qty: 15 mL, Refills: 3    Comments: 15 ml = 3 bottles for 90 days  Associated Diagnoses: Allergic conjunctivitis of both eyes      sodium chloride (Saline Nasal) 0.65 % nasal squeeze bottle 0.05 mL by Both Nostrils route four (4) times daily as needed for Congestion or Nasal Dryness. Qty: 15 mL, Refills: 0      latanoprost (XALATAN) 0.005 % ophthalmic solution Administer 1 Drop to both eyes every evening. Qty: 1 Bottle, Refills: 0      polyethylene glycol (MIRALAX) 17 gram packet Take 1 Packet by mouth daily. Qty: 14 Each, Refills: 0      calcium carbonate (OS-JANA) 500 mg calcium (1,250 mg) tablet Take  by mouth daily. ascorbic acid, vitamin C, (VITAMIN C) 500 mg tablet Take  by mouth.      melatonin 10 mg cap Take  by mouth. dietary supplement cap Take  by mouth. docusate sodium (STOOL SOFTENER) 100 mg tab Take  by mouth as needed. glucosamine & chondroit sul. Na 750-400 mg cmpk Take  by mouth. omega-3 fatty acids-vitamin e (FISH OIL) 1,000 mg cap Take 1 Cap by mouth. 1 by oral route twice daily      VIT C/ABDIAZIZ AC/LUT/COPPER/ZNOX (PRESERVISION LUTEIN PO) Take  by mouth. 1 by oral route daily      cholecalciferol (VITAMIN D3) 1,000 unit tablet Take  by mouth daily. Procedures done this admission:  * No surgery found *    Consults this admission:  IP CONSULT TO PULMONOLOGY    Echocardiogram/EKG results:  Results from Hospital Encounter encounter on 01/31/22    ECHO ADULT COMPLETE    Interpretation Summary    Left Ventricle: Left ventricle size is normal. Mildly increased wall thickness. Normal wall motion. Normal left ventricular systolic function with a visually estimated EF of 60 - 65%. Normal diastolic function.   Mitral Valve: Mildly thickened leaflets. Mild transvalvular regurgitation.   IVC/SVC: IVC diameter is less than or equal to 21 mm and decreases less than 50% during inspiration; therefore the estimated right atrial pressure is intermediate (~8 mmHg).        EKG Results     Procedure 720 Value Units Date/Time    EKG [[id number]] Collected: 01/31/22 1558    Order Status: Completed Updated: 02/01/22 8241     Ventricular Rate 104 BPM      Atrial Rate 104 BPM      P-R Interval 136 ms      QRS Duration 95 ms      Q-T Interval 360 ms      QTC Calculation (Bezet) 474 ms      Calculated P Axis 20 degrees      Calculated R Axis 0 degrees      Calculated T Axis -39 degrees      Diagnosis --     Sinus tachycardia  Low voltage, precordial leads  Nonspecific T abnormalities, diffuse leads    Confirmed by Arturo Hernandez MD (), Jane Lynch ([id number]) on 2/1/2022 6:21:05 AM            Diagnostic Imaging/Tests: XR CHEST PA LAT    Result Date: 2/2/2022  EXAM: Chest x-ray. INDICATION: Dyspnea. COMPARISON: January 31, 2022. TECHNIQUE: Frontal and lateral views of the chest were obtained. FINDINGS: Patchy diffuse bilateral lung edema or infiltrates are unchanged. The heart remains enlarged. No pneumothorax or pleural effusion is seen. Again noted are degenerative changes in the spine, old lower thoracic spine compression fracture, a left shoulder arthroplasty and a right humerus fixation plate. Unchanged cardiomegaly and bilateral lung edema or infiltrates. ECHO ADULT COMPLETE    Result Date: 2/1/2022    Left Ventricle: Left ventricle size is normal. Mildly increased wall thickness. Normal wall motion. Normal left ventricular systolic function with a visually estimated EF of 60 - 65%. Normal diastolic function.   Mitral Valve: Mildly thickened leaflets. Mild transvalvular regurgitation.   IVC/SVC: IVC diameter is less than or equal to 21 mm and decreases less than 50% during inspiration; therefore the estimated right atrial pressure is intermediate (~8 mmHg).        All Micro Results     Procedure Component Value Units Date/Time    RESPIRATORY VIRUS PANEL W/COVID-19, PCR [[id number]] Collected: 02/01/22 0727    Order Status: Completed Specimen: Nasopharyngeal Updated: 02/01/22 0853     Adenovirus NOT DETECTED        Coronavirus 229E NOT DETECTED        Coronavirus HKU1 NOT DETECTED        Coronavirus CVNL63 NOT DETECTED        Coronavirus OC43 NOT DETECTED        SARS-CoV-2, PCR NOT DETECTED        Metapneumovirus NOT DETECTED        Rhinovirus and Enterovirus NOT DETECTED        Influenza A NOT DETECTED        Influenza B NOT DETECTED        Parainfluenza 1 NOT DETECTED        Parainfluenza 2 NOT DETECTED        Parainfluenza 3 NOT DETECTED        Parainfluenza virus 4 NOT DETECTED        RSV by PCR NOT DETECTED        B. parapertussis, PCR NOT DETECTED        Bordetella pertussis - PCR NOT DETECTED Chlamydophila pneumoniae DNA, QL, PCR NOT DETECTED        Mycoplasma pneumoniae DNA, QL, PCR NOT DETECTED       COVID-19 RAPID TEST [[id number]] Collected: 01/31/22 1615    Order Status: Completed Specimen: Nasopharyngeal Updated: 01/31/22 1708     Specimen source NASAL        COVID-19 rapid test Not detected        Comment:      The specimen is NEGATIVE for SARS-CoV-2, the novel coronavirus associated with COVID-19. A negative result does not rule out COVID-19. This test has been authorized by the FDA under an Emergency Use Authorization (EUA) for use by authorized laboratories. Fact sheet for Healthcare Providers: ConventionUpdate.co.nz  Fact sheet for Patients: ConventionAn Giang [employer] Protection Joint Stock Companydate.co.nz       Methodology: Isothermal Nucleic Acid Amplification               Labs: Results:       BMP, Mg, Phos Recent Labs     02/03/22  0858 02/02/22  1026    140   K 3.8 3.5    104   CO2 32 30   AGAP 4* 6*   BUN 20 19   CREA 0.50* 0.50*   CA 9.5 9.4   * 185*   PHOS  --  2.6      CBC Recent Labs     02/03/22  0858 02/02/22  1026   WBC 12.1* 11.3*   RBC 3.99* 3.53*   HGB 11.8 10.5*   HCT 38.1 33.6*   * 445   GRANS 61 77   LYMPH 23 13   EOS 3 0*   MONOS 12 9   BASOS 0 0   IG 1 1   ANEU 7.4 8.7*   ABL 2.8 1.5   YOLA 0.4 0.0   ABM 1.4* 1.0   ABB 0.1 0.0   AIG 0.1 0.1      LFT No results for input(s): ALT, TBIL, AP, TP, ALB, GLOB, AGRAT in the last 72 hours.     No lab exists for component: SGOT, GPT   Cardiac Testing No results found for: BNPP, BNP, CPK, RCK1, RCK2, RCK3, RCK4, CKMB, CKNDX, CKND1, TROPT, TROIQ   Coagulation Tests Lab Results   Component Value Date/Time    Prothrombin time 16.3 (H) 09/15/2020 02:49 PM    Prothrombin time 14.5 07/28/2020 01:40 AM    Prothrombin time 14.0 07/27/2020 04:30 PM    INR 1.3 09/15/2020 02:49 PM    INR 1.1 07/28/2020 01:40 AM    INR 1.1 07/27/2020 04:30 PM    aPTT 29.9 09/15/2020 02:49 PM    aPTT 142.6 (H) 07/29/2020 08:53 AM aPTT 46.7 (H) 07/29/2020 12:14 AM      A1c Lab Results   Component Value Date/Time    Hemoglobin A1c 6.0 (H) 11/18/2021 03:46 PM    Hemoglobin A1c 6.0 (H) 07/14/2021 03:15 PM    Hemoglobin A1c 6.1 (H) 01/13/2020 03:21 PM      Lipid Panel Lab Results   Component Value Date/Time    Cholesterol, total 255 (H) 07/14/2021 03:15 PM    HDL Cholesterol 65 07/14/2021 03:15 PM    LDL, calculated 158 (H) 07/14/2021 03:15 PM    LDL, calculated 127 (H) 01/13/2020 03:21 PM    VLDL, calculated 32 07/14/2021 03:15 PM    VLDL, calculated 27 01/13/2020 03:21 PM    Triglyceride 177 (H) 07/14/2021 03:15 PM      Thyroid Panel Lab Results   Component Value Date/Time    TSH 2.050 01/13/2020 03:21 PM    TSH 2.150 08/19/2019 02:44 PM        Most Recent UA Lab Results   Component Value Date/Time    Color YELLOW 09/15/2020 06:23 PM    Appearance CLOUDY 09/15/2020 06:23 PM    Specific gravity 1.010 09/15/2020 06:23 PM    pH (UA) 7.5 09/15/2020 06:23 PM    Protein Negative 09/15/2020 06:23 PM    Glucose Negative 09/15/2020 06:23 PM    Ketone Negative 09/15/2020 06:23 PM    Bilirubin Negative 09/15/2020 06:23 PM    Blood Negative 09/15/2020 06:23 PM    Urobilinogen 0.2 09/15/2020 06:23 PM    Nitrites Negative 09/15/2020 06:23 PM    Leukocyte Esterase SMALL (A) 09/15/2020 06:23 PM    WBC 3-5 09/15/2020 06:23 PM    RBC 0-3 09/15/2020 06:23 PM    Epithelial cells 0-3 09/15/2020 06:23 PM    Bacteria 0 09/15/2020 06:23 PM    Casts 0-3 09/15/2020 06:23 PM          All Labs from Last 24 Hrs:  Recent Results (from the past 24 hour(s))   PLEASE READ & DOCUMENT PPD TEST IN 72 HRS    Collection Time: 02/03/22  4:16 PM   Result Value Ref Range    PPD Negative Negative    mm Induration 0 0 - 5 mm   PLEASE READ & DOCUMENT PPD TEST IN 48 HRS    Collection Time: 02/03/22  4:18 PM   Result Value Ref Range    PPD Negative Negative    mm Induration 0 0 - 5 mm   GLUCOSE, POC    Collection Time: 02/03/22  5:08 PM   Result Value Ref Range    Glucose (POC) 121 (H) 65 - 100 mg/dL    Performed by Damian Damon    GLUCOSE, POC    Collection Time: 02/03/22  9:48 PM   Result Value Ref Range    Glucose (POC) 261 (H) 65 - 100 mg/dL    Performed by Dontrell    GLUCOSE, POC    Collection Time: 02/04/22  6:16 AM   Result Value Ref Range    Glucose (POC) 146 (H) 65 - 100 mg/dL    Performed by Dontrell    GLUCOSE, POC    Collection Time: 02/04/22 11:59 AM   Result Value Ref Range    Glucose (POC) 134 (H) 65 - 100 mg/dL    Performed by John        Current Med List in Hospital:   Current Facility-Administered Medications   Medication Dose Route Frequency    predniSONE (DELTASONE) tablet 40 mg  40 mg Oral DAILY WITH BREAKFAST    potassium chloride (KLOR-CON M10) tablet 10 mEq  10 mEq Oral DAILY    insulin lispro (HUMALOG) injection 0-10 Units  0-10 Units SubCUTAneous AC&HS    clorazepate (TRANXENE) tablet 3.75 mg  3.75 mg Oral BID    furosemide (LASIX) tablet 40 mg  40 mg Oral DAILY    apixaban (ELIQUIS) tablet 2.5 mg  2.5 mg Oral Q12H    ascorbic acid (vitamin C) (VITAMIN C) tablet 500 mg  500 mg Oral DAILY    Iron Fum & PS Cmp-Vit C-Niacin 125-40-3 mg cap 1 Caplet (Patient Supplied)  1 Caplet Oral DAILY    latanoprost (XALATAN) 0.005 % ophthalmic solution 1 Drop  1 Drop Both Eyes QHS    lifitegrast 5 % dpet 1 Drop (Patient Supplied)  1 Drop Both Eyes Q12H    lisinopriL (PRINIVIL, ZESTRIL) tablet 5 mg  5 mg Oral DAILY    montelukast (SINGULAIR) tablet 10 mg  10 mg Oral DAILY    ketotifen (ZADITOR) 0.025 % (0.035 %) ophthalmic solution 1 Drop  1 Drop Both Eyes BID    pantoprazole (PROTONIX) tablet 40 mg  40 mg Oral BID    polyethylene glycol (MIRALAX) packet 17 g  17 g Oral DAILY    sertraline (ZOLOFT) tablet 150 mg  150 mg Oral QPM    sucralfate (CARAFATE) tablet 1 g  1 g Oral TID    budesonide-formoterol (SYMBICORT) 80-4.5 mcg inhaler  2 Puff Inhalation BID RT    albuterol (PROVENTIL VENTOLIN) nebulizer solution 2.5 mg  2.5 mg Nebulization Q6H PRN    sodium chloride (NS) flush 5-40 mL  5-40 mL IntraVENous Q8H    sodium chloride (NS) flush 5-40 mL  5-40 mL IntraVENous PRN    acetaminophen (TYLENOL) tablet 650 mg  650 mg Oral Q6H PRN    Or    acetaminophen (TYLENOL) suppository 650 mg  650 mg Rectal Q6H PRN    polyethylene glycol (MIRALAX) packet 17 g  17 g Oral DAILY PRN    ondansetron (ZOFRAN ODT) tablet 4 mg  4 mg Oral Q8H PRN    Or    ondansetron (ZOFRAN) injection 4 mg  4 mg IntraVENous Q6H PRN       Allergies   Allergen Reactions    Flexeril [Cyclobenzaprine] Unknown (comments)    Ketoprofen Unknown (comments)    Plaquenil [Hydroxychloroquine] Unknown (comments)     Immunization History   Administered Date(s) Administered    Pneumococcal Conjugate (PCV-13) 11/06/2017    Pneumococcal Polysaccharide (PPSV-23) 02/18/2019    TB Skin Test (PPD) Intradermal 07/28/2020, 02/01/2022       Recent Vital Data:  Patient Vitals for the past 24 hrs:   Temp Pulse Resp BP SpO2   02/04/22 1105 98 °F (36.7 °C) 88 18 (!) 129/56 94 %   02/04/22 0900     (!) 82 %   02/04/22 0705 97.5 °F (36.4 °C) 70 17 139/60 96 %   02/04/22 0342 98 °F (36.7 °C) 97 18 130/81 97 %   02/03/22 2300 98.7 °F (37.1 °C) 81 18 113/66 95 %   02/03/22 1858 98.4 °F (36.9 °C) 100 18 115/64 94 %   02/03/22 1711 98.3 °F (36.8 °C) 97 18 (!) 126/90 99 %     Oxygen Therapy  O2 Sat (%): 94 % (02/04/22 1105)  Pulse via Oximetry: 80 beats per minute (02/03/22 0823)  O2 Device: Nasal cannula (02/04/22 0831)  O2 Flow Rate (L/min): 1 l/min (02/04/22 0831)  FIO2 (%): 36 % (02/01/22 0849)    Estimated body mass index is 34.06 kg/m² as calculated from the following:    Height as of this encounter: 5' (1.524 m). Weight as of this encounter: 79.1 kg (174 lb 6.1 oz).     Intake/Output Summary (Last 24 hours) at 2/4/2022 1211  Last data filed at 2/4/2022 0910  Gross per 24 hour   Intake 480 ml   Output 1550 ml   Net -1070 ml         Physical Exam:  General:    No overt distress  Head:  Normocephalic, atraumatic  Eyes:  Sclerae appear normal.  Pupils equally round. HENT:  Nares appear normal, no drainage. Moist mucous membranes  Neck:  No restricted ROM. Trachea midline  CV:   RRR. No m/r/g. No JVD  Lungs:   No rhonchi. Even, unlabored on 1 L at rest.  Abdomen:   Soft, nontender, nondistended. Extremities: Warm and dry. No cyanosis or clubbing. Skin:     No rashes. Normal coloration  Neuro:  CN II-XII grossly intact. Psych:  Normal mood and affect. Signed:  Rose Hayes NP    Part of this note may have been written by using a voice dictation software. The note has been proof read but may still contain some grammatical/other typographical errors.

## 2022-02-04 NOTE — DISCHARGE INSTRUCTIONS
Patient Education        The Respiratory System: Anatomy Sketch     Current as of: July 6, 2021               Content Version: 13.0  © 2006-2021 [employer]. Care instructions adapted under license by [employer] (which disclaims liability or warranty for this information). If you have questions about a medical condition or this instruction, always ask your healthcare professional. [place] [address] any warranty or liability for your use of this information.

## 2022-02-04 NOTE — PROGRESS NOTES
02/04/22 1328   Resting (Room Air)   SpO2 86   HR 89   Resting (On O2)   SpO2 91   HR 92   O2 Device Nasal cannula   O2 Flow Rate (l/min) 1 l/min   During Walk (On O2)   SpO2 85      O2 Device Nasal cannula   O2 Flow Rate (l/min) 1 l/min   Need Additional O2 Flow Rate Rows Yes   O2 Flow Rate (l/min) 2 l/min   O2 Saturation 87   O2 Flow Rate (l/min) 3 l/min   O2 Saturation 91   Walk/Assistance Device Walker   Rate of Dyspnea 1   After Walk   SpO2 93      O2 Device Nasal cannula   O2 Flow Rate (l/min) 1 l/min   FIO2 (%) 24   Rate of Dyspnea 0   Does the Patient Qualify for Home O2 Yes   Liter Flow at Rest 1   Liter Flow on Exertion 3

## 2022-02-04 NOTE — PROGRESS NOTES
The patient was given discharge instructions with time to ask questions. The patient the was discharged with belonging in hand.

## 2022-02-04 NOTE — PROGRESS NOTES
ACUTE PHYSICAL THERAPY GOALS:  (Developed with and agreed upon by patient and/or caregiver. )  LTG:  (1.)Ms. Anusha Dumont will move from supine to sit and sit to supine , scoot up and down and roll side to side in bed with MODIFIED INDEPENDENCE within 7 treatment day(s). (2.)Ms. Anusha Dumont will transfer from bed to chair and chair to bed with STAND BY ASSIST using the least restrictive device within 7 treatment day(s). (3.)Ms. Anusha Dumont will ambulate with CONTACT GUARD ASSIST for 150+ feet with the least restrictive device within 7 treatment day(s). PHYSICAL THERAPY: Daily Note and PM Treatment Day # 3    Birdie Gregg is a 64 y.o. female   PRIMARY DIAGNOSIS: Acute hypoxemic respiratory failure ([address]  Suspected CHF (congestive heart failure) [R09.89]  Hypokalemia [E87.6]         ASSESSMENT:     REHAB RECOMMENDATIONS: CURRENT LEVEL OF FUNCTION:  (Most Recently Demonstrated)   Recommendation to date pending progress:  [address] [place] [address] (pt refusing STR after recent stay)  Equipment:    None Bed Mobility:   Minimal Assistance  Sit to Stand:   Contact Guard Assistance  Transfers:   Not tested  Gait/Mobility:   Minimal Assistance     ASSESSMENT:  Ms. Anusha Dumont was seen for second treatment today to assist respiratory therapy. She was able to sit at EOB with Carline. STS with CGA. Marching in place with RW ~ one minute with Carline, improved coordination of steps and balance this PM. Pt would benefit from STR at d/c, however, pt not agreeable. Good progress, will continue to follow. SUBJECTIVE:   Ms. Anusha Dumont states, \"I still need that shampoo cap\"    SOCIAL HISTORY/ LIVING ENVIRONMENT: two recent falls (since getting covid). Lives w/friend who helps. Using wc since covid, prior to this she was mod I with rollator.   Home Environment: Private residence  # Steps to Enter: 0  One/Two Story Residence: One story  Living Alone: No  Support Systems: Friend/Neighbor,Child(ranjith) (family friend: Tricia Fisher daughter/Luna Patel [phone]; ana cristina Yarbrough [phone])  OBJECTIVE:     PAIN: VITAL SIGNS: LINES/DRAINS:   Pre Treatment: Pain Screen  Pain Scale 1: Numeric (0 - 10)  Pain Intensity 1: 0  Post Treatment: 0 Vital Signs  O2 Sat (%): (!) 82 % (post mobility on 1 L. improved when bumed to 3L) purewick  O2 Device: Nasal cannula     MOBILITY: I Mod I S SBA CGA Min Mod Max Total  NT x2 Comments:   Bed Mobility    Rolling [] [] [] [] [x] [] [] [] [] [] []    Supine to Sit [] [] [] [] [] [x] [] [] [] [] []    Scooting [] [] [] [] [] [x] [] [] [] [] []    Sit to Supine [] [] [] [] [] [x] [] [] [] [] []    Transfers    Sit to Stand [] [] [] [] [x] [] [] [] [] [] []    Bed to Chair [] [] [] [] [] [] [] [] [] [x] []    Stand to Sit [] [] [] [] [x] [] [] [] [] [] []    I=Independent, Mod I=Modified Independent, S=Supervision, SBA=Standby Assistance, CGA=Contact Guard Assistance,   Min=Minimal Assistance, Mod=Moderate Assistance, Max=Maximal Assistance, Total=Total Assistance, NT=Not Tested    BALANCE: Good Fair+ Fair Fair- Poor NT Comments   Sitting Static [x] [] [] [] [] []    Sitting Dynamic [x] [] [] [] [] []              Standing Static [] [] [x] [] [] []    Standing Dynamic [] [] [] [x] [] []      GAIT: I Mod I S SBA CGA Min Mod Max Total  NT x2 Comments:   Level of Assistance [] [] [] [] [] [x] [] [] [] [] []    Distance Marching in place    DME Rolling Walker    Gait Quality Forward flexed posture, slow, shuffled, decreased step clearance    Weightbearing  Status N/A     I=Independent, Mod I=Modified Independent, S=Supervision, SBA=Standby Assistance, CGA=Contact Guard Assistance,   Min=Minimal Assistance, Mod=Moderate Assistance, Max=Maximal Assistance, Total=Total Assistance, NT=Not Tested    PLAN:   FREQUENCY/DURATION: PT Plan of Care: 3 times/week for duration of hospital stay or until stated goals are met, whichever comes first.  TREATMENT:     TREATMENT:   ($$ Therapeutic Activity: 8-22 mins    )  Therapeutic Activity (16 Minutes): Therapeutic activity included Rolling, Supine to Sit, Sit to Supine, Scooting, Ambulation on level ground, Sitting balance  and Standing balance to improve functional Mobility, Strength and Activity tolerance.     TREATMENT GRID:  N/A    AFTER TREATMENT POSITION/PRECAUTIONS:  Bed, Needs within reach and RN notified    INTERDISCIPLINARY COLLABORATION:  RN/PCT and respiratory therapist    TOTAL TREATMENT DURATION:  PT Patient Time In/Time Out  Time In: 1334  Time Out: 315 Adrien Fregoso, PT

## 2022-02-04 NOTE — PROGRESS NOTES
ACUTE PHYSICAL THERAPY GOALS:  (Developed with and agreed upon by patient and/or caregiver. )  LTG:  (1.)Ms. Mainor Florian will move from supine to sit and sit to supine , scoot up and down and roll side to side in bed with MODIFIED INDEPENDENCE within 7 treatment day(s). (2.)Ms. Mainor Florian will transfer from bed to chair and chair to bed with STAND BY ASSIST using the least restrictive device within 7 treatment day(s). (3.)Ms. Mainor Florian will ambulate with CONTACT GUARD ASSIST for 150+ feet with the least restrictive device within 7 treatment day(s). PHYSICAL THERAPY: Daily Note and AM Treatment Day # 3    Dianne Pearce is a 64 y.o. female   PRIMARY DIAGNOSIS: Acute hypoxemic respiratory failure ([facility])  Suspected CHF (congestive heart failure) [R09.89]  Hypokalemia [E87.6]         ASSESSMENT:     REHAB RECOMMENDATIONS: CURRENT LEVEL OF FUNCTION:  (Most Recently Demonstrated)   Recommendation to date pending progress:  [address] [place] [address] (pt refusing STR after recent stay)  Equipment:    None Bed Mobility:   Minimal Assistance  Sit to Stand:   Contact Guard Assistance  Transfers:   Not tested  Gait/Mobility:   min-modA     ASSESSMENT:  Ms. Mainor Florian was received supine in bed, agreeable to therapy on 1 L O2 via NC. She was able to sit at EOB with Carline. STS practiced x 3, pt progressed to requiring min-modA. Pt was able to take side steps in each direction with min-modA, difficulty with balance and pt fatigues easily. SpO2 at 82% after mobility, improved with rest and being bumped up to 3L. Returned to supine with Carline, CGA for rolling. Post treatment session, attempted to wean pt's O2 back down but she required 3L to maintain over 92%. RN aware of vitals, O2 levels, and pt performance. Pt would benefit from STR at d/c, however, pt not agreeable. Some progress, will continue to follow.       SUBJECTIVE:   Ms. Mainor Florian states, \"ever since I had covid I need the wheelchair\"    SOCIAL HISTORY/ LIVING ENVIRONMENT: two recent falls (since getting covid). Lives w/friend who helps. Using wc since covid, prior to this she was mod I with rollator.   Home Environment: Private residence  # Steps to Enter: 0  One/Two Story Residence: One story  Living Alone: No  Support Systems: Friend/Neighbor,Child(ranjith) (family friend: Lukas Butts, [address] [phone]; son Jordan Beltre [phone])  OBJECTIVE:     PAIN: VITAL SIGNS: LINES/DRAINS:   Pre Treatment: Pain Screen  Pain Scale 1: Numeric (0 - 10)  Pain Intensity 1: 0  Post Treatment: 0 Vital Signs  O2 Sat (%): (!) 82 % (post mobility on 1 L. improved when bumed to 3L) purewick  O2 Device: Nasal cannula     MOBILITY: I Mod I S SBA CGA Min Mod Max Total  NT x2 Comments:   Bed Mobility    Rolling [] [] [] [] [x] [] [] [] [] [] []    Supine to Sit [] [] [] [] [] [x] [] [] [] [] []    Scooting [] [] [] [] [] [x] [] [] [] [] []    Sit to Supine [] [] [] [] [] [x] [] [] [] [] []    Transfers    Sit to Stand [] [] [] [] [x] [] [] [] [] [] []    Bed to Chair [] [] [] [] [] [] [] [] [] [x] []    Stand to Sit [] [] [] [] [x] [] [] [] [] [] []    I=Independent, Mod I=Modified Independent, S=Supervision, SBA=Standby Assistance, CGA=Contact Guard Assistance,   Min=Minimal Assistance, Mod=Moderate Assistance, Max=Maximal Assistance, Total=Total Assistance, NT=Not Tested    BALANCE: Good Fair+ Fair Fair- Poor NT Comments   Sitting Static [x] [] [] [] [] []    Sitting Dynamic [x] [] [] [] [] []              Standing Static [] [] [x] [] [] []    Standing Dynamic [] [] [] [x] [] []      GAIT: I Mod I S SBA CGA Min Mod Max Total  NT x2 Comments:   Level of Assistance [] [] [] [] [] [x] [x] [] [] [] []    Distance 3ft x2- side steps    DME Rolling Walker    Gait Quality Forward flexed posture, slow, shuffled, decreased step clearance    Weightbearing  Status N/A     I=Independent, Mod I=Modified Independent, S=Supervision, SBA=Standby Assistance, CGA=Contact Guard Assistance, Min=Minimal Assistance, Mod=Moderate Assistance, Max=Maximal Assistance, Total=Total Assistance, NT=Not Tested    PLAN:   FREQUENCY/DURATION: PT Plan of Care: 3 times/week for duration of hospital stay or until stated goals are met, whichever comes first.  TREATMENT:     TREATMENT:   ($$ Therapeutic Activity: 38-52 mins    )  Therapeutic Activity (39 Minutes): Therapeutic activity included Rolling, Supine to Sit, Sit to Supine, Scooting, Ambulation on level ground, Sitting balance  and Standing balance to improve functional Mobility, Strength and Activity tolerance.     TREATMENT GRID:  N/A    AFTER TREATMENT POSITION/PRECAUTIONS:  Bed, Needs within reach and RN notified    INTERDISCIPLINARY COLLABORATION:  RN/PCT    TOTAL TREATMENT DURATION:  PT Patient Time In/Time Out  Time In: 9245  Time Out: 800 Brian Sandhu PT

## 2022-02-10 LAB
Lab: NORMAL
REFERENCE LAB,REFLB: NORMAL
TEST DESCRIPTION:,ATST: 6502

## 2022-02-11 ENCOUNTER — APPOINTMENT (OUTPATIENT)
Dept: GENERAL RADIOLOGY | Age: 77
End: 2022-02-11
Attending: EMERGENCY MEDICINE
Payer: MEDICARE

## 2022-02-11 ENCOUNTER — HOSPITAL ENCOUNTER (EMERGENCY)
Age: 77
Discharge: HOME | End: 2022-02-11
Attending: EMERGENCY MEDICINE
Payer: MEDICARE

## 2022-02-11 VITALS
HEART RATE: 85 BPM | HEIGHT: 60 IN | OXYGEN SATURATION: 98 % | WEIGHT: 158 LBS | DIASTOLIC BLOOD PRESSURE: 50 MMHG | RESPIRATION RATE: 26 BRPM | BODY MASS INDEX: 31.02 KG/M2 | TEMPERATURE: 98.3 F | SYSTOLIC BLOOD PRESSURE: 95 MMHG

## 2022-02-11 DIAGNOSIS — R42 DIZZINESS: ICD-10-CM

## 2022-02-11 DIAGNOSIS — E86.1 HYPOTENSION DUE TO HYPOVOLEMIA: Primary | ICD-10-CM

## 2022-02-11 DIAGNOSIS — I95.89 HYPOTENSION DUE TO HYPOVOLEMIA: Primary | ICD-10-CM

## 2022-02-11 LAB
ALBUMIN SERPL-MCNC: 2.5 G/DL (ref 3.2–4.6)
ALBUMIN/GLOB SERPL: 0.6 {RATIO} (ref 1.2–3.5)
ALP SERPL-CCNC: 69 U/L (ref 50–136)
ALT SERPL-CCNC: 29 U/L (ref 12–65)
ANION GAP SERPL CALC-SCNC: 6 MMOL/L (ref 7–16)
AST SERPL-CCNC: 16 U/L (ref 15–37)
ATRIAL RATE: 82 BPM
BASOPHILS # BLD: 0.1 K/UL (ref 0–0.2)
BASOPHILS NFR BLD: 1 % (ref 0–2)
BILIRUB SERPL-MCNC: 0.3 MG/DL (ref 0.2–1.1)
BUN SERPL-MCNC: 18 MG/DL (ref 8–23)
CALCIUM SERPL-MCNC: 9 MG/DL (ref 8.3–10.4)
CALCULATED P AXIS, ECG09: 44 DEGREES
CALCULATED R AXIS, ECG10: 12 DEGREES
CALCULATED T AXIS, ECG11: 32 DEGREES
CHLORIDE SERPL-SCNC: 95 MMOL/L (ref 98–107)
CO2 SERPL-SCNC: 35 MMOL/L (ref 21–32)
CREAT SERPL-MCNC: 1.07 MG/DL (ref 0.6–1)
DIAGNOSIS, 93000: NORMAL
DIFFERENTIAL METHOD BLD: ABNORMAL
EOSINOPHIL # BLD: 0.3 K/UL (ref 0–0.8)
EOSINOPHIL NFR BLD: 1 % (ref 0.5–7.8)
ERYTHROCYTE [DISTWIDTH] IN BLOOD BY AUTOMATED COUNT: 14.6 % (ref 11.9–14.6)
GLOBULIN SER CALC-MCNC: 4.3 G/DL (ref 2.3–3.5)
GLUCOSE SERPL-MCNC: 129 MG/DL (ref 65–100)
HCT VFR BLD AUTO: 39 % (ref 35.8–46.3)
HGB BLD-MCNC: 12.2 G/DL (ref 11.7–15.4)
IMM GRANULOCYTES # BLD AUTO: 0.3 K/UL (ref 0–0.5)
IMM GRANULOCYTES NFR BLD AUTO: 2 % (ref 0–5)
LACTATE SERPL-SCNC: 2.1 MMOL/L (ref 0.4–2)
LYMPHOCYTES # BLD: 2 K/UL (ref 0.5–4.6)
LYMPHOCYTES NFR BLD: 12 % (ref 13–44)
MCH RBC QN AUTO: 29.4 PG (ref 26.1–32.9)
MCHC RBC AUTO-ENTMCNC: 31.3 G/DL (ref 31.4–35)
MCV RBC AUTO: 94 FL (ref 79.6–97.8)
MONOCYTES # BLD: 1.7 K/UL (ref 0.1–1.3)
MONOCYTES NFR BLD: 10 % (ref 4–12)
NEUTS SEG # BLD: 13 K/UL (ref 1.7–8.2)
NEUTS SEG NFR BLD: 75 % (ref 43–78)
NRBC # BLD: 0 K/UL (ref 0–0.2)
P-R INTERVAL, ECG05: 160 MS
PLATELET # BLD AUTO: 394 K/UL (ref 150–450)
PMV BLD AUTO: 9.4 FL (ref 9.4–12.3)
POTASSIUM SERPL-SCNC: 3.9 MMOL/L (ref 3.5–5.1)
PROCALCITONIN SERPL-MCNC: <0.05 NG/ML (ref 0–0.49)
PROT SERPL-MCNC: 6.8 G/DL (ref 6.3–8.2)
Q-T INTERVAL, ECG07: 390 MS
QRS DURATION, ECG06: 84 MS
QTC CALCULATION (BEZET), ECG08: 455 MS
RBC # BLD AUTO: 4.15 M/UL (ref 4.05–5.2)
SODIUM SERPL-SCNC: 136 MMOL/L (ref 136–145)
TROPONIN-HIGH SENSITIVITY: 26.5 PG/ML (ref 0–14)
VENTRICULAR RATE, ECG03: 82 BPM
WBC # BLD AUTO: 17.3 K/UL (ref 4.3–11.1)

## 2022-02-11 PROCEDURE — 80053 COMPREHEN METABOLIC PANEL: CPT

## 2022-02-11 PROCEDURE — 93005 ELECTROCARDIOGRAM TRACING: CPT | Performed by: EMERGENCY MEDICINE

## 2022-02-11 PROCEDURE — 96360 HYDRATION IV INFUSION INIT: CPT

## 2022-02-11 PROCEDURE — 83605 ASSAY OF LACTIC ACID: CPT

## 2022-02-11 PROCEDURE — 99285 EMERGENCY DEPT VISIT HI MDM: CPT

## 2022-02-11 PROCEDURE — 84145 PROCALCITONIN (PCT): CPT

## 2022-02-11 PROCEDURE — 85025 COMPLETE CBC W/AUTO DIFF WBC: CPT

## 2022-02-11 PROCEDURE — 84484 ASSAY OF TROPONIN QUANT: CPT

## 2022-02-11 PROCEDURE — 71045 X-RAY EXAM CHEST 1 VIEW: CPT

## 2022-02-11 PROCEDURE — 74011250636 HC RX REV CODE- 250/636: Performed by: EMERGENCY MEDICINE

## 2022-02-11 RX ADMIN — SODIUM CHLORIDE 500 ML: 9 INJECTION, SOLUTION INTRAVENOUS at 18:32

## 2022-02-11 NOTE — ED PROVIDER NOTES
75-year-old female brought in by EMS because of low blood pressure. Patient states she started feeling weak earlier this afternoon. Checked her blood pressure and it was about 80 systolic. She then checked it later when she felt worse and it was in the upper 60s. EMS was called. They got 75 systolic. She was started on some IV fluids. Patient denies to me any chest pain. No shortness of breath. No fever chills. No vomiting diarrhea or abnormal bleeding. Patient's past history is significant for pulmonary embolism for which she still takes Eliquis. Patient was recently admitted January 31 through February 4 for hypoxemia was felt to be due to post Covid and congestive heart failure. She was discharged home on prednisone and Lasix. Some lightheadedness. No complete syncope. No focal numbness or weakness. The history is provided by the patient. Hypotension   This is a new problem. The current episode started 6 to 12 hours ago. Associated symptoms include somnolence and weakness. Pertinent negatives include no unresponsiveness and no numbness. Mental status baseline is normal.  Her past medical history is significant for hypertension and heart disease. Her past medical history does not include diabetes, seizures or CVA.         Past Medical History:   Diagnosis Date    Abnormality of gait 7/18/2018    Anemia     Anxiety     Arthritis     Asthma     Calculus of kidney     Chronic pain     Contact dermatitis and other eczema, due to unspecified cause     Depressive disorder, not elsewhere classified     Encounter for chronic pain management     Esophageal reflux     Essential hypertension, benign     Frequent falls 7/18/2018    GERD (gastroesophageal reflux disease)     Pure hypercholesterolemia        Past Surgical History:   Procedure Laterality Date    HX CATARACT REMOVAL Bilateral     HX COLONOSCOPY      HX GI      HX HYSTERECTOMY      HX KNEE REPLACEMENT Right     HX LITHOTRIPSY      HX RETINAL DETACHMENT REPAIR      HX SHOULDER REPLACEMENT Left     IR REMOVE IVC FILTER W SI  3/29/2021    [facility]  7/28/2020    NEUROLOGICAL PROCEDURE UNLISTED           Family History:   Problem Relation Age of Onset    OSTEOARTHRITIS Mother     Cancer Father     Stroke Maternal Grandmother     Stroke Paternal Grandmother        Social History     Socioeconomic History    Marital status:      Spouse name: Not on file    Number of children: Not on file    Years of education: Not on file    Highest education level: Not on file   Occupational History    Not on file   Tobacco Use    Smoking status: Never Smoker    Smokeless tobacco: Never Used   Substance and Sexual Activity    Alcohol use: No    Drug use: No    Sexual activity: Not on file   Other Topics Concern    Not on file   Social History Narrative    Patient and  reside together. She denies any in-house stairs. 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 Activity is reported as cooking and household chores as tolerable. Exercise is reported as intolerable. Social Determinants of Health     Financial Resource Strain:     Difficulty of Paying Living Expenses: Not on file   Food Insecurity:     Worried About Running Out of Food in the Last Year: Not on file    Theresa of Food in the Last Year: Not on file   Transportation Needs:     Lack of Transportation (Medical): Not on file    Lack of Transportation (Non-Medical):  Not on file   Physical Activity:     Days of Exercise per Week: Not on file    Minutes of Exercise per Session: Not on file   Stress:     Feeling of Stress : Not on file   Social Connections:     Frequency of Communication with Friends and Family: Not on file    Frequency of Social Gatherings with Friends and Family: Not on file    Attends Rastafari Services: Not on file   [employer] of [employer]s or Organizations: Not on file    Attends Club or Organization Meetings: Not on file    Marital Status: Not on file   Intimate Partner Violence:     Fear of Current or Ex-Partner: Not on file    Emotionally Abused: Not on file    Physically Abused: Not on file    Sexually Abused: Not on file   Housing Stability:     Unable to Pay for Housing in the Last Year: Not on file    Number of [place] in the Last Year: Not on file    Unstable Housing in the Last Year: Not on file         ALLERGIES: Flexeril [cyclobenzaprine], Ketoprofen, and Plaquenil [hydroxychloroquine]    Review of Systems   Constitutional: Positive for fatigue. Negative for chills and fever. Respiratory: Negative for cough and shortness of breath. Cardiovascular: Negative for chest pain and palpitations. Gastrointestinal: Negative for abdominal pain, diarrhea and vomiting. Genitourinary: Negative for dysuria and flank pain. Musculoskeletal: Negative for back pain and neck pain. Skin: Negative for color change and rash. Neurological: Positive for dizziness, weakness and light-headedness. Negative for syncope, numbness and headaches. All other systems reviewed and are negative. Vitals:    02/11/22 1801   BP: (!) 106/53   Pulse: 89   Resp: 18   Temp: 98 °F (36.7 °C)   SpO2: 100%   Weight: 71.7 kg (158 lb)   Height: 5' (1.524 m)            Physical Exam  Vitals and nursing note reviewed. Constitutional:       General: She is not in acute distress. Appearance: She is well-developed. HENT:      Head: Normocephalic and atraumatic. Right Ear: External ear normal.      Left Ear: External ear normal.      Mouth/Throat:      Pharynx: No oropharyngeal exudate. Eyes:      General: No scleral icterus. Conjunctiva/sclera: Conjunctivae normal.   Cardiovascular:      Rate and Rhythm: Normal rate and regular rhythm. Heart sounds: No murmur heard. Pulmonary:      Effort: No respiratory distress. Breath sounds: Normal breath sounds. Abdominal:      General: Bowel sounds are normal.      Palpations: Abdomen is soft. There is no mass. Tenderness: There is no abdominal tenderness. There is no guarding or rebound. Hernia: No hernia is present. Skin:     General: Skin is warm and dry. Neurological:      Mental Status: She is alert and oriented to person, place, and time. Gait: Gait normal.      Comments: Nl speech   Psychiatric:         Speech: Speech normal.          MDM  Number of Diagnoses or Management Options  Dizziness  Hypotension due to hypovolemia  Diagnosis management comments: Blood pressure much more reasonable after small fluid bolus by EMS. Initial impression is that of hypotension due to overdiuresis since patient was placed on Lasix 1 week ago. Will screen for anemia, bleeding as patient is on Eliquis. Screen for sepsis. Check EKG and troponin. Amount and/or Complexity of Data Reviewed  Clinical lab tests: ordered and reviewed  Tests in the radiology section of CPT®: ordered and reviewed  Tests in the medicine section of CPT®: ordered and reviewed  Review and summarize past medical records: yes  Independent visualization of images, tracings, or specimens: yes (My interpretation EKG shows normal sinus rhythm at 82. No ST-T changes or ectopy.   Normal QT interval)    Risk of Complications, Morbidity, and/or Mortality  Presenting problems: moderate  Diagnostic procedures: minimal  Management options: low    Patient Progress  Patient progress: stable         Procedures      Results Include:    Recent Results (from the past 24 hour(s))   METABOLIC PANEL, COMPREHENSIVE    Collection Time: 02/11/22  6:32 PM   Result Value Ref Range    Sodium 136 136 - 145 mmol/L    Potassium 3.9 3.5 - 5.1 mmol/L    Chloride 95 (L) 98 - 107 mmol/L    CO2 35 (H) 21 - 32 mmol/L    Anion gap 6 (L) 7 - 16 mmol/L    Glucose 129 (H) 65 - 100 mg/dL    BUN 18 8 - 23 MG/DL    Creatinine 1.07 (H) 0.6 - 1.0 MG/DL    GFR est AA >60 >60 ml/min/1.73m2    GFR est non-AA 53 (L) >60 ml/min/1.73m2    Calcium 9.0 8.3 - 10.4 MG/DL    Bilirubin, total 0.3 0.2 - 1.1 MG/DL    ALT (SGPT) 29 12 - 65 U/L    AST (SGOT) 16 15 - 37 U/L    Alk. phosphatase 69 50 - 136 U/L    Protein, total 6.8 6.3 - 8.2 g/dL    Albumin 2.5 (L) 3.2 - 4.6 g/dL    Globulin 4.3 (H) 2.3 - 3.5 g/dL    A-G Ratio 0.6 (L) 1.2 - 3.5     CBC WITH AUTOMATED DIFF    Collection Time: 02/11/22  6:32 PM   Result Value Ref Range    WBC 17.3 (H) 4.3 - 11.1 K/uL    RBC 4.15 4.05 - 5.2 M/uL    HGB 12.2 11.7 - 15.4 g/dL    HCT 39.0 35.8 - 46.3 %    MCV 94.0 79.6 - 97.8 FL    MCH 29.4 26.1 - 32.9 PG    MCHC 31.3 (L) 31.4 - 35.0 g/dL    RDW 14.6 11.9 - 14.6 %    PLATELET 799 017 - 259 K/uL    MPV 9.4 9.4 - 12.3 FL    ABSOLUTE NRBC 0.00 0.0 - 0.2 K/uL    DF AUTOMATED      NEUTROPHILS 75 43 - 78 %    LYMPHOCYTES 12 (L) 13 - 44 %    MONOCYTES 10 4.0 - 12.0 %    EOSINOPHILS 1 0.5 - 7.8 %    BASOPHILS 1 0.0 - 2.0 %    IMMATURE GRANULOCYTES 2 0.0 - 5.0 %    ABS. NEUTROPHILS 13.0 (H) 1.7 - 8.2 K/UL    ABS. LYMPHOCYTES 2.0 0.5 - 4.6 K/UL    ABS. MONOCYTES 1.7 (H) 0.1 - 1.3 K/UL    ABS. EOSINOPHILS 0.3 0.0 - 0.8 K/UL    ABS. BASOPHILS 0.1 0.0 - 0.2 K/UL    ABS. IMM.  GRANS. 0.3 0.0 - 0.5 K/UL   TROPONIN-HIGH SENSITIVITY    Collection Time: 02/11/22  6:32 PM   Result Value Ref Range    Troponin-High Sensitivity 26.5 (H) 0 - 14 pg/mL   PROCALCITONIN    Collection Time: 02/11/22  6:32 PM   Result Value Ref Range    Procalcitonin <0.05 0.00 - 0.49 ng/mL   EKG, 12 LEAD, INITIAL    Collection Time: 02/11/22  6:39 PM   Result Value Ref Range    Ventricular Rate 82 BPM    Atrial Rate 82 BPM    P-R Interval 160 ms    QRS Duration 84 ms    Q-T Interval 390 ms    QTC Calculation (Bezet) 455 ms    Calculated P Axis 44 degrees    Calculated R Axis 12 degrees    Calculated T Axis 32 degrees    Diagnosis       Normal sinus rhythm  Normal ECG  When compared with ECG of 31-JAN-2022 15:58,  PREVIOUS ECG IS PRESENT     LACTIC ACID    Collection Time: 02/11/22  7:16 PM   Result Value Ref Range    Lactic acid 2.1 (H) 0.4 - 2.0 MMOL/L     8:17 PM  Response in blood pressure reasonable when she stood up. About 201 systolic. Patient states she felt weak but has felt this way ever since the diagnosis of COVID a few weeks ago. Patient's creatinine is up some. Offered observation admission. Patient prefers to go home.

## 2022-02-11 NOTE — ED TRIAGE NOTES
Per EMS pt started Lasix on Monday after fluid buildup from covid, pt with pressure of 74/47. Pt states shes felt weak and unwell over the last 24 hours. Pt received fluid en route, last pressure of 110/57. Pt 88% on room air, has O2 \"as needed\" at home.  100% on 2L

## 2022-02-12 NOTE — ED NOTES
I have reviewed discharge instructions with the patient. The patient verbalized understanding. Patient left ED via Discharge Method: ambulatory to Home with herself. Opportunity for questions and clarification provided. Patient given 0 scripts. To continue your aftercare when you leave the hospital, you may receive an automated call from our care team to check in on how you are doing. This is a free service and part of our promise to provide the best care and service to meet your aftercare needs.  If you have questions, or wish to unsubscribe from this service please call [phone]. Thank you for Choosing our [facility] Emergency Department.

## 2022-02-12 NOTE — DISCHARGE INSTRUCTIONS
Did not take any Lasix tomorrow or the next day. Take a half a pill, 20 mg Monday morning. Keep appointment with your doctor on Monday and he will adjust medications after that. Recheck sooner for worse or worrisome symptoms including fever worse dizziness or passing out.

## 2022-02-24 PROBLEM — F32.1 DEPRESSION, MAJOR, SINGLE EPISODE, MODERATE (HCC): Status: ACTIVE | Noted: 2022-02-24

## 2022-03-15 ENCOUNTER — TRANSCRIBE ORDER (OUTPATIENT)
Dept: SCHEDULING | Age: 77
End: 2022-03-15

## 2022-03-15 DIAGNOSIS — Z12.31 VISIT FOR SCREENING MAMMOGRAM: Primary | ICD-10-CM

## 2022-03-18 PROBLEM — R91.8 PULMONARY INFILTRATE: Status: ACTIVE | Noted: 2022-02-01

## 2022-03-18 PROBLEM — Z86.711 HISTORY OF PULMONARY EMBOLISM: Status: ACTIVE | Noted: 2021-03-29

## 2022-03-18 PROBLEM — M67.911 ROTATOR CUFF DISORDER, RIGHT: Status: ACTIVE | Noted: 2020-08-03

## 2022-03-18 PROBLEM — I07.1 MODERATE TRICUSPID REGURGITATION: Status: ACTIVE | Noted: 2020-08-03

## 2022-03-18 PROBLEM — Z95.828 S/P IVC FILTER: Status: ACTIVE | Noted: 2020-08-03

## 2022-03-18 PROBLEM — M48.02 CERVICAL STENOSIS OF SPINAL CANAL: Status: ACTIVE | Noted: 2018-10-29

## 2022-03-18 PROBLEM — R29.818 SUSPECTED SLEEP APNEA: Status: ACTIVE | Noted: 2022-02-01

## 2022-03-18 PROBLEM — R26.9 ABNORMALITY OF GAIT: Status: ACTIVE | Noted: 2018-07-18

## 2022-03-18 PROBLEM — E87.6 HYPOKALEMIA: Status: ACTIVE | Noted: 2020-07-29

## 2022-03-18 PROBLEM — J96.01 ACUTE HYPOXEMIC RESPIRATORY FAILURE (HCC): Status: ACTIVE | Noted: 2020-07-27

## 2022-03-18 PROBLEM — R29.3 POSTURAL IMBALANCE: Status: ACTIVE | Noted: 2018-07-18

## 2022-03-18 PROBLEM — R29.6 FREQUENT FALLS: Status: ACTIVE | Noted: 2018-07-18

## 2022-03-19 PROBLEM — I34.0 MODERATE MITRAL VALVE REGURGITATION: Status: ACTIVE | Noted: 2020-08-03

## 2022-03-19 PROBLEM — Z86.16 HISTORY OF 2019 NOVEL CORONAVIRUS DISEASE (COVID-19): Status: ACTIVE | Noted: 2022-02-01

## 2022-03-19 PROBLEM — Z20.822 PERSON UNDER INVESTIGATION FOR COVID-19: Status: ACTIVE | Noted: 2020-07-27

## 2022-03-19 PROBLEM — G31.9 CEREBELLAR ATROPHY (HCC): Status: ACTIVE | Noted: 2018-10-29

## 2022-03-19 PROBLEM — K59.00 CONSTIPATION: Status: ACTIVE | Noted: 2020-08-02

## 2022-03-19 PROBLEM — D62 ACUTE BLOOD LOSS ANEMIA: Status: ACTIVE | Noted: 2020-09-15

## 2022-03-19 PROBLEM — I27.20 PULMONARY HTN (HCC): Status: ACTIVE | Noted: 2020-08-03

## 2022-03-19 PROBLEM — M71.21 BAKER'S CYST OF KNEE, RIGHT: Status: ACTIVE | Noted: 2020-08-03

## 2022-03-19 PROBLEM — I82.403 ACUTE DEEP VEIN THROMBOSIS (DVT) OF BOTH LOWER EXTREMITIES (HCC): Status: ACTIVE | Noted: 2020-08-03

## 2022-03-19 PROBLEM — M54.2 NECK PAIN: Status: ACTIVE | Noted: 2018-07-18

## 2022-03-19 PROBLEM — D50.9 IRON DEFICIENCY ANEMIA: Status: ACTIVE | Noted: 2021-11-18

## 2022-03-19 PROBLEM — J45.909 ASTHMA: Status: ACTIVE | Noted: 2020-07-27

## 2022-03-19 PROBLEM — M54.31 SCIATICA, RIGHT SIDE: Status: ACTIVE | Noted: 2021-11-18

## 2022-03-19 PROBLEM — F32.1 DEPRESSION, MAJOR, SINGLE EPISODE, MODERATE (HCC): Status: ACTIVE | Noted: 2022-02-24

## 2022-03-19 PROBLEM — S35.10XA: Status: ACTIVE | Noted: 2021-03-29

## 2022-03-19 PROBLEM — I10 ESSENTIAL HYPERTENSION: Status: ACTIVE | Noted: 2021-03-29

## 2022-03-19 PROBLEM — I26.99 PULMONARY EMBOLISM, BILATERAL (HCC): Status: ACTIVE | Noted: 2020-07-27

## 2022-03-19 PROBLEM — R53.81 DEBILITY: Status: ACTIVE | Noted: 2022-02-01

## 2022-03-20 PROBLEM — R09.89 SUSPECTED CHF (CONGESTIVE HEART FAILURE): Status: ACTIVE | Noted: 2022-01-31

## 2022-03-20 PROBLEM — M50.30 DDD (DEGENERATIVE DISC DISEASE), CERVICAL: Status: ACTIVE | Noted: 2018-12-11

## 2022-03-20 PROBLEM — R73.9 HIGH BLOOD SUGAR: Status: ACTIVE | Noted: 2021-11-18

## 2022-03-29 ENCOUNTER — HOSPITAL ENCOUNTER (OUTPATIENT)
Dept: LAB | Age: 77
Discharge: HOME | End: 2022-03-29
Payer: MEDICARE

## 2022-03-29 DIAGNOSIS — I82.4Y9 DEEP VEIN THROMBOSIS (DVT) OF PROXIMAL LOWER EXTREMITY, UNSPECIFIED CHRONICITY, UNSPECIFIED LATERALITY (HCC): ICD-10-CM

## 2022-03-29 LAB
ALBUMIN SERPL-MCNC: 3.4 G/DL (ref 3.2–4.6)
ALBUMIN/GLOB SERPL: 0.9 {RATIO} (ref 1.2–3.5)
ALP SERPL-CCNC: 78 U/L (ref 50–136)
ALT SERPL-CCNC: 26 U/L (ref 12–65)
ANION GAP SERPL CALC-SCNC: 5 MMOL/L (ref 7–16)
AST SERPL-CCNC: 19 U/L (ref 15–37)
BASOPHILS # BLD: 0 K/UL (ref 0–0.2)
BASOPHILS NFR BLD: 0 % (ref 0–2)
BILIRUB SERPL-MCNC: 0.4 MG/DL (ref 0.2–1.1)
BUN SERPL-MCNC: 18 MG/DL (ref 8–23)
CALCIUM SERPL-MCNC: 8.9 MG/DL (ref 8.3–10.4)
CHLORIDE SERPL-SCNC: 103 MMOL/L (ref 98–107)
CO2 SERPL-SCNC: 31 MMOL/L (ref 21–32)
CREAT SERPL-MCNC: 0.8 MG/DL (ref 0.6–1)
DIFFERENTIAL METHOD BLD: ABNORMAL
EOSINOPHIL # BLD: 0.2 K/UL (ref 0–0.8)
EOSINOPHIL NFR BLD: 3 % (ref 0.5–7.8)
ERYTHROCYTE [DISTWIDTH] IN BLOOD BY AUTOMATED COUNT: 15.1 % (ref 11.9–14.6)
GLOBULIN SER CALC-MCNC: 3.8 G/DL (ref 2.3–3.5)
GLUCOSE SERPL-MCNC: 117 MG/DL (ref 65–100)
HCT VFR BLD AUTO: 41.1 % (ref 35.8–46.3)
HGB BLD-MCNC: 12.4 G/DL (ref 11.7–15.4)
IMM GRANULOCYTES # BLD AUTO: 0 K/UL (ref 0–0.5)
IMM GRANULOCYTES NFR BLD AUTO: 1 % (ref 0–5)
LYMPHOCYTES # BLD: 1.7 K/UL (ref 0.5–4.6)
LYMPHOCYTES NFR BLD: 23 % (ref 13–44)
MCH RBC QN AUTO: 27.5 PG (ref 26.1–32.9)
MCHC RBC AUTO-ENTMCNC: 30.2 G/DL (ref 31.4–35)
MCV RBC AUTO: 91.1 FL (ref 79.6–97.8)
MONOCYTES # BLD: 1.1 K/UL (ref 0.1–1.3)
MONOCYTES NFR BLD: 15 % (ref 4–12)
NEUTS SEG # BLD: 4.4 K/UL (ref 1.7–8.2)
NEUTS SEG NFR BLD: 58 % (ref 43–78)
NRBC # BLD: 0 K/UL (ref 0–0.2)
PLATELET # BLD AUTO: 306 K/UL (ref 150–450)
PMV BLD AUTO: 9.2 FL (ref 9.4–12.3)
POTASSIUM SERPL-SCNC: 4.3 MMOL/L (ref 3.5–5.1)
PROT SERPL-MCNC: 7.2 G/DL (ref 6.3–8.2)
RBC # BLD AUTO: 4.51 M/UL (ref 4.05–5.2)
SODIUM SERPL-SCNC: 139 MMOL/L (ref 136–145)
WBC # BLD AUTO: 7.5 K/UL (ref 4.3–11.1)

## 2022-03-29 PROCEDURE — 80053 COMPREHEN METABOLIC PANEL: CPT

## 2022-03-29 PROCEDURE — 85025 COMPLETE CBC W/AUTO DIFF WBC: CPT

## 2022-03-29 PROCEDURE — 36415 COLL VENOUS BLD VENIPUNCTURE: CPT

## 2022-05-02 ENCOUNTER — HOSPITAL ENCOUNTER (OUTPATIENT)
Dept: GENERAL RADIOLOGY | Age: 77
Discharge: HOME | End: 2022-05-02
Payer: MEDICARE

## 2022-05-02 DIAGNOSIS — R93.89 ABNORMAL CXR: ICD-10-CM

## 2022-05-02 DIAGNOSIS — U09.9 POST-COVID SYNDROME: ICD-10-CM

## 2022-05-02 PROCEDURE — 71046 X-RAY EXAM CHEST 2 VIEWS: CPT

## 2022-05-24 ENCOUNTER — OFFICE VISIT (OUTPATIENT)
Dept: FAMILY MEDICINE CLINIC | Facility: CLINIC | Age: 77
End: 2022-05-24
Payer: MEDICARE

## 2022-05-24 VITALS
HEIGHT: 60 IN | SYSTOLIC BLOOD PRESSURE: 120 MMHG | WEIGHT: 160 LBS | DIASTOLIC BLOOD PRESSURE: 70 MMHG | BODY MASS INDEX: 31.41 KG/M2

## 2022-05-24 DIAGNOSIS — I34.0 MODERATE MITRAL VALVE REGURGITATION: ICD-10-CM

## 2022-05-24 DIAGNOSIS — E78.00 HIGH CHOLESTEROL: ICD-10-CM

## 2022-05-24 DIAGNOSIS — I07.1 MODERATE TRICUSPID REGURGITATION: Primary | ICD-10-CM

## 2022-05-24 DIAGNOSIS — I27.20 PULMONARY HTN (HCC): ICD-10-CM

## 2022-05-24 DIAGNOSIS — Z12.31 VISIT FOR SCREENING MAMMOGRAM: ICD-10-CM

## 2022-05-24 DIAGNOSIS — Z91.81 AT HIGH RISK FOR FALLS: ICD-10-CM

## 2022-05-24 DIAGNOSIS — I10 ESSENTIAL HYPERTENSION: ICD-10-CM

## 2022-05-24 DIAGNOSIS — J96.01 ACUTE HYPOXEMIC RESPIRATORY FAILURE (HCC): ICD-10-CM

## 2022-05-24 DIAGNOSIS — J45.20 MILD INTERMITTENT ASTHMA, UNSPECIFIED WHETHER COMPLICATED: ICD-10-CM

## 2022-05-24 DIAGNOSIS — I26.99 PULMONARY EMBOLISM, BILATERAL (HCC): ICD-10-CM

## 2022-05-24 DIAGNOSIS — R73.9 HIGH BLOOD SUGAR: ICD-10-CM

## 2022-05-24 DIAGNOSIS — F41.9 ANXIETY: ICD-10-CM

## 2022-05-24 DIAGNOSIS — S35.10XA: ICD-10-CM

## 2022-05-24 DIAGNOSIS — R09.89 SUSPECTED CHF (CONGESTIVE HEART FAILURE): ICD-10-CM

## 2022-05-24 DIAGNOSIS — R54 AGE-RELATED PHYSICAL DEBILITY: ICD-10-CM

## 2022-05-24 LAB
ALBUMIN SERPL-MCNC: 3.7 G/DL (ref 3.2–4.6)
ALBUMIN/GLOB SERPL: 1 {RATIO} (ref 1.2–3.5)
ALP SERPL-CCNC: 97 U/L (ref 50–136)
ALT SERPL-CCNC: 27 U/L (ref 12–65)
ANION GAP SERPL CALC-SCNC: 4 MMOL/L (ref 7–16)
AST SERPL-CCNC: 18 U/L (ref 15–37)
BILIRUB SERPL-MCNC: 0.3 MG/DL (ref 0.2–1.1)
BUN SERPL-MCNC: 11 MG/DL (ref 8–23)
CALCIUM SERPL-MCNC: 9.8 MG/DL (ref 8.3–10.4)
CHLORIDE SERPL-SCNC: 106 MMOL/L (ref 98–107)
CHOLEST SERPL-MCNC: 231 MG/DL
CO2 SERPL-SCNC: 31 MMOL/L (ref 21–32)
CREAT SERPL-MCNC: 0.7 MG/DL (ref 0.6–1)
EST. AVERAGE GLUCOSE BLD GHB EST-MCNC: 126 MG/DL
GLOBULIN SER CALC-MCNC: 3.8 G/DL (ref 2.3–3.5)
GLUCOSE SERPL-MCNC: 87 MG/DL (ref 65–100)
HBA1C MFR BLD: 6 % (ref 4.2–6.3)
HDLC SERPL-MCNC: 58 MG/DL (ref 40–60)
HDLC SERPL: 4 {RATIO}
LDLC SERPL CALC-MCNC: 144.6 MG/DL
POTASSIUM SERPL-SCNC: 5.1 MMOL/L (ref 3.5–5.1)
PROT SERPL-MCNC: 7.5 G/DL (ref 6.3–8.2)
SODIUM SERPL-SCNC: 141 MMOL/L (ref 136–145)
TRIGL SERPL-MCNC: 142 MG/DL (ref 35–150)
VLDLC SERPL CALC-MCNC: 28.4 MG/DL (ref 6–23)

## 2022-05-24 PROCEDURE — G8417 CALC BMI ABV UP PARAM F/U: HCPCS | Performed by: FAMILY MEDICINE

## 2022-05-24 PROCEDURE — G8427 DOCREV CUR MEDS BY ELIG CLIN: HCPCS | Performed by: FAMILY MEDICINE

## 2022-05-24 PROCEDURE — 99214 OFFICE O/P EST MOD 30 MIN: CPT | Performed by: FAMILY MEDICINE

## 2022-05-24 PROCEDURE — 1090F PRES/ABSN URINE INCON ASSESS: CPT | Performed by: FAMILY MEDICINE

## 2022-05-24 PROCEDURE — 1123F ACP DISCUSS/DSCN MKR DOCD: CPT | Performed by: FAMILY MEDICINE

## 2022-05-24 PROCEDURE — G8400 PT W/DXA NO RESULTS DOC: HCPCS | Performed by: FAMILY MEDICINE

## 2022-05-24 PROCEDURE — 1036F TOBACCO NON-USER: CPT | Performed by: FAMILY MEDICINE

## 2022-05-24 RX ORDER — CLORAZEPATE DIPOTASSIUM 3.75 MG/1
3.75 TABLET ORAL 3 TIMES DAILY
Qty: 270 TABLET | Refills: 1 | Status: SHIPPED | OUTPATIENT
Start: 2022-05-24 | End: 2022-11-20

## 2022-05-24 RX ORDER — SERTRALINE HYDROCHLORIDE 100 MG/1
100 TABLET, FILM COATED ORAL DAILY
Qty: 90 TABLET | Refills: 1 | Status: SHIPPED | OUTPATIENT
Start: 2022-05-24 | End: 2022-08-31 | Stop reason: SDUPTHER

## 2022-05-24 ASSESSMENT — ENCOUNTER SYMPTOMS
CHEST TIGHTNESS: 0
COLOR CHANGE: 0
GASTROINTESTINAL NEGATIVE: 1
WHEEZING: 0
STRIDOR: 0
APNEA: 0
SHORTNESS OF BREATH: 1
COUGH: 1

## 2022-05-24 ASSESSMENT — PATIENT HEALTH QUESTIONNAIRE - PHQ9
6. FEELING BAD ABOUT YOURSELF - OR THAT YOU ARE A FAILURE OR HAVE LET YOURSELF OR YOUR FAMILY DOWN: 0
9. THOUGHTS THAT YOU WOULD BE BETTER OFF DEAD, OR OF HURTING YOURSELF: 0
SUM OF ALL RESPONSES TO PHQ QUESTIONS 1-9: 0
SUM OF ALL RESPONSES TO PHQ QUESTIONS 1-9: 0
7. TROUBLE CONCENTRATING ON THINGS, SUCH AS READING THE NEWSPAPER OR WATCHING TELEVISION: 0
8. MOVING OR SPEAKING SO SLOWLY THAT OTHER PEOPLE COULD HAVE NOTICED. OR THE OPPOSITE, BEING SO FIGETY OR RESTLESS THAT YOU HAVE BEEN MOVING AROUND A LOT MORE THAN USUAL: 0
4. FEELING TIRED OR HAVING LITTLE ENERGY: 0
5. POOR APPETITE OR OVEREATING: 0
3. TROUBLE FALLING OR STAYING ASLEEP: 0
2. FEELING DOWN, DEPRESSED OR HOPELESS: 0
SUM OF ALL RESPONSES TO PHQ QUESTIONS 1-9: 0
10. IF YOU CHECKED OFF ANY PROBLEMS, HOW DIFFICULT HAVE THESE PROBLEMS MADE IT FOR YOU TO DO YOUR WORK, TAKE CARE OF THINGS AT HOME, OR GET ALONG WITH OTHER PEOPLE: 0
SUM OF ALL RESPONSES TO PHQ QUESTIONS 1-9: 0

## 2022-05-24 ASSESSMENT — ANXIETY QUESTIONNAIRES
2. NOT BEING ABLE TO STOP OR CONTROL WORRYING: 0
3. WORRYING TOO MUCH ABOUT DIFFERENT THINGS: 0
IF YOU CHECKED OFF ANY PROBLEMS ON THIS QUESTIONNAIRE, HOW DIFFICULT HAVE THESE PROBLEMS MADE IT FOR YOU TO DO YOUR WORK, TAKE CARE OF THINGS AT HOME, OR GET ALONG WITH OTHER PEOPLE: NOT DIFFICULT AT ALL
1. FEELING NERVOUS, ANXIOUS, OR ON EDGE: 0
7. FEELING AFRAID AS IF SOMETHING AWFUL MIGHT HAPPEN: 0
GAD7 TOTAL SCORE: 0
6. BECOMING EASILY ANNOYED OR IRRITABLE: 0
5. BEING SO RESTLESS THAT IT IS HARD TO SIT STILL: 0
4. TROUBLE RELAXING: 0

## 2022-05-24 NOTE — PROGRESS NOTES
[address] [place] [address]  _______________________________________  Jonah Plascencia MD                 [address] [place] [address] Drive, [address]. [place], [address] [place] [address]                     Jose Good [address]                                                                                    Phone: [phone]                                                                                    Fax: [phone]    Sadia Mooney is a 76 y.o. female who is seen for evaluation of   Chief Complaint   Patient presents with    Hypertension    Gastroesophageal Reflux    Anxiety       HPI:       Patient presents today for follow-up and evaluation of multiple medical problems. In general she is concerned about her weakness, debility, impaired mobility and fall risk. See details below. She has recently completed a round of physical therapy but without much improvement in her confidence. Hypertension: Stable on medication, refills needed  COPD with asthma, prevention with Symbicort and Singulair being maintained, she also has other allergy medications as well as rescue inhalers. 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 Negative for activity change, appetite change, chills and diaphoresis. HENT: Negative for congestion, dental problem and ear discharge. Respiratory: Positive for cough and shortness of breath. Negative for apnea, chest tightness, wheezing and stridor. Cardiovascular: Negative for chest pain, palpitations and leg swelling. Gastrointestinal: Negative.     Genitourinary: Negative for difficulty urinating, dyspareunia, dysuria and flank pain. Skin: Negative for color change, pallor, rash and wound. Neurological: Positive for weakness. Negative for dizziness, facial asymmetry and numbness. Hematological: Negative for adenopathy. Does not bruise/bleed easily. Psychiatric/Behavioral: Negative for agitation, decreased concentration and dysphoric mood.         Mild to moderate anxiety but currently fairly well controlled on medications       History:  Past Medical History:   Diagnosis Date    Abnormality of gait 7/18/2018    Anemia     Anxiety     Arthritis     Asthma     Calculus of kidney     Chronic pain     Contact dermatitis and other eczema, due to unspecified cause     Depressive disorder, not elsewhere classified     Encounter for chronic pain management     Esophageal reflux     Essential hypertension, benign     Frequent falls 7/18/2018    GERD (gastroesophageal reflux disease)     Pure hypercholesterolemia        Past Surgical History:   Procedure Laterality Date    CATARACT REMOVAL Bilateral     COLONOSCOPY      GI      HYSTERECTOMY      IR IVC FILTER RETRIEVAL  3/29/2021    IR IVC FILTER RETRIEVAL  3/29/2021    IR IVC FILTER RETRIEVAL 3/29/2021 [facility]    IR MECHANICAL ART THROMBECTOMY INIT  7/28/2020    IR MECHANICAL ART THROMBECTOMY INIT  7/28/2020    IR MECHANICAL ART THROMBECTOMY INIT 7/28/2020 [facility]    LITHOTRIPSY      NEUROLOGICAL SURGERY      RETINAL DETACHMENT SURGERY      SHOULDER ARTHROPLASTY Left     TOTAL KNEE ARTHROPLASTY Right        Family History   Problem Relation Age of Onset    Osteoarthritis Mother     Cancer Father     Stroke Maternal Grandmother     Stroke Paternal Grandmother        Social History     Tobacco Use    Smoking status: Never Smoker    Smokeless tobacco: Never Used   Substance Use Topics    Alcohol use: No       Allergies   Allergen Reactions    Cyclobenzaprine Other (See Comments)    Hydroxychloroquine Other (See Comments)    Ketoprofen Other (See Comments)       Current Outpatient Medications   Medication Sig Dispense Refill    clorazepate (TRANXENE) 3.75 MG tablet Take 1 tablet by mouth 3 times daily for 180 days. 270 tablet 1    sertraline (ZOLOFT) 100 MG tablet Take 1 tablet by mouth daily Take 1 1/2 tab daily. 90 tablet 1    albuterol sulfate HFA (PROAIR HFA) 108 (90 Base) MCG/ACT inhaler INHALE TWO PUFFS BY MOUTH TWICE A DAY AS NEEDED FOR COUGH      apixaban (ELIQUIS) 2.5 MG TABS tablet Take 2.5 mg by mouth 2 times daily      ascorbic acid (VITAMIN C) 500 MG tablet Take by mouth      budesonide-formoterol (SYMBICORT) 80-4.5 MCG/ACT AERO INHALE 1 PUFF MOUTH TWICE A DAY FOR COUGH RINSE MOUTH AFTER EACH DOSE (DISCARD THREE MONTHS AFTER REMOVAL FROM FOIL POUCH)      calcium carbonate (OYSTER SHELL CALCIUM 500 MG) 1250 (500 Ca) MG tablet Take by mouth daily      chlorhexidine (PERIDEX) 0.12 % solution SWISH AND DIP BRUSH INTO RINSE AND CLEAN TEETH EXPOSED IMPLANT TWO TIMES DAILY.       vitamin D (CHOLECALCIFEROL) 25 MCG (1000 UT) TABS tablet Take by mouth daily      Docusate Sodium 100 MG TABS Take by mouth as needed      furosemide (LASIX) 40 MG tablet Take 40 mg by mouth daily      latanoprost (XALATAN) 0.005 % ophthalmic solution Apply 1 drop to eye every evening      Lifitegrast (XIIDRA) 5 % SOLN Apply to eye      lisinopril (PRINIVIL;ZESTRIL) 5 MG tablet TAKE ONE TABLET BY MOUTH EVERY DAY      melatonin 10 MG CAPS capsule Take by mouth      montelukast (SINGULAIR) 10 MG tablet Take 10 mg by mouth daily      nystatin-triamcinolone (MYCOLOG II) 550403-4.1 UNIT/GM-% cream APPLY CREAM TO AFFECTED AREA TWICE A DAY      olopatadine (PATADAY) 0.2 % SOLN ophthalmic solution Apply 1 drop to eye daily      pantoprazole (PROTONIX) 40 MG tablet TAKE ONE TABLET BY MOUTH TWICE A DAY      polyethylene glycol (GLYCOLAX) 17 GM/SCOOP powder Take 17 g by mouth daily      sodium chloride (OCEAN) 0.65 % nasal spray 1 spray by Nasal route 4 times daily as needed      sucralfate (CARAFATE) 1 GM tablet Take 1 g by mouth 3 times daily      predniSONE (DELTASONE) 5 MG tablet TAKE ONE TABLET BY MOUTH THREE TIMES A DAY ON DAYS 1-3, TAKE ONE TABLET BY MOUTH TWICE A DAY ON DAYS 4-6, TAKE ONE TABLET BY MOUTH ONE TIME       No current facility-administered medications for this visit. Vitals:    /70   Ht 5' (1.524 m)   Wt 160 lb (72.6 kg)   Breastfeeding No   BMI 31.25 kg/m²     Physical Exam:  Physical Exam  Constitutional:       General: She is not in acute distress. Appearance: She is not ill-appearing or diaphoretic. HENT:      Head: Normocephalic and atraumatic. Nose: Nose normal.   Cardiovascular:      Rate and Rhythm: Normal rate and regular rhythm. Pulses: Normal pulses. Heart sounds: Normal heart sounds. Pulmonary:      Effort: Pulmonary effort is normal.      Breath sounds: Normal breath sounds. No stridor. No wheezing or rhonchi. Musculoskeletal:         General: No swelling or tenderness. Neurological:      Mental Status: She is alert. Motor: Weakness present. Coordination: Coordination abnormal.      Gait: Gait abnormal.      Comments: Unsteady gait, generalized leg weakness bilateral, nonfocal         Assessment/Plan:     ICD-10-CM    1. Moderate tricuspid regurgitation  I07.1    2. Pulmonary embolism, bilateral ([facility])  I26.99    3. Injury of inferior vena cava, initial encounter  S35.10XA    4. Essential hypertension  I10 CBC with Auto Differential     Comprehensive Metabolic Panel     Comprehensive Metabolic Panel     CBC with Auto Differential   5. Moderate mitral valve regurgitation  I34.0    6. Pulmonary HTN ([facility])  I27.20    7. Suspected CHF (congestive heart failure)  R09.89    8. Acute hypoxemic respiratory failure ([facility])  J96.01    9. Mild intermittent asthma, unspecified whether complicated  L07.69    10.  High blood sugar R73.9 Hemoglobin A1C     Hemoglobin A1C   11. High cholesterol  E78.00 Lipid Panel     Lipid Panel   12. Age-related physical debility  R54    13. At high risk for falls  Z91.81    14. Anxiety  F41.9 clorazepate (TRANXENE) 3.75 MG tablet     sertraline (ZOLOFT) 100 MG tablet   15. Visit for screening mammogram  Z12.31 Mammography screening bilateral         Soraya Lay MD             [address]  _______________________________________  Merlyn Tucker MD                 [address] [place] [address], [address].  [place] [address], [address] [place] [address]                                                                                    Phone: [phone]                                                                                    Fax: [phone]    Heriberto Corcoran is a 76 y.o. female who is seen for evaluation of   Chief Complaint   Patient presents with    Hypertension    Gastroesophageal Reflux    Anxiety       HPI:   @HPI@   Chief Complaint   Patient presents with    Hypertension    Gastroesophageal Reflux    Anxiety         Review of Systems:    [unfilled]    History:  Past Medical History:   Diagnosis Date    Abnormality of gait 7/18/2018    Anemia     Anxiety     Arthritis     Asthma     Calculus of kidney     Chronic pain     Contact dermatitis and other eczema, due to unspecified cause     Depressive disorder, not elsewhere classified     Encounter for chronic pain management     Esophageal reflux     Essential hypertension, benign     Frequent falls 7/18/2018    GERD (gastroesophageal reflux disease)     Pure hypercholesterolemia        Past Surgical History:   Procedure Laterality Date    CATARACT REMOVAL Bilateral     COLONOSCOPY      GI      HYSTERECTOMY      IR IVC FILTER RETRIEVAL  3/29/2021    IR IVC FILTER RETRIEVAL  3/29/2021    IR IVC FILTER RETRIEVAL 3/29/2021 [facility]    IR MECHANICAL ART THROMBECTOMY INIT 7/28/2020    IR MECHANICAL ART THROMBECTOMY INIT  7/28/2020    IR MECHANICAL ART THROMBECTOMY INIT 7/28/2020 [facility]    LITHOTRIPSY      NEUROLOGICAL SURGERY      RETINAL DETACHMENT SURGERY      SHOULDER ARTHROPLASTY Left     TOTAL KNEE ARTHROPLASTY Right        Family History   Problem Relation Age of Onset    Osteoarthritis Mother     Cancer Father     Stroke Maternal Grandmother     Stroke Paternal Grandmother        Social History     Tobacco Use    Smoking status: Never Smoker    Smokeless tobacco: Never Used   Substance Use Topics    Alcohol use: No       Allergies   Allergen Reactions    Cyclobenzaprine Other (See Comments)    Hydroxychloroquine Other (See Comments)    Ketoprofen Other (See Comments)       Current Outpatient Medications   Medication Sig Dispense Refill    clorazepate (TRANXENE) 3.75 MG tablet Take 1 tablet by mouth 3 times daily for 180 days. 270 tablet 1    sertraline (ZOLOFT) 100 MG tablet Take 1 tablet by mouth daily Take 1 1/2 tab daily. 90 tablet 1    albuterol sulfate HFA (PROAIR HFA) 108 (90 Base) MCG/ACT inhaler INHALE TWO PUFFS BY MOUTH TWICE A DAY AS NEEDED FOR COUGH      apixaban (ELIQUIS) 2.5 MG TABS tablet Take 2.5 mg by mouth 2 times daily      ascorbic acid (VITAMIN C) 500 MG tablet Take by mouth      budesonide-formoterol (SYMBICORT) 80-4.5 MCG/ACT AERO INHALE 1 PUFF MOUTH TWICE A DAY FOR COUGH RINSE MOUTH AFTER EACH DOSE (DISCARD THREE MONTHS AFTER REMOVAL FROM FOIL POUCH)      calcium carbonate (OYSTER SHELL CALCIUM 500 MG) 1250 (500 Ca) MG tablet Take by mouth daily      chlorhexidine (PERIDEX) 0.12 % solution SWISH AND DIP BRUSH INTO RINSE AND CLEAN TEETH EXPOSED IMPLANT TWO TIMES DAILY.       vitamin D (CHOLECALCIFEROL) 25 MCG (1000 UT) TABS tablet Take by mouth daily      Docusate Sodium 100 MG TABS Take by mouth as needed      furosemide (LASIX) 40 MG tablet Take 40 mg by mouth daily      latanoprost (XALATAN) 0.005 % ophthalmic solution Apply 1 drop to eye every evening      Lifitegrast (XIIDRA) 5 % SOLN Apply to eye      lisinopril (PRINIVIL;ZESTRIL) 5 MG tablet TAKE ONE TABLET BY MOUTH EVERY DAY      melatonin 10 MG CAPS capsule Take by mouth      montelukast (SINGULAIR) 10 MG tablet Take 10 mg by mouth daily      nystatin-triamcinolone (MYCOLOG II) 549041-6.1 UNIT/GM-% cream APPLY CREAM TO AFFECTED AREA TWICE A DAY      olopatadine (PATADAY) 0.2 % SOLN ophthalmic solution Apply 1 drop to eye daily      pantoprazole (PROTONIX) 40 MG tablet TAKE ONE TABLET BY MOUTH TWICE A DAY      polyethylene glycol (GLYCOLAX) 17 GM/SCOOP powder Take 17 g by mouth daily      sodium chloride (OCEAN) 0.65 % nasal spray 1 spray by Nasal route 4 times daily as needed      sucralfate (CARAFATE) 1 GM tablet Take 1 g by mouth 3 times daily      predniSONE (DELTASONE) 5 MG tablet TAKE ONE TABLET BY MOUTH THREE TIMES A DAY ON DAYS 1-3, TAKE ONE TABLET BY MOUTH TWICE A DAY ON DAYS 4-6, TAKE ONE TABLET BY MOUTH ONE TIME       No current facility-administered medications for this visit. Vitals:    /70   Ht 5' (1.524 m)   Wt 160 lb (72.6 kg)   Breastfeeding No   BMI 31.25 kg/m²     Physical Exam:  @[facility]@    Assessment/Plan:     ICD-10-CM    1. Moderate tricuspid regurgitation  I07.1    2. Pulmonary embolism, bilateral ([facility])  I26.99    3. Injury of inferior vena cava, initial encounter  S35.10XA    4. Essential hypertension  I10 CBC with Auto Differential     Comprehensive Metabolic Panel     Comprehensive Metabolic Panel     CBC with Auto Differential   5. Moderate mitral valve regurgitation  I34.0    6. Pulmonary HTN ([facility])  I27.20    7. Suspected CHF (congestive heart failure)  R09.89    8. Acute hypoxemic respiratory failure ([facility])  J96.01    9. Mild intermittent asthma, unspecified whether complicated  G44.99    10. High blood sugar  R73.9 Hemoglobin A1C     Hemoglobin A1C   11.  High cholesterol  E78.00 Lipid Panel     Lipid Panel   12. Age-related physical debility  R54    13. At high risk for falls  Z91.81    14. Anxiety  F41.9 clorazepate (TRANXENE) 3.75 MG tablet     sertraline (ZOLOFT) 100 MG tablet   15. Visit for screening mammogram  Z12.31 Mammography screening bilateral     Bilateral screening mammogram ordered today  Anxiety: Refills of clorazepate and sertraline provided as listed above  Debility with increased risk of falls. Patient has completed physical therapy and feels more confident that she did but she is still too afraid to try to walk without assistance. This point she is fairly well confined herself to a wheelchair while at home by her self. She is requesting assistance with bathing and showering, prescription was provided to hopefully help her to use her [employer] administration aid and attendance benefit to pay for this service.     Has history of DVT with pulmonary emboli, remains on Eliquis 2.5 mg twice a day for safety and prevention  Asthma with COPD, patient continues to use albuterol as well as Symbicort with good control  Vitamin D deficiency, recheck labs and refills pending  Hypertension: Stable on medication, refills provided, recheck labs pending  GERD: Stable on pantoprazole, refills provided    Follow-up 3 or 4 months, call if other concerns    Paul Stewart MD

## 2022-05-24 NOTE — PROGRESS NOTES
On the basis of positive falls risk screening, assessment and plan is as follows: in-office gait and balance testing performed using The 30 Second Chair Stand Test was positive for increased falls risk, home safety tips provided.

## 2022-05-25 LAB
BASOPHILS # BLD: 0 K/UL (ref 0–0.2)
BASOPHILS NFR BLD: 1 % (ref 0–2)
DIFFERENTIAL METHOD BLD: ABNORMAL
EOSINOPHIL # BLD: 0.2 K/UL (ref 0–0.8)
EOSINOPHIL NFR BLD: 3 % (ref 0.5–7.8)
ERYTHROCYTE [DISTWIDTH] IN BLOOD BY AUTOMATED COUNT: 16.1 % (ref 11.9–14.6)
HCT VFR BLD AUTO: 42.8 % (ref 35.8–46.3)
HGB BLD-MCNC: 12.6 G/DL (ref 11.7–15.4)
IMM GRANULOCYTES # BLD AUTO: 0 K/UL (ref 0–0.5)
IMM GRANULOCYTES NFR BLD AUTO: 0 % (ref 0–5)
LYMPHOCYTES # BLD: 2.2 K/UL (ref 0.5–4.6)
LYMPHOCYTES NFR BLD: 35 % (ref 13–44)
MCH RBC QN AUTO: 26.8 PG (ref 26.1–32.9)
MCHC RBC AUTO-ENTMCNC: 29.4 G/DL (ref 31.4–35)
MCV RBC AUTO: 91.1 FL (ref 79.6–97.8)
MONOCYTES # BLD: 0.7 K/UL (ref 0.1–1.3)
MONOCYTES NFR BLD: 11 % (ref 4–12)
NEUTS SEG # BLD: 3.3 K/UL (ref 1.7–8.2)
NEUTS SEG NFR BLD: 51 % (ref 43–78)
NRBC # BLD: 0 K/UL (ref 0–0.2)
PLATELET # BLD AUTO: 314 K/UL (ref 150–450)
PMV BLD AUTO: 10 FL (ref 9.4–12.3)
RBC # BLD AUTO: 4.7 M/UL (ref 4.05–5.2)
WBC # BLD AUTO: 6.4 K/UL (ref 4.3–11.1)

## 2022-06-09 ENCOUNTER — HOSPITAL ENCOUNTER (OUTPATIENT)
Dept: MAMMOGRAPHY | Age: 77
Discharge: HOME | End: 2022-06-12
Payer: MEDICARE

## 2022-06-09 DIAGNOSIS — Z12.31 VISIT FOR SCREENING MAMMOGRAM: ICD-10-CM

## 2022-06-09 PROCEDURE — 77067 SCR MAMMO BI INCL CAD: CPT

## 2022-06-15 ENCOUNTER — TELEPHONE (OUTPATIENT)
Dept: PULMONOLOGY | Age: 77
End: 2022-06-15

## 2022-06-15 DIAGNOSIS — I27.20 PULMONARY HTN (HCC): Primary | ICD-10-CM

## 2022-06-15 DIAGNOSIS — J44.9 CHRONIC OBSTRUCTIVE PULMONARY DISEASE, UNSPECIFIED COPD TYPE (HCC): ICD-10-CM

## 2022-06-15 DIAGNOSIS — R09.89 SUSPECTED CHF (CONGESTIVE HEART FAILURE): ICD-10-CM

## 2022-06-15 DIAGNOSIS — J44.9 CHRONIC OBSTRUCTIVE PULMONARY DISEASE, UNSPECIFIED COPD TYPE (HCC): Primary | ICD-10-CM

## 2022-06-15 NOTE — TELEPHONE ENCOUNTER
Patient was returning the missed call , we figure it was the results for the oximetry    Patient called back said her oxygen was off and her daughter realized it .  So they don't know if it should be set at 4 are 2.5 please call [phone]

## 2022-06-16 ENCOUNTER — CLINICAL DOCUMENTATION (OUTPATIENT)
Dept: FAMILY MEDICINE CLINIC | Facility: CLINIC | Age: 77
End: 2022-06-16

## 2022-06-20 NOTE — TELEPHONE ENCOUNTER
I called to inform pt that [address] [place] [address] said that pt needs an office visit that's within 30 days from PERRY order.  It can be virtual. LEANNA BROOKS MA

## 2022-06-27 RX ORDER — SUCRALFATE 1 G/1
TABLET ORAL
Qty: 270 TABLET | Refills: 0 | Status: SHIPPED | OUTPATIENT
Start: 2022-06-27

## 2022-06-30 ENCOUNTER — TELEPHONE (OUTPATIENT)
Dept: FAMILY MEDICINE CLINIC | Facility: CLINIC | Age: 77
End: 2022-06-30

## 2022-06-30 NOTE — TELEPHONE ENCOUNTER
----- Message from Cheri Linares sent at 6/30/2022  4:01 PM EDT -----  Subject: Referral Request    QUESTIONS   Reason for referral request? Patient is calling in to get a referral for a   rheumatologist. Please call back to discuss. Has the physician seen you for this condition before? No   Preferred Specialist (if applicable)? Do you already have an appointment scheduled? No  Additional Information for Provider?   ---------------------------------------------------------------------------  --------------  CALL BACK INFO  What is the best way for the office to contact you? OK to leave message on   voicemail  Preferred Call Back Phone Number? [phone]  ---------------------------------------------------------------------------  --------------  SCRIPT ANSWERS  Relationship to Patient?  Self

## 2022-07-07 ENCOUNTER — OFFICE VISIT (OUTPATIENT)
Dept: FAMILY MEDICINE CLINIC | Facility: CLINIC | Age: 77
End: 2022-07-07
Payer: MEDICARE

## 2022-07-07 VITALS
HEIGHT: 58 IN | BODY MASS INDEX: 33.58 KG/M2 | SYSTOLIC BLOOD PRESSURE: 126 MMHG | WEIGHT: 160 LBS | DIASTOLIC BLOOD PRESSURE: 80 MMHG

## 2022-07-07 DIAGNOSIS — M19.049 HAND ARTHRITIS: ICD-10-CM

## 2022-07-07 DIAGNOSIS — M19.90 ARTHRITIS: Primary | ICD-10-CM

## 2022-07-07 PROCEDURE — 99213 OFFICE O/P EST LOW 20 MIN: CPT | Performed by: FAMILY MEDICINE

## 2022-07-07 PROCEDURE — G8400 PT W/DXA NO RESULTS DOC: HCPCS | Performed by: FAMILY MEDICINE

## 2022-07-07 PROCEDURE — G8427 DOCREV CUR MEDS BY ELIG CLIN: HCPCS | Performed by: FAMILY MEDICINE

## 2022-07-07 PROCEDURE — 1090F PRES/ABSN URINE INCON ASSESS: CPT | Performed by: FAMILY MEDICINE

## 2022-07-07 PROCEDURE — 1123F ACP DISCUSS/DSCN MKR DOCD: CPT | Performed by: FAMILY MEDICINE

## 2022-07-07 PROCEDURE — G8417 CALC BMI ABV UP PARAM F/U: HCPCS | Performed by: FAMILY MEDICINE

## 2022-07-07 PROCEDURE — 1036F TOBACCO NON-USER: CPT | Performed by: FAMILY MEDICINE

## 2022-07-07 ASSESSMENT — ANXIETY QUESTIONNAIRES
5. BEING SO RESTLESS THAT IT IS HARD TO SIT STILL: 0
3. WORRYING TOO MUCH ABOUT DIFFERENT THINGS: 0
GAD7 TOTAL SCORE: 0
2. NOT BEING ABLE TO STOP OR CONTROL WORRYING: 0
7. FEELING AFRAID AS IF SOMETHING AWFUL MIGHT HAPPEN: 0
6. BECOMING EASILY ANNOYED OR IRRITABLE: 0
1. FEELING NERVOUS, ANXIOUS, OR ON EDGE: 0
4. TROUBLE RELAXING: 0
IF YOU CHECKED OFF ANY PROBLEMS ON THIS QUESTIONNAIRE, HOW DIFFICULT HAVE THESE PROBLEMS MADE IT FOR YOU TO DO YOUR WORK, TAKE CARE OF THINGS AT HOME, OR GET ALONG WITH OTHER PEOPLE: NOT DIFFICULT AT ALL

## 2022-07-07 ASSESSMENT — PATIENT HEALTH QUESTIONNAIRE - PHQ9
SUM OF ALL RESPONSES TO PHQ9 QUESTIONS 1 & 2: 0
SUM OF ALL RESPONSES TO PHQ QUESTIONS 1-9: 0
10. IF YOU CHECKED OFF ANY PROBLEMS, HOW DIFFICULT HAVE THESE PROBLEMS MADE IT FOR YOU TO DO YOUR WORK, TAKE CARE OF THINGS AT HOME, OR GET ALONG WITH OTHER PEOPLE: 0
1. LITTLE INTEREST OR PLEASURE IN DOING THINGS: 0
SUM OF ALL RESPONSES TO PHQ QUESTIONS 1-9: 0
6. FEELING BAD ABOUT YOURSELF - OR THAT YOU ARE A FAILURE OR HAVE LET YOURSELF OR YOUR FAMILY DOWN: 0
9. THOUGHTS THAT YOU WOULD BE BETTER OFF DEAD, OR OF HURTING YOURSELF: 0
4. FEELING TIRED OR HAVING LITTLE ENERGY: 0
8. MOVING OR SPEAKING SO SLOWLY THAT OTHER PEOPLE COULD HAVE NOTICED. OR THE OPPOSITE, BEING SO FIGETY OR RESTLESS THAT YOU HAVE BEEN MOVING AROUND A LOT MORE THAN USUAL: 0
7. TROUBLE CONCENTRATING ON THINGS, SUCH AS READING THE NEWSPAPER OR WATCHING TELEVISION: 0
3. TROUBLE FALLING OR STAYING ASLEEP: 0
2. FEELING DOWN, DEPRESSED OR HOPELESS: 0
SUM OF ALL RESPONSES TO PHQ QUESTIONS 1-9: 0
5. POOR APPETITE OR OVEREATING: 0
SUM OF ALL RESPONSES TO PHQ QUESTIONS 1-9: 0

## 2022-07-07 ASSESSMENT — ENCOUNTER SYMPTOMS
ANAL BLEEDING: 0
ABDOMINAL PAIN: 0
EYE REDNESS: 0
BACK PAIN: 0
COUGH: 0
SINUS PAIN: 0
CHEST TIGHTNESS: 0
RHINORRHEA: 0
APNEA: 0
EYE ITCHING: 0
FACIAL SWELLING: 0
SINUS PRESSURE: 0
EYE DISCHARGE: 0
EYE PAIN: 0
BLOOD IN STOOL: 0
ABDOMINAL DISTENTION: 0

## 2022-07-07 NOTE — PROGRESS NOTES
[address] [place] [address]  _______________________________________  Ilia Freeman MD                 [address] [place] [address],  [address] [place], [address] [place], [place] [address]                                                                                     Phone: [phone]                                                                                    Fax: [phone]    Deanna Oro is a 76 y.o. female who is seen for evaluation of   Chief Complaint   Patient presents with    Arthritis       HPI:   Hand Pain   Incident location: Hand pain bilateral, worse on the right side, tender nodules at the base of the fingers, restricted range of motion secondary to pain. Pertinent negatives include no chest pain. Chief Complaint   Patient presents with    Arthritis         Review of Systems:    Review of Systems   Constitutional: Negative for activity change, appetite change, chills, diaphoresis, fatigue and fever. HENT: Negative for congestion, ear discharge, ear pain, facial swelling, hearing loss, mouth sores, rhinorrhea, sinus pressure and sinus pain. Eyes: Negative for pain, discharge, redness and itching. Respiratory: Negative for apnea, cough and chest tightness. Cardiovascular: Negative for chest pain and leg swelling. Gastrointestinal: Negative for abdominal distention, abdominal pain, anal bleeding and blood in stool. Endocrine: Negative for cold intolerance and heat intolerance. Genitourinary: Negative for difficulty urinating, dysuria, enuresis, flank pain, frequency and hematuria. Musculoskeletal: Positive for arthralgias. Negative for back pain, gait problem, joint swelling and myalgias. Skin: Negative for pallor and rash. Neurological: Negative for dizziness, facial asymmetry, light-headedness and headaches. Psychiatric/Behavioral: Negative for agitation, behavioral problems, confusion and sleep disturbance.  The patient is not nervous/anxious. History:  Past Medical History:   Diagnosis Date    Abnormality of gait 7/18/2018    Anemia     Anxiety     Arthritis     Asthma     Calculus of kidney     Chronic pain     Contact dermatitis and other eczema, due to unspecified cause     Depressive disorder, not elsewhere classified     Encounter for chronic pain management     Esophageal reflux     Essential hypertension, benign     Frequent falls 7/18/2018    GERD (gastroesophageal reflux disease)     Pure hypercholesterolemia        Past Surgical History:   Procedure Laterality Date    CATARACT REMOVAL Bilateral     COLONOSCOPY      GI      HYSTERECTOMY (CERVIX STATUS UNKNOWN)      IR IVC FILTER RETRIEVAL  3/29/2021    IR IVC FILTER RETRIEVAL  3/29/2021    IR IVC FILTER RETRIEVAL 3/29/2021 [facility]    IR MECHANICAL ART THROMBECTOMY INIT  7/28/2020    IR MECHANICAL ART THROMBECTOMY INIT  7/28/2020    IR MECHANICAL ART THROMBECTOMY INIT 7/28/2020 [facility]    LITHOTRIPSY      NEUROLOGICAL SURGERY      RETINAL DETACHMENT SURGERY      SHOULDER ARTHROPLASTY Left     TOTAL KNEE ARTHROPLASTY Right        Family History   Problem Relation Age of Onset    Osteoarthritis Mother     Cancer Father     Stroke Maternal Grandmother     Stroke Paternal Grandmother        Social History     Tobacco Use    Smoking status: Never Smoker    Smokeless tobacco: Never Used   Substance Use Topics    Alcohol use: No       Allergies   Allergen Reactions    Cyclobenzaprine Other (See Comments)    Hydroxychloroquine Other (See Comments)    Ketoprofen Other (See Comments)       Current Outpatient Medications   Medication Sig Dispense Refill    diclofenac sodium (VOLTAREN) 1 % GEL Apply topically 2 times daily      sucralfate (CARAFATE) 1 GM tablet TAKE ONE TABLET BY MOUTH THREE TIMES A  tablet 0    clorazepate (TRANXENE) 3.75 MG tablet Take 1 tablet by mouth 3 times daily for 180 days.  [address] tablet 1    sertraline (ZOLOFT) 100 MG tablet Take 1 tablet by mouth daily Take 1 1/2 tab daily. 90 tablet 1    albuterol sulfate HFA (PROAIR HFA) 108 (90 Base) MCG/ACT inhaler INHALE TWO PUFFS BY MOUTH TWICE A DAY AS NEEDED FOR COUGH      apixaban (ELIQUIS) 2.5 MG TABS tablet Take 2.5 mg by mouth 2 times daily      ascorbic acid (VITAMIN C) 500 MG tablet Take by mouth      budesonide-formoterol (SYMBICORT) 80-4.5 MCG/ACT AERO INHALE 1 PUFF MOUTH TWICE A DAY FOR COUGH RINSE MOUTH AFTER EACH DOSE (DISCARD THREE MONTHS AFTER REMOVAL FROM FOIL POUCH)      calcium carbonate (OYSTER SHELL CALCIUM 500 MG) 1250 (500 Ca) MG tablet Take by mouth daily      chlorhexidine (PERIDEX) 0.12 % solution SWISH AND DIP BRUSH INTO RINSE AND CLEAN TEETH EXPOSED IMPLANT TWO TIMES DAILY.       vitamin D (CHOLECALCIFEROL) 25 MCG (1000 UT) TABS tablet Take by mouth daily      Docusate Sodium 100 MG TABS Take by mouth as needed      furosemide (LASIX) 40 MG tablet Take 40 mg by mouth daily      latanoprost (XALATAN) 0.005 % ophthalmic solution Apply 1 drop to eye every evening      Lifitegrast (XIIDRA) 5 % SOLN Apply to eye      lisinopril (PRINIVIL;ZESTRIL) 5 MG tablet TAKE ONE TABLET BY MOUTH EVERY DAY      melatonin 10 MG CAPS capsule Take by mouth      montelukast (SINGULAIR) 10 MG tablet Take 10 mg by mouth daily      nystatin-triamcinolone (MYCOLOG II) 193670-6.1 UNIT/GM-% cream APPLY CREAM TO AFFECTED AREA TWICE A DAY      olopatadine (PATADAY) 0.2 % SOLN ophthalmic solution Apply 1 drop to eye daily      pantoprazole (PROTONIX) 40 MG tablet TAKE ONE TABLET BY MOUTH TWICE A DAY      polyethylene glycol (GLYCOLAX) 17 GM/SCOOP powder Take 17 g by mouth daily      predniSONE (DELTASONE) 5 MG tablet TAKE ONE TABLET BY MOUTH THREE TIMES A DAY ON DAYS 1-3, TAKE ONE TABLET BY MOUTH TWICE A DAY ON DAYS 4-6, TAKE ONE TABLET BY MOUTH ONE TIME      sodium chloride (OCEAN) 0.65 % nasal spray 1 spray by Nasal route 4 times daily as needed       No current facility-administered medications for this visit. Vitals:    /80   Ht 4' 10\" (1.473 m)   Wt 160 lb (72.6 kg)   LMP  (LMP Unknown)   Breastfeeding No   BMI 33.44 kg/m²     Physical Exam:  Physical Exam  Constitutional:       Appearance: Normal appearance. She is obese. She is not ill-appearing or diaphoretic. HENT:      Head: Normocephalic. Right Ear: Tympanic membrane normal.      Left Ear: Tympanic membrane normal.      Nose: No congestion or rhinorrhea. Cardiovascular:      Rate and Rhythm: Normal rate and regular rhythm. Pulses: Normal pulses. Heart sounds: Normal heart sounds. Pulmonary:      Effort: Pulmonary effort is normal.      Breath sounds: Normal breath sounds. Musculoskeletal:         General: Tenderness present. Normal range of motion. Cervical back: Normal range of motion and neck supple. Comments: Tender arthritis nodules in multiple fingers and both hands   Skin:     General: Skin is warm and dry. Neurological:      Mental Status: She is alert and oriented to person, place, and time. Assessment/Plan:     ICD-10-CM    1. Arthritis  M19.90 Trenton Montoya MD, Rheumatology, [facility]   2. Hand arthritis  M19.049 [facility] Francisco Gil MD, Rheumatology, [facility]     Patient without progress issues needs referral to rheumatology, she has tried topical medicines without much relief.   She has ongoing issues with this and is not able to use anti-inflammatories      Zuri Fraga MD

## 2022-07-15 ENCOUNTER — TELEPHONE (OUTPATIENT)
Dept: FAMILY MEDICINE CLINIC | Facility: CLINIC | Age: 77
End: 2022-07-15

## 2022-07-15 NOTE — TELEPHONE ENCOUNTER
----- Message from Babar Jones sent at 7/15/2022  9:57 AM EDT -----  Subject: Message to Provider    QUESTIONS  Information for Provider? Pt spoke to 2nd Rheumatology office regarding   arthritis in both hands, left one is worse. Pt was advised that this   office is booked up until January 2023. Pt is in a lot of pain and is   asking if Dr. Harrison Rowland can treat her? Please call pt to advise.  ---------------------------------------------------------------------------  --------------  Catrina Grady INFO  [phone]; OK to leave message on voicemail  ---------------------------------------------------------------------------  --------------  SCRIPT ANSWERS  Relationship to Patient?  Self

## 2022-07-15 NOTE — TELEPHONE ENCOUNTER
I don't know what to do you help her. We have sent referrals to the two rheumatologist in [facility].

## 2022-07-25 ENCOUNTER — TELEPHONE (OUTPATIENT)
Dept: FAMILY MEDICINE CLINIC | Facility: CLINIC | Age: 77
End: 2022-07-25

## 2022-07-25 RX ORDER — BUDESONIDE AND FORMOTEROL FUMARATE DIHYDRATE 80; 4.5 UG/1; UG/1
2 AEROSOL RESPIRATORY (INHALATION) DAILY
Qty: 10.2 G | Refills: 3 | Status: SHIPPED | OUTPATIENT
Start: 2022-07-25 | End: 2022-08-22 | Stop reason: SDUPTHER

## 2022-07-25 RX ORDER — LISINOPRIL 5 MG/1
5 TABLET ORAL DAILY
Qty: 90 TABLET | Refills: 1 | Status: SHIPPED | OUTPATIENT
Start: 2022-07-25 | End: 2022-10-25 | Stop reason: SDUPTHER

## 2022-07-25 NOTE — TELEPHONE ENCOUNTER
----- Message from Leigha Muller sent at 7/25/2022  3:19 PM EDT -----  Subject: Refill Request    QUESTIONS  Name of Medication? lisinopril (PRINIVIL;ZESTRIL) 5 MG tablet  Patient-reported dosage and instructions? 5 MG 1 DAILY  How many days do you have left? 10  Preferred Pharmacy? [address] [facility] phone number (if available)? [phone]  ---------------------------------------------------------------------------  --------------,  Name of Medication? budesonide-formoterol (SYMBICORT) 80-4.5 MCG/ACT AERO  Patient-reported dosage and instructions? TWICE DAILY  How many days do you have left? 0  Preferred Pharmacy? [facility] [facility] phone number (if available)? [phone]  ---------------------------------------------------------------------------  --------------  CALL BACK INFO  What is the best way for the office to contact you? OK to leave message on   voicemail  Preferred Call Back Phone Number? [phone]  ---------------------------------------------------------------------------  --------------  SCRIPT ANSWERS  Relationship to Patient?  Self

## 2022-07-25 NOTE — TELEPHONE ENCOUNTER
----- Message from Mahamed Farrar sent at 7/25/2022  3:16 PM EDT -----  Subject: Referral Request    Reason for referral request? PATIENT HAS AN APPT WITH DR Dalton Martinez,   RHEUMATOLOGY, FEBRUARY 2023. SHE WAS ORIGINALLY NOT WILLING TO GO TO   [facility]HE[facility], BUT IS WILLING TO GO THERE IF THEY CAN SEE HER   SOONER THAN NEXT YEAR. PLEASE CALL PATIENT TO ADVISE  Provider patient wants to be referred to(if known):     Provider Phone Number(if known):     Additional Information for Provider?   ---------------------------------------------------------------------------  --------------  [id number]    [phone]; OK to leave message on voicemail  ---------------------------------------------------------------------------  --------------

## 2022-08-05 ENCOUNTER — OFFICE VISIT (OUTPATIENT)
Dept: FAMILY MEDICINE CLINIC | Facility: CLINIC | Age: 77
End: 2022-08-05
Payer: MEDICARE

## 2022-08-05 VITALS
HEIGHT: 58 IN | SYSTOLIC BLOOD PRESSURE: 124 MMHG | BODY MASS INDEX: 33.58 KG/M2 | WEIGHT: 160 LBS | DIASTOLIC BLOOD PRESSURE: 70 MMHG

## 2022-08-05 DIAGNOSIS — R30.0 DYSURIA: ICD-10-CM

## 2022-08-05 DIAGNOSIS — N30.00 ACUTE CYSTITIS WITHOUT HEMATURIA: Primary | ICD-10-CM

## 2022-08-05 LAB
BILIRUBIN, URINE, POC: NEGATIVE
BLOOD URINE, POC: ABNORMAL
GLUCOSE URINE, POC: NEGATIVE
KETONES, URINE, POC: NEGATIVE
LEUKOCYTE ESTERASE, URINE, POC: ABNORMAL
NITRITE, URINE, POC: ABNORMAL
PH, URINE, POC: 6 (ref 4.6–8)
PROTEIN,URINE, POC: ABNORMAL
SPECIFIC GRAVITY, URINE, POC: 1.03 (ref 1–1.03)
URINALYSIS CLARITY, POC: ABNORMAL
URINALYSIS COLOR, POC: YELLOW
UROBILINOGEN, POC: ABNORMAL

## 2022-08-05 PROCEDURE — 1123F ACP DISCUSS/DSCN MKR DOCD: CPT | Performed by: FAMILY MEDICINE

## 2022-08-05 PROCEDURE — G8427 DOCREV CUR MEDS BY ELIG CLIN: HCPCS | Performed by: FAMILY MEDICINE

## 2022-08-05 PROCEDURE — 1036F TOBACCO NON-USER: CPT | Performed by: FAMILY MEDICINE

## 2022-08-05 PROCEDURE — 81003 URINALYSIS AUTO W/O SCOPE: CPT | Performed by: FAMILY MEDICINE

## 2022-08-05 PROCEDURE — 1090F PRES/ABSN URINE INCON ASSESS: CPT | Performed by: FAMILY MEDICINE

## 2022-08-05 PROCEDURE — 99214 OFFICE O/P EST MOD 30 MIN: CPT | Performed by: FAMILY MEDICINE

## 2022-08-05 PROCEDURE — G8417 CALC BMI ABV UP PARAM F/U: HCPCS | Performed by: FAMILY MEDICINE

## 2022-08-05 PROCEDURE — G8400 PT W/DXA NO RESULTS DOC: HCPCS | Performed by: FAMILY MEDICINE

## 2022-08-05 RX ORDER — CEPHALEXIN 500 MG/1
500 CAPSULE ORAL 2 TIMES DAILY
Qty: 20 CAPSULE | Refills: 0 | Status: SHIPPED | OUTPATIENT
Start: 2022-08-05 | End: 2022-08-15

## 2022-08-05 ASSESSMENT — ENCOUNTER SYMPTOMS
COUGH: 0
BACK PAIN: 0
VOMITING: 0
DIARRHEA: 0
WHEEZING: 0
SINUS PAIN: 0
SINUS PRESSURE: 0
COLOR CHANGE: 0
ABDOMINAL DISTENTION: 0
SHORTNESS OF BREATH: 0
STRIDOR: 0
EYE ITCHING: 0
ABDOMINAL PAIN: 0
SORE THROAT: 0
EYE DISCHARGE: 0
CHEST TIGHTNESS: 0
FACIAL SWELLING: 0
EYE REDNESS: 0
RHINORRHEA: 0
BLOOD IN STOOL: 0
CONSTIPATION: 0
EYE PAIN: 0
NAUSEA: 0

## 2022-08-05 NOTE — LETTER
[id number] [address] [address]  Phone: [phone]  Fax: [phone]    Christopher Corona III, MD        Current Outpatient Medications on File Prior to Visit   Medication Sig Dispense Refill    lisinopril (PRINIVIL;ZESTRIL) 5 MG tablet Take 1 tablet by mouth in the morning. TAKE ONE TABLET BY MOUTH EVERY DAY. 90 tablet 1    budesonide-formoterol (SYMBICORT) 80-4.5 MCG/ACT AERO Inhale 2 puffs into the lungs in the morning. INHALE 1 PUFF MOUTH TWICE A DAY FOR COUGH RINSE MOUTH AFTER EACH DOSE (DISCARD THREE MONTHS AFTER REMOVAL FROM [facility]). 10.2 g 3    diclofenac sodium (VOLTAREN) 1 % GEL Apply topically 2 times daily      sucralfate (CARAFATE) 1 GM tablet TAKE ONE TABLET BY MOUTH THREE TIMES A  tablet 0    clorazepate (TRANXENE) 3.75 MG tablet Take 1 tablet by mouth 3 times daily for 180 days. 270 tablet 1    sertraline (ZOLOFT) 100 MG tablet Take 1 tablet by mouth daily Take 1 1/2 tab daily. 90 tablet 1    albuterol sulfate HFA (PROAIR HFA) 108 (90 Base) MCG/ACT inhaler INHALE TWO PUFFS BY MOUTH TWICE A DAY AS NEEDED FOR COUGH      apixaban (ELIQUIS) 2.5 MG TABS tablet Take 2.5 mg by mouth 2 times daily      ascorbic acid (VITAMIN C) 500 MG tablet Take by mouth      calcium carbonate (OYSTER SHELL CALCIUM 500 MG) 1250 (500 Ca) MG tablet Take by mouth daily      chlorhexidine (PERIDEX) 0.12 % solution SWISH AND DIP BRUSH INTO RINSE AND CLEAN TEETH EXPOSED IMPLANT TWO TIMES DAILY.       vitamin D (CHOLECALCIFEROL) 25 MCG (1000 UT) TABS tablet Take by mouth daily      Docusate Sodium 100 MG TABS Take by mouth as needed      furosemide (LASIX) 40 MG tablet Take 40 mg by mouth daily      latanoprost (XALATAN) 0.005 % ophthalmic solution Apply 1 drop to eye every evening      Lifitegrast (XIIDRA) 5 % SOLN Apply to eye      melatonin 10 MG CAPS capsule Take by mouth      montelukast (SINGULAIR) 10 MG tablet Take 10 mg by mouth daily      nystatin-triamcinolone (MYCOLOG II) 752796-4.1 UNIT/GM-% cream APPLY CREAM TO AFFECTED AREA TWICE A DAY      olopatadine (PATADAY) 0.2 % SOLN ophthalmic solution Apply 1 drop to eye daily      pantoprazole (PROTONIX) 40 MG tablet TAKE ONE TABLET BY MOUTH TWICE A DAY      polyethylene glycol (GLYCOLAX) 17 GM/SCOOP powder Take 17 g by mouth daily      predniSONE (DELTASONE) 5 MG tablet TAKE ONE TABLET BY MOUTH THREE TIMES A DAY ON DAYS 1-3, TAKE ONE TABLET BY MOUTH TWICE A DAY ON DAYS 4-6, TAKE ONE TABLET BY MOUTH ONE TIME      sodium chloride (OCEAN) 0.65 % nasal spray 1 spray by Nasal route 4 times daily as needed       No current facility-administered medications on file prior to visit.

## 2022-08-05 NOTE — ASSESSMENT & PLAN NOTE
Pt was instructed to take medications as prescribed, increase oral fluids, reduce caffeine. RTC if pt notices increasing fever/chills not responsive to tylenol/nsaids, LOC changes, karolina hematuria, flank pain, inability to urinate. Pt verbalizes understanding. Unable to send UCx as there was not enough specimen.

## 2022-08-05 NOTE — PROGRESS NOTES
[address] [place] [address]  _______________________________________  Arnol Ramos MD                 [address] [place] [address] Drive, [address]. [place], [address]                                                                                    Phone: [phone]                                                                                    Fax: [phone]    Mary Beth Gonzalez is a 76 y.o. female who is seen for evaluation of   Chief Complaint   Patient presents with    Dysuria     Pt has increased urinary frequency and painful urination. HPI:   PT c/o dysuria, urinary frequency for the last 2 months. She was treated in May for UTI with macrobid, but states her s/s did not resolve. She denies any fever, chills, hematuria, flank pain. Review of Systems:  Review of Systems   Constitutional:  Negative for activity change, appetite change, chills, diaphoresis, fatigue, fever and unexpected weight change. HENT:  Negative for congestion, ear discharge, ear pain, facial swelling, hearing loss, mouth sores, nosebleeds, postnasal drip, rhinorrhea, sinus pressure, sinus pain, sore throat and tinnitus. Eyes:  Negative for pain, discharge, redness, itching and visual disturbance. Respiratory:  Negative for cough, chest tightness, shortness of breath, wheezing and stridor. Cardiovascular:  Negative for chest pain, palpitations and leg swelling. Gastrointestinal:  Negative for abdominal distention, abdominal pain, blood in stool, constipation, diarrhea, nausea and vomiting. Endocrine: Negative for cold intolerance, heat intolerance, polydipsia, polyphagia and polyuria. Genitourinary:  Positive for dysuria and frequency. Negative for decreased urine volume, difficulty urinating, flank pain, hematuria and urgency. Musculoskeletal:  Negative for arthralgias, back pain, gait problem, joint swelling, myalgias and neck pain.    Skin:  Negative for color change, pallor, rash and wound. Neurological:  Negative for dizziness, tremors, seizures, syncope, facial asymmetry, speech difficulty, weakness, light-headedness, numbness and headaches. Psychiatric/Behavioral:  Negative for agitation, behavioral problems, confusion, hallucinations, self-injury, sleep disturbance and suicidal ideas. The patient is not nervous/anxious.        History:  Past Medical History:   Diagnosis Date    Abnormality of gait 7/18/2018    Anemia     Anxiety     Arthritis     Asthma     Calculus of kidney     Chronic pain     Contact dermatitis and other eczema, due to unspecified cause     Depressive disorder, not elsewhere classified     Encounter for chronic pain management     Esophageal reflux     Essential hypertension, benign     Frequent falls 7/18/2018    GERD (gastroesophageal reflux disease)     Pure hypercholesterolemia        Past Surgical History:   Procedure Laterality Date    CATARACT REMOVAL Bilateral     COLONOSCOPY      GI      HYSTERECTOMY (CERVIX STATUS UNKNOWN)      IR IVC FILTER RETRIEVAL  3/29/2021    IR IVC FILTER RETRIEVAL  3/29/2021    IR IVC FILTER RETRIEVAL 3/29/2021 [facility]    IR MECHANICAL ART THROMBECTOMY INIT  7/28/2020    IR MECHANICAL ART THROMBECTOMY INIT  7/28/2020    IR MECHANICAL ART THROMBECTOMY INIT 7/28/2020 [facility]    LITHOTRIPSY      NEUROLOGICAL SURGERY      RETINAL DETACHMENT SURGERY      SHOULDER ARTHROPLASTY Left     TOTAL KNEE ARTHROPLASTY Right        Family History   Problem Relation Age of Onset    Osteoarthritis Mother     Cancer Father     Stroke Maternal Grandmother     Stroke Paternal Grandmother        Social History     Tobacco Use    Smoking status: Never    Smokeless tobacco: Never   Substance Use Topics    Alcohol use: No       Allergies   Allergen Reactions    Cyclobenzaprine Other (See Comments)    Hydroxychloroquine Other (See Comments)    Ketoprofen Other (See Comments)       Current Outpatient Medications Medication Sig Dispense Refill    cephALEXin (KEFLEX) 500 MG capsule Take 1 capsule by mouth in the morning and 1 capsule before bedtime. Do all this for 10 days. 20 capsule 0    lisinopril (PRINIVIL;ZESTRIL) 5 MG tablet Take 1 tablet by mouth in the morning. TAKE ONE TABLET BY MOUTH EVERY DAY. 90 tablet 1    budesonide-formoterol (SYMBICORT) 80-4.5 MCG/ACT AERO Inhale 2 puffs into the lungs in the morning. INHALE 1 PUFF MOUTH TWICE A DAY FOR COUGH RINSE MOUTH AFTER EACH DOSE (DISCARD THREE MONTHS AFTER REMOVAL FROM [facility]). 10.2 g 3    diclofenac sodium (VOLTAREN) 1 % GEL Apply topically 2 times daily      sucralfate (CARAFATE) 1 GM tablet TAKE ONE TABLET BY MOUTH THREE TIMES A  tablet 0    clorazepate (TRANXENE) 3.75 MG tablet Take 1 tablet by mouth 3 times daily for 180 days. 270 tablet 1    sertraline (ZOLOFT) 100 MG tablet Take 1 tablet by mouth daily Take 1 1/2 tab daily. 90 tablet 1    albuterol sulfate HFA (PROVENTIL;VENTOLIN;PROAIR) 108 (90 Base) MCG/ACT inhaler INHALE TWO PUFFS BY MOUTH TWICE A DAY AS NEEDED FOR COUGH      apixaban (ELIQUIS) 2.5 MG TABS tablet Take 2.5 mg by mouth 2 times daily      ascorbic acid (VITAMIN C) 500 MG tablet Take by mouth      calcium carbonate (OYSTER SHELL CALCIUM 500 MG) 1250 (500 Ca) MG tablet Take by mouth daily      chlorhexidine (PERIDEX) 0.12 % solution SWISH AND DIP BRUSH INTO RINSE AND CLEAN TEETH EXPOSED IMPLANT TWO TIMES DAILY.       vitamin D (CHOLECALCIFEROL) 25 MCG (1000 UT) TABS tablet Take by mouth daily      Docusate Sodium 100 MG TABS Take by mouth as needed      furosemide (LASIX) 40 MG tablet Take 40 mg by mouth daily      latanoprost (XALATAN) 0.005 % ophthalmic solution Apply 1 drop to eye every evening      Lifitegrast (XIIDRA) 5 % SOLN Apply to eye      melatonin 10 MG CAPS capsule Take by mouth      montelukast (SINGULAIR) 10 MG tablet Take 10 mg by mouth daily      nystatin-triamcinolone (MYCOLOG II) 919423-5.1 UNIT/GM-% cream APPLY CREAM TO AFFECTED AREA TWICE A DAY      olopatadine (PATADAY) 0.2 % SOLN ophthalmic solution Apply 1 drop to eye daily      pantoprazole (PROTONIX) 40 MG tablet TAKE ONE TABLET BY MOUTH TWICE A DAY      polyethylene glycol (GLYCOLAX) 17 GM/SCOOP powder Take 17 g by mouth daily      predniSONE (DELTASONE) 5 MG tablet TAKE ONE TABLET BY MOUTH THREE TIMES A DAY ON DAYS 1-3, TAKE ONE TABLET BY MOUTH TWICE A DAY ON DAYS 4-6, TAKE ONE TABLET BY MOUTH ONE TIME      sodium chloride (OCEAN) 0.65 % nasal spray 1 spray by Nasal route 4 times daily as needed       No current facility-administered medications for this visit. Vitals:    /70   Ht 4' 10\" (1.473 m)   Wt 160 lb (72.6 kg)   LMP  (LMP Unknown)   BMI 33.44 kg/m²     Results for orders placed or performed in visit on 08/05/22   AMB POC URINALYSIS DIP STICK AUTO W/O MICRO   Result Value Ref Range    Color, Urine, POC yellow     Clarity, Urine, POC cloudy     Glucose, Urine, POC Negative Negative    Bilirubin, Urine, POC Negative Negative    Ketones, Urine, POC Negative Negative    Specific Gravity, Urine, POC 1.030 1.001 - 1.035    Blood, Urine, POC trace Negative    pH, Urine, POC 6.0 4.6 - 8.0    Protein, Urine, POC 2+ Negative    Urobilinogen, POC n     Nitrate, Urine, POC n Negative    Leukocyte Esterase, Urine, POC 3+ Negative         Physical Exam:  Physical Exam  Vitals reviewed. Constitutional:       General: She is not in acute distress. Appearance: Normal appearance. She is not ill-appearing, toxic-appearing or diaphoretic. HENT:      Head: Normocephalic and atraumatic. Right Ear: Tympanic membrane, ear canal and external ear normal. There is no impacted cerumen. Left Ear: Tympanic membrane, ear canal and external ear normal. There is no impacted cerumen. Nose: Nose normal. No congestion or rhinorrhea.       Mouth/Throat:      Mouth: Mucous membranes are moist.      Pharynx: No oropharyngeal exudate or posterior oropharyngeal erythema. Eyes:      General: No scleral icterus. Right eye: No discharge. Left eye: No discharge. Extraocular Movements: Extraocular movements intact. Conjunctiva/sclera: Conjunctivae normal.      Pupils: Pupils are equal, round, and reactive to light. Cardiovascular:      Rate and Rhythm: Normal rate and regular rhythm. Pulses: Normal pulses. Heart sounds: Normal heart sounds. No murmur heard. No friction rub. No gallop. Pulmonary:      Effort: Pulmonary effort is normal. No respiratory distress. Breath sounds: Normal breath sounds. No stridor. No wheezing, rhonchi or rales. Chest:      Chest wall: No tenderness. Abdominal:      General: Abdomen is flat. Bowel sounds are normal. There is no distension. Palpations: Abdomen is soft. There is no mass. Tenderness: There is no abdominal tenderness. There is no right CVA tenderness, left CVA tenderness, guarding or rebound. Musculoskeletal:         General: No swelling, tenderness, deformity or signs of injury. Cervical back: Neck supple. No rigidity or tenderness. Right lower leg: No edema. Left lower leg: No edema. Lymphadenopathy:      Cervical: No cervical adenopathy. Skin:     General: Skin is warm and dry. Coloration: Skin is not jaundiced or pale. Findings: No bruising, erythema, lesion or rash. Neurological:      General: No focal deficit present. Mental Status: She is alert. Mental status is at baseline. Motor: No weakness. Coordination: Coordination normal.      Gait: Gait normal.   Psychiatric:         Mood and Affect: Mood normal.         Behavior: Behavior normal.         Thought Content: Thought content normal.         Judgment: Judgment normal.       Assessment/Plan:     ICD-10-CM    1. Acute cystitis without hematuria  N30.00 cephALEXin (KEFLEX) 500 MG capsule     CANCELED: Culture, Urine      2.  Dysuria  R30.0 AMB POC URINALYSIS DIP STICK AUTO W/O MICRO           Problem List          Genitourinary    Acute cystitis without hematuria - Primary     Pt was instructed to take medications as prescribed, increase oral fluids, reduce caffeine. RTC if pt notices increasing fever/chills not responsive to tylenol/nsaids, LOC changes, karolina hematuria, flank pain, inability to urinate. Pt verbalizes understanding. Unable to send UCx as there was not enough specimen.           Relevant Medications    cephALEXin (KEFLEX) 500 MG capsule        Lazara Wang III, MD

## 2022-08-11 ENCOUNTER — TELEPHONE (OUTPATIENT)
Dept: FAMILY MEDICINE CLINIC | Facility: CLINIC | Age: 77
End: 2022-08-11

## 2022-08-11 DIAGNOSIS — N30.00 ACUTE CYSTITIS WITHOUT HEMATURIA: Primary | ICD-10-CM

## 2022-08-11 RX ORDER — NITROFURANTOIN 25; 75 MG/1; MG/1
100 CAPSULE ORAL 2 TIMES DAILY
Qty: 10 CAPSULE | Refills: 0 | Status: SHIPPED | OUTPATIENT
Start: 2022-08-11 | End: 2022-08-16

## 2022-08-12 DIAGNOSIS — I26.99 OTHER PULMONARY EMBOLISM WITHOUT ACUTE COR PULMONALE, UNSPECIFIED CHRONICITY (HCC): Primary | ICD-10-CM

## 2022-08-16 ENCOUNTER — HOSPITAL ENCOUNTER (OUTPATIENT)
Dept: LAB | Age: 77
Discharge: HOME | End: 2022-08-19
Payer: MEDICARE

## 2022-08-16 ENCOUNTER — OFFICE VISIT (OUTPATIENT)
Dept: ONCOLOGY | Age: 77
End: 2022-08-16
Payer: MEDICARE

## 2022-08-16 VITALS
TEMPERATURE: 98.5 F | SYSTOLIC BLOOD PRESSURE: 134 MMHG | HEIGHT: 58 IN | BODY MASS INDEX: 35.68 KG/M2 | DIASTOLIC BLOOD PRESSURE: 75 MMHG | RESPIRATION RATE: 18 BRPM | WEIGHT: 170 LBS | OXYGEN SATURATION: 95 % | HEART RATE: 83 BPM

## 2022-08-16 DIAGNOSIS — Z74.09 IMMOBILITY: ICD-10-CM

## 2022-08-16 DIAGNOSIS — E66.01 SEVERE OBESITY (BMI 35.0-39.9) WITH COMORBIDITY (HCC): ICD-10-CM

## 2022-08-16 DIAGNOSIS — I26.99 OTHER PULMONARY EMBOLISM WITHOUT ACUTE COR PULMONALE, UNSPECIFIED CHRONICITY (HCC): ICD-10-CM

## 2022-08-16 DIAGNOSIS — R53.83 FATIGUE, UNSPECIFIED TYPE: ICD-10-CM

## 2022-08-16 DIAGNOSIS — I26.99 OTHER PULMONARY EMBOLISM WITHOUT ACUTE COR PULMONALE, UNSPECIFIED CHRONICITY (HCC): Primary | ICD-10-CM

## 2022-08-16 LAB
ALBUMIN SERPL-MCNC: 3.4 G/DL (ref 3.2–4.6)
ALBUMIN/GLOB SERPL: 0.9 {RATIO} (ref 1.2–3.5)
ALP SERPL-CCNC: 91 U/L (ref 50–136)
ALT SERPL-CCNC: 25 U/L (ref 12–65)
ANION GAP SERPL CALC-SCNC: 4 MMOL/L (ref 7–16)
AST SERPL-CCNC: 19 U/L (ref 15–37)
BASOPHILS # BLD: 0 K/UL (ref 0–0.2)
BASOPHILS NFR BLD: 1 % (ref 0–2)
BILIRUB SERPL-MCNC: 0.4 MG/DL (ref 0.2–1.1)
BUN SERPL-MCNC: 11 MG/DL (ref 8–23)
CALCIUM SERPL-MCNC: 8.9 MG/DL (ref 8.3–10.4)
CHLORIDE SERPL-SCNC: 106 MMOL/L (ref 98–107)
CO2 SERPL-SCNC: 28 MMOL/L (ref 21–32)
CREAT SERPL-MCNC: 0.6 MG/DL (ref 0.6–1)
DIFFERENTIAL METHOD BLD: ABNORMAL
EOSINOPHIL # BLD: 0.2 K/UL (ref 0–0.8)
EOSINOPHIL NFR BLD: 5 % (ref 0.5–7.8)
ERYTHROCYTE [DISTWIDTH] IN BLOOD BY AUTOMATED COUNT: 16.8 % (ref 11.9–14.6)
GLOBULIN SER CALC-MCNC: 3.8 G/DL (ref 2.3–3.5)
GLUCOSE SERPL-MCNC: 96 MG/DL (ref 65–100)
HCT VFR BLD AUTO: 41.4 % (ref 35.8–46.3)
HGB BLD-MCNC: 13.2 G/DL (ref 11.7–15.4)
IMM GRANULOCYTES # BLD AUTO: 0 K/UL (ref 0–0.5)
IMM GRANULOCYTES NFR BLD AUTO: 0 % (ref 0–5)
LYMPHOCYTES # BLD: 1.7 K/UL (ref 0.5–4.6)
LYMPHOCYTES NFR BLD: 33 % (ref 13–44)
MCH RBC QN AUTO: 29.5 PG (ref 26.1–32.9)
MCHC RBC AUTO-ENTMCNC: 31.9 G/DL (ref 31.4–35)
MCV RBC AUTO: 92.6 FL (ref 79.6–97.8)
MONOCYTES # BLD: 0.7 K/UL (ref 0.1–1.3)
MONOCYTES NFR BLD: 13 % (ref 4–12)
NEUTS SEG # BLD: 2.4 K/UL (ref 1.7–8.2)
NEUTS SEG NFR BLD: 48 % (ref 43–78)
NRBC # BLD: 0 K/UL (ref 0–0.2)
PLATELET # BLD AUTO: 216 K/UL (ref 150–450)
PMV BLD AUTO: 9.4 FL (ref 9.4–12.3)
POTASSIUM SERPL-SCNC: 3.9 MMOL/L (ref 3.5–5.1)
PROT SERPL-MCNC: 7.2 G/DL (ref 6.3–8.2)
RBC # BLD AUTO: 4.47 M/UL (ref 4.05–5.2)
SODIUM SERPL-SCNC: 138 MMOL/L (ref 136–145)
WBC # BLD AUTO: 5 K/UL (ref 4.3–11.1)

## 2022-08-16 PROCEDURE — 1123F ACP DISCUSS/DSCN MKR DOCD: CPT | Performed by: INTERNAL MEDICINE

## 2022-08-16 PROCEDURE — 1036F TOBACCO NON-USER: CPT | Performed by: INTERNAL MEDICINE

## 2022-08-16 PROCEDURE — G8417 CALC BMI ABV UP PARAM F/U: HCPCS | Performed by: INTERNAL MEDICINE

## 2022-08-16 PROCEDURE — 85025 COMPLETE CBC W/AUTO DIFF WBC: CPT

## 2022-08-16 PROCEDURE — 99214 OFFICE O/P EST MOD 30 MIN: CPT | Performed by: INTERNAL MEDICINE

## 2022-08-16 PROCEDURE — 80053 COMPREHEN METABOLIC PANEL: CPT

## 2022-08-16 PROCEDURE — 36415 COLL VENOUS BLD VENIPUNCTURE: CPT

## 2022-08-16 PROCEDURE — 1090F PRES/ABSN URINE INCON ASSESS: CPT | Performed by: INTERNAL MEDICINE

## 2022-08-16 PROCEDURE — G8427 DOCREV CUR MEDS BY ELIG CLIN: HCPCS | Performed by: INTERNAL MEDICINE

## 2022-08-16 PROCEDURE — G8400 PT W/DXA NO RESULTS DOC: HCPCS | Performed by: INTERNAL MEDICINE

## 2022-08-16 RX ORDER — IRON FUM,PS CMP/VIT C/NIACIN 125-40-3MG
CAPSULE ORAL
COMMUNITY
Start: 2022-07-14 | End: 2022-10-19 | Stop reason: SDUPTHER

## 2022-08-16 ASSESSMENT — PATIENT HEALTH QUESTIONNAIRE - PHQ9
1. LITTLE INTEREST OR PLEASURE IN DOING THINGS: 0
SUM OF ALL RESPONSES TO PHQ QUESTIONS 1-9: 0
2. FEELING DOWN, DEPRESSED OR HOPELESS: 0
SUM OF ALL RESPONSES TO PHQ9 QUESTIONS 1 & 2: 0

## 2022-08-16 NOTE — PROGRESS NOTES
[facility]: Office Visit Progress Note      Chief Complaint:     DVT/PE   Epistaxis      History of Present Illness:  76 y.o. female admitted on 9/15/2020 with a primary diagnosis of The primary encounter diagnosis was Epistaxis. Diagnoses  of Acute blood loss anemia and Syncope and collapse were also pertinent to this visit. .         Ms. Yocasta Villeda was admitted on 9/15/2020 with a diagnosis of epistaxis/anemia on Eliquis. She has a past medical history of GERD, hypertension, anxiety, asthma, recent admission for  bilateral pulmonary embolism in the setting of bilateral lower extremity DVT currently on Eliquis. She presented to the ER on 9/15/2020 with a nosebleed, improved with rapid Rhino. She denies any other sources of bleeding. She was found to have a  drop in her hemoglobin to 8.6 and was admitted for medical management. Eliquis was placed on hold d/t bleeding and anemia. She does have an IVC filter in place. Her Hemoglobin decreased further on the morning of 9/16 and she will receive 1 unit  of blood. She had a repeat BLE doppler which showed bilateral lower extremity DVT, greater on the left. Patient admits to having frequent nosebleeds every few weeks or so but she has always been able to stop it in reasonable amount of time. She states  that she has seen ENT before and did not find source of bleed. She does admit to using Flonase on a daily basis and based on her description of administration, she may be causing irritation. We were consulted for our recommendation given epitaxis  while on Eliquis for BL pulmonary embolism and BL lower extremity DVT. Interim history updating A/P           Review of Systems:      Constitutional  Denies fever or chills. Denies weight loss or appetite changes. Denies fatigue. Denies anorexia. HEENT  Denies trauma, bluring vision, hearing loss, ear pain, nosebleeds, sore throat, neck pain and ear discharge.      Skin  Denies lesions or rashes. Itching      Lungs  Denies cough, sputum production or hemoptysis. Shortness of breath      Cardiovascular  Denies chest pain, palpitations, orthopnea, claudication    Leg swelling     Gastrointestinal  Denies nausea, vomiting, bowel changes. Denies bloody or black stools. Denies abdominal pain.   Denies dysuria, frequency or hesitancy of urination     Neuro  Denies ataxia. Denies dizziness, tingling, tremors, sensory change, speech change, focal weakness and headaches. Visual change     Hematology  Denies nasal/gum bleeding, denies easy bruise     Endo  Denies heat/cold intolerance, denies diabetes. MSK   fall and left toe fracture. Denies back pain, swollen legs, myalgias and falls. Psychiatric/Behavioral  Denies depression and substance abuse. The patient is not nervous/anxious.                 Allergies   Allergen Reactions    Cyclobenzaprine Other (See Comments)    Hydroxychloroquine Other (See Comments)    Ketoprofen Other (See Comments)     Past Medical History:   Diagnosis Date    Abnormality of gait 7/18/2018    Anemia     Anxiety     Arthritis     Asthma     Calculus of kidney     Chronic pain     Contact dermatitis and other eczema, due to unspecified cause     Depressive disorder, not elsewhere classified     Encounter for chronic pain management     Esophageal reflux     Essential hypertension, benign     Frequent falls 7/18/2018    GERD (gastroesophageal reflux disease)     Pure hypercholesterolemia      Past Surgical History:   Procedure Laterality Date    CATARACT REMOVAL Bilateral     COLONOSCOPY      GI      HYSTERECTOMY (CERVIX STATUS UNKNOWN)      IR IVC FILTER RETRIEVAL  3/29/2021    IR IVC FILTER RETRIEVAL  3/29/2021    IR IVC FILTER RETRIEVAL 3/29/2021 [facility]    IR MECHANICAL ART THROMBECTOMY INIT  7/28/2020    IR MECHANICAL ART THROMBECTOMY INIT  7/28/2020    IR MECHANICAL ART THROMBECTOMY INIT 7/28/2020 [facility]    LITHOTRIPSY NEUROLOGICAL SURGERY      RETINAL DETACHMENT SURGERY      SHOULDER ARTHROPLASTY Left     TOTAL KNEE ARTHROPLASTY Right      Family History   Problem Relation Age of Onset    Osteoarthritis Mother     Cancer Father     Stroke Maternal Grandmother     Stroke Paternal Grandmother      Social History     Socioeconomic History    Marital status:      Spouse name: Not on file    Number of children: Not on file    Years of education: Not on file    Highest education level: Not on file   Occupational History    Not on file   Tobacco Use    Smoking status: Never    Smokeless tobacco: Never   Substance and Sexual Activity    Alcohol use: No    Drug use: No    Sexual activity: Not on file   Other Topics Concern    Not on file   Social History Narrative    eported as intolerable. Patient and  reside together. She denies any in-house stairs. 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 Activity is reported as cooking and household chores as tolerable. Exercise is r     Social Determinants of Health     Financial Resource Strain: Not on file   Food Insecurity: Not on file   Transportation Needs: Not on file   Physical Activity: Not on file   Stress: Not on file   Social Connections: Not on file   Intimate Partner Violence: Not on file   Housing Stability: Not on file     Current Outpatient Medications   Medication Sig Dispense Refill    Fe Fum-FePoly-Vit C-Vit B3 (INTEGRA) 62.5-62.5-40-3 MG CAPS TAKE ONE CAPSULE BY MOUTH ONE TIME DAILY      nitrofurantoin, macrocrystal-monohydrate, (MACROBID) 100 MG capsule Take 1 capsule by mouth in the morning and 1 capsule before bedtime. Do all this for 5 days. 10 capsule 0    lisinopril (PRINIVIL;ZESTRIL) 5 MG tablet Take 1 tablet by mouth in the morning. TAKE ONE TABLET BY MOUTH EVERY DAY. 90 tablet 1    budesonide-formoterol (SYMBICORT) 80-4.5 MCG/ACT AERO Inhale 2 puffs into the lungs in the morning.  INHALE 1 PUFF MOUTH TWICE A DAY FOR COUGH RINSE MOUTH AFTER EACH DOSE (DISCARD THREE MONTHS AFTER REMOVAL FROM [facility]). 10.2 g 3    diclofenac sodium (VOLTAREN) 1 % GEL Apply topically 2 times daily      sucralfate (CARAFATE) 1 GM tablet TAKE ONE TABLET BY MOUTH THREE TIMES A  tablet 0    clorazepate (TRANXENE) 3.75 MG tablet Take 1 tablet by mouth 3 times daily for 180 days. 270 tablet 1    sertraline (ZOLOFT) 100 MG tablet Take 1 tablet by mouth daily Take 1 1/2 tab daily. 90 tablet 1    albuterol sulfate HFA (PROVENTIL;VENTOLIN;PROAIR) 108 (90 Base) MCG/ACT inhaler INHALE TWO PUFFS BY MOUTH TWICE A DAY AS NEEDED FOR COUGH      apixaban (ELIQUIS) 2.5 MG TABS tablet Take 2.5 mg by mouth 2 times daily      ascorbic acid (VITAMIN C) 500 MG tablet Take by mouth      calcium carbonate (OYSTER SHELL CALCIUM 500 MG) 1250 (500 Ca) MG tablet Take by mouth daily      chlorhexidine (PERIDEX) 0.12 % solution SWISH AND DIP BRUSH INTO RINSE AND CLEAN TEETH EXPOSED IMPLANT TWO TIMES DAILY.       vitamin D (CHOLECALCIFEROL) 25 MCG (1000 UT) TABS tablet Take by mouth daily      furosemide (LASIX) 40 MG tablet Take 40 mg by mouth daily      latanoprost (XALATAN) 0.005 % ophthalmic solution Apply 1 drop to eye every evening      Lifitegrast (XIIDRA) 5 % SOLN Apply to eye      melatonin 10 MG CAPS capsule Take by mouth      montelukast (SINGULAIR) 10 MG tablet Take 10 mg by mouth daily      nystatin-triamcinolone (MYCOLOG II) 173293-3.1 UNIT/GM-% cream APPLY CREAM TO AFFECTED AREA TWICE A DAY      olopatadine (PATADAY) 0.2 % SOLN ophthalmic solution Apply 1 drop to eye daily      pantoprazole (PROTONIX) 40 MG tablet TAKE ONE TABLET BY MOUTH TWICE A DAY      polyethylene glycol (GLYCOLAX) 17 GM/SCOOP powder Take 17 g by mouth daily      sodium chloride (OCEAN) 0.65 % nasal spray 1 spray by Nasal route 4 times daily as needed      Docusate Sodium 100 MG TABS Take by mouth as needed (Patient not taking: Reported on 8/16/2022) predniSONE (DELTASONE) 5 MG tablet TAKE ONE TABLET BY MOUTH THREE TIMES A DAY ON DAYS 1-3, TAKE ONE TABLET BY MOUTH TWICE A DAY ON DAYS 4-6, TAKE ONE TABLET BY MOUTH ONE TIME (Patient not taking: Reported on 8/16/2022)       No current facility-administered medications for this visit. OBJECTIVE:  /75   Pulse 83   Temp 98.5 °F (36.9 °C) (Oral)   Resp 18   Ht 4' 10\" (1.473 m)   Wt 170 lb (77.1 kg)   LMP  (LMP Unknown)   SpO2 95%   BMI 35.53 kg/m²     Physical Exam:  Constitutional: Oriented to person, place, and time. Well-developed and well-nourished. HEENT: Normocephalic and atraumatic. Oropharynx is clear and moist.   Conjunctivae and EOM are normal. Pupils are equal, round, and reactive to light. No scleral icterus. Neck supple. No JVD present. No tracheal deviation present. No thyromegaly present. Lymph node No palpable submandibular, cervical, supraclavicular, axillary and inguinal lymph nodes. Skin Warm and dry. No bruising and no rash noted. No erythema. No pallor. Respiratory Effort normal and breath sounds normal.  No respiratory distress. No wheezes. No rales. No tenderness. CVS Normal rate, regular rhythm and normal heart sounds. Exam reveals no gallop, no friction and no rub. No murmur heard. Abdomen Soft. Bowel sounds are normal. Exhibits no distension. There is no tenderness. There is no rebound and no guarding. Neuro Normal reflexes. No cranial nerve deficit. Exhibits normal muscle tone, 5 of 5 strength of all extremities. MSK Normal range of motion in general.  No edema and no tenderness.    Psych Normal mood, affect, behavior, judgment and thought content      Labs:  Recent Results (from the past 24 hour(s))   CBC with Auto Differential    Collection Time: 08/16/22 12:19 PM   Result Value Ref Range    WBC 5.0 4.3 - 11.1 K/uL    RBC 4.47 4.05 - 5.2 M/uL    Hemoglobin 13.2 11.7 - 15.4 g/dL    Hematocrit 41.4 35.8 - 46.3 %    MCV 92.6 79.6 - 97.8 FL MCH 29.5 26.1 - 32.9 PG    MCHC 31.9 31.4 - 35.0 g/dL    RDW 16.8 (H) 11.9 - 14.6 %    Platelets 018 720 - 158 K/uL    MPV 9.4 9.4 - 12.3 FL    nRBC 0.00 0.0 - 0.2 K/uL    Differential Type AUTOMATED      Seg Neutrophils 48 43 - 78 %    Lymphocytes 33 13 - 44 %    Monocytes 13 (H) 4.0 - 12.0 %    Eosinophils % 5 0.5 - 7.8 %    Basophils 1 0.0 - 2.0 %    Immature Granulocytes 0 0.0 - 5.0 %    Segs Absolute 2.4 1.7 - 8.2 K/UL    Absolute Lymph # 1.7 0.5 - 4.6 K/UL    Absolute Mono # 0.7 0.1 - 1.3 K/UL    Absolute Eos # 0.2 0.0 - 0.8 K/UL    Basophils Absolute 0.0 0.0 - 0.2 K/UL    Absolute Immature Granulocyte 0.0 0.0 - 0.5 K/UL       Imaging:  No results found for this or any previous visit. ASSESSMENT/PLAN:   Diagnosis Orders   1. Other pulmonary embolism without acute cor pulmonale, unspecified chronicity ([facility] [address]        2. Severe obesity (BMI 35.0-39. 9) with comorbidity ([facility] [address]        3. Immobility        4.  Fatigue, unspecified type          76 y.o. female found B/L PE/DVT in 7/2020 probably related to immobility and started Eliquis, admitted for severe epistaxis  and acute anemia, held Eliquis, reported frequent nose bleeding for a year and had ENT eval without significant finding, also reported improper use of flonase nasal spray and discussed it can cause bleeding, consulted ENT for second opinion and agreed  with the improper use of flonase was causing septum bleeding, discussed with Dr. Ayla Rojas to resume half dose anticoagulation but she had another episode of epistaxis without resuming AC, again discussed her high risk of bleeding makes AC unpractical  and agreed to have 2-4 weeks of break, followed in office 10/14/20 with Hb up to 10.6, no more nose bleeding, discussed and agreed to resume Eliquis at 2.5 mg bid, apparently tolerated, discussed to continue this dose without challenging higher dose  again, plan to complete 6 months AC, plan to remove IVC filter, try to be more active, follow in 3 month but call as needed. She returned on 2/16/21, reported having bled once since last visit, having been repeating LE dopplers but not yet followed IR to schedule IVCF removal, again discussed the pro/con of anticoagulation in her complex situation, refill eliquis 2.5mg bid  for 3 more months meanwhile arranged to see IR to remove IVCF on 3/29/21, however fell and fractured left toe became immobile again, she stopped using nasal steroid and only use saline spray and had not bled again, continue low-dose Eliquis and followed  on 10/13/2021, hemoglobin 14.5, the toe fracture had healed but still sedentary for most part of her day, discussed with patient and her roommate her lack of mobility appears long-term as she appeared tolerating the current low-dose Eliquis well, she  desires to continue on the current low-dose Eliquis given he continues to have more issues with mobility and tolerate blood thinner better. She was admitted for COVID in 1/2022, and lingering fatigue, mobility and activity only worse, no bleeding episodes, labs reviewed stable, refill Eliquis and return in 6 months but call as needed. All questions are answered to her satisfaction. She will call for further questions and concerns. ECOG PERFORMANCE STATUS - 1- Restricted in physically strenuous activity but ambulatory and able to carry out work of a light or sedentary nature such as light house work, office work. Pain - 0 - No pain/10. None/Minimal pain - not affecting QOL     Fatigue - No flowsheet data found. Distress - No flowsheet data found. Elements of this note have been dictated via voice recognition software. Text and phrases may be limited by the accuracy and autoconversion of the software. The chart has been reviewed, but errors may still be present. Stefan Flores M.D.   [facility]  [address] [place] [address]  Office : [phone]  Fax : [phone]

## 2022-08-16 NOTE — PATIENT INSTRUCTIONS
Patient Instructions from Today's Visit    Reason for Visit:  Follow-up visit for DVT/PE      Plan:  -Continue low-dose Eliquis. Labs look stable.      Follow Up:  -6 months    Recent Lab Results:  Hospital Outpatient Visit on 08/16/2022   Component Date Value Ref Range Status    WBC 08/16/2022 5.0  4.3 - 11.1 K/uL Final    RBC 08/16/2022 4.47  4.05 - 5.2 M/uL Final    Hemoglobin 08/16/2022 13.2  11.7 - 15.4 g/dL Final    Hematocrit 08/16/2022 41.4  35.8 - 46.3 % Final    MCV 08/16/2022 92.6  79.6 - 97.8 FL Final    MCH 08/16/2022 29.5  26.1 - 32.9 PG Final    MCHC 08/16/2022 31.9  31.4 - 35.0 g/dL Final    RDW 08/16/2022 16.8 (A) 11.9 - 14.6 % Final    Platelets 04/98/9347 216  150 - 450 K/uL Final    MPV 08/16/2022 9.4  9.4 - 12.3 FL Final    nRBC 08/16/2022 0.00  0.0 - 0.2 K/uL Final    **Note: Absolute NRBC parameter is now reported with Hemogram**    Differential Type 08/16/2022 AUTOMATED    Final    Seg Neutrophils 08/16/2022 48  43 - 78 % Final    Lymphocytes 08/16/2022 33  13 - 44 % Final    Monocytes 08/16/2022 13 (A) 4.0 - 12.0 % Final    Eosinophils % 08/16/2022 5  0.5 - 7.8 % Final    Basophils 08/16/2022 1  0.0 - 2.0 % Final    Immature Granulocytes 08/16/2022 0  0.0 - 5.0 % Final    Segs Absolute 08/16/2022 2.4  1.7 - 8.2 K/UL Final    Absolute Lymph # 08/16/2022 1.7  0.5 - 4.6 K/UL Final    Absolute Mono # 08/16/2022 0.7  0.1 - 1.3 K/UL Final    Absolute Eos # 08/16/2022 0.2  0.0 - 0.8 K/UL Final    Basophils Absolute 08/16/2022 0.0  0.0 - 0.2 K/UL Final    Absolute Immature Granulocyte 08/16/2022 0.0  0.0 - 0.5 K/UL Final       Treatment Summary has been discussed and given to patient: N/A      -------------------------------------------------------------------------------------------------------------------  Please call our office at [phone] if you have any  of the following symptoms:   Fever of 100.5 or greater  Chills  Shortness of breath  Swelling or pain in one leg    After office hours an answering service is available and will contact a provider for emergencies or if you are experiencing any of the above symptoms. Patient did not express an interest in My Chart. My Chart log in information explained on the after visit summary printout at the [facility] [address] [address] desk.     Jess Kerr RN

## 2022-08-22 ENCOUNTER — SCHEDULED TELEPHONE ENCOUNTER (OUTPATIENT)
Dept: PULMONOLOGY | Age: 77
End: 2022-08-22
Payer: MEDICARE

## 2022-08-22 DIAGNOSIS — J45.909 UNCOMPLICATED ASTHMA, UNSPECIFIED ASTHMA SEVERITY, UNSPECIFIED WHETHER PERSISTENT: ICD-10-CM

## 2022-08-22 DIAGNOSIS — J84.9 ILD (INTERSTITIAL LUNG DISEASE) (HCC): ICD-10-CM

## 2022-08-22 DIAGNOSIS — R09.02 HYPOXEMIA: Primary | ICD-10-CM

## 2022-08-22 PROCEDURE — 99442 PR PHYS/QHP TELEPHONE EVALUATION 11-20 MIN: CPT | Performed by: INTERNAL MEDICINE

## 2022-08-22 RX ORDER — BUDESONIDE AND FORMOTEROL FUMARATE DIHYDRATE 80; 4.5 UG/1; UG/1
2 AEROSOL RESPIRATORY (INHALATION) 2 TIMES DAILY
Qty: 10.2 G | Refills: 3 | Status: SHIPPED | OUTPATIENT
Start: 2022-08-22

## 2022-08-22 NOTE — PROGRESS NOTES
Progress Notes by Liudmila Enriquez MD at 05/02/22 1041                    Author: Liudmila Enriquez MD  Service: --  Author Type: Physician       Filed: 05/02/22 1531  Encounter Date: 5/2/2022  Status: Signed          : Liudmila Enriquez MD (Physician)                            [address] [facility] , [address] [facility], [address] [place] [address]   [phone]      Patient Name:  Mary Beth Gonzalez       YOB: 1945         Office Visit 5/2/2022   The patient is seen by synchronous (real-time) audio-video technology on TELEPHONE VISIT. Consent:  The patient/healthcare decision maker is aware that this patient-initiated Telehealth encounter is a billable service, with coverage as determined by his insurance carrier. The patient is aware that he/she may receive a bill and has provided verbal consent to proceed: Yes     I was at the office  while conducting this visit. CHIEF COMPLAINT:         Chief Complaint       Patient presents with          Breathing Problem           HISTORY OF PRESENT ILLNESS:      Patient is 76years old white female who I talked over the phone for follow-up. Today she reports she is doing very well. She does have her baseline shortness of breath with exertion. She is on Symbicort which uses twice a day. She also has albuterol nebulizer which she does not use very frequently. She continues to use 4 L of oxygen at night. She does not use oxygen during the day. She is not very active just around the house and she is using walker because she is afraid she would fall. She has been using walker for a long time. She denies any significant cough or wheezing.              Past Medical History:        Diagnosis  Date           Abnormality of gait  7/18/2018       Anemia         Anxiety         Arthritis         Asthma         Calculus of kidney         Chronic pain         Contact dermatitis and other eczema, due to unspecified cause         Depressive disorder, not elsewhere classified         Encounter for chronic pain management         Esophageal reflux         Essential hypertension, benign         Frequent falls  7/18/2018       GERD (gastroesophageal reflux disease)             Pure hypercholesterolemia                     Problem List   Date Reviewed:  3/29/2022                          Codes  Class  Noted             Depression, major, single episode, moderate ([facility]  ICD-10-CM: F32.1   ICD-9-CM: 296.22    2/24/2022                       History of 2019 novel coronavirus disease (COVID-19)  ICD-10-CM: Z86.16   ICD-9-CM: V12.09    2/1/2022                       Pulmonary infiltrate  ICD-10-CM: R91.8   ICD-9-CM: 793.19    2/1/2022                       Debility  ICD-10-CM: R53.81   ICD-9-CM: 799.3    2/1/2022                       Suspected sleep apnea  ICD-10-CM: R29.818   ICD-9-CM: 781.99    2/1/2022                       Suspected CHF (congestive heart failure)  ICD-10-CM: R09.89   ICD-9-CM: 785.9    1/31/2022                       Iron deficiency anemia  ICD-10-CM: D50.9   ICD-9-CM: 280.9    11/18/2021                       Sciatica, right side  ICD-10-CM: M54.31   ICD-9-CM: 724.3    11/18/2021                       High blood sugar  ICD-10-CM: R73.9   ICD-9-CM: 790.29    11/18/2021                       Injury of inferior vena cava  ICD-10-CM: S35.10XA   ICD-9-CM: 902.10    3/29/2021                       Essential hypertension  ICD-10-CM: I10   ICD-9-CM: 401.9    3/29/2021                       History of pulmonary embolism  ICD-10-CM: Z86.711   ICD-9-CM: V12.55    3/29/2021                       Acute blood loss anemia  ICD-10-CM: D62   ICD-9-CM: 285.1    9/15/2020                       Acute deep vein thrombosis (DVT) of both lower extremities (HCC)  ICD-10-CM: I82.403   ICD-9-CM: 453.40    8/3/2020                       S/P IVC filter  ICD-10-CM: A06.329   ICD-9-CM: V45.89    8/3/2020                       Rotator cuff disorder, right  ICD-10-CM: M67.911   ICD-9-CM: 726.10 8/3/2020                       Baker's cyst of knee, right  ICD-10-CM: M71.21   ICD-9-CM: 727.51    8/3/2020                       Moderate tricuspid regurgitation  ICD-10-CM: I07.1   ICD-9-CM: 397.0    8/3/2020                       Moderate mitral valve regurgitation  ICD-10-CM: I34.0   ICD-9-CM: 424.0    8/3/2020                       Pulmonary HTN (HCC)  ICD-10-CM: I27.20   ICD-9-CM: 416.8    8/3/2020                       Constipation  ICD-10-CM: K59.00   ICD-9-CM: 564.00    8/2/2020                       Hypokalemia  ICD-10-CM: E87.6   ICD-9-CM: 276.8    7/29/2020                       Acute hypoxemic respiratory failure (HCC)  ICD-10-CM: J96.01   ICD-9-CM: 518.81    7/27/2020                       Pulmonary embolism, bilateral ([facility]  ICD-10-CM: I26.99   ICD-9-CM: 415.19    7/27/2020                       Person under investigation for COVID-19  ICD-10-CM: Z20.822   ICD-9-CM: V01.79    7/27/2020                       Asthma  ICD-10-CM: J45.909   ICD-9-CM: 493.90    7/27/2020                       DDD (degenerative disc disease), cervical  ICD-10-CM: M50.30   ICD-9-CM: 722.4    12/11/2018                       Cerebellar atrophy ([facility]  ICD-10-CM: G31.9   ICD-9-CM: 331.9    10/29/2018                       Cervical stenosis of spinal canal  ICD-10-CM: M48.02   ICD-9-CM: 723.0    10/29/2018                       Abnormality of gait  ICD-10-CM: R26.9   ICD-9-CM: 781.2    7/18/2018                       Frequent falls  ICD-10-CM: R29.6   ICD-9-CM: V15.88    7/18/2018                       Neck pain  ICD-10-CM: M54.2   ICD-9-CM: 723.1    7/18/2018                       Postural imbalance  ICD-10-CM: R29.3   ICD-9-CM: 729.90    7/18/2018                       Back pain  ICD-10-CM: M54.9   ICD-9-CM: 724.5    7/20/2015                       Contact dermatitis and other eczema, due to unspecified cause  ICD-10-CM: L25.9   ICD-9-CM: 692.9    Unknown                       Esophageal reflux  ICD-10-CM: K21.9   ICD-9-CM: 530.81    Unknown                       High cholesterol  ICD-10-CM: E78.00   ICD-9-CM: 272.0    Unknown                       Depressive disorder, not elsewhere classified  ICD-10-CM: F32.89   ICD-9-CM: 311    Unknown                       Anxiety  ICD-10-CM: F41.9   ICD-9-CM: 300.00    Unknown                                   Past Surgical History:         Procedure  Laterality  Date            HX CATARACT REMOVAL  Bilateral         HX COLONOSCOPY           HX GI           HX HYSTERECTOMY           HX KNEE REPLACEMENT  Right         HX LITHOTRIPSY           HX RETINAL DETACHMENT REPAIR           HX SHOULDER REPLACEMENT  Left         IR REMOVE IVC FILTER W SI    3/29/2021       [facility] OR INTRACRANIAL    7/28/2020            NEUROLOGICAL PROCEDURE UNLISTED               No flowsheet data found. Social History            Socioeconomic History           Marital status:                Spouse name:  Not on file           Number of children:  Not on file       Years of education:  Not on file       Highest education level:  Not on file       Occupational History          Not on file       Tobacco Use           Smoking status:  Never Smoker       Smokeless tobacco:  Never Used       Substance and Sexual Activity           Alcohol use:  No       Drug use:  No       Sexual activity:  Not on file        Other Topics  Concern          Not on file       Social History Narrative          Patient and  reside together. She denies any in-house stairs. 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 Activity is reported as cooking and household chores as tolerable. Exercise is reported as intolerable.              Social Determinants of Health            Financial Resource Strain:           Difficulty of Paying Living Expenses: Not on file       Food Insecurity:           Worried About [address] in the Last Year: Not on file       Ran Out of Food in the Last Year: Not on file       Transportation Needs:           Lack of Transportation (Medical): Not on file       Lack of Transportation (Non-Medical): Not on file       Physical Activity:           Days of Exercise per Week: Not on file       Minutes of Exercise per Session: Not on file       Stress:           Feeling of Stress : Not on file       Social Connections:           Frequency of Communication with Friends and Family: Not on file       Frequency of Social Gatherings with Friends and Family: Not on file       Attends Worship Services: Not on file       Active Member of [address] [place] [address] [employer] or Organizations: Not on file       Attends Club or Organization Meetings: Not on file       Marital Status: Not on file       Intimate Partner Violence:           Fear of Current or Ex-Partner: Not on file       Emotionally Abused: Not on file       Physically Abused: Not on file       Sexually Abused: Not on file       Housing Stability:           Unable to Pay for Housing in the Last Year: Not on file       Number of [place] in the Last Year: Not on file          Unstable Housing in the Last Year: Not on file                  Family History         Problem  Relation  Age of Onset            OSTEOARTHRITIS  Mother         Cancer  Father         Stroke  Maternal Grandmother              Stroke  Paternal Grandmother                    Allergies        Allergen  Reactions           Flexeril [Cyclobenzaprine]  Unknown (comments)       Ketoprofen  Unknown (comments)       Plaquenil [Hydroxychloroquine]  Unknown (comments)           Shellfish Derived  Unknown (comments)                  Current Outpatient Medications        Medication  Sig           furosemide (LASIX) 40 mg tablet  Take 1 Tablet by mouth daily. montelukast (SINGULAIR) 10 mg tablet  Take 1 Tablet by mouth daily.        pantoprazole (PROTONIX) 40 mg tablet  TAKE ONE TABLET BY MOUTH TWICE A DAY       lisinopriL (PRINIVIL, ZESTRIL) 5 mg tablet  TAKE ONE TABLET BY MOUTH EVERY DAY       sertraline (ZOLOFT) 100 mg tablet  Take 1 1/2 tab daily. nystatin-triamcinolone (MYCOLOG II) topical cream  APPLY CREAM TO AFFECTED AREA TWICE A DAY       lifitegrast (Xiidra) 5 % dpet  Apply  to eye. apixaban (ELIQUIS) 2.5 mg tablet  Take 1 Tablet by mouth two (2) times a day. Iron Fum & PS Cmp-Vit C-Niacin (Integra) 125-40-3 mg cap  Take 125 mg by mouth daily. clorazepate (TRANXENE) 3.75 mg tablet  Take 1 Tablet by mouth three (3) times daily. Max Daily Amount: 11.25 mg. Indications: anxious       ProAir HFA 90 mcg/actuation inhaler  INHALE TWO PUFFS BY MOUTH TWICE A DAY AS NEEDED FOR COUGH       Symbicort 80-4.5 mcg/actuation HFAA  INHALE 1 PUFF MOUTH TWICE A DAY FOR COUGH RINSE MOUTH AFTER EACH DOSE (DISCARD THREE MONTHS AFTER REMOVAL FROM FOIL POUCH)       chlorhexidine (PERIDEX) 0.12 % solution  SWISH AND DIP BRUSH INTO RINSE AND CLEAN TEETH EXPOSED IMPLANT TWO TIMES DAILY. sucralfate (Carafate) 1 gram tablet  Take 1 Tab by mouth three (3) times daily. olopatadine (Pataday) 0.2 % drop ophthalmic solution  Administer 1 Drop to both eyes daily. latanoprost (XALATAN) 0.005 % ophthalmic solution  Administer 1 Drop to both eyes every evening. calcium carbonate (OS-AMANDA) 500 mg calcium (1,250 mg) tablet  Take  by mouth daily. ascorbic acid, vitamin C, (VITAMIN C) 500 mg tablet  Take  by mouth.       melatonin 10 mg cap  Take  by mouth. glucosamine & chondroit sul. Na 750-400 mg cmpk  Take  by mouth. omega-3 fatty acids-vitamin e (FISH OIL) 1,000 mg cap  Take 1 Cap by mouth. 1 by oral route twice daily           VIT C/BRYANNA AC/LUT/COPPER/ZNOX (PRESERVISION LUTEIN PO)  Take  by mouth. 1 by oral route daily       cholecalciferol (VITAMIN D3) 1,000 unit tablet  Take  by mouth daily.        predniSONE (DELTASONE) 5 mg tablet  TAKE ONE TABLET BY MOUTH THREE TIMES A DAY ON DAYS 1-3, TAKE ONE TABLET BY MOUTH TWICE A DAY ON DAYS 4-6, TAKE ONE TABLET BY MOUTH ONE TIME (Patient  not taking: Reported on 5/2/2022)       sodium chloride (Saline Nasal) 0.65 % nasal squeeze bottle  0.05 mL by Both Nostrils route four (4) times daily as needed for Congestion or Nasal Dryness. (Patient not taking: Reported on 5/2/2022)       polyethylene glycol (MIRALAX) 17 gram packet  Take 1 Packet by mouth daily. (Patient not taking: Reported on 5/2/2022)       dietary supplement cap  Take  by mouth. (Patient not taking: Reported on 3/29/2022)           docusate sodium (STOOL SOFTENER) 100 mg tab  Take  by mouth as needed. (Patient not taking: Reported on 3/29/2022)            No current facility-administered medications for this visit. REVIEW OF SYSTEMS:       Review of Systems    Constitutional: Positive for malaise/fatigue. Negative for chills, fever and weight loss. Respiratory: Positive for cough, shortness of breath  and wheezing. Negative for hemoptysis and sputum production. Cardiovascular: Negative for chest pain, palpitations and leg swelling. Gastrointestinal: Negative for abdominal pain, constipation, diarrhea, heartburn, nausea and vomiting. Neurological: Negative for dizziness, tremors, seizures, weakness and headaches. PHYSICAL EXAM:      Visit Vitals        BP  120/68     Pulse  (!) 107     Temp  97.1 °F (36.2 °C) (Temporal)     Resp  20     Ht  4' 10\" (1.473 m)     Wt  175 lb (79.4 kg)     SpO2  93%        BMI  36.58 kg/m²            GENERAL APPEARANCE:    The patient is normal weight and in no respiratory distress. HEENT:    PERRL. Conjunctivae unremarkable. Nasal mucosa is without epistaxis, exudate, or polyps. Gums and dentition are unremarkable. There is no oropharyngeal narrowing. TMs are clear. NECK/LYMPHATIC:    Symmetrical with no elevation of jugular venous pulsation. Trachea midline. No thyroid enlargement.   No cervical adenopathy. LUNGS:    Normal respiratory effort with symmetrical lung expansion. Breath sounds clear. HEART:    There is a regular rate and rhythm. No murmur, rub, or gallop. There is no edema in the lower extremities. ABDOMEN:    Soft and non-tender. No hepatosplenomegaly. Bowel sounds are normal.      SKIN:    There are no rashes, cyanosis, jaundice, or ecchymosis present. EXTREMITIES:    The extremities are unremarkable without clubbing, cyanosis, joint inflammation, degenerative, or ischemic change. MUSCULOSKELETAL:    There is no abnormal tone, muscle atrophy, or abnormal movement present. NEURO:    The patient is alert and oriented to person, place, and time. Memory appears intact and mood is normal.  No gross sensorimotor deficits are present. DIAGNOSTIC TESTS:    PCXR: No results found for this or any previous visit. CXR PA and lateral:  05/02/2022                           Screening chest CT: No/ results found for this or any previous visit. CT of chest without contrast:   No results found for this or any previous visit. CT of chest with contrast:  No results found for this or any previous visit. PET/CT: No results found for this or any previous visit. Spirometry:                   ASSESSMENT:  (Medical Decision Making)   Diagnosis Orders   1. Hypoxemia  DME - DURABLE MEDICAL EQUIPMENT  Ncturnal only       2. Uncomplicated asthma, unspecified asthma severity, unspecified whether persistent  On symbicort and prn albuterol       3.  ILD (interstitial lung disease) ([facility]  DME - DURABLE MEDICAL EQUIPMENT  Had some chronic changes on last CXR after COVID ,last visit declined chest CT                              PLAN:   --continue 4L at night  - continue symbicort and albuterol nebs  -follow up in 6 months                    Orders Placed This Encounter          RT--OVERNIGHT OXIMETRY           Over 50% of today's office visit was spent in face to face time reviewing test results/records, prognosis, importance of compliance, education about disease process, benefits of  medications, instructions for management of acute flare-ups, and follow up plans. Total time spent was 20  minutes. Tati Gunderson MD   Electronically signed      Dictated using voice recognition software.   Proof read but unrecognized errors may exist.

## 2022-08-31 ENCOUNTER — OFFICE VISIT (OUTPATIENT)
Dept: FAMILY MEDICINE CLINIC | Facility: CLINIC | Age: 77
End: 2022-08-31
Payer: MEDICARE

## 2022-08-31 VITALS
BODY MASS INDEX: 35.26 KG/M2 | HEIGHT: 58 IN | WEIGHT: 168 LBS | DIASTOLIC BLOOD PRESSURE: 80 MMHG | SYSTOLIC BLOOD PRESSURE: 132 MMHG

## 2022-08-31 DIAGNOSIS — I34.0 MODERATE MITRAL VALVE REGURGITATION: ICD-10-CM

## 2022-08-31 DIAGNOSIS — G31.9 CEREBELLAR ATROPHY (HCC): ICD-10-CM

## 2022-08-31 DIAGNOSIS — D50.9 IRON DEFICIENCY ANEMIA, UNSPECIFIED IRON DEFICIENCY ANEMIA TYPE: ICD-10-CM

## 2022-08-31 DIAGNOSIS — R53.83 FATIGUE, UNSPECIFIED TYPE: ICD-10-CM

## 2022-08-31 DIAGNOSIS — I10 ESSENTIAL HYPERTENSION: Primary | ICD-10-CM

## 2022-08-31 DIAGNOSIS — J30.1 SEASONAL ALLERGIC RHINITIS DUE TO POLLEN: ICD-10-CM

## 2022-08-31 DIAGNOSIS — R26.9 ABNORMALITY OF GAIT: ICD-10-CM

## 2022-08-31 DIAGNOSIS — I82.4Y3 ACUTE DEEP VEIN THROMBOSIS (DVT) OF PROXIMAL VEIN OF BOTH LOWER EXTREMITIES (HCC): ICD-10-CM

## 2022-08-31 DIAGNOSIS — I26.99 PULMONARY EMBOLISM, BILATERAL (HCC): ICD-10-CM

## 2022-08-31 DIAGNOSIS — D64.9 ANEMIA, UNSPECIFIED TYPE: ICD-10-CM

## 2022-08-31 DIAGNOSIS — E55.9 VITAMIN D DEFICIENCY: ICD-10-CM

## 2022-08-31 DIAGNOSIS — F41.9 ANXIETY: ICD-10-CM

## 2022-08-31 DIAGNOSIS — R09.89 SUSPECTED CHF (CONGESTIVE HEART FAILURE): ICD-10-CM

## 2022-08-31 DIAGNOSIS — J45.20 MILD INTERMITTENT ASTHMA, UNSPECIFIED WHETHER COMPLICATED: ICD-10-CM

## 2022-08-31 DIAGNOSIS — I27.20 PULMONARY HTN (HCC): ICD-10-CM

## 2022-08-31 PROCEDURE — 1036F TOBACCO NON-USER: CPT | Performed by: FAMILY MEDICINE

## 2022-08-31 PROCEDURE — 1123F ACP DISCUSS/DSCN MKR DOCD: CPT | Performed by: FAMILY MEDICINE

## 2022-08-31 PROCEDURE — G8417 CALC BMI ABV UP PARAM F/U: HCPCS | Performed by: FAMILY MEDICINE

## 2022-08-31 PROCEDURE — G8427 DOCREV CUR MEDS BY ELIG CLIN: HCPCS | Performed by: FAMILY MEDICINE

## 2022-08-31 PROCEDURE — 1090F PRES/ABSN URINE INCON ASSESS: CPT | Performed by: FAMILY MEDICINE

## 2022-08-31 PROCEDURE — 99214 OFFICE O/P EST MOD 30 MIN: CPT | Performed by: FAMILY MEDICINE

## 2022-08-31 PROCEDURE — G8400 PT W/DXA NO RESULTS DOC: HCPCS | Performed by: FAMILY MEDICINE

## 2022-08-31 RX ORDER — SERTRALINE HYDROCHLORIDE 100 MG/1
TABLET, FILM COATED ORAL
Qty: 135 TABLET | Refills: 1 | Status: SHIPPED | OUTPATIENT
Start: 2022-08-31

## 2022-08-31 RX ORDER — MONTELUKAST SODIUM 10 MG/1
10 TABLET ORAL DAILY
Qty: 90 TABLET | Refills: 1 | Status: SHIPPED | OUTPATIENT
Start: 2022-08-31

## 2022-08-31 ASSESSMENT — ENCOUNTER SYMPTOMS
RESPIRATORY NEGATIVE: 1
ABDOMINAL PAIN: 0
EYES NEGATIVE: 1

## 2022-08-31 ASSESSMENT — PATIENT HEALTH QUESTIONNAIRE - PHQ9
SUM OF ALL RESPONSES TO PHQ QUESTIONS 1-9: 0
1. LITTLE INTEREST OR PLEASURE IN DOING THINGS: 0
SUM OF ALL RESPONSES TO PHQ9 QUESTIONS 1 & 2: 0
SUM OF ALL RESPONSES TO PHQ QUESTIONS 1-9: 0
2. FEELING DOWN, DEPRESSED OR HOPELESS: 0

## 2022-08-31 NOTE — PROGRESS NOTES
[address] [place] [address]  _______________________________________  Shayy Diallo MD                 [address] [place] [address] Drive, [address]. MD Ioana                     Jose Good [address]                                                                                    Phone: [phone]                                                                                    Fax: [phone]    Natalia Bryant is a 76 y.o. female who is seen for evaluation of   Chief Complaint   Patient presents with    Hypertension    Cholesterol Problem    Asthma    Mental Health Problem       HPI:   Hypertension  This is a chronic problem. The current episode started more than 1 year ago. The problem is unchanged. The problem is controlled. Pertinent negatives include no chest pain. (Patient reports blood pressure is very well controlled without side effects, needs refills recheck labs)   Asthma  Chronicity: Chronic persistent asthma, no recent flares, needs refills of Singulair for prevention, has rescue inhalers on hand. The current episode started more than 1 month ago. Pertinent negatives include no appetite change or chest pain. Her past medical history is significant for asthma. Mental Health Problem  Episode onset: Chronic moderate to severe depression with anxiety, no thoughts of self-harm, she states that her symptoms are actually much better controlled since the last visit. Additional symptoms of the illness include fatigue. Additional symptoms of the illness do not include appetite change or abdominal pain. Fatigue  This is a chronic problem. Episode frequency: Patient's fatigue is gotten more severe. She states that she feels like she could sleep all the time. Denies any snoring or disrupted breathing. Refuses to consider home sleep study. Labs pending as listed above. Associated symptoms include fatigue.  Pertinent negatives include no abdominal pain, anorexia, chest pain, chills or diaphoresis. Chief Complaint   Patient presents with    Hypertension    Cholesterol Problem    Asthma    Mental Health Problem         Review of Systems:    Review of Systems   Constitutional:  Positive for fatigue. Negative for activity change, appetite change, chills and diaphoresis. HENT: Negative. Eyes: Negative. Respiratory: Negative. Cardiovascular:  Negative for chest pain. Gastrointestinal:  Negative for abdominal pain and anorexia. Endocrine: Negative. Genitourinary: Negative. History:  Past Medical History:   Diagnosis Date    Abnormality of gait 7/18/2018    Anemia     Anxiety     Arthritis     Asthma     Calculus of kidney     Chronic pain     Contact dermatitis and other eczema, due to unspecified cause     Depressive disorder, not elsewhere classified     Encounter for chronic pain management     Esophageal reflux     Essential hypertension, benign     Frequent falls 7/18/2018    GERD (gastroesophageal reflux disease)     Pure hypercholesterolemia        Past Surgical History:   Procedure Laterality Date    CATARACT REMOVAL Bilateral     COLONOSCOPY      GI      HYSTERECTOMY (CERVIX STATUS UNKNOWN)      IR IVC FILTER RETRIEVAL  3/29/2021    IR IVC FILTER RETRIEVAL  3/29/2021    IR IVC FILTER RETRIEVAL 3/29/2021 [facility]    IR MECHANICAL ART THROMBECTOMY INIT  7/28/2020    IR MECHANICAL ART THROMBECTOMY INIT  7/28/2020    IR MECHANICAL ART THROMBECTOMY INIT 7/28/2020 [facility]    LITHOTRIPSY      NEUROLOGICAL SURGERY      RETINAL DETACHMENT SURGERY      SHOULDER ARTHROPLASTY Left     TOTAL KNEE ARTHROPLASTY Right        Family History   Problem Relation Age of Onset    Osteoarthritis Mother     Cancer Father     Stroke Maternal Grandmother     Stroke Paternal Grandmother        Social History     Tobacco Use    Smoking status: Never    Smokeless tobacco: Never   Substance Use Topics    Alcohol use:  No Allergies   Allergen Reactions    Cyclobenzaprine Other (See Comments)    Hydroxychloroquine Other (See Comments)    Ketoprofen Other (See Comments)    Molds & Smuts     Pollen Extract     Shellfish Allergy      Other reaction(s): Unknown (comments)       Current Outpatient Medications   Medication Sig Dispense Refill    montelukast (SINGULAIR) 10 MG tablet Take 1 tablet by mouth daily 90 tablet 1    sertraline (ZOLOFT) 100 MG tablet Take 1 1/2 tab daily. 135 tablet 1    OXYGEN Inhale into the lungs 4 liters qhs      budesonide-formoterol (SYMBICORT) 80-4.5 MCG/ACT AERO Inhale 2 puffs into the lungs in the morning and 2 puffs in the evening. INHALE 1 PUFF MOUTH TWICE A DAY FOR COUGH RINSE MOUTH AFTER EACH DOSE (DISCARD THREE MONTHS AFTER REMOVAL FROM [facility]). 10.2 g 3    Fe Fum-FePoly-Vit C-Vit B3 (INTEGRA) 62.5-62.5-40-3 MG CAPS TAKE ONE CAPSULE BY MOUTH ONE TIME DAILY      apixaban (ELIQUIS) 2.5 MG TABS tablet Take 1 tablet by mouth in the morning and 1 tablet before bedtime. 60 tablet 5    lisinopril (PRINIVIL;ZESTRIL) 5 MG tablet Take 1 tablet by mouth in the morning. TAKE ONE TABLET BY MOUTH EVERY DAY. 90 tablet 1    diclofenac sodium (VOLTAREN) 1 % GEL Apply topically 2 times daily      sucralfate (CARAFATE) 1 GM tablet TAKE ONE TABLET BY MOUTH THREE TIMES A  tablet 0    clorazepate (TRANXENE) 3.75 MG tablet Take 1 tablet by mouth 3 times daily for 180 days. 270 tablet 1    albuterol sulfate HFA (PROVENTIL;VENTOLIN;PROAIR) 108 (90 Base) MCG/ACT inhaler INHALE TWO PUFFS BY MOUTH TWICE A DAY AS NEEDED FOR COUGH      ascorbic acid (VITAMIN C) 500 MG tablet Take by mouth      calcium carbonate (OYSTER SHELL CALCIUM 500 MG) 1250 (500 Ca) MG tablet Take by mouth daily      chlorhexidine (PERIDEX) 0.12 % solution SWISH AND DIP BRUSH INTO RINSE AND CLEAN TEETH EXPOSED IMPLANT TWO TIMES DAILY.       vitamin D (CHOLECALCIFEROL) 25 MCG (1000 UT) TABS tablet Take by mouth daily      Docusate Sodium 100 MG TABS Take by mouth as needed      furosemide (LASIX) 40 MG tablet Take 40 mg by mouth daily      latanoprost (XALATAN) 0.005 % ophthalmic solution Apply 1 drop to eye every evening      Lifitegrast (XIIDRA) 5 % SOLN Apply to eye      melatonin 10 MG CAPS capsule Take by mouth      nystatin-triamcinolone (MYCOLOG II) 769088-6.1 UNIT/GM-% cream APPLY CREAM TO AFFECTED AREA TWICE A DAY      olopatadine (PATADAY) 0.2 % SOLN ophthalmic solution Apply 1 drop to eye daily      pantoprazole (PROTONIX) 40 MG tablet TAKE ONE TABLET BY MOUTH TWICE A DAY      polyethylene glycol (GLYCOLAX) 17 GM/SCOOP powder Take 17 g by mouth daily      predniSONE (DELTASONE) 5 MG tablet TAKE ONE TABLET BY MOUTH THREE TIMES A DAY ON DAYS 1-3, TAKE ONE TABLET BY MOUTH TWICE A DAY ON DAYS 4-6, TAKE ONE TABLET BY MOUTH ONE TIME      sodium chloride (OCEAN) 0.65 % nasal spray 1 spray by Nasal route 4 times daily as needed       No current facility-administered medications for this visit. Vitals:    /80   Ht 4' 10\" (1.473 m)   Wt 168 lb (76.2 kg)   LMP  (LMP Unknown)   BMI 35.11 kg/m²     Physical Exam:  Physical Exam  Constitutional:       Appearance: Normal appearance. She is obese. She is not ill-appearing or diaphoretic. HENT:      Head: Normocephalic. Right Ear: Tympanic membrane normal.      Left Ear: Tympanic membrane normal.      Nose: No congestion or rhinorrhea. Cardiovascular:      Rate and Rhythm: Normal rate and regular rhythm. Pulses: Normal pulses. Heart sounds: Normal heart sounds. Pulmonary:      Effort: Pulmonary effort is normal.      Breath sounds: Normal breath sounds. Musculoskeletal:         General: Normal range of motion. Cervical back: Normal range of motion and neck supple. Skin:     General: Skin is warm and dry. Neurological:      Mental Status: She is alert and oriented to person, place, and time.       Coordination: Coordination abnormal. Gait: Gait abnormal.      Comments: Patient with unsteady gait, walks with assistance and a Rollator walker, no recent falls     Assessment/Plan:     ICD-10-CM    1. Essential hypertension  I10       2. Anxiety  F41.9 sertraline (ZOLOFT) 100 MG tablet      3. Pulmonary HTN ([facility])  G84.93 Basic Metabolic Panel      4. Pulmonary embolism, bilateral ([facility])  I26.99       5. Acute deep vein thrombosis (DVT) of proximal vein of both lower extremities ([facility])  I82.4Y3       6. Moderate mitral valve regurgitation  I34.0       7. Suspected CHF (congestive heart failure)  R09.89       8. Mild intermittent asthma, unspecified whether complicated  Y25.63 montelukast (SINGULAIR) 10 MG tablet      9. Cerebellar atrophy ([facility])  G31.9       10. Abnormality of gait  R26.9       11. Iron deficiency anemia, unspecified iron deficiency anemia type  D50.9       12. Seasonal allergic rhinitis due to pollen  J30.1 montelukast (SINGULAIR) 10 MG tablet      13. Anemia, unspecified type  D64.9 Vitamin B12      14. Vitamin D deficiency  E55.9 Vitamin D 25 Hydroxy      15. Fatigue, unspecified type  R53.83 TSH      Patient has fairly severe chronic fatigue, she refuses to let us do a home sleep evaluation and does not want to consider CPAP therapy. Her roommate has not heard her snore or stop breathing or gasp for air. Were going to check labs as listed above including TSH, vitamin B12, vitamin D to assess.   Her other routine medications were sent to her 90-[facility] pharmacy  Meds sent  Labs sent  Encourage diet and exercise   Encourage weight loss,  Encourage home sugar monitoring  Call if problems  Recheck 3 months     Kaludia Mccain MD

## 2022-09-01 LAB
25(OH)D3 SERPL-MCNC: 53.1 NG/ML (ref 30–100)
ANION GAP SERPL CALC-SCNC: 7 MMOL/L (ref 4–13)
BUN SERPL-MCNC: 10 MG/DL (ref 8–23)
CALCIUM SERPL-MCNC: 9.1 MG/DL (ref 8.3–10.4)
CHLORIDE SERPL-SCNC: 107 MMOL/L (ref 101–110)
CO2 SERPL-SCNC: 27 MMOL/L (ref 21–32)
CREAT SERPL-MCNC: 0.5 MG/DL (ref 0.6–1)
GLUCOSE SERPL-MCNC: 97 MG/DL (ref 65–100)
POTASSIUM SERPL-SCNC: 3.9 MMOL/L (ref 3.5–5.1)
SODIUM SERPL-SCNC: 141 MMOL/L (ref 136–145)
TSH, 3RD GENERATION: 1.21 UIU/ML (ref 0.36–3.74)
VIT B12 SERPL-MCNC: 1951 PG/ML (ref 193–986)

## 2022-09-07 ENCOUNTER — TELEPHONE (OUTPATIENT)
Dept: PULMONOLOGY | Age: 77
End: 2022-09-07

## 2022-09-07 NOTE — TELEPHONE ENCOUNTER
Order to  02 was placed in [place] 8/24/2022 and excepted 8/31/2022 by Karis Tidwell.  LEANNA BROOKS MA

## 2022-09-30 ENCOUNTER — TELEPHONE (OUTPATIENT)
Dept: FAMILY MEDICINE CLINIC | Facility: CLINIC | Age: 77
End: 2022-09-30

## 2022-09-30 DIAGNOSIS — R30.0 DYSURIA: Primary | ICD-10-CM

## 2022-09-30 RX ORDER — CEPHALEXIN 500 MG/1
500 CAPSULE ORAL 2 TIMES DAILY
Qty: 14 CAPSULE | Refills: 0 | Status: SHIPPED | OUTPATIENT
Start: 2022-09-30 | End: 2022-10-06

## 2022-09-30 NOTE — TELEPHONE ENCOUNTER
Patient called and said she thinks she has a UTI. Wants to know if she can get Keflex called in. I told her we may not be able to without seeing her, but she wanted me to check. Her number is [phone].

## 2022-09-30 NOTE — TELEPHONE ENCOUNTER
I called pt to confirm which pharmacy she would like to have abx sent to - [facility] [address]    [place] [employer], [place]

## 2022-10-06 ENCOUNTER — OFFICE VISIT (OUTPATIENT)
Dept: FAMILY MEDICINE CLINIC | Facility: CLINIC | Age: 77
End: 2022-10-06
Payer: MEDICARE

## 2022-10-06 VITALS — WEIGHT: 163 LBS | BODY MASS INDEX: 34.22 KG/M2 | HEIGHT: 58 IN

## 2022-10-06 DIAGNOSIS — D50.9 IRON DEFICIENCY ANEMIA, UNSPECIFIED IRON DEFICIENCY ANEMIA TYPE: ICD-10-CM

## 2022-10-06 DIAGNOSIS — Z00.00 ROUTINE GENERAL MEDICAL EXAMINATION AT A HEALTH CARE FACILITY: ICD-10-CM

## 2022-10-06 DIAGNOSIS — I10 ESSENTIAL HYPERTENSION: ICD-10-CM

## 2022-10-06 DIAGNOSIS — N30.00 ACUTE CYSTITIS WITHOUT HEMATURIA: ICD-10-CM

## 2022-10-06 DIAGNOSIS — R30.0 DYSURIA: Primary | ICD-10-CM

## 2022-10-06 PROCEDURE — G8400 PT W/DXA NO RESULTS DOC: HCPCS | Performed by: FAMILY MEDICINE

## 2022-10-06 PROCEDURE — 99214 OFFICE O/P EST MOD 30 MIN: CPT | Performed by: FAMILY MEDICINE

## 2022-10-06 PROCEDURE — 1036F TOBACCO NON-USER: CPT | Performed by: FAMILY MEDICINE

## 2022-10-06 PROCEDURE — G8427 DOCREV CUR MEDS BY ELIG CLIN: HCPCS | Performed by: FAMILY MEDICINE

## 2022-10-06 PROCEDURE — 1090F PRES/ABSN URINE INCON ASSESS: CPT | Performed by: FAMILY MEDICINE

## 2022-10-06 PROCEDURE — G8417 CALC BMI ABV UP PARAM F/U: HCPCS | Performed by: FAMILY MEDICINE

## 2022-10-06 PROCEDURE — G0439 PPPS, SUBSEQ VISIT: HCPCS | Performed by: FAMILY MEDICINE

## 2022-10-06 PROCEDURE — G8484 FLU IMMUNIZE NO ADMIN: HCPCS | Performed by: FAMILY MEDICINE

## 2022-10-06 PROCEDURE — 1123F ACP DISCUSS/DSCN MKR DOCD: CPT | Performed by: FAMILY MEDICINE

## 2022-10-06 RX ORDER — CIPROFLOXACIN 500 MG/1
500 TABLET, FILM COATED ORAL 2 TIMES DAILY
Qty: 14 TABLET | Refills: 0 | Status: SHIPPED | OUTPATIENT
Start: 2022-10-06 | End: 2022-10-13 | Stop reason: SDUPTHER

## 2022-10-06 ASSESSMENT — ENCOUNTER SYMPTOMS
CHOKING: 0
EYES NEGATIVE: 1
APNEA: 0
SHORTNESS OF BREATH: 0
CHEST TIGHTNESS: 0

## 2022-10-06 ASSESSMENT — PATIENT HEALTH QUESTIONNAIRE - PHQ9
SUM OF ALL RESPONSES TO PHQ9 QUESTIONS 1 & 2: 0
SUM OF ALL RESPONSES TO PHQ QUESTIONS 1-9: 0
1. LITTLE INTEREST OR PLEASURE IN DOING THINGS: 0
SUM OF ALL RESPONSES TO PHQ QUESTIONS 1-9: 0
SUM OF ALL RESPONSES TO PHQ QUESTIONS 1-9: 0
2. FEELING DOWN, DEPRESSED OR HOPELESS: 0
SUM OF ALL RESPONSES TO PHQ QUESTIONS 1-9: 0

## 2022-10-06 ASSESSMENT — LIFESTYLE VARIABLES
HOW OFTEN DO YOU HAVE A DRINK CONTAINING ALCOHOL: NEVER
HOW MANY STANDARD DRINKS CONTAINING ALCOHOL DO YOU HAVE ON A TYPICAL DAY: PATIENT DOES NOT DRINK

## 2022-10-07 NOTE — PATIENT INSTRUCTIONS
Personalized Preventive Plan for [facility] - 10/6/2022  Medicare offers a range of preventive health benefits. Some of the tests and screenings are paid in full while other may be subject to a deductible, co-insurance, and/or copay. Some of these benefits include a comprehensive review of your medical history including lifestyle, illnesses that may run in your family, and various assessments and screenings as appropriate. After reviewing your medical record and screening and assessments performed today your provider may have ordered immunizations, labs, imaging, and/or referrals for you. A list of these orders (if applicable) as well as your Preventive Care list are included within your After Visit Summary for your review. Other Preventive Recommendations:    A preventive eye exam performed by an eye specialist is recommended every 1-2 years to screen for glaucoma; cataracts, macular degeneration, and other eye disorders. A preventive dental visit is recommended every 6 months. Try to get at least 150 minutes of exercise per week or 10,000 steps per day on a pedometer . Order or download the FREE \"Exercise & Physical Activity: Your Everyday Guide\" from [employer]. Call 0-793.849.7446 or search [employer] online. You need 1935-5703 mg of calcium and 3442-8398 IU of vitamin D per day. It is possible to meet your calcium requirement with diet alone, but a vitamin D supplement is usually necessary to meet this goal.  When exposed to the sun, use a sunscreen that protects against both UVA and UVB radiation with an SPF of 30 or greater. Reapply every 2 to 3 hours or after sweating, drying off with a towel, or swimming. Always wear a seat belt when traveling in a car. Always wear a helmet when riding a bicycle or motorcycle.

## 2022-10-07 NOTE — PROGRESS NOTES
[address] [place] [address]  _______________________________________  Jazmyn Roque MD                 [address] [place] [address],  [address]. MD Ioana                     Jose [address]                                                                                    Phone: [phone]                                                                                    Fax: [phone]    Arnulfo Conklin is a 76 y.o. female who is seen for evaluation of   Chief Complaint   Patient presents with    Medicare AWV    Hypertension    Arthritis    Cholesterol Problem       HPI:   Hypertension  This is a chronic problem. The current episode started more than 1 year ago. The problem is unchanged. The problem is controlled. Pertinent negatives include no shortness of breath. (Blood pressure well controlled on medication without side effects or problems needs refills today recheck labs  )   Arthritis  Presents for follow-up visit. Affected location: Arthritis issues, patient still has difficulty with ambulation, needs refills of meds today. Associated symptoms include dysuria. Pertinent negatives include no fatigue. Urinary Tract Infection  Episode onset: Recurrent urinary tract infection, does not have good response to Keflex for last week. See details below. Chief Complaint   Patient presents with    Medicare AWV    Hypertension    Arthritis    Cholesterol Problem         Review of Systems:    Review of Systems   Constitutional:  Negative for activity change, appetite change, diaphoresis and fatigue. HENT:  Negative for congestion, ear discharge and ear pain. Eyes: Negative. Respiratory:  Negative for apnea, choking, chest tightness and shortness of breath. Endocrine: Negative. Genitourinary:  Positive for difficulty urinating, dysuria and frequency. Musculoskeletal:  Positive for arthritis.      History:  Past Medical History:   Diagnosis Date    Abnormality of gait 7/18/2018    Anemia     Anxiety     Arthritis     Asthma     Calculus of kidney     Chronic pain     Contact dermatitis and other eczema, due to unspecified cause     Depressive disorder, not elsewhere classified     Encounter for chronic pain management     Esophageal reflux     Essential hypertension, benign     Frequent falls 7/18/2018    GERD (gastroesophageal reflux disease)     Pure hypercholesterolemia        Past Surgical History:   Procedure Laterality Date    CATARACT REMOVAL Bilateral     COLONOSCOPY      GI      HYSTERECTOMY (CERVIX STATUS UNKNOWN)      IR IVC FILTER RETRIEVAL  3/29/2021    IR IVC FILTER RETRIEVAL  3/29/2021    IR IVC FILTER RETRIEVAL 3/29/2021 [facility]    IR MECHANICAL ART THROMBECTOMY INIT  7/28/2020    IR MECHANICAL ART THROMBECTOMY INIT  7/28/2020    IR MECHANICAL ART THROMBECTOMY INIT 7/28/2020 [facility]    LITHOTRIPSY      NEUROLOGICAL SURGERY      RETINAL DETACHMENT SURGERY      SHOULDER ARTHROPLASTY Left     TOTAL KNEE ARTHROPLASTY Right        Family History   Problem Relation Age of Onset    Osteoarthritis Mother     Cancer Father     Stroke Maternal Grandmother     Stroke Paternal Grandmother        Social History     Tobacco Use    Smoking status: Never    Smokeless tobacco: Never   Substance Use Topics    Alcohol use: No       Allergies   Allergen Reactions    Cyclobenzaprine Other (See Comments)    Hydroxychloroquine Other (See Comments)    Ketoprofen Other (See Comments)    Molds & Smuts     Pollen Extract     Shellfish Allergy      Other reaction(s): Unknown (comments)       Current Outpatient Medications   Medication Sig Dispense Refill    ciprofloxacin (CIPRO) 500 MG tablet Take 1 tablet by mouth 2 times daily for 7 days 14 tablet 0    montelukast (SINGULAIR) 10 MG tablet Take 1 tablet by mouth daily 90 tablet 1    sertraline (ZOLOFT) 100 MG tablet Take 1 1/2 tab daily.  135 tablet 1    OXYGEN Inhale into the lungs 4 liters qhs      budesonide-formoterol (SYMBICORT) 80-4.5 MCG/ACT AERO Inhale 2 puffs into the lungs in the morning and 2 puffs in the evening. INHALE 1 PUFF MOUTH TWICE A DAY FOR COUGH RINSE MOUTH AFTER EACH DOSE (DISCARD THREE MONTHS AFTER REMOVAL FROM [facility]). 10.2 g 3    Fe Fum-FePoly-Vit C-Vit B3 (INTEGRA) 62.5-62.5-40-3 MG CAPS TAKE ONE CAPSULE BY MOUTH ONE TIME DAILY      apixaban (ELIQUIS) 2.5 MG TABS tablet Take 1 tablet by mouth in the morning and 1 tablet before bedtime. 60 tablet 5    lisinopril (PRINIVIL;ZESTRIL) 5 MG tablet Take 1 tablet by mouth in the morning. TAKE ONE TABLET BY MOUTH EVERY DAY. 90 tablet 1    sucralfate (CARAFATE) 1 GM tablet TAKE ONE TABLET BY MOUTH THREE TIMES A  tablet 0    clorazepate (TRANXENE) 3.75 MG tablet Take 1 tablet by mouth 3 times daily for 180 days. 270 tablet 1    albuterol sulfate HFA (PROVENTIL;VENTOLIN;PROAIR) 108 (90 Base) MCG/ACT inhaler INHALE TWO PUFFS BY MOUTH TWICE A DAY AS NEEDED FOR COUGH      ascorbic acid (VITAMIN C) 500 MG tablet Take by mouth      calcium carbonate (OYSTER SHELL CALCIUM 500 MG) 1250 (500 Ca) MG tablet Take by mouth daily      chlorhexidine (PERIDEX) 0.12 % solution SWISH AND DIP BRUSH INTO RINSE AND CLEAN TEETH EXPOSED IMPLANT TWO TIMES DAILY.       vitamin D (CHOLECALCIFEROL) 25 MCG (1000 UT) TABS tablet Take by mouth daily      Docusate Sodium 100 MG TABS Take by mouth as needed      latanoprost (XALATAN) 0.005 % ophthalmic solution Apply 1 drop to eye every evening      Lifitegrast (XIIDRA) 5 % SOLN Apply to eye      melatonin 10 MG CAPS capsule Take by mouth      nystatin-triamcinolone (MYCOLOG II) 638571-5.1 UNIT/GM-% cream APPLY CREAM TO AFFECTED AREA TWICE A DAY      olopatadine (PATADAY) 0.2 % SOLN ophthalmic solution Apply 1 drop to eye daily      pantoprazole (PROTONIX) 40 MG tablet TAKE ONE TABLET BY MOUTH TWICE A DAY      sodium chloride (OCEAN) 0.65 % nasal spray 1 spray by Nasal route 4 times daily as needed       No current facility-administered medications for this visit. Vitals:    Ht 4' 10\" (1.473 m)   Wt 163 lb (73.9 kg)   LMP  (LMP Unknown)   BMI 34.07 kg/m²     Physical Exam:  Physical Exam  Constitutional:       Appearance: Normal appearance. She is obese. She is not ill-appearing or diaphoretic. HENT:      Head: Normocephalic. Right Ear: Tympanic membrane normal.      Left Ear: Tympanic membrane normal.      Nose: No congestion or rhinorrhea. Cardiovascular:      Rate and Rhythm: Normal rate and regular rhythm. Pulses: Normal pulses. Heart sounds: Normal heart sounds. Pulmonary:      Effort: Pulmonary effort is normal.      Breath sounds: Normal breath sounds. Musculoskeletal:         General: Normal range of motion. Cervical back: Normal range of motion and neck supple. Skin:     General: Skin is warm and dry. Neurological:      Mental Status: She is alert and oriented to person, place, and time. Coordination: Coordination abnormal.      Gait: Gait abnormal.     Assessment/Plan:     ICD-10-CM    1. Dysuria  R30.0 AMB POC URINALYSIS DIP STICK AUTO W/O MICRO     ciprofloxacin (CIPRO) 500 MG tablet      2. Acute cystitis without hematuria  N30.00 ciprofloxacin (CIPRO) 500 MG tablet      3. Essential hypertension  I10       4. Iron deficiency anemia, unspecified iron deficiency anemia type  D50.9       5.  Routine general medical examination at a health care facility  Z00.00         UTI, changed from Keflex to Cipro, increase fluids, cranberry extract tablets, call if other problems    Iron deficiency anemia, patient needs recheck labs on next visit, outside source labs reviewed today  Hypertension: Controlled on medication, good readings today denies any side effects of meds    Labs and medicines sent and pending as appropriate  Encourage low salt diet with exercise as tolerated  Encourage weight control efforts to reduce overall health risks in future  Encourage monitor BP at home   Recheck in 4 months or as needed for other problems.      Lopez Mcwilliams MD

## 2022-10-07 NOTE — PROGRESS NOTES
Medicare Annual Wellness Visit    Feng More is here for Medicare AWV, Hypertension, Arthritis, and Cholesterol Problem    Assessment & Plan   Dysuria  -     AMB POC URINALYSIS DIP STICK AUTO W/O MICRO  -     ciprofloxacin (CIPRO) 500 MG tablet; Take 1 tablet by mouth 2 times daily for 7 days, Disp-14 tablet, R-0Normal  Acute cystitis without hematuria  -     ciprofloxacin (CIPRO) 500 MG tablet; Take 1 tablet by mouth 2 times daily for 7 days, Disp-14 tablet, R-0Normal  Essential hypertension  Iron deficiency anemia, unspecified iron deficiency anemia type  Routine general medical examination at a health care facility    Recommendations for Preventive Services Due: see orders and patient instructions/AVS.  Recommended screening schedule for the next 5-10 years is provided to the patient in written form: see Patient Instructions/AVS.     Return for Medicare Annual Wellness Visit in 1 year. Subjective   The following acute and/or chronic problems were also addressed today:  Urinary tract infection, not responding to Keflex, frequency urgency and dysuria, denies fever or cognitive change    Patient's complete Health Risk Assessment and screening values have been reviewed and are found in Flowsheets. The following problems were reviewed today and where indicated follow up appointments were made and/or referrals ordered.     Positive Risk Factor Screenings with Interventions:             General Health and ACP:  General  In general, how would you say your health is?: Fair  In the past 7 days, have you experienced any of the following: New or Increased Pain, New or Increased Fatigue, Loneliness, Social Isolation, Stress or Anger?: No  Do you get the social and emotional support that you need?: Yes  Do you have a Living Will?: (!) No    Advance Directives       Power of  Living Will ACP-Advance Directive ACP-Power of     Not on File Not on File Not on File Not on [address] Risk Interventions:  Poor self-assessment of health status: patient advised to follow-up in this office for further evaluation and treatment of urinary issues within 4 month(s)    Health Habits/Nutrition:  Physical Activity: Inactive    Days of Exercise per Week: 0 days    Minutes of Exercise per Session: 0 min     Have you lost any weight without trying in the past 3 months?: No  Body mass index: (!) 34.06  Have you seen the dentist within the past year?: Yes  Health Habits/Nutrition Interventions:  Inadequate physical activity:  patient agrees to increase physical activity as follows: Increased walking with assistance      ADLs:  In the past 7 days, did you need help from others to perform any of the following everyday activities: Eating, dressing, grooming, bathing, toileting, or walking/balance?: (!) Yes  In the past 7 days, did you need help from others to take care of any of the following: Laundry, housekeeping, banking/finances, shopping, telephone use, food preparation, transportation, or taking medications?: (!) Yes  ADL Interventions:  Patient declines any further evaluation/treatment for this issue          Objective   Vitals:    10/06/22 1429   Weight: 163 lb (73.9 kg)   Height: 4' 10\" (1.473 m)      Body mass index is 34.07 kg/m². Pulmonary/Chest: clear to auscultation bilaterally- no wheezes, rales or rhonchi, normal air movement, no respiratory distress  Cardiovascular: normal rate, normal S1 and S2, no gallops, intact distal pulses, and no carotid bruits  Abdomen: soft, non-tender, non-distended, normal bowel sounds, no masses or organomegaly  Extremities: no cyanosis and no clubbing       Allergies   Allergen Reactions    Cyclobenzaprine Other (See Comments)    Hydroxychloroquine Other (See Comments)    Ketoprofen Other (See Comments)    Molds & Smuts     Pollen Extract     Shellfish Allergy      Other reaction(s): Unknown (comments)     Prior to Visit Medications    Medication Sig Taking? Authorizing Provider   ciprofloxacin (CIPRO) 500 MG tablet Take 1 tablet by mouth 2 times daily for 7 days Yes Renee Hendrix MD   montelukast (SINGULAIR) 10 MG tablet Take 1 tablet by mouth daily Yes Renee Hendrix MD   sertraline (ZOLOFT) 100 MG tablet Take 1 1/2 tab daily. Yes Renee Hendrix MD   OXYGEN Inhale into the lungs 4 liters qhs Yes Historical Provider, MD   budesonide-formoterol (SYMBICORT) 80-4.5 MCG/ACT AERO Inhale 2 puffs into the lungs in the morning and 2 puffs in the evening. INHALE 1 PUFF MOUTH TWICE A DAY FOR COUGH RINSE MOUTH AFTER EACH DOSE (DISCARD THREE MONTHS AFTER REMOVAL FROM [facility]). Yes Scottie Young MD   Fe Fum-FePoly-Vit C-Vit B3 (INTEGRA) 62.5-62.5-40-3 MG CAPS TAKE ONE CAPSULE BY MOUTH ONE TIME DAILY Yes Historical Provider, MD   apixaban (ELIQUIS) 2.5 MG TABS tablet Take 1 tablet by mouth in the morning and 1 tablet before bedtime. Yes Adithya Little MD   lisinopril (PRINIVIL;ZESTRIL) 5 MG tablet Take 1 tablet by mouth in the morning. TAKE ONE TABLET BY MOUTH EVERY DAY. Yes Renee Hendrix MD   sucralfate (CARAFATE) 1 GM tablet TAKE ONE TABLET BY MOUTH THREE TIMES A DAY Yes Renee Hendrix MD   clorazepate (TRANXENE) 3.75 MG tablet Take 1 tablet by mouth 3 times daily for 180 days. Yes Renee Hendrix MD   albuterol sulfate HFA (PROVENTIL;VENTOLIN;PROAIR) 108 (90 Base) MCG/ACT inhaler INHALE TWO PUFFS BY MOUTH TWICE A DAY AS NEEDED FOR COUGH Yes Ar Automatic Reconciliation   ascorbic acid (VITAMIN C) 500 MG tablet Take by mouth Yes Ar Automatic Reconciliation   calcium carbonate (OYSTER SHELL CALCIUM 500 MG) 1250 (500 Ca) MG tablet Take by mouth daily Yes Ar Automatic Reconciliation   chlorhexidine (PERIDEX) 0.12 % solution SWISH AND DIP BRUSH INTO RINSE AND CLEAN TEETH EXPOSED IMPLANT TWO TIMES DAILY.  Yes Ar Automatic Reconciliation   vitamin D (CHOLECALCIFEROL) 25 MCG (1000 UT) TABS tablet Take by mouth daily Yes Ar Automatic Reconciliation   Docusate Sodium 100 MG TABS Take by mouth as needed Yes Ar Automatic Reconciliation   latanoprost (XALATAN) 0.005 % ophthalmic solution Apply 1 drop to eye every evening Yes Ar Automatic Reconciliation   Lifitegrast (XIIDRA) 5 % SOLN Apply to eye Yes Ar Automatic Reconciliation   melatonin 10 MG CAPS capsule Take by mouth Yes Ar Automatic Reconciliation   nystatin-triamcinolone (MYCOLOG II) 832973-1.1 UNIT/GM-% cream APPLY CREAM TO AFFECTED AREA TWICE A DAY Yes Ar Automatic Reconciliation   olopatadine (PATADAY) 0.2 % SOLN ophthalmic solution Apply 1 drop to eye daily Yes Ar Automatic Reconciliation   pantoprazole (PROTONIX) 40 MG tablet TAKE ONE TABLET BY MOUTH TWICE A DAY Yes Ar Automatic Reconciliation   sodium chloride (OCEAN) 0.65 % nasal spray 1 spray by Nasal route 4 times daily as needed Yes Ar Automatic Reconciliation       CareTeam (Including outside providers/suppliers regularly involved in providing care):   Patient Care Team:  Carline Alvarado MD as PCP - Claudette Bollard, MD as PCP - [facility] Empaneled Provider  Trista Marcelo MD as Consulting Physician     Reviewed and updated this visit:  Tobacco  Allergies  Meds  Problems  Med Hx  Surg Hx  Soc Hx  Fam Hx

## 2022-10-13 ENCOUNTER — TELEPHONE (OUTPATIENT)
Dept: FAMILY MEDICINE CLINIC | Facility: CLINIC | Age: 77
End: 2022-10-13

## 2022-10-13 DIAGNOSIS — R30.0 DYSURIA: ICD-10-CM

## 2022-10-13 DIAGNOSIS — N30.00 ACUTE CYSTITIS WITHOUT HEMATURIA: ICD-10-CM

## 2022-10-13 RX ORDER — CIPROFLOXACIN 500 MG/1
500 TABLET, FILM COATED ORAL 2 TIMES DAILY
Qty: 14 TABLET | Refills: 0 | Status: SHIPPED | OUTPATIENT
Start: 2022-10-13 | End: 2022-10-20

## 2022-10-13 NOTE — TELEPHONE ENCOUNTER
Cally Bills called and said she took her last Cipro yesterday. She said she is not hurting or burning, but still having frequency and having to go to the bathroom 2-3 times every hour. She said she doesn't know what to do. [phone].

## 2022-10-19 RX ORDER — IRON FUM,PS CMP/VIT C/NIACIN 125-40-3MG
CAPSULE ORAL
Qty: 90 CAPSULE | Refills: 1 | Status: SHIPPED | OUTPATIENT
Start: 2022-10-19

## 2022-10-25 RX ORDER — LISINOPRIL 5 MG/1
5 TABLET ORAL DAILY
Qty: 90 TABLET | Refills: 1 | Status: SHIPPED | OUTPATIENT
Start: 2022-10-25

## 2022-10-25 NOTE — TELEPHONE ENCOUNTER
Last visit 10-6-22  Next visit 1-3-23    Pt requesting this be sent to mail order vs local pharmacy please

## 2022-11-09 ENCOUNTER — TELEPHONE (OUTPATIENT)
Dept: PULMONOLOGY | Age: 77
End: 2022-11-09

## 2022-11-09 NOTE — TELEPHONE ENCOUNTER
Patient is calling to have us send in order to have portable 02 picked up. Only uses this at night.     [facility]

## 2022-11-10 DIAGNOSIS — J84.9 ILD (INTERSTITIAL LUNG DISEASE) (HCC): Primary | ICD-10-CM

## 2022-11-10 DIAGNOSIS — J44.9 CHRONIC OBSTRUCTIVE PULMONARY DISEASE, UNSPECIFIED COPD TYPE (HCC): ICD-10-CM

## 2022-11-28 DIAGNOSIS — M81.0 AGE-RELATED OSTEOPOROSIS WITHOUT CURRENT PATHOLOGICAL FRACTURE: Primary | ICD-10-CM

## 2022-11-28 NOTE — PROGRESS NOTES
Pt called said her rheumatologist told her to contact us for a bone density order. Order ready to sign if you approve.

## 2022-12-06 ENCOUNTER — HOSPITAL ENCOUNTER (OUTPATIENT)
Dept: MAMMOGRAPHY | Age: 77
Discharge: HOME | End: 2022-12-09
Payer: MEDICARE

## 2022-12-06 DIAGNOSIS — M81.0 AGE-RELATED OSTEOPOROSIS WITHOUT CURRENT PATHOLOGICAL FRACTURE: ICD-10-CM

## 2022-12-06 DIAGNOSIS — M81.0 OSTEOPOROSIS, UNSPECIFIED OSTEOPOROSIS TYPE, UNSPECIFIED PATHOLOGICAL FRACTURE PRESENCE: Primary | ICD-10-CM

## 2022-12-06 PROCEDURE — 77080 DXA BONE DENSITY AXIAL: CPT

## 2023-01-03 ENCOUNTER — OFFICE VISIT (OUTPATIENT)
Dept: FAMILY MEDICINE CLINIC | Facility: CLINIC | Age: 78
End: 2023-01-03
Payer: MEDICARE

## 2023-01-03 VITALS
DIASTOLIC BLOOD PRESSURE: 78 MMHG | WEIGHT: 168 LBS | BODY MASS INDEX: 35.26 KG/M2 | SYSTOLIC BLOOD PRESSURE: 124 MMHG | HEIGHT: 58 IN

## 2023-01-03 DIAGNOSIS — J84.9 INTERSTITIAL PULMONARY DISEASE, UNSPECIFIED (HCC): ICD-10-CM

## 2023-01-03 DIAGNOSIS — M81.0 OSTEOPOROSIS, UNSPECIFIED OSTEOPOROSIS TYPE, UNSPECIFIED PATHOLOGICAL FRACTURE PRESENCE: Primary | ICD-10-CM

## 2023-01-03 DIAGNOSIS — K21.9 GASTROESOPHAGEAL REFLUX DISEASE, UNSPECIFIED WHETHER ESOPHAGITIS PRESENT: ICD-10-CM

## 2023-01-03 DIAGNOSIS — R73.9 HIGH BLOOD SUGAR: ICD-10-CM

## 2023-01-03 DIAGNOSIS — I27.20 PULMONARY HTN (HCC): ICD-10-CM

## 2023-01-03 DIAGNOSIS — E66.01 SEVERE OBESITY (BMI 35.0-39.9) WITH COMORBIDITY (HCC): ICD-10-CM

## 2023-01-03 DIAGNOSIS — G31.9 CEREBELLAR ATROPHY (HCC): ICD-10-CM

## 2023-01-03 DIAGNOSIS — F32.1 MAJOR DEPRESSIVE DISORDER, SINGLE EPISODE, MODERATE (HCC): ICD-10-CM

## 2023-01-03 PROBLEM — I26.99 PULMONARY EMBOLISM, BILATERAL (HCC): Status: RESOLVED | Noted: 2020-07-27 | Resolved: 2023-01-03

## 2023-01-03 PROCEDURE — 99214 OFFICE O/P EST MOD 30 MIN: CPT | Performed by: FAMILY MEDICINE

## 2023-01-03 PROCEDURE — G8427 DOCREV CUR MEDS BY ELIG CLIN: HCPCS | Performed by: FAMILY MEDICINE

## 2023-01-03 PROCEDURE — G8484 FLU IMMUNIZE NO ADMIN: HCPCS | Performed by: FAMILY MEDICINE

## 2023-01-03 PROCEDURE — 3074F SYST BP LT 130 MM HG: CPT | Performed by: FAMILY MEDICINE

## 2023-01-03 PROCEDURE — G8399 PT W/DXA RESULTS DOCUMENT: HCPCS | Performed by: FAMILY MEDICINE

## 2023-01-03 PROCEDURE — 3078F DIAST BP <80 MM HG: CPT | Performed by: FAMILY MEDICINE

## 2023-01-03 PROCEDURE — 1036F TOBACCO NON-USER: CPT | Performed by: FAMILY MEDICINE

## 2023-01-03 PROCEDURE — G8417 CALC BMI ABV UP PARAM F/U: HCPCS | Performed by: FAMILY MEDICINE

## 2023-01-03 PROCEDURE — 1090F PRES/ABSN URINE INCON ASSESS: CPT | Performed by: FAMILY MEDICINE

## 2023-01-03 PROCEDURE — 1123F ACP DISCUSS/DSCN MKR DOCD: CPT | Performed by: FAMILY MEDICINE

## 2023-01-03 RX ORDER — BUDESONIDE AND FORMOTEROL FUMARATE DIHYDRATE 160; 4.5 UG/1; UG/1
AEROSOL RESPIRATORY (INHALATION)
COMMUNITY

## 2023-01-03 RX ORDER — LIFITEGRAST 50 MG/ML
SOLUTION/ DROPS OPHTHALMIC
COMMUNITY

## 2023-01-03 RX ORDER — PANTOPRAZOLE SODIUM 40 MG/1
TABLET, DELAYED RELEASE ORAL
Qty: 180 TABLET | Refills: 1 | Status: SHIPPED | OUTPATIENT
Start: 2023-01-03

## 2023-01-03 ASSESSMENT — PATIENT HEALTH QUESTIONNAIRE - PHQ9
SUM OF ALL RESPONSES TO PHQ QUESTIONS 1-9: 0
SUM OF ALL RESPONSES TO PHQ QUESTIONS 1-9: 0
SUM OF ALL RESPONSES TO PHQ9 QUESTIONS 1 & 2: 0
SUM OF ALL RESPONSES TO PHQ QUESTIONS 1-9: 0
SUM OF ALL RESPONSES TO PHQ QUESTIONS 1-9: 0
2. FEELING DOWN, DEPRESSED OR HOPELESS: 0
1. LITTLE INTEREST OR PLEASURE IN DOING THINGS: 0

## 2023-01-03 ASSESSMENT — ENCOUNTER SYMPTOMS
BLURRED VISION: 0
SHORTNESS OF BREATH: 0
EYE DISCHARGE: 0
ORTHOPNEA: 0
COUGH: 0
EYE PAIN: 0
APNEA: 0
EYE ITCHING: 0
RESPIRATORY NEGATIVE: 1

## 2023-01-03 NOTE — PROGRESS NOTES
[address] [place] [address]  _______________________________________  Arnol Ramos MD                 [address] [place] [address],  [address]. MD Ioana                     [address]                                                                                    Phone: [phone]                                                                                    Fax: [phone]    Mary Beth Gonzalez is a 76 y.o. female who is seen for evaluation of   Chief Complaint   Patient presents with    Hypertension    Asthma           Gastroesophageal Reflux    Cholesterol Problem       HPI:   Hypertension  This is a chronic problem. The current episode started more than 1 year ago. The problem is unchanged. The problem is controlled. Pertinent negatives include no anxiety, blurred vision, chest pain, headaches, malaise/fatigue, neck pain, orthopnea, peripheral edema, PND or shortness of breath. (On meds, need refills and labs)   Asthma  There is no cough or shortness of breath. Chronicity: on meds, need refills, no side effect noted. Pertinent negatives include no appetite change, chest pain, headaches, malaise/fatigue or PND. Her past medical history is significant for asthma. Gastroesophageal Reflux  She reports no chest pain or no coughing. Chronicity: on meds, need refills and labs. Pertinent negatives include no fatigue. Abnormal Lab  Episode frequency: high sugar and abnormal CBC in past, need recheck labs soon, has lasb scheduled with Dr Fady Garcia in a few weeks. Pertinent negatives include no chest pain, chills, coughing, diaphoresis, fatigue, headaches or neck pain. Chief Complaint   Patient presents with    Hypertension    Asthma           Gastroesophageal Reflux    Cholesterol Problem         Review of Systems:    Review of Systems   Constitutional:  Negative for activity change, appetite change, chills, diaphoresis, fatigue and malaise/fatigue. HENT: Negative.      Eyes: Negative for blurred vision, pain, discharge and itching. Respiratory: Negative. Negative for apnea, cough and shortness of breath. Cardiovascular:  Negative for chest pain, orthopnea and PND. Endocrine: Negative. Genitourinary:  Negative for difficulty urinating, dyspareunia, enuresis and frequency. Musculoskeletal:  Negative for neck pain. Neurological:  Negative for headaches.      History:  Past Medical History:   Diagnosis Date    Abnormality of gait 7/18/2018    Anemia     Anxiety     Arthritis     Asthma     Calculus of kidney     Chronic pain     Contact dermatitis and other eczema, due to unspecified cause     Depressive disorder, not elsewhere classified     Encounter for chronic pain management     Esophageal reflux     Essential hypertension, benign     Frequent falls 7/18/2018    GERD (gastroesophageal reflux disease)     Pure hypercholesterolemia        Past Surgical History:   Procedure Laterality Date    CATARACT REMOVAL Bilateral     COLONOSCOPY      GI      HYSTERECTOMY (CERVIX STATUS UNKNOWN)      IR IVC FILTER RETRIEVAL  3/29/2021    IR IVC FILTER RETRIEVAL  3/29/2021    IR IVC FILTER RETRIEVAL 3/29/2021 [facility]    IR MECHANICAL ART THROMBECTOMY INIT  7/28/2020    IR MECHANICAL ART THROMBECTOMY INIT  7/28/2020    IR MECHANICAL ART THROMBECTOMY INIT 7/28/2020 [facility]    LITHOTRIPSY      NEUROLOGICAL SURGERY      RETINAL DETACHMENT SURGERY      SHOULDER ARTHROPLASTY Left     TOTAL KNEE ARTHROPLASTY Right        Family History   Problem Relation Age of Onset    Osteoarthritis Mother     Cancer Father     Stroke Maternal Grandmother     Stroke Paternal Grandmother        Social History     Tobacco Use    Smoking status: Never    Smokeless tobacco: Never   Substance Use Topics    Alcohol use: No       Allergies   Allergen Reactions    Cyclobenzaprine Other (See Comments)    Hydroxychloroquine Other (See Comments)    Hydroxychloroquine Sulfate Other (See Comments)    Ketoprofen Other (See Comments)    Molds & Smuts     Pollen Extract     Shellfish Allergy      Other reaction(s): Unknown (comments)       Current Outpatient Medications   Medication Sig Dispense Refill    Lifitegrast (XIIDRA) 5 % SOLN 1 drop into affected eye      budesonide-formoterol (SYMBICORT) 160-4.5 MCG/ACT AERO 2 puffs coupon IAQ#[id number] pcn# cn grp# [id number] id# [id number]      pantoprazole (PROTONIX) 40 MG tablet TAKE ONE TABLET BY MOUTH TWICE A  tablet 1    lisinopril (PRINIVIL;ZESTRIL) 5 MG tablet Take 1 tablet by mouth daily TAKE ONE TABLET BY MOUTH EVERY DAY 90 tablet 1    Fe Fum-FePoly-Vit C-Vit B3 (INTEGRA) 62.5-62.5-40-3 MG CAPS TAKE ONE CAPSULE BY MOUTH ONE TIME DAILY 90 capsule 1    montelukast (SINGULAIR) 10 MG tablet Take 1 tablet by mouth daily 90 tablet 1    sertraline (ZOLOFT) 100 MG tablet Take 1 1/2 tab daily. 135 tablet 1    OXYGEN Inhale into the lungs 4 liters qhs      budesonide-formoterol (SYMBICORT) 80-4.5 MCG/ACT AERO Inhale 2 puffs into the lungs in the morning and 2 puffs in the evening. INHALE 1 PUFF MOUTH TWICE A DAY FOR COUGH RINSE MOUTH AFTER EACH DOSE (DISCARD THREE MONTHS AFTER REMOVAL FROM [facility]). 10.2 g 3    apixaban (ELIQUIS) 2.5 MG TABS tablet Take 1 tablet by mouth in the morning and 1 tablet before bedtime.  60 tablet 5    sucralfate (CARAFATE) 1 GM tablet TAKE ONE TABLET BY MOUTH THREE TIMES A  tablet 0    albuterol sulfate HFA (PROVENTIL;VENTOLIN;PROAIR) 108 (90 Base) MCG/ACT inhaler INHALE TWO PUFFS BY MOUTH TWICE A DAY AS NEEDED FOR COUGH      ascorbic acid (VITAMIN C) 500 MG tablet Take by mouth      calcium carbonate (OYSTER SHELL CALCIUM 500 MG) 1250 (500 Ca) MG tablet Take by mouth daily      vitamin D (CHOLECALCIFEROL) 25 MCG (1000 UT) TABS tablet Take by mouth daily      Docusate Sodium 100 MG TABS Take by mouth as needed      latanoprost (XALATAN) 0.005 % ophthalmic solution Apply 1 drop to eye every evening      Lifitegrast (XIIDRA) 5 % SOLN Apply to eye      melatonin 10 MG CAPS capsule Take by mouth      nystatin-triamcinolone (MYCOLOG II) 155125-0.1 UNIT/GM-% cream APPLY CREAM TO AFFECTED AREA TWICE A DAY      olopatadine (PATADAY) 0.2 % SOLN ophthalmic solution Apply 1 drop to eye daily      sodium chloride (OCEAN) 0.65 % nasal spray 1 spray by Nasal route 4 times daily as needed      chlorhexidine (PERIDEX) 0.12 % solution SWISH AND DIP BRUSH INTO RINSE AND CLEAN TEETH EXPOSED IMPLANT TWO TIMES DAILY. (Patient not taking: Reported on 1/3/2023)       No current facility-administered medications for this visit. Vitals:    /78 (Site: Left Upper Arm, Position: Sitting)   Ht 4' 10\" (1.473 m)   Wt 168 lb (76.2 kg)   LMP  (LMP Unknown)   BMI 35.11 kg/m²     Physical Exam:  Physical Exam  Constitutional:       Appearance: Normal appearance. She is obese. She is not ill-appearing or diaphoretic. HENT:      Head: Normocephalic. Right Ear: Tympanic membrane normal.      Left Ear: Tympanic membrane normal.      Nose: No congestion or rhinorrhea. Cardiovascular:      Rate and Rhythm: Normal rate and regular rhythm. Pulses: Normal pulses. Heart sounds: Normal heart sounds. Pulmonary:      Effort: Pulmonary effort is normal.      Breath sounds: Normal breath sounds. Musculoskeletal:         General: Normal range of motion. Cervical back: Normal range of motion and neck supple. Skin:     General: Skin is warm and dry. Neurological:      Mental Status: She is alert and oriented to person, place, and time. Assessment/Plan:     ICD-10-CM    1. Osteoporosis, unspecified osteoporosis type, unspecified pathological fracture presence  M81.0       2. Interstitial pulmonary disease, unspecified ([address]  J84.9       3. Severe obesity (BMI 35.0-39. 9) with comorbidity ([address]  E66.01       4. Cerebellar atrophy (HCC)  G31.9       5.  Pulmonary HTN ([facility]  I27.20       6. Major depressive disorder, single episode, moderate (HCC)  F32.1       7. Gastroesophageal reflux disease, unspecified whether esophagitis present  K21.9 pantoprazole (PROTONIX) 40 MG tablet      8. High blood sugar  R73.9 CANCELED: Hemoglobin A1C        Has osteoporosis and has appt with Rheum in [place] later next week, needs copy to take with her. High sugar, on meds, need refills    GERD: on meds, need refills for 90 day pharmacy    Pulmonary HTN, stable at present, no dyspnea or other sx noted. Depression: stable on meds, need refills, no breakthrough noted. High sugar; has labs scheduled with other doc in a month    Cerebellar atrophy: stable at present, ambulates with a walker at home and uses chair or rolling walker away from home. No progression of sx noted.      Interstitial pulmonary disease, stable on meds at present    Jeyson Hercules MD

## 2023-01-23 RX ORDER — LISINOPRIL 5 MG/1
TABLET ORAL
Qty: 90 TABLET | Refills: 1 | Status: SHIPPED | OUTPATIENT
Start: 2023-01-23

## 2023-02-08 NOTE — TELEPHONE ENCOUNTER
Meds by mail behind on sending this one to pt. Please send to local for 1 week supply.     Last visit 1-3-23  Next visit 5-3-23

## 2023-02-14 ENCOUNTER — OFFICE VISIT (OUTPATIENT)
Dept: ONCOLOGY | Age: 78
End: 2023-02-14
Payer: MEDICARE

## 2023-02-14 ENCOUNTER — HOSPITAL ENCOUNTER (OUTPATIENT)
Dept: LAB | Age: 78
Discharge: HOME | End: 2023-02-17
Payer: MEDICARE

## 2023-02-14 VITALS
OXYGEN SATURATION: 95 % | HEART RATE: 77 BPM | DIASTOLIC BLOOD PRESSURE: 78 MMHG | RESPIRATION RATE: 16 BRPM | BODY MASS INDEX: 36.34 KG/M2 | SYSTOLIC BLOOD PRESSURE: 151 MMHG | TEMPERATURE: 98 F | WEIGHT: 173.1 LBS | HEIGHT: 58 IN

## 2023-02-14 DIAGNOSIS — I26.99 OTHER PULMONARY EMBOLISM WITHOUT ACUTE COR PULMONALE, UNSPECIFIED CHRONICITY (HCC): ICD-10-CM

## 2023-02-14 DIAGNOSIS — I26.99 OTHER PULMONARY EMBOLISM WITHOUT ACUTE COR PULMONALE, UNSPECIFIED CHRONICITY (HCC): Primary | ICD-10-CM

## 2023-02-14 LAB
ALBUMIN SERPL-MCNC: 3.3 G/DL (ref 3.2–4.6)
ALBUMIN/GLOB SERPL: 0.8 (ref 0.4–1.6)
ALP SERPL-CCNC: 96 U/L (ref 50–136)
ALT SERPL-CCNC: 21 U/L (ref 12–65)
ANION GAP SERPL CALC-SCNC: 4 MMOL/L (ref 2–11)
AST SERPL-CCNC: 16 U/L (ref 15–37)
BASOPHILS # BLD: 0 K/UL (ref 0–0.2)
BASOPHILS NFR BLD: 0 % (ref 0–2)
BILIRUB SERPL-MCNC: 0.4 MG/DL (ref 0.2–1.1)
BUN SERPL-MCNC: 10 MG/DL (ref 8–23)
CALCIUM SERPL-MCNC: 8.6 MG/DL (ref 8.3–10.4)
CHLORIDE SERPL-SCNC: 108 MMOL/L (ref 101–110)
CO2 SERPL-SCNC: 28 MMOL/L (ref 21–32)
CREAT SERPL-MCNC: 0.6 MG/DL (ref 0.6–1)
DIFFERENTIAL METHOD BLD: ABNORMAL
EOSINOPHIL # BLD: 0.2 K/UL (ref 0–0.8)
EOSINOPHIL NFR BLD: 3 % (ref 0.5–7.8)
ERYTHROCYTE [DISTWIDTH] IN BLOOD BY AUTOMATED COUNT: 14.1 % (ref 11.9–14.6)
GLOBULIN SER CALC-MCNC: 4 G/DL (ref 2.8–4.5)
GLUCOSE SERPL-MCNC: 131 MG/DL (ref 65–100)
HCT VFR BLD AUTO: 40.6 % (ref 35.8–46.3)
HGB BLD-MCNC: 12.8 G/DL (ref 11.7–15.4)
IMM GRANULOCYTES # BLD AUTO: 0 K/UL (ref 0–0.5)
IMM GRANULOCYTES NFR BLD AUTO: 0 % (ref 0–5)
LYMPHOCYTES # BLD: 1.9 K/UL (ref 0.5–4.6)
LYMPHOCYTES NFR BLD: 25 % (ref 13–44)
MCH RBC QN AUTO: 30.1 PG (ref 26.1–32.9)
MCHC RBC AUTO-ENTMCNC: 31.5 G/DL (ref 31.4–35)
MCV RBC AUTO: 95.5 FL (ref 82–102)
MONOCYTES # BLD: 0.9 K/UL (ref 0.1–1.3)
MONOCYTES NFR BLD: 12 % (ref 4–12)
NEUTS SEG # BLD: 4.4 K/UL (ref 1.7–8.2)
NEUTS SEG NFR BLD: 60 % (ref 43–78)
NRBC # BLD: 0 K/UL (ref 0–0.2)
PLATELET # BLD AUTO: 219 K/UL (ref 150–450)
PMV BLD AUTO: 8.9 FL (ref 9.4–12.3)
POTASSIUM SERPL-SCNC: 4 MMOL/L (ref 3.5–5.1)
PROT SERPL-MCNC: 7.3 G/DL (ref 6.3–8.2)
RBC # BLD AUTO: 4.25 M/UL (ref 4.05–5.2)
SODIUM SERPL-SCNC: 140 MMOL/L (ref 133–143)
WBC # BLD AUTO: 7.5 K/UL (ref 4.3–11.1)

## 2023-02-14 PROCEDURE — 36415 COLL VENOUS BLD VENIPUNCTURE: CPT

## 2023-02-14 PROCEDURE — 99214 OFFICE O/P EST MOD 30 MIN: CPT | Performed by: NURSE PRACTITIONER

## 2023-02-14 PROCEDURE — G8417 CALC BMI ABV UP PARAM F/U: HCPCS | Performed by: NURSE PRACTITIONER

## 2023-02-14 PROCEDURE — 85025 COMPLETE CBC W/AUTO DIFF WBC: CPT

## 2023-02-14 PROCEDURE — 1036F TOBACCO NON-USER: CPT | Performed by: NURSE PRACTITIONER

## 2023-02-14 PROCEDURE — G8427 DOCREV CUR MEDS BY ELIG CLIN: HCPCS | Performed by: NURSE PRACTITIONER

## 2023-02-14 PROCEDURE — 3078F DIAST BP <80 MM HG: CPT | Performed by: NURSE PRACTITIONER

## 2023-02-14 PROCEDURE — G8484 FLU IMMUNIZE NO ADMIN: HCPCS | Performed by: NURSE PRACTITIONER

## 2023-02-14 PROCEDURE — 1123F ACP DISCUSS/DSCN MKR DOCD: CPT | Performed by: NURSE PRACTITIONER

## 2023-02-14 PROCEDURE — 3077F SYST BP >= 140 MM HG: CPT | Performed by: NURSE PRACTITIONER

## 2023-02-14 PROCEDURE — G8399 PT W/DXA RESULTS DOCUMENT: HCPCS | Performed by: NURSE PRACTITIONER

## 2023-02-14 PROCEDURE — 1090F PRES/ABSN URINE INCON ASSESS: CPT | Performed by: NURSE PRACTITIONER

## 2023-02-14 PROCEDURE — 80053 COMPREHEN METABOLIC PANEL: CPT

## 2023-02-14 ASSESSMENT — PATIENT HEALTH QUESTIONNAIRE - PHQ9
2. FEELING DOWN, DEPRESSED OR HOPELESS: 1
SUM OF ALL RESPONSES TO PHQ QUESTIONS 1-9: 1

## 2023-02-14 NOTE — PROGRESS NOTES
3 [facility] Hematology & Oncology: Office Visit Progress Note      Chief Complaint:     DVT/PE   Epistaxis      History of Present Illness:  76 y.o. female admitted on 9/15/2020 with a primary diagnosis of The primary encounter diagnosis was Epistaxis. Diagnoses  of Acute blood loss anemia and Syncope and collapse were also pertinent to this visit. .         Ms. Evelyn Camacho was admitted on 9/15/2020 with a diagnosis of epistaxis/anemia on Eliquis. She has a past medical history of GERD, hypertension, anxiety, asthma, recent admission for  bilateral pulmonary embolism in the setting of bilateral lower extremity DVT currently on Eliquis. She presented to the ER on 9/15/2020 with a nosebleed, improved with rapid Rhino. She denies any other sources of bleeding. She was found to have a  drop in her hemoglobin to 8.6 and was admitted for medical management. Eliquis was placed on hold d/t bleeding and anemia. She does have an IVC filter in place. Her Hemoglobin decreased further on the morning of 9/16 and she will receive 1 unit  of blood. She had a repeat BLE doppler which showed bilateral lower extremity DVT, greater on the left. Patient admits to having frequent nosebleeds every few weeks or so but she has always been able to stop it in reasonable amount of time. She states  that she has seen ENT before and did not find source of bleed. She does admit to using Flonase on a daily basis and based on her description of administration, she may be causing irritation. We were consulted for our recommendation given epitaxis  while on Eliquis for BL pulmonary embolism and BL lower extremity DVT. Interim history updating A/P           Review of Systems:      Constitutional  Denies fever or chills. Denies weight loss or appetite changes. Denies fatigue. Denies anorexia. HEENT  Denies trauma, bluring vision, hearing loss, ear pain, nosebleeds, sore throat, neck pain and ear discharge.      Skin  Denies lesions or rashes. Lungs  Denies cough, sputum production or hemoptysis. Shortness of breath      Cardiovascular  Denies chest pain, palpitations, orthopnea, claudication      Gastrointestinal  Denies nausea, vomiting, bowel changes. Denies bloody or black stools. Denies abdominal pain.   Denies dysuria, frequency or hesitancy of urination     Neuro  Denies ataxia. Denies dizziness, tingling, tremors, sensory change, speech change, focal weakness and headaches. Hematology  Denies nasal/gum bleeding, denies easy bruise     Endo  Denies heat/cold intolerance, denies diabetes. MSK   fall and left toe fracture. Denies back pain, swollen legs, myalgias and falls. Psychiatric/Behavioral  Denies depression and substance abuse. The patient is not nervous/anxious.                 Allergies   Allergen Reactions    Cyclobenzaprine Other (See Comments)    Hydroxychloroquine Other (See Comments)    Hydroxychloroquine Sulfate Other (See Comments)    Ketoprofen Other (See Comments)    Molds & Smuts     Pollen Extract     Shellfish Allergy      Other reaction(s): Unknown (comments)     Past Medical History:   Diagnosis Date    Abnormality of gait 7/18/2018    Anemia     Anxiety     Arthritis     Asthma     Calculus of kidney     Chronic pain     Contact dermatitis and other eczema, due to unspecified cause     Depressive disorder, not elsewhere classified     Encounter for chronic pain management     Esophageal reflux     Essential hypertension, benign     Frequent falls 7/18/2018    GERD (gastroesophageal reflux disease)     Pure hypercholesterolemia      Past Surgical History:   Procedure Laterality Date    CATARACT REMOVAL Bilateral     COLONOSCOPY      GI      HYSTERECTOMY (CERVIX STATUS UNKNOWN)      IR IVC FILTER RETRIEVAL  3/29/2021    IR IVC FILTER RETRIEVAL  3/29/2021    IR IVC FILTER RETRIEVAL 3/29/2021 [facility]    IR MECHANICAL ART THROMBECTOMY INIT  7/28/2020    IR MECHANICAL ART THROMBECTOMY INIT  7/28/2020    IR MECHANICAL ART THROMBECTOMY INIT 7/28/2020 [facility]    LITHOTRIPSY      NEUROLOGICAL SURGERY      RETINAL DETACHMENT SURGERY      SHOULDER ARTHROPLASTY Left     TOTAL KNEE ARTHROPLASTY Right      Family History   Problem Relation Age of Onset    Osteoarthritis Mother     Cancer Father     Stroke Maternal Grandmother     Stroke Paternal Grandmother      Social History     Socioeconomic History    Marital status:      Spouse name: Not on file    Number of children: Not on file    Years of education: Not on file    Highest education level: Not on file   Occupational History    Not on file   Tobacco Use    Smoking status: Never    Smokeless tobacco: Never   Substance and Sexual Activity    Alcohol use: No    Drug use: No    Sexual activity: Not on file   Other Topics Concern    Not on file   Social History Narrative    eported as intolerable. Patient and  reside together. She denies any in-house stairs. 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 Activity is reported as cooking and household chores as tolerable.  Exercise is r     Social Determinants of Health     Financial Resource Strain: Not on file   Food Insecurity: Not on file   Transportation Needs: Not on file   Physical Activity: Inactive    Days of Exercise per Week: 0 days    Minutes of Exercise per Session: 0 min   Stress: Not on file   Social Connections: Not on file   Intimate Partner Violence: Not on file   Housing Stability: Not on file     Current Outpatient Medications   Medication Sig Dispense Refill    NONFORMULARY [place]      apixaban (ELIQUIS) 2.5 MG TABS tablet Take 1 tablet by mouth 2 times daily 180 tablet 3    lisinopril (PRINIVIL;ZESTRIL) 5 MG tablet TAKE ONE TABLET BY MOUTH EVERY MORNING 90 tablet 1    pantoprazole (PROTONIX) 40 MG tablet TAKE ONE TABLET BY MOUTH TWICE A  tablet 1    Fe Fum-FePoly-Vit C-Vit B3 (INTEGRA) 62.5-62.5-40-3 MG CAPS TAKE ONE CAPSULE BY MOUTH ONE TIME DAILY 90 capsule 1    montelukast (SINGULAIR) 10 MG tablet Take 1 tablet by mouth daily 90 tablet 1    sertraline (ZOLOFT) 100 MG tablet Take 1 1/2 tab daily. 135 tablet 1    OXYGEN Inhale into the lungs 4 liters qhs      sucralfate (CARAFATE) 1 GM tablet TAKE ONE TABLET BY MOUTH THREE TIMES A  tablet 0    albuterol sulfate HFA (PROVENTIL;VENTOLIN;PROAIR) 108 (90 Base) MCG/ACT inhaler INHALE TWO PUFFS BY MOUTH TWICE A DAY AS NEEDED FOR COUGH      ascorbic acid (VITAMIN C) 500 MG tablet Take by mouth      calcium carbonate (OYSTER SHELL CALCIUM 500 MG) 1250 (500 Ca) MG tablet Take by mouth daily      vitamin D (CHOLECALCIFEROL) 25 MCG (1000 UT) TABS tablet Take by mouth daily      latanoprost (XALATAN) 0.005 % ophthalmic solution Apply 1 drop to eye every evening      Lifitegrast (XIIDRA) 5 % SOLN Apply to eye      melatonin 10 MG CAPS capsule Take by mouth      nystatin-triamcinolone (MYCOLOG II) 752359-6.1 UNIT/GM-% cream APPLY CREAM TO AFFECTED AREA TWICE A DAY      olopatadine (PATADAY) 0.2 % SOLN ophthalmic solution Apply 1 drop to eye daily      sodium chloride (OCEAN) 0.65 % nasal spray 1 spray by Nasal route 4 times daily as needed      Lifitegrast (XIIDRA) 5 % SOLN 1 drop into affected eye (Patient not taking: Reported on 2/14/2023)      budesonide-formoterol (SYMBICORT) 160-4.5 MCG/ACT AERO 2 puffs coupon LYLIZY#[id number] pcn# cn grp# [id number] id# [id number]      budesonide-formoterol (SYMBICORT) 80-4.5 MCG/ACT AERO Inhale 2 puffs into the lungs in the morning and 2 puffs in the evening. INHALE 1 PUFF MOUTH TWICE A DAY FOR COUGH RINSE MOUTH AFTER EACH DOSE (DISCARD THREE MONTHS AFTER REMOVAL FROM [facility]). 10.2 g 3    Docusate Sodium 100 MG TABS Take by mouth as needed (Patient not taking: Reported on 2/14/2023)       No current facility-administered medications for this visit.        OBJECTIVE:  BP (!) 151/78 (Site: Left Upper Arm, Position: Sitting)   Pulse 77   Temp 98 °F (36.7 °C)   Resp 16   Ht 4' 10\" (1.473 m)   Wt 173 lb 1.6 oz (78.5 kg)   LMP  (LMP Unknown)   SpO2 95%   BMI 36.18 kg/m²     Physical Exam:  Constitutional: Oriented to person, place, and time. Well-developed and well-nourished. HEENT: Normocephalic and atraumatic. Oropharynx is clear and moist.   Conjunctivae and EOM are normal. No scleral icterus. Neck supple. No JVD present. No tracheal deviation present. No thyromegaly present. Lymph node Deferred. Skin Warm and dry. No bruising and no rash noted. No erythema. No pallor. Respiratory Effort normal and breath sounds normal.  No respiratory distress. No wheezes. No rales. No tenderness. CVS Normal rate, regular rhythm and normal heart sounds. Exam reveals no gallop, no friction and no rub. No murmur heard. Abdomen Soft. Bowel sounds are normal. Exhibits no distension. There is no tenderness. There is no rebound and no guarding. Neuro No cranial nerve deficit. Exhibits normal muscle tone. MSK Normal range of motion in general.  No edema and no tenderness.    Psych Normal mood, affect, behavior, judgment and thought content      Labs:  Recent Results (from the past 24 hour(s))   CBC with Auto Differential    Collection Time: 02/14/23 12:05 PM   Result Value Ref Range    WBC 7.5 4.3 - 11.1 K/uL    RBC 4.25 4.05 - 5.2 M/uL    Hemoglobin 12.8 11.7 - 15.4 g/dL    Hematocrit 40.6 35.8 - 46.3 %    MCV 95.5 82.0 - 102.0 FL    MCH 30.1 26.1 - 32.9 PG    MCHC 31.5 31.4 - 35.0 g/dL    RDW 14.1 11.9 - 14.6 %    Platelets 679 913 - 924 K/uL    MPV 8.9 (L) 9.4 - 12.3 FL    nRBC 0.00 0.0 - 0.2 K/uL    Seg Neutrophils 60 43 - 78 %    Lymphocytes 25 13 - 44 %    Monocytes 12 4.0 - 12.0 %    Eosinophils % 3 0.5 - 7.8 %    Basophils 0 0.0 - 2.0 %    Immature Granulocytes 0 0.0 - 5.0 %    Segs Absolute 4.4 1.7 - 8.2 K/UL    Absolute Lymph # 1.9 0.5 - 4.6 K/UL    Absolute Mono # 0.9 0.1 - 1.3 K/UL Absolute Eos # 0.2 0.0 - 0.8 K/UL    Basophils Absolute 0.0 0.0 - 0.2 K/UL    Absolute Immature Granulocyte 0.0 0.0 - 0.5 K/UL    Differential Type AUTOMATED     Comprehensive Metabolic Panel    Collection Time: 02/14/23 12:05 PM   Result Value Ref Range    Sodium 140 133 - 143 mmol/L    Potassium 4.0 3.5 - 5.1 mmol/L    Chloride 108 101 - 110 mmol/L    CO2 28 21 - 32 mmol/L    Anion Gap 4 2 - 11 mmol/L    Glucose 131 (H) 65 - 100 mg/dL    BUN 10 8 - 23 MG/DL    Creatinine 0.60 0.6 - 1.0 MG/DL    Est, Glom Filt Rate >60 >60 ml/min/1.73m2    Calcium 8.6 8.3 - 10.4 MG/DL    Total Bilirubin 0.4 0.2 - 1.1 MG/DL    ALT 21 12 - 65 U/L    AST 16 15 - 37 U/L    Alk Phosphatase 96 50 - 136 U/L    Total Protein 7.3 6.3 - 8.2 g/dL    Albumin 3.3 3.2 - 4.6 g/dL    Globulin 4.0 2.8 - 4.5 g/dL    Albumin/Globulin Ratio 0.8 0.4 - 1.6         Imaging:  No results found for this or any previous visit. ASSESSMENT/PLAN:   Diagnosis Orders   1.  Other pulmonary embolism without acute cor pulmonale, unspecified chronicity ([facility]          76 y.o. female found B/L PE/DVT in 7/2020 probably related to immobility and started Eliquis, admitted for severe epistaxis  and acute anemia, held Eliquis, reported frequent nose bleeding for a year and had ENT eval without significant finding, also reported improper use of flonase nasal spray and discussed it can cause bleeding, consulted ENT for second opinion and agreed  with the improper use of flonase was causing septum bleeding, discussed with Dr. Tiana Sawyer to resume half dose anticoagulation but she had another episode of epistaxis without resuming AC, again discussed her high risk of bleeding makes AC unpractical  and agreed to have 2-4 weeks of break, followed in office 10/14/20 with Hb up to 10.6, no more nose bleeding, discussed and agreed to resume Eliquis at 2.5 mg bid, apparently tolerated, discussed to continue this dose without challenging higher dose  again, plan to complete 6 months AC, plan to remove IVC filter, try to be more active, follow in 3 month but call as needed. She returned on 2/16/21, reported having bled once since last visit, having been repeating LE dopplers but not yet followed IR to schedule IVCF removal, again discussed the pro/con of anticoagulation in her complex situation, refill eliquis 2.5mg bid  for 3 more months meanwhile arranged to see IR to remove IVCF on 3/29/21, however fell and fractured left toe became immobile again, she stopped using nasal steroid and only use saline spray and had not bled again, continue low-dose Eliquis and followed  on 10/13/2021, hemoglobin 14.5, the toe fracture had healed but still sedentary for most part of her day, discussed with patient and her roommate her lack of mobility appears long-term as she appeared tolerating the current low-dose Eliquis well, she  desires to continue on the current low-dose Eliquis given he continues to have more issues with mobility and tolerate blood thinner better. She was admitted for COVID in 1/2022, and lingering fatigue, mobility and activity only worse, no bleeding episodes, labs reviewed stable, refill Eliquis and return in 6 months but call as needed. 2/14/2023: She is here today for routine follow up on Eliquis 2.5mg BID and continues to tolerate it well. There has been 1 episode of epistaxis, now using humidifier at night. No other signs of bleeding. Easy bruising is ongoing. Exertional shortness of breath is ongoing, uses 2.5L O2 at HS, no chest pain. She denies any calf pain or edema. Labs reviewed and unremarkable. She will continue on Eliquis as planned, refills provided today. Return in 6 months for follow up and repeat labs or sooner if needed. All questions are answered to her satisfaction. She will call for further questions and concerns.     ECOG PERFORMANCE STATUS - 1- Restricted in physically strenuous activity but ambulatory and able to carry out work of a light or sedentary nature such as light house work, office work. Pain - 0 - No pain/10. None/Minimal pain - not affecting QOL     Fatigue - No flowsheet data found. Distress - No flowsheet data found.           Vincent Hoff) [address] [place] [address] Hematology and Oncology  [address] [facility]  Office : [phone]  Fax : [phone]

## 2023-02-21 NOTE — PROGRESS NOTES
Name:  Joe Rodriguez  YOB: 1945   MRN: [id number]      Office Visit: 2/22/2023        ASSESSMENT AND PLAN:  (Medical Decision Making)    Impression:     1. Mild persistent asthma without complication  --has never had methacholine challenge. Is on Symbicort. Does not wheeze and no obstruction on spirometry. Can continue Symbicort for now--consider FeNO and or methacholine challenge in the future. - albuterol sulfate HFA (PROVENTIL;VENTOLIN;PROAIR) 108 (90 Base) MCG/ACT inhaler; Inhale 2 puffs into the lungs every 4 hours as needed for Wheezing asthmadx code j45.09  Dispense: 3 each; Refill: 3  - Spirometry Without Bronchodilator    2. ILD (interstitial lung disease) ([facility] [address]  --has bilateral interstitial chronic infiltrates on CT scan. Previously hospitalized in 2022 with elevated BNP at 2675, elevated ESR. Was started on steriods, but stopped due to poor tolerance. Also had Covid. Has severe restriction on spirometry, crackles on exam.  Suspect IPF. Will obtain HRCT and CPFTs.  - Full PFT Study With Bronchodilator; Future  - CT CHEST HIGH RESOLUTION; Future    3. Nocturnal hypoxemia  --remains on O2 and 4 lpm with sleep. Reports receiving benefit from it--continue. 4. History of COVID-19  --? Fibrosis from Covid (12/2020). Orders Placed This Encounter   Medications    albuterol sulfate HFA (PROVENTIL;VENTOLIN;PROAIR) 108 (90 Base) MCG/ACT inhaler     Sig: Inhale 2 puffs into the lungs every 4 hours as needed for Wheezing asthmadx code j45.09     Dispense:  3 each     Refill:  3       Follow-up and Dispositions    Return in about 6 months (around 8/22/2023) for Harris or Gia//ILD, asthma. Collaborating physician is Dr. Brittani Montana.     ADS    Esperanza Hi, APRN - CNP      _________________________________________________________________________    HISTORY OF PRESENT ILLNESS:    Ms. Gui Aldrich is a 76 y.o. female who is seen at [facility] today for Asthma and Other (Hypoxemia, ILD)     The patient is a 64-year-old female who is seen for follow-up of ILD and asthma. She is accompanied by her roommate. She has nocturnal hypoxemia and is on oxygen at 4 L/min. She has a history of anxiety, kidney stones, depression, reflux, hypertension, and hyperlipidemia. She has never smoked. She has a history of COVID in early 2022. In addition, she has a history of DVT and PE, S/P IVC filter and is on chronic life long anticoagulation. Last echo done 2/1/22 which did not reveal any evidence of pHTN. CTA of chest on 1/31/2022 revealed mild cardiomegaly and no pulmonary emboli. There were bilateral interstitial opacities bilaterally as well as a left adrenal nodule. Follow-up chest x-ray done 5/2/2022 revealed diminished lung volumes with persistence of diffuse bilateral coarse reticular opacities, concerning for ILD. Denies history of RA, lupus, sjogren's. Denies joint pain. She has chronic dry cough. She is unsure about LOPEZ because she walks very little. She is in wheelchair today and uses electric scooter when at home. States she hasn't walked in over 20 years. Denies any LE edema. No orthopnea. No chest pain. REVIEW OF SYSTEMS: 10 point review of systems is negative except as reported in HPI. PHYSICAL EXAM: Body mass index is 34.34 kg/m². Vitals:    02/22/23 1331   BP: (!) 154/84   Pulse: 85   Resp: 17   Temp: 98.1 °F (36.7 °C)   TempSrc: Skin   SpO2: 96%   Weight: 170 lb (77.1 kg)   Height: 4' 11\" (1.499 m)         General:   Alert, cooperative, no distress, appears stated age. Eyes:   Conjunctivae/corneas clear. PERRL        Mouth/Throat:  Lips, mucosa, and tongue normal. Teeth and gums normal.        Lungs:     Breath sounds with faint crackles in bases. Heart:   Regular rate and rhythm, S1, S2 normal, no murmur, click, rub or gallop. Abdomen:    Soft, non-tender.      Extremities:  Extremities normal, atraumatic, no cyanosis or edema. Skin:  Skin color normal. No rashes or lesions     Neurologic:  A&Ox3     DIAGNOSTIC TESTS:                                                                                    LABS:   Lab Results   Component Value Date/Time    WBC 7.5 02/14/2023 12:05 PM    HGB 12.8 02/14/2023 12:05 PM    HCT 40.6 02/14/2023 12:05 PM     02/14/2023 12:05 PM    TSH 2.050 01/13/2020 03:21 PM    ESR 80 02/02/2022 10:26 AM    CRP 0.9 07/28/2020 10:04 AM     Imaging: I performed an independent interpretation of the patient's images. CXR:   XR CHEST STANDARD TWO VW 05/02/2022    Narrative  EXAM: CHEST X-RAY, 2 VIEWS    INDICATION: Post-Covid syndrome    COMPARISON: Chest x-ray 2/11/2022, 2/4/2022, chest CT 1/31/2022    TECHNIQUE: Frontal and lateral views of the chest were obtained. FINDINGS:    Lungs: Persistence of coarse reticular opacities throughout both lung fields. The lungs are diminished in volume. Cardiomediastinal silhouette: Normal.    Pleura: No pneumothorax or pleural effusion. Osseous structures: Right humerus ORIF. Prior left shoulder plasty. Impression  Diminished lung volumes with persistence of diffuse bilateral coarse reticular  opacities concerning for interstitial lung disease. CT Chest:   CT CHEST PULMONARY EMBOLISM W CONTRAST 01/31/2022    Narrative  CT chest with contrast (pulmonary embolism protocol)    History: worsening SOB that started last night. Covid 12/29 and ended up  hospitalized with Covid Pneumonia    Technique: Helically acquired images were obtained from the lung apices to the  domes of the diaphragms reconstructed at 2.5mm intervals after the uneventful  administration of 100 cc's intravenous Isovue-370 in order to evaluate the  pulmonary arteries. Coronal and sagittal reformatted images were submitted.     Radiation dose reduction techniques were used for this study:  Our CT scanners  use one or all of the following: Automated exposure control, adjustment of the  mA and/or kVp according to patient's size, iterative reconstruction.    Comparison: 01/27/2020    Findings: There is respiratory motion artifact. There is no pleural or  pericardial effusion. The heart is at least mildly enlarged. There are no  filling defects within the central or proximal segmental pulmonary arteries. The  distal segmental branches are not well assessed due to motion. Moderate  bilateral interstitial opacities are present bilaterally. There is a large  hiatal hernia. No adenopathy is present within the thorax. There is a left  adrenal gland nodule measuring 2.6 cm without significant change. There is a  remote compression deformity involving a lower thoracic vertebra, unchanged.    Impression  1. No evidence of central or proximal segmental pulmonary embolism with the  remaining branches unable to be assessed due to excessive motion  2. Findings most compatible with viral pneumonitis.  3. Large hiatal hernia..            Nuclear Medicine: No results found for this or any previous visit from the past 3650 days.    PFTs:   Office Spirometry Results Latest Ref Rng & Units 2/22/2023   FVC L 0.70   FEV1 L 0.61   FEV1 %PRED-PRE % 40   FVC %PRED-PRE % 34   FEV1/FVC % 86       Echo:   TRANSTHORACIC ECHOCARDIOGRAM (TTE) COMPLETE (CONTRAST/BUBBLE/3D PRN) 02/01/2022    Narrative  This is a summary report. The complete report is available in the patient's medical record. If you cannot access the medical record, please contact the sending organization for a detailed fax or copy.      Left Ventricle: Left ventricle size is normal. Mildly increased wall thickness. Normal wall motion. Normal left ventricular systolic function with a visually estimated EF of 60 - 65%. Normal diastolic function.    Mitral Valve: Mildly thickened leaflets. Mild transvalvular regurgitation.    IVC/SVC: IVC diameter is less than or equal to 21 mm and decreases less than 50% during inspiration; therefore the  estimated right atrial pressure is intermediate (~8 mmHg).     [facility] Reference Info:                                                                                                                  Past Medical History:   Diagnosis Date    Abnormality of gait 7/18/2018    Anemia     Anxiety     Arthritis     Asthma     Calculus of kidney     Chronic pain     Contact dermatitis and other eczema, due to unspecified cause     Depressive disorder, not elsewhere classified     Encounter for chronic pain management     Esophageal reflux     Essential hypertension, benign     Frequent falls 7/18/2018    GERD (gastroesophageal reflux disease)     Pure hypercholesterolemia         Tobacco Use      Smoking status: Never      Smokeless tobacco: Never    Allergies   Allergen Reactions    Cyclobenzaprine Other (See Comments)    Hydroxychloroquine Other (See Comments)    Hydroxychloroquine Sulfate Other (See Comments)    Ketoprofen Other (See Comments)    Molds & Smuts     Pollen Extract     Shellfish Allergy      Other reaction(s): Unknown (comments)     Current Outpatient Medications   Medication Instructions    albuterol sulfate HFA (PROVENTIL;VENTOLIN;PROAIR) 108 (90 Base) MCG/ACT inhaler 2 puffs, Inhalation, EVERY 4 HOURS PRN, asthmadx code j45.09    apixaban (ELIQUIS) 2.5 mg, Oral, 2 TIMES DAILY    ascorbic acid (VITAMIN C) 500 MG tablet Oral    budesonide-formoterol (SYMBICORT) 80-4.5 MCG/ACT AERO 2 puffs, Inhalation, 2 TIMES DAILY, INHALE 1 PUFF MOUTH TWICE A DAY FOR COUGH RINSE MOUTH AFTER EACH DOSE (DISCARD THREE MONTHS AFTER REMOVAL FROM FOIL POUCH)    calcium carbonate (OYSTER SHELL CALCIUM 500 MG) 1250 (500 Ca) MG tablet Oral, DAILY    Docusate Sodium 100 MG TABS PRN    Fe Fum-FePoly-Vit C-Vit B3 (INTEGRA) 62.5-62.5-40-3 MG CAPS TAKE ONE CAPSULE BY MOUTH ONE TIME DAILY    latanoprost (XALATAN) 0.005 % ophthalmic solution 1 drop, Ophthalmic, EVERY EVENING    Lifitegrast (XIIDRA) 5 % SOLN Ophthalmic    Lifitegrast (XIIDRA) 5 % SOLN 1 drop into affected eye    lisinopril (PRINIVIL;ZESTRIL) 5 MG tablet TAKE ONE TABLET BY MOUTH EVERY MORNING    melatonin 10 MG CAPS capsule Oral    montelukast (SINGULAIR) 10 mg, Oral, DAILY    NONFORMULARY Eldeberry    nystatin-triamcinolone (MYCOLOG II) 598881-1.1 UNIT/GM-% cream APPLY CREAM TO AFFECTED AREA TWICE A DAY    olopatadine (PATADAY) 0.2 % SOLN ophthalmic solution 1 drop, Ophthalmic, DAILY    OXYGEN Inhalation, 4 liters qhs    pantoprazole (PROTONIX) 40 MG tablet TAKE ONE TABLET BY MOUTH TWICE A DAY    sertraline (ZOLOFT) 100 MG tablet Take 1 1/2 tab daily.     sodium chloride (OCEAN) 0.65 % nasal spray 1 spray, Nasal, 4 TIMES DAILY PRN    sucralfate (CARAFATE) 1 GM tablet TAKE ONE TABLET BY MOUTH THREE TIMES A DAY    vitamin D (CHOLECALCIFEROL) 25 MCG (1000 UT) TABS tablet Oral, DAILY

## 2023-02-22 ENCOUNTER — OFFICE VISIT (OUTPATIENT)
Dept: PULMONOLOGY | Age: 78
End: 2023-02-22
Payer: MEDICARE

## 2023-02-22 VITALS
SYSTOLIC BLOOD PRESSURE: 154 MMHG | BODY MASS INDEX: 34.27 KG/M2 | DIASTOLIC BLOOD PRESSURE: 84 MMHG | WEIGHT: 170 LBS | OXYGEN SATURATION: 96 % | HEIGHT: 59 IN | RESPIRATION RATE: 17 BRPM | HEART RATE: 85 BPM | TEMPERATURE: 98.1 F

## 2023-02-22 DIAGNOSIS — J84.9 ILD (INTERSTITIAL LUNG DISEASE) (HCC): ICD-10-CM

## 2023-02-22 DIAGNOSIS — Z86.16 HISTORY OF COVID-19: ICD-10-CM

## 2023-02-22 DIAGNOSIS — G47.34 NOCTURNAL HYPOXEMIA: ICD-10-CM

## 2023-02-22 DIAGNOSIS — J45.30 MILD PERSISTENT ASTHMA WITHOUT COMPLICATION: Primary | ICD-10-CM

## 2023-02-22 PROBLEM — J96.01 ACUTE HYPOXEMIC RESPIRATORY FAILURE (HCC): Status: RESOLVED | Noted: 2020-07-27 | Resolved: 2023-02-22

## 2023-02-22 PROBLEM — I27.20 PULMONARY HTN (HCC): Status: RESOLVED | Noted: 2020-08-03 | Resolved: 2023-02-22

## 2023-02-22 PROBLEM — R91.8 PULMONARY INFILTRATE: Status: RESOLVED | Noted: 2022-02-01 | Resolved: 2023-02-22

## 2023-02-22 LAB
EXPIRATORY TIME: NORMAL
FEF 25-75% %PRED-PRE: NORMAL
FEF 25-75% PRED: NORMAL
FEF 25-75%-PRE: NORMAL
FEV1 %PRED-PRE: 40 %
FEV1 PRED: NORMAL
FEV1/FVC %PRED-PRE: NORMAL
FEV1/FVC PRED: NORMAL
FEV1/FVC: 86 %
FEV1: 0.61 L
FVC %PRED-PRE: 34 %
FVC PRED: NORMAL
FVC: 0.7 L
PEF %PRED-PRE: NORMAL
PEF PRED: NORMAL
PEF-PRE: NORMAL

## 2023-02-22 PROCEDURE — 1090F PRES/ABSN URINE INCON ASSESS: CPT | Performed by: NURSE PRACTITIONER

## 2023-02-22 PROCEDURE — G8417 CALC BMI ABV UP PARAM F/U: HCPCS | Performed by: NURSE PRACTITIONER

## 2023-02-22 PROCEDURE — 3079F DIAST BP 80-89 MM HG: CPT | Performed by: NURSE PRACTITIONER

## 2023-02-22 PROCEDURE — G8399 PT W/DXA RESULTS DOCUMENT: HCPCS | Performed by: NURSE PRACTITIONER

## 2023-02-22 PROCEDURE — 94010 BREATHING CAPACITY TEST: CPT | Performed by: INTERNAL MEDICINE

## 2023-02-22 PROCEDURE — G8484 FLU IMMUNIZE NO ADMIN: HCPCS | Performed by: NURSE PRACTITIONER

## 2023-02-22 PROCEDURE — 99214 OFFICE O/P EST MOD 30 MIN: CPT | Performed by: NURSE PRACTITIONER

## 2023-02-22 PROCEDURE — G8427 DOCREV CUR MEDS BY ELIG CLIN: HCPCS | Performed by: NURSE PRACTITIONER

## 2023-02-22 PROCEDURE — 1036F TOBACCO NON-USER: CPT | Performed by: NURSE PRACTITIONER

## 2023-02-22 PROCEDURE — 1123F ACP DISCUSS/DSCN MKR DOCD: CPT | Performed by: NURSE PRACTITIONER

## 2023-02-22 PROCEDURE — 3077F SYST BP >= 140 MM HG: CPT | Performed by: NURSE PRACTITIONER

## 2023-02-22 RX ORDER — ALBUTEROL SULFATE 90 UG/1
2 AEROSOL, METERED RESPIRATORY (INHALATION) EVERY 4 HOURS PRN
Qty: 3 EACH | Refills: 3 | Status: SHIPPED | OUTPATIENT
Start: 2023-02-22

## 2023-02-22 ASSESSMENT — PULMONARY FUNCTION TESTS
FVC_PERCENT_PREDICTED_PRE: 34
FVC: 0.70
FEV1_PERCENT_PREDICTED_PRE: 40
FEV1/FVC: 86
FEV1: 0.61

## 2023-02-22 NOTE — PATIENT INSTRUCTIONS
I have ordered CT scan of chest.  You should receive a call from scheduling within 2-3 business days.   If you do not hear from them, please call [phone] to schedule your test.

## 2023-02-27 DIAGNOSIS — J30.1 SEASONAL ALLERGIC RHINITIS DUE TO POLLEN: ICD-10-CM

## 2023-02-27 DIAGNOSIS — J45.20 MILD INTERMITTENT ASTHMA, UNSPECIFIED WHETHER COMPLICATED: ICD-10-CM

## 2023-02-27 DIAGNOSIS — F41.9 ANXIETY: ICD-10-CM

## 2023-02-27 RX ORDER — MONTELUKAST SODIUM 10 MG/1
10 TABLET ORAL DAILY
Qty: 90 TABLET | Refills: 1 | Status: SHIPPED | OUTPATIENT
Start: 2023-02-27

## 2023-02-27 RX ORDER — SUCRALFATE 1 G/1
TABLET ORAL
Qty: 270 TABLET | Refills: 0 | Status: SHIPPED | OUTPATIENT
Start: 2023-02-27

## 2023-02-27 RX ORDER — SERTRALINE HYDROCHLORIDE 100 MG/1
TABLET, FILM COATED ORAL
Qty: 135 TABLET | Refills: 1 | Status: SHIPPED | OUTPATIENT
Start: 2023-02-27

## 2023-03-06 ENCOUNTER — HOSPITAL ENCOUNTER (OUTPATIENT)
Dept: CT IMAGING | Age: 78
Discharge: HOME | End: 2023-03-08
Payer: MEDICARE

## 2023-03-06 DIAGNOSIS — J84.9 ILD (INTERSTITIAL LUNG DISEASE) (HCC): ICD-10-CM

## 2023-03-06 PROCEDURE — 71250 CT THORAX DX C-: CPT

## 2023-03-06 NOTE — RESULT ENCOUNTER NOTE
Please let patient know that CT shows significant improvement in her infiltrates. She has 2 small nodules that could be related to her recent infection. Will repeat CT of chest in 1 year, and we will order this at next appointment. No worrisome findings.

## 2023-03-09 ENCOUNTER — HOSPITAL ENCOUNTER (OUTPATIENT)
Dept: PULMONOLOGY | Age: 78
Discharge: HOME | End: 2023-03-09
Payer: MEDICARE

## 2023-03-09 DIAGNOSIS — J84.9 ILD (INTERSTITIAL LUNG DISEASE) (HCC): ICD-10-CM

## 2023-03-09 PROCEDURE — 94010 BREATHING CAPACITY TEST: CPT

## 2023-05-03 ENCOUNTER — OFFICE VISIT (OUTPATIENT)
Dept: FAMILY MEDICINE CLINIC | Facility: CLINIC | Age: 78
End: 2023-05-03
Payer: MEDICARE

## 2023-05-03 VITALS
SYSTOLIC BLOOD PRESSURE: 130 MMHG | DIASTOLIC BLOOD PRESSURE: 70 MMHG | BODY MASS INDEX: 33.06 KG/M2 | WEIGHT: 164 LBS | HEIGHT: 59 IN

## 2023-05-03 DIAGNOSIS — J30.1 SEASONAL ALLERGIC RHINITIS DUE TO POLLEN: ICD-10-CM

## 2023-05-03 DIAGNOSIS — E66.01 SEVERE OBESITY (BMI 35.0-39.9) WITH COMORBIDITY (HCC): ICD-10-CM

## 2023-05-03 DIAGNOSIS — G31.9 CEREBELLAR ATROPHY (HCC): ICD-10-CM

## 2023-05-03 DIAGNOSIS — J84.9 INTERSTITIAL PULMONARY DISEASE, UNSPECIFIED (HCC): ICD-10-CM

## 2023-05-03 DIAGNOSIS — M81.0 OSTEOPOROSIS, UNSPECIFIED OSTEOPOROSIS TYPE, UNSPECIFIED PATHOLOGICAL FRACTURE PRESENCE: ICD-10-CM

## 2023-05-03 DIAGNOSIS — R53.83 FATIGUE, UNSPECIFIED TYPE: ICD-10-CM

## 2023-05-03 DIAGNOSIS — J45.20 MILD INTERMITTENT ASTHMA, UNSPECIFIED WHETHER COMPLICATED: ICD-10-CM

## 2023-05-03 DIAGNOSIS — I10 ESSENTIAL HYPERTENSION: ICD-10-CM

## 2023-05-03 DIAGNOSIS — R73.9 HIGH BLOOD SUGAR: ICD-10-CM

## 2023-05-03 DIAGNOSIS — F32.1 MAJOR DEPRESSIVE DISORDER, SINGLE EPISODE, MODERATE (HCC): ICD-10-CM

## 2023-05-03 DIAGNOSIS — F41.9 ANXIETY: Primary | ICD-10-CM

## 2023-05-03 DIAGNOSIS — E78.00 HIGH CHOLESTEROL: ICD-10-CM

## 2023-05-03 LAB
ALBUMIN SERPL-MCNC: 3.6 G/DL (ref 3.2–4.6)
ALBUMIN/GLOB SERPL: 1 (ref 0.4–1.6)
ALP SERPL-CCNC: 61 U/L (ref 50–136)
ALT SERPL-CCNC: 27 U/L (ref 12–65)
ANION GAP SERPL CALC-SCNC: 4 MMOL/L (ref 2–11)
AST SERPL-CCNC: 17 U/L (ref 15–37)
BASOPHILS # BLD: 0 K/UL (ref 0–0.2)
BASOPHILS NFR BLD: 0 % (ref 0–2)
BILIRUB SERPL-MCNC: 0.4 MG/DL (ref 0.2–1.1)
BUN SERPL-MCNC: 12 MG/DL (ref 8–23)
CALCIUM SERPL-MCNC: 9.1 MG/DL (ref 8.3–10.4)
CHLORIDE SERPL-SCNC: 107 MMOL/L (ref 101–110)
CHOLEST SERPL-MCNC: 264 MG/DL
CO2 SERPL-SCNC: 27 MMOL/L (ref 21–32)
CREAT SERPL-MCNC: 0.5 MG/DL (ref 0.6–1)
DIFFERENTIAL METHOD BLD: NORMAL
EOSINOPHIL # BLD: 0.2 K/UL (ref 0–0.8)
EOSINOPHIL NFR BLD: 2 % (ref 0.5–7.8)
ERYTHROCYTE [DISTWIDTH] IN BLOOD BY AUTOMATED COUNT: 14.1 % (ref 11.9–14.6)
GLOBULIN SER CALC-MCNC: 3.7 G/DL (ref 2.8–4.5)
GLUCOSE SERPL-MCNC: 140 MG/DL (ref 65–100)
HCT VFR BLD AUTO: 43.8 % (ref 35.8–46.3)
HDLC SERPL-MCNC: 62 MG/DL (ref 40–60)
HDLC SERPL: 4.3
HGB BLD-MCNC: 14 G/DL (ref 11.7–15.4)
IMM GRANULOCYTES # BLD AUTO: 0 K/UL (ref 0–0.5)
IMM GRANULOCYTES NFR BLD AUTO: 0 % (ref 0–5)
LDLC SERPL CALC-MCNC: 157.8 MG/DL
LYMPHOCYTES # BLD: 2.5 K/UL (ref 0.5–4.6)
LYMPHOCYTES NFR BLD: 33 % (ref 13–44)
MCH RBC QN AUTO: 31.3 PG (ref 26.1–32.9)
MCHC RBC AUTO-ENTMCNC: 32 G/DL (ref 31.4–35)
MCV RBC AUTO: 97.8 FL (ref 82–102)
MONOCYTES # BLD: 0.7 K/UL (ref 0.1–1.3)
MONOCYTES NFR BLD: 9 % (ref 4–12)
NEUTS SEG # BLD: 4.2 K/UL (ref 1.7–8.2)
NEUTS SEG NFR BLD: 56 % (ref 43–78)
NRBC # BLD: 0 K/UL (ref 0–0.2)
PLATELET # BLD AUTO: 220 K/UL (ref 150–450)
PMV BLD AUTO: 9.8 FL (ref 9.4–12.3)
POTASSIUM SERPL-SCNC: 3.9 MMOL/L (ref 3.5–5.1)
PROT SERPL-MCNC: 7.3 G/DL (ref 6.3–8.2)
RBC # BLD AUTO: 4.48 M/UL (ref 4.05–5.2)
SODIUM SERPL-SCNC: 138 MMOL/L (ref 133–143)
TRIGL SERPL-MCNC: 221 MG/DL (ref 35–150)
TSH, 3RD GENERATION: 0.62 UIU/ML (ref 0.36–3.74)
VLDLC SERPL CALC-MCNC: 44.2 MG/DL (ref 6–23)
WBC # BLD AUTO: 7.5 K/UL (ref 4.3–11.1)

## 2023-05-03 PROCEDURE — 1123F ACP DISCUSS/DSCN MKR DOCD: CPT | Performed by: FAMILY MEDICINE

## 2023-05-03 PROCEDURE — 3075F SYST BP GE 130 - 139MM HG: CPT | Performed by: FAMILY MEDICINE

## 2023-05-03 PROCEDURE — G8399 PT W/DXA RESULTS DOCUMENT: HCPCS | Performed by: FAMILY MEDICINE

## 2023-05-03 PROCEDURE — 3078F DIAST BP <80 MM HG: CPT | Performed by: FAMILY MEDICINE

## 2023-05-03 PROCEDURE — 99214 OFFICE O/P EST MOD 30 MIN: CPT | Performed by: FAMILY MEDICINE

## 2023-05-03 PROCEDURE — 1036F TOBACCO NON-USER: CPT | Performed by: FAMILY MEDICINE

## 2023-05-03 PROCEDURE — G8427 DOCREV CUR MEDS BY ELIG CLIN: HCPCS | Performed by: FAMILY MEDICINE

## 2023-05-03 PROCEDURE — 1090F PRES/ABSN URINE INCON ASSESS: CPT | Performed by: FAMILY MEDICINE

## 2023-05-03 PROCEDURE — G8417 CALC BMI ABV UP PARAM F/U: HCPCS | Performed by: FAMILY MEDICINE

## 2023-05-03 SDOH — ECONOMIC STABILITY: FOOD INSECURITY: WITHIN THE PAST 12 MONTHS, YOU WORRIED THAT YOUR FOOD WOULD RUN OUT BEFORE YOU GOT MONEY TO BUY MORE.: NEVER TRUE

## 2023-05-03 SDOH — ECONOMIC STABILITY: HOUSING INSECURITY
IN THE LAST 12 MONTHS, WAS THERE A TIME WHEN YOU DID NOT HAVE A STEADY PLACE TO SLEEP OR SLEPT IN A SHELTER (INCLUDING NOW)?: NO

## 2023-05-03 SDOH — ECONOMIC STABILITY: FOOD INSECURITY: WITHIN THE PAST 12 MONTHS, THE FOOD YOU BOUGHT JUST DIDN'T LAST AND YOU DIDN'T HAVE MONEY TO GET MORE.: NEVER TRUE

## 2023-05-03 SDOH — ECONOMIC STABILITY: INCOME INSECURITY: HOW HARD IS IT FOR YOU TO PAY FOR THE VERY BASICS LIKE FOOD, HOUSING, MEDICAL CARE, AND HEATING?: NOT HARD AT ALL

## 2023-05-03 ASSESSMENT — PATIENT HEALTH QUESTIONNAIRE - PHQ9
SUM OF ALL RESPONSES TO PHQ QUESTIONS 1-9: 0
2. FEELING DOWN, DEPRESSED OR HOPELESS: 0
SUM OF ALL RESPONSES TO PHQ9 QUESTIONS 1 & 2: 0
SUM OF ALL RESPONSES TO PHQ QUESTIONS 1-9: 0
1. LITTLE INTEREST OR PLEASURE IN DOING THINGS: 0

## 2023-05-03 ASSESSMENT — ENCOUNTER SYMPTOMS
SHORTNESS OF BREATH: 0
CHEST TIGHTNESS: 0
APNEA: 0

## 2023-05-03 NOTE — PROGRESS NOTES
[address] [place] [address]  _______________________________________  Isidro Aiken MD                 [address] [place] [address] Drive, [address]. [place], [address] [place] [address]                     Jose Good [address]                                                                                    Phone: [phone]                                                                                    Fax: [phone]    Ruddy Cobos is a 76 y.o. female who is seen for evaluation of   Chief Complaint   Patient presents with    Hypertension    Gastroesophageal Reflux    Asthma    Osteoporosis    Shortness of Breath     Increased episodes upon exertion     Cholesterol Problem    Mental Health Problem       HPI:   Hypertension  This is a chronic problem. Progression since onset: on meds, need refills and labs, no side effect noted. Pertinent negatives include no chest pain, palpitations or shortness of breath. Gastroesophageal Reflux  She reports no chest pain. Chronicity: on meds, good control noted, need refills. Associated symptoms include fatigue. Asthma  There is no shortness of breath. Chronicity: stable at present but some dyspnea iwth exertion at times, see below for details. Pertinent negatives include no appetite change, chest pain or ear pain. Her past medical history is significant for asthma. Shortness of Breath  Episode onset: see below, worse with exertion, not able to walk much now due to increase number of falls, using scooter or wheelchair most times, can transfer safely if she moves slowly. Pertinent negatives include no chest pain, ear pain or leg swelling. Her past medical history is significant for asthma. Mental Health Problem    The degree of incapacity that she is experiencing as a consequence of her illness is moderate (anxiety and depression, on med, need refills nad labs). Additional symptoms of the illness include fatigue. Additional symptoms of the illness do not include appetite change.

## 2023-05-04 LAB
EST. AVERAGE GLUCOSE BLD GHB EST-MCNC: 120 MG/DL
HBA1C MFR BLD: 5.8 % (ref 4.8–5.6)

## 2023-05-24 ENCOUNTER — TRANSCRIBE ORDERS (OUTPATIENT)
Dept: SCHEDULING | Age: 78
End: 2023-05-24

## 2023-05-24 DIAGNOSIS — Z12.31 SCREENING MAMMOGRAM FOR HIGH-RISK PATIENT: Primary | ICD-10-CM

## 2023-05-30 ENCOUNTER — TELEPHONE (OUTPATIENT)
Dept: FAMILY MEDICINE CLINIC | Facility: CLINIC | Age: 78
End: 2023-05-30

## 2023-05-30 DIAGNOSIS — F41.9 ANXIETY: ICD-10-CM

## 2023-05-30 NOTE — TELEPHONE ENCOUNTER
Patient called for a refill on Sertraline to be sent to Mail Order pharmacy. Her number is [phone].

## 2023-05-31 RX ORDER — SERTRALINE HYDROCHLORIDE 100 MG/1
TABLET, FILM COATED ORAL
Qty: 135 TABLET | Refills: 1 | Status: SHIPPED | OUTPATIENT
Start: 2023-05-31

## 2023-07-11 RX ORDER — LISINOPRIL 5 MG/1
TABLET ORAL
Qty: 90 TABLET | Refills: 1 | Status: SHIPPED | OUTPATIENT
Start: 2023-07-11

## 2023-08-11 DIAGNOSIS — I26.99 OTHER PULMONARY EMBOLISM WITHOUT ACUTE COR PULMONALE, UNSPECIFIED CHRONICITY (HCC): Primary | ICD-10-CM

## 2023-08-21 NOTE — TELEPHONE ENCOUNTER
----- Message from Jos Abel sent at 10/19/2022  3:38 PM EDT -----  Subject: Refill Request    QUESTIONS  Name of Medication? ciprofloxacin (CIPRO) 500 MG tablet  Patient-reported dosage and instructions? once daily  How many days do you have left? 0  Preferred Pharmacy? [id number] [facility] phone number (if available)? [phone]  ---------------------------------------------------------------------------  --------------,  Name of Medication? Fe Fum-FePoly-Vit C-Vit B3 (INTEGRA) 62.5-62.5-40-3 MG   CAPS  Patient-reported dosage and instructions? once daily  How many days do you have left? 0  Preferred Pharmacy? [id number]  Pharmacy phone number (if available)? [phone]  ---------------------------------------------------------------------------  --------------  Tamie tidy INFO  What is the best way for the office to contact you? OK to leave message on   voicemail  Preferred Call Back Phone Number? [phone]  ---------------------------------------------------------------------------  --------------  SCRIPT ANSWERS  Relationship to Patient?  Self Pt reports some improvement in burning sensation.

## 2023-08-22 NOTE — PROGRESS NOTES
2017.    The patient is a candidate for a lung nodule program which can assist in nodule  surveillance and scheduling follow up exams. Nuclear Medicine: No results found for this or any previous visit from the past 3650 days. PFTs:   Office Spirometry Results Latest Ref Rng & Units 2/22/2023   FVC L 0.70   FEV1 L 0.61   FEV1 %PRED-PRE % 40   FVC %PRED-PRE % 34   FEV1/FVC % 86     No results found for this or any previous visit. No results found for this or any previous visit. FeNO: No results found for this or any previous visit. FeNO and Likelihood of Eosinophilic Asthma   Unlikely Intermediate Likely   <25 ppb 25-50 ppb >50ppb     Exercise Oximetry:    Echo:   TRANSTHORACIC ECHOCARDIOGRAM (TTE) COMPLETE (CONTRAST/BUBBLE/3D PRN) 02/01/2022    Narrative  This is a summary report. The complete report is available in the patient's medical record. If you cannot access the medical record, please contact the sending organization for a detailed fax or copy. Left Ventricle: Left ventricle size is normal. Mildly increased wall thickness. Normal wall motion. Normal left ventricular systolic function with a visually estimated EF of 60 - 65%. Normal diastolic function. Mitral Valve: Mildly thickened leaflets. Mild transvalvular regurgitation. IVC/SVC: IVC diameter is less than or equal to 21 mm and decreases less than 50% during inspiration; therefore the estimated right atrial pressure is intermediate (~8 mmHg).       [facility] Reference Info:                                                                                                                  Immunization History   Administered Date(s) Administered    PPD Test 07/28/2020, 02/01/2022    Pneumococcal, PCV-13, PREVNAR 15, (age 6w+), IM, 0.5mL 11/06/2017    Pneumococcal, PPSV23, PNEUMOVAX 23, (age 2y+), SC/IM, 0.5mL 01/04/2019, 02/18/2019     Past Medical History:   Diagnosis Date    Abnormality of gait 7/18/2018    Anemia     Anxiety

## 2023-08-23 ENCOUNTER — OFFICE VISIT (OUTPATIENT)
Dept: PULMONOLOGY | Age: 78
End: 2023-08-23
Payer: MEDICARE

## 2023-08-23 VITALS
BODY MASS INDEX: 35.05 KG/M2 | HEART RATE: 77 BPM | SYSTOLIC BLOOD PRESSURE: 128 MMHG | WEIGHT: 167 LBS | HEIGHT: 58 IN | DIASTOLIC BLOOD PRESSURE: 72 MMHG | OXYGEN SATURATION: 96 % | RESPIRATION RATE: 17 BRPM | TEMPERATURE: 97 F

## 2023-08-23 DIAGNOSIS — G47.34 NOCTURNAL HYPOXEMIA: ICD-10-CM

## 2023-08-23 DIAGNOSIS — J45.30 MILD PERSISTENT ASTHMA WITHOUT COMPLICATION: ICD-10-CM

## 2023-08-23 DIAGNOSIS — Z86.16 HISTORY OF COVID-19: ICD-10-CM

## 2023-08-23 DIAGNOSIS — R91.8 PULMONARY INFILTRATE: ICD-10-CM

## 2023-08-23 DIAGNOSIS — R91.8 LUNG NODULES: Primary | ICD-10-CM

## 2023-08-23 DIAGNOSIS — J98.4 RESTRICTIVE LUNG DISEASE: ICD-10-CM

## 2023-08-23 PROCEDURE — 99215 OFFICE O/P EST HI 40 MIN: CPT | Performed by: NURSE PRACTITIONER

## 2023-08-23 PROCEDURE — 3074F SYST BP LT 130 MM HG: CPT | Performed by: NURSE PRACTITIONER

## 2023-08-23 PROCEDURE — 3078F DIAST BP <80 MM HG: CPT | Performed by: NURSE PRACTITIONER

## 2023-08-23 PROCEDURE — 1036F TOBACCO NON-USER: CPT | Performed by: NURSE PRACTITIONER

## 2023-08-23 PROCEDURE — 1090F PRES/ABSN URINE INCON ASSESS: CPT | Performed by: NURSE PRACTITIONER

## 2023-08-23 PROCEDURE — 1123F ACP DISCUSS/DSCN MKR DOCD: CPT | Performed by: NURSE PRACTITIONER

## 2023-08-23 PROCEDURE — G8417 CALC BMI ABV UP PARAM F/U: HCPCS | Performed by: NURSE PRACTITIONER

## 2023-08-23 PROCEDURE — G8399 PT W/DXA RESULTS DOCUMENT: HCPCS | Performed by: NURSE PRACTITIONER

## 2023-08-23 PROCEDURE — G8427 DOCREV CUR MEDS BY ELIG CLIN: HCPCS | Performed by: NURSE PRACTITIONER

## 2023-08-23 NOTE — PATIENT INSTRUCTIONS
I have ordered CT scan due in March, 2024. You should receive a call from scheduling within 2-3 business days.   If you do not hear from them, please call [phone] to schedule your test.

## 2023-08-25 ENCOUNTER — TELEPHONE (OUTPATIENT)
Dept: ONCOLOGY | Age: 78
End: 2023-08-25

## 2023-08-28 ENCOUNTER — TELEPHONE (OUTPATIENT)
Dept: ONCOLOGY | Age: 78
End: 2023-08-28

## 2023-08-28 NOTE — TELEPHONE ENCOUNTER
Patient requesting an appointment with Johnny Simmons     Patient awaiting a call back to schedule appointment

## 2023-09-20 ENCOUNTER — HOSPITAL ENCOUNTER (OUTPATIENT)
Dept: LAB | Age: 78
Discharge: HOME | End: 2023-09-23
Payer: MEDICARE

## 2023-09-20 ENCOUNTER — OFFICE VISIT (OUTPATIENT)
Dept: ONCOLOGY | Age: 78
End: 2023-09-20
Payer: MEDICARE

## 2023-09-20 VITALS
OXYGEN SATURATION: 93 % | TEMPERATURE: 98 F | WEIGHT: 163.9 LBS | HEART RATE: 79 BPM | SYSTOLIC BLOOD PRESSURE: 146 MMHG | DIASTOLIC BLOOD PRESSURE: 79 MMHG | BODY MASS INDEX: 34.26 KG/M2 | RESPIRATION RATE: 16 BRPM

## 2023-09-20 DIAGNOSIS — Z74.09 IMMOBILITY: ICD-10-CM

## 2023-09-20 DIAGNOSIS — I26.99 OTHER PULMONARY EMBOLISM WITHOUT ACUTE COR PULMONALE, UNSPECIFIED CHRONICITY (HCC): Primary | ICD-10-CM

## 2023-09-20 DIAGNOSIS — E66.01 SEVERE OBESITY (BMI 35.0-39.9) WITH COMORBIDITY (HCC): ICD-10-CM

## 2023-09-20 DIAGNOSIS — I26.99 OTHER PULMONARY EMBOLISM WITHOUT ACUTE COR PULMONALE, UNSPECIFIED CHRONICITY (HCC): ICD-10-CM

## 2023-09-20 LAB
ALBUMIN SERPL-MCNC: 3.5 G/DL (ref 3.2–4.6)
ALBUMIN/GLOB SERPL: 0.9 (ref 0.4–1.6)
ALP SERPL-CCNC: 66 U/L (ref 50–136)
ALT SERPL-CCNC: 23 U/L (ref 12–65)
ANION GAP SERPL CALC-SCNC: 5 MMOL/L (ref 2–11)
AST SERPL-CCNC: 18 U/L (ref 15–37)
BASOPHILS # BLD: 0 K/UL (ref 0–0.2)
BASOPHILS NFR BLD: 0 % (ref 0–2)
BILIRUB SERPL-MCNC: 0.4 MG/DL (ref 0.2–1.1)
BUN SERPL-MCNC: 7 MG/DL (ref 8–23)
CALCIUM SERPL-MCNC: 9 MG/DL (ref 8.3–10.4)
CHLORIDE SERPL-SCNC: 106 MMOL/L (ref 101–110)
CO2 SERPL-SCNC: 29 MMOL/L (ref 21–32)
CREAT SERPL-MCNC: 0.6 MG/DL (ref 0.6–1)
DIFFERENTIAL METHOD BLD: ABNORMAL
EOSINOPHIL # BLD: 0.1 K/UL (ref 0–0.8)
EOSINOPHIL NFR BLD: 2 % (ref 0.5–7.8)
ERYTHROCYTE [DISTWIDTH] IN BLOOD BY AUTOMATED COUNT: 12.6 % (ref 11.9–14.6)
GLOBULIN SER CALC-MCNC: 3.7 G/DL (ref 2.8–4.5)
GLUCOSE SERPL-MCNC: 101 MG/DL (ref 65–100)
HCT VFR BLD AUTO: 42.9 % (ref 35.8–46.3)
HGB BLD-MCNC: 13.6 G/DL (ref 11.7–15.4)
IMM GRANULOCYTES # BLD AUTO: 0 K/UL (ref 0–0.5)
IMM GRANULOCYTES NFR BLD AUTO: 0 % (ref 0–5)
LYMPHOCYTES # BLD: 1.1 K/UL (ref 0.5–4.6)
LYMPHOCYTES NFR BLD: 21 % (ref 13–44)
MCH RBC QN AUTO: 31.1 PG (ref 26.1–32.9)
MCHC RBC AUTO-ENTMCNC: 31.7 G/DL (ref 31.4–35)
MCV RBC AUTO: 97.9 FL (ref 82–102)
MONOCYTES # BLD: 0.9 K/UL (ref 0.1–1.3)
MONOCYTES NFR BLD: 17 % (ref 4–12)
NEUTS SEG # BLD: 3.2 K/UL (ref 1.7–8.2)
NEUTS SEG NFR BLD: 60 % (ref 43–78)
NRBC # BLD: 0 K/UL (ref 0–0.2)
PLATELET # BLD AUTO: 216 K/UL (ref 150–450)
PMV BLD AUTO: 9.1 FL (ref 9.4–12.3)
POTASSIUM SERPL-SCNC: 3.7 MMOL/L (ref 3.5–5.1)
PROT SERPL-MCNC: 7.2 G/DL (ref 6.3–8.2)
RBC # BLD AUTO: 4.38 M/UL (ref 4.05–5.2)
SODIUM SERPL-SCNC: 140 MMOL/L (ref 133–143)
WBC # BLD AUTO: 5.3 K/UL (ref 4.3–11.1)

## 2023-09-20 PROCEDURE — 80053 COMPREHEN METABOLIC PANEL: CPT

## 2023-09-20 PROCEDURE — 85025 COMPLETE CBC W/AUTO DIFF WBC: CPT

## 2023-09-20 PROCEDURE — 1036F TOBACCO NON-USER: CPT | Performed by: INTERNAL MEDICINE

## 2023-09-20 PROCEDURE — 1090F PRES/ABSN URINE INCON ASSESS: CPT | Performed by: INTERNAL MEDICINE

## 2023-09-20 PROCEDURE — 36415 COLL VENOUS BLD VENIPUNCTURE: CPT

## 2023-09-20 PROCEDURE — 3077F SYST BP >= 140 MM HG: CPT | Performed by: INTERNAL MEDICINE

## 2023-09-20 PROCEDURE — G8417 CALC BMI ABV UP PARAM F/U: HCPCS | Performed by: INTERNAL MEDICINE

## 2023-09-20 PROCEDURE — G8399 PT W/DXA RESULTS DOCUMENT: HCPCS | Performed by: INTERNAL MEDICINE

## 2023-09-20 PROCEDURE — 99214 OFFICE O/P EST MOD 30 MIN: CPT | Performed by: INTERNAL MEDICINE

## 2023-09-20 PROCEDURE — G8427 DOCREV CUR MEDS BY ELIG CLIN: HCPCS | Performed by: INTERNAL MEDICINE

## 2023-09-20 PROCEDURE — 3078F DIAST BP <80 MM HG: CPT | Performed by: INTERNAL MEDICINE

## 2023-09-20 PROCEDURE — 1123F ACP DISCUSS/DSCN MKR DOCD: CPT | Performed by: INTERNAL MEDICINE

## 2023-09-20 ASSESSMENT — PATIENT HEALTH QUESTIONNAIRE - PHQ9
2. FEELING DOWN, DEPRESSED OR HOPELESS: 0
10. IF YOU CHECKED OFF ANY PROBLEMS, HOW DIFFICULT HAVE THESE PROBLEMS MADE IT FOR YOU TO DO YOUR WORK, TAKE CARE OF THINGS AT HOME, OR GET ALONG WITH OTHER PEOPLE: 0
SUM OF ALL RESPONSES TO PHQ9 QUESTIONS 1 & 2: 0
9. THOUGHTS THAT YOU WOULD BE BETTER OFF DEAD, OR OF HURTING YOURSELF: 0
5. POOR APPETITE OR OVEREATING: 2
4. FEELING TIRED OR HAVING LITTLE ENERGY: 1
1. LITTLE INTEREST OR PLEASURE IN DOING THINGS: 0
8. MOVING OR SPEAKING SO SLOWLY THAT OTHER PEOPLE COULD HAVE NOTICED. OR THE OPPOSITE, BEING SO FIGETY OR RESTLESS THAT YOU HAVE BEEN MOVING AROUND A LOT MORE THAN USUAL: 0
SUM OF ALL RESPONSES TO PHQ QUESTIONS 1-9: 5
SUM OF ALL RESPONSES TO PHQ QUESTIONS 1-9: 5
6. FEELING BAD ABOUT YOURSELF - OR THAT YOU ARE A FAILURE OR HAVE LET YOURSELF OR YOUR FAMILY DOWN: 0
SUM OF ALL RESPONSES TO PHQ QUESTIONS 1-9: 5
3. TROUBLE FALLING OR STAYING ASLEEP: 2
7. TROUBLE CONCENTRATING ON THINGS, SUCH AS READING THE NEWSPAPER OR WATCHING TELEVISION: 0
SUM OF ALL RESPONSES TO PHQ QUESTIONS 1-9: 5

## 2023-09-20 ASSESSMENT — ANXIETY QUESTIONNAIRES
4. TROUBLE RELAXING: 0
IF YOU CHECKED OFF ANY PROBLEMS ON THIS QUESTIONNAIRE, HOW DIFFICULT HAVE THESE PROBLEMS MADE IT FOR YOU TO DO YOUR WORK, TAKE CARE OF THINGS AT HOME, OR GET ALONG WITH OTHER PEOPLE: NOT DIFFICULT AT ALL
3. WORRYING TOO MUCH ABOUT DIFFERENT THINGS: 0
5. BEING SO RESTLESS THAT IT IS HARD TO SIT STILL: 0
2. NOT BEING ABLE TO STOP OR CONTROL WORRYING: 0
6. BECOMING EASILY ANNOYED OR IRRITABLE: 0
GAD7 TOTAL SCORE: 2
1. FEELING NERVOUS, ANXIOUS, OR ON EDGE: 2
7. FEELING AFRAID AS IF SOMETHING AWFUL MIGHT HAPPEN: 0

## 2023-09-20 NOTE — PROGRESS NOTES
Vernon Colin M.D.   [facility], [address] [facility]  Office : [phone]  Fax : [phone]

## 2023-09-20 NOTE — PATIENT INSTRUCTIONS
Patient Instructions from Today's Visit    Reason for Visit:  Follow-up visit for DVT/PE      Plan:  -Continue low-dose Eliquis. Labs look stable.      Follow Up:  -6 months    Recent Lab Results:  Hospital Outpatient Visit on 09/20/2023   Component Date Value Ref Range Status    WBC 09/20/2023 5.3  4.3 - 11.1 K/uL Final    RBC 09/20/2023 4.38  4.05 - 5.2 M/uL Final    Hemoglobin 09/20/2023 13.6  11.7 - 15.4 g/dL Final    Hematocrit 09/20/2023 42.9  35.8 - 46.3 % Final    MCV 09/20/2023 97.9  82.0 - 102.0 FL Final    MCH 09/20/2023 31.1  26.1 - 32.9 PG Final    MCHC 09/20/2023 31.7  31.4 - 35.0 g/dL Final    RDW 09/20/2023 12.6  11.9 - 14.6 % Final    Platelets 32/10/6013 216  150 - 450 K/uL Final    MPV 09/20/2023 9.1 (L)  9.4 - 12.3 FL Final    nRBC 09/20/2023 0.00  0.0 - 0.2 K/uL Final    **Note: Absolute NRBC parameter is now reported with Hemogram**    Neutrophils % 09/20/2023 60  43 - 78 % Final    Lymphocytes % 09/20/2023 21  13 - 44 % Final    Monocytes % 09/20/2023 17 (H)  4.0 - 12.0 % Final    Eosinophils % 09/20/2023 2  0.5 - 7.8 % Final    Basophils % 09/20/2023 0  0.0 - 2.0 % Final    Immature Granulocytes 09/20/2023 0  0.0 - 5.0 % Final    Neutrophils Absolute 09/20/2023 3.2  1.7 - 8.2 K/UL Final    Lymphocytes Absolute 09/20/2023 1.1  0.5 - 4.6 K/UL Final    Monocytes Absolute 09/20/2023 0.9  0.1 - 1.3 K/UL Final    Eosinophils Absolute 09/20/2023 0.1  0.0 - 0.8 K/UL Final    Basophils Absolute 09/20/2023 0.0  0.0 - 0.2 K/UL Final    Absolute Immature Granulocyte 09/20/2023 0.0  0.0 - 0.5 K/UL Final    Differential Type 09/20/2023 AUTOMATED    Final       Treatment Summary has been discussed and given to patient: N/A      -------------------------------------------------------------------------------------------------------------------  Please call our office at [phone] if you have any  of the following symptoms:   Fever of 100.5 or greater  Chills  Shortness of breath  Swelling or pain in one

## 2023-09-21 ENCOUNTER — OFFICE VISIT (OUTPATIENT)
Dept: FAMILY MEDICINE CLINIC | Facility: CLINIC | Age: 78
End: 2023-09-21
Payer: MEDICARE

## 2023-09-21 VITALS
BODY MASS INDEX: 32.86 KG/M2 | DIASTOLIC BLOOD PRESSURE: 70 MMHG | WEIGHT: 163 LBS | HEIGHT: 59 IN | SYSTOLIC BLOOD PRESSURE: 132 MMHG

## 2023-09-21 DIAGNOSIS — J84.9 INTERSTITIAL PULMONARY DISEASE, UNSPECIFIED (HCC): ICD-10-CM

## 2023-09-21 DIAGNOSIS — E66.01 SEVERE OBESITY (BMI 35.0-39.9) WITH COMORBIDITY (HCC): ICD-10-CM

## 2023-09-21 DIAGNOSIS — E83.42 HYPOMAGNESEMIA: ICD-10-CM

## 2023-09-21 DIAGNOSIS — F41.9 ANXIETY: Primary | ICD-10-CM

## 2023-09-21 DIAGNOSIS — E78.00 HIGH CHOLESTEROL: ICD-10-CM

## 2023-09-21 DIAGNOSIS — G31.9 CEREBELLAR ATROPHY (HCC): ICD-10-CM

## 2023-09-21 DIAGNOSIS — R73.9 HIGH BLOOD SUGAR: ICD-10-CM

## 2023-09-21 DIAGNOSIS — M81.0 OSTEOPOROSIS, UNSPECIFIED OSTEOPOROSIS TYPE, UNSPECIFIED PATHOLOGICAL FRACTURE PRESENCE: ICD-10-CM

## 2023-09-21 DIAGNOSIS — J30.1 SEASONAL ALLERGIC RHINITIS DUE TO POLLEN: ICD-10-CM

## 2023-09-21 DIAGNOSIS — I10 ESSENTIAL HYPERTENSION: ICD-10-CM

## 2023-09-21 DIAGNOSIS — F32.1 MAJOR DEPRESSIVE DISORDER, SINGLE EPISODE, MODERATE (HCC): ICD-10-CM

## 2023-09-21 DIAGNOSIS — J45.20 MILD INTERMITTENT ASTHMA, UNSPECIFIED WHETHER COMPLICATED: ICD-10-CM

## 2023-09-21 LAB
CHOLEST SERPL-MCNC: 228 MG/DL
EST. AVERAGE GLUCOSE BLD GHB EST-MCNC: 114 MG/DL
HBA1C MFR BLD: 5.6 % (ref 4.8–5.6)
HDLC SERPL-MCNC: 53 MG/DL (ref 40–60)
HDLC SERPL: 4.3
LDLC SERPL CALC-MCNC: 140 MG/DL
MAGNESIUM SERPL-MCNC: 2.3 MG/DL (ref 1.8–2.4)
TRIGL SERPL-MCNC: 175 MG/DL (ref 35–150)
VLDLC SERPL CALC-MCNC: 35 MG/DL (ref 6–23)

## 2023-09-21 PROCEDURE — 1036F TOBACCO NON-USER: CPT | Performed by: FAMILY MEDICINE

## 2023-09-21 PROCEDURE — 3078F DIAST BP <80 MM HG: CPT | Performed by: FAMILY MEDICINE

## 2023-09-21 PROCEDURE — G8417 CALC BMI ABV UP PARAM F/U: HCPCS | Performed by: FAMILY MEDICINE

## 2023-09-21 PROCEDURE — 1090F PRES/ABSN URINE INCON ASSESS: CPT | Performed by: FAMILY MEDICINE

## 2023-09-21 PROCEDURE — 99214 OFFICE O/P EST MOD 30 MIN: CPT | Performed by: FAMILY MEDICINE

## 2023-09-21 PROCEDURE — 3075F SYST BP GE 130 - 139MM HG: CPT | Performed by: FAMILY MEDICINE

## 2023-09-21 PROCEDURE — G8427 DOCREV CUR MEDS BY ELIG CLIN: HCPCS | Performed by: FAMILY MEDICINE

## 2023-09-21 PROCEDURE — 1123F ACP DISCUSS/DSCN MKR DOCD: CPT | Performed by: FAMILY MEDICINE

## 2023-09-21 PROCEDURE — G8399 PT W/DXA RESULTS DOCUMENT: HCPCS | Performed by: FAMILY MEDICINE

## 2023-09-21 ASSESSMENT — ENCOUNTER SYMPTOMS
CHEST TIGHTNESS: 0
ABDOMINAL PAIN: 0
EYE PAIN: 0
ANAL BLEEDING: 0
EYE DISCHARGE: 0
ABDOMINAL DISTENTION: 0
EYE ITCHING: 0
BLOOD IN STOOL: 0
RHINORRHEA: 0
SINUS PRESSURE: 0
COUGH: 0
SINUS PAIN: 0
EYE REDNESS: 0
BACK PAIN: 0
APNEA: 0
FACIAL SWELLING: 0

## 2023-09-21 ASSESSMENT — PATIENT HEALTH QUESTIONNAIRE - PHQ9
SUM OF ALL RESPONSES TO PHQ QUESTIONS 1-9: 0
1. LITTLE INTEREST OR PLEASURE IN DOING THINGS: 0

## 2023-10-02 ENCOUNTER — TELEPHONE (OUTPATIENT)
Dept: PULMONOLOGY | Age: 78
End: 2023-10-02

## 2023-10-02 NOTE — TELEPHONE ENCOUNTER
----- Message from Jose Arambula MD sent at 10/2/2023 10:21 AM EDT -----  Pt had this PERRY on RA to see if her hypoxia was mild enough to travel without her O2. Can you let her know that her numbers were good enough that traveling without her O2 likely will not put her at high risk. Thank you.      Jose Arambula MD

## 2023-10-02 NOTE — TELEPHONE ENCOUNTER
Spoke with the patient in regards to their Overnight results, explained per Dr. Nolan Perry that the PERRY her numbers were good enough that traveling without her oxygen will likely not put her at high risk. Patient understood the results and did not have any further questions or concerns at this time. // Lieutenant Kranthi SIMS

## 2023-10-06 ENCOUNTER — TELEPHONE (OUTPATIENT)
Dept: PULMONOLOGY | Age: 78
End: 2023-10-06

## 2023-10-06 NOTE — TELEPHONE ENCOUNTER
Patient  is calling back concerning her PERRY results. Ray called her and told her the results. States that it does not make sense that she can be with out her 02 for 4 nights.  Asking does she need to stop using the 02

## 2023-10-06 NOTE — TELEPHONE ENCOUNTER
Spoke with the patient and she voiced concerns about being off her oxygen at night while she is on her trip. I reconfirmed with her per Dr. Nolan Perry that she would likely not be at high risk if she is off her oxygen for 4 nights. Patient wanted to know if she could be off her oxygen from now on and I explained to her that she should still stay on her oxygen but just for this trip Dr. Nolan Perry does not feel that it would be a high risk situation for her. Patient continued to voice concerns about this and I recommended her to call her DME company to see what their travel options are. Patient verb'ed understanding and did not have any further questions or concerns at this time.  // Tao Palmer

## 2023-10-13 ENCOUNTER — HOSPITAL ENCOUNTER (EMERGENCY)
Age: 78
Discharge: HOME | End: 2023-10-14
Attending: EMERGENCY MEDICINE
Payer: MEDICARE

## 2023-10-13 DIAGNOSIS — R04.0 EPISTAXIS: Primary | ICD-10-CM

## 2023-10-13 DIAGNOSIS — I10 ESSENTIAL HYPERTENSION: ICD-10-CM

## 2023-10-13 PROCEDURE — 99282 EMERGENCY DEPT VISIT SF MDM: CPT

## 2023-10-13 ASSESSMENT — PAIN - FUNCTIONAL ASSESSMENT: PAIN_FUNCTIONAL_ASSESSMENT: NONE - DENIES PAIN

## 2023-10-14 VITALS
DIASTOLIC BLOOD PRESSURE: 73 MMHG | RESPIRATION RATE: 19 BRPM | TEMPERATURE: 97.6 F | HEART RATE: 73 BPM | SYSTOLIC BLOOD PRESSURE: 112 MMHG | OXYGEN SATURATION: 95 %

## 2023-10-14 NOTE — ED TRIAGE NOTES
Pt in wc to triage reporting nose bleed x3 hours, pt had nose clip at home and was able to clip it right away. Pt thinks bleeding started due to allergies, states bleeding worse from L nostril. Pt is on eliquis for hx of clots. Denies headache or feeling lightheaded.

## 2023-10-14 NOTE — DISCHARGE INSTRUCTIONS
Use Vaseline in the anterior nose once daily to help keep it moisturized or use the Ocean nasal spray to control the moisture of your nose. If this persist she will need to schedule follow-up appointment with the ears nose and throat doctor.

## 2023-10-16 ENCOUNTER — CARE COORDINATION (OUTPATIENT)
Dept: CARE COORDINATION | Facility: CLINIC | Age: 78
End: 2023-10-16

## 2023-10-16 NOTE — CARE COORDINATION
Ambulatory Care Management Note    Date/Time:  10/16/2023 11:47 AM    This patient was received as a referral from Daily assignment. Ambulatory Care Manager outreached to patient today to offer care management services. Introduction to self and role of care manager provided. Patient declined care management services at this time. No follow up call scheduled at this time. Patient has Ambulatory Care Manager's contact number for for any questions or concerns.

## 2023-10-18 ENCOUNTER — TELEPHONE (OUTPATIENT)
Dept: FAMILY MEDICINE CLINIC | Facility: CLINIC | Age: 78
End: 2023-10-18

## 2023-10-18 DIAGNOSIS — F41.9 ANXIETY: Primary | ICD-10-CM

## 2023-10-18 RX ORDER — CLORAZEPATE DIPOTASSIUM 3.75 MG/1
3.75 TABLET ORAL 3 TIMES DAILY
Qty: 270 TABLET | Refills: 1 | Status: SHIPPED | OUTPATIENT
Start: 2023-10-18 | End: 2024-04-15

## 2023-10-18 NOTE — TELEPHONE ENCOUNTER
Ms Chuy Marcus said she needed a refill on Tranzene ( clorazapate ) was not sent last time .  Send to meds by mail

## 2023-11-22 ENCOUNTER — TELEPHONE (OUTPATIENT)
Dept: FAMILY MEDICINE CLINIC | Facility: CLINIC | Age: 78
End: 2023-11-22

## 2023-11-22 DIAGNOSIS — F41.9 ANXIETY: ICD-10-CM

## 2023-11-22 RX ORDER — SERTRALINE HYDROCHLORIDE 100 MG/1
TABLET, FILM COATED ORAL
Qty: 135 TABLET | Refills: 1 | Status: SHIPPED | OUTPATIENT
Start: 2023-11-22

## 2023-11-22 NOTE — TELEPHONE ENCOUNTER
Patient called requesting a refill on her Sertraline. Sent to her mail order pharmacy.      Last OV: 9/21/2023  Next OV: 1/23/2024

## 2023-12-26 ENCOUNTER — TELEPHONE (OUTPATIENT)
Dept: PULMONOLOGY | Age: 78
End: 2023-12-26

## 2023-12-26 NOTE — TELEPHONE ENCOUNTER
Patients daughter in law  Lewis Ramsey says that the patient has bee having  Problems with keeping her oxygen up. Her  was at  89 without the oxygen . She says that it is now back up to 92.

## 2023-12-26 NOTE — TELEPHONE ENCOUNTER
Last seen: 8/23/23  Hx: nodule f/u 3/24, nocturnal hypoxemia, asthma, RLD, kyphosis    Telephone note with PCP indicates recent flu. Family reporting problem w/ sob; contacted son & advised to call Lori Kendall @ [phone]    Flu B dx Monday, finished tamiflu, using nebulizer tx, started steroids and was feeling some better, some nausea; checked O2, was 89; normally only wears at night, able to get up to 965 took off and ate, almost immediately dropped to 93%; has wheezing & feeling sob. Returned to using O2 to keep sats in the low 90s. Staying with family this week and will be staying in the home. Advised that keeping sats is primary concern, reviewed that she is still in recovery period post flu; continue to monitor symptoms, if any worsening will revisit. Thankful for f/u.

## 2024-01-23 ENCOUNTER — OFFICE VISIT (OUTPATIENT)
Dept: FAMILY MEDICINE CLINIC | Facility: CLINIC | Age: 79
End: 2024-01-23
Payer: MEDICARE

## 2024-01-23 VITALS
BODY MASS INDEX: 32.05 KG/M2 | SYSTOLIC BLOOD PRESSURE: 132 MMHG | DIASTOLIC BLOOD PRESSURE: 74 MMHG | HEIGHT: 59 IN | WEIGHT: 159 LBS

## 2024-01-23 DIAGNOSIS — J45.20 MILD INTERMITTENT ASTHMA, UNSPECIFIED WHETHER COMPLICATED: ICD-10-CM

## 2024-01-23 DIAGNOSIS — J84.9 INTERSTITIAL PULMONARY DISEASE, UNSPECIFIED (HCC): ICD-10-CM

## 2024-01-23 DIAGNOSIS — I26.99 OTHER PULMONARY EMBOLISM WITHOUT ACUTE COR PULMONALE, UNSPECIFIED CHRONICITY (HCC): ICD-10-CM

## 2024-01-23 DIAGNOSIS — F32.1 MAJOR DEPRESSIVE DISORDER, SINGLE EPISODE, MODERATE (HCC): ICD-10-CM

## 2024-01-23 DIAGNOSIS — E78.00 HIGH CHOLESTEROL: ICD-10-CM

## 2024-01-23 DIAGNOSIS — I10 ESSENTIAL HYPERTENSION: ICD-10-CM

## 2024-01-23 DIAGNOSIS — J30.1 SEASONAL ALLERGIC RHINITIS DUE TO POLLEN: ICD-10-CM

## 2024-01-23 DIAGNOSIS — F41.9 ANXIETY: Primary | ICD-10-CM

## 2024-01-23 DIAGNOSIS — G31.9 CEREBELLAR ATROPHY (HCC): ICD-10-CM

## 2024-01-23 DIAGNOSIS — I50.33 ACUTE ON CHRONIC DIASTOLIC (CONGESTIVE) HEART FAILURE (HCC): ICD-10-CM

## 2024-01-23 LAB
BASOPHILS # BLD: 0 K/UL (ref 0–0.2)
BASOPHILS NFR BLD: 0 % (ref 0–2)
CHOLEST SERPL-MCNC: 261 MG/DL
DIFFERENTIAL METHOD BLD: NORMAL
EOSINOPHIL # BLD: 0.2 K/UL (ref 0–0.8)
EOSINOPHIL NFR BLD: 3 % (ref 0.5–7.8)
ERYTHROCYTE [DISTWIDTH] IN BLOOD BY AUTOMATED COUNT: 13.3 % (ref 11.9–14.6)
HCT VFR BLD AUTO: 41.9 % (ref 35.8–46.3)
HDLC SERPL-MCNC: 68 MG/DL (ref 40–60)
HDLC SERPL: 3.8
HGB BLD-MCNC: 13.4 G/DL (ref 11.7–15.4)
IMM GRANULOCYTES # BLD AUTO: 0 K/UL (ref 0–0.5)
IMM GRANULOCYTES NFR BLD AUTO: 0 % (ref 0–5)
LDLC SERPL CALC-MCNC: 154.8 MG/DL
LYMPHOCYTES # BLD: 2.4 K/UL (ref 0.5–4.6)
LYMPHOCYTES NFR BLD: 32 % (ref 13–44)
MCH RBC QN AUTO: 30.9 PG (ref 26.1–32.9)
MCHC RBC AUTO-ENTMCNC: 32 G/DL (ref 31.4–35)
MCV RBC AUTO: 96.5 FL (ref 82–102)
MONOCYTES # BLD: 0.8 K/UL (ref 0.1–1.3)
MONOCYTES NFR BLD: 10 % (ref 4–12)
NEUTS SEG # BLD: 4 K/UL (ref 1.7–8.2)
NEUTS SEG NFR BLD: 55 % (ref 43–78)
NRBC # BLD: 0 K/UL (ref 0–0.2)
PLATELET # BLD AUTO: 248 K/UL (ref 150–450)
PMV BLD AUTO: 9.5 FL (ref 9.4–12.3)
RBC # BLD AUTO: 4.34 M/UL (ref 4.05–5.2)
TRIGL SERPL-MCNC: 191 MG/DL (ref 35–150)
VLDLC SERPL CALC-MCNC: 38.2 MG/DL (ref 6–23)
WBC # BLD AUTO: 7.4 K/UL (ref 4.3–11.1)

## 2024-01-23 PROCEDURE — G8484 FLU IMMUNIZE NO ADMIN: HCPCS | Performed by: FAMILY MEDICINE

## 2024-01-23 PROCEDURE — G8417 CALC BMI ABV UP PARAM F/U: HCPCS | Performed by: FAMILY MEDICINE

## 2024-01-23 PROCEDURE — 3075F SYST BP GE 130 - 139MM HG: CPT | Performed by: FAMILY MEDICINE

## 2024-01-23 PROCEDURE — G8399 PT W/DXA RESULTS DOCUMENT: HCPCS | Performed by: FAMILY MEDICINE

## 2024-01-23 PROCEDURE — 3078F DIAST BP <80 MM HG: CPT | Performed by: FAMILY MEDICINE

## 2024-01-23 PROCEDURE — 1036F TOBACCO NON-USER: CPT | Performed by: FAMILY MEDICINE

## 2024-01-23 PROCEDURE — 1123F ACP DISCUSS/DSCN MKR DOCD: CPT | Performed by: FAMILY MEDICINE

## 2024-01-23 PROCEDURE — 1090F PRES/ABSN URINE INCON ASSESS: CPT | Performed by: FAMILY MEDICINE

## 2024-01-23 PROCEDURE — 99214 OFFICE O/P EST MOD 30 MIN: CPT | Performed by: FAMILY MEDICINE

## 2024-01-23 PROCEDURE — G8427 DOCREV CUR MEDS BY ELIG CLIN: HCPCS | Performed by: FAMILY MEDICINE

## 2024-01-23 RX ORDER — LISINOPRIL 5 MG/1
5 TABLET ORAL DAILY
Qty: 90 TABLET | Refills: 1 | Status: SHIPPED | OUTPATIENT
Start: 2024-01-23

## 2024-01-23 RX ORDER — MONTELUKAST SODIUM 10 MG/1
10 TABLET ORAL DAILY
Qty: 90 TABLET | Refills: 1 | Status: SHIPPED | OUTPATIENT
Start: 2024-01-23

## 2024-01-23 RX ORDER — TRAMADOL HYDROCHLORIDE 50 MG/1
50 TABLET ORAL EVERY 12 HOURS
COMMUNITY
Start: 2024-01-18 | End: 2024-02-17

## 2024-01-23 ASSESSMENT — PATIENT HEALTH QUESTIONNAIRE - PHQ9
SUM OF ALL RESPONSES TO PHQ QUESTIONS 1-9: 0
SUM OF ALL RESPONSES TO PHQ QUESTIONS 1-9: 0
2. FEELING DOWN, DEPRESSED OR HOPELESS: 0
1. LITTLE INTEREST OR PLEASURE IN DOING THINGS: 0
SUM OF ALL RESPONSES TO PHQ QUESTIONS 1-9: 0
SUM OF ALL RESPONSES TO PHQ QUESTIONS 1-9: 0
SUM OF ALL RESPONSES TO PHQ9 QUESTIONS 1 & 2: 0

## 2024-01-23 ASSESSMENT — ENCOUNTER SYMPTOMS
ABDOMINAL PAIN: 0
EYE ITCHING: 0
COUGH: 0
BACK PAIN: 0
SINUS PAIN: 0
CHEST TIGHTNESS: 0
BLOOD IN STOOL: 0
SINUS PRESSURE: 0
ANAL BLEEDING: 0
APNEA: 0
EYE DISCHARGE: 0
EYE PAIN: 0
RHINORRHEA: 0
ABDOMINAL DISTENTION: 0
FACIAL SWELLING: 0
EYE REDNESS: 0

## 2024-01-23 NOTE — ASSESSMENT & PLAN NOTE
Well-controlled, continue current medications, has balance issues, uses scooter and walker, no fall recently

## 2024-01-24 NOTE — PROGRESS NOTES
Musculoskeletal:         General: Normal range of motion.      Cervical back: Normal range of motion and neck supple.   Skin:     General: Skin is warm and dry.   Neurological:      Mental Status: She is alert and oriented to person, place, and time.     Assessment/Plan:     ICD-10-CM    1. Anxiety  F41.9       2. Acute on chronic diastolic (congestive) heart failure (HCC)  I50.33       3. Cerebellar atrophy (HCC)  G31.9       4. Major depressive disorder, single episode, moderate (HCC)  F32.1       5. Interstitial pulmonary disease, unspecified (HCC)  J84.9       6. Other pulmonary embolism without acute cor pulmonale, unspecified chronicity (HCC)  I26.99       7. Mild intermittent asthma, unspecified whether complicated  J45.20 montelukast (SINGULAIR) 10 MG tablet      8. Seasonal allergic rhinitis due to pollen  J30.1 montelukast (SINGULAIR) 10 MG tablet      9. High cholesterol  E78.00 Lipid Panel     Lipid Panel      10. Essential hypertension  I10 CBC with Auto Differential     CBC with Auto Differential          1. Anxiety  2. Acute on chronic diastolic (congestive) heart failure (HCC)  Assessment & Plan:   Asymptomatic, continue current medications, stable fluid status    3. Cerebellar atrophy (HCC)  Assessment & Plan:   Well-controlled, continue current medications, has balance issues, uses scooter and walker, no fall recently  4. Major depressive disorder, single episode, moderate (HCC)  Assessment & Plan:   Asymptomatic, continue current medications, continue meds, stable on meds  5. Interstitial pulmonary disease, unspecified (HCC)  Assessment & Plan:   Well-controlled, continue current medications, stable on meds  6. Other pulmonary embolism without acute cor pulmonale, unspecified chronicity (HCC)  Assessment & Plan:   Asymptomatic, continue current medications, stable on meds  7. Mild intermittent asthma, unspecified whether complicated  -     montelukast (SINGULAIR) 10 MG tablet; Take 1 tablet by

## 2024-02-22 NOTE — PROGRESS NOTES
Name:  Fabby Chiu  YOB: 1945   MRN: [id number]      Office Visit: 2/26/2024        ASSESSMENT AND PLAN:  (Medical Decision Making)    Impression: 78 y.o. female     1. Mild persistent asthma without complication  --doing well on Symbicort.  Had one exacerbation since last visit secondary to the flu.  Feels back to baseline currently.  - DME - DURABLE MEDICAL EQUIPMENT    2. Lung nodules  --small nodules--follow up is planned 3/1/24.    3. Nocturnal hypoxemia  --PERRY is normal.  Can D/C nocturnal O2.  - DME - DURABLE MEDICAL EQUIPMENT    4. Restrictive lung disease  --no ILD on CT scan--? Related to obesity.    No orders of the defined types were placed in this encounter.        Procedures    DME - DURABLE MEDICAL EQUIPMENT     D/C O2--patient has improved.     Follow-up and Dispositions    Return in about 6 months (around 8/26/2024) for Jermain or Alcides/asthma, spirometry/FI in office.       Gia Mcgrath, APRN - [facility]    Jermain/Alcides=pulmonary team    _________________________________________________________________________    HISTORY OF PRESENT ILLNESS:    Ms. Fabby Chiu is a 78 y.o. female who is seen at [facility] for  Asthma     The patient is an 78-year-old female who is seen for follow-up of ILD and asthma.  She is accompanied by her roommate. Suyapa.  She has nocturnal hypoxemia and is on oxygen at 4 L/min with sleep.  She has a history of anxiety, kidney stones, depression, reflux, hypertension, and hyperlipidemia.  She has never smoked.  She has a history of COVID in early 2022.  In addition, she has a history of DVT and PE, S/P IVC filter and is on chronic life long anticoagulation.     Last echo done 2/1/22 which did not reveal any evidence of pHTN.     CTA of chest on 1/31/2022 revealed mild cardiomegaly and no pulmonary emboli.  There were bilateral interstitial opacities bilaterally as well as a left adrenal nodule.  Had HRCT 3/6/23 which revealed

## 2024-02-26 ENCOUNTER — OFFICE VISIT (OUTPATIENT)
Dept: PULMONOLOGY | Age: 79
End: 2024-02-26
Payer: MEDICARE

## 2024-02-26 VITALS
SYSTOLIC BLOOD PRESSURE: 122 MMHG | DIASTOLIC BLOOD PRESSURE: 70 MMHG | HEIGHT: 57 IN | HEART RATE: 79 BPM | WEIGHT: 147 LBS | OXYGEN SATURATION: 100 % | BODY MASS INDEX: 31.71 KG/M2

## 2024-02-26 DIAGNOSIS — G47.34 NOCTURNAL HYPOXEMIA: ICD-10-CM

## 2024-02-26 DIAGNOSIS — J45.30 MILD PERSISTENT ASTHMA WITHOUT COMPLICATION: Primary | ICD-10-CM

## 2024-02-26 DIAGNOSIS — J98.4 RESTRICTIVE LUNG DISEASE: ICD-10-CM

## 2024-02-26 DIAGNOSIS — R91.8 LUNG NODULES: ICD-10-CM

## 2024-02-26 PROCEDURE — G8417 CALC BMI ABV UP PARAM F/U: HCPCS | Performed by: NURSE PRACTITIONER

## 2024-02-26 PROCEDURE — 1123F ACP DISCUSS/DSCN MKR DOCD: CPT | Performed by: NURSE PRACTITIONER

## 2024-02-26 PROCEDURE — 3078F DIAST BP <80 MM HG: CPT | Performed by: NURSE PRACTITIONER

## 2024-02-26 PROCEDURE — G8484 FLU IMMUNIZE NO ADMIN: HCPCS | Performed by: NURSE PRACTITIONER

## 2024-02-26 PROCEDURE — 99214 OFFICE O/P EST MOD 30 MIN: CPT | Performed by: NURSE PRACTITIONER

## 2024-02-26 PROCEDURE — G8399 PT W/DXA RESULTS DOCUMENT: HCPCS | Performed by: NURSE PRACTITIONER

## 2024-02-26 PROCEDURE — 1036F TOBACCO NON-USER: CPT | Performed by: NURSE PRACTITIONER

## 2024-02-26 PROCEDURE — 1090F PRES/ABSN URINE INCON ASSESS: CPT | Performed by: NURSE PRACTITIONER

## 2024-02-26 PROCEDURE — 3074F SYST BP LT 130 MM HG: CPT | Performed by: NURSE PRACTITIONER

## 2024-02-26 PROCEDURE — G8427 DOCREV CUR MEDS BY ELIG CLIN: HCPCS | Performed by: NURSE PRACTITIONER

## 2024-03-01 ENCOUNTER — HOSPITAL ENCOUNTER (OUTPATIENT)
Dept: CT IMAGING | Age: 79
Discharge: HOME | End: 2024-03-04

## 2024-03-01 DIAGNOSIS — R91.8 LUNG NODULES: ICD-10-CM

## 2024-03-04 NOTE — RESULT ENCOUNTER NOTE
Please let patient know that there is a stable 5 mm RUL nodule.  Other nodules have resolved.  Recommend follow up CT of chest in 1 year without contrast.  Please arrange.

## 2024-03-06 ENCOUNTER — TELEPHONE (OUTPATIENT)
Dept: PULMONOLOGY | Age: 79
End: 2024-03-06

## 2024-03-06 DIAGNOSIS — R91.8 LUNG NODULES: Primary | ICD-10-CM

## 2024-03-20 ENCOUNTER — HOSPITAL ENCOUNTER (OUTPATIENT)
Dept: LAB | Age: 79
Discharge: HOME | End: 2024-03-23
Payer: MEDICARE

## 2024-03-20 ENCOUNTER — OFFICE VISIT (OUTPATIENT)
Dept: ONCOLOGY | Age: 79
End: 2024-03-20
Payer: MEDICARE

## 2024-03-20 VITALS
RESPIRATION RATE: 18 BRPM | OXYGEN SATURATION: 91 % | BODY MASS INDEX: 31.69 KG/M2 | DIASTOLIC BLOOD PRESSURE: 78 MMHG | HEIGHT: 59 IN | HEART RATE: 76 BPM | SYSTOLIC BLOOD PRESSURE: 128 MMHG | WEIGHT: 157.2 LBS | TEMPERATURE: 98.3 F

## 2024-03-20 DIAGNOSIS — Z74.09 IMMOBILITY: ICD-10-CM

## 2024-03-20 DIAGNOSIS — I26.99 OTHER PULMONARY EMBOLISM WITHOUT ACUTE COR PULMONALE, UNSPECIFIED CHRONICITY (HCC): ICD-10-CM

## 2024-03-20 DIAGNOSIS — I82.90 THROMBOSIS: Primary | ICD-10-CM

## 2024-03-20 LAB
ALBUMIN SERPL-MCNC: 3.2 G/DL (ref 3.2–4.6)
ALBUMIN/GLOB SERPL: 0.9 (ref 0.4–1.6)
ALP SERPL-CCNC: 56 U/L (ref 50–136)
ALT SERPL-CCNC: 16 U/L (ref 12–65)
ANION GAP SERPL CALC-SCNC: 7 MMOL/L (ref 2–11)
AST SERPL-CCNC: 15 U/L (ref 15–37)
BASOPHILS # BLD: 0 K/UL (ref 0–0.2)
BASOPHILS NFR BLD: 0 % (ref 0–2)
BILIRUB SERPL-MCNC: 0.4 MG/DL (ref 0.2–1.1)
BUN SERPL-MCNC: 10 MG/DL (ref 8–23)
CALCIUM SERPL-MCNC: 8.6 MG/DL (ref 8.3–10.4)
CHLORIDE SERPL-SCNC: 107 MMOL/L (ref 103–113)
CO2 SERPL-SCNC: 26 MMOL/L (ref 21–32)
CREAT SERPL-MCNC: 0.5 MG/DL (ref 0.6–1)
DIFFERENTIAL METHOD BLD: ABNORMAL
EOSINOPHIL # BLD: 0.2 K/UL (ref 0–0.8)
EOSINOPHIL NFR BLD: 3 % (ref 0.5–7.8)
ERYTHROCYTE [DISTWIDTH] IN BLOOD BY AUTOMATED COUNT: 13.5 % (ref 11.9–14.6)
GLOBULIN SER CALC-MCNC: 3.7 G/DL (ref 2.8–4.5)
GLUCOSE SERPL-MCNC: 96 MG/DL (ref 65–100)
HCT VFR BLD AUTO: 41.7 % (ref 35.8–46.3)
HGB BLD-MCNC: 13.4 G/DL (ref 11.7–15.4)
IMM GRANULOCYTES # BLD AUTO: 0 K/UL (ref 0–0.5)
IMM GRANULOCYTES NFR BLD AUTO: 0 % (ref 0–5)
LYMPHOCYTES # BLD: 1.7 K/UL (ref 0.5–4.6)
LYMPHOCYTES NFR BLD: 24 % (ref 13–44)
MCH RBC QN AUTO: 30.9 PG (ref 26.1–32.9)
MCHC RBC AUTO-ENTMCNC: 32.1 G/DL (ref 31.4–35)
MCV RBC AUTO: 96.3 FL (ref 82–102)
MONOCYTES # BLD: 0.8 K/UL (ref 0.1–1.3)
MONOCYTES NFR BLD: 12 % (ref 4–12)
NEUTS SEG # BLD: 4.4 K/UL (ref 1.7–8.2)
NEUTS SEG NFR BLD: 61 % (ref 43–78)
NRBC # BLD: 0 K/UL (ref 0–0.2)
PLATELET # BLD AUTO: 230 K/UL (ref 150–450)
PMV BLD AUTO: 9.2 FL (ref 9.4–12.3)
POTASSIUM SERPL-SCNC: 3.9 MMOL/L (ref 3.5–5.1)
PROT SERPL-MCNC: 6.9 G/DL (ref 6.3–8.2)
RBC # BLD AUTO: 4.33 M/UL (ref 4.05–5.2)
SODIUM SERPL-SCNC: 140 MMOL/L (ref 136–146)
WBC # BLD AUTO: 7.1 K/UL (ref 4.3–11.1)

## 2024-03-20 PROCEDURE — 1090F PRES/ABSN URINE INCON ASSESS: CPT | Performed by: INTERNAL MEDICINE

## 2024-03-20 PROCEDURE — 36415 COLL VENOUS BLD VENIPUNCTURE: CPT

## 2024-03-20 PROCEDURE — 85025 COMPLETE CBC W/AUTO DIFF WBC: CPT

## 2024-03-20 PROCEDURE — 1123F ACP DISCUSS/DSCN MKR DOCD: CPT | Performed by: INTERNAL MEDICINE

## 2024-03-20 PROCEDURE — G8417 CALC BMI ABV UP PARAM F/U: HCPCS | Performed by: INTERNAL MEDICINE

## 2024-03-20 PROCEDURE — G8484 FLU IMMUNIZE NO ADMIN: HCPCS | Performed by: INTERNAL MEDICINE

## 2024-03-20 PROCEDURE — 99214 OFFICE O/P EST MOD 30 MIN: CPT | Performed by: INTERNAL MEDICINE

## 2024-03-20 PROCEDURE — G8427 DOCREV CUR MEDS BY ELIG CLIN: HCPCS | Performed by: INTERNAL MEDICINE

## 2024-03-20 PROCEDURE — 3074F SYST BP LT 130 MM HG: CPT | Performed by: INTERNAL MEDICINE

## 2024-03-20 PROCEDURE — 1036F TOBACCO NON-USER: CPT | Performed by: INTERNAL MEDICINE

## 2024-03-20 PROCEDURE — G8399 PT W/DXA RESULTS DOCUMENT: HCPCS | Performed by: INTERNAL MEDICINE

## 2024-03-20 PROCEDURE — 3078F DIAST BP <80 MM HG: CPT | Performed by: INTERNAL MEDICINE

## 2024-03-20 PROCEDURE — 80053 COMPREHEN METABOLIC PANEL: CPT

## 2024-03-20 ASSESSMENT — PATIENT HEALTH QUESTIONNAIRE - PHQ9
SUM OF ALL RESPONSES TO PHQ QUESTIONS 1-9: 0
SUM OF ALL RESPONSES TO PHQ9 QUESTIONS 1 & 2: 0
SUM OF ALL RESPONSES TO PHQ QUESTIONS 1-9: 0
SUM OF ALL RESPONSES TO PHQ QUESTIONS 1-9: 0
2. FEELING DOWN, DEPRESSED OR HOPELESS: NOT AT ALL
SUM OF ALL RESPONSES TO PHQ QUESTIONS 1-9: 0
1. LITTLE INTEREST OR PLEASURE IN DOING THINGS: NOT AT ALL

## 2024-03-20 NOTE — PATIENT INSTRUCTIONS
Patient Information from Today's Visit        Treatment Summary has been discussed and given to patient:N/A  Follow Up: 6 months    Continue taking Eliquis as prescribed.     Hospital Outpatient Visit on 03/20/2024   Component Date Value Ref Range Status    WBC 03/20/2024 7.1  4.3 - 11.1 K/uL Final    RBC 03/20/2024 4.33  4.05 - 5.2 M/uL Final    Hemoglobin 03/20/2024 13.4  11.7 - 15.4 g/dL Final    Hematocrit 03/20/2024 41.7  35.8 - 46.3 % Final    MCV 03/20/2024 96.3  82.0 - 102.0 FL Final    MCH 03/20/2024 30.9  26.1 - 32.9 PG Final    MCHC 03/20/2024 32.1  31.4 - 35.0 g/dL Final    RDW 03/20/2024 13.5  11.9 - 14.6 % Final    Platelets 03/20/2024 230  150 - 450 K/uL Final    MPV 03/20/2024 9.2 (L)  9.4 - 12.3 FL Final    nRBC 03/20/2024 0.00  0.0 - 0.2 K/uL Final    **Note: Absolute NRBC parameter is now reported with Hemogram**    Neutrophils % 03/20/2024 61  43 - 78 % Final    Lymphocytes % 03/20/2024 24  13 - 44 % Final    Monocytes % 03/20/2024 12  4.0 - 12.0 % Final    Eosinophils % 03/20/2024 3  0.5 - 7.8 % Final    Basophils % 03/20/2024 0  0.0 - 2.0 % Final    Immature Granulocytes 03/20/2024 0  0.0 - 5.0 % Final    Neutrophils Absolute 03/20/2024 4.4  1.7 - 8.2 K/UL Final    Lymphocytes Absolute 03/20/2024 1.7  0.5 - 4.6 K/UL Final    Monocytes Absolute 03/20/2024 0.8  0.1 - 1.3 K/UL Final    Eosinophils Absolute 03/20/2024 0.2  0.0 - 0.8 K/UL Final    Basophils Absolute 03/20/2024 0.0  0.0 - 0.2 K/UL Final    Absolute Immature Granulocyte 03/20/2024 0.0  0.0 - 0.5 K/UL Final    Differential Type 03/20/2024 AUTOMATED    Final         Please refer to After Visit Summary or [facility] for upcoming appointment information. If you have any questions regarding your upcoming schedule please reach out to your care team through [facility] or call [phone].          -------------------------------------------------------------------------------------------------------------------  Please call our office at

## 2024-03-20 NOTE — PROGRESS NOTES
MECHANICAL ART THROMBECTOMY INIT  7/28/2020    IR MECHANICAL ART THROMBECTOMY INIT  7/28/2020    IR MECHANICAL ART THROMBECTOMY INIT 7/28/2020 [facility]    LITHOTRIPSY      NEUROLOGICAL SURGERY      RETINAL DETACHMENT SURGERY      SHOULDER ARTHROPLASTY Left     TOTAL KNEE ARTHROPLASTY Right      Family History   Problem Relation Age of Onset    Osteoarthritis Mother     Cancer Father     Stroke Maternal Grandmother     Stroke Paternal Grandmother      Social History     Socioeconomic History    Marital status:      Spouse name: Not on file    Number of children: Not on file    Years of education: Not on file    Highest education level: Not on file   Occupational History    Not on file   Tobacco Use    Smoking status: Never    Smokeless tobacco: Never   Substance and Sexual Activity    Alcohol use: No    Drug use: No    Sexual activity: Not on file   Other Topics Concern    Not on file   Social History Narrative    eported as intolerable.     Patient and  reside together. She denies any in-house stairs. 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 Activity is reported as cooking and household chores as tolerable. Exercise is r     Social Determinants of Health     Financial Resource Strain: Patient Declined (12/19/2023)    Overall Financial Resource Strain (CARDIA)     Difficulty of Paying Living Expenses: Patient declined   Food Insecurity: Patient Declined (12/19/2023)    Hunger Vital Sign     Worried About Running Out of Food in the Last Year: Patient declined     Ran Out of Food in the Last Year: Patient declined   Transportation Needs: Unknown (12/19/2023)    PRAPARE - Transportation     Lack of Transportation (Medical): Not on file     Lack of Transportation (Non-Medical): Patient declined   Physical Activity: Inactive (10/6/2022)    Exercise Vital Sign     Days of Exercise per Week: 0 days     Minutes of Exercise per Session:

## 2024-04-22 DIAGNOSIS — K21.9 GASTROESOPHAGEAL REFLUX DISEASE, UNSPECIFIED WHETHER ESOPHAGITIS PRESENT: ICD-10-CM

## 2024-04-22 DIAGNOSIS — I10 ESSENTIAL HYPERTENSION: Primary | ICD-10-CM

## 2024-04-22 PROBLEM — M32.9 SYSTEMIC LUPUS ERYTHEMATOSUS (HCC): Status: ACTIVE | Noted: 2024-04-22

## 2024-04-22 PROBLEM — M81.0 OSTEOPOROSIS: Status: ACTIVE | Noted: 2024-04-22

## 2024-04-22 RX ORDER — PANTOPRAZOLE SODIUM 40 MG/1
40 TABLET, DELAYED RELEASE ORAL 2 TIMES DAILY
Qty: 180 TABLET | Refills: 1 | Status: SHIPPED | OUTPATIENT
Start: 2024-04-22 | End: 2024-10-19

## 2024-04-22 RX ORDER — LISINOPRIL 5 MG/1
5 TABLET ORAL DAILY
Qty: 90 TABLET | Refills: 1 | Status: SHIPPED | OUTPATIENT
Start: 2024-04-22 | End: 2024-10-19

## 2024-04-22 NOTE — TELEPHONE ENCOUNTER
Patient called requesting refill(s) on     Requested Prescriptions     Pending Prescriptions Disp Refills    nystatin-triamcinolone (MYCOLOG II) 092569-8.1 UNIT/GM-% cream 60 g 1     Sig: APPLY CREAM TO AFFECTED AREA TWICE A DAY    pantoprazole (PROTONIX) 40 MG tablet 180 tablet 1     Sig: TAKE ONE TABLET BY MOUTH TWICE A DAY    lisinopril (PRINIVIL;ZESTRIL) 5 MG tablet 90 tablet 1     Sig: Take 1 tablet by mouth daily       Last appointment- 1/23/24  Next appointment- 5/23/24

## 2024-05-23 ENCOUNTER — OFFICE VISIT (OUTPATIENT)
Dept: FAMILY MEDICINE CLINIC | Facility: CLINIC | Age: 79
End: 2024-05-23

## 2024-05-23 VITALS
SYSTOLIC BLOOD PRESSURE: 136 MMHG | WEIGHT: 156 LBS | HEIGHT: 59 IN | DIASTOLIC BLOOD PRESSURE: 82 MMHG | BODY MASS INDEX: 31.45 KG/M2

## 2024-05-23 DIAGNOSIS — I10 ESSENTIAL HYPERTENSION: Primary | ICD-10-CM

## 2024-05-23 DIAGNOSIS — J84.9 INTERSTITIAL PULMONARY DISEASE, UNSPECIFIED (HCC): ICD-10-CM

## 2024-05-23 DIAGNOSIS — M32.9 SYSTEMIC LUPUS ERYTHEMATOSUS, UNSPECIFIED SLE TYPE, UNSPECIFIED ORGAN INVOLVEMENT STATUS (HCC): ICD-10-CM

## 2024-05-23 DIAGNOSIS — R53.83 FATIGUE, UNSPECIFIED TYPE: ICD-10-CM

## 2024-05-23 DIAGNOSIS — R29.898 LEFT LEG WEAKNESS: ICD-10-CM

## 2024-05-23 DIAGNOSIS — E78.00 HIGH CHOLESTEROL: ICD-10-CM

## 2024-05-23 DIAGNOSIS — K21.9 GASTROESOPHAGEAL REFLUX DISEASE, UNSPECIFIED WHETHER ESOPHAGITIS PRESENT: ICD-10-CM

## 2024-05-23 DIAGNOSIS — E83.42 HYPOMAGNESEMIA: ICD-10-CM

## 2024-05-23 DIAGNOSIS — M48.00 SPINAL STENOSIS, UNSPECIFIED SPINAL REGION: ICD-10-CM

## 2024-05-23 DIAGNOSIS — I50.33 ACUTE ON CHRONIC DIASTOLIC (CONGESTIVE) HEART FAILURE (HCC): ICD-10-CM

## 2024-05-23 DIAGNOSIS — E55.9 VITAMIN D DEFICIENCY: ICD-10-CM

## 2024-05-23 DIAGNOSIS — R73.9 HIGH BLOOD SUGAR: ICD-10-CM

## 2024-05-23 DIAGNOSIS — G31.9 CEREBELLAR ATROPHY (HCC): ICD-10-CM

## 2024-05-23 DIAGNOSIS — R29.898 WEAKNESS OF LEFT LOWER EXTREMITY: ICD-10-CM

## 2024-05-23 DIAGNOSIS — F41.9 ANXIETY: ICD-10-CM

## 2024-05-23 DIAGNOSIS — J45.20 MILD INTERMITTENT ASTHMA, UNSPECIFIED WHETHER COMPLICATED: ICD-10-CM

## 2024-05-23 DIAGNOSIS — F32.1 MAJOR DEPRESSIVE DISORDER, SINGLE EPISODE, MODERATE (HCC): ICD-10-CM

## 2024-05-23 LAB
25(OH)D3 SERPL-MCNC: 50.7 NG/ML (ref 30–100)
ALBUMIN SERPL-MCNC: 3.7 G/DL (ref 3.2–4.6)
ALBUMIN/GLOB SERPL: 1.1 (ref 1–1.9)
ALP SERPL-CCNC: 51 U/L (ref 35–104)
ALT SERPL-CCNC: 14 U/L (ref 12–65)
ANION GAP SERPL CALC-SCNC: 12 MMOL/L (ref 9–18)
AST SERPL-CCNC: 20 U/L (ref 15–37)
BASOPHILS # BLD: 0 K/UL (ref 0–0.2)
BASOPHILS NFR BLD: 1 % (ref 0–2)
BILIRUB SERPL-MCNC: 0.2 MG/DL (ref 0–1.2)
BUN SERPL-MCNC: 14 MG/DL (ref 8–23)
CALCIUM SERPL-MCNC: 9.4 MG/DL (ref 8.8–10.2)
CHLORIDE SERPL-SCNC: 100 MMOL/L (ref 98–107)
CHOLEST SERPL-MCNC: 229 MG/DL (ref 0–200)
CO2 SERPL-SCNC: 28 MMOL/L (ref 20–28)
CREAT SERPL-MCNC: 0.53 MG/DL (ref 0.6–1.1)
DIFFERENTIAL METHOD BLD: NORMAL
EOSINOPHIL # BLD: 0.2 K/UL (ref 0–0.8)
EOSINOPHIL NFR BLD: 4 % (ref 0.5–7.8)
ERYTHROCYTE [DISTWIDTH] IN BLOOD BY AUTOMATED COUNT: 13.1 % (ref 11.9–14.6)
EST. AVERAGE GLUCOSE BLD GHB EST-MCNC: 130 MG/DL
GLOBULIN SER CALC-MCNC: 3.2 G/DL (ref 2.3–3.5)
GLUCOSE SERPL-MCNC: 93 MG/DL (ref 70–99)
HBA1C MFR BLD: 6.1 % (ref 0–5.6)
HCT VFR BLD AUTO: 43.6 % (ref 35.8–46.3)
HDLC SERPL-MCNC: 66 MG/DL (ref 40–60)
HDLC SERPL: 3.5 (ref 0–5)
HGB BLD-MCNC: 13.8 G/DL (ref 11.7–15.4)
IMM GRANULOCYTES # BLD AUTO: 0 K/UL (ref 0–0.5)
IMM GRANULOCYTES NFR BLD AUTO: 0 % (ref 0–5)
LDLC SERPL CALC-MCNC: 133 MG/DL (ref 0–100)
LYMPHOCYTES # BLD: 2 K/UL (ref 0.5–4.6)
LYMPHOCYTES NFR BLD: 34 % (ref 13–44)
MAGNESIUM SERPL-MCNC: 2 MG/DL (ref 1.8–2.4)
MCH RBC QN AUTO: 31.4 PG (ref 26.1–32.9)
MCHC RBC AUTO-ENTMCNC: 31.7 G/DL (ref 31.4–35)
MCV RBC AUTO: 99.1 FL (ref 82–102)
MONOCYTES # BLD: 0.7 K/UL (ref 0.1–1.3)
MONOCYTES NFR BLD: 12 % (ref 4–12)
NEUTS SEG # BLD: 2.9 K/UL (ref 1.7–8.2)
NEUTS SEG NFR BLD: 49 % (ref 43–78)
NRBC # BLD: 0 K/UL (ref 0–0.2)
PLATELET # BLD AUTO: 231 K/UL (ref 150–450)
PMV BLD AUTO: 9.8 FL (ref 9.4–12.3)
POTASSIUM SERPL-SCNC: 3.9 MMOL/L (ref 3.5–5.1)
PROT SERPL-MCNC: 6.9 G/DL (ref 6.3–8.2)
RBC # BLD AUTO: 4.4 M/UL (ref 4.05–5.2)
SODIUM SERPL-SCNC: 139 MMOL/L (ref 136–145)
TRIGL SERPL-MCNC: 152 MG/DL (ref 0–150)
TSH, 3RD GENERATION: 1.74 UIU/ML (ref 0.27–4.2)
VLDLC SERPL CALC-MCNC: 30 MG/DL (ref 6–23)
WBC # BLD AUTO: 5.9 K/UL (ref 4.3–11.1)

## 2024-05-23 RX ORDER — AZELASTINE HYDROCHLORIDE 137 UG/1
SPRAY, METERED NASAL
COMMUNITY
Start: 2022-04-22

## 2024-05-23 RX ORDER — NEOMYCIN SULFATE, POLYMYXIN B SULFATE AND DEXAMETHASONE 3.5; 10000; 1 MG/ML; [USP'U]/ML; MG/ML
SUSPENSION/ DROPS OPHTHALMIC
COMMUNITY
Start: 2024-04-03

## 2024-05-23 ASSESSMENT — ENCOUNTER SYMPTOMS
EYE REDNESS: 0
FACIAL SWELLING: 0
EYE ITCHING: 0
ABDOMINAL DISTENTION: 0
ANAL BLEEDING: 0
RHINORRHEA: 0
BACK PAIN: 0
ORTHOPNEA: 0
ABDOMINAL PAIN: 0
EYE PAIN: 0
CHEST TIGHTNESS: 0
EYE DISCHARGE: 0
SINUS PRESSURE: 0
SINUS PAIN: 0
COUGH: 0
APNEA: 0
BLOOD IN STOOL: 0

## 2024-05-23 NOTE — PROGRESS NOTES
wheelchair today, requires max assist for standing to the scales     Assessment/Plan:     ICD-10-CM    1. Essential hypertension  I10 Comprehensive Metabolic Panel     CBC with Auto Differential     CBC with Auto Differential     Comprehensive Metabolic Panel      2. Systemic lupus erythematosus, unspecified SLE type, unspecified organ involvement status (HCC)  M32.9       3. Gastroesophageal reflux disease, unspecified whether esophagitis present  K21.9       4. Anxiety  F41.9       5. Acute on chronic diastolic (congestive) heart failure (HCC)  I50.33       6. Cerebellar atrophy (HCC)  G31.9       7. Major depressive disorder, single episode, moderate (HCC)  F32.1       8. Interstitial pulmonary disease, unspecified (HCC)  J84.9       9. Mild intermittent asthma, unspecified whether complicated  J45.20       10. Weakness of left lower extremity  R29.898       11. High blood sugar  R73.9 Hemoglobin A1C     Hemoglobin A1C      12. Vitamin D deficiency  E55.9 Vitamin D 25 Hydroxy     Vitamin D 25 Hydroxy      13. Fatigue, unspecified type  R53.83 TSH     TSH      14. Hypomagnesemia  E83.42 Magnesium     Magnesium      15. High cholesterol  E78.00 Lipid Panel     Lipid Panel      16. Left leg weakness  R29.898 Ambulatory referral to Orthopedic Surgery      17. Spinal stenosis, unspecified spinal region  M48.00 Ambulatory referral to Orthopedic Surgery          1. Essential hypertension  -     Comprehensive Metabolic Panel; Future  -     CBC with Auto Differential; Future  2. Systemic lupus erythematosus, unspecified SLE type, unspecified organ involvement status (HCC)  Assessment & Plan:   Asymptomatic, continue current medications, no recent flare noted, managed with meds    3. Gastroesophageal reflux disease, unspecified whether esophagitis present  4. Anxiety  5. Acute on chronic diastolic (congestive) heart failure (HCC)  6. Cerebellar atrophy (HCC)  7. Major depressive disorder, single episode, moderate (HCC)  8.

## 2024-06-12 ENCOUNTER — OFFICE VISIT (OUTPATIENT)
Age: 79
End: 2024-06-12
Payer: MEDICARE

## 2024-06-12 DIAGNOSIS — M48.062 LUMBAR STENOSIS WITH NEUROGENIC CLAUDICATION: ICD-10-CM

## 2024-06-12 DIAGNOSIS — M43.16 SPONDYLOLISTHESIS OF LUMBAR REGION: ICD-10-CM

## 2024-06-12 DIAGNOSIS — M47.816 FACET ARTHROPATHY, LUMBAR: ICD-10-CM

## 2024-06-12 DIAGNOSIS — M54.50 LOW BACK PAIN, UNSPECIFIED BACK PAIN LATERALITY, UNSPECIFIED CHRONICITY, UNSPECIFIED WHETHER SCIATICA PRESENT: Primary | ICD-10-CM

## 2024-06-12 PROCEDURE — 1090F PRES/ABSN URINE INCON ASSESS: CPT | Performed by: PHYSICIAN ASSISTANT

## 2024-06-12 PROCEDURE — G8417 CALC BMI ABV UP PARAM F/U: HCPCS | Performed by: PHYSICIAN ASSISTANT

## 2024-06-12 PROCEDURE — 1123F ACP DISCUSS/DSCN MKR DOCD: CPT | Performed by: PHYSICIAN ASSISTANT

## 2024-06-12 PROCEDURE — G8427 DOCREV CUR MEDS BY ELIG CLIN: HCPCS | Performed by: PHYSICIAN ASSISTANT

## 2024-06-12 PROCEDURE — G8399 PT W/DXA RESULTS DOCUMENT: HCPCS | Performed by: PHYSICIAN ASSISTANT

## 2024-06-12 PROCEDURE — 99203 OFFICE O/P NEW LOW 30 MIN: CPT | Performed by: PHYSICIAN ASSISTANT

## 2024-06-12 PROCEDURE — 1036F TOBACCO NON-USER: CPT | Performed by: PHYSICIAN ASSISTANT

## 2024-06-13 NOTE — PROGRESS NOTES
A referral to pain management has been ordered. CCAMP/EVAL AND TREAT.   
  Allergen Reactions    Cyclobenzaprine Other (See Comments)    Hydroxychloroquine Other (See Comments)    Hydroxychloroquine Sulfate Other (See Comments)    Ketoprofen Other (See Comments)    Molds & Smuts     Pollen Extract Other (See Comments)    Prednisone Other (See Comments)    Shellfish Allergy      Other reaction(s): Unknown (comments)         Current Outpatient Medications:     neomycin-polymyxin-dexameth (MAXITROL) 3.5-82050-7.1 ophthalmic suspension, SHAKE LIQUID AND INSTILL 1 DROP IN LEFT EYE THREE TIMES DAILY FOR 1 WEEK, Disp: , Rfl:     Azelastine HCl 137 MCG/SPRAY SOLN, 1 puff in each nostril Nasally Twice a day for 90 days, Disp: , Rfl:     nystatin-triamcinolone (MYCOLOG II) 021030-7.1 UNIT/GM-% cream, APPLY CREAM TO AFFECTED AREA TWICE A DAY, Disp: 60 g, Rfl: 1    pantoprazole (PROTONIX) 40 MG tablet, Take 1 tablet by mouth in the morning and at bedtime, Disp: 180 tablet, Rfl: 1    lisinopril (PRINIVIL;ZESTRIL) 5 MG tablet, Take 1 tablet by mouth daily, Disp: 90 tablet, Rfl: 1    apixaban (ELIQUIS) 2.5 MG TABS tablet, Take 1 tablet by mouth 2 times daily, Disp: 180 tablet, Rfl: 3    Zoledronic Acid (RECLAST IV), Infuse intravenously Once yearly-- February, Disp: , Rfl:     montelukast (SINGULAIR) 10 MG tablet, Take 1 tablet by mouth daily, Disp: 90 tablet, Rfl: 1    sertraline (ZOLOFT) 100 MG tablet, Take 1 1/2 tab daily., Disp: 135 tablet, Rfl: 1    Fexofenadine HCl (ALLEGRA PO), Take by mouth, Disp: , Rfl:     GLUCOSAMINE SULFATE PO, Take by mouth, Disp: , Rfl:     sucralfate (CARAFATE) 1 GM tablet, TAKE ONE TABLET BY MOUTH THREE TIMES A DAY, Disp: 270 tablet, Rfl: 0    albuterol sulfate HFA (PROVENTIL;VENTOLIN;PROAIR) 108 (90 Base) MCG/ACT inhaler, Inhale 2 puffs into the lungs every 4 hours as needed for Wheezing asthmadx code j45.09, Disp: 3 each, Rfl: 3    Fe Fum-FePoly-Vit C-Vit B3 (INTEGRA) 62.5-62.5-40-3 MG CAPS, TAKE ONE CAPSULE BY MOUTH ONE TIME DAILY, Disp: 90 capsule, Rfl: 1

## 2024-06-18 DIAGNOSIS — M48.062 LUMBAR STENOSIS WITH NEUROGENIC CLAUDICATION: ICD-10-CM

## 2024-06-18 DIAGNOSIS — M54.50 LOW BACK PAIN, UNSPECIFIED BACK PAIN LATERALITY, UNSPECIFIED CHRONICITY, UNSPECIFIED WHETHER SCIATICA PRESENT: Primary | ICD-10-CM

## 2024-06-18 DIAGNOSIS — M43.16 SPONDYLOLISTHESIS OF LUMBAR REGION: ICD-10-CM

## 2024-06-18 DIAGNOSIS — M47.816 FACET ARTHROPATHY, LUMBAR: ICD-10-CM

## 2024-06-24 DIAGNOSIS — F41.9 ANXIETY: ICD-10-CM

## 2024-06-24 RX ORDER — SERTRALINE HYDROCHLORIDE 100 MG/1
TABLET, FILM COATED ORAL
Qty: 135 TABLET | Refills: 1 | Status: SHIPPED | OUTPATIENT
Start: 2024-06-24

## 2024-07-01 ENCOUNTER — TELEPHONE (OUTPATIENT)
Age: 79
End: 2024-07-01

## 2024-07-09 RX ORDER — SUCRALFATE 1 G/1
TABLET ORAL
Qty: 270 TABLET | Refills: 0 | Status: SHIPPED | OUTPATIENT
Start: 2024-07-09

## 2024-08-18 ENCOUNTER — HOSPITAL ENCOUNTER (EMERGENCY)
Age: 79
Discharge: HOME | End: 2024-08-18
Attending: EMERGENCY MEDICINE
Payer: MEDICARE

## 2024-08-18 VITALS
SYSTOLIC BLOOD PRESSURE: 153 MMHG | HEIGHT: 59 IN | BODY MASS INDEX: 28.22 KG/M2 | TEMPERATURE: 97.9 F | DIASTOLIC BLOOD PRESSURE: 75 MMHG | OXYGEN SATURATION: 95 % | HEART RATE: 78 BPM | WEIGHT: 140 LBS | RESPIRATION RATE: 19 BRPM

## 2024-08-18 DIAGNOSIS — R07.89 ATYPICAL CHEST PAIN: Primary | ICD-10-CM

## 2024-08-18 DIAGNOSIS — K30 INDIGESTION: ICD-10-CM

## 2024-08-18 LAB
ALBUMIN SERPL-MCNC: 3.8 G/DL (ref 3.2–4.6)
ALBUMIN/GLOB SERPL: 1 (ref 0.4–1.6)
ALP SERPL-CCNC: 88 U/L (ref 45–117)
ALT SERPL-CCNC: 8 U/L (ref 13–61)
ANION GAP SERPL CALC-SCNC: 14 MMOL/L (ref 9–18)
AST SERPL-CCNC: 21 U/L (ref 15–37)
BASOPHILS # BLD: 0 K/UL (ref 0–0.2)
BASOPHILS NFR BLD: 1 % (ref 0–2)
BILIRUB SERPL-MCNC: 0.4 MG/DL (ref 0.2–1.1)
BUN SERPL-MCNC: 11 MG/DL (ref 8–23)
CALCIUM SERPL-MCNC: 9.4 MG/DL (ref 8.3–10.4)
CHLORIDE SERPL-SCNC: 101 MMOL/L (ref 98–107)
CO2 SERPL-SCNC: 25 MMOL/L (ref 21–32)
CREAT SERPL-MCNC: 0.39 MG/DL (ref 0.6–1)
DIFFERENTIAL METHOD BLD: ABNORMAL
EKG ATRIAL RATE: 74 BPM
EKG ATRIAL RATE: 74 BPM
EKG DIAGNOSIS: NORMAL
EKG DIAGNOSIS: NORMAL
EKG P AXIS: 32 DEGREES
EKG P AXIS: 32 DEGREES
EKG P-R INTERVAL: 180 MS
EKG P-R INTERVAL: 180 MS
EKG Q-T INTERVAL: 383 MS
EKG Q-T INTERVAL: 383 MS
EKG QRS DURATION: 99 MS
EKG QRS DURATION: 99 MS
EKG QTC CALCULATION (BAZETT): 425 MS
EKG QTC CALCULATION (BAZETT): 425 MS
EKG R AXIS: 36 DEGREES
EKG R AXIS: 36 DEGREES
EKG T AXIS: 35 DEGREES
EKG T AXIS: 35 DEGREES
EKG VENTRICULAR RATE: 74 BPM
EKG VENTRICULAR RATE: 74 BPM
EOSINOPHIL # BLD: 0.5 K/UL (ref 0–0.8)
EOSINOPHIL NFR BLD: 7 % (ref 0.5–7.8)
ERYTHROCYTE [DISTWIDTH] IN BLOOD BY AUTOMATED COUNT: 12.7 % (ref 11.9–14.6)
GLOBULIN SER CALC-MCNC: 3.7 G/DL (ref 2.8–4.5)
GLUCOSE SERPL-MCNC: 101 MG/DL (ref 65–100)
HCT VFR BLD AUTO: 42.8 % (ref 35.8–46.3)
HGB BLD-MCNC: 13.5 G/DL (ref 11.7–15.4)
IMM GRANULOCYTES # BLD AUTO: 0 K/UL (ref 0–0.5)
IMM GRANULOCYTES NFR BLD AUTO: 0 % (ref 0–5)
LYMPHOCYTES # BLD: 1.9 K/UL (ref 0.5–4.6)
LYMPHOCYTES NFR BLD: 24 % (ref 13–44)
MCH RBC QN AUTO: 29.7 PG (ref 26.1–32.9)
MCHC RBC AUTO-ENTMCNC: 31.5 G/DL (ref 31.4–35)
MCV RBC AUTO: 94.3 FL (ref 82–102)
MONOCYTES # BLD: 1.1 K/UL (ref 0.1–1.3)
MONOCYTES NFR BLD: 13 % (ref 4–12)
NEUTS SEG # BLD: 4.3 K/UL (ref 1.7–8.2)
NEUTS SEG NFR BLD: 55 % (ref 43–78)
NRBC # BLD: 0 K/UL (ref 0–0.2)
PLATELET # BLD AUTO: 221 K/UL (ref 150–450)
PMV BLD AUTO: 9.8 FL (ref 9.4–12.3)
POTASSIUM SERPL-SCNC: 4.2 MMOL/L (ref 3.5–5.1)
PROT SERPL-MCNC: 7.5 G/DL (ref 6.4–8.2)
RBC # BLD AUTO: 4.54 M/UL (ref 4.05–5.2)
SODIUM SERPL-SCNC: 140 MMOL/L (ref 133–143)
TROPONIN T SERPL HS-MCNC: 6.4 NG/L (ref 0–14)
TROPONIN T SERPL HS-MCNC: 9.9 NG/L (ref 0–14)
WBC # BLD AUTO: 7.9 K/UL (ref 4.3–11.1)

## 2024-08-18 PROCEDURE — 93010 ELECTROCARDIOGRAM REPORT: CPT | Performed by: INTERNAL MEDICINE

## 2024-08-18 PROCEDURE — 99284 EMERGENCY DEPT VISIT MOD MDM: CPT

## 2024-08-18 PROCEDURE — 93005 ELECTROCARDIOGRAM TRACING: CPT | Performed by: EMERGENCY MEDICINE

## 2024-08-18 PROCEDURE — 84484 ASSAY OF TROPONIN QUANT: CPT

## 2024-08-18 PROCEDURE — 80053 COMPREHEN METABOLIC PANEL: CPT

## 2024-08-18 PROCEDURE — 85025 COMPLETE CBC W/AUTO DIFF WBC: CPT

## 2024-08-18 ASSESSMENT — PAIN SCALES - GENERAL: PAINLEVEL_OUTOF10: 0

## 2024-08-18 ASSESSMENT — LIFESTYLE VARIABLES
HOW MANY STANDARD DRINKS CONTAINING ALCOHOL DO YOU HAVE ON A TYPICAL DAY: 1 OR 2
HOW OFTEN DO YOU HAVE A DRINK CONTAINING ALCOHOL: 4 OR MORE TIMES A WEEK

## 2024-08-18 ASSESSMENT — PAIN - FUNCTIONAL ASSESSMENT: PAIN_FUNCTIONAL_ASSESSMENT: NONE - DENIES PAIN

## 2024-08-18 NOTE — ED TRIAGE NOTES
PT to Triage via EMS. EMS was called by patient's roommate when pt was c/o CP at home. On arrival of EMS to pt home pt states that CP was almost gone. On arrival to triage, pt states CP is now gone. EMS stated that no meds were given PTA and pt has no IV.     EMS VS   70 HR  95% RA  162/80  98.3  Bgl 124

## 2024-08-18 NOTE — DISCHARGE INSTRUCTIONS
Continue your Protonix daily if you are no longer taking this,  some Prilosec or Pepcid  Keep some Tums or Rolaids handy  Consider some Gas-X    If you are having any kind of chest pain that concerns you, come right away to get it checked out

## 2024-08-19 ENCOUNTER — CARE COORDINATION (OUTPATIENT)
Dept: CARE COORDINATION | Facility: CLINIC | Age: 79
End: 2024-08-19

## 2024-08-19 NOTE — CARE COORDINATION
Ambulatory Care Coordination Note     2024 10:51 AM     Patient Current Location:  [place]     This patient was received as a referral from Ambulatory Care Manager .    ACM contacted the patient by telephone. Verified name and  with patient as identifiers. Provided introduction to self, and explanation of the ACM role.   Patient declined care management services at this time.

## 2024-08-28 NOTE — PROGRESS NOTES
tongue normal. Teeth and gums normal.        Lungs:     Breath sounds clear bilaterally.     Heart:   Regular rate and rhythm, S1, S2 normal, no murmur, click, rub or gallop.     Abdomen:    Soft, non-tender.     Extremities:  Extremities normal, atraumatic, no cyanosis or edema.     Skin:  Skin color normal. No rashes or lesions     Neurologic:  A&Ox3     DIAGNOSTIC TESTS:                                                                                    LABS:   Lab Results   Component Value Date/Time    WBC 7.9 08/18/2024 01:31 PM    HGB 13.5 08/18/2024 01:31 PM    HCT 42.8 08/18/2024 01:31 PM     08/18/2024 01:31 PM    TSH 1.740 05/23/2024 02:48 PM    CRP 0.9 07/28/2020 10:04 AM     Imaging: I performed an independent interpretation of the patient's images.  CXR:   XR LUMBAR SPINE (2-3 VIEWS) 06/12/2024    Narrative  AUTOMATIC ADMINISTRATIVE RESULT    The result for this exam can be found in the Progress note in the chart.    Impression  See Progress note in the chart.    CT Chest:   CT CHEST WO CONTRAST 03/01/2024    Narrative  CT CHEST    INDICATION: Follow-up nodules.    Multiple 2D axial images were obtained through the chest without IV contrast.  Radiation dose reduction techniques were used for this study:  All CT scans  performed at this facility use one or all of the following: Automated exposure  control, adjustment of the mA and/or kVp according to patient's size, iterative  reconstruction.    COMPARISON: March 6, 2023    FINDINGS:  - LUNGS: No infiltrates, masses, or effusions. There is a stable 5.0 mm right  upper lobe nodule. Previously noted subcentimeter right lower lobe nodule is not  seen on the current study. No new or enlarging parenchymal opacities. Mild  elevation of the left hemidiaphragm.  - MEDIASTINUM/AXILLA: No significant adenopathy.    - HEART/VESSELS: Aortic sclerosis..  - CHEST WALL/BONES: Normal.  - UPPER ABDOMEN: No acute findings. Large hiatal hernia.    Impression  A                                                                                                    Immunization History   Administered Date(s) Administered    PPD Test 07/28/2020, 02/01/2022    Pneumococcal, PCV-13, PREVNAR 13, (age 6w+), IM, 0.5mL 11/06/2017    Pneumococcal, PPSV23, PNEUMOVAX 23, (age 2y+), SC/IM, 0.5mL 01/04/2019, 02/18/2019     Past Medical History:   Diagnosis Date    Abnormality of gait 7/18/2018    Anemia     Anxiety     Arthritis     Asthma     Calculus of kidney     Chronic pain     Contact dermatitis and other eczema, due to unspecified cause     Depressive disorder, not elsewhere classified     Encounter for chronic pain management     Esophageal reflux     Essential hypertension, benign     Frequent falls 7/18/2018    GERD (gastroesophageal reflux disease)     Pure hypercholesterolemia         Tobacco Use      Smoking status: Never      Smokeless tobacco: Never    Allergies   Allergen Reactions    Cyclobenzaprine Other (See Comments)    Hydroxychloroquine Other (See Comments)    Hydroxychloroquine Sulfate Other (See Comments)    Ketoprofen Other (See Comments)    Molds & Smuts     Pollen Extract Other (See Comments)    Prednisone Other (See Comments)    Shellfish Allergy      Other reaction(s): Unknown (comments)     Current Outpatient Medications   Medication Instructions    albuterol sulfate HFA (PROVENTIL;VENTOLIN;PROAIR) 108 (90 Base) MCG/ACT inhaler 2 puffs, Inhalation, EVERY 4 HOURS PRN, asthmadx code j45.09    apixaban (ELIQUIS) 2.5 mg, Oral, 2 TIMES DAILY    ascorbic acid (VITAMIN C) 500 MG tablet Oral    Azelastine HCl 137 MCG/SPRAY SOLN 1 puff in each nostril Nasally Twice a day for 90 days    budesonide-formoterol (SYMBICORT) 80-4.5 MCG/ACT AERO 2 puffs, Inhalation, 2 TIMES DAILY RESP    calcium carbonate (OYSTER SHELL CALCIUM 500 MG) 1250 (500 Ca) MG tablet Oral, DAILY    Fe Fum-FePoly-Vit C-Vit B3 (INTEGRA) 62.5-62.5-40-3 MG CAPS TAKE ONE CAPSULE BY MOUTH ONE TIME DAILY

## 2024-08-29 ENCOUNTER — OFFICE VISIT (OUTPATIENT)
Age: 79
End: 2024-08-29
Payer: MEDICARE

## 2024-08-29 VITALS
HEART RATE: 91 BPM | TEMPERATURE: 97.5 F | WEIGHT: 138.2 LBS | SYSTOLIC BLOOD PRESSURE: 122 MMHG | BODY MASS INDEX: 29.01 KG/M2 | HEIGHT: 58 IN | DIASTOLIC BLOOD PRESSURE: 76 MMHG | OXYGEN SATURATION: 94 % | RESPIRATION RATE: 18 BRPM

## 2024-08-29 DIAGNOSIS — R91.8 LUNG NODULES: Primary | ICD-10-CM

## 2024-08-29 DIAGNOSIS — J98.4 RESTRICTIVE LUNG DISEASE: ICD-10-CM

## 2024-08-29 DIAGNOSIS — G47.34 NOCTURNAL HYPOXEMIA: ICD-10-CM

## 2024-08-29 DIAGNOSIS — J45.30 MILD PERSISTENT ASTHMA WITHOUT COMPLICATION: ICD-10-CM

## 2024-08-29 LAB
EXPIRATORY TIME: NORMAL
FEF 25-75% %PRED-PRE: NORMAL
FEF 25-75% PRED: NORMAL
FEF 25-75-PRE: NORMAL
FEV1 %PRED-PRE: 25 %
FEV1 PRED: 1.54 L
FEV1/FVC %PRED-PRE: NORMAL
FEV1/FVC PRED: NORMAL
FEV1/FVC: 89 %
FEV1: 0.39 L
FVC %PRED-PRE: 22 %
FVC PRED: 2.01 L
FVC: 0.44 L
PEF %PRED-PRE: NORMAL
PEF PRED: NORMAL
PEF-PRE: NORMAL

## 2024-08-29 PROCEDURE — 1123F ACP DISCUSS/DSCN MKR DOCD: CPT | Performed by: NURSE PRACTITIONER

## 2024-08-29 PROCEDURE — 3078F DIAST BP <80 MM HG: CPT | Performed by: NURSE PRACTITIONER

## 2024-08-29 PROCEDURE — 1090F PRES/ABSN URINE INCON ASSESS: CPT | Performed by: NURSE PRACTITIONER

## 2024-08-29 PROCEDURE — G8417 CALC BMI ABV UP PARAM F/U: HCPCS | Performed by: NURSE PRACTITIONER

## 2024-08-29 PROCEDURE — 3074F SYST BP LT 130 MM HG: CPT | Performed by: NURSE PRACTITIONER

## 2024-08-29 PROCEDURE — 1036F TOBACCO NON-USER: CPT | Performed by: NURSE PRACTITIONER

## 2024-08-29 PROCEDURE — 99214 OFFICE O/P EST MOD 30 MIN: CPT | Performed by: NURSE PRACTITIONER

## 2024-08-29 PROCEDURE — G8427 DOCREV CUR MEDS BY ELIG CLIN: HCPCS | Performed by: NURSE PRACTITIONER

## 2024-08-29 PROCEDURE — G8399 PT W/DXA RESULTS DOCUMENT: HCPCS | Performed by: NURSE PRACTITIONER

## 2024-08-29 RX ORDER — ALBUTEROL SULFATE 90 UG/1
2 AEROSOL, METERED RESPIRATORY (INHALATION) EVERY 4 HOURS PRN
Qty: 3 EACH | Refills: 3 | Status: SHIPPED | OUTPATIENT
Start: 2024-08-29

## 2024-08-29 RX ORDER — BUDESONIDE AND FORMOTEROL FUMARATE DIHYDRATE 80; 4.5 UG/1; UG/1
2 AEROSOL RESPIRATORY (INHALATION)
Qty: 3 EACH | Refills: 3 | Status: SHIPPED | OUTPATIENT
Start: 2024-08-29

## 2024-08-29 ASSESSMENT — PULMONARY FUNCTION TESTS
FEV1: 0.39
FEV1_PERCENT_PREDICTED_PRE: 25
FEV1/FVC: 89
FEV1_PREDICTED: 1.54
FVC: 0.44
FVC_PERCENT_PREDICTED_PRE: 22
FVC_PREDICTED: 2.01

## 2024-09-13 ASSESSMENT — ENCOUNTER SYMPTOMS
SHORTNESS OF BREATH: 0
VOMITING: 0
EYE DISCHARGE: 0
ABDOMINAL PAIN: 0
RHINORRHEA: 0
BACK PAIN: 0
EYE REDNESS: 0
NAUSEA: 0
FACIAL SWELLING: 0
COUGH: 0
COLOR CHANGE: 0

## 2024-09-13 NOTE — ED PROVIDER NOTES
fever.   HENT:  Negative for facial swelling and rhinorrhea.    Eyes:  Negative for discharge and redness.   Respiratory:  Negative for cough and shortness of breath.    Cardiovascular:  Positive for chest pain. Negative for palpitations.   Gastrointestinal:  Negative for abdominal pain, nausea and vomiting.   Musculoskeletal:  Negative for back pain and neck pain.   Skin:  Negative for color change and pallor.   Neurological:  Negative for dizziness, light-headedness and headaches.   All other systems reviewed and are negative.       Physical Exam     Vitals signs and nursing note reviewed:  Vitals:    08/18/24 1445 08/18/24 1500 08/18/24 1524 08/18/24 1530   BP: 138/65 139/72 (!) 148/87 (!) 153/75   Pulse: 70 72 81 78   Resp: 13 18 22 19   Temp:       TempSrc:       SpO2: 95% 92% 94% 95%   Weight:       Height:          Physical Exam  Vitals and nursing note reviewed.   Constitutional:       General: She is not in acute distress.     Appearance: Normal appearance. She is normal weight. She is not ill-appearing, toxic-appearing or diaphoretic.   HENT:      Head: Normocephalic and atraumatic.      Right Ear: External ear normal.      Left Ear: External ear normal.      Nose: No rhinorrhea.   Eyes:      General: No scleral icterus.        Right eye: No discharge.         Left eye: No discharge.      Conjunctiva/sclera: Conjunctivae normal.   Cardiovascular:      Rate and Rhythm: Normal rate and regular rhythm.   Pulmonary:      Effort: Pulmonary effort is normal. No respiratory distress.      Breath sounds: Normal breath sounds.   Abdominal:      Palpations: Abdomen is soft. There is no fluid wave or pulsatile mass.      Tenderness: There is no abdominal tenderness. There is no guarding or rebound.   Musculoskeletal:         General: No deformity or signs of injury. Normal range of motion.      Cervical back: Normal range of motion and neck supple. No rigidity.   Skin:     General: Skin is warm and dry.

## 2024-09-25 ENCOUNTER — OFFICE VISIT (OUTPATIENT)
Dept: FAMILY MEDICINE CLINIC | Facility: CLINIC | Age: 79
End: 2024-09-25
Payer: MEDICARE

## 2024-09-25 VITALS
BODY MASS INDEX: 28.55 KG/M2 | WEIGHT: 136 LBS | HEIGHT: 58 IN | SYSTOLIC BLOOD PRESSURE: 126 MMHG | DIASTOLIC BLOOD PRESSURE: 72 MMHG

## 2024-09-25 DIAGNOSIS — J45.20 MILD INTERMITTENT ASTHMA, UNSPECIFIED WHETHER COMPLICATED: ICD-10-CM

## 2024-09-25 DIAGNOSIS — K21.9 GASTROESOPHAGEAL REFLUX DISEASE, UNSPECIFIED WHETHER ESOPHAGITIS PRESENT: ICD-10-CM

## 2024-09-25 DIAGNOSIS — R73.9 HIGH BLOOD SUGAR: ICD-10-CM

## 2024-09-25 DIAGNOSIS — I10 ESSENTIAL HYPERTENSION: Primary | ICD-10-CM

## 2024-09-25 DIAGNOSIS — F41.9 ANXIETY: ICD-10-CM

## 2024-09-25 DIAGNOSIS — G31.9 CEREBELLAR ATROPHY (HCC): ICD-10-CM

## 2024-09-25 LAB — HBA1C MFR BLD: 6 %

## 2024-09-25 PROCEDURE — 1123F ACP DISCUSS/DSCN MKR DOCD: CPT | Performed by: FAMILY MEDICINE

## 2024-09-25 PROCEDURE — 83036 HEMOGLOBIN GLYCOSYLATED A1C: CPT | Performed by: FAMILY MEDICINE

## 2024-09-25 PROCEDURE — G8427 DOCREV CUR MEDS BY ELIG CLIN: HCPCS | Performed by: FAMILY MEDICINE

## 2024-09-25 PROCEDURE — 3078F DIAST BP <80 MM HG: CPT | Performed by: FAMILY MEDICINE

## 2024-09-25 PROCEDURE — G8399 PT W/DXA RESULTS DOCUMENT: HCPCS | Performed by: FAMILY MEDICINE

## 2024-09-25 PROCEDURE — 1036F TOBACCO NON-USER: CPT | Performed by: FAMILY MEDICINE

## 2024-09-25 PROCEDURE — 99214 OFFICE O/P EST MOD 30 MIN: CPT | Performed by: FAMILY MEDICINE

## 2024-09-25 PROCEDURE — G8417 CALC BMI ABV UP PARAM F/U: HCPCS | Performed by: FAMILY MEDICINE

## 2024-09-25 PROCEDURE — 1090F PRES/ABSN URINE INCON ASSESS: CPT | Performed by: FAMILY MEDICINE

## 2024-09-25 PROCEDURE — 3074F SYST BP LT 130 MM HG: CPT | Performed by: FAMILY MEDICINE

## 2024-09-25 RX ORDER — LISINOPRIL 5 MG/1
5 TABLET ORAL DAILY
Qty: 90 TABLET | Refills: 1 | Status: SHIPPED | OUTPATIENT
Start: 2024-09-25 | End: 2025-03-24

## 2024-09-25 RX ORDER — CLORAZEPATE DIPOTASSIUM 3.75 MG/1
3.75 TABLET ORAL 3 TIMES DAILY
Qty: 270 TABLET | Refills: 1 | Status: SHIPPED | OUTPATIENT
Start: 2024-09-25 | End: 2025-03-24

## 2024-09-25 ASSESSMENT — ENCOUNTER SYMPTOMS
CHEST TIGHTNESS: 0
APNEA: 0
ANAL BLEEDING: 0
EYE ITCHING: 0
ABDOMINAL PAIN: 0
EYE PAIN: 0
BACK PAIN: 1
FACIAL SWELLING: 0
ABDOMINAL DISTENTION: 0
ORTHOPNEA: 0
COUGH: 0
SINUS PAIN: 0
RHINORRHEA: 0
BLOOD IN STOOL: 0
BLURRED VISION: 0
EYE DISCHARGE: 0
SINUS PRESSURE: 0
EYE REDNESS: 0

## 2024-10-02 DIAGNOSIS — I26.99 OTHER PULMONARY EMBOLISM WITHOUT ACUTE COR PULMONALE, UNSPECIFIED CHRONICITY (HCC): Primary | ICD-10-CM

## 2024-10-02 DIAGNOSIS — I82.90 THROMBOSIS: ICD-10-CM

## 2024-10-07 ENCOUNTER — TELEPHONE (OUTPATIENT)
Dept: ONCOLOGY | Age: 79
End: 2024-10-07

## 2024-10-14 ENCOUNTER — HOSPITAL ENCOUNTER (OUTPATIENT)
Dept: LAB | Age: 79
Discharge: HOME | End: 2024-10-14
Payer: MEDICARE

## 2024-10-14 ENCOUNTER — OFFICE VISIT (OUTPATIENT)
Dept: ONCOLOGY | Age: 79
End: 2024-10-14
Payer: MEDICARE

## 2024-10-14 VITALS
RESPIRATION RATE: 16 BRPM | TEMPERATURE: 98.1 F | WEIGHT: 145 LBS | SYSTOLIC BLOOD PRESSURE: 124 MMHG | DIASTOLIC BLOOD PRESSURE: 59 MMHG | OXYGEN SATURATION: 96 % | HEIGHT: 59 IN | HEART RATE: 72 BPM | BODY MASS INDEX: 29.23 KG/M2

## 2024-10-14 DIAGNOSIS — I82.90 THROMBOSIS: ICD-10-CM

## 2024-10-14 DIAGNOSIS — Z74.09 IMMOBILITY: ICD-10-CM

## 2024-10-14 DIAGNOSIS — I26.99 OTHER PULMONARY EMBOLISM WITHOUT ACUTE COR PULMONALE, UNSPECIFIED CHRONICITY (HCC): Primary | ICD-10-CM

## 2024-10-14 DIAGNOSIS — I26.99 OTHER PULMONARY EMBOLISM WITHOUT ACUTE COR PULMONALE, UNSPECIFIED CHRONICITY (HCC): ICD-10-CM

## 2024-10-14 LAB
ALBUMIN SERPL-MCNC: 3.4 G/DL (ref 3.2–4.6)
ALBUMIN/GLOB SERPL: 0.9 (ref 1–1.9)
ALP SERPL-CCNC: 65 U/L (ref 35–104)
ALT SERPL-CCNC: 9 U/L (ref 8–45)
ANION GAP SERPL CALC-SCNC: 11 MMOL/L (ref 9–18)
AST SERPL-CCNC: 19 U/L (ref 15–37)
BASOPHILS # BLD: 0 K/UL (ref 0–0.2)
BASOPHILS NFR BLD: 1 % (ref 0–2)
BILIRUB SERPL-MCNC: 0.4 MG/DL (ref 0–1.2)
BUN SERPL-MCNC: 9 MG/DL (ref 8–23)
CALCIUM SERPL-MCNC: 9.5 MG/DL (ref 8.8–10.2)
CHLORIDE SERPL-SCNC: 103 MMOL/L (ref 98–107)
CO2 SERPL-SCNC: 26 MMOL/L (ref 20–28)
CREAT SERPL-MCNC: 0.55 MG/DL (ref 0.6–1.1)
D DIMER PPP FEU-MCNC: <0.27 UG/ML(FEU)
DIFFERENTIAL METHOD BLD: ABNORMAL
EOSINOPHIL # BLD: 0.1 K/UL (ref 0–0.8)
EOSINOPHIL NFR BLD: 3 % (ref 0.5–7.8)
ERYTHROCYTE [DISTWIDTH] IN BLOOD BY AUTOMATED COUNT: 13.8 % (ref 11.9–14.6)
GLOBULIN SER CALC-MCNC: 3.6 G/DL (ref 2.3–3.5)
GLUCOSE SERPL-MCNC: 132 MG/DL (ref 70–99)
HCT VFR BLD AUTO: 43.3 % (ref 35.8–46.3)
HGB BLD-MCNC: 13.2 G/DL (ref 11.7–15.4)
IMM GRANULOCYTES # BLD AUTO: 0 K/UL (ref 0–0.5)
IMM GRANULOCYTES NFR BLD AUTO: 0 % (ref 0–5)
LYMPHOCYTES # BLD: 1.2 K/UL (ref 0.5–4.6)
LYMPHOCYTES NFR BLD: 25 % (ref 13–44)
MAGNESIUM SERPL-MCNC: 2 MG/DL (ref 1.8–2.4)
MCH RBC QN AUTO: 28.3 PG (ref 26.1–32.9)
MCHC RBC AUTO-ENTMCNC: 30.5 G/DL (ref 31.4–35)
MCV RBC AUTO: 92.9 FL (ref 82–102)
MONOCYTES # BLD: 0.6 K/UL (ref 0.1–1.3)
MONOCYTES NFR BLD: 12 % (ref 4–12)
NEUTS SEG # BLD: 2.8 K/UL (ref 1.7–8.2)
NEUTS SEG NFR BLD: 59 % (ref 43–78)
NRBC # BLD: 0 K/UL (ref 0–0.2)
PLATELET # BLD AUTO: 214 K/UL (ref 150–450)
PMV BLD AUTO: 9.8 FL (ref 9.4–12.3)
POTASSIUM SERPL-SCNC: 3.8 MMOL/L (ref 3.5–5.1)
PROT SERPL-MCNC: 7 G/DL (ref 6.3–8.2)
RBC # BLD AUTO: 4.66 M/UL (ref 4.05–5.2)
SODIUM SERPL-SCNC: 140 MMOL/L (ref 136–145)
TSH W FREE THYROID IF ABNORMAL: 0.99 UIU/ML (ref 0.27–4.2)
WBC # BLD AUTO: 4.8 K/UL (ref 4.3–11.1)

## 2024-10-14 PROCEDURE — 1090F PRES/ABSN URINE INCON ASSESS: CPT | Performed by: INTERNAL MEDICINE

## 2024-10-14 PROCEDURE — 80053 COMPREHEN METABOLIC PANEL: CPT

## 2024-10-14 PROCEDURE — 3078F DIAST BP <80 MM HG: CPT | Performed by: INTERNAL MEDICINE

## 2024-10-14 PROCEDURE — G8484 FLU IMMUNIZE NO ADMIN: HCPCS | Performed by: INTERNAL MEDICINE

## 2024-10-14 PROCEDURE — 3074F SYST BP LT 130 MM HG: CPT | Performed by: INTERNAL MEDICINE

## 2024-10-14 PROCEDURE — G8427 DOCREV CUR MEDS BY ELIG CLIN: HCPCS | Performed by: INTERNAL MEDICINE

## 2024-10-14 PROCEDURE — 83735 ASSAY OF MAGNESIUM: CPT

## 2024-10-14 PROCEDURE — 1123F ACP DISCUSS/DSCN MKR DOCD: CPT | Performed by: INTERNAL MEDICINE

## 2024-10-14 PROCEDURE — 85379 FIBRIN DEGRADATION QUANT: CPT

## 2024-10-14 PROCEDURE — 84443 ASSAY THYROID STIM HORMONE: CPT

## 2024-10-14 PROCEDURE — 36415 COLL VENOUS BLD VENIPUNCTURE: CPT

## 2024-10-14 PROCEDURE — G8399 PT W/DXA RESULTS DOCUMENT: HCPCS | Performed by: INTERNAL MEDICINE

## 2024-10-14 PROCEDURE — 99214 OFFICE O/P EST MOD 30 MIN: CPT | Performed by: INTERNAL MEDICINE

## 2024-10-14 PROCEDURE — 1036F TOBACCO NON-USER: CPT | Performed by: INTERNAL MEDICINE

## 2024-10-14 PROCEDURE — G8417 CALC BMI ABV UP PARAM F/U: HCPCS | Performed by: INTERNAL MEDICINE

## 2024-10-14 PROCEDURE — 85025 COMPLETE CBC W/AUTO DIFF WBC: CPT

## 2024-10-14 RX ORDER — HYDROCODONE BITARTRATE AND ACETAMINOPHEN 10; 325 MG/1; MG/1
1 TABLET ORAL EVERY 8 HOURS PRN
COMMUNITY
Start: 2024-10-03 | End: 2024-11-02

## 2024-10-14 ASSESSMENT — PATIENT HEALTH QUESTIONNAIRE - PHQ9
SUM OF ALL RESPONSES TO PHQ9 QUESTIONS 1 & 2: 1
2. FEELING DOWN, DEPRESSED OR HOPELESS: SEVERAL DAYS
SUM OF ALL RESPONSES TO PHQ QUESTIONS 1-9: 1
SUM OF ALL RESPONSES TO PHQ QUESTIONS 1-9: 1
1. LITTLE INTEREST OR PLEASURE IN DOING THINGS: NOT AT ALL
SUM OF ALL RESPONSES TO PHQ QUESTIONS 1-9: 1
SUM OF ALL RESPONSES TO PHQ QUESTIONS 1-9: 1

## 2024-10-14 NOTE — PROGRESS NOTES
[facility]: Office Visit Progress Note      Chief Complaint:     DVT/PE   Epistaxis      History of Present Illness:  79 y.o. female admitted on 9/15/2020 with a primary diagnosis of The primary encounter diagnosis was Epistaxis. Diagnoses  of Acute blood loss anemia and Syncope and collapse were also pertinent to this visit..         Ms. Chiu was admitted on 9/15/2020 with a diagnosis of epistaxis/anemia on Eliquis.  She has a past medical history of GERD, hypertension, anxiety, asthma, recent admission for  bilateral pulmonary embolism in the setting of bilateral lower extremity DVT currently on Eliquis.  She presented to the ER on 9/15/2020 with a nosebleed, improved with rapid Rhino. She denies any other sources of bleeding.  She was found to have a  drop in her hemoglobin to 8.6 and was admitted for medical management.  Eliquis was placed on hold d/t bleeding and anemia.  She does have an IVC filter in place.  Her Hemoglobin decreased further on the morning of 9/16 and she will receive 1 unit  of blood.  She had a repeat BLE doppler which showed bilateral lower extremity DVT, greater on the left. Patient admits to having frequent nosebleeds every few weeks or so but she has always been able to stop it in reasonable amount of time. She states  that she has seen ENT before and did not find source of bleed. She does admit to using Flonase on a daily basis and based on her description of administration, she may be causing irritation.  We were consulted for our recommendation given epitaxis  while on Eliquis for BL pulmonary embolism and BL lower extremity DVT.           Interim history updating A/P           Review of Systems:      Constitutional  Denies fever or chills.  Denies weight loss or appetite changes. Denies fatigue. Denies anorexia.        HEENT  Denies trauma, bluring vision, hearing loss, ear pain, nosebleeds, sore throat, neck pain and ear discharge.     Skin  Denies lesions

## 2024-10-14 NOTE — PATIENT INSTRUCTIONS
Careful clinical correlation is recommended, particularly when comparing to results calculated using previous equations.  The CKD-EPI equation is less accurate in patients with extremes of muscle mass, extra-renal metabolism of creatinine, excessive creatine ingestion, or following therapy that affects renal tubular secretion.      Calcium 10/14/2024 9.5  8.8 - 10.2 MG/DL Final    Total Bilirubin 10/14/2024 0.4  0.0 - 1.2 MG/DL Final    ALT 10/14/2024 9  8 - 45 U/L Final    AST 10/14/2024 19  15 - 37 U/L Final    Alk Phosphatase 10/14/2024 65  35 - 104 U/L Final    Total Protein 10/14/2024 7.0  6.3 - 8.2 g/dL Final    Albumin 10/14/2024 3.4  3.2 - 4.6 g/dL Final    Globulin 10/14/2024 3.6 (H)  2.3 - 3.5 g/dL Final    Albumin/Globulin Ratio 10/14/2024 0.9 (L)  1.0 - 1.9   Final    WBC 10/14/2024 4.8  4.3 - 11.1 K/uL Final    RBC 10/14/2024 4.66  4.05 - 5.2 M/uL Final    Hemoglobin 10/14/2024 13.2  11.7 - 15.4 g/dL Final    Hematocrit 10/14/2024 43.3  35.8 - 46.3 % Final    MCV 10/14/2024 92.9  82.0 - 102.0 FL Final    MCH 10/14/2024 28.3  26.1 - 32.9 PG Final    MCHC 10/14/2024 30.5 (L)  31.4 - 35.0 g/dL Final    RDW 10/14/2024 13.8  11.9 - 14.6 % Final    Platelets 10/14/2024 214  150 - 450 K/uL Final    MPV 10/14/2024 9.8  9.4 - 12.3 FL Final    nRBC 10/14/2024 0.00  0.0 - 0.2 K/uL Final    **Note: Absolute NRBC parameter is now reported with Hemogram**    Neutrophils % 10/14/2024 59  43 - 78 % Final    Lymphocytes % 10/14/2024 25  13 - 44 % Final    Monocytes % 10/14/2024 12  4.0 - 12.0 % Final    Eosinophils % 10/14/2024 3  0.5 - 7.8 % Final    Basophils % 10/14/2024 1  0.0 - 2.0 % Final    Immature Granulocytes % 10/14/2024 0  0.0 - 5.0 % Final    Neutrophils Absolute 10/14/2024 2.8  1.7 - 8.2 K/UL Final    Lymphocytes Absolute 10/14/2024 1.2  0.5 - 4.6 K/UL Final    Monocytes Absolute 10/14/2024 0.6  0.1 - 1.3 K/UL Final    Eosinophils Absolute 10/14/2024 0.1  0.0 - 0.8 K/UL Final    Basophils Absolute

## 2024-10-23 ENCOUNTER — TELEPHONE (OUTPATIENT)
Dept: ONCOLOGY | Age: 79
End: 2024-10-23

## 2024-10-23 DIAGNOSIS — I26.99 OTHER PULMONARY EMBOLISM WITHOUT ACUTE COR PULMONALE, UNSPECIFIED CHRONICITY (HCC): ICD-10-CM

## 2024-10-23 NOTE — TELEPHONE ENCOUNTER
Physician provider: Dr. Franco  Reason for today's call (Please detail here patients chief complaint): prescription   Last office visit:10/14/2024  Patient Callback Number: [phone]  Was callback number verified?: Yes  Preferred pharmacy (If refill request): [facility]  Calls to office within the last 48 hours?:No    Patient notified that their information will be routed to the [facility] clinical triage team for review. Patient is advised that they will receive a phone call from the triage department. If symptom related and symptoms worsen before receiving a call back, the patient has been advised to proceed to the nearest ED.          Pt is calling stating prescription Eloquis was sent to wrong pharmacy.    It was supposed to be sent to [facility]

## 2024-11-20 ENCOUNTER — TELEPHONE (OUTPATIENT)
Dept: FAMILY MEDICINE CLINIC | Facility: CLINIC | Age: 79
End: 2024-11-20

## 2024-11-20 NOTE — TELEPHONE ENCOUNTER
Pt lost her eliquis and cannot get new supply till 10 days. Sample given so pt does not have to go 10 days with her Eliquis.

## 2024-12-17 NOTE — H&P
Department of Interventional Radiology  [phone]    History and Physical    Patient:  Seema García MRN:  [id number]  SSN:  [id number]    YOB: 1945  Age:  66 y.o. Sex:  female      Primary Care Provider:  Sheeba Huerta MD  Referring Physician:  Edelmira Prather MD    Subjective:     Chief Complaint: Request that IVC filter be removed    History of the Present Illness: The patient is a 66 y.o. female with hx of recurrent epitaxis while on anticoagulation for DVT/PE who presents for IVC filter removal. She had an IVC filter placed in July 2020 after PE thrombectomy. Following with hematology/oncology who is in agreement with IVC filter removal. Serial DVT US with minimal residual DVT. No acute complaints. Held eliquis this AM.       Past Medical History:   Diagnosis Date    Abnormality of gait 7/18/2018    Anemia     Anxiety     Arthritis     Asthma     Calculus of kidney     Chronic pain     Contact dermatitis and other eczema, due to unspecified cause     Depressive disorder, not elsewhere classified     Encounter for chronic pain management     Esophageal reflux     Essential hypertension, benign     Frequent falls 7/18/2018    GERD (gastroesophageal reflux disease)     Pure hypercholesterolemia      Past Surgical History:   Procedure Laterality Date    HX CATARACT REMOVAL Bilateral     HX COLONOSCOPY      HX GI      HX HYSTERECTOMY      HX KNEE REPLACEMENT Right     HX LITHOTRIPSY      HX RETINAL DETACHMENT REPAIR      HX SHOULDER REPLACEMENT Left     IR THROMBECTOMY MECH ART PRIMARY NON CHIRAG OR INTRACRANIAL  7/28/2020    NEUROLOGICAL PROCEDURE UNLISTED          Review of Systems:    Pertinent items are noted in the History of Present Illness. Prior to Admission medications    Medication Sig Start Date End Date Taking? Authorizing Provider   apixaban (ELIQUIS) 2.5 mg tablet Take 1 Tab by mouth two (2) times a day.  3/3/21   Sheeba Huerta MD sucralfate (Carafate) 1 gram tablet Take 1 Tab by mouth three (3) times daily. 3/3/21   Jenny Malhotra MD   clorazepate (TRANXENE) 3.75 mg tablet Take 1 Tab by mouth three (3) times daily. Max Daily Amount: 11.25 mg. Indications: anxious 3/3/21   Jenny Malhotra MD   montelukast (Singulair) 10 mg tablet Take 1 Tab by mouth daily. 3/3/21   Jenny Malhotra MD   sertraline (ZOLOFT) 100 mg tablet Take 1 1/2 tab daily. 3/3/21   Jenny Malhotra MD   lisinopriL (PRINIVIL, ZESTRIL) 5 mg tablet TAKE ONE TABLET BY MOUTH EVERY DAY 1/4/21   Jenny Malhotra MD   olopatadine (Pataday) 0.2 % drop ophthalmic solution Administer 1 Drop to both eyes daily. 10/27/20   Jenny Mlahotra MD   nystatin-triamcinolone (MYCOLOG II) topical cream Apply  to affected area two (2) times a day. 10/15/20   Jenny Malhotra MD   Iron Fum & P-FA-Vit B & C No.9 (Integra Plus) 125 mg iron- 1 mg cap Take 1 Cap by mouth daily. 9/23/20   Jenny Malhotra MD   sodium chloride (Saline Nasal) 0.65 % nasal squeeze bottle 0.05 mL by Both Nostrils route four (4) times daily as needed for Congestion or Nasal Dryness. 9/19/20   Jorge Luis Flores MD   tiotropium (Spiriva with HandiHaler) 18 mcg inhalation capsule Take 1 Cap by inhalation daily. 8/21/20   Jenny Malhotra MD   albuterol-ipratropium (DUO-NEB) 2.5 mg-0.5 mg/3 ml nebu 3 mL by Nebulization route every four (4) hours as needed for Wheezing or Shortness of Breath. 8/3/20   Jarocho Conroy MD   latanoprost (XALATAN) 0.005 % ophthalmic solution Administer 1 Drop to both eyes every evening. 8/3/20   Jarocho Conroy MD   polyethylene glycol (MIRALAX) 17 gram packet Take 1 Packet by mouth daily. 8/3/20   Jarocho Conroy MD   magnesium hydroxide (MILK OF MAGNESIA) 400 mg/5 mL suspension Take 30 mL by mouth daily. 8/3/20   Jarocho Conroy MD   pantoprazole (Protonix) 40 mg tablet Take 1 Tab by mouth two (2) times a day.  5/13/20   Jenny Malhotra MD   ProAir HFA 90 mcg/actuation inhaler USE 2 PUFFS PO BID PRF COUGH 3/27/20   Sisi De Leon MD   Symbicort 80-4.5 mcg/actuation HFAA INHALE 1 PUFF PO BID FOR COUGH 3/17/20   Sisi De Leon MD   cycloSPORINE (RESTASIS) 0.05 % dpet Restasis    Provider, Historical   calcium carbonate (OS-JANA) 500 mg calcium (1,250 mg) tablet Take  by mouth daily. Provider, Historical   ascorbic acid, vitamin C, (VITAMIN C) 500 mg tablet Take  by mouth. Provider, Historical   melatonin 10 mg cap Take  by mouth. Provider, Historical   Iron Fum & PS Cmp-Vit C-Niacin (INTEGRA) 125-40-3 mg cap Take 125 mg by mouth daily. 9/6/18   Sisi De Leon MD   dietary supplement cap Take  by mouth. Provider, Historical   docusate sodium (STOOL SOFTENER) 100 mg tab Take  by mouth as needed. Provider, Historical   glucosamine & chondroit sul. Na 750-400 mg cmpk Take  by mouth. Provider, Historical   omega-3 fatty acids-vitamin e (FISH OIL) 1,000 mg cap Take 1 Cap by mouth. 1 by oral route twice daily    Provider, Historical   VIT C/ABDIAZIZ AC/LUT/COPPER/ZNOX (PRESERVISION LUTEIN PO) Take  by mouth. 1 by oral route daily    Provider, Historical   cholecalciferol (VITAMIN D3) 1,000 unit tablet Take  by mouth daily.     Provider, Historical        Allergies   Allergen Reactions    Flexeril [Cyclobenzaprine] Unknown (comments)    Ketoprofen Unknown (comments)    Plaquenil [Hydroxychloroquine] Unknown (comments)       Family History   Problem Relation Age of Onset    Arthritis-osteo Mother     Cancer Father     Stroke Maternal Grandmother     Stroke Paternal Grandmother      Social History     Tobacco Use    Smoking status: Never Smoker    Smokeless tobacco: Never Used   Substance Use Topics    Alcohol use: No        Objective:       Physical Examination:    Vitals:    03/29/21 1118   Pulse: 86   Resp: 18   Temp: 98.7 °F (37.1 °C)   SpO2: 98%   Weight: 70.8 kg (156 lb)   Height: 4' 10\" (1.473 m)       Pain Assessment  Pain Intensity 1: 0 (03/29/21 1118) General: Anxious elderly female in NAD. AAOx4. Lungs: Respirations are even and non-labored.     Laboratory:     Lab Results   Component Value Date/Time    Sodium 139 03/24/2021 03:05 PM    Sodium 140 02/16/2021 02:18 PM    Potassium 3.7 03/24/2021 03:05 PM    Potassium 3.6 02/16/2021 02:18 PM    Chloride 104 03/24/2021 03:05 PM    Chloride 103 02/16/2021 02:18 PM    CO2 31 03/24/2021 03:05 PM    CO2 31 02/16/2021 02:18 PM    Anion gap 4 (L) 03/24/2021 03:05 PM    Anion gap 6 (L) 02/16/2021 02:18 PM    Glucose 106 (H) 03/24/2021 03:05 PM    Glucose 109 (H) 02/16/2021 02:18 PM    BUN 11 03/24/2021 03:05 PM    BUN 8 02/16/2021 02:18 PM    Creatinine 0.70 03/24/2021 03:05 PM    Creatinine 0.80 02/16/2021 02:18 PM    GFR est AA >60 03/24/2021 03:05 PM    GFR est AA >60 02/16/2021 02:18 PM    GFR est non-AA >60 03/24/2021 03:05 PM    GFR est non-AA >60 02/16/2021 02:18 PM    Calcium 9.3 03/24/2021 03:05 PM    Calcium 9.0 02/16/2021 02:18 PM    Magnesium 2.1 07/29/2020 08:53 AM    Magnesium 2.3 08/19/2019 02:44 PM    Phosphorus 3.8 10/15/2015 02:58 PM    Albumin 3.7 03/24/2021 03:05 PM    Albumin 3.6 02/16/2021 02:18 PM    Protein, total 7.7 03/24/2021 03:05 PM    Protein, total 7.9 02/16/2021 02:18 PM    Globulin 4.0 (H) 03/24/2021 03:05 PM    Globulin 4.3 (H) 02/16/2021 02:18 PM    A-G Ratio 0.9 (L) 03/24/2021 03:05 PM    A-G Ratio 0.8 (L) 02/16/2021 02:18 PM    ALT (SGPT) 31 03/24/2021 03:05 PM    ALT (SGPT) 24 02/16/2021 02:18 PM     Lab Results   Component Value Date/Time    WBC 7.7 03/24/2021 03:05 PM    WBC 7.3 02/16/2021 02:18 PM    HGB 14.5 03/24/2021 03:05 PM    HGB 14.3 02/16/2021 02:18 PM    HCT 45.3 03/24/2021 03:05 PM    HCT 46.2 02/16/2021 02:18 PM    PLATELET 263 78/04/8559 03:05 PM    PLATELET 558 46/34/4811 02:18 PM     Lab Results   Component Value Date/Time    aPTT 29.9 09/15/2020 02:49 PM    aPTT 142.6 (H) 07/29/2020 08:53 AM    Prothrombin time 16.3 (H) 09/15/2020 02:49 PM    Prothrombin time 14.5 07/28/2020 01:40 AM    INR 1.3 09/15/2020 02:49 PM    INR 1.1 07/28/2020 01:40 AM       Assessment:     67 yo female with hx of recurrent epistaxis on anticoagulation for DVT/PE. Currently with IVC filter in place. Currently tolerating eliquis without recurrent epistaxis. Hospital Problems  Date Reviewed: 3/3/2021    None          Plan:     Planned Procedure:  IVC filter retrieval and IVC venography. Risks, benefits, and alternatives reviewed with patient and she agrees to proceed with the procedure.       Signed By: Darleen Banda MD     March 29, 2021 DASI= 9.89

## 2024-12-18 ENCOUNTER — TELEPHONE (OUTPATIENT)
Dept: FAMILY MEDICINE CLINIC | Facility: CLINIC | Age: 79
End: 2024-12-18

## 2024-12-18 DIAGNOSIS — I10 ESSENTIAL HYPERTENSION: ICD-10-CM

## 2024-12-18 DIAGNOSIS — E78.00 HIGH CHOLESTEROL: ICD-10-CM

## 2024-12-18 DIAGNOSIS — R73.9 HIGH BLOOD SUGAR: Primary | ICD-10-CM

## 2024-12-18 DIAGNOSIS — F41.9 ANXIETY: ICD-10-CM

## 2024-12-18 RX ORDER — SERTRALINE HYDROCHLORIDE 100 MG/1
125 TABLET, FILM COATED ORAL DAILY
Qty: 135 TABLET | Refills: 1 | Status: SHIPPED | OUTPATIENT
Start: 2024-12-18 | End: 2025-06-16

## 2024-12-18 NOTE — TELEPHONE ENCOUNTER
Patient called requesting refill(s) on     Requested Prescriptions     Pending Prescriptions Disp Refills    sertraline (ZOLOFT) 100 MG tablet 135 tablet 1     Sig: Take 1.5 tablets by mouth daily       Last appointment- 9/25/24  Next appointment- 1/28/25      Requesting referral to nutritionist.

## 2025-01-06 ENCOUNTER — HOSPITAL ENCOUNTER (INPATIENT)
Age: 80
LOS: 2 days | Discharge: HOME | DRG: 193 | End: 2025-01-09
Payer: MEDICARE

## 2025-01-06 ENCOUNTER — HOSPITAL ENCOUNTER (EMERGENCY)
Age: 80
Discharge: ANOTHER HOSPITAL | DRG: 193 | End: 2025-01-06
Attending: EMERGENCY MEDICINE
Payer: MEDICARE

## 2025-01-06 ENCOUNTER — APPOINTMENT (OUTPATIENT)
Dept: GENERAL RADIOLOGY | Age: 80
DRG: 193 | End: 2025-01-06
Payer: MEDICARE

## 2025-01-06 VITALS
HEART RATE: 85 BPM | RESPIRATION RATE: 38 BRPM | TEMPERATURE: 99.2 F | DIASTOLIC BLOOD PRESSURE: 69 MMHG | SYSTOLIC BLOOD PRESSURE: 136 MMHG | OXYGEN SATURATION: 83 %

## 2025-01-06 DIAGNOSIS — R06.02 SHORTNESS OF BREATH: Primary | ICD-10-CM

## 2025-01-06 DIAGNOSIS — J11.1 INFLUENZA: Primary | ICD-10-CM

## 2025-01-06 DIAGNOSIS — K21.9 GASTROESOPHAGEAL REFLUX DISEASE, UNSPECIFIED WHETHER ESOPHAGITIS PRESENT: ICD-10-CM

## 2025-01-06 DIAGNOSIS — R09.02 HYPOXEMIA: ICD-10-CM

## 2025-01-06 LAB
ANION GAP SERPL CALC-SCNC: 8 MMOL/L (ref 7–16)
BASOPHILS # BLD: 0 K/UL (ref 0–0.2)
BASOPHILS NFR BLD: 0 % (ref 0–2)
BUN SERPL-MCNC: 9 MG/DL (ref 8–23)
CALCIUM SERPL-MCNC: 8.6 MG/DL (ref 8.8–10.2)
CHLORIDE SERPL-SCNC: 102 MMOL/L (ref 98–107)
CO2 SERPL-SCNC: 27 MMOL/L (ref 20–29)
CREAT SERPL-MCNC: 0.43 MG/DL (ref 0.8–1.3)
DIFFERENTIAL METHOD BLD: ABNORMAL
EKG ATRIAL RATE: 159 BPM
EKG DIAGNOSIS: NORMAL
EKG P AXIS: 50 DEGREES
EKG P-R INTERVAL: 177 MS
EKG Q-T INTERVAL: 355 MS
EKG QRS DURATION: 97 MS
EKG QTC CALCULATION (BAZETT): 427 MS
EKG R AXIS: 14 DEGREES
EKG T AXIS: 19 DEGREES
EKG VENTRICULAR RATE: 87 BPM
EOSINOPHIL # BLD: 0 K/UL (ref 0–0.8)
EOSINOPHIL NFR BLD: 0 % (ref 0.5–7.8)
ERYTHROCYTE [DISTWIDTH] IN BLOOD BY AUTOMATED COUNT: 14.7 % (ref 11.9–14.6)
FLUAV RNA SPEC QL NAA+PROBE: DETECTED
FLUBV RNA SPEC QL NAA+PROBE: NOT DETECTED
GLUCOSE SERPL-MCNC: 127 MG/DL (ref 65–100)
HCT VFR BLD AUTO: 35.9 % (ref 35.8–46.3)
HGB BLD-MCNC: 11.6 G/DL (ref 11.7–15.4)
IMM GRANULOCYTES # BLD AUTO: 0 K/UL (ref 0–0.5)
IMM GRANULOCYTES NFR BLD AUTO: 1 % (ref 0–5)
LYMPHOCYTES # BLD: 0.3 K/UL (ref 0.5–4.6)
LYMPHOCYTES NFR BLD: 9 % (ref 13–44)
MCH RBC QN AUTO: 28.4 PG (ref 26.1–32.9)
MCHC RBC AUTO-ENTMCNC: 32.3 G/DL (ref 31.4–35)
MCV RBC AUTO: 88 FL (ref 82–102)
MONOCYTES # BLD: 1 K/UL (ref 0.1–1.3)
MONOCYTES NFR BLD: 26 % (ref 4–12)
NEUTS SEG # BLD: 2.5 K/UL (ref 1.7–8.2)
NEUTS SEG NFR BLD: 65 % (ref 43–78)
NRBC # BLD: 0 K/UL (ref 0–0.2)
NT PRO BNP: 2634 PG/ML (ref 0–450)
PLATELET # BLD AUTO: 149 K/UL (ref 150–450)
PMV BLD AUTO: 9.8 FL (ref 9.4–12.3)
POTASSIUM SERPL-SCNC: 3.5 MMOL/L (ref 3.5–5.1)
RBC # BLD AUTO: 4.08 M/UL (ref 4.05–5.2)
SARS-COV-2 RDRP RESP QL NAA+PROBE: NOT DETECTED
SODIUM SERPL-SCNC: 137 MMOL/L (ref 133–143)
SOURCE: NORMAL
TROPONIN T SERPL HS-MCNC: 19.5 NG/L (ref 0–14)
TROPONIN T SERPL HS-MCNC: 21.3 NG/L (ref 0–14)
WBC # BLD AUTO: 3.9 K/UL (ref 4.3–11.1)

## 2025-01-06 PROCEDURE — 6370000000 HC RX 637 (ALT 250 FOR IP): Performed by: INTERNAL MEDICINE

## 2025-01-06 PROCEDURE — 6370000000 HC RX 637 (ALT 250 FOR IP)

## 2025-01-06 PROCEDURE — 93005 ELECTROCARDIOGRAM TRACING: CPT | Performed by: EMERGENCY MEDICINE

## 2025-01-06 PROCEDURE — 6360000002 HC RX W HCPCS

## 2025-01-06 PROCEDURE — 2500000003 HC RX 250 WO HCPCS

## 2025-01-06 PROCEDURE — 87635 SARS-COV-2 COVID-19 AMP PRB: CPT

## 2025-01-06 PROCEDURE — 84484 ASSAY OF TROPONIN QUANT: CPT

## 2025-01-06 PROCEDURE — 94640 AIRWAY INHALATION TREATMENT: CPT

## 2025-01-06 PROCEDURE — 85025 COMPLETE CBC W/AUTO DIFF WBC: CPT

## 2025-01-06 PROCEDURE — G0378 HOSPITAL OBSERVATION PER HR: HCPCS

## 2025-01-06 PROCEDURE — 94762 N-INVAS EAR/PLS OXIMTRY CONT: CPT

## 2025-01-06 PROCEDURE — 71045 X-RAY EXAM CHEST 1 VIEW: CPT

## 2025-01-06 PROCEDURE — 99285 EMERGENCY DEPT VISIT HI MDM: CPT

## 2025-01-06 PROCEDURE — 80048 BASIC METABOLIC PNL TOTAL CA: CPT

## 2025-01-06 PROCEDURE — 83880 ASSAY OF NATRIURETIC PEPTIDE: CPT

## 2025-01-06 PROCEDURE — 2700000000 HC OXYGEN THERAPY PER DAY

## 2025-01-06 PROCEDURE — 87502 INFLUENZA DNA AMP PROBE: CPT

## 2025-01-06 PROCEDURE — 96374 THER/PROPH/DIAG INJ IV PUSH: CPT

## 2025-01-06 PROCEDURE — 94760 N-INVAS EAR/PLS OXIMETRY 1: CPT

## 2025-01-06 PROCEDURE — 93010 ELECTROCARDIOGRAM REPORT: CPT | Performed by: INTERNAL MEDICINE

## 2025-01-06 RX ORDER — POTASSIUM CHLORIDE 1500 MG/1
40 TABLET, EXTENDED RELEASE ORAL PRN
Status: DISCONTINUED | OUTPATIENT
Start: 2025-01-06 | End: 2025-01-09 | Stop reason: HOSPADM

## 2025-01-06 RX ORDER — HYDROCODONE BITARTRATE AND ACETAMINOPHEN 10; 325 MG/1; MG/1
1 TABLET ORAL EVERY 6 HOURS PRN
COMMUNITY

## 2025-01-06 RX ORDER — SODIUM CHLORIDE 9 MG/ML
INJECTION, SOLUTION INTRAVENOUS PRN
Status: DISCONTINUED | OUTPATIENT
Start: 2025-01-06 | End: 2025-01-09 | Stop reason: HOSPADM

## 2025-01-06 RX ORDER — ONDANSETRON 2 MG/ML
4 INJECTION INTRAMUSCULAR; INTRAVENOUS EVERY 6 HOURS PRN
Status: DISCONTINUED | OUTPATIENT
Start: 2025-01-06 | End: 2025-01-09 | Stop reason: HOSPADM

## 2025-01-06 RX ORDER — LISINOPRIL 5 MG/1
5 TABLET ORAL DAILY
Status: DISCONTINUED | OUTPATIENT
Start: 2025-01-07 | End: 2025-01-09 | Stop reason: HOSPADM

## 2025-01-06 RX ORDER — LORAZEPAM 0.5 MG/1
0.5 TABLET ORAL 2 TIMES DAILY
Status: DISCONTINUED | OUTPATIENT
Start: 2025-01-06 | End: 2025-01-09 | Stop reason: HOSPADM

## 2025-01-06 RX ORDER — ALBUTEROL SULFATE 90 UG/1
2 INHALANT RESPIRATORY (INHALATION) EVERY 4 HOURS PRN
Status: DISCONTINUED | OUTPATIENT
Start: 2025-01-06 | End: 2025-01-06

## 2025-01-06 RX ORDER — ONDANSETRON 4 MG/1
4 TABLET, ORALLY DISINTEGRATING ORAL EVERY 8 HOURS PRN
Status: DISCONTINUED | OUTPATIENT
Start: 2025-01-06 | End: 2025-01-09 | Stop reason: HOSPADM

## 2025-01-06 RX ORDER — POLYETHYLENE GLYCOL 3350 17 G/17G
17 POWDER, FOR SOLUTION ORAL DAILY PRN
Status: DISCONTINUED | OUTPATIENT
Start: 2025-01-06 | End: 2025-01-09 | Stop reason: HOSPADM

## 2025-01-06 RX ORDER — PANTOPRAZOLE SODIUM 40 MG/1
40 TABLET, DELAYED RELEASE ORAL 2 TIMES DAILY
Status: DISCONTINUED | OUTPATIENT
Start: 2025-01-06 | End: 2025-01-09 | Stop reason: HOSPADM

## 2025-01-06 RX ORDER — MONTELUKAST SODIUM 10 MG/1
10 TABLET ORAL DAILY
Status: DISCONTINUED | OUTPATIENT
Start: 2025-01-07 | End: 2025-01-09 | Stop reason: HOSPADM

## 2025-01-06 RX ORDER — SODIUM CHLORIDE 0.9 % (FLUSH) 0.9 %
5-40 SYRINGE (ML) INJECTION PRN
Status: DISCONTINUED | OUTPATIENT
Start: 2025-01-06 | End: 2025-01-09 | Stop reason: HOSPADM

## 2025-01-06 RX ORDER — ACETAMINOPHEN 325 MG/1
650 TABLET ORAL EVERY 6 HOURS PRN
Status: DISCONTINUED | OUTPATIENT
Start: 2025-01-06 | End: 2025-01-09 | Stop reason: HOSPADM

## 2025-01-06 RX ORDER — LATANOPROST 50 UG/ML
1 SOLUTION/ DROPS OPHTHALMIC
Status: DISCONTINUED | OUTPATIENT
Start: 2025-01-06 | End: 2025-01-09 | Stop reason: HOSPADM

## 2025-01-06 RX ORDER — ACETAMINOPHEN 650 MG/1
650 SUPPOSITORY RECTAL EVERY 6 HOURS PRN
Status: DISCONTINUED | OUTPATIENT
Start: 2025-01-06 | End: 2025-01-09 | Stop reason: HOSPADM

## 2025-01-06 RX ORDER — OSELTAMIVIR PHOSPHATE 75 MG/1
75 CAPSULE ORAL 2 TIMES DAILY
Status: DISCONTINUED | OUTPATIENT
Start: 2025-01-06 | End: 2025-01-09 | Stop reason: HOSPADM

## 2025-01-06 RX ORDER — FUROSEMIDE 10 MG/ML
20 INJECTION INTRAMUSCULAR; INTRAVENOUS DAILY
Status: DISCONTINUED | OUTPATIENT
Start: 2025-01-06 | End: 2025-01-08

## 2025-01-06 RX ORDER — VITAMIN B COMPLEX
1000 TABLET ORAL DAILY
Status: DISCONTINUED | OUTPATIENT
Start: 2025-01-07 | End: 2025-01-09 | Stop reason: HOSPADM

## 2025-01-06 RX ORDER — SODIUM CHLORIDE 0.9 % (FLUSH) 0.9 %
5-40 SYRINGE (ML) INJECTION EVERY 12 HOURS SCHEDULED
Status: DISCONTINUED | OUTPATIENT
Start: 2025-01-06 | End: 2025-01-09 | Stop reason: HOSPADM

## 2025-01-06 RX ORDER — ENOXAPARIN SODIUM 100 MG/ML
40 INJECTION SUBCUTANEOUS DAILY
Status: DISCONTINUED | OUTPATIENT
Start: 2025-01-06 | End: 2025-01-06

## 2025-01-06 RX ORDER — HYDROCODONE BITARTRATE AND ACETAMINOPHEN 10; 325 MG/1; MG/1
1 TABLET ORAL EVERY 6 HOURS PRN
Status: DISCONTINUED | OUTPATIENT
Start: 2025-01-06 | End: 2025-01-09 | Stop reason: HOSPADM

## 2025-01-06 RX ORDER — MAGNESIUM SULFATE IN WATER 40 MG/ML
2000 INJECTION, SOLUTION INTRAVENOUS PRN
Status: DISCONTINUED | OUTPATIENT
Start: 2025-01-06 | End: 2025-01-09 | Stop reason: HOSPADM

## 2025-01-06 RX ORDER — NEOMYCIN SULFATE, POLYMYXIN B SULFATE AND DEXAMETHASONE 3.5; 10000; 1 MG/ML; [USP'U]/ML; MG/ML
1 SUSPENSION/ DROPS OPHTHALMIC DAILY
Status: DISCONTINUED | OUTPATIENT
Start: 2025-01-06 | End: 2025-01-06

## 2025-01-06 RX ORDER — MULTIVIT WITH MINERALS/LUTEIN
500 TABLET ORAL DAILY
Status: DISCONTINUED | OUTPATIENT
Start: 2025-01-07 | End: 2025-01-09 | Stop reason: HOSPADM

## 2025-01-06 RX ORDER — CLOTRIMAZOLE AND BETAMETHASONE DIPROPIONATE 10; .64 MG/G; MG/G
CREAM TOPICAL 2 TIMES DAILY
Status: DISCONTINUED | OUTPATIENT
Start: 2025-01-06 | End: 2025-01-06

## 2025-01-06 RX ORDER — POTASSIUM CHLORIDE 7.45 MG/ML
10 INJECTION INTRAVENOUS PRN
Status: DISCONTINUED | OUTPATIENT
Start: 2025-01-06 | End: 2025-01-09 | Stop reason: HOSPADM

## 2025-01-06 RX ORDER — CALCIUM CARBONATE 500(1250)
1250 TABLET ORAL DAILY
Status: DISCONTINUED | OUTPATIENT
Start: 2025-01-07 | End: 2025-01-09 | Stop reason: HOSPADM

## 2025-01-06 RX ADMIN — HYDROCODONE BITARTRATE AND ACETAMINOPHEN 1 TABLET: 10; 325 TABLET ORAL at 20:35

## 2025-01-06 RX ADMIN — APIXABAN 2.5 MG: 2.5 TABLET, FILM COATED ORAL at 20:34

## 2025-01-06 RX ADMIN — FUROSEMIDE 20 MG: 10 INJECTION, SOLUTION INTRAMUSCULAR; INTRAVENOUS at 18:37

## 2025-01-06 RX ADMIN — PANTOPRAZOLE SODIUM 40 MG: 40 TABLET, DELAYED RELEASE ORAL at 20:34

## 2025-01-06 RX ADMIN — SODIUM CHLORIDE, PRESERVATIVE FREE 10 ML: 5 INJECTION INTRAVENOUS at 20:37

## 2025-01-06 RX ADMIN — ARFORMOTEROL TARTRATE: 15 SOLUTION RESPIRATORY (INHALATION) at 20:46

## 2025-01-06 RX ADMIN — Medication 6 MG: at 20:34

## 2025-01-06 RX ADMIN — LORAZEPAM 0.5 MG: 0.5 TABLET ORAL at 20:35

## 2025-01-06 RX ADMIN — OSELTAMIVIR PHOSPHATE 75 MG: 75 CAPSULE ORAL at 18:37

## 2025-01-06 ASSESSMENT — ENCOUNTER SYMPTOMS
RHINORRHEA: 0
COUGH: 1
VOMITING: 0
ABDOMINAL PAIN: 0
SORE THROAT: 0
BACK PAIN: 0
SINUS PRESSURE: 0
WHEEZING: 0
CHEST TIGHTNESS: 0
SHORTNESS OF BREATH: 0
NAUSEA: 0

## 2025-01-06 ASSESSMENT — PAIN DESCRIPTION - LOCATION: LOCATION: BACK

## 2025-01-06 ASSESSMENT — PAIN SCALES - GENERAL: PAINLEVEL_OUTOF10: 10

## 2025-01-06 ASSESSMENT — PAIN DESCRIPTION - ORIENTATION: ORIENTATION: LOWER;MID

## 2025-01-06 NOTE — H&P
Hospitalist History and Physical   Admit Date:  2025  5:08 PM   Name:  Fabby Chiu   Age:  79 y.o.  Sex:  female  :  1945   MRN:  [id number]   Room:  [facility]/    Presenting/Chief Complaint: No chief complaint on file.     Reason(s) for Admission: Influenza [J11.1]     History of Present Illness:   Fabby Chiu is a 79 y.o. female with medical history of chronic pain syndrome, bilateral pulmonary embolism and bilateral lower extremity DVT status post IVC filter and reduced dose, epistaxis, GERD, cerebellar dysfunction (uses motorized wheelchair), hypertension, hyperlipidemia, anxiety, and asthma who presents to the ED with shortness of breath.  Admitted for acute hypoxic respiratory failure secondary to influenza type A.    Assessment & Plan:     Influenza type A  Acute hypoxic respiratory failure 2/2 above  History of long COVID  -3 L nasal cannula on admission.  Baseline no oxygen  -Influenza A positive  -Chest x-ray with minimal interstitial edema  -Tamiflu 75 mg twice daily x 10 doses  -Patient does not ambulate?  Wheelchair-bound?    Elevated BNP  -BNP 2,634  -Echocardiography pending  -Lasix 20 mg IV daily  -Strict I's and O's    Cerebellar dysfunction  Cerebral atrophy  -Uses motorized wheelchair at home  -States she does not ambulate  -PT/OT to help with bed exercises    Chronic pain syndrome  Osteoporosis  -Zoledronic acid once yearly  -I do not see any chronic pain medication    Bilateral pulmonary embolism  Bilateral lower extremity DVT  -Follows with oncology outpatient  -Eliquis 2.5 mg twice daily    GERD  -Protonix 40 mg twice daily    Hypertension  -Lisinopril 5 mg daily    Hyperlipidemia  -Does not appear to take medication    Anxiety  -Takes Tranxene 3 times daily. Substituted for Ativan 0.5 mg twice daily.  -Zoloft 125 mg daily    Vitamin D deficiency  -Colecalciferol 1000 units daily    Asthma, not in exacerbation  Allergies  -Symbicort twice daily  -Singulair 10 mg  daily    History of epistaxis  -Noted  -Monitor while on Eliquis    PT/OT evals ordered?  Ordered to help with bed exercises  Diet: Regular  VTE prophylaxis: Already on anticoagulation  Code status: Full code  Code status discussed: Yes  Blood consent obtained: No      Non-peripheral Lines and Tubes (if present):           Hospital Problems:  Principal Problem:    Influenza  Active Problems:    Interstitial pulmonary disease, unspecified (HCC)    Abnormality of gait    Frequent falls    Moderate tricuspid regurgitation    Cervical stenosis of spinal canal    High cholesterol    Baker's cyst of knee, right    Acute deep vein thrombosis (DVT) of both lower extremities (HCC)    Back pain    Iron deficiency anemia    History of 2019 novel coronavirus disease (COVID-19)    Depression, major, single episode, moderate (HCC)    Asthma    Esophageal reflux    Debility    Essential hypertension    Moderate mitral valve regurgitation    Cerebellar atrophy (HCC)    Constipation    Acute blood loss anemia    Anxiety    DDD (degenerative disc disease), cervical    Acute on chronic diastolic (congestive) heart failure (HCC)    Osteoporosis  Resolved Problems:    * No resolved hospital problems. *      Objective:   Patient Vitals for the past 24 hrs:   Temp Pulse Resp BP SpO2   01/06/25 1723 98.8 °F (37.1 °C) 66 20 (!) 142/92 97 %       Oxygen Therapy  SpO2: 97 %  O2 Device: Nasal cannula  O2 Flow Rate (L/min): 3 L/min    Estimated body mass index is 29.29 kg/m² as calculated from the following:    Height as of 10/14/24: 1.499 m (4' 11\").    Weight as of 10/14/24: 65.8 kg (145 lb).  No intake or output data in the 24 hours ending 01/06/25 1801      Physical Exam:    General:    Well nourished.  3 L nasal cannula.  No acute distress.  Pleasant on exam  Head:  Normocephalic, atraumatic  Eyes:  Sclerae appear normal.  Pupils equally round.  ENT:  Nares appear normal.  Moist oral mucosa  Neck:  No restricted ROM.  Trachea midline   CV:    EKG 12 Lead    Collection Time: 01/06/25  1:14 PM   Result Value Ref Range    Ventricular Rate 87 BPM    Atrial Rate 159 BPM    P-R Interval 177 ms    QRS Duration 97 ms    Q-T Interval 355 ms    QTc Calculation (Bazett) 427 ms    P Axis 50 degrees    R Axis 14 degrees    T Axis 19 degrees    Diagnosis       Sinus rhythm  Atrial premature complexes    Confirmed by MD RODRIGUEZ DANIEL ([id number]) on 1/6/2025 1:50:58 PM     Troponin    Collection Time: 01/06/25  3:20 PM   Result Value Ref Range    Troponin T 19.5 (H) 0 - 14 ng/L       Recent Labs     01/06/25  1244   COVID19 Not detected       XR CHEST PORTABLE    Result Date: 1/6/2025  EXAMINATION: XR CHEST PORTABLE EXAM DATE: 1/6/2025 1:35 PM INDICATION: cough COMPARISON: May 2, 2022 TECHNIQUE:  Single AP view of the chest. FINDINGS: Mild interstitial edema present. No pleural effusion or pneumothorax. The cardiac silhouette is enlarged. No acute osseous or soft tissue abnormality. A left shoulder arthroplasty and right humeral plate and screws are noted.     1.  Mild interstitial edema. Electronically signed by Kim Garcia        Signed:  Kemal Emerson MD    Part of this note may have been written by using a voice dictation software.  The note has been proof read but may still contain some grammatical/other typographical errors.

## 2025-01-06 NOTE — ED NOTES
Report called to GABBY Beavers at [facility].  Pt remains on 3L/NC and becomes short of breath with any exertion.  # 22 jelco via the right forearm intact with some trauma to the site but appears to be intact and flushes well.  Awaiting transport

## 2025-01-06 NOTE — PROGRESS NOTES
TRANSFER - IN REPORT:    Verbal report received from GABBY Mcmullen on Fabby Chiu  being received from [facility] for routine progression of patient care      Report consisted of patient's Situation, Background, Assessment and   Recommendations(SBAR).     Information from the following report(s) Nurse Handoff Report, ED Encounter Summary, and ED SBAR was reviewed with the receiving nurse.    Opportunity for questions and clarification was provided.      Assessment completed upon patient's arrival to unit and care assumed.

## 2025-01-06 NOTE — ED PROVIDER NOTES
[employer] Emergency Department Provider Note                   PCP:                Ryan Angel MD               Age: 79 y.o.      Sex: female     MEDICAL DECISION MAKING  Complexity of Problems Addressed:   1 or more acute illness/injury that poses a threat to life or bodily function    Data Reviewed and Analyzed:  Category 1:    I have reviewed outside records from an external source for any pertinent PMH, ED visits, primary care visits, specialist visits, labs, EKG, and/or radiologic studies.    Category 2:     ED EKG Interpretation  EKG was interpreted in the absence of a cardiologist.    Rate: Rate: Normal  EKG Interpretation: Normal sinus rhythm with sinus arrhythmia  ST Segments: Nonspecific ST segments - NO STEMI      I independently ordered and reviewed the labs.    I have reviewed the Radiologist interpretation of the radiologic studies. My medical decision making regarding the radiologic studies is based on the interpretation report of the board certified Radiologist.      The patient was admitted and I have discussed patient management with the admitting provider.    Category 3:     Discussion of management or test interpretation:    MDM  Number of Diagnoses or Management Options  Hypoxemia  Influenza  Diagnosis management comments: Patient has a history of respiratory disease from long COVID.  She used to be on home oxygen but has been off of it for a year.  She has been having several days of cough and was noted to have hypoxia at home.  Patient's lungs were clear without any evidence of respiratory failure.  Patient was taken off of supplemental oxygen and her O2 level dropped to 85%.  She was placed back on 2 L nasal cannula and oxygen level improved to the mid 90s.  Her flu test is positive.  COVID test is negative.  Her chest x-ray shows some mild interstitial edema but no focal pneumonia.  Her CBC and BMP are unremarkable.  She does have a normal white count.  Her SIRS criteria is  on 1/6/2025 1:50:58 PM            XR CHEST PORTABLE   Final Result      1.  Mild interstitial edema.      Electronically signed by Kim Garcia                       ED Course as of 01/06/25 1506   Mon Jan 06, 2025   1420 Nursing took patient off of supplemental oxygen and her oxygen saturation dropped to 85% on room air.  They placed her back on 2 L nasal cannula and oxygen level in the low 90s. [CW]   1448 Patient is resting comfortably in bed with pulse ox of 94% on 2 L nasal cannula.  I updated the patient on results and plan.  She is agreeable to admission via transfer. [CW]      ED Course User Index  [CW] Jesus Freed MD       The diagnosis and plan as well as the results of any testing and treatments today in the department were communicated to the patient and/or their family/caregiver (if present).  The patient/caregiver verbalized understanding and realize that they are being admitted to the hospital for further evaluation and treatment.  All questions were answered.      ICD-10-CM    1. Influenza  J11.1       2. Hypoxemia  R09.02           DISPOSITION Decision To Transfer 01/06/2025 03:02:43 PM   DISPOSITION CONDITION Stable            Voice dictation software was used during the making of this note.  This software is not perfect and grammatical and other typographical errors may be present.  This note has not been completely proofread for errors.     Jesus Freed MD  01/06/25 7242

## 2025-01-06 NOTE — ED NOTES
Patient trial of room air showed a desat to 85%. Patient placed back on nasal cannula at a rate of 4L. Patient repeat o2 level 95%. MD aware.

## 2025-01-06 NOTE — ED NOTES
Pt presents to the ED for c/o decreased O2 sats this morning.  Roommate dx with the flu yesterday.  No other c/o's from the pt herself

## 2025-01-07 ENCOUNTER — APPOINTMENT (OUTPATIENT)
Dept: NON INVASIVE DIAGNOSTICS | Age: 80
DRG: 193 | End: 2025-01-07
Payer: MEDICARE

## 2025-01-07 LAB
ANION GAP SERPL CALC-SCNC: 9 MMOL/L (ref 7–16)
BASOPHILS # BLD: 0.01 K/UL (ref 0–0.2)
BASOPHILS NFR BLD: 0.3 % (ref 0–2)
BUN SERPL-MCNC: 11 MG/DL (ref 8–23)
CALCIUM SERPL-MCNC: 8.2 MG/DL (ref 8.8–10.2)
CHLORIDE SERPL-SCNC: 98 MMOL/L (ref 98–107)
CO2 SERPL-SCNC: 28 MMOL/L (ref 20–29)
CREAT SERPL-MCNC: 0.56 MG/DL (ref 0.6–1.1)
DIFFERENTIAL METHOD BLD: ABNORMAL
ECHO AV AREA PEAK VELOCITY: 1.8 CM2
ECHO AV AREA VTI: 2.4 CM2
ECHO AV AREA/BSA PEAK VELOCITY: 1.1 CM2/M2
ECHO AV AREA/BSA VTI: 1.5 CM2/M2
ECHO AV MEAN GRADIENT: 5 MMHG
ECHO AV MEAN VELOCITY: 1 M/S
ECHO AV PEAK GRADIENT: 11 MMHG
ECHO AV PEAK VELOCITY: 1.7 M/S
ECHO AV VELOCITY RATIO: 0.53
ECHO AV VTI: 25.6 CM
ECHO BSA: 1.7 M2
ECHO LA AREA 2C: 24.1 CM2
ECHO LA AREA 4C: 21.3 CM2
ECHO LA MAJOR AXIS: 6.2 CM
ECHO LA MINOR AXIS: 6.2 CM
ECHO LA VOL BP: 66 ML (ref 22–52)
ECHO LA VOL MOD A2C: 74 ML (ref 22–52)
ECHO LA VOL MOD A4C: 58 ML (ref 22–52)
ECHO LA VOL/BSA BIPLANE: 40 ML/M2 (ref 16–34)
ECHO LA VOLUME INDEX MOD A2C: 45 ML/M2 (ref 16–34)
ECHO LA VOLUME INDEX MOD A4C: 36 ML/M2 (ref 16–34)
ECHO LV E' LATERAL VELOCITY: 8.05 CM/S
ECHO LV E' SEPTAL VELOCITY: 8.53 CM/S
ECHO LV EF PHYSICIAN: 55 %
ECHO LV FRACTIONAL SHORTENING: 26 % (ref 28–44)
ECHO LV INTERNAL DIMENSION DIASTOLE INDEX: 2.15 CM/M2
ECHO LV INTERNAL DIMENSION DIASTOLIC: 3.5 CM (ref 3.9–5.3)
ECHO LV INTERNAL DIMENSION SYSTOLIC INDEX: 1.6 CM/M2
ECHO LV INTERNAL DIMENSION SYSTOLIC: 2.6 CM
ECHO LV IVSD: 0.8 CM (ref 0.6–0.9)
ECHO LV MASS 2D: 81.9 G (ref 67–162)
ECHO LV MASS INDEX 2D: 50.3 G/M2 (ref 43–95)
ECHO LV POSTERIOR WALL DIASTOLIC: 0.9 CM (ref 0.6–0.9)
ECHO LV RELATIVE WALL THICKNESS RATIO: 0.51
ECHO LVOT AREA: 3.1 CM2
ECHO LVOT AV VTI INDEX: 0.75
ECHO LVOT DIAM: 2 CM
ECHO LVOT MEAN GRADIENT: 2 MMHG
ECHO LVOT PEAK GRADIENT: 3 MMHG
ECHO LVOT PEAK VELOCITY: 0.9 M/S
ECHO LVOT STROKE VOLUME INDEX: 37.2 ML/M2
ECHO LVOT SV: 60.6 ML
ECHO LVOT VTI: 19.3 CM
ECHO MV A VELOCITY: 1.13 M/S
ECHO MV E DECELERATION TIME (DT): 263 MS
ECHO MV E VELOCITY: 0.92 M/S
ECHO MV E/A RATIO: 0.81
ECHO MV E/E' LATERAL: 11.43
ECHO MV E/E' RATIO (AVERAGED): 11.11
ECHO MV E/E' SEPTAL: 10.79
ECHO PV MAX VELOCITY: 1 M/S
ECHO PV PEAK GRADIENT: 4 MMHG
ECHO RV TAPSE: 2.2 CM (ref 1.7–?)
EOSINOPHIL # BLD: 0 K/UL (ref 0–0.8)
EOSINOPHIL NFR BLD: 0 % (ref 0.5–7.8)
ERYTHROCYTE [DISTWIDTH] IN BLOOD BY AUTOMATED COUNT: 14.8 % (ref 11.9–14.6)
GLUCOSE BLD STRIP.AUTO-MCNC: 121 MG/DL (ref 65–100)
GLUCOSE SERPL-MCNC: 98 MG/DL (ref 70–99)
HCT VFR BLD AUTO: 36.2 % (ref 35.8–46.3)
HGB BLD-MCNC: 11.3 G/DL (ref 11.7–15.4)
IMM GRANULOCYTES # BLD AUTO: 0.01 K/UL (ref 0–0.5)
IMM GRANULOCYTES NFR BLD AUTO: 0.3 % (ref 0–5)
LYMPHOCYTES # BLD: 0.78 K/UL (ref 0.5–4.6)
LYMPHOCYTES NFR BLD: 20.7 % (ref 13–44)
MCH RBC QN AUTO: 27.9 PG (ref 26.1–32.9)
MCHC RBC AUTO-ENTMCNC: 31.2 G/DL (ref 31.4–35)
MCV RBC AUTO: 89.4 FL (ref 82–102)
MONOCYTES # BLD: 1.29 K/UL (ref 0.1–1.3)
MONOCYTES NFR BLD: 34.3 % (ref 4–12)
NEUTS SEG # BLD: 1.67 K/UL (ref 1.7–8.2)
NEUTS SEG NFR BLD: 44.4 % (ref 43–78)
NRBC # BLD: 0 K/UL (ref 0–0.2)
PLATELET # BLD AUTO: 138 K/UL (ref 150–450)
PMV BLD AUTO: 9.9 FL (ref 9.4–12.3)
POTASSIUM SERPL-SCNC: 4.1 MMOL/L (ref 3.5–5.1)
RBC # BLD AUTO: 4.05 M/UL (ref 4.05–5.2)
SERVICE CMNT-IMP: ABNORMAL
SODIUM SERPL-SCNC: 135 MMOL/L (ref 136–145)
WBC # BLD AUTO: 3.8 K/UL (ref 4.3–11.1)

## 2025-01-07 PROCEDURE — 2700000000 HC OXYGEN THERAPY PER DAY

## 2025-01-07 PROCEDURE — 6360000002 HC RX W HCPCS

## 2025-01-07 PROCEDURE — 93308 TTE F-UP OR LMTD: CPT

## 2025-01-07 PROCEDURE — 93321 DOPPLER ECHO F-UP/LMTD STD: CPT | Performed by: INTERNAL MEDICINE

## 2025-01-07 PROCEDURE — 93308 TTE F-UP OR LMTD: CPT | Performed by: INTERNAL MEDICINE

## 2025-01-07 PROCEDURE — 97165 OT EVAL LOW COMPLEX 30 MIN: CPT

## 2025-01-07 PROCEDURE — 51798 US URINE CAPACITY MEASURE: CPT

## 2025-01-07 PROCEDURE — 80048 BASIC METABOLIC PNL TOTAL CA: CPT

## 2025-01-07 PROCEDURE — G0378 HOSPITAL OBSERVATION PER HR: HCPCS

## 2025-01-07 PROCEDURE — 96376 TX/PRO/DX INJ SAME DRUG ADON: CPT

## 2025-01-07 PROCEDURE — 85025 COMPLETE CBC W/AUTO DIFF WBC: CPT

## 2025-01-07 PROCEDURE — 6370000000 HC RX 637 (ALT 250 FOR IP)

## 2025-01-07 PROCEDURE — 2500000003 HC RX 250 WO HCPCS

## 2025-01-07 PROCEDURE — 94640 AIRWAY INHALATION TREATMENT: CPT

## 2025-01-07 PROCEDURE — 6370000000 HC RX 637 (ALT 250 FOR IP): Performed by: INTERNAL MEDICINE

## 2025-01-07 PROCEDURE — 97530 THERAPEUTIC ACTIVITIES: CPT

## 2025-01-07 PROCEDURE — 82962 GLUCOSE BLOOD TEST: CPT

## 2025-01-07 PROCEDURE — 94760 N-INVAS EAR/PLS OXIMETRY 1: CPT

## 2025-01-07 PROCEDURE — 97161 PT EVAL LOW COMPLEX 20 MIN: CPT

## 2025-01-07 PROCEDURE — 94761 N-INVAS EAR/PLS OXIMETRY MLT: CPT

## 2025-01-07 PROCEDURE — 36415 COLL VENOUS BLD VENIPUNCTURE: CPT

## 2025-01-07 PROCEDURE — 93325 DOPPLER ECHO COLOR FLOW MAPG: CPT | Performed by: INTERNAL MEDICINE

## 2025-01-07 RX ORDER — HYDROCODONE BITARTRATE AND HOMATROPINE METHYLBROMIDE ORAL SOLUTION 5; 1.5 MG/5ML; MG/5ML
5 LIQUID ORAL EVERY 4 HOURS PRN
Status: DISCONTINUED | OUTPATIENT
Start: 2025-01-07 | End: 2025-01-09 | Stop reason: HOSPADM

## 2025-01-07 RX ADMIN — HYDROCODONE BITARTRATE AND ACETAMINOPHEN 1 TABLET: 10; 325 TABLET ORAL at 20:47

## 2025-01-07 RX ADMIN — PANTOPRAZOLE SODIUM 40 MG: 40 TABLET, DELAYED RELEASE ORAL at 20:48

## 2025-01-07 RX ADMIN — SERTRALINE 125 MG: 100 TABLET, FILM COATED ORAL at 09:13

## 2025-01-07 RX ADMIN — Medication 6 MG: at 20:48

## 2025-01-07 RX ADMIN — HYDROCODONE BITARTRATE AND ACETAMINOPHEN 1 TABLET: 10; 325 TABLET ORAL at 10:02

## 2025-01-07 RX ADMIN — MONTELUKAST 10 MG: 10 TABLET, FILM COATED ORAL at 09:14

## 2025-01-07 RX ADMIN — FUROSEMIDE 20 MG: 10 INJECTION, SOLUTION INTRAMUSCULAR; INTRAVENOUS at 09:15

## 2025-01-07 RX ADMIN — HYDROCODONE BITARTRATE AND HOMATROPINE METHYLBROMIDE 5 ML: 5; 1.5 SOLUTION ORAL at 16:33

## 2025-01-07 RX ADMIN — CALCIUM 1250 MG: 500 TABLET ORAL at 09:14

## 2025-01-07 RX ADMIN — APIXABAN 2.5 MG: 2.5 TABLET, FILM COATED ORAL at 09:13

## 2025-01-07 RX ADMIN — OSELTAMIVIR PHOSPHATE 75 MG: 75 CAPSULE ORAL at 09:15

## 2025-01-07 RX ADMIN — ARFORMOTEROL TARTRATE: 15 SOLUTION RESPIRATORY (INHALATION) at 07:52

## 2025-01-07 RX ADMIN — LORAZEPAM 0.5 MG: 0.5 TABLET ORAL at 09:14

## 2025-01-07 RX ADMIN — OSELTAMIVIR PHOSPHATE 75 MG: 75 CAPSULE ORAL at 20:48

## 2025-01-07 RX ADMIN — APIXABAN 2.5 MG: 2.5 TABLET, FILM COATED ORAL at 20:48

## 2025-01-07 RX ADMIN — SODIUM CHLORIDE, PRESERVATIVE FREE 10 ML: 5 INJECTION INTRAVENOUS at 09:16

## 2025-01-07 RX ADMIN — VITAMIN D, TAB 1000IU (100/BT) 1000 UNITS: 25 TAB at 09:15

## 2025-01-07 RX ADMIN — Medication 500 MG: at 09:14

## 2025-01-07 RX ADMIN — LORAZEPAM 0.5 MG: 0.5 TABLET ORAL at 20:48

## 2025-01-07 RX ADMIN — ARFORMOTEROL TARTRATE: 15 SOLUTION RESPIRATORY (INHALATION) at 20:04

## 2025-01-07 RX ADMIN — LISINOPRIL 5 MG: 5 TABLET ORAL at 09:15

## 2025-01-07 RX ADMIN — SODIUM CHLORIDE, PRESERVATIVE FREE 10 ML: 5 INJECTION INTRAVENOUS at 20:51

## 2025-01-07 RX ADMIN — PANTOPRAZOLE SODIUM 40 MG: 40 TABLET, DELAYED RELEASE ORAL at 09:14

## 2025-01-07 ASSESSMENT — PAIN DESCRIPTION - LOCATION
LOCATION: BACK;GENERALIZED
LOCATION: BACK;LEG

## 2025-01-07 ASSESSMENT — PAIN - FUNCTIONAL ASSESSMENT: PAIN_FUNCTIONAL_ASSESSMENT: PREVENTS OR INTERFERES SOME ACTIVE ACTIVITIES AND ADLS

## 2025-01-07 ASSESSMENT — PAIN SCALES - GENERAL
PAINLEVEL_OUTOF10: 4
PAINLEVEL_OUTOF10: 8

## 2025-01-07 ASSESSMENT — PAIN DESCRIPTION - ORIENTATION: ORIENTATION: LOWER;RIGHT;LEFT

## 2025-01-07 NOTE — PROGRESS NOTES
ACUTE PHYSICAL THERAPY GOALS:   (Developed with and agreed upon by patient and/or caregiver.)   Patient will demonstrate supine <> sit transitions with modified independence within 1 treatment day.  -GOAL MET 1/7/25   Patient will demonstrate stand pivot transfers between the bed and a chair with STANDBY ASSIST within 1 treatment day.  -GOAL MET 1/7/25     PHYSICAL THERAPY Initial Assessment and Discharge  (Link to Caseload Tracking: PT Visit Days : 1  Acknowledge Orders  Time In/Out  PT Charge Capture  Rehab Caseload Tracker    Fabby Chiu is a 79 y.o. female   PRIMARY DIAGNOSIS: Influenza  Influenza [J11.1]       Reason for Referral: Generalized Muscle Weakness (M62.81)  Observation: Payor: MEDICARE / Plan: MEDICARE PART A AND B / Product Type: *No Product type* /     ASSESSMENT:     REHAB RECOMMENDATIONS:   Recommendation to date pending progress:  Setting:  No further skilled physical therapy after discharge from hospital    Equipment:    None     ASSESSMENT:  Ms. Chiu admitted with above diagnosis.  She presented to the ER because she noted her sats were low when checking them at home and she had a recent flu exposure.  Patient has cerebral atrophy and uses a w/c as her primary means of mobility.  She has a power chair she uses in the home and manual w/c outside the home.  She is able to perform her own transfers in and out of the chair at home.  Her chair does not fit in the bathroom so she uses the counter for support to transfer to the toilet.  PT consulted for bed exercises.  Patient, however, is mobile and able to demonstrate her transfers between the bed and a chair without assist.  Frequent transfers in/out of the bed to a chair or BSC would be much more beneficial and functional than bed exercises.  Feel it would be best for external catheter to be removed to encourage the need for these transfers.  Patient does not have any need for skilled therapy services at this time, she just needs

## 2025-01-07 NOTE — PROGRESS NOTES
Hospitalist Progress Note   Admit Date:  2025  5:08 PM   Name:  Fabby Chiu   Age:  79 y.o.  Sex:  female  :  1945   MRN:  [id number]   Room:  [facility]/    Presenting/Chief Complaint: No chief complaint on file.     Reason(s) for Admission: Influenza [J11.1]     Hospital Course:   Fabby Chiu is a 79 y.o. female with medical history of chronic pain syndrome, bilateral pulmonary embolism and bilateral lower extremity DVT status post IVC filter and reduced dose, epistaxis, GERD, cerebellar dysfunction (uses motorized wheelchair), hypertension, hyperlipidemia, anxiety, and asthma who presents to the ED with shortness of breath.  Admitted for acute hypoxic respiratory failure secondary to influenza type A.       Subjective & 24hr Events:   No overnight events reported. Afebrile overnight. On 1L NC. Denied any complaints.       Assessment & Plan:     Influenza type A  Acute hypoxic respiratory failure 2/2 above  History of long COVID  -3 L nasal cannula on admission.  Baseline no oxygen  -Influenza A positive  -Tamiflu 75 mg twice daily x 10 doses  -uses only wheelchair / scooter at home to mobilize   -plan for walk test in AM to determine any O2 needs     Pulmonary edema  Elevated BNP  -BNP 2,634  -Chest x-ray with minimal interstitial edema  -Echocardiography pending  -Lasix 20 mg IV daily while inpatient  -record I&Os     Cerebellar dysfunction  Cerebral atrophy  -Uses motorized wheelchair at home  -States she does not ambulate  -PT/OT to help with bed exercises     Osteoporosis  -Zoledronic acid once yearly  -on calcium tabs     Bilateral pulmonary embolism  Bilateral lower extremity DVT  -Follows with oncology outpatient  -Eliquis 2.5 mg twice daily     GERD  -Protonix 40 mg twice daily     Hypertension  -Lisinopril 5 mg daily     Hyperlipidemia  -not on a statin outpatient     Anxiety  -Takes Tranxene 3 times daily. Substituted for Ativan 0.5 mg twice daily.  -Zoloft 125 mg daily

## 2025-01-07 NOTE — PLAN OF CARE
Problem: Chronic Conditions and Co-morbidities  Goal: Patient's chronic conditions and co-morbidity symptoms are monitored and maintained or improved  1/7/2025 0233 by Noe Faust RN  Outcome: Progressing     Problem: Discharge Planning  Goal: Discharge to home or other facility with appropriate resources  1/7/2025 0233 by Noe Faust, RN  Outcome: Progressing     Problem: Safety - Adult  Goal: Free from fall injury  1/7/2025 0233 by Noe Faust, RN  Outcome: Progressing

## 2025-01-07 NOTE — PROGRESS NOTES
ACUTE OCCUPATIONAL THERAPY GOALS: -GOAL MET 1/7/25    (Developed with and agreed upon by patient and/or caregiver.)  Pt will be modified (I) to SBA with ADL's and in room transfers.      OCCUPATIONAL THERAPY Initial Assessment, Daily Note, Discharge, and AM       OT Visit Days: 1  Acknowledge Orders  Time  OT Charge Capture  Rehab Caseload Tracker      Fabby Chiu is a 79 y.o. female   PRIMARY DIAGNOSIS: Influenza  Influenza [J11.1]       Reason for Referral: Generalized Muscle Weakness (M62.81)  Observation: Payor: MEDICARE / Plan: MEDICARE PART A AND B / Product Type: *No Product type* /     ASSESSMENT:     REHAB RECOMMENDATIONS:   Recommendation to date pending progress:  Setting:  No further skilled occupational therapy after discharge from hospital    Equipment:    None     ASSESSMENT:  Ms. Chiu was admitted with the above diagnosis. She presented to the emergency room after noticing low oxygen saturation levels while checking them at home and following a recent exposure to the flu. The patient has cerebral atrophy and primarily uses a wheelchair for mobility. She utilizes a power wheelchair indoors and a manual wheelchair when outside. She is capable of independently transferring in and out of her wheelchair and attending to her own self care needs at home. Due to her chair not fitting in the bathroom, she uses the counter for support during transfers to the toilet. Pt is noted today supine in bed and is able to transfer out of bed to recliner with stand by assistance. Pt moves well and would benefit from getting in and out of bed as needed for toileting and activities. She is functioning at baseline and would be best served to discharge home with roommate. No further OT warranted     [facility] AM-PAC™ “6 Clicks” Daily Activity Inpatient Short Form:                SUBJECTIVE:     Ms. Chiu states, \"my roommate will help if I need it\"     Social/Functional Lives With:  (roommate)  Type  ADLs:              Upper Body Bathing  [] [] [] [] [] [] [] [] [] []     Lower Body Bathing [] [] [] [] [] [] [] [] [] []     Toileting [] [] [] [] [] [] [] [] [] []    Upper Body Dressing [] [] [] [] [] [] [] [] [] []    Lower Body Dressing [] [] [] [] [] [] [] [] [] []    Feeding [x] [] [] [] [] [] [] [] [] []    Grooming [] [] [] [] [] [] [] [] [] []    Personal Device Care [] [] [] [] [] [] [] [] [] []    Functional Mobility [] [] [] [x] [] [] [] [] [] []    I=Independent, Mod I=Modified Independent, S=Supervision/Setup, SBA=Standby Assistance, CGA=Contact Guard Assistance, Min=Minimal Assistance, Mod=Moderate Assistance, Max=Maximal Assistance, Total=Total Assistance, NT=Not Tested    PLAN:   FREQUENCY/DURATION   OT Plan of Care:  (no further OT after today) for duration of hospital stay or until stated goals are met, whichever comes first.    PROBLEM LIST:   (Skilled intervention is medically necessary to address:)  Decreased ADL/Functional Activities  Decreased Activity Tolerance  Decreased Strength   INTERVENTIONS PLANNED:  (Benefits and precautions of occupational therapy have been discussed with the patient.)  Self Care Training  Therapeutic Activity  Education         TREATMENT:     EVALUATION: LOW COMPLEXITY: (Untimed Charge)  The initial evaluation charge encompasses clinical chart review, objective assessment, interpretation of assessment, and skilled monitoring of the patient's response to treatment in order to develop a plan of care.     TREATMENT:   Therapeutic Activity (10 Minutes): Patient participated in therapeutic activities including functional transfer training with minimal verbal cues in order to prepare for discharge home.       AFTER TREATMENT PRECAUTIONS: Bed, Bed/Chair Locked, Call light within reach, Needs within reach, and RN notified    INTERDISCIPLINARY COLLABORATION:  RN/ PCT, PT/ PTA, and OT/ GLASS    EDUCATION:  Education Given To: Patient  Education Provided: Role of Therapy;Plan

## 2025-01-07 NOTE — CARE COORDINATION
ZACK ESQUIVEL completed chart review and met with pt at bedside for initial CM assessment. Pt admitted with influenza. Pt alert and oriented x 4. Demographics, insurance, and PCP discussed and verified. Last PCP visit 9/25/24 with Dr. Angel. Pt lives with friend in one level home with ramped entrance. Pt independent with mobility, transfers, bathing, and dressing and requires assistance with cooking, cleaning, and transportation PTA. Pt utilizes motorized wheelchair inside and manual outside at baseline. Roommate provides transportation, cooks, cleans, and shops for pt. Additional DME of shower chair in home. Pt reports home is wheelchair accessible throughout. Pt with history of HH/STR. Pt with local supportive family and roommate. Family or roommate plan to provide transportation at discharge.     PT/OT recommending no needs at discharge.    CM to follow 02 stats for potential home 02.     DC/POC home with family when medically stable.     CM team to continue to follow for any needs that may arise.      01/07/25 1208   Service Assessment   Patient Orientation Alert and Oriented;Person;Situation;Place;Self   Cognition Alert   History Provided By Patient;Medical Record   Primary Caregiver Self   Accompanied By/Relationship N/A   Support Systems Family Members;Children;Friends/Neighbors   Patient's Healthcare Decision Maker is: Legal Next of Kin  (two adult children)   PCP Verified by CM Yes  (Dr. Ryan Angel)   Last Visit to PCP Within last 6 months  (9/25/24)   Prior Functional Level Assistance with the following:;Cooking;Shopping;Housework   Current Functional Level Cooking;Housework;Shopping;Assistance with the following:   Can patient return to prior living arrangement Yes   Ability to make needs known: Good   Family able to assist with home care needs: Yes   Would you like for me to discuss the discharge plan with any other family members/significant others, and if so, who? No   Financial Resources Medicare;

## 2025-01-07 NOTE — ACP (ADVANCE CARE PLANNING)
Advance Care Planning   General Advance Care Planning (ACP) Conversation    Date of Conversation: 1/6/2025  Conducted with: Patient with Decision Making Capacity  Other persons present: None    Healthcare Decision Maker:    Primary Decision Maker: Mireille Adams - Child - [phone]    Secondary Decision Maker: AramRyan . - Child - [phone]    Today we documented Decision Maker(s) consistent with Legal Next of Kin hierarchy.  Content/Action Overview:  No LW HCPOA  on file. Two adult children legal NOK unless documentation provided.      Length of Voluntary ACP Conversation in minutes:  <16 minutes (Non-Billable)    JAMAL STRATTON

## 2025-01-08 PROBLEM — J96.01 ACUTE HYPOXIC RESPIRATORY FAILURE: Status: ACTIVE | Noted: 2025-01-08

## 2025-01-08 LAB
ANION GAP SERPL CALC-SCNC: 11 MMOL/L (ref 7–16)
BASOPHILS # BLD: 0.02 K/UL (ref 0–0.2)
BASOPHILS NFR BLD: 0.5 % (ref 0–2)
BUN SERPL-MCNC: 13 MG/DL (ref 8–23)
CALCIUM SERPL-MCNC: 8.7 MG/DL (ref 8.8–10.2)
CHLORIDE SERPL-SCNC: 97 MMOL/L (ref 98–107)
CO2 SERPL-SCNC: 31 MMOL/L (ref 20–29)
CREAT SERPL-MCNC: 0.51 MG/DL (ref 0.6–1.1)
DIFFERENTIAL METHOD BLD: ABNORMAL
EOSINOPHIL # BLD: 0.01 K/UL (ref 0–0.8)
EOSINOPHIL NFR BLD: 0.3 % (ref 0.5–7.8)
ERYTHROCYTE [DISTWIDTH] IN BLOOD BY AUTOMATED COUNT: 14.4 % (ref 11.9–14.6)
GLUCOSE SERPL-MCNC: 94 MG/DL (ref 70–99)
HCT VFR BLD AUTO: 41 % (ref 35.8–46.3)
HGB BLD-MCNC: 12.8 G/DL (ref 11.7–15.4)
IMM GRANULOCYTES # BLD AUTO: 0.02 K/UL (ref 0–0.5)
IMM GRANULOCYTES NFR BLD AUTO: 0.5 % (ref 0–5)
LYMPHOCYTES # BLD: 0.82 K/UL (ref 0.5–4.6)
LYMPHOCYTES NFR BLD: 22.1 % (ref 13–44)
MAGNESIUM SERPL-MCNC: 1.8 MG/DL (ref 1.8–2.4)
MCH RBC QN AUTO: 27.5 PG (ref 26.1–32.9)
MCHC RBC AUTO-ENTMCNC: 31.2 G/DL (ref 31.4–35)
MCV RBC AUTO: 88.2 FL (ref 82–102)
MONOCYTES # BLD: 0.85 K/UL (ref 0.1–1.3)
MONOCYTES NFR BLD: 22.9 % (ref 4–12)
NEUTS SEG # BLD: 1.99 K/UL (ref 1.7–8.2)
NEUTS SEG NFR BLD: 53.7 % (ref 43–78)
NRBC # BLD: 0 K/UL (ref 0–0.2)
PLATELET # BLD AUTO: 143 K/UL (ref 150–450)
PMV BLD AUTO: 10.1 FL (ref 9.4–12.3)
POTASSIUM SERPL-SCNC: 3.4 MMOL/L (ref 3.5–5.1)
RBC # BLD AUTO: 4.65 M/UL (ref 4.05–5.2)
SODIUM SERPL-SCNC: 139 MMOL/L (ref 136–145)
WBC # BLD AUTO: 3.7 K/UL (ref 4.3–11.1)

## 2025-01-08 PROCEDURE — 6370000000 HC RX 637 (ALT 250 FOR IP): Performed by: INTERNAL MEDICINE

## 2025-01-08 PROCEDURE — 2500000003 HC RX 250 WO HCPCS

## 2025-01-08 PROCEDURE — 6370000000 HC RX 637 (ALT 250 FOR IP)

## 2025-01-08 PROCEDURE — 80048 BASIC METABOLIC PNL TOTAL CA: CPT

## 2025-01-08 PROCEDURE — 6360000002 HC RX W HCPCS

## 2025-01-08 PROCEDURE — 94761 N-INVAS EAR/PLS OXIMETRY MLT: CPT

## 2025-01-08 PROCEDURE — 36415 COLL VENOUS BLD VENIPUNCTURE: CPT

## 2025-01-08 PROCEDURE — 94640 AIRWAY INHALATION TREATMENT: CPT

## 2025-01-08 PROCEDURE — 2700000000 HC OXYGEN THERAPY PER DAY

## 2025-01-08 PROCEDURE — 83735 ASSAY OF MAGNESIUM: CPT

## 2025-01-08 PROCEDURE — 85025 COMPLETE CBC W/AUTO DIFF WBC: CPT

## 2025-01-08 PROCEDURE — 1100000000 HC RM PRIVATE

## 2025-01-08 PROCEDURE — 96375 TX/PRO/DX INJ NEW DRUG ADDON: CPT

## 2025-01-08 RX ADMIN — LISINOPRIL 5 MG: 5 TABLET ORAL at 08:11

## 2025-01-08 RX ADMIN — HYDROCODONE BITARTRATE AND HOMATROPINE METHYLBROMIDE 5 ML: 5; 1.5 SOLUTION ORAL at 21:09

## 2025-01-08 RX ADMIN — OSELTAMIVIR PHOSPHATE 75 MG: 75 CAPSULE ORAL at 08:11

## 2025-01-08 RX ADMIN — PANTOPRAZOLE SODIUM 40 MG: 40 TABLET, DELAYED RELEASE ORAL at 21:09

## 2025-01-08 RX ADMIN — WATER 40 MG: 1 INJECTION INTRAMUSCULAR; INTRAVENOUS; SUBCUTANEOUS at 21:23

## 2025-01-08 RX ADMIN — APIXABAN 2.5 MG: 2.5 TABLET, FILM COATED ORAL at 21:09

## 2025-01-08 RX ADMIN — HYDROCODONE BITARTRATE AND ACETAMINOPHEN 1 TABLET: 10; 325 TABLET ORAL at 10:15

## 2025-01-08 RX ADMIN — ARFORMOTEROL TARTRATE: 15 SOLUTION RESPIRATORY (INHALATION) at 20:21

## 2025-01-08 RX ADMIN — Medication 6 MG: at 21:09

## 2025-01-08 RX ADMIN — SODIUM CHLORIDE, PRESERVATIVE FREE 10 ML: 5 INJECTION INTRAVENOUS at 08:12

## 2025-01-08 RX ADMIN — OSELTAMIVIR PHOSPHATE 75 MG: 75 CAPSULE ORAL at 21:09

## 2025-01-08 RX ADMIN — ARFORMOTEROL TARTRATE: 15 SOLUTION RESPIRATORY (INHALATION) at 07:38

## 2025-01-08 RX ADMIN — VITAMIN D, TAB 1000IU (100/BT) 1000 UNITS: 25 TAB at 08:11

## 2025-01-08 RX ADMIN — MONTELUKAST 10 MG: 10 TABLET, FILM COATED ORAL at 08:10

## 2025-01-08 RX ADMIN — LORAZEPAM 0.5 MG: 0.5 TABLET ORAL at 08:11

## 2025-01-08 RX ADMIN — PANTOPRAZOLE SODIUM 40 MG: 40 TABLET, DELAYED RELEASE ORAL at 08:10

## 2025-01-08 RX ADMIN — LORAZEPAM 0.5 MG: 0.5 TABLET ORAL at 21:09

## 2025-01-08 RX ADMIN — APIXABAN 2.5 MG: 2.5 TABLET, FILM COATED ORAL at 08:10

## 2025-01-08 RX ADMIN — CALCIUM 1250 MG: 500 TABLET ORAL at 08:09

## 2025-01-08 RX ADMIN — SODIUM CHLORIDE, PRESERVATIVE FREE 10 ML: 5 INJECTION INTRAVENOUS at 21:24

## 2025-01-08 RX ADMIN — Medication 500 MG: at 08:10

## 2025-01-08 RX ADMIN — HYDROCODONE BITARTRATE AND HOMATROPINE METHYLBROMIDE 5 ML: 5; 1.5 SOLUTION ORAL at 04:33

## 2025-01-08 RX ADMIN — WATER 40 MG: 1 INJECTION INTRAMUSCULAR; INTRAVENOUS; SUBCUTANEOUS at 10:16

## 2025-01-08 RX ADMIN — HYDROCODONE BITARTRATE AND ACETAMINOPHEN 1 TABLET: 10; 325 TABLET ORAL at 21:15

## 2025-01-08 RX ADMIN — SERTRALINE 125 MG: 100 TABLET, FILM COATED ORAL at 08:11

## 2025-01-08 ASSESSMENT — PAIN DESCRIPTION - LOCATION
LOCATION: BACK
LOCATION: BACK

## 2025-01-08 ASSESSMENT — PAIN SCALES - GENERAL
PAINLEVEL_OUTOF10: 8
PAINLEVEL_OUTOF10: 7

## 2025-01-08 ASSESSMENT — PAIN - FUNCTIONAL ASSESSMENT: PAIN_FUNCTIONAL_ASSESSMENT: PREVENTS OR INTERFERES WITH MANY ACTIVE NOT PASSIVE ACTIVITIES

## 2025-01-08 ASSESSMENT — PAIN DESCRIPTION - ORIENTATION: ORIENTATION: MID;LOWER

## 2025-01-08 NOTE — CARE COORDINATION
Pt chart reviewed and discussed in AM IDR for continued stay. LOS 2 days. Pt admitted with influenza. Per MD, started on steriods; wheezing; 1L/Min 02 NC; walk-test completed and qualifies for home 02. Potential discharge tomorrow morning.     CM sent referral to [facility] for home 02.     DC/POC to return home with friends when medically stable.     CM team will continue to follow for potential discharge needs that may arise.       Tere Ndiaye, MSW, LBSW    [facility]

## 2025-01-08 NOTE — PROGRESS NOTES
Hospitalist Progress Note   Admit Date:  2025  5:08 PM   Name:  Fabby Chiu   Age:  79 y.o.  Sex:  female  :  1945   MRN:  [id number]   Room:  [facility]/    Presenting/Chief Complaint: No chief complaint on file.     Reason(s) for Admission: Influenza [J11.1]  Acute hypoxic respiratory failure [J96.01]     Hospital Course:   Fabby Chiu is a 79 y.o. female with medical history of chronic pain syndrome, bilateral pulmonary embolism and bilateral lower extremity DVT status post IVC filter and reduced dose, epistaxis, GERD, cerebellar dysfunction (uses motorized wheelchair), hypertension, hyperlipidemia, anxiety, and asthma who presents to the ED with shortness of breath.  Admitted for acute hypoxic respiratory failure secondary to influenza type A.          Subjective & 24hr Events:   No overnight events reported. Complained of wheezing this morning. Denied any other complaints. Required O2 with ambulation on walk test.       Assessment & Plan:     Influenza type A  Acute hypoxic respiratory failure 2/2 above  History of long COVID  -3 L nasal cannula on admission.  Baseline no oxygen  -Influenza A positive  -Tamiflu 75 mg twice daily x 10 doses  -uses only wheelchair / scooter at home to mobilize   -required O2 with ambulation on walk test. Will set up home O2 at time of DC.      Cerebellar dysfunction  Cerebral atrophy  -Uses motorized wheelchair at home  -States she does not ambulate  -PT/OT to help with bed exercises    Pulmonary edema  Elevated BNP  -BNP 2,634  -Chest x-ray with minimal interstitial edema  -Echocardiography showed preserved EF  -resolved     Osteoporosis  -Zoledronic acid once yearly  -on calcium tabs     Bilateral pulmonary embolism  Bilateral lower extremity DVT  -Follows with oncology outpatient  -Eliquis 2.5 mg twice daily     GERD  -Protonix 40 mg twice daily     Hypertension  -Lisinopril 5 mg daily     Hyperlipidemia  -not on a statin outpatient    (XALATAN) 0.005 % ophthalmic solution 1 drop  1 drop Both Eyes QHS    lisinopril (PRINIVIL;ZESTRIL) tablet 5 mg  5 mg Oral Daily    melatonin tablet 6 mg  6 mg Oral Nightly    montelukast (SINGULAIR) tablet 10 mg  10 mg Oral Daily    pantoprazole (PROTONIX) tablet 40 mg  40 mg Oral BID    sertraline (ZOLOFT) tablet 125 mg  125 mg Oral Daily    Vitamin D (CHOLECALCIFEROL) tablet 1,000 Units  1,000 Units Oral Daily    oseltamivir (TAMIFLU) capsule 75 mg  75 mg Oral BID    HYDROcodone-acetaminophen (NORCO)  MG per tablet 1 tablet  1 tablet Oral Q6H PRN       Signed:  Maycol Pedersen MD    Part of this note may have been written by using a voice dictation software.  The note has been proof read but may still contain some grammatical/other typographical errors.

## 2025-01-08 NOTE — PROGRESS NOTES
01/08/25 0752   Resting (Room Air)   SpO2 86   Resting (On O2)   SpO2 95   O2 Device Nasal cannula   O2 Flow Rate (l/min) 1 l/min   Comments pt requires 1L O2 at home   During Walk (Room Air)   Comments pt does not walk   After Walk   SpO2 95   O2 Device Nasal cannula   O2 Flow Rate (l/min) 1 l/min   Rate of Dyspnea 0   Comments pt requires 1L O2 at home   Does the Patient Qualify for Home O2 Yes   Liter Flow at Rest 1   Does the Patient Need Portable Oxygen Tanks Yes

## 2025-01-09 VITALS
OXYGEN SATURATION: 96 % | RESPIRATION RATE: 17 BRPM | WEIGHT: 155.1 LBS | HEIGHT: 58 IN | SYSTOLIC BLOOD PRESSURE: 138 MMHG | BODY MASS INDEX: 32.56 KG/M2 | TEMPERATURE: 97.7 F | HEART RATE: 74 BPM | DIASTOLIC BLOOD PRESSURE: 72 MMHG

## 2025-01-09 LAB
ANION GAP SERPL CALC-SCNC: 12 MMOL/L (ref 7–16)
BASOPHILS # BLD: 0.01 K/UL (ref 0–0.2)
BASOPHILS NFR BLD: 0.3 % (ref 0–2)
BUN SERPL-MCNC: 13 MG/DL (ref 8–23)
CALCIUM SERPL-MCNC: 9.1 MG/DL (ref 8.8–10.2)
CHLORIDE SERPL-SCNC: 97 MMOL/L (ref 98–107)
CO2 SERPL-SCNC: 31 MMOL/L (ref 20–29)
CREAT SERPL-MCNC: 0.54 MG/DL (ref 0.6–1.1)
DIFFERENTIAL METHOD BLD: ABNORMAL
EOSINOPHIL # BLD: 0 K/UL (ref 0–0.8)
EOSINOPHIL NFR BLD: 0 % (ref 0.5–7.8)
ERYTHROCYTE [DISTWIDTH] IN BLOOD BY AUTOMATED COUNT: 14.2 % (ref 11.9–14.6)
GLUCOSE SERPL-MCNC: 154 MG/DL (ref 70–99)
HCT VFR BLD AUTO: 45.6 % (ref 35.8–46.3)
HGB BLD-MCNC: 14.3 G/DL (ref 11.7–15.4)
IMM GRANULOCYTES # BLD AUTO: 0.01 K/UL (ref 0–0.5)
IMM GRANULOCYTES NFR BLD AUTO: 0.3 % (ref 0–5)
LYMPHOCYTES # BLD: 0.8 K/UL (ref 0.5–4.6)
LYMPHOCYTES NFR BLD: 25.6 % (ref 13–44)
MCH RBC QN AUTO: 28.1 PG (ref 26.1–32.9)
MCHC RBC AUTO-ENTMCNC: 31.4 G/DL (ref 31.4–35)
MCV RBC AUTO: 89.6 FL (ref 82–102)
MONOCYTES # BLD: 0.51 K/UL (ref 0.1–1.3)
MONOCYTES NFR BLD: 16.3 % (ref 4–12)
NEUTS SEG # BLD: 1.8 K/UL (ref 1.7–8.2)
NEUTS SEG NFR BLD: 57.5 % (ref 43–78)
NRBC # BLD: 0 K/UL (ref 0–0.2)
PLATELET # BLD AUTO: 152 K/UL (ref 150–450)
PMV BLD AUTO: 10.8 FL (ref 9.4–12.3)
POTASSIUM SERPL-SCNC: 3.7 MMOL/L (ref 3.5–5.1)
RBC # BLD AUTO: 5.09 M/UL (ref 4.05–5.2)
SODIUM SERPL-SCNC: 140 MMOL/L (ref 136–145)
WBC # BLD AUTO: 3.1 K/UL (ref 4.3–11.1)

## 2025-01-09 PROCEDURE — 36415 COLL VENOUS BLD VENIPUNCTURE: CPT

## 2025-01-09 PROCEDURE — 6360000002 HC RX W HCPCS

## 2025-01-09 PROCEDURE — 2500000003 HC RX 250 WO HCPCS

## 2025-01-09 PROCEDURE — 94760 N-INVAS EAR/PLS OXIMETRY 1: CPT

## 2025-01-09 PROCEDURE — 94640 AIRWAY INHALATION TREATMENT: CPT

## 2025-01-09 PROCEDURE — 94761 N-INVAS EAR/PLS OXIMETRY MLT: CPT

## 2025-01-09 PROCEDURE — 6370000000 HC RX 637 (ALT 250 FOR IP)

## 2025-01-09 PROCEDURE — 2700000000 HC OXYGEN THERAPY PER DAY

## 2025-01-09 PROCEDURE — 80048 BASIC METABOLIC PNL TOTAL CA: CPT

## 2025-01-09 PROCEDURE — 85025 COMPLETE CBC W/AUTO DIFF WBC: CPT

## 2025-01-09 RX ORDER — OSELTAMIVIR PHOSPHATE 75 MG/1
75 CAPSULE ORAL 2 TIMES DAILY
Qty: 4 CAPSULE | Refills: 0 | Status: SHIPPED | OUTPATIENT
Start: 2025-01-09 | End: 2025-01-11

## 2025-01-09 RX ORDER — PANTOPRAZOLE SODIUM 40 MG/1
40 TABLET, DELAYED RELEASE ORAL DAILY
Qty: 30 TABLET | Refills: 0 | Status: SHIPPED | OUTPATIENT
Start: 2025-01-09 | End: 2025-02-08

## 2025-01-09 RX ADMIN — SODIUM CHLORIDE, PRESERVATIVE FREE 10 ML: 5 INJECTION INTRAVENOUS at 09:10

## 2025-01-09 RX ADMIN — VITAMIN D, TAB 1000IU (100/BT) 1000 UNITS: 25 TAB at 08:57

## 2025-01-09 RX ADMIN — MONTELUKAST 10 MG: 10 TABLET, FILM COATED ORAL at 08:57

## 2025-01-09 RX ADMIN — CALCIUM 1250 MG: 500 TABLET ORAL at 08:57

## 2025-01-09 RX ADMIN — OSELTAMIVIR PHOSPHATE 75 MG: 75 CAPSULE ORAL at 08:57

## 2025-01-09 RX ADMIN — PANTOPRAZOLE SODIUM 40 MG: 40 TABLET, DELAYED RELEASE ORAL at 08:57

## 2025-01-09 RX ADMIN — SERTRALINE 125 MG: 100 TABLET, FILM COATED ORAL at 08:57

## 2025-01-09 RX ADMIN — ARFORMOTEROL TARTRATE: 15 SOLUTION RESPIRATORY (INHALATION) at 07:23

## 2025-01-09 RX ADMIN — APIXABAN 2.5 MG: 2.5 TABLET, FILM COATED ORAL at 08:57

## 2025-01-09 RX ADMIN — Medication 500 MG: at 08:57

## 2025-01-09 RX ADMIN — LORAZEPAM 0.5 MG: 0.5 TABLET ORAL at 08:57

## 2025-01-09 RX ADMIN — LISINOPRIL 5 MG: 5 TABLET ORAL at 08:57

## 2025-01-09 RX ADMIN — WATER 40 MG: 1 INJECTION INTRAMUSCULAR; INTRAVENOUS; SUBCUTANEOUS at 08:57

## 2025-01-09 ASSESSMENT — PAIN SCALES - GENERAL: PAINLEVEL_OUTOF10: 0

## 2025-01-09 NOTE — DISCHARGE SUMMARY
Hospitalist Discharge Summary   Admit Date:  2025  5:08 PM   DC Note date: 2025  Name:  Fabby Chiu   Age:  79 y.o.  Sex:  female  :  1945   MRN:  [id number]   Room:  [facility]  PCP:  Ryan Angel MD    Presenting Complaint: No chief complaint on file.     Initial Admission Diagnosis: Influenza [J11.1]  Acute hypoxic respiratory failure [J96.01]     Problem List for this Hospitalization (present on admission):    Principal Problem:    Influenza  Active Problems:    Interstitial pulmonary disease, unspecified (HCC)    Abnormality of gait    Frequent falls    Moderate tricuspid regurgitation    Cervical stenosis of spinal canal    High cholesterol    Baker's cyst of knee, right    Acute deep vein thrombosis (DVT) of both lower extremities (HCC)    Back pain    Iron deficiency anemia    History of 2019 novel coronavirus disease (COVID-19)    Depression, major, single episode, moderate (HCC)    Asthma    Esophageal reflux    Debility    Essential hypertension    Moderate mitral valve regurgitation    Cerebellar atrophy (HCC)    Constipation    Acute blood loss anemia    Anxiety    DDD (degenerative disc disease), cervical    Acute on chronic diastolic (congestive) heart failure (HCC)    Osteoporosis    Acute hypoxic respiratory failure  Resolved Problems:    * No resolved hospital problems. *      Hospital Course:  Fabby Chiu is a 79 y.o. female with medical history of chronic pain syndrome, bilateral pulmonary embolism and bilateral lower extremity DVT status post IVC filter and reduced dose, epistaxis, GERD, cerebellar dysfunction (uses motorized wheelchair), hypertension, hyperlipidemia, anxiety, and asthma who presents to the ED with shortness of breath.  Admitted for acute hypoxic respiratory failure secondary to influenza type A.      Influenza type A  Acute hypoxic respiratory failure 2/2 above  History of long COVID  -3 L nasal cannula on admission.  Baseline no  0.78 0.82 0.80   EOSABS 0.00 0.01 0.00   MONOSABS 1.29 0.85 0.51   BASOSABS 0.01 0.02 0.01   ABSIMMGRAN 0.01 0.02 0.01      LFT No results for input(s): \"BILITOT\", \"BILIDIR\", \"ALKPHOS\", \"AST\", \"ALT\", \"PROTEIN\", \"ALBUMIN\", \"GLOB\" in the last 72 hours.   Cardiac  Lab Results   Component Value Date/Time    TROPHS 26.5 02/11/2022 06:32 PM    TROPHS 35.0 01/31/2022 05:58 PM    TROPHS 34.6 01/31/2022 04:00 PM      Coags Lab Results   Component Value Date/Time    PROTIME 16.3 09/15/2020 02:49 PM    PROTIME 14.5 07/28/2020 01:40 AM    PROTIME 14.0 07/27/2020 04:30 PM    INR 1.3 09/15/2020 02:49 PM    INR 1.1 07/28/2020 01:40 AM    INR 1.1 07/27/2020 04:30 PM    APTT 29.9 09/15/2020 02:49 PM    APTT 142.6 07/29/2020 08:53 AM    APTT 46.7 07/29/2020 12:14 AM      A1c Lab Results   Component Value Date/Time    LABA1C 6.1 05/23/2024 02:48 PM    LABA1C 5.6 09/21/2023 03:02 PM    LABA1C 5.8 05/03/2023 02:53 PM     05/23/2024 02:48 PM     09/21/2023 03:02 PM     05/03/2023 02:53 PM      Lipids Lab Results   Component Value Date/Time    CHOL 229 05/23/2024 02:48 PM    HDL 66 05/23/2024 02:48 PM    CHOLHDLRATIO 3.5 05/23/2024 02:48 PM    TRIG 152 05/23/2024 02:48 PM      Thyroid  Lab Results   Component Value Date/Time    TSHELE 0.99 10/14/2024 02:49 PM        Most Recent UA Lab Results   Component Value Date/Time    COLORU YELLOW 09/15/2020 06:23 PM    PROTEINU Negative 09/15/2020 06:23 PM    GLUCOSEU Negative 09/15/2020 06:23 PM    KETUA Negative 09/15/2020 06:23 PM    BILIRUBINUR Negative 09/15/2020 06:23 PM    BLOODU Negative 09/15/2020 06:23 PM    NITRU Negative 09/15/2020 06:23 PM    LEUKOCYTESUR SMALL 09/15/2020 06:23 PM    WBCUA 3-5 09/15/2020 06:23 PM    RBCUA 0-3 09/15/2020 06:23 PM    BACTERIA 0 09/15/2020 06:23 PM        Microbiology:  Results       Procedure Component Value Units Date/Time    Influenza A/B, Molecular [[id number]]  (Abnormal) Collected: 01/06/25 1244    Order Status: Completed

## 2025-01-09 NOTE — PLAN OF CARE
Problem: Chronic Conditions and Co-morbidities  Goal: Patient's chronic conditions and co-morbidity symptoms are monitored and maintained or improved  Outcome: Progressing     Problem: Discharge Planning  Goal: Discharge to home or other facility with appropriate resources  1/9/2025 1156 by Franchesca Novoa RN  Outcome: Progressing  1/9/2025 0417 by Noe Faust RN  Outcome: Progressing     Problem: Skin/Tissue Integrity  Goal: Absence of new skin breakdown  Description: 1.  Monitor for areas of redness and/or skin breakdown  2.  Assess vascular access sites hourly  3.  Every 4-6 hours minimum:  Change oxygen saturation probe site  4.  Every 4-6 hours:  If on nasal continuous positive airway pressure, respiratory therapy assess nares and determine need for appliance change or resting period.  Outcome: Progressing     Problem: Safety - Adult  Goal: Free from fall injury  1/9/2025 1156 by Franchesca Novoa RN  Outcome: Progressing  1/9/2025 0417 by Noe Faust RN  Outcome: Progressing     Problem: Pain  Goal: Verbalizes/displays adequate comfort level or baseline comfort level  1/9/2025 1156 by Franchesca Novoa RN  Outcome: Progressing  1/9/2025 0417 by Noe Faust RN  Outcome: Progressing

## 2025-01-09 NOTE — CARE COORDINATION
Patient is for discharge home today with family, DME of portable 02 tank, and [facility] RN services.     Please notify CM team for any needs that may arise.        01/09/25 3931   Services At/After Discharge   Transition of Care Consult (CM Consult) Discharge Planning;Home Health;DME/Supply Assistance   Internal Home Health No   Reason Outside Agency Chosen Script used patient chose alternate agency   Services At/After Discharge DME;Nursing services;Home Health  ([facility] and [facility])   [place] Resource Information Provided? No   Mode of Transport at Discharge Self   Confirm Follow Up Transport Family   Condition of Participation: Discharge Planning   The Plan for Transition of Care is related to the following treatment goals: Patient to return home with family with DME of 02 and HH RN services.   The Patient and/or Patient Representative was provided with a Choice of Provider? Patient   The Patient and/Or Patient Representative agree with the Discharge Plan? Yes   Freedom of Choice list was provided with basic dialogue that supports the patient's individualized plan of care/goals, treatment preferences, and shares the quality data associated with the providers?  Yes     Tere Ndiaye, MSW, LBSW    [facility]

## 2025-01-10 ENCOUNTER — TELEPHONE (OUTPATIENT)
Dept: FAMILY MEDICINE CLINIC | Facility: CLINIC | Age: 80
End: 2025-01-10

## 2025-01-10 ENCOUNTER — CARE COORDINATION (OUTPATIENT)
Dept: CARE COORDINATION | Facility: CLINIC | Age: 80
End: 2025-01-10

## 2025-01-10 DIAGNOSIS — J11.1 INFLUENZA: Primary | ICD-10-CM

## 2025-01-10 PROCEDURE — 1111F DSCHRG MED/CURRENT MED MERGE: CPT | Performed by: FAMILY MEDICINE

## 2025-01-10 NOTE — TELEPHONE ENCOUNTER
Care Transitions Initial Follow Up Call    Outreach made within 2 business days of discharge: Yes    Patient: Fabby Chiu Patient : 1945   MRN: [id number]  Reason for Admission: Influenza   Discharge Date: 25       Spoke with: left voicemail    Discharge department/facility: [facility]    TCM Interactive Patient Contact:  Was patient able to fill all prescriptions:   Was patient instructed to bring all medications to the follow-up visit:   Is patient taking all medications as directed in the discharge summary?   Does patient understand their discharge instructions:   Does patient have questions or concerns that need addressed prior to 7-14 day follow up office visit:     Additional needs identified to be addressed with provider             Scheduled appointment with PCP within 7-14 days    Follow Up  Future Appointments   Date Time Provider Department Center   2025  3:00 PM BSDI CHRISTINA CT [facility] AMB RAD [facility]   2025  2:00 PM Ryan Angel MD [facility] DEP   2025  2:15 PM Gia Mcgrath, APRN - CNP [facility] AMB   2025  1:30 PM [facility]   2025  2:30 PM Joce Franco MD [facility] AMB       BARBARA RODRIGUEZ LPN

## 2025-01-10 NOTE — CARE COORDINATION
Care Transitions Note    Initial Call - Call within 2 business days of discharge: Yes    Patient Current Location:  Home: [address] [place] [address] [address] [place] [address]    Care Transition Nurse contacted the patient by telephone to perform post hospital discharge assessment, verified name and  as identifiers. Provided introduction to self, and explanation of the Care Transition Nurse role.     Patient: Fabby Chiu    Patient : 1945   MRN: [id number]    Reason for Admission: Shortness of breath   Discharge Date: 25  RURS: Readmission Risk Score: 12.8      Last Discharge Facility       Date Complaint Diagnosis Description Type Department Provider    25  Shortness of breath ... Admission (Discharged) [facility]Maycol Coombs MD    25 low oxygen; Cough Influenza ... ED (TRANSFER) Jesus Bauman MD            Was this an external facility discharge? No    Additional needs identified to be addressed with provider   No needs identified             Method of communication with provider: none.    Patients top risk factors for readmission: medical condition-Primary diagnosis: Influenza  Influenza  Active Problems:    Interstitial pulmonary disease, unspecified (HCC)    Abnormality of gait    Frequent falls    Moderate tricuspid regurgitation    Cervical stenosis of spinal canal    High cholesterol    Baker's cyst of knee, right    Acute deep vein thrombosis (DVT) of both lower extremities  Interventions to address risk factors:   Home Health: verified referral with [facility]; they will let scheduling know that patient is ready for outreach; also informed of patient's current condition with coughing, etc.     Care Summary Note: Patient states that they feel a little better    Care Transition Nurse reviewed discharge instructions with patient. The patient was given an opportunity to ask questions; all questions answered at this time.. The patient verbalized understanding.   Were

## 2025-01-14 ENCOUNTER — OFFICE VISIT (OUTPATIENT)
Dept: FAMILY MEDICINE CLINIC | Facility: CLINIC | Age: 80
End: 2025-01-14

## 2025-01-14 ENCOUNTER — CARE COORDINATION (OUTPATIENT)
Dept: CARE COORDINATION | Facility: CLINIC | Age: 80
End: 2025-01-14

## 2025-01-14 VITALS — DIASTOLIC BLOOD PRESSURE: 84 MMHG | SYSTOLIC BLOOD PRESSURE: 138 MMHG

## 2025-01-14 DIAGNOSIS — J15.9 PNEUMONIA, BACTERIAL: Primary | ICD-10-CM

## 2025-01-14 DIAGNOSIS — Z09 HOSPITAL DISCHARGE FOLLOW-UP: ICD-10-CM

## 2025-01-14 DIAGNOSIS — Z00.00 MEDICARE ANNUAL WELLNESS VISIT, SUBSEQUENT: ICD-10-CM

## 2025-01-14 DIAGNOSIS — M32.9 SYSTEMIC LUPUS ERYTHEMATOSUS, UNSPECIFIED SLE TYPE, UNSPECIFIED ORGAN INVOLVEMENT STATUS (HCC): ICD-10-CM

## 2025-01-14 DIAGNOSIS — I10 ESSENTIAL HYPERTENSION: ICD-10-CM

## 2025-01-14 DIAGNOSIS — Z00.00 ROUTINE GENERAL MEDICAL EXAMINATION AT A HEALTH CARE FACILITY: ICD-10-CM

## 2025-01-14 RX ORDER — DOXYCYCLINE HYCLATE 100 MG
100 TABLET ORAL 2 TIMES DAILY
Qty: 20 TABLET | Refills: 0 | Status: SHIPPED | OUTPATIENT
Start: 2025-01-14 | End: 2025-01-24

## 2025-01-14 SDOH — ECONOMIC STABILITY: FOOD INSECURITY: WITHIN THE PAST 12 MONTHS, YOU WORRIED THAT YOUR FOOD WOULD RUN OUT BEFORE YOU GOT MONEY TO BUY MORE.: NEVER TRUE

## 2025-01-14 SDOH — ECONOMIC STABILITY: FOOD INSECURITY: WITHIN THE PAST 12 MONTHS, THE FOOD YOU BOUGHT JUST DIDN'T LAST AND YOU DIDN'T HAVE MONEY TO GET MORE.: NEVER TRUE

## 2025-01-14 NOTE — PATIENT INSTRUCTIONS
weight.     Try to get 7 to 9 hours of sleep each night.     Limit alcohol to 2 drinks a day for men and 1 drink a day for women. Too much alcohol can cause health problems.     Manage other health problems such as diabetes, high blood pressure, and high cholesterol. If you think you may have a problem with alcohol or drug use, talk to your doctor.   Medicines    Take your medicines exactly as prescribed. Call your doctor if you think you are having a problem with your medicine.     If your doctor recommends aspirin, take the amount directed each day. Make sure you take aspirin and not another kind of pain reliever, such as acetaminophen (Tylenol).   When should you call for help?   Call 911 if you have symptoms of a heart attack. These may include:    Chest pain or pressure, or a strange feeling in the chest.     Sweating.     Shortness of breath.     Pain, pressure, or a strange feeling in the back, neck, jaw, or upper belly or in one or both shoulders or arms.     Lightheadedness or sudden weakness.     A fast or irregular heartbeat.   After you call 911, the  may tell you to chew 1 adult-strength or 2 to 4 low-dose aspirin. Wait for an ambulance. Do not try to drive yourself.  Watch closely for changes in your health, and be sure to contact your doctor if you have any problems.  Where can you learn more?  Go to https://www.iH[employer]Help.net/patientEd and enter F075 to learn more about \"A Healthy Heart: Care Instructions.\"  Current as of: July 31, 2024  Content Version: 14.3  © 2024 [employer].   Care instructions adapted under license by [employer]. If you have questions about a medical condition or this instruction, always ask your healthcare professional. [employer] disclaims any warranty or liability for your use of this information.    Personalized Preventive Plan for Fabby Chiu - 1/14/2025  Medicare offers a range of preventive health benefits. Some of the tests and

## 2025-01-14 NOTE — PROGRESS NOTES
Post-Discharge Transitional Care  Follow Up      Fabby Chiu   YOB: 1945    Date of Office Visit:  1/14/2025  Date of Hospital Admission: 1/6/25  Date of Hospital Discharge: 1/9/25  Risk of hospital readmission (high >=14%. Medium >=10%) :Readmission Risk Score: 12.8      Care management risk score Rising risk (score 2-5) and Complex Care (Scores >=6): No Risk Score On File     Non face to face  following discharge, date last encounter closed (first attempt may have been earlier): 01/10/2025    Call initiated 2 business days of discharge: Yes    ASSESSMENT/PLAN:   Pneumonia, bacterial  -     doxycycline hyclate (VIBRA-TABS) 100 MG tablet; Take 1 tablet by mouth 2 times daily for 10 days, Disp-20 tablet, R-0Normal  Systemic lupus erythematosus, unspecified SLE type, unspecified organ involvement status (HCC)  Assessment & Plan:   Monitored by specialist- no acute findings meriting change in the plan  Essential hypertension  Hospital discharge follow-up  -     SC DISCHARGE MEDS RECONCILED W/ CURRENT OUTPATIENT MED LIST      Medical Decision Making: moderate complexity  Return in 1 month (on 2/14/2025).           Subjective:   HPI:  Follow up of Hospital problems/diagnosis(es): pt with flu and sepsis and pneumonia    Inpatient course: Discharge summary reviewed- see chart.    Interval history/Current status: still with cough and sputum, low grade temp, easily fatigued and SOB. Also with other underlying conditions and feeling weak    Patient Active Problem List   Diagnosis    Abnormality of gait    Suspected sleep apnea    Hypokalemia    History of pulmonary embolism    S/P IVC filter    Frequent falls    Postural imbalance    Moderate tricuspid regurgitation    Cervical stenosis of spinal canal    Rotator cuff disorder, right    High cholesterol    Baker's cyst of knee, right    Other pulmonary embolism without acute cor pulmonale, unspecified chronicity (HCC)    Acute deep vein thrombosis

## 2025-01-17 ENCOUNTER — CARE COORDINATION (OUTPATIENT)
Dept: CARE COORDINATION | Facility: CLINIC | Age: 80
End: 2025-01-17

## 2025-01-17 ENCOUNTER — TELEPHONE (OUTPATIENT)
Dept: INTERNAL MEDICINE CLINIC | Facility: CLINIC | Age: 80
End: 2025-01-17

## 2025-01-17 NOTE — PROGRESS NOTES
I spoke with the patient via telephone. She was reportedly recently hospitalized with influenza and pneumonia. She was placed on doxycycline at her recent office visit by Dr. Angel. She relates she is having allergic reaction to doxycycline as it is making her skin crawl. She took some Benadryl prior to calling the physician on-call and states she is currently feeling better. She was advised to stop taking the doxycycline. Will change to Augmentin 875 mg p.o. twice daily x 7 days. If she has any worsening or changing symptoms she was advised to call back or go to the emergency room.

## 2025-01-17 NOTE — CARE COORDINATION
Care Transitions Note    Follow Up Call     Patient Current Location:  Home: [address] [place] [address] [place] [address]    N Care Coordinator contacted the patient by telephone. Verified name and  as identifiers.    Additional needs identified to be addressed with provider   No needs identified                 Method of communication with provider: none.    Care Summary Note: States she is doing fairly well.  Continues with shortness of breath and occasional cough, wears oxygen at 1 LM.  Reports fair appetite and hydration.  Has history of falls, denies recent.  Is participating in HH therapy services.  No complaints, needs, or concerns at this time.  Reviewed up coming appointments.  Has attended scheduled follow up with PCP, DIL provided transportation.  Has contact information if needed, Daisy Haque LPN  [phone].    Plan of care updates since last contact:  Education: Daisy Haque LP [phone].  Home Health: [facility]  [phone].   All scheduled appointments.        Advance Care Planning:   Does patient have an Advance Directive: Not on file; patient encouraged to bring existing ACP documents to a [facility]. and states DIL has documents, patient states she does not wish to have on file .    Medication Review:  No changes since last call.     Remote Patient Monitoring:  Offered patient enrollment in the Remote Patient Monitoring (RPM) program for in-home monitoring: Yes, but did not enroll at this time: na .    Assessments:  No changes since last call    Follow Up Appointment:   Reviewed upcoming appointment(s).  Future Appointments         Provider Specialty Dept Phone    2025 3:00 PM (Arrive by 2:45 PM) [facility] [phone] 2:00 PM Ryan Angel MD Family Medicine [phone]    2025 2:15 PM Gia Mcgrath, APRN - CNP Pulmonology [phone]    2025 1:30 PM [facility] [phone]    2025 2:30 PM Salvador

## 2025-01-21 ENCOUNTER — TELEPHONE (OUTPATIENT)
Dept: FAMILY MEDICINE CLINIC | Facility: CLINIC | Age: 80
End: 2025-01-21

## 2025-01-21 DIAGNOSIS — J15.9 PNEUMONIA, BACTERIAL: ICD-10-CM

## 2025-01-21 RX ORDER — DOXYCYCLINE HYCLATE 100 MG
100 TABLET ORAL 2 TIMES DAILY
Qty: 10 TABLET | Refills: 0 | Status: SHIPPED | OUTPATIENT
Start: 2025-01-21 | End: 2025-01-26

## 2025-01-21 NOTE — TELEPHONE ENCOUNTER
Pt c/o diarrhea since switching Abx due to reaction to previous Abx. Previous Abx doxycycline may have not been problem. Pt was also taking Mucinex DM. Pt has d/c Mucinex. Seeking advice on next step.      Spoke with PCP and d/c Augmentin and switch back to doxycycline. Take imodium for diarrhea and drink plenty of fluids.

## 2025-01-21 NOTE — PROGRESS NOTES
Physician Progress Note      PATIENT:               KASHIF HOYT  CSN #:                  [id number]  :                       1945  ADMIT DATE:       2025 5:08 PM  DISCH DATE:        2025 1:43 PM  RESPONDING  PROVIDER #:        Maycol Pedersen MD          QUERY TEXT:    Patient with documented history of diastolic CHF was noted to have pulmonary   edema on the DCS. Evaluation revealed BNP 2634, CXR showing pulmonary edema   and cardiomegaly, patient reports LOPEZ. Patient was treated with IV Lasix.   After study, can the pulmonary edema be specified  as:      The medical record reflects the following:  Risk Factors: 79yr, Moderate mitral valve regurgitation, Interstitial   pulmonary disease, Influenza  Clinical Indicators: cxr shows Mild interstitial edema, BNP 2,634, ECHO shows   Left?Ventricle: Normal left ventricular systolic function with a visually   estimated EF of 55 - 60%. Left ventricle size is normal. Normal wall thicknes  Treatment: Lasix IV, Strict I's and O's, telemetry, ECHO, chest xray  Options provided:  -- Acute pulmonary edema due to acute on chronic diastolic heart failure  -- Acute pulmonary edema unrelated to CHF  -- Other - I will add my own diagnosis  -- Disagree - Not applicable / Not valid  -- Disagree - Clinically unable to determine / Unknown  -- Refer to Clinical Documentation Reviewer    PROVIDER RESPONSE TEXT:    This patient has acute pulmonary edema due to acute on chronic diastolic heart   failure    Query created by: ALLAN MENCHACA on 2025 1:31 PM      Electronically signed by:  Maycol Pedersen MD 2025 6:57 AM

## 2025-01-24 ENCOUNTER — CARE COORDINATION (OUTPATIENT)
Dept: CARE COORDINATION | Facility: CLINIC | Age: 80
End: 2025-01-24

## 2025-01-24 DIAGNOSIS — M81.0 OSTEOPOROSIS, UNSPECIFIED OSTEOPOROSIS TYPE, UNSPECIFIED PATHOLOGICAL FRACTURE PRESENCE: Primary | ICD-10-CM

## 2025-01-24 RX ORDER — SODIUM CHLORIDE 0.9 % (FLUSH) 0.9 %
5-40 SYRINGE (ML) INJECTION PRN
OUTPATIENT
Start: 2025-01-24

## 2025-01-24 RX ORDER — DIPHENHYDRAMINE HYDROCHLORIDE 50 MG/ML
50 INJECTION INTRAMUSCULAR; INTRAVENOUS
OUTPATIENT
Start: 2025-01-24

## 2025-01-24 RX ORDER — EPINEPHRINE 1 MG/ML
0.3 INJECTION, SOLUTION, CONCENTRATE INTRAVENOUS PRN
OUTPATIENT
Start: 2025-01-24

## 2025-01-24 RX ORDER — SODIUM CHLORIDE 9 MG/ML
INJECTION, SOLUTION INTRAVENOUS CONTINUOUS
OUTPATIENT
Start: 2025-01-24

## 2025-01-24 RX ORDER — ZOLEDRONIC ACID 0.05 MG/ML
5 INJECTION, SOLUTION INTRAVENOUS ONCE
OUTPATIENT
Start: 2025-01-24 | End: 2025-01-24

## 2025-01-24 RX ORDER — HEPARIN SODIUM (PORCINE) LOCK FLUSH IV SOLN 100 UNIT/ML 100 UNIT/ML
500 SOLUTION INTRAVENOUS PRN
OUTPATIENT
Start: 2025-01-24

## 2025-01-24 RX ORDER — SODIUM CHLORIDE 9 MG/ML
5-250 INJECTION, SOLUTION INTRAVENOUS PRN
OUTPATIENT
Start: 2025-01-24

## 2025-01-24 RX ORDER — ACETAMINOPHEN 325 MG/1
650 TABLET ORAL
OUTPATIENT
Start: 2025-01-24

## 2025-01-24 RX ORDER — ALBUTEROL SULFATE 90 UG/1
4 INHALANT RESPIRATORY (INHALATION) PRN
OUTPATIENT
Start: 2025-01-24

## 2025-01-24 RX ORDER — HYDROCORTISONE SODIUM SUCCINATE 100 MG/2ML
100 INJECTION INTRAMUSCULAR; INTRAVENOUS
OUTPATIENT
Start: 2025-01-24

## 2025-01-24 RX ORDER — ONDANSETRON 2 MG/ML
8 INJECTION INTRAMUSCULAR; INTRAVENOUS
OUTPATIENT
Start: 2025-01-24

## 2025-01-24 RX ORDER — FAMOTIDINE 10 MG/ML
20 INJECTION, SOLUTION INTRAVENOUS
OUTPATIENT
Start: 2025-01-24

## 2025-01-24 NOTE — CARE COORDINATION
Care Transitions Note    Follow Up Call     Attempted to reach patient for transitions of care follow up.  Unable to reach patient.      Outreach Attempts:   Unable to leave message.     Care Summary Note: Unable to reach.  No answer, voice mailbox is full, unable to leave message.     Follow Up Appointment:   Future Appointments         Provider Specialty Dept Phone    1/27/2025 3:00 PM (Arrive by 2:45 PM) [facility] [phone] 1/30/2025 2:15 PM Gia Mcgrath, APRN - CNP Pulmonology [phone]    2/11/2025 2:30 PM Ryan Angel MD Family Medicine [phone]    4/14/2025 1:30 PM [facility] [phone]    4/14/2025 2:30 PM Joce Franco MD Oncology [phone]            Plan for follow-up call in 6-10 days based on severity of symptoms and risk factors. Plan for next call: self management-diet, hydration, exercise, follow up appointments.     Daisy Haque LPN

## 2025-01-30 ENCOUNTER — OFFICE VISIT (OUTPATIENT)
Age: 80
End: 2025-01-30
Payer: MEDICARE

## 2025-01-30 VITALS
WEIGHT: 134 LBS | BODY MASS INDEX: 28.13 KG/M2 | HEIGHT: 58 IN | HEART RATE: 91 BPM | DIASTOLIC BLOOD PRESSURE: 74 MMHG | RESPIRATION RATE: 19 BRPM | OXYGEN SATURATION: 95 % | SYSTOLIC BLOOD PRESSURE: 124 MMHG | TEMPERATURE: 97.4 F

## 2025-01-30 DIAGNOSIS — J45.20 MILD INTERMITTENT ASTHMA, UNSPECIFIED WHETHER COMPLICATED: Primary | ICD-10-CM

## 2025-01-30 DIAGNOSIS — J30.1 SEASONAL ALLERGIC RHINITIS DUE TO POLLEN: ICD-10-CM

## 2025-01-30 DIAGNOSIS — R91.8 LUNG NODULES: ICD-10-CM

## 2025-01-30 DIAGNOSIS — R09.02 HYPOXEMIA: ICD-10-CM

## 2025-01-30 PROCEDURE — G8427 DOCREV CUR MEDS BY ELIG CLIN: HCPCS | Performed by: NURSE PRACTITIONER

## 2025-01-30 PROCEDURE — 1036F TOBACCO NON-USER: CPT | Performed by: NURSE PRACTITIONER

## 2025-01-30 PROCEDURE — 1159F MED LIST DOCD IN RCRD: CPT | Performed by: NURSE PRACTITIONER

## 2025-01-30 PROCEDURE — G2211 COMPLEX E/M VISIT ADD ON: HCPCS | Performed by: NURSE PRACTITIONER

## 2025-01-30 PROCEDURE — 1111F DSCHRG MED/CURRENT MED MERGE: CPT | Performed by: NURSE PRACTITIONER

## 2025-01-30 PROCEDURE — 1160F RVW MEDS BY RX/DR IN RCRD: CPT | Performed by: NURSE PRACTITIONER

## 2025-01-30 PROCEDURE — 3078F DIAST BP <80 MM HG: CPT | Performed by: NURSE PRACTITIONER

## 2025-01-30 PROCEDURE — 3074F SYST BP LT 130 MM HG: CPT | Performed by: NURSE PRACTITIONER

## 2025-01-30 PROCEDURE — 99214 OFFICE O/P EST MOD 30 MIN: CPT | Performed by: NURSE PRACTITIONER

## 2025-01-30 PROCEDURE — G8399 PT W/DXA RESULTS DOCUMENT: HCPCS | Performed by: NURSE PRACTITIONER

## 2025-01-30 PROCEDURE — 1090F PRES/ABSN URINE INCON ASSESS: CPT | Performed by: NURSE PRACTITIONER

## 2025-01-30 PROCEDURE — 1123F ACP DISCUSS/DSCN MKR DOCD: CPT | Performed by: NURSE PRACTITIONER

## 2025-01-30 PROCEDURE — G8417 CALC BMI ABV UP PARAM F/U: HCPCS | Performed by: NURSE PRACTITIONER

## 2025-01-30 PROCEDURE — 1126F AMNT PAIN NOTED NONE PRSNT: CPT | Performed by: NURSE PRACTITIONER

## 2025-01-30 RX ORDER — LATANOPROST OPHTHALMIC SOLUTION 0.005% 50 UG/ML
SOLUTION/ DROPS TOPICAL
COMMUNITY
Start: 2024-12-19

## 2025-01-30 RX ORDER — MONTELUKAST SODIUM 10 MG/1
10 TABLET ORAL DAILY
Qty: 90 TABLET | Refills: 3 | Status: SHIPPED | OUTPATIENT
Start: 2025-01-30

## 2025-01-30 NOTE — PROGRESS NOTES
PREVNAR 13, (age 6w+), IM, 0.5mL 11/06/2017    Pneumococcal, PPSV23, PNEUMOVAX 23, (age 2y+), SC/IM, 0.5mL 01/04/2019, 02/18/2019     Past Medical History:   Diagnosis Date    Abnormality of gait 7/18/2018    Anemia     Anxiety     Arthritis     Asthma     Calculus of kidney     Chronic pain     Contact dermatitis and other eczema, due to unspecified cause     Depressive disorder, not elsewhere classified     Encounter for chronic pain management     Esophageal reflux     Essential hypertension, benign     Frequent falls 7/18/2018    GERD (gastroesophageal reflux disease)     Pure hypercholesterolemia         Tobacco Use      Smoking status: Never      Smokeless tobacco: Never    Allergies   Allergen Reactions    Cyclobenzaprine Other (See Comments)    Hydroxychloroquine Other (See Comments)    Hydroxychloroquine Sulfate Other (See Comments)    Ketoprofen Other (See Comments)    Molds & Smuts     Pollen Extract Other (See Comments)    Prednisone Other (See Comments)    Shellfish Allergy      Other reaction(s): Unknown (comments)    Mucinex Dm [Dm-Guaifenesin Er] Anxiety     \"Climbing the walls\"     Current Outpatient Medications   Medication Instructions    albuterol sulfate HFA (PROVENTIL;VENTOLIN;PROAIR) 108 (90 Base) MCG/ACT inhaler 2 puffs, Inhalation, EVERY 4 HOURS PRN, asthmadx code j45.09    apixaban (ELIQUIS) 2.5 mg, Oral, 2 TIMES DAILY    ascorbic acid (VITAMIN C) 500 MG tablet Oral    Azelastine HCl 137 MCG/SPRAY SOLN 1 puff in each nostril Nasally Twice a day for 90 days    budesonide-formoterol (SYMBICORT) 80-4.5 MCG/ACT AERO 2 puffs, Inhalation, 2 TIMES DAILY RESP    calcium carbonate (OYSTER SHELL CALCIUM 500 MG) 1250 (500 Ca) MG tablet Oral, DAILY    clorazepate (TRANXENE) 3.75 mg, Oral, 3 TIMES DAILY    Fexofenadine HCl (ALLEGRA PO) Take by mouth    GLUCOSAMINE SULFATE PO Oral    HYDROcodone-acetaminophen (NORCO)  MG per tablet 1 tablet, Oral, EVERY 6 HOURS PRN    IYUZEH 0.005 % SOLN INSTILL 1

## 2025-01-31 ENCOUNTER — TELEPHONE (OUTPATIENT)
Age: 80
End: 2025-01-31

## 2025-01-31 ENCOUNTER — CARE COORDINATION (OUTPATIENT)
Dept: CARE COORDINATION | Facility: CLINIC | Age: 80
End: 2025-01-31

## 2025-01-31 NOTE — CARE COORDINATION
Care Transitions Note    Follow Up Call     Attempted to reach patient for transitions of care follow up.  Unable to reach patient.      Outreach Attempts:   Unable to leave message.   Voice mailbox option remains full.     Care Summary Note: Unable to reach.  Per documentation in EPIC, has attended scheduled follow ap appointments.      Follow Up Appointment:   Future Appointments         Provider Specialty Dept Phone    2/11/2025 2:30 PM Ryan Angel MD Family Medicine [phone]    4/14/2025 1:30 PM [facility] [phone]    4/14/2025 2:30 PM Joce Franco MD Oncology [phone]    7/31/2025 3:15 PM (Arrive by 3:00 PM) Gia Mcgrath, APRN - [facility] [phone]            Plan for follow-up call in 6-10 days based on severity of symptoms and risk factors. Plan for next call: self management-diet, hydration, exercise, follow up appointments.     Daisy Haque LPN

## 2025-02-10 ENCOUNTER — CARE COORDINATION (OUTPATIENT)
Dept: CARE COORDINATION | Facility: CLINIC | Age: 80
End: 2025-02-10

## 2025-02-10 NOTE — CARE COORDINATION
Care Transitions Note    Final Call     Attempted to reach patient for transitions of care follow up.  Unable to reach patient.      Outreach Attempts:   Multiple attempts to contact patient at phone numbers on file.     Patient closed (unable to reach patient) from the Care Transitions program on 02/10/2025.  Patient/family has the ability to self manage at this time..      Handoff:   Patient was not referred to the ACM team due to unable to contact patient.      Care Summary Note: Unable to reach.    Assessments:  Care Transitions Subsequent and Final Call    Subsequent and Final Calls  Are you currently active with any services?: Home Health  Care Transitions Interventions  Other Interventions:              Upcoming Appointments:    Future Appointments         Provider Specialty Dept Phone    2/11/2025 2:30 PM Ryan Angel MD Family Medicine [phone]    4/14/2025 1:30 PM [facility] [phone]    4/14/2025 2:30 PM Joce Franco MD Oncology [phone]    7/31/2025 3:15 PM (Arrive by 3:00 PM) Gia Mcgrath, APRN - CNP Pulmonology [phone]            Daisy Haque LPN

## 2025-02-11 ENCOUNTER — OFFICE VISIT (OUTPATIENT)
Dept: FAMILY MEDICINE CLINIC | Facility: CLINIC | Age: 80
End: 2025-02-11

## 2025-02-11 VITALS
DIASTOLIC BLOOD PRESSURE: 72 MMHG | WEIGHT: 134 LBS | BODY MASS INDEX: 28.13 KG/M2 | HEIGHT: 58 IN | SYSTOLIC BLOOD PRESSURE: 130 MMHG

## 2025-02-11 DIAGNOSIS — E78.00 HIGH CHOLESTEROL: ICD-10-CM

## 2025-02-11 DIAGNOSIS — M81.0 AGE-RELATED OSTEOPOROSIS WITHOUT CURRENT PATHOLOGICAL FRACTURE: ICD-10-CM

## 2025-02-11 DIAGNOSIS — E83.42 HYPOMAGNESEMIA: ICD-10-CM

## 2025-02-11 DIAGNOSIS — R73.9 HIGH BLOOD SUGAR: ICD-10-CM

## 2025-02-11 DIAGNOSIS — Z09 HOSPITAL DISCHARGE FOLLOW-UP: ICD-10-CM

## 2025-02-11 DIAGNOSIS — F41.9 ANXIETY: ICD-10-CM

## 2025-02-11 DIAGNOSIS — E55.9 VITAMIN D DEFICIENCY: ICD-10-CM

## 2025-02-11 DIAGNOSIS — I10 ESSENTIAL HYPERTENSION: Primary | ICD-10-CM

## 2025-02-11 LAB
25(OH)D3 SERPL-MCNC: 52 NG/ML (ref 30–100)
ALBUMIN SERPL-MCNC: 3.4 G/DL (ref 3.2–4.6)
ALBUMIN/GLOB SERPL: 1 (ref 1–1.9)
ALP SERPL-CCNC: 76 U/L (ref 35–104)
ALT SERPL-CCNC: 15 U/L (ref 8–45)
ANION GAP SERPL CALC-SCNC: 14 MMOL/L (ref 7–16)
AST SERPL-CCNC: 25 U/L (ref 15–37)
BILIRUB SERPL-MCNC: 0.3 MG/DL (ref 0–1.2)
BUN SERPL-MCNC: 9 MG/DL (ref 8–23)
CALCIUM SERPL-MCNC: 9.2 MG/DL (ref 8.8–10.2)
CHLORIDE SERPL-SCNC: 100 MMOL/L (ref 98–107)
CO2 SERPL-SCNC: 25 MMOL/L (ref 20–29)
CREAT SERPL-MCNC: 0.61 MG/DL (ref 0.6–1.1)
GLOBULIN SER CALC-MCNC: 3.6 G/DL (ref 2.3–3.5)
GLUCOSE SERPL-MCNC: 97 MG/DL (ref 70–99)
MAGNESIUM SERPL-MCNC: 2.1 MG/DL (ref 1.8–2.4)
POTASSIUM SERPL-SCNC: 4.2 MMOL/L (ref 3.5–5.1)
PROT SERPL-MCNC: 7 G/DL (ref 6.3–8.2)
SODIUM SERPL-SCNC: 139 MMOL/L (ref 136–145)

## 2025-02-11 RX ORDER — SODIUM CHLORIDE 0.9 % (FLUSH) 0.9 %
5-40 SYRINGE (ML) INJECTION PRN
OUTPATIENT
Start: 2026-02-08

## 2025-02-11 RX ORDER — HYDROCORTISONE SODIUM SUCCINATE 100 MG/2ML
100 INJECTION INTRAMUSCULAR; INTRAVENOUS
OUTPATIENT
Start: 2026-02-08

## 2025-02-11 RX ORDER — SODIUM CHLORIDE 9 MG/ML
5-250 INJECTION, SOLUTION INTRAVENOUS PRN
OUTPATIENT
Start: 2026-02-08

## 2025-02-11 RX ORDER — DIPHENHYDRAMINE HYDROCHLORIDE 50 MG/ML
50 INJECTION INTRAMUSCULAR; INTRAVENOUS
OUTPATIENT
Start: 2026-02-08

## 2025-02-11 RX ORDER — SODIUM CHLORIDE 9 MG/ML
INJECTION, SOLUTION INTRAVENOUS CONTINUOUS
OUTPATIENT
Start: 2026-02-08

## 2025-02-11 RX ORDER — ZOLEDRONIC ACID 0.05 MG/ML
5 INJECTION, SOLUTION INTRAVENOUS ONCE
OUTPATIENT
Start: 2026-02-08 | End: 2026-02-08

## 2025-02-11 RX ORDER — ALBUTEROL SULFATE 90 UG/1
4 INHALANT RESPIRATORY (INHALATION) PRN
OUTPATIENT
Start: 2026-02-08

## 2025-02-11 RX ORDER — HEPARIN SODIUM (PORCINE) LOCK FLUSH IV SOLN 100 UNIT/ML 100 UNIT/ML
500 SOLUTION INTRAVENOUS PRN
OUTPATIENT
Start: 2026-02-08

## 2025-02-11 RX ORDER — FAMOTIDINE 10 MG/ML
20 INJECTION, SOLUTION INTRAVENOUS
OUTPATIENT
Start: 2026-02-08

## 2025-02-11 RX ORDER — ONDANSETRON 2 MG/ML
8 INJECTION INTRAMUSCULAR; INTRAVENOUS
OUTPATIENT
Start: 2026-02-08

## 2025-02-11 RX ORDER — ACETAMINOPHEN 325 MG/1
650 TABLET ORAL
OUTPATIENT
Start: 2026-02-08

## 2025-02-11 RX ORDER — EPINEPHRINE 1 MG/ML
0.3 INJECTION, SOLUTION, CONCENTRATE INTRAVENOUS PRN
OUTPATIENT
Start: 2026-02-08

## 2025-02-11 ASSESSMENT — ENCOUNTER SYMPTOMS
RHINORRHEA: 0
COUGH: 0
APNEA: 0
BLURRED VISION: 0
FACIAL SWELLING: 0
ABDOMINAL DISTENTION: 0
SINUS PAIN: 0
SHORTNESS OF BREATH: 0
EYE REDNESS: 0
SINUS PRESSURE: 0
BACK PAIN: 0
EYE DISCHARGE: 0
EYE ITCHING: 0
ANAL BLEEDING: 0
BLOOD IN STOOL: 0
ORTHOPNEA: 0
EYE PAIN: 0
CHEST TIGHTNESS: 0
ABDOMINAL PAIN: 0

## 2025-02-11 NOTE — PROGRESS NOTES
[facility]  _______________________________________  Ryan Angel MD                 [address] [place] [address]        Ryan Triplett MD                     [place] [address]                                                                                    Phone: [phone]                                                                                    Fax: [phone]    Fabby Chiu is a 79 y.o. female who is seen for evaluation of   Chief Complaint   Patient presents with    Hypertension    Anxiety    Pneumonia     Improving. Off daytime o2.        HPI:   Hypertension  This is a chronic problem. The current episode started more than 1 year ago. The problem is unchanged. The problem is controlled. Associated symptoms include anxiety and malaise/fatigue. Pertinent negatives include no blurred vision, chest pain, headaches, neck pain, orthopnea, peripheral edema, PND or shortness of breath. (on meds, need refills and labs, no side effect noted.   )   Anxiety  Presents for follow-up visit. Patient reports no chest pain, confusion, dizziness, nervous/anxious behavior or shortness of breath. Episode frequency: stable on mes, no side effect noted.       Pneumonia  There is no cough or shortness of breath. Chronicity: slowly improving, off the daytime oxygen, uses it overnight, moved in with daughter as her roomate is in cancer treatment and not doign well. Associated symptoms include malaise/fatigue. Pertinent negatives include no appetite change, chest pain, ear pain, fever, headaches, myalgias, PND or rhinorrhea.   Other  Episode onset: pt with osteoporosis and is overdue for reclast infusion, she cannot make to the doctor in [place] who treated it prior, will need to see [facility] infusion center. Pertinent negatives include no abdominal pain, arthralgias, chest pain, chills, congestion, coughing, diaphoresis, fatigue, fever, headaches, joint swelling, myalgias, neck pain or

## 2025-02-25 ENCOUNTER — NURSE ONLY (OUTPATIENT)
Age: 80
End: 2025-02-25
Payer: MEDICARE

## 2025-02-25 VITALS
HEART RATE: 86 BPM | RESPIRATION RATE: 19 BRPM | OXYGEN SATURATION: 88 % | TEMPERATURE: 98 F | SYSTOLIC BLOOD PRESSURE: 124 MMHG | DIASTOLIC BLOOD PRESSURE: 72 MMHG

## 2025-02-25 DIAGNOSIS — M81.0 OSTEOPOROSIS, UNSPECIFIED OSTEOPOROSIS TYPE, UNSPECIFIED PATHOLOGICAL FRACTURE PRESENCE: Primary | ICD-10-CM

## 2025-02-25 PROCEDURE — 96365 THER/PROPH/DIAG IV INF INIT: CPT | Performed by: PSYCHIATRY & NEUROLOGY

## 2025-02-25 RX ORDER — ZOLEDRONIC ACID 0.05 MG/ML
5 INJECTION, SOLUTION INTRAVENOUS ONCE
Status: COMPLETED | OUTPATIENT
Start: 2025-02-25 | End: 2025-02-25

## 2025-02-25 RX ORDER — ACETAMINOPHEN 325 MG/1
650 TABLET ORAL
Status: CANCELLED | OUTPATIENT
Start: 2026-02-10

## 2025-02-25 RX ORDER — SODIUM CHLORIDE 9 MG/ML
INJECTION, SOLUTION INTRAVENOUS CONTINUOUS
Status: DISCONTINUED | OUTPATIENT
Start: 2025-02-25 | End: 2025-02-25 | Stop reason: HOSPADM

## 2025-02-25 RX ORDER — SODIUM CHLORIDE 9 MG/ML
5-250 INJECTION, SOLUTION INTRAVENOUS PRN
Status: DISCONTINUED | OUTPATIENT
Start: 2025-02-25 | End: 2025-02-25 | Stop reason: HOSPADM

## 2025-02-25 RX ORDER — ZOLEDRONIC ACID 0.05 MG/ML
5 INJECTION, SOLUTION INTRAVENOUS ONCE
Status: CANCELLED | OUTPATIENT
Start: 2026-02-10 | End: 2026-02-10

## 2025-02-25 RX ORDER — ALBUTEROL SULFATE 90 UG/1
4 INHALANT RESPIRATORY (INHALATION) PRN
Status: CANCELLED | OUTPATIENT
Start: 2026-02-10

## 2025-02-25 RX ORDER — HYDROCORTISONE SODIUM SUCCINATE 100 MG/2ML
100 INJECTION INTRAMUSCULAR; INTRAVENOUS
Status: CANCELLED | OUTPATIENT
Start: 2026-02-10

## 2025-02-25 RX ORDER — ACETAMINOPHEN 325 MG/1
650 TABLET ORAL
Status: DISCONTINUED | OUTPATIENT
Start: 2025-02-25 | End: 2025-02-25 | Stop reason: HOSPADM

## 2025-02-25 RX ORDER — SODIUM CHLORIDE 9 MG/ML
5-250 INJECTION, SOLUTION INTRAVENOUS PRN
Status: CANCELLED | OUTPATIENT
Start: 2026-02-10

## 2025-02-25 RX ORDER — HEPARIN 100 UNIT/ML
500 SYRINGE INTRAVENOUS PRN
Status: CANCELLED | OUTPATIENT
Start: 2026-02-10

## 2025-02-25 RX ORDER — ALBUTEROL SULFATE 90 UG/1
4 INHALANT RESPIRATORY (INHALATION) PRN
Status: DISCONTINUED | OUTPATIENT
Start: 2025-02-25 | End: 2025-02-25 | Stop reason: HOSPADM

## 2025-02-25 RX ORDER — SODIUM CHLORIDE 9 MG/ML
INJECTION, SOLUTION INTRAVENOUS CONTINUOUS
Status: CANCELLED | OUTPATIENT
Start: 2026-02-10

## 2025-02-25 RX ORDER — EPINEPHRINE 1 MG/ML
0.3 INJECTION, SOLUTION, CONCENTRATE INTRAVENOUS PRN
Status: DISCONTINUED | OUTPATIENT
Start: 2025-02-25 | End: 2025-02-25 | Stop reason: HOSPADM

## 2025-02-25 RX ORDER — ONDANSETRON 2 MG/ML
8 INJECTION INTRAMUSCULAR; INTRAVENOUS
Status: DISCONTINUED | OUTPATIENT
Start: 2025-02-25 | End: 2025-02-25 | Stop reason: HOSPADM

## 2025-02-25 RX ORDER — HEPARIN 100 UNIT/ML
500 SYRINGE INTRAVENOUS PRN
Status: DISCONTINUED | OUTPATIENT
Start: 2025-02-25 | End: 2025-02-25 | Stop reason: HOSPADM

## 2025-02-25 RX ORDER — DIPHENHYDRAMINE HYDROCHLORIDE 50 MG/ML
50 INJECTION INTRAMUSCULAR; INTRAVENOUS
Status: CANCELLED | OUTPATIENT
Start: 2026-02-10

## 2025-02-25 RX ORDER — EPINEPHRINE 1 MG/ML
0.3 INJECTION, SOLUTION, CONCENTRATE INTRAVENOUS PRN
Status: CANCELLED | OUTPATIENT
Start: 2026-02-10

## 2025-02-25 RX ORDER — HYDROCORTISONE SODIUM SUCCINATE 100 MG/2ML
100 INJECTION INTRAMUSCULAR; INTRAVENOUS
Status: DISCONTINUED | OUTPATIENT
Start: 2025-02-25 | End: 2025-02-25 | Stop reason: HOSPADM

## 2025-02-25 RX ORDER — SODIUM CHLORIDE 0.9 % (FLUSH) 0.9 %
5-40 SYRINGE (ML) INJECTION PRN
Status: DISCONTINUED | OUTPATIENT
Start: 2025-02-25 | End: 2025-02-25 | Stop reason: HOSPADM

## 2025-02-25 RX ORDER — DIPHENHYDRAMINE HYDROCHLORIDE 50 MG/ML
50 INJECTION INTRAMUSCULAR; INTRAVENOUS
Status: DISCONTINUED | OUTPATIENT
Start: 2025-02-25 | End: 2025-02-25 | Stop reason: HOSPADM

## 2025-02-25 RX ORDER — ONDANSETRON 2 MG/ML
8 INJECTION INTRAMUSCULAR; INTRAVENOUS
Status: CANCELLED | OUTPATIENT
Start: 2026-02-10

## 2025-02-25 RX ORDER — SODIUM CHLORIDE 0.9 % (FLUSH) 0.9 %
5-40 SYRINGE (ML) INJECTION PRN
Status: CANCELLED | OUTPATIENT
Start: 2026-02-10

## 2025-02-25 RX ADMIN — ZOLEDRONIC ACID 5 MG: 0.05 INJECTION, SOLUTION INTRAVENOUS at 14:20

## 2025-02-25 NOTE — PROGRESS NOTES
[facility]  [address], [address]tr[address]  [place] [id number]  Office : [phone], Fax: [phone]     Pre-questions:    Have you had any teeth pulled in the last month or any appointments to have major dental work/teeth pulled in the next two months? No    Have you experienced any fractures in the last year? No    Are you taking vitamin D and Calcium? Yes , both    When was your last Reclast/osteoporosis infusion? 2/8/24      Patient arrived ambulatory to the infusion center for a Reclast infusion. Vital signs WNL. Labs drawn on 12/11/25, Ca: 9.2. All labs are within parameters to receive medication. Patient offered snacks, warm blankets and drinks during visit.     22 g PIV catheter placed in the patient's right wrist x 1 attempt; flushed with 10ml NS. Patient tolerated well.   Reclast 5 mg/100 mL administered at 400ml/hr. Patient tolerated the infusion well. Total infusion time: 16 minutes.    Post infusion vital signs WNL. PIV flushed with 10ml NS and removed without difficulty, catheter intact; dressing applied. Patient instructed to leave the dressing on for at least 30 minutes before removal. No observation time recommended or required.     Patient discharged ambulatory from the infusion center feeling well.   Appointment time given for next infusion prior to departure.

## 2025-02-26 DIAGNOSIS — M81.0 OSTEOPOROSIS, UNSPECIFIED OSTEOPOROSIS TYPE, UNSPECIFIED PATHOLOGICAL FRACTURE PRESENCE: Primary | ICD-10-CM

## 2025-02-26 RX ORDER — SODIUM CHLORIDE 9 MG/ML
5-250 INJECTION, SOLUTION INTRAVENOUS PRN
OUTPATIENT
Start: 2025-02-26

## 2025-02-26 RX ORDER — ONDANSETRON 2 MG/ML
8 INJECTION INTRAMUSCULAR; INTRAVENOUS
OUTPATIENT
Start: 2025-02-26

## 2025-02-26 RX ORDER — SODIUM CHLORIDE 0.9 % (FLUSH) 0.9 %
5-40 SYRINGE (ML) INJECTION PRN
OUTPATIENT
Start: 2025-02-26

## 2025-02-26 RX ORDER — ACETAMINOPHEN 325 MG/1
650 TABLET ORAL
OUTPATIENT
Start: 2025-02-26

## 2025-02-26 RX ORDER — SODIUM CHLORIDE 9 MG/ML
INJECTION, SOLUTION INTRAVENOUS CONTINUOUS
OUTPATIENT
Start: 2025-02-26

## 2025-02-26 RX ORDER — HEPARIN 100 UNIT/ML
500 SYRINGE INTRAVENOUS PRN
OUTPATIENT
Start: 2025-02-26

## 2025-02-26 RX ORDER — ZOLEDRONIC ACID 0.05 MG/ML
5 INJECTION, SOLUTION INTRAVENOUS ONCE
OUTPATIENT
Start: 2025-02-26 | End: 2025-02-26

## 2025-02-26 RX ORDER — DIPHENHYDRAMINE HYDROCHLORIDE 50 MG/ML
50 INJECTION INTRAMUSCULAR; INTRAVENOUS
OUTPATIENT
Start: 2025-02-26

## 2025-02-26 RX ORDER — EPINEPHRINE 1 MG/ML
0.3 INJECTION, SOLUTION, CONCENTRATE INTRAVENOUS PRN
OUTPATIENT
Start: 2025-02-26

## 2025-02-26 RX ORDER — ALBUTEROL SULFATE 90 UG/1
4 INHALANT RESPIRATORY (INHALATION) PRN
OUTPATIENT
Start: 2025-02-26

## 2025-02-26 RX ORDER — HYDROCORTISONE SODIUM SUCCINATE 100 MG/2ML
100 INJECTION INTRAMUSCULAR; INTRAVENOUS
OUTPATIENT
Start: 2025-02-26

## 2025-02-27 ENCOUNTER — OFFICE VISIT (OUTPATIENT)
Dept: FAMILY MEDICINE CLINIC | Facility: CLINIC | Age: 80
End: 2025-02-27

## 2025-02-27 VITALS — HEART RATE: 90 BPM | OXYGEN SATURATION: 94 % | HEIGHT: 58 IN | BODY MASS INDEX: 28.01 KG/M2

## 2025-02-27 DIAGNOSIS — J45.20 MILD INTERMITTENT ASTHMA, UNSPECIFIED WHETHER COMPLICATED: ICD-10-CM

## 2025-02-27 DIAGNOSIS — R73.9 HIGH BLOOD SUGAR: ICD-10-CM

## 2025-02-27 DIAGNOSIS — J84.9 INTERSTITIAL PULMONARY DISEASE, UNSPECIFIED (HCC): ICD-10-CM

## 2025-02-27 DIAGNOSIS — I10 ESSENTIAL HYPERTENSION: ICD-10-CM

## 2025-02-27 DIAGNOSIS — R09.02 HYPOXEMIA: Primary | ICD-10-CM

## 2025-02-27 DIAGNOSIS — E83.42 HYPOMAGNESEMIA: ICD-10-CM

## 2025-02-27 ASSESSMENT — ENCOUNTER SYMPTOMS
ABDOMINAL PAIN: 0
COUGH: 0
ANAL BLEEDING: 0
EYE REDNESS: 0
APNEA: 0
WHEEZING: 1
FACIAL SWELLING: 0
BLOOD IN STOOL: 0
RHINORRHEA: 0
CHEST TIGHTNESS: 0
SINUS PAIN: 0
ABDOMINAL DISTENTION: 0
EYE ITCHING: 0
EYE DISCHARGE: 0
BACK PAIN: 0
EYE PAIN: 0
SINUS PRESSURE: 0
SHORTNESS OF BREATH: 1

## 2025-02-27 ASSESSMENT — PATIENT HEALTH QUESTIONNAIRE - PHQ9
SUM OF ALL RESPONSES TO PHQ QUESTIONS 1-9: 0
SUM OF ALL RESPONSES TO PHQ QUESTIONS 1-9: 0
SUM OF ALL RESPONSES TO PHQ9 QUESTIONS 1 & 2: 0
SUM OF ALL RESPONSES TO PHQ QUESTIONS 1-9: 0
2. FEELING DOWN, DEPRESSED OR HOPELESS: NOT AT ALL
1. LITTLE INTEREST OR PLEASURE IN DOING THINGS: NOT AT ALL
SUM OF ALL RESPONSES TO PHQ QUESTIONS 1-9: 0

## 2025-02-27 NOTE — PROGRESS NOTES
[facility]  _______________________________________  Ryan Angel MD                 [address] [place] [address]        Ryan Triplett [place] [address]                                                                                    Phone: [phone]                                                                                    Fax: [phone]    Fabby Chiu is a 79 y.o. female who is seen for evaluation of   Chief Complaint   Patient presents with   • Shortness of Breath   • Urinary Frequency at Night   • Wheezing       HPI:   Shortness of Breath  Episode onset: mild but with very low oxygen readings ath ome, see below for details. Associated symptoms include wheezing. Pertinent negatives include no abdominal pain, chest pain, ear pain, fever, headaches, leg swelling, rash or rhinorrhea.   Wheezing   Episode onset: mild, comes and goes, none today. Associated symptoms include shortness of breath. Pertinent negatives include no abdominal pain, chest pain, chills, coughing, ear pain, fever, headaches, rash or rhinorrhea.   Hypertension  Episode onset: on meds, no side effect noted. Associated symptoms include shortness of breath. Pertinent negatives include no chest pain or headaches.   Depression  Visit Type: follow-up  Patient presents with the following symptoms: shortness of breath.  Patient is not experiencing: confusion and nervousness/anxiety.  Episode frequency: stable on meds.      Asthma  She complains of shortness of breath and wheezing. There is no cough. Chronicity: stable on meds, no recent probelms noted, mild wheezes at times but controlled largely. Pertinent negatives include no appetite change, chest pain, ear pain, fever, headaches, myalgias or rhinorrhea.    Chief Complaint   Patient presents with   • Shortness of Breath   • Urinary Frequency at Night   • Wheezing         Review of Systems:    Review of Systems   Constitutional:

## 2025-03-26 ENCOUNTER — RESULTS FOLLOW-UP (OUTPATIENT)
Dept: PULMONOLOGY | Age: 80
End: 2025-03-26

## 2025-03-26 NOTE — RESULT ENCOUNTER NOTE
Please let patient know that recent PERRY was abnormal showing about 34 minutes of low oxygen levels as low as 84%.  Would recommend ongoing use of oxygen at 2 lpm with sleep.

## 2025-03-26 NOTE — TELEPHONE ENCOUNTER
----- Message from NAA Dawson CNP sent at 3/26/2025 11:31 AM EDT -----  Please let patient know that recent PERRY was abnormal showing about 34 minutes of low oxygen levels as low as 84%.  Would recommend ongoing use of oxygen at 2 lpm with sleep.

## 2025-03-28 ENCOUNTER — TELEPHONE (OUTPATIENT)
Dept: PULMONOLOGY | Age: 80
End: 2025-03-28

## 2025-03-28 DIAGNOSIS — R91.8 LUNG NODULES: ICD-10-CM

## 2025-03-28 DIAGNOSIS — J45.20 MILD INTERMITTENT ASTHMA, UNSPECIFIED WHETHER COMPLICATED: Primary | ICD-10-CM

## 2025-04-21 DIAGNOSIS — K21.9 GASTROESOPHAGEAL REFLUX DISEASE, UNSPECIFIED WHETHER ESOPHAGITIS PRESENT: ICD-10-CM

## 2025-04-21 RX ORDER — SUCRALFATE 1 G/1
1 TABLET ORAL 3 TIMES DAILY
Qty: 270 TABLET | Refills: 0 | Status: SHIPPED | OUTPATIENT
Start: 2025-04-21 | End: 2025-07-20

## 2025-04-21 RX ORDER — PANTOPRAZOLE SODIUM 40 MG/1
40 TABLET, DELAYED RELEASE ORAL DAILY
Qty: 90 TABLET | Refills: 0 | Status: SHIPPED | OUTPATIENT
Start: 2025-04-21 | End: 2025-07-20

## 2025-04-21 NOTE — TELEPHONE ENCOUNTER
Patient called requesting refill(s) on     Requested Prescriptions     Pending Prescriptions Disp Refills    pantoprazole (PROTONIX) 40 MG tablet 90 tablet 0     Sig: Take 1 tablet by mouth daily    sucralfate (CARAFATE) 1 GM tablet 270 tablet 0     Sig: Take 1 tablet by mouth in the morning, at noon, and at bedtime       Last appointment- 2/27/25  Next appointment- 6/10/25

## 2025-04-24 DIAGNOSIS — I26.99 OTHER PULMONARY EMBOLISM WITHOUT ACUTE COR PULMONALE, UNSPECIFIED CHRONICITY (HCC): Primary | ICD-10-CM

## 2025-04-29 ENCOUNTER — HOSPITAL ENCOUNTER (OUTPATIENT)
Dept: LAB | Age: 80
Discharge: HOME | End: 2025-04-29
Payer: MEDICARE

## 2025-04-29 ENCOUNTER — OFFICE VISIT (OUTPATIENT)
Dept: ONCOLOGY | Age: 80
End: 2025-04-29
Payer: MEDICARE

## 2025-04-29 VITALS
HEART RATE: 74 BPM | TEMPERATURE: 97.7 F | SYSTOLIC BLOOD PRESSURE: 157 MMHG | RESPIRATION RATE: 18 BRPM | DIASTOLIC BLOOD PRESSURE: 78 MMHG | BODY MASS INDEX: 27.06 KG/M2 | HEIGHT: 59 IN | OXYGEN SATURATION: 97 %

## 2025-04-29 DIAGNOSIS — I26.99 OTHER PULMONARY EMBOLISM WITHOUT ACUTE COR PULMONALE, UNSPECIFIED CHRONICITY (HCC): ICD-10-CM

## 2025-04-29 DIAGNOSIS — I82.90 THROMBOSIS: Primary | ICD-10-CM

## 2025-04-29 DIAGNOSIS — Z74.09 IMMOBILITY: ICD-10-CM

## 2025-04-29 LAB
ALBUMIN SERPL-MCNC: 3.6 G/DL (ref 3.2–4.6)
ALBUMIN/GLOB SERPL: 1 (ref 1–1.9)
ALP SERPL-CCNC: 49 U/L (ref 35–104)
ALT SERPL-CCNC: 13 U/L (ref 8–45)
ANION GAP SERPL CALC-SCNC: 12 MMOL/L (ref 7–16)
AST SERPL-CCNC: 21 U/L (ref 15–37)
BASOPHILS # BLD: 0.02 K/UL (ref 0–0.2)
BASOPHILS NFR BLD: 0.3 % (ref 0–2)
BILIRUB SERPL-MCNC: 0.3 MG/DL (ref 0–1.2)
BUN SERPL-MCNC: 19 MG/DL (ref 8–23)
CALCIUM SERPL-MCNC: 9.4 MG/DL (ref 8.8–10.2)
CHLORIDE SERPL-SCNC: 99 MMOL/L (ref 98–107)
CO2 SERPL-SCNC: 25 MMOL/L (ref 20–29)
CREAT SERPL-MCNC: 0.66 MG/DL (ref 0.6–1.1)
D DIMER PPP FEU-MCNC: 0.28 UG/ML(FEU)
DIFFERENTIAL METHOD BLD: ABNORMAL
EOSINOPHIL # BLD: 0.08 K/UL (ref 0–0.8)
EOSINOPHIL NFR BLD: 1.2 % (ref 0.5–7.8)
ERYTHROCYTE [DISTWIDTH] IN BLOOD BY AUTOMATED COUNT: 13.9 % (ref 11.9–14.6)
GLOBULIN SER CALC-MCNC: 3.5 G/DL (ref 2.3–3.5)
GLUCOSE SERPL-MCNC: 99 MG/DL (ref 70–99)
HCT VFR BLD AUTO: 40.2 % (ref 35.8–46.3)
HGB BLD-MCNC: 12.4 G/DL (ref 11.7–15.4)
IMM GRANULOCYTES # BLD AUTO: 0.01 K/UL (ref 0–0.5)
IMM GRANULOCYTES NFR BLD AUTO: 0.1 % (ref 0–5)
LYMPHOCYTES # BLD: 1.59 K/UL (ref 0.5–4.6)
LYMPHOCYTES NFR BLD: 23.4 % (ref 13–44)
MCH RBC QN AUTO: 28.1 PG (ref 26.1–32.9)
MCHC RBC AUTO-ENTMCNC: 30.8 G/DL (ref 31.4–35)
MCV RBC AUTO: 91.2 FL (ref 82–102)
MONOCYTES # BLD: 0.81 K/UL (ref 0.1–1.3)
MONOCYTES NFR BLD: 11.9 % (ref 4–12)
NEUTS SEG # BLD: 4.28 K/UL (ref 1.7–8.2)
NEUTS SEG NFR BLD: 63.1 % (ref 43–78)
NRBC # BLD: 0 K/UL (ref 0–0.2)
PLATELET # BLD AUTO: 200 K/UL (ref 150–450)
PMV BLD AUTO: 9.4 FL (ref 9.4–12.3)
POTASSIUM SERPL-SCNC: 4.9 MMOL/L (ref 3.5–5.1)
PROT SERPL-MCNC: 7 G/DL (ref 6.3–8.2)
RBC # BLD AUTO: 4.41 M/UL (ref 4.05–5.2)
SODIUM SERPL-SCNC: 136 MMOL/L (ref 136–145)
WBC # BLD AUTO: 6.8 K/UL (ref 4.3–11.1)

## 2025-04-29 PROCEDURE — G8399 PT W/DXA RESULTS DOCUMENT: HCPCS | Performed by: INTERNAL MEDICINE

## 2025-04-29 PROCEDURE — 1090F PRES/ABSN URINE INCON ASSESS: CPT | Performed by: INTERNAL MEDICINE

## 2025-04-29 PROCEDURE — 1159F MED LIST DOCD IN RCRD: CPT | Performed by: INTERNAL MEDICINE

## 2025-04-29 PROCEDURE — 80053 COMPREHEN METABOLIC PANEL: CPT

## 2025-04-29 PROCEDURE — 1036F TOBACCO NON-USER: CPT | Performed by: INTERNAL MEDICINE

## 2025-04-29 PROCEDURE — 1125F AMNT PAIN NOTED PAIN PRSNT: CPT | Performed by: INTERNAL MEDICINE

## 2025-04-29 PROCEDURE — 3077F SYST BP >= 140 MM HG: CPT | Performed by: INTERNAL MEDICINE

## 2025-04-29 PROCEDURE — 1160F RVW MEDS BY RX/DR IN RCRD: CPT | Performed by: INTERNAL MEDICINE

## 2025-04-29 PROCEDURE — 36415 COLL VENOUS BLD VENIPUNCTURE: CPT

## 2025-04-29 PROCEDURE — G8427 DOCREV CUR MEDS BY ELIG CLIN: HCPCS | Performed by: INTERNAL MEDICINE

## 2025-04-29 PROCEDURE — 99214 OFFICE O/P EST MOD 30 MIN: CPT | Performed by: INTERNAL MEDICINE

## 2025-04-29 PROCEDURE — 3078F DIAST BP <80 MM HG: CPT | Performed by: INTERNAL MEDICINE

## 2025-04-29 PROCEDURE — 85379 FIBRIN DEGRADATION QUANT: CPT

## 2025-04-29 PROCEDURE — 85025 COMPLETE CBC W/AUTO DIFF WBC: CPT

## 2025-04-29 PROCEDURE — 1123F ACP DISCUSS/DSCN MKR DOCD: CPT | Performed by: INTERNAL MEDICINE

## 2025-04-29 PROCEDURE — G8417 CALC BMI ABV UP PARAM F/U: HCPCS | Performed by: INTERNAL MEDICINE

## 2025-04-29 NOTE — PROGRESS NOTES
(1/14/2025)    Exercise Vital Sign     Days of Exercise per Week: 0 days     Minutes of Exercise per Session: 0 min   Stress: Not on file   Social Connections: Unknown (12/28/2021)    Received from [facility]    Social Connections     Frequency of Communication with Friends and Family: Not asked     Frequency of Social Gatherings with Friends and Family: Not asked   Intimate Partner Violence: Unknown (12/28/2021)    Received from [employer]    Intimate Partner Violence     Fear of Current or Ex-Partner: Not asked     Emotionally Abused: Not asked     Physically Abused: Not asked     Sexually Abused: Not asked   Housing Stability: Low Risk  (1/14/2025)    Housing Stability Vital Sign     Unable to Pay for Housing in the Last Year: No     Number of Times Moved in the Last Year: 0     Homeless in the Last Year: No     Current Outpatient Medications   Medication Sig Dispense Refill    pantoprazole (PROTONIX) 40 MG tablet Take 1 tablet by mouth daily 90 tablet 0    sucralfate (CARAFATE) 1 GM tablet Take 1 tablet by mouth in the morning, at noon, and at bedtime 270 tablet 0    IYUZEH 0.005 % SOLN INSTILL 1 DROP IN BOTH EYES EVERY NIGHT      montelukast (SINGULAIR) 10 MG tablet Take 1 tablet by mouth daily 90 tablet 3    HYDROcodone-acetaminophen (NORCO)  MG per tablet Take 1 tablet by mouth every 6 hours as needed for Pain.      sertraline (ZOLOFT) 100 MG tablet Take 1.5 tablets by mouth daily 135 tablet 1    apixaban (ELIQUIS) 2.5 MG TABS tablet Take 1 tablet by mouth 2 times daily 180 tablet 3    albuterol sulfate HFA (PROVENTIL;VENTOLIN;PROAIR) 108 (90 Base) MCG/ACT inhaler Inhale 2 puffs into the lungs every 4 hours as needed for Wheezing asthmadx code j45.09 3 each 3    budesonide-formoterol (SYMBICORT) 80-4.5 MCG/ACT AERO Inhale 2 puffs into the lungs in the morning and 2 puffs in the evening. 3 each 3    OXYGEN Inhale into the lungs 4 lpm qhs      Azelastine HCl 137

## 2025-04-29 NOTE — PATIENT INSTRUCTIONS
Patient Information from Today's Visit    Diagnosis: follow up for DVT  On low dose eliquis    Follow Up Instructions:     You have refills of Eliquis to last you another 6 months    Follow up: 6 months with Dr Franco     Treatment Summary has been discussed and given to patient: N/A      Current Labs:   Hospital Outpatient Visit on 04/29/2025   Component Date Value Ref Range Status    WBC 04/29/2025 6.8  4.3 - 11.1 K/uL Final    RBC 04/29/2025 4.41  4.05 - 5.2 M/uL Final    Hemoglobin 04/29/2025 12.4  11.7 - 15.4 g/dL Final    Hematocrit 04/29/2025 40.2  35.8 - 46.3 % Final    MCV 04/29/2025 91.2  82.0 - 102.0 FL Final    MCH 04/29/2025 28.1  26.1 - 32.9 PG Final    MCHC 04/29/2025 30.8 (L)  31.4 - 35.0 g/dL Final    RDW 04/29/2025 13.9  11.9 - 14.6 % Final    Platelets 04/29/2025 200  150 - 450 K/uL Final    MPV 04/29/2025 9.4  9.4 - 12.3 FL Final    nRBC 04/29/2025 0.00  0.0 - 0.2 K/uL Final    **Note: Absolute NRBC parameter is now reported with Hemogram**    Neutrophils % 04/29/2025 63.1  43.0 - 78.0 % Final    Lymphocytes % 04/29/2025 23.4  13.0 - 44.0 % Final    Monocytes % 04/29/2025 11.9  4.0 - 12.0 % Final    Eosinophils % 04/29/2025 1.2  0.5 - 7.8 % Final    Basophils % 04/29/2025 0.3  0.0 - 2.0 % Final    Immature Granulocytes % 04/29/2025 0.1  0.0 - 5.0 % Final    Neutrophils Absolute 04/29/2025 4.28  1.70 - 8.20 K/UL Final    Lymphocytes Absolute 04/29/2025 1.59  0.50 - 4.60 K/UL Final    Monocytes Absolute 04/29/2025 0.81  0.10 - 1.30 K/UL Final    Eosinophils Absolute 04/29/2025 0.08  0.00 - 0.80 K/UL Final    Basophils Absolute 04/29/2025 0.02  0.00 - 0.20 K/UL Final    Immature Granulocytes Absolute 04/29/2025 0.01  0.00 - 0.50 K/UL Final    Differential Type 04/29/2025 AUTOMATED    Final    Sodium 04/29/2025 136  136 - 145 mmol/L Final    Potassium 04/29/2025 4.9  3.5 - 5.1 mmol/L Final    Specimen hemolysis has exceeded the interference as defined by Roche. Value may be false increased.

## 2025-04-30 ENCOUNTER — TELEPHONE (OUTPATIENT)
Dept: FAMILY MEDICINE CLINIC | Facility: CLINIC | Age: 80
End: 2025-04-30

## 2025-04-30 DIAGNOSIS — I10 ESSENTIAL HYPERTENSION: ICD-10-CM

## 2025-04-30 RX ORDER — LISINOPRIL 5 MG/1
5 TABLET ORAL DAILY
Qty: 90 TABLET | Refills: 1 | Status: SHIPPED | OUTPATIENT
Start: 2025-04-30 | End: 2025-10-27

## 2025-04-30 NOTE — TELEPHONE ENCOUNTER
Requested Prescriptions     Pending Prescriptions Disp Refills    lisinopril (PRINIVIL;ZESTRIL) 5 MG tablet 90 tablet 1     Sig: Take 1 tablet by mouth daily

## 2025-05-14 ENCOUNTER — TELEPHONE (OUTPATIENT)
Dept: FAMILY MEDICINE CLINIC | Facility: CLINIC | Age: 80
End: 2025-05-14

## 2025-05-14 NOTE — TELEPHONE ENCOUNTER
Diarrhea and cramping. Poonam wants protocol. Pt feeling weak. [phone]    Poonam is not on release of information. Tried called patients number but Mailbox full.       Recommendation: Drink water, BRAT diet, imodium, avoid dairy and fried foods.

## 2025-06-12 ENCOUNTER — APPOINTMENT (OUTPATIENT)
Dept: GENERAL RADIOLOGY | Age: 80
End: 2025-06-12
Payer: MEDICARE

## 2025-06-12 ENCOUNTER — HOSPITAL ENCOUNTER (EMERGENCY)
Age: 80
Discharge: HOME | End: 2025-06-12
Attending: EMERGENCY MEDICINE
Payer: MEDICARE

## 2025-06-12 VITALS
WEIGHT: 136 LBS | TEMPERATURE: 98 F | BODY MASS INDEX: 27.42 KG/M2 | OXYGEN SATURATION: 97 % | HEART RATE: 85 BPM | SYSTOLIC BLOOD PRESSURE: 134 MMHG | HEIGHT: 59 IN | DIASTOLIC BLOOD PRESSURE: 67 MMHG | RESPIRATION RATE: 18 BRPM

## 2025-06-12 DIAGNOSIS — S62.339A CLOSED BOXER'S FRACTURE, INITIAL ENCOUNTER: Primary | ICD-10-CM

## 2025-06-12 PROCEDURE — 29125 APPL SHORT ARM SPLINT STATIC: CPT

## 2025-06-12 PROCEDURE — 73130 X-RAY EXAM OF HAND: CPT

## 2025-06-12 PROCEDURE — 99283 EMERGENCY DEPT VISIT LOW MDM: CPT

## 2025-06-12 ASSESSMENT — PAIN DESCRIPTION - LOCATION: LOCATION: HAND

## 2025-06-12 ASSESSMENT — PAIN - FUNCTIONAL ASSESSMENT
PAIN_FUNCTIONAL_ASSESSMENT: NONE - DENIES PAIN
PAIN_FUNCTIONAL_ASSESSMENT: 0-10

## 2025-06-12 ASSESSMENT — PAIN DESCRIPTION - ORIENTATION: ORIENTATION: RIGHT

## 2025-06-12 ASSESSMENT — PAIN SCALES - GENERAL: PAINLEVEL_OUTOF10: 3

## 2025-06-12 ASSESSMENT — PAIN DESCRIPTION - DESCRIPTORS: DESCRIPTORS: STABBING

## 2025-06-12 NOTE — ED NOTES
Patient mobility status ambulates wheelchair bound.     I have reviewed discharge instructions with the patient.  The patient and caregiver verbalized understanding.    Patient left ED via Discharge Method: wheelchair to Home with  caregiver .    Opportunity for questions and clarification provided.     Patient given 0 scripts.

## 2025-06-12 NOTE — ED TRIAGE NOTES
Pt stated she had a mechanical fall on Sunday, complaining of right hand pain. Pt denies hitting head.

## 2025-06-12 NOTE — ED PROVIDER NOTES
Emergency Department Provider Note       [facility] EMERGENCY DEPT   PCP: Ryan Angel MD   Age: 80 y.o.   Sex: female     DISPOSITION Decision To Discharge 06/12/2025 02:00:21 PM    ICD-10-CM    1. Closed boxer's fracture, initial encounter  S62.339A [facility] Orthopaedics          Medical Decision Making     My review of the x-ray of the right hand shows a minimally angulated and nondisplaced fracture of the fifth metacarpal.  Ulnar gutter splint applied.  She will be referred to orthopedics for follow-up.     1 acute, uncomplicated illness or injury.  Shared medical decision making was utilized in creating the patients health plan today.  I independently ordered and reviewed each unique test.                         History     Patient presents with pain swelling and bruising to the dorsal aspect of the right hand laterally.  She sustained this injury in a fall several days ago.  She denies any other injuries and has no other complaints.    The history is provided by the patient.     Physical Exam     Vitals signs and nursing note reviewed:  Vitals:    06/12/25 1301   BP: (!) 143/80   Pulse: 84   Resp: 17   Temp: 97.9 °F (36.6 °C)   TempSrc: Oral   SpO2: 96%   Weight: 61.7 kg (136 lb)   Height: 1.499 m (4' 11\")      Physical Exam  Vitals and nursing note reviewed.   Constitutional:       General: She is not in acute distress.     Appearance: Normal appearance. She is not ill-appearing or toxic-appearing.   Eyes:      Extraocular Movements: Extraocular movements intact.      Conjunctiva/sclera: Conjunctivae normal.      Pupils: Pupils are equal, round, and reactive to light.   Musculoskeletal:         General: Swelling, tenderness and signs of injury present. No deformity. Normal range of motion.      Comments: Swelling, bruising and tenderness noted over the dorsal aspect of the 4th and 5th metacarpals of the right hand.  The hand is neurovascular intact and there is no evidence of injury  PO    Take by mouth    HYDROCODONE-ACETAMINOPHEN (NORCO)  MG PER TABLET    Take 1 tablet by mouth every 6 hours as needed for Pain.    IYUZEH 0.005 % SOLN    INSTILL 1 DROP IN BOTH EYES EVERY NIGHT    LISINOPRIL (PRINIVIL;ZESTRIL) 5 MG TABLET    Take 1 tablet by mouth daily    MELATONIN 10 MG CAPS CAPSULE    Take by mouth    MONTELUKAST (SINGULAIR) 10 MG TABLET    Take 1 tablet by mouth daily    NYSTATIN-TRIAMCINOLONE (MYCOLOG II) 435662-5.1 UNIT/GM-% CREAM    APPLY CREAM TO AFFECTED AREA TWICE A DAY    OXYGEN    Inhale into the lungs 4 lpm qhs    PANTOPRAZOLE (PROTONIX) 40 MG TABLET    Take 1 tablet by mouth daily    SERTRALINE (ZOLOFT) 100 MG TABLET    Take 1.5 tablets by mouth daily    SUCRALFATE (CARAFATE) 1 GM TABLET    Take 1 tablet by mouth in the morning, at noon, and at bedtime    VITAMIN D (CHOLECALCIFEROL) 25 MCG (1000 UT) TABS TABLET    Take by mouth daily    ZOLEDRONIC ACID (RECLAST IV)    Infuse intravenously Once yearly-- February        Results from this emergency department visit:      No results found for any visits on 06/12/25.      XR HAND RIGHT (MIN 3 VIEWS)    (Results Pending)                No results for input(s): \"COVID19\" in the last 72 hours.     Voice dictation software was used during the making of this note.  This software is not perfect and grammatical and other typographical errors may be present.  This note has not been completely proofread for errors.        Jamar Marina, DO  06/12/25 7699

## 2025-06-13 ENCOUNTER — CARE COORDINATION (OUTPATIENT)
Dept: CARE COORDINATION | Facility: CLINIC | Age: 80
End: 2025-06-13

## 2025-06-13 NOTE — CARE COORDINATION
Ambulatory Care Coordination Note     6/13/2025 8:30 AM     ACM outreach attempt by this ACM today to offer care management services. ACM was unable to reach the patient by telephone today;   hospital follow up call within 2 business days of discharge: Yes     ACM: Mireille Gilbert RN     Care Summary Note: voicemail full    PCP/Specialist follow up:   Future Appointments         Provider Specialty Dept Phone    6/17/2025 1:40 PM (Arrive by 1:25 PM) Leonarda Villarreal MD Orthopedic Surgery [phone]    6/18/2025 2:15 PM Ryan Angel MD Family Medicine [phone]    7/31/2025 3:15 PM (Arrive by 3:00 PM) Gia Mcgrath, APRN - [facility] Pulmonology [phone]    10/29/2025 10:40 AM PERIPHERAL Lab [phone]    10/29/2025 11:45 AM Joce Franco MD Oncology [phone]    3/2/2026 2:00 PM CHAIR 1 Neurology [phone]            Follow Up:   Plan for next ACM outreach in approximately 1-2 days  to complete:  - outreach attempt to offer care management services.

## 2025-06-16 ENCOUNTER — OFFICE VISIT (OUTPATIENT)
Age: 80
End: 2025-06-16
Payer: MEDICARE

## 2025-06-16 ENCOUNTER — CARE COORDINATION (OUTPATIENT)
Dept: CARE COORDINATION | Facility: CLINIC | Age: 80
End: 2025-06-16

## 2025-06-16 VITALS — HEIGHT: 59 IN | WEIGHT: 136 LBS | BODY MASS INDEX: 27.42 KG/M2

## 2025-06-16 DIAGNOSIS — S62.326A DISPLACED FRACTURE OF SHAFT OF FIFTH METACARPAL BONE, RIGHT HAND, INITIAL ENCOUNTER FOR CLOSED FRACTURE: Primary | ICD-10-CM

## 2025-06-16 PROCEDURE — G8417 CALC BMI ABV UP PARAM F/U: HCPCS | Performed by: ORTHOPAEDIC SURGERY

## 2025-06-16 PROCEDURE — 99204 OFFICE O/P NEW MOD 45 MIN: CPT | Performed by: ORTHOPAEDIC SURGERY

## 2025-06-16 PROCEDURE — 1090F PRES/ABSN URINE INCON ASSESS: CPT | Performed by: ORTHOPAEDIC SURGERY

## 2025-06-16 PROCEDURE — G8428 CUR MEDS NOT DOCUMENT: HCPCS | Performed by: ORTHOPAEDIC SURGERY

## 2025-06-16 PROCEDURE — G8399 PT W/DXA RESULTS DOCUMENT: HCPCS | Performed by: ORTHOPAEDIC SURGERY

## 2025-06-16 PROCEDURE — 1123F ACP DISCUSS/DSCN MKR DOCD: CPT | Performed by: ORTHOPAEDIC SURGERY

## 2025-06-16 PROCEDURE — 1036F TOBACCO NON-USER: CPT | Performed by: ORTHOPAEDIC SURGERY

## 2025-06-16 PROCEDURE — 1159F MED LIST DOCD IN RCRD: CPT | Performed by: ORTHOPAEDIC SURGERY

## 2025-06-16 NOTE — CARE COORDINATION
effects, taught by Mireille Gilbert RN at 6/16/2025  8:10 AM.  Learner: Patient  Readiness: Acceptance  Method: Explanation  Response: Verbalizes Understanding    Educate safety precautions, taught by Mireille Gilbert RN at 6/16/2025  8:10 AM.  Learner: Patient  Readiness: Acceptance  Method: Explanation  Response: Verbalizes Understanding    Educate home safety, taught by Mireille Gilbert RN at 6/16/2025  8:10 AM.  Learner: Patient  Readiness: Acceptance  Method: Explanation  Response: Verbalizes Understanding    COGNITIVE : FALLS-RISK OF, taught by Mireille Gilbert RN at 6/16/2025  8:10 AM.  Learner: Patient  Readiness: Acceptance  Method: Explanation  Response: Verbalizes Understanding    Educate bathing safety, taught by Mireille Gilbert RN at 6/16/2025  8:10 AM.  Learner: Patient  Readiness: Acceptance  Method: Explanation  Response: Verbalizes Understanding    Educate ambulation safety, taught by Mireille Gilbert RN at 6/16/2025  8:10 AM.  Learner: Patient  Readiness: Acceptance  Method: Explanation  Response: Verbalizes Understanding    Education Comments  No comments found.     ,    Goals Addressed                   This Visit's Progress     Reduce Falls         I will reduce my risk of falls by the following: Remove rugs or use non slip rugs  Install grab bars in bathroom  Use walking aids like cane or walker  Follow through on orders for PT    Barriers: impairment:  physical: patient has issues with ambulation  Plan for overcoming my barriers: weekly calls   Confidence: 7/10  Anticipated Goal Completion Date: 9/16/25               PCP/Specialist follow up:   Future Appointments         Provider Specialty Dept Phone    6/16/2025 10:10 AM (Arrive by 9:55 AM) Leonarda Villarreal MD Orthopedic Surgery [phone]    6/18/2025 2:15 PM Ryan Angel MD Family Medicine [phone]    7/31/2025 3:15 PM (Arrive by 3:00 PM) Gia Mcgrath APRN - [facility] Pulmonology [phone]    10/29/2025 10:40 AM

## 2025-06-16 NOTE — PROGRESS NOTES
Orthopaedic Hand Clinic Note    Name: Fabby Chiu  YOB: 1945  Gender: female  MRN: [id number]      CC: Patient referred for evaluation of upper extremity pain    HPI: Fabby Chiu is a 80 y.o. female with a chief complaint of injury to the right hand which occurred as a result of a fall on 6/1. She saw her PCP a week later and had xrays performed. She is here with her daughter in law.      ROS/Meds/PSH/PMH/FH/SH: I personally reviewed the patients standard intake form.  Pertinents are discussed in the HPI    Physical Examination:    Musculoskeletal Exam:  Examination on the right upper extremity demonstrates cap refill < 5 seconds in all fingers, there is swelling and tenderness at the 5th metacarpal. She is able to perform full extension of the MCP and PIP joints. Flexion is limited by pain. There is mild rotational deformity with overlap of the small on the ring finger.    Imaging / Electrodiagnostic Tests:     I independently reviewed and interpreted right hand radiographs.  They demonstrate mildly displaced 5th metacarpal shaft fracture      Assessment:     ICD-10-CM    1. Displaced fracture of shaft of fifth metacarpal bone, right hand, initial encounter for closed fracture  S62.326A Ambulatory Referral to [facility]          Plan:   We discussed the diagnosis and different treatment options. We discussed observation, therapy, antiinflammatory medications and other pertinent treatment modalities.    After discussing in detail the patient elects to proceed with nonsurgical treatment with placement into a brace. She is not interested in surgery, and does understand that the slight rotation deformity can only be corrected with surgery, but therapy may help her accommodate to it. Patient will be placed into a removable brace. Brace should remain in place at all times except for hygiene. The patient can perform finger range of motion as tolerated, but should avoid any lifting with the

## 2025-06-18 ENCOUNTER — TELEMEDICINE (OUTPATIENT)
Dept: FAMILY MEDICINE CLINIC | Facility: CLINIC | Age: 80
End: 2025-06-18
Payer: MEDICARE

## 2025-06-18 DIAGNOSIS — J45.20 MILD INTERMITTENT ASTHMA, UNSPECIFIED WHETHER COMPLICATED: ICD-10-CM

## 2025-06-18 DIAGNOSIS — S62.91XD CLOSED FRACTURE OF RIGHT HAND WITH ROUTINE HEALING, SUBSEQUENT ENCOUNTER: ICD-10-CM

## 2025-06-18 DIAGNOSIS — I10 ESSENTIAL HYPERTENSION: Primary | ICD-10-CM

## 2025-06-18 DIAGNOSIS — I50.33 ACUTE ON CHRONIC DIASTOLIC (CONGESTIVE) HEART FAILURE (HCC): ICD-10-CM

## 2025-06-18 DIAGNOSIS — G31.9 CEREBELLAR ATROPHY: ICD-10-CM

## 2025-06-18 DIAGNOSIS — K21.9 GASTROESOPHAGEAL REFLUX DISEASE, UNSPECIFIED WHETHER ESOPHAGITIS PRESENT: ICD-10-CM

## 2025-06-18 DIAGNOSIS — J84.9 INTERSTITIAL PULMONARY DISEASE, UNSPECIFIED (HCC): ICD-10-CM

## 2025-06-18 DIAGNOSIS — M81.0 AGE-RELATED OSTEOPOROSIS WITHOUT CURRENT PATHOLOGICAL FRACTURE: ICD-10-CM

## 2025-06-18 DIAGNOSIS — E78.00 HIGH CHOLESTEROL: ICD-10-CM

## 2025-06-18 PROCEDURE — G8399 PT W/DXA RESULTS DOCUMENT: HCPCS | Performed by: FAMILY MEDICINE

## 2025-06-18 PROCEDURE — 1090F PRES/ABSN URINE INCON ASSESS: CPT | Performed by: FAMILY MEDICINE

## 2025-06-18 PROCEDURE — 1159F MED LIST DOCD IN RCRD: CPT | Performed by: FAMILY MEDICINE

## 2025-06-18 PROCEDURE — G8427 DOCREV CUR MEDS BY ELIG CLIN: HCPCS | Performed by: FAMILY MEDICINE

## 2025-06-18 PROCEDURE — 1160F RVW MEDS BY RX/DR IN RCRD: CPT | Performed by: FAMILY MEDICINE

## 2025-06-18 PROCEDURE — G2211 COMPLEX E/M VISIT ADD ON: HCPCS | Performed by: FAMILY MEDICINE

## 2025-06-18 PROCEDURE — 1123F ACP DISCUSS/DSCN MKR DOCD: CPT | Performed by: FAMILY MEDICINE

## 2025-06-18 PROCEDURE — 99214 OFFICE O/P EST MOD 30 MIN: CPT | Performed by: FAMILY MEDICINE

## 2025-06-18 ASSESSMENT — ENCOUNTER SYMPTOMS
RESPIRATORY NEGATIVE: 1
EYES NEGATIVE: 1
GASTROINTESTINAL NEGATIVE: 1

## 2025-06-18 NOTE — PROGRESS NOTES
classified    • Encounter for chronic pain management    • Esophageal reflux    • Essential hypertension, benign    • Frequent falls 7/18/2018   • GERD (gastroesophageal reflux disease)    • Pure hypercholesterolemia        Past Surgical History:   Procedure Laterality Date   • CATARACT REMOVAL Bilateral    • COLONOSCOPY     • GI     • HYSTERECTOMY (CERVIX STATUS UNKNOWN)     • IR GUIDED IVC FILTER RETRIEVAL  3/29/2021   • IR GUIDED IVC FILTER RETRIEVAL  3/29/2021    IR IVC FILTER RETRIEVAL 3/29/2021 [facility] RADIOLOGY SPECIALS   • IR GUIDED PERC TRANSLUM ART THROMBECTOMY INIT  7/28/2020   • IR GUIDED PERC TRANSLUM ART THROMBECTOMY INIT  7/28/2020    IR MECHANICAL ART THROMBECTOMY INIT 7/28/2020 [facility] RADIOLOGY SPECIALS   • LITHOTRIPSY     • NEUROLOGICAL SURGERY     • RETINAL DETACHMENT SURGERY     • SHOULDER ARTHROPLASTY Left    • TOTAL KNEE ARTHROPLASTY Right        Family History   Problem Relation Age of Onset   • Osteoarthritis Mother    • Cancer Father    • Stroke Maternal Grandmother    • Stroke Paternal Grandmother        Social History     Tobacco Use   • Smoking status: Never   • Smokeless tobacco: Never   Substance Use Topics   • Alcohol use: Yes     Comment: one shot a night.       Allergies   Allergen Reactions   • Cyclobenzaprine Other (See Comments)   • Hydroxychloroquine Other (See Comments)   • Hydroxychloroquine Sulfate Other (See Comments)   • Ketoprofen Other (See Comments)   • Molds & Smuts    • Pollen Extract Other (See Comments)   • Prednisone Other (See Comments)   • Shellfish Allergy      Other reaction(s): Unknown (comments)   • Mucinex Dm [Dm-Guaifenesin Er] Anxiety     \"Climbing the walls\"       Current Outpatient Medications   Medication Sig Dispense Refill   • lisinopril (PRINIVIL;ZESTRIL) 5 MG tablet Take 1 tablet by mouth daily 90 tablet 1   • pantoprazole (PROTONIX) 40 MG tablet Take 1 tablet by mouth daily 90 tablet 0   • sucralfate (CARAFATE) 1 GM tablet Take 1 tablet by mouth in the

## 2025-06-19 NOTE — PROGRESS NOTES
Patient was prescribed a Boxer Splint for the right hand. The top portion of the boxer splint is bent slightly to an angle that allows fingers to be in a relaxed position. The straps are opened to allow the hand to rest in the splint with the fourth and fifth fingers in the top portion of the splint. The top portion strap is secured around the fourth and fifth fingers. The strap around the wrist is then secured followed by the middle strap that is secured between the first finger and thumb    Patient read and signed documenting they understand and agree to [facility]'s current DME return policy.     The above DME items were also checked for same/similar on the Medicare portal. An ABN was issued if necessary.

## 2025-06-23 ENCOUNTER — CARE COORDINATION (OUTPATIENT)
Dept: CARE COORDINATION | Facility: CLINIC | Age: 80
End: 2025-06-23

## 2025-06-23 NOTE — CARE COORDINATION
Ambulatory Care Coordination Note     2025 8:41 AM     Patient Current Location:  [place]     ACM contacted the patient by telephone. Verified name and  with patient as identifiers.         ACM: Mireille Gilbert RN     Challenges to be reviewed by the provider   Additional needs identified to be addressed with provider No  none               Method of communication with provider: none.    Utilization: Patient has not had any utilization since our last call.     Care Summary Note: ccm outreached to patient. Patient states no falls since last outreach. Patient states she is eating and drinking well. Pain is being managed well per patient. Patient states no questions or concerns. Ccm discussed that I would outreach next week. Patient agreeable.          Assessments Completed:   No changes since last call    Medications Reviewed:   Patient denies any changes with medications and reports taking all medications as prescribed.    Advance Care Planning:   Not reviewed during this call     Care Planning:   Education Documentation  No documentation found.  Education Comments  No comments found.     ,    Goals Addressed                   This Visit's Progress     Reduce Falls    On track     I will reduce my risk of falls by the following: Remove rugs or use non slip rugs  Install grab bars in bathroom  Use walking aids like cane or walker  Follow through on orders for PT    Barriers: impairment:  physical: patient has issues with ambulation  Plan for overcoming my barriers: weekly calls   Confidence: 7/10  Anticipated Goal Completion Date: 25               PCP/Specialist follow up:   Future Appointments         Provider Specialty Dept Phone    2025 10:40 AM (Arrive by 10:25 AM) Leonarda Villarreal MD Orthopedic Surgery [phone]    2025 3:15 PM (Arrive by 3:00 PM) Gia Mcgrath, APRN - [facility] Pulmonology [phone]    10/29/2025 10:40 AM PERIPHERAL Lab [phone]    10/29/2025 11:45 AM [place]

## 2025-06-24 ENCOUNTER — TELEPHONE (OUTPATIENT)
Dept: FAMILY MEDICINE CLINIC | Facility: CLINIC | Age: 80
End: 2025-06-24

## 2025-06-24 DIAGNOSIS — F41.9 ANXIETY: ICD-10-CM

## 2025-06-24 RX ORDER — SERTRALINE HYDROCHLORIDE 100 MG/1
125 TABLET, FILM COATED ORAL DAILY
Qty: 135 TABLET | Refills: 1 | Status: SHIPPED | OUTPATIENT
Start: 2025-06-24 | End: 2025-12-21

## 2025-06-24 NOTE — TELEPHONE ENCOUNTER
Patient called requesting refill(s) on     Requested Prescriptions     Pending Prescriptions Disp Refills    sertraline (ZOLOFT) 100 MG tablet 135 tablet 1     Sig: Take 1.5 tablets by mouth daily       Last appointment- 6/18/25  Next appointment- 6/27/25

## 2025-06-25 ENCOUNTER — TELEPHONE (OUTPATIENT)
Dept: ORTHOPEDIC SURGERY | Age: 80
End: 2025-06-25

## 2025-06-25 NOTE — TELEPHONE ENCOUNTER
----- Message from Danin YAO sent at 6/25/2025  8:51 AM EDT -----  Regarding: Friend Specialty Message to Provider  Specialty Message to Provider    Relationship to Patient: Self     Patient Message: brace seems to be too big, jayashree comes out of pinkeverardo, can she get a smaller one  --------------------------------------------------------------------------------------------------------------------------    Call Back Information: OK to leave message on voicemail  Preferred Call Back Number: Phone [phone]

## 2025-06-25 NOTE — TELEPHONE ENCOUNTER
I spoke with Ms. Chiu and she stated that her little finger comes out of the loop and the splint slides up and down.  I asked her if she could tighten the straps on the splint more with out cutting off the circulation in her hand and she that she could. I told her the splint should not slide up/down. She has a size small splint which is the smallest we have.  I offered for her to come by the office today so I could check it or she could try tightening the straps and see if she can get it to where it does not move. She voiced understanding and chose to try tightening the straps herself.  If it doesn't work, she will call tomorrow so she can come by the office for us adjust it.

## 2025-06-27 ENCOUNTER — TELEMEDICINE (OUTPATIENT)
Dept: FAMILY MEDICINE CLINIC | Facility: CLINIC | Age: 80
End: 2025-06-27

## 2025-06-27 DIAGNOSIS — I10 ESSENTIAL HYPERTENSION: ICD-10-CM

## 2025-06-27 DIAGNOSIS — F41.9 ANXIETY: ICD-10-CM

## 2025-06-27 DIAGNOSIS — R04.0 EPISTAXIS: Primary | ICD-10-CM

## 2025-06-27 DIAGNOSIS — E78.00 HIGH CHOLESTEROL: ICD-10-CM

## 2025-06-27 DIAGNOSIS — K21.9 GASTROESOPHAGEAL REFLUX DISEASE, UNSPECIFIED WHETHER ESOPHAGITIS PRESENT: ICD-10-CM

## 2025-06-27 DIAGNOSIS — J45.20 MILD INTERMITTENT ASTHMA, UNSPECIFIED WHETHER COMPLICATED: ICD-10-CM

## 2025-06-27 ASSESSMENT — ENCOUNTER SYMPTOMS
BLOOD IN STOOL: 0
EYE PAIN: 0
ABDOMINAL PAIN: 0
BACK PAIN: 0
ABDOMINAL DISTENTION: 0
APNEA: 0
FACIAL SWELLING: 0
SINUS PAIN: 0
CHEST TIGHTNESS: 0
SINUS PRESSURE: 0
COUGH: 0
RHINORRHEA: 0
EYE REDNESS: 0
ANAL BLEEDING: 0
EYE DISCHARGE: 0
ORTHOPNEA: 0
EYE ITCHING: 0

## 2025-06-27 NOTE — PROGRESS NOTES
[facility]  _______________________________________  Ryan Angel MD                 [address] [place] [address]        Ryan Triplett [place] [address]                                                                                    Phone: [phone]                                                                                    Fax: [phone]    Fabby Chiu is a 80 y.o. female who is seen for evaluation of No chief complaint on file.      HPI:   Other  Episode onset: nosebleed, etc. see below for details. Pertinent negatives include no abdominal pain, arthralgias, chest pain, chills, congestion, coughing, diaphoresis, fatigue, fever, headaches, joint swelling, myalgias, neck pain or rash.   Hypertension  Episode onset: on meds, no matt eeffect noted. Associated symptoms include anxiety and palpitations. Pertinent negatives include no chest pain, headaches, malaise/fatigue, neck pain or orthopnea.   Gastroesophageal Reflux  She reports no abdominal pain, no chest pain or no coughing. Chronicity: on meds, no matt eeffect noted. Pertinent negatives include no fatigue.   Anxiety  Presents for follow-up visit. Symptoms include palpitations. Patient reports no chest pain, confusion, dizziness or nervous/anxious behavior. Episode frequency: on meds, no side effect noted.        No chief complaint on file.        Review of Systems:    Review of Systems   Constitutional:  Negative for activity change, appetite change, chills, diaphoresis, fatigue, fever and malaise/fatigue.   HENT:  Negative for congestion, ear discharge, ear pain, facial swelling, hearing loss, mouth sores, rhinorrhea, sinus pressure and sinus pain.    Eyes:  Negative for pain, discharge, redness and itching.   Respiratory:  Negative for apnea, cough and chest tightness.    Cardiovascular:  Positive for palpitations. Negative for chest pain, orthopnea and leg swelling.   Gastrointestinal:

## 2025-06-30 ENCOUNTER — CARE COORDINATION (OUTPATIENT)
Dept: CARE COORDINATION | Facility: CLINIC | Age: 80
End: 2025-06-30

## 2025-06-30 RX ORDER — SOLIFENACIN SUCCINATE 5 MG/1
5 TABLET, FILM COATED ORAL DAILY
Qty: 90 TABLET | Refills: 1 | Status: SHIPPED | OUTPATIENT
Start: 2025-06-30 | End: 2026-06-30

## 2025-06-30 NOTE — TELEPHONE ENCOUNTER
Pt called said she would like to start on the medication the helps her \"not pee so much at night\".  Pt uses [facility] on [facility]. Please advise

## 2025-06-30 NOTE — CARE COORDINATION
Ambulatory Care Coordination Note     2025 9:27 AM     Patient Current Location:  [place]     ACM contacted the patient by telephone. Verified name and  with patient as identifiers.         ACM: Mireille Gilbert RN     Challenges to be reviewed by the provider   Additional needs identified to be addressed with provider No  none               Method of communication with provider: none.    Utilization: Patient has not had any utilization since our last call.     Care Summary Note: ccm outreached to patient. Patient states she has been doing well. Patient states no recent falls. Patient states no questions or concerns. [facility] discussed that I would outreach next week. Patient agreeable.          Assessments Completed:   No changes since last call    Medications Reviewed:   Patient denies any changes with medications and reports taking all medications as prescribed.    Advance Care Planning:   Not reviewed during this call     Care Planning:   Education Documentation  No documentation found.  Education Comments  No comments found.         PCP/Specialist follow up:   Future Appointments         Provider Specialty Dept Phone    2025 10:40 AM (Arrive by 10:25 AM) Leonarda Villarreal MD Orthopedic Surgery [phone]    2025 3:15 PM (Arrive by 3:00 PM) Gia Mcgrath, APRN - CNP Pulmonology [phone]    10/29/2025 10:40 AM PERIPHERAL Lab [phone]    10/29/2025 11:45 AM Joce Franco MD Oncology [phone]    3/2/2026 2:00 PM CHAIR 1 Neurology [phone]            Follow Up:   Plan for next ACM outreach in approximately 1 week to complete:  - goal progression  - education .   Patient  is agreeable to this plan.

## 2025-07-07 ENCOUNTER — CARE COORDINATION (OUTPATIENT)
Dept: CARE COORDINATION | Facility: CLINIC | Age: 80
End: 2025-07-07

## 2025-07-07 NOTE — CARE COORDINATION
Ambulatory Care Coordination Note     2025 9:36 AM     Patient Current Location:  [place]     ACM contacted the patient by telephone. Verified name and  with patient as identifiers.         ACM: Mireille Gilbert RN     Challenges to be reviewed by the provider   Additional needs identified to be addressed with provider No  none               Method of communication with provider: none.    Utilization: Patient has not had any utilization since our last call.     Care Summary Note: ccm outreached to patient. Patient states she is doing well. Patient states splint remains in place. Patient states pain is being controlled. Patient states no questions or concerns. Ccm discussed that I would outreach next week. Patient agreeable.          Assessments Completed:   No changes since last call    Medications Reviewed:   Patient denies any changes with medications and reports taking all medications as prescribed.    Advance Care Planning:   Not reviewed during this call     Care Planning:   Education Documentation  No documentation found.  Education Comments  No comments found.     ,    Goals Addressed                   This Visit's Progress     Reduce Falls    On track     I will reduce my risk of falls by the following: Remove rugs or use non slip rugs  Install grab bars in bathroom  Use walking aids like cane or walker  Follow through on orders for PT    Barriers: impairment:  physical: patient has issues with ambulation  Plan for overcoming my barriers: weekly calls   Confidence: 7/10  Anticipated Goal Completion Date: 25               PCP/Specialist follow up:   Future Appointments         Provider Specialty Dept Phone    2025 10:40 AM (Arrive by 10:25 AM) Leonarda Villarreal MD Orthopedic Surgery [phone]    2025 3:15 PM (Arrive by 3:00 PM) Gia Mcgrath APRN - CNP Pulmonology [phone]    10/29/2025 10:40 AM PERIPHERAL Lab [phone]    10/29/2025 11:45 AM Joce Franco MD

## 2025-07-14 ENCOUNTER — CARE COORDINATION (OUTPATIENT)
Dept: CARE COORDINATION | Facility: CLINIC | Age: 80
End: 2025-07-14

## 2025-07-14 ENCOUNTER — OFFICE VISIT (OUTPATIENT)
Age: 80
End: 2025-07-14
Payer: MEDICARE

## 2025-07-14 DIAGNOSIS — S62.326A DISPLACED FRACTURE OF SHAFT OF FIFTH METACARPAL BONE, RIGHT HAND, INITIAL ENCOUNTER FOR CLOSED FRACTURE: Primary | ICD-10-CM

## 2025-07-14 PROCEDURE — 99213 OFFICE O/P EST LOW 20 MIN: CPT | Performed by: ORTHOPAEDIC SURGERY

## 2025-07-14 PROCEDURE — 1036F TOBACCO NON-USER: CPT | Performed by: ORTHOPAEDIC SURGERY

## 2025-07-14 PROCEDURE — 1090F PRES/ABSN URINE INCON ASSESS: CPT | Performed by: ORTHOPAEDIC SURGERY

## 2025-07-14 PROCEDURE — G8417 CALC BMI ABV UP PARAM F/U: HCPCS | Performed by: ORTHOPAEDIC SURGERY

## 2025-07-14 PROCEDURE — G8399 PT W/DXA RESULTS DOCUMENT: HCPCS | Performed by: ORTHOPAEDIC SURGERY

## 2025-07-14 PROCEDURE — G8428 CUR MEDS NOT DOCUMENT: HCPCS | Performed by: ORTHOPAEDIC SURGERY

## 2025-07-14 PROCEDURE — 1123F ACP DISCUSS/DSCN MKR DOCD: CPT | Performed by: ORTHOPAEDIC SURGERY

## 2025-07-14 NOTE — PROGRESS NOTES
Orthopaedic Hand Clinic Note    Name: Fabby Chiu  YOB: 1945  Gender: female  MRN: [id number]      Follow up visit:   1. Displaced fracture of shaft of fifth metacarpal bone, right hand, initial encounter for closed fracture        HPI: Fabby Chiu is a 80 y.o. female who is following up for right 5th metacarpal fracture. She has been comfortable in her brace. Pain has improved..      ROS/Meds/PSH/PMH/FH/SH: I personally reviewed the patients standard intake form.  Pertinents are discussed in the HPI    Past Medical History:   Diagnosis Date    Abnormality of gait 7/18/2018    Anemia     Anxiety     Arthritis     Asthma     Calculus of kidney     Chronic pain     Contact dermatitis and other eczema, due to unspecified cause     Depressive disorder, not elsewhere classified     Encounter for chronic pain management     Esophageal reflux     Essential hypertension, benign     Frequent falls 7/18/2018    GERD (gastroesophageal reflux disease)     Pure hypercholesterolemia      Past Surgical History:   Procedure Laterality Date    CATARACT REMOVAL Bilateral     COLONOSCOPY      GI      HYSTERECTOMY (CERVIX STATUS UNKNOWN)      IR GUIDED IVC FILTER RETRIEVAL  3/29/2021    IR GUIDED IVC FILTER RETRIEVAL  3/29/2021    IR IVC FILTER RETRIEVAL 3/29/2021 [facility] RADIOLOGY SPECIALS    IR GUIDED PERC TRANSLUM ART THROMBECTOMY INIT  7/28/2020    IR GUIDED PERC TRANSLUM ART THROMBECTOMY INIT  7/28/2020    IR MECHANICAL ART THROMBECTOMY INIT 7/28/2020 [facility] RADIOLOGY SPECIALS    LITHOTRIPSY      NEUROLOGICAL SURGERY      RETINAL DETACHMENT SURGERY      SHOULDER ARTHROPLASTY Left     TOTAL KNEE ARTHROPLASTY Right        Physical Examination:    Musculoskeletal Examination:  Examination on the right upper extremity demonstrates cap refill < 5 seconds in all fingers, there is no tenderness at the fracture site.she is able to perform full extension of the small finger MCP and PIP, flexion is slightly

## 2025-07-16 ENCOUNTER — TELEPHONE (OUTPATIENT)
Dept: FAMILY MEDICINE CLINIC | Facility: CLINIC | Age: 80
End: 2025-07-16

## 2025-07-16 DIAGNOSIS — R04.0 BLEEDING NOSE: Primary | ICD-10-CM

## 2025-07-17 ENCOUNTER — OFFICE VISIT (OUTPATIENT)
Dept: PULMONOLOGY | Age: 80
End: 2025-07-17
Payer: MEDICARE

## 2025-07-17 VITALS
SYSTOLIC BLOOD PRESSURE: 118 MMHG | DIASTOLIC BLOOD PRESSURE: 70 MMHG | WEIGHT: 140.9 LBS | RESPIRATION RATE: 19 BRPM | HEART RATE: 83 BPM | BODY MASS INDEX: 28.4 KG/M2 | TEMPERATURE: 97.1 F | OXYGEN SATURATION: 95 % | HEIGHT: 59 IN

## 2025-07-17 DIAGNOSIS — R09.02 HYPOXEMIA: Primary | ICD-10-CM

## 2025-07-17 DIAGNOSIS — R91.8 LUNG NODULES: ICD-10-CM

## 2025-07-17 DIAGNOSIS — J45.30 MILD PERSISTENT ASTHMA WITHOUT COMPLICATION: ICD-10-CM

## 2025-07-17 PROCEDURE — 3074F SYST BP LT 130 MM HG: CPT | Performed by: INTERNAL MEDICINE

## 2025-07-17 PROCEDURE — 3078F DIAST BP <80 MM HG: CPT | Performed by: INTERNAL MEDICINE

## 2025-07-17 PROCEDURE — G8417 CALC BMI ABV UP PARAM F/U: HCPCS | Performed by: INTERNAL MEDICINE

## 2025-07-17 PROCEDURE — 1090F PRES/ABSN URINE INCON ASSESS: CPT | Performed by: INTERNAL MEDICINE

## 2025-07-17 PROCEDURE — 1125F AMNT PAIN NOTED PAIN PRSNT: CPT | Performed by: INTERNAL MEDICINE

## 2025-07-17 PROCEDURE — 99214 OFFICE O/P EST MOD 30 MIN: CPT | Performed by: INTERNAL MEDICINE

## 2025-07-17 PROCEDURE — G8427 DOCREV CUR MEDS BY ELIG CLIN: HCPCS | Performed by: INTERNAL MEDICINE

## 2025-07-17 PROCEDURE — 1036F TOBACCO NON-USER: CPT | Performed by: INTERNAL MEDICINE

## 2025-07-17 PROCEDURE — 1123F ACP DISCUSS/DSCN MKR DOCD: CPT | Performed by: INTERNAL MEDICINE

## 2025-07-17 PROCEDURE — G8399 PT W/DXA RESULTS DOCUMENT: HCPCS | Performed by: INTERNAL MEDICINE

## 2025-07-17 PROCEDURE — 1159F MED LIST DOCD IN RCRD: CPT | Performed by: INTERNAL MEDICINE

## 2025-07-17 RX ORDER — BUDESONIDE AND FORMOTEROL FUMARATE DIHYDRATE 80; 4.5 UG/1; UG/1
2 AEROSOL RESPIRATORY (INHALATION)
Qty: 3 EACH | Refills: 3 | Status: SHIPPED | OUTPATIENT
Start: 2025-07-17

## 2025-07-17 RX ORDER — ALBUTEROL SULFATE 90 UG/1
2 INHALANT RESPIRATORY (INHALATION) EVERY 4 HOURS PRN
Qty: 3 EACH | Refills: 3 | Status: SHIPPED | OUTPATIENT
Start: 2025-07-17

## 2025-07-17 NOTE — PROGRESS NOTES
Name:  Fabby Chiu  YOB: 1945   MRN: [id number]      Office Visit: 7/17/2025       [facility]      Assessment & Plan    (Medical Decision Making)    Impression: 80 y.o. female     1. Mild intermittent asthma, unspecified whether complicated  --feeling more SOB and wheezing ,on symbicort, given sample of breztri to try and she is let us know if she feels working better in the \"prescription  - montelukast (SINGULAIR) 10 MG tablet; Take 1 tablet by mouth daily  Dispense: 90 tablet; Refill: 3  - DME - DURABLE MEDICAL EQUIPMENT    2. Seasonal allergic rhinitis due to pollen  --controlled on Singulair.  Continue current therapy.  - montelukast (SINGULAIR) 10 MG tablet; Take 1 tablet by mouth daily  Dispense: 90 tablet; Refill: 3    3. Hypoxemia  On 2L at night  - DME - DURABLE MEDICAL EQUIPMENT    4.  Lung nodules          Orders Placed This Encounter   Medications    albuterol sulfate HFA (PROVENTIL;VENTOLIN;PROAIR) 108 (90 Base) MCG/ACT inhaler     Sig: Inhale 2 puffs into the lungs every 4 hours as needed for Wheezing asthmadx code j45.09     Dispense:  3 each     Refill:  3    budesonide-formoterol (SYMBICORT) 80-4.5 MCG/ACT AERO     Sig: Inhale 2 puffs into the lungs in the morning and 2 puffs in the evening.     Dispense:  3 each     Refill:  3     No orders of the defined types were placed in this encounter.    MD Jermain Ibarra/Alcides      _________________________________________________________________________    HISTORY OF PRESENT ILLNESS:    Ms. Fabby Chiu is a 80 y.o. female who is seen at [facility] today for  Asthma    Patient is 80 years old female who is here today for follow-up of asthma and allergic migraines.  She is here with her daughter.  She is in a wheelchair.  She is on Symbicort and albuterol as needed.  She reports she is feels like her breathing worse and she has been using it more usually when she has exertion.  She also admits she

## 2025-07-17 NOTE — TELEPHONE ENCOUNTER
nurse Lillian calling pt called her again about nose bleeds. Nurse is wondering about d/c fish oil since she is on eliquis to see if that might help lessen the nose bleeds.    Marked referral as urgent yesterday.

## 2025-07-29 ENCOUNTER — TELEPHONE (OUTPATIENT)
Dept: FAMILY MEDICINE CLINIC | Facility: CLINIC | Age: 80
End: 2025-07-29

## 2025-07-29 DIAGNOSIS — K21.9 GASTROESOPHAGEAL REFLUX DISEASE, UNSPECIFIED WHETHER ESOPHAGITIS PRESENT: ICD-10-CM

## 2025-07-29 RX ORDER — PANTOPRAZOLE SODIUM 40 MG/1
40 TABLET, DELAYED RELEASE ORAL DAILY
Qty: 90 TABLET | Refills: 1 | Status: SHIPPED | OUTPATIENT
Start: 2025-07-29 | End: 2026-01-25

## 2025-07-29 NOTE — TELEPHONE ENCOUNTER
Patient called requesting refill(s) on     Requested Prescriptions     Pending Prescriptions Disp Refills    pantoprazole (PROTONIX) 40 MG tablet 90 tablet 0     Sig: Take 1 tablet by mouth daily       Last appointment- 6/27/25  Next appointment-

## 2025-07-30 DIAGNOSIS — I10 ESSENTIAL HYPERTENSION: ICD-10-CM

## 2025-07-30 RX ORDER — LISINOPRIL 5 MG/1
5 TABLET ORAL DAILY
Qty: 90 TABLET | Refills: 1 | Status: SHIPPED | OUTPATIENT
Start: 2025-07-30 | End: 2026-01-26

## 2025-08-26 ENCOUNTER — TELEPHONE (OUTPATIENT)
Dept: FAMILY MEDICINE CLINIC | Facility: CLINIC | Age: 80
End: 2025-08-26

## 2025-09-02 PROBLEM — M47.816 LUMBAR SPONDYLOSIS: Status: ACTIVE | Noted: 2025-02-14

## 2025-09-02 PROBLEM — M47.20 RADICULOPATHY DUE TO SPONDYLOSIS: Status: ACTIVE | Noted: 2025-02-14

## 2025-09-03 ENCOUNTER — OFFICE VISIT (OUTPATIENT)
Dept: FAMILY MEDICINE CLINIC | Facility: CLINIC | Age: 80
End: 2025-09-03

## 2025-09-03 VITALS — WEIGHT: 134 LBS | BODY MASS INDEX: 27.01 KG/M2 | HEIGHT: 59 IN

## 2025-09-03 DIAGNOSIS — R05.8 ACE-INHIBITOR COUGH: ICD-10-CM

## 2025-09-03 DIAGNOSIS — I07.1 MODERATE TRICUSPID REGURGITATION: ICD-10-CM

## 2025-09-03 DIAGNOSIS — I34.0 MITRAL VALVE INSUFFICIENCY, UNSPECIFIED ETIOLOGY: Primary | ICD-10-CM

## 2025-09-03 DIAGNOSIS — I10 ESSENTIAL HYPERTENSION: ICD-10-CM

## 2025-09-03 DIAGNOSIS — T46.4X5A ACE-INHIBITOR COUGH: ICD-10-CM

## 2025-09-03 RX ORDER — LOSARTAN POTASSIUM 50 MG/1
50 TABLET ORAL DAILY
Qty: 90 TABLET | Refills: 1 | Status: SHIPPED | OUTPATIENT
Start: 2025-09-03

## 2025-09-03 RX ORDER — IPRATROPIUM BROMIDE 21 UG/1
2 SPRAY, METERED NASAL EVERY 12 HOURS
COMMUNITY

## 2025-09-03 ASSESSMENT — ENCOUNTER SYMPTOMS
RHINORRHEA: 0
ABDOMINAL PAIN: 0
FACIAL SWELLING: 0
ANAL BLEEDING: 0
SINUS PAIN: 0
EYE PAIN: 0
EYE REDNESS: 0
COUGH: 1
EYE ITCHING: 0
EYE DISCHARGE: 0
SINUS PRESSURE: 0
CHEST TIGHTNESS: 0
ABDOMINAL DISTENTION: 0
APNEA: 0
BLURRED VISION: 0
BLOOD IN STOOL: 0
BACK PAIN: 0

## 2025-09-03 ASSESSMENT — PATIENT HEALTH QUESTIONNAIRE - PHQ9
SUM OF ALL RESPONSES TO PHQ QUESTIONS 1-9: 0
1. LITTLE INTEREST OR PLEASURE IN DOING THINGS: NOT AT ALL